# Patient Record
Sex: FEMALE | Race: WHITE | NOT HISPANIC OR LATINO | Employment: UNEMPLOYED | URBAN - METROPOLITAN AREA
[De-identification: names, ages, dates, MRNs, and addresses within clinical notes are randomized per-mention and may not be internally consistent; named-entity substitution may affect disease eponyms.]

---

## 2017-07-20 ENCOUNTER — TELEPHONE (OUTPATIENT)
Dept: NEUROLOGY | Facility: CLINIC | Age: 27
End: 2017-07-20

## 2017-07-20 NOTE — TELEPHONE ENCOUNTER
----- Message from RT Quinn sent at 7/20/2017 11:54 AM CDT -----  Contact: pt     pt , requesting an appt much sooner than 10/19/2017 referred to by her oral surgeon, Dr. Mohan, for nerve damage / migraine head aches, appt was put on wait list, thanks.

## 2017-09-06 ENCOUNTER — TELEPHONE (OUTPATIENT)
Dept: NEUROLOGY | Facility: CLINIC | Age: 27
End: 2017-09-06

## 2017-09-06 NOTE — TELEPHONE ENCOUNTER
Spoke with the pt, reports she was attempting to get in sooner. Pt will keep the appt for tomorrow. Informed her she would need to collect all information from the dentist that severed the nerve in face. Verbalized understanding and will bring documentation. I do not see referral in the system, the pt was scheduled by the phone room with Dr. Diaz in October as well.

## 2017-09-06 NOTE — TELEPHONE ENCOUNTER
----- Message from Stephanie Ugalde sent at 9/6/2017  1:24 PM CDT -----  Patient requesting to speak with nurse concerning tomorrow's appointment/requesting to be seen today if possible/please call back at [phone] to advise.

## 2017-09-07 ENCOUNTER — OFFICE VISIT (OUTPATIENT)
Dept: NEUROLOGY | Facility: CLINIC | Age: 27
End: 2017-09-07
Payer: COMMERCIAL

## 2017-09-07 VITALS
BODY MASS INDEX: 47.48 KG/M2 | HEART RATE: 115 BPM | HEIGHT: 65 IN | SYSTOLIC BLOOD PRESSURE: 132 MMHG | DIASTOLIC BLOOD PRESSURE: 88 MMHG | WEIGHT: 285 LBS | RESPIRATION RATE: 18 BRPM

## 2017-09-07 DIAGNOSIS — M79.2 NEUROPATHIC PAIN: ICD-10-CM

## 2017-09-07 DIAGNOSIS — G50.9 TRIGEMINAL NEUROPATHY: Primary | ICD-10-CM

## 2017-09-07 PROCEDURE — 3008F BODY MASS INDEX DOCD: CPT | Mod: S$GLB,,, | Performed by: PSYCHIATRY & NEUROLOGY

## 2017-09-07 PROCEDURE — 99204 OFFICE O/P NEW MOD 45 MIN: CPT | Mod: S$GLB,,, | Performed by: PSYCHIATRY & NEUROLOGY

## 2017-09-07 PROCEDURE — 99999 PR PBB SHADOW E&M-EST. PATIENT-LVL III: CPT | Mod: PBBFAC,,, | Performed by: PSYCHIATRY & NEUROLOGY

## 2017-09-07 RX ORDER — FLUOXETINE HYDROCHLORIDE 40 MG/1
60 CAPSULE ORAL
COMMUNITY
End: 2017-09-07 | Stop reason: SDUPTHER

## 2017-09-07 RX ORDER — OXCARBAZEPINE 600 MG/1
600 TABLET, FILM COATED ORAL 2 TIMES DAILY
Qty: 60 TABLET | Refills: 3 | Status: SHIPPED | OUTPATIENT
Start: 2017-09-07 | End: 2017-10-27 | Stop reason: SDUPTHER

## 2017-09-07 RX ORDER — KETOROLAC TROMETHAMINE 10 MG/1
10 TABLET, FILM COATED ORAL EVERY 6 HOURS
COMMUNITY
End: 2017-10-17

## 2017-09-07 RX ORDER — FLUOXETINE HYDROCHLORIDE 20 MG/1
60 CAPSULE ORAL DAILY
Qty: 90 CAPSULE | Refills: 0 | Status: SHIPPED | OUTPATIENT
Start: 2017-09-07 | End: 2017-10-04 | Stop reason: SDUPTHER

## 2017-09-07 NOTE — PATIENT INSTRUCTIONS
WORKUP:  -- none     PREVENTION (use daily regardless of headache):  -- start oxcarbazepine (trileptal) 300mg twice a day and increase to 600mg twice a day according to chart   (trileptal can elevate prozac levels)    ACUTE TREATMENT:  -- start Nucynta 50mg 2x per day as needed for severe pain     ---------------------------------------------------------------------------------    Oxcarbazepine (Trileptal brand)     Oxcarbazepine is an anti-epileptic medication that can be beneficial for painful neurological conditions. While it is only FDA approved for certain types of seizures, it has proven useful in the treatment of trigeminal and other painful neuralgias. It does not appear to help with migraines. While it is generally quite safe and well tolerated, it may cause side effects, including but not limited to the following:   Dizziness   Rash   Low blood sodium (blood test)   Sleepiness / sedation   Unsteadiness   Difficulty thinking or concentrating   Suicidal thoughts (a rare problem with any seizure drug)     Serious reactions are rare. Dosing should be increased gradually. It is usually administered twice a day. Use the following schedule as a guide to find the lowest dose that seems to be effective for you. Check off each dose increase as you go. Usually the dose is increased every 3-5 days.     Low dosing is done with 300mg tablets, higher dosing with 600mg tablets. The tablets can be easily split with a knife or pill-cutter.     Oxcarbazepine dosing using 300 or 600 mg tabs   # of Tablets                                    Check off Each Day   Morning  Evening  1  2  3  (4)  (5)    1/2  1/2         1/2  1         1  1         1  1 1/2         1 1/2  1 1/2         1 1/2  2         2  2           Do not increase dose by more than ½ tablet per day every 3 days. If needed, you can increase more slowly than suggested. Stop increasing your dose if you experience side effects. If your condition responds to the  medication at a lower strength, stop increasing your dose - the idea is to find the minimally effective strength you need to feel better. If you are better at 1 pill twice a day, then that is your dose!

## 2017-09-07 NOTE — PROGRESS NOTES
"Date of service: 9/7/2017  Referring provider: Aaareferral Self    Subjective:      Chief complaint: Facial Pain       Patient ID: Jasper Guerrero is a 26 y.o. lady with fibromyalgia who presents for the evaluation of facial pain. She is a new patient to me.     History of Present Illness    Headache History:  Age at onset and course over time: intermittent migraines but then August 12th, 2016 was having lower wisdom teeth removed by a local dentist, this was a difficult extraction, never regained feeling in right half of her tongue, was initially told it was local anesthetic effect. Pain in the bottom molars radiating "everywhere" started shortly thereafter. 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 She was urgently referred to orofacial surgeon at [facility] and she underwent grafting of cadaver lingual nerve in Oct 2016 as well as removal of a neuroma that was found during surgery; with some resolution to pain but the sensation to the right tongue ever came back. Now the pain has intensified again. She reports septocaine (articaine) was used for the initial extraction.   Location: right jaw (inside) radiating up the temple and across the forehead; +trigger area involving the right gum (buccal surface) that will briefly cause neuropathic sensations when touched   Quality: stabbing, throbbing, sharp   Severity:7-10/10   Duration: constant   Frequency: daily  Alleviated by: none  Exacerbated by: none  Associated with: nausea, dizziness, irritability, anger, anxiety, problem with memory and relaxation; loss of sensation/taste to anterior right tongue (can not taste hot wings on the right tongue)  Sleep habits:  Caffeine intake: 2-3 per day     Current acute treatment - multiple times per day, daily has developed erosive gastritis and black stools as a result   -- ibuprofen  -- excedrin  -- toradol   -- tylenol " "    Current prevention:  -- none   (fluoxetine 60mg for severe anxiety and severe depression)    Previously tried/failed acute treatment:  -- tylenol  -- aspirin  -- toradol  -- naproxen  -- fioricet  -- norco  -- percocet   -- Nucynta - helped the most     Previously tried/failed preventative treatment:  -- gabapentin - developed night gonzales   -- pregabalin - "couldn't feel legs"    Review of patient's allergies indicates:   Allergen Reactions    Penicillins      Current Outpatient Prescriptions   Medication Sig Dispense Refill    fluoxetine (PROZAC) 20 MG capsule Take 3 capsules (60 mg total) by mouth once daily. 90 capsule 0    ketorolac (TORADOL) 10 mg tablet Take 10 mg by mouth every 6 (six) hours.      oxcarbazepine (TRILEPTAL) 600 MG Tab Take 1 tablet (600 mg total) by mouth 2 (two) times daily. 60 tablet 3    tapentadol (NUCYNTA) 50 mg Tab Take 1 tablet (50 mg total) by mouth 2 (two) times daily as needed (severe pain). 60 tablet 0     No current facility-administered medications for this visit.        Past Medical History  Past Medical History:   Diagnosis Date    Fibromyalgia        Past Surgical History  Past Surgical History:   Procedure Laterality Date    MOUTH SURGERY         Family History  Family History   Problem Relation Age of Onset    Family history unknown: Yes       Social History  Social History     Social History    Marital status:      Spouse name: N/A    Number of children: N/A    Years of education: N/A     Occupational History    Not on file.     Social History Main Topics    Smoking status: Never Smoker    Smokeless tobacco: Never Used    Alcohol use No    Drug use: No    Sexual activity: Yes     Partners: Male     Other Topics Concern    Not on file     Social History Narrative    No narrative on file        Review of Systems  Constitutional: no fever, no chills  Eyes: no change to vision, no redness, no tearing  Ears, nose, mouth, throat: no hearing loss, no " tinnitus, no rhinorrhea, no difficulty swallowing   Cardiovascular: + palpitations  Respiratory: no shortness of breath  Gastrointestinal: no diarrhea, no constipation +nausea/vomiting   Musculoskeletal: no joint pain  Skin: no rashes  Neurologic: + numbness, weakness, change to speech, loss of coordination   Endocrine: no heat or cold intolerance + fatigue   Psych: + severe depression and anxiety     Objective:        Vitals:    09/07/17 1308   BP: 132/88   Pulse: (!) 115   Resp: 18     Body mass index is 47.43 kg/m².    Constitutional: appears in no acute distress, well-developed, well-nourished      Eyes: normal conjunctiva, PERRLA, optic discs are flat and sharp bilaterally     Ears, nose, mouth, throat: external appearance of ears and nose normal, hearing intact to finger rub     Cardiovascular: regular rate and rhythm, no murmurs appreciated, no carotid bruits     Respiratory: unlabored respirations, breath sounds normal bilaterally    Musculoskeletal: normal tone in all four extremities. No atrophy. No abnormal movements. Strength in all muscles groups of the upper and lower extremities is 5/5. Normal gait and station. Digits and nails normal.      Spine: cervical spine with normal range of motion     Psychiatric: normal judgment and insight. Oriented to person, place, and time.     Neurologic:   Cortical functions: recent and remote memory intact, normal attention span and concentration, speech fluent, adequate fund of knowledge   Cranial nerves: visual fields full, PERRLA, EOMI, facial sensation reduced to light touch in right V2 and V3 but intact to temperature in bialteral V1-V3, symmetric facial strength, hearing intact to finger rub, palate elevates symmetrically, shoulder shrug 5/5, tongue protrudes midline and has full strength   Reflexes: 2+ in the upper and lower extremities  Sensation: intact to light touch and temperature   Coordination: normal finger to noise    Data Review:     No results found  "for this or any previous visit.    No results found for: BNP, NA, K, MG, CL, CO2, BUN, CREATININE, GLU, HGBA1C, MG, AST, ALT, ALBUMIN, PROT, BILITOT, CHOL, HDL, LDLCALC, TRIG    No results found for: WBC, HGB, HCT, MCV, PLT    No results found for: TSH    Assessment & Plan:       Problem List Items Addressed This Visit     None      Visit Diagnoses     Trigeminal neuropathy    -  Primary    Relevant Medications    oxcarbazepine (TRILEPTAL) 600 MG Tab    tapentadol (NUCYNTA) 50 mg Tab    Neuropathic pain        Relevant Medications    oxcarbazepine (TRILEPTAL) 600 MG Tab    tapentadol (NUCYNTA) 50 mg Tab              Ms. Guerrero experiences severe, constant, neuropathic type pain in the right jaw radiating to the temple and forehead associated with numbness in the anterior tongue (right side), present since undergoing a wisdom tooth extraction in August 2016. 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 She has been on 2 neuropathic agents - gabapentin and pregabalin - which she has not tolerated at all. She "takes the edge off" the pain with significant doses of multiple NSAIDs daily and has now developed erosive gastritis. In this case, I would like to treat this akin to a trigeminal neuralgia/neuropathy (neuropathy, given sensory loss) since essentially this is a secondary trigeminal neuralgia. Since the best evidence for trigeminal neuralgia exists with carbamazepine, I will trial this but actually use oxcarbazepine for improved tolerance. I have advised with written instructions on a slow titration regimen to improve tolerance. For as needed relief, she must at once stop NSAIDs given the " gastrointestinal complications she has developed, and because this is ineffective for a neuropathic mechanism. I will instead prescribe Nucynta, and while I am not in favor of long term opiate therapy in general, I think short courses of this medication are important and indicated given the need for a non NSAID, anti-neuropathic pain type medicine. Nucynta satisfies this role given norepinephrine reuptake blocking activity. I am hoping that as oxcarbazepine is titrated, she will have lower need for Nucynta.     She asked me to refill her Prozac for the capsule form rather than tablet. Current dose is 60mg so I refilled it as 20mg x 3 per day without refills.     WORKUP:  -- none     PREVENTION (use daily regardless of headache):  -- start oxcarbazepine (trileptal) 300mg (half tab) and raise according to chart until 600mg BID is reached  (trileptal can elevate prozac levels)    ACUTE TREATMENT:  -- start Nucynta IR 50mg BID prn severe pain #60, no refills. Paper script.       Return in about 4 weeks (around 10/5/2017). for medication refill.     Sangeeta Hart MD

## 2017-09-12 ENCOUNTER — TELEPHONE (OUTPATIENT)
Dept: NEUROLOGY | Facility: CLINIC | Age: 27
End: 2017-09-12

## 2017-09-12 NOTE — TELEPHONE ENCOUNTER
----- Message from Joshua Bermudez sent at 9/11/2017 12:33 PM CDT -----  Contact: patient  Patient called requesting a script of anti itch. [facility] [place]. Please call back at [phone] when script has been sent. Thanks,

## 2017-09-12 NOTE — TELEPHONE ENCOUNTER
Noted. Returned call to patient, she will buy an over the counter cream and if this doesn't work we will call in hydroxyzine tablets.

## 2017-09-12 NOTE — TELEPHONE ENCOUNTER
Returned call and spoke with patient. Patient stated she has taken Nucynta before and it made her itch. Patient states she is itching and she knows it is a side effect. Patient would like anti-itch cream. Please advise.

## 2017-09-12 NOTE — TELEPHONE ENCOUNTER
Tried to call the patient. No answer. Unable to leave message due to busy tone. Will try again at a later time.

## 2017-09-12 NOTE — TELEPHONE ENCOUNTER
Returned call. No answer. Unable to leave message due to busy tone. Will try again at a later time.

## 2017-09-26 ENCOUNTER — TELEPHONE (OUTPATIENT)
Dept: NEUROLOGY | Facility: CLINIC | Age: 27
End: 2017-09-26

## 2017-09-26 NOTE — TELEPHONE ENCOUNTER
----- Message from Rebekah Tirado sent at 9/26/2017  1:28 PM CDT -----  Contact: Patient  Jasper, patient [phone], calling about a refill for next Saturday 10/7/17 Rx tapentadol (NUCYNTA) 50 mg Tab. Patient would like to  the hard copy Rx this week or next so she can have refilled on time. Please advise. Thanks.

## 2017-09-26 NOTE — TELEPHONE ENCOUNTER
S/W pt & advised her that this is a controlled substance & we cannot issue this type of prescription over a week ahead of when is would actually be due to be refilled; she can call back next week, on Wed 10 4 or Thurs 10/5, when Dr Hart is in clinic to see if it can be written at that time; pt verbalizes understanding.

## 2017-10-04 DIAGNOSIS — M79.2 NEUROPATHIC PAIN: ICD-10-CM

## 2017-10-04 DIAGNOSIS — G50.9 TRIGEMINAL NEUROPATHY: ICD-10-CM

## 2017-10-04 RX ORDER — FLUOXETINE HYDROCHLORIDE 20 MG/1
60 CAPSULE ORAL DAILY
Qty: 90 CAPSULE | Refills: 5 | Status: SHIPPED | OUTPATIENT
Start: 2017-10-04 | End: 2017-10-17

## 2017-10-04 NOTE — TELEPHONE ENCOUNTER
Returned call and spoke with patient. Informed her that Dr. Hart refilled Nucynta and it would be at the . Patient stated her  Sanjay Guerrero may  the Rx. Advised to bring either her/his ID as we will check them. Patient verbalized understanding.

## 2017-10-04 NOTE — TELEPHONE ENCOUNTER
----- Message from Lashell Flores sent at 10/4/2017 11:42 AM CDT -----  Contact: [phone]  Patient requesting a refill on nucynta, patient requesting to  the prescription and she will bring it to the pharmacy, as of this Saturday she will be out of medication.      Please call patient at [phone].     Thanks!

## 2017-10-17 ENCOUNTER — OFFICE VISIT (OUTPATIENT)
Dept: NEUROLOGY | Facility: CLINIC | Age: 27
End: 2017-10-17
Payer: COMMERCIAL

## 2017-10-17 VITALS — HEIGHT: 65 IN | BODY MASS INDEX: 48.48 KG/M2 | WEIGHT: 291 LBS

## 2017-10-17 DIAGNOSIS — M79.2 NEUROPATHIC PAIN: ICD-10-CM

## 2017-10-17 DIAGNOSIS — G50.9 TRIGEMINAL NEUROPATHY: Primary | ICD-10-CM

## 2017-10-17 DIAGNOSIS — F32.A DEPRESSION, UNSPECIFIED DEPRESSION TYPE: ICD-10-CM

## 2017-10-17 PROCEDURE — 99214 OFFICE O/P EST MOD 30 MIN: CPT | Mod: S$GLB,,, | Performed by: PSYCHIATRY & NEUROLOGY

## 2017-10-17 PROCEDURE — 99999 PR PBB SHADOW E&M-EST. PATIENT-LVL III: CPT | Mod: PBBFAC,,, | Performed by: PSYCHIATRY & NEUROLOGY

## 2017-10-17 RX ORDER — BUPROPION HYDROCHLORIDE 100 MG/1
100 TABLET, EXTENDED RELEASE ORAL DAILY
Refills: 3 | COMMUNITY
Start: 2017-09-27 | End: 2017-12-18

## 2017-10-17 RX ORDER — FLUOXETINE HYDROCHLORIDE 40 MG/1
40 CAPSULE ORAL DAILY
Refills: 1 | COMMUNITY
Start: 2017-10-05 | End: 2018-04-17

## 2017-10-17 RX ORDER — ALPRAZOLAM 1 MG/1
1 TABLET ORAL 2 TIMES DAILY PRN
Refills: 1 | Status: ON HOLD | COMMUNITY
Start: 2017-10-05 | End: 2019-02-10 | Stop reason: CLARIF

## 2017-10-17 NOTE — PROGRESS NOTES
"Date of service: 10/17/2017  Referring provider: No ref. provider found    Subjective:      Chief complaint: Follow-up       Patient ID: Jasper Guerrero is a 26 y.o. lady with fibromyalgia, depression, and trigeminal neuropathic pain who presents for follow up.     History of Present Illness    INTERVAL HISTORY     At last visit during which she was a new patient to me she was started on trileptal titration for prevention of trigeminal neuropathic pain and nucynta for acute treatment. She had called in saying she was doing very well.     Today she reports she had been pretty much pain free on the above regimen, until this recent week when she had an unfortunate number of very said circumstances occur - her close friend's young brother passed away from Soler sarcoma, and a relative passed away as well. She has been scratching her right forearm to deal with emotional pain.     She reports at one point she mis-read the chart on the trileptal and was taking 1200mg BID rather than 600mg BID. When she took this high dose, she developed an itchy rash and went to the Ed. She was evaluated and it was determined she had misunderstood the directions and when she went back to 600mg BID the rash went away. The nucynta works well at 50mg but recently has not been lasting more than about 4 hours.    The patient shows no worrisome behaviors in regards to her medications. She has no adverse effects. Analgesia is adequate just not lasting long.     She does not have thoughts of killing herself. She said she would never do that as she has to be present for her young son.     ORIGINAL PAIN HISTORY - 9/7/17  Age at onset and course over time: intermittent migraines but then August 12th, 2016 was having lower wisdom teeth removed by a local dentist, this was a difficult extraction, never regained feeling in right half of her tongue, was initially told it was local anesthetic effect. Pain in the bottom molars radiating "everywhere" " "started shortly thereafter. 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 She was urgently referred to orofacial surgeon at [facility] and she underwent grafting of cadaver lingual nerve in Oct 2016 as well as removal of a neuroma that was found during surgery; with some resolution to pain but the sensation to the right tongue ever came back. Now the pain has intensified again. She reports septocaine (articaine) was used for the initial extraction.   Location: right jaw (inside) radiating up the temple and across the forehead; +trigger area involving the right gum (buccal surface) that will briefly cause neuropathic sensations when touched   Quality: stabbing, throbbing, sharp   Severity:7-10/10   Duration: constant   Frequency: daily  Alleviated by: none  Exacerbated by: none  Associated with: nausea, dizziness, irritability, anger, anxiety, problem with memory and relaxation; loss of sensation/taste to anterior right tongue (can not taste hot wings on the right tongue)  Sleep habits:  Caffeine intake: 2-3 per day     Current acute treatment -   -- nucynta IR 50mg BID prn     Current prevention:  -- oxcarbazepine 600mg BID   (fluoxetine 60mg for severe anxiety and severe depression)    Previously tried/failed acute treatment: NSAIDs multiple times per day, daily has developed erosive gastritis and black stools as a result   -- tylenol  -- aspirin  -- toradol  -- naproxen  -- fioricet  -- norco  -- percocet   -- Nucynta - helped the most     Previously tried/failed preventative treatment:  -- gabapentin - developed night gonzales   -- pregabalin - "couldn't feel legs"    Review of patient's allergies indicates:   Allergen Reactions    Penicillins      Current Outpatient Prescriptions   Medication Sig Dispense Refill    alprazolam (XANAX) 1 MG tablet Take 1 mg by mouth 2 (two) times daily as needed.  1    buPROPion " (WELLBUTRIN SR) 100 MG TBSR 12 hr tablet Take 100 mg by mouth once daily.  3    fluoxetine (PROZAC) 40 MG capsule Take 80 mg by mouth once daily.  1    oxcarbazepine (TRILEPTAL) 600 MG Tab Take 1 tablet (600 mg total) by mouth 2 (two) times daily. 60 tablet 3    tapentadol (NUCYNTA) 50 mg Tab Take 1 tablet (50 mg total) by mouth once as needed (severe pain). X 20 days. Add to previous prescription for total 50mg PO TID prn. 20 tablet 0     No current facility-administered medications for this visit.        Past Medical History  Past Medical History:   Diagnosis Date    Fibromyalgia        Past Surgical History  Past Surgical History:   Procedure Laterality Date    MOUTH SURGERY         Family History  Family History   Problem Relation Age of Onset    Family history unknown: Yes       Social History  Social History     Social History    Marital status:      Spouse name: N/A    Number of children: N/A    Years of education: N/A     Occupational History    Not on file.     Social History Main Topics    Smoking status: Never Smoker    Smokeless tobacco: Never Used    Alcohol use No    Drug use: No    Sexual activity: Yes     Partners: Male     Other Topics Concern    Not on file     Social History Narrative    No narrative on file        Review of Systems  Constitutional: no fever, no chills  Eyes: no change to vision, no redness, no tearing  Ears, nose, mouth, throat: no hearing loss, no tinnitus, no rhinorrhea, no difficulty swallowing   Cardiovascular: + palpitations  Respiratory: no shortness of breath  Gastrointestinal: no diarrhea, no constipation +nausea/vomiting   Musculoskeletal: no joint pain  Skin: no rashes  Neurologic: + numbness, weakness, change to speech, loss of coordination   Endocrine: no heat or cold intolerance + fatigue   Psych: + severe depression and anxiety     Objective:        There were no vitals filed for this visit.  Body mass index is 48.43 kg/m².    General: Well  developed, well nourished. She is sad and eyes appear that she had been crying.   HEENT: Atraumatic, normocephalic.  Neck: Trachea midline  Cardiovascular: Vitals reviewed. Normal peripheral perfusion.   Pulmonary: No increased work of breathing.  Abdomen/GI: No guarding  Musculoskeletal: No obvious joint deformities, moves all extremities symmetrically and well.     Neurological exam:  Mental status: Awake and alert. Oriented to situation.  Speech fluent and appropriate. Recent and remote memory appear to be intact.  Fund of knowledge normal.  Cranial nerves: Pupils equal round, extraocular movements intact, facial strength intact bilaterally, hearing grossly intact bilaterally.  Gait: Normal       Data Review:     No results found for this or any previous visit.    No results found for: BNP, NA, K, MG, CL, CO2, BUN, CREATININE, GLU, HGBA1C, MG, AST, ALT, ALBUMIN, PROT, BILITOT, CHOL, HDL, LDLCALC, TRIG    No results found for: WBC, HGB, HCT, MCV, PLT    No results found for: TSH    Assessment & Plan:       Problem List Items Addressed This Visit     Trigeminal neuropathy - Primary    Relevant Medications    tapentadol (NUCYNTA) 50 mg Tab    Neuropathic pain    Relevant Medications    tapentadol (NUCYNTA) 50 mg Tab      Other Visit Diagnoses     Depression, unspecified depression type        Relevant Orders    Ambulatory Referral to Psychiatry              Ms. Guerrero has right trigeminal neuropathic pain related to traumatic injury to the lingual nerve. She was doing very well when we first initiated titration of trileptal, now 600mg BID and nucynta IR 50mg BID prn but has since experienced flare of pain in the setting of multiple significant life events. I will raise her trileptal gradually, and have her monitor for return of rash in which case I would have her go back to the previous lower dose. I will raise nucynta IR to 50mg TID prn.     For her depression I would like her to see one of our [facility]  psychiatrists as the relationship she has with her current one is not therapeutic.     WORKUP:  -- none     Refer to Dr. Swati Tripp ([facility] in [place])     PREVENTION (use daily regardless of headache):  -- raise oxcarbazepine to 900mg (1.5 tablets) and 600mg (1 tablet) x 3-5 days, if tolerating, but still in pain, raise to 900mg twice a day and let me know how this works for next refill     ACUTE TREATMENT:  -- raise Nucynta IR to 50mg three times a day as needed. #20 with zero refills prescribed today to add to 10/5/17 script.       Return in about 2 months (around 12/17/2017). for medication refill.     Sangeeta Hart MD

## 2017-10-17 NOTE — PATIENT INSTRUCTIONS
WORKUP:  -- none     Refer to Dr. Swati Tripp ([facility] psychiatrist in [place])     PREVENTION (use daily regardless of headache):  -- raise oxcarbazepine to 900mg (1.5 tablets) and 600mg (1 tablet) x 3-5 days, if tolerating, but still in pain, raise to 900mg twice a day and let me know how this works for next refill     ACUTE TREATMENT:  -- raise Nucynta IR to 50mg three times a day as needed. #20 with zero refills prescribed today to add to 10/5/17 script.

## 2017-10-18 DIAGNOSIS — M79.2 NEUROPATHIC PAIN: ICD-10-CM

## 2017-10-18 DIAGNOSIS — G50.9 TRIGEMINAL NEUROPATHY: ICD-10-CM

## 2017-10-18 NOTE — TELEPHONE ENCOUNTER
Spoke with pharmacist at [facility], stated that patient got the previous prescription at [facility] and that they did not have the medication in stock; they advised patient to get medication at [facility] again. Spoke with patient, she said that she can  the prescription from [facility] if you will send the prescription there because they always have it in stock.

## 2017-10-18 NOTE — TELEPHONE ENCOUNTER
----- Message from Diane Gibbs sent at 10/18/2017  1:16 PM CDT -----  Contact: patient  Patient calling in regards to a prescription change for Nucynta. The Pharmacy won't fill the prescription for 10 days. She was told it is too soon. Please advise.  Call back   Thanks!    [facility] [place] - [address] [facility]  [address] [facility] [place] [address]  Phone: [phone] Fax: [phone]

## 2017-10-19 ENCOUNTER — TELEPHONE (OUTPATIENT)
Dept: PSYCHIATRY | Facility: CLINIC | Age: 27
End: 2017-10-19

## 2017-10-19 NOTE — TELEPHONE ENCOUNTER
----- Message from Swati Tripp MD sent at 10/18/2017  4:46 PM CDT -----  Do we accept pt's insurance - [employer]?      ----- Message -----  From: Sangeeta Hart MD  Sent: 10/17/2017   5:03 PM  To: MD Dr. Qasim Barrientos,    I'm not sure if you are taking new patients, but would you please consider following my patient Ms. Jasper Guerrero. She is a very sweet 26 year old who I see for oral pain related to a dental procedure. She has depression and recently started hurting herself to cope with multiple close friend deaths. She has started seeing another psychiatrist who has really demonstrated some unprofessional behavior based on her description and I am quite worried for her, would like her to see someone excellent. If you can not see her could you please advise who else you would recommend.     Thank you!    Sangeeta Hart  Neurology

## 2017-10-19 NOTE — TELEPHONE ENCOUNTER
I do not see her specific insurance on our coverage list- but that does not mean she is not covered. She should call her insurance company for a definite answer because they have the most accurate information    I attempted to call patient. No answer- and unable to leave voicemail. Will reattempt later in day

## 2017-10-20 ENCOUNTER — TELEPHONE (OUTPATIENT)
Dept: NEUROLOGY | Facility: CLINIC | Age: 27
End: 2017-10-20

## 2017-10-20 NOTE — TELEPHONE ENCOUNTER
----- Message from Kailey Hu sent at 10/19/2017  3:20 PM CDT -----  Contact: self  Patient called regarding recent appt yesterday. Was prescribed NUCYNTA 50 mg Tab. Stating was taking twice a day but was changed to 3 times a day for 20 tablets. Pharmacy advising cannot receive until 10/29 because of an issue with how the prescription is written. Need medication now. Please contact [phone] (home)     [facility] [id number]  [place] - [address] AT [facility] [address]  [address] [place] [address]  Phone: [phone] Fax: [phone]

## 2017-10-20 NOTE — TELEPHONE ENCOUNTER
----- Message from Macy Flaherty sent at 10/20/2017  4:20 PM CDT -----  Contact: self  Patient is returning call.  Please call patient at [phone].  Thanks!

## 2017-10-20 NOTE — TELEPHONE ENCOUNTER
Returned call and spoke with patient. Informed her that her insurance would not pay for her medication before 10/29. Pharmacist Kirit at [facility] states that he could override the prescription on 10/25. Patient verbalized understanding.

## 2017-10-24 ENCOUNTER — HOSPITAL ENCOUNTER (EMERGENCY)
Facility: HOSPITAL | Age: 27
Discharge: HOME | End: 2017-10-24
Attending: EMERGENCY MEDICINE
Payer: COMMERCIAL

## 2017-10-24 ENCOUNTER — TELEPHONE (OUTPATIENT)
Dept: NEUROLOGY | Facility: CLINIC | Age: 27
End: 2017-10-24

## 2017-10-24 VITALS
HEIGHT: 65 IN | OXYGEN SATURATION: 99 % | RESPIRATION RATE: 16 BRPM | BODY MASS INDEX: 46.65 KG/M2 | SYSTOLIC BLOOD PRESSURE: 137 MMHG | DIASTOLIC BLOOD PRESSURE: 80 MMHG | TEMPERATURE: 98 F | WEIGHT: 280 LBS | HEART RATE: 97 BPM

## 2017-10-24 DIAGNOSIS — F32.A DEPRESSION, UNSPECIFIED DEPRESSION TYPE: Primary | ICD-10-CM

## 2017-10-24 PROCEDURE — 96372 THER/PROPH/DIAG INJ SC/IM: CPT

## 2017-10-24 PROCEDURE — 99283 EMERGENCY DEPT VISIT LOW MDM: CPT | Mod: 25

## 2017-10-24 PROCEDURE — 63600175 PHARM REV CODE 636 W HCPCS: Performed by: EMERGENCY MEDICINE

## 2017-10-24 RX ORDER — ZIPRASIDONE MESYLATE 20 MG/ML
10 INJECTION, POWDER, LYOPHILIZED, FOR SOLUTION INTRAMUSCULAR
Status: COMPLETED | OUTPATIENT
Start: 2017-10-24 | End: 2017-10-24

## 2017-10-24 RX ADMIN — ZIPRASIDONE MESYLATE 10 MG: 20 INJECTION, POWDER, LYOPHILIZED, FOR SOLUTION INTRAMUSCULAR at 10:10

## 2017-10-24 NOTE — TELEPHONE ENCOUNTER
----- Message from RT Quinn sent at 10/23/2017  3:41 PM CDT -----  Contact: pt    pt , requesting a call back concerning her medication refill, thanks.

## 2017-10-25 ENCOUNTER — TELEPHONE (OUTPATIENT)
Dept: NEUROLOGY | Facility: CLINIC | Age: 27
End: 2017-10-25

## 2017-10-25 NOTE — ED NOTES
"Presents to the ER with c/o increased depression. Patient reports self harm with "Scratching her legs and wrists when I become anxious." Patient is requesting her home meds to be changed. Denies HI or SI. Patient had a recent loss of her best friend, her brother is in penitentiary, her uncle is ill and she has a fear of going outside. 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 Patient has scarring to her bilateral wrists from scratching and redness to left upper thigh because she became anxious in room. Patient reports that she is compliant with her medications.  Mucous membranes are pink and moist. Skin is warm, dry and intact. Lungs are clear bilaterally, respirations are regular and unlabored. Denies cough, congestion, rhinorrhea or SOB. BS active x4, no tenderness with palpation, abd is soft and not distended. Denies any appetite or activity change. S1S2, capillary refill is < 2 seconds. Denies dysuria, difficulty urinating, frequency, numbness, tingling or weakness. MARQUIS VSS    "

## 2017-10-25 NOTE — TELEPHONE ENCOUNTER
----- Message from Diane Gibbs sent at 10/24/2017  2:24 PM CDT -----  Contact: patient  Patient calling to speak with the Nurse about requesting a new prescription. Please advise.  Call back   Thanks!

## 2017-10-25 NOTE — ED NOTES
Upon discharge, patient is AAOx4, no cardiac or respiratory complications. Follow up care reviewed with patient and has been instructed to return to the ER if needed. Patient verbalized understanding and ambulated to the lobby without difficulty. ADALBERTO CHO    The importance of making an appointment with Dr. Brice in the morning has been reviewed with the patient. Patient verbalized understanding and was discharged with her  who was in the waiting room.

## 2017-10-25 NOTE — TELEPHONE ENCOUNTER
----- Message from Stephanie Ugalde sent at 10/24/2017  4:38 PM CDT -----  Patient stated she spoke with [facility]'s pharmacy at [phone] (Mario)/was told if doctor calls in prescription for 90 tabs they can disregard prescription for 20 tabs and fill/please order and call patient back at [phone] to confirm.

## 2017-10-25 NOTE — TELEPHONE ENCOUNTER
Returned call and spoke with patient. Informed her that the insurance approved her medication. Patient verbalized understanding.

## 2017-10-25 NOTE — ED PROVIDER NOTES
"Encounter Date: 10/24/2017    SCRIBE #1 NOTE: I, Maia Cosby, am scribing for, and in the presence of,  Dr. Parr. I have scribed the entire note.       History     Chief Complaint   Patient presents with    Depression     pt has been scratching skin on legs and wrists; feels she needs help; denies suicidal/homocidal ideation       10/24/2017 10:20 PM     Chief complaint: Depression and anxiety       Jasper Guerrero is a 26 y.o. female who presents to the ED with concern for depression and anxiety with associated self-harm. Patient says that "recently" she "feels hopeless" and has been scratching the skin on her wrists and upper thighs. Last year, the patient's lingual nerve was severed and she was at home "for awhile." Since being at home for an extended period of time, she now has "a fear of going outside and being around other people." Patient states that she "needs help" and wants her "medications changed" so she can feel like herself again. Patient denies having SI, HI, or having any auditory or visual hallucinations. She is compliant with Xanax, Wellbutrin, Prozac, and Trileptal. Patient's current psychiatrist is Dr. Brendon Elliott in [place]. Her PMHx includes depression, agoraphobia, PTSD, and fibromyalgia. Patient's PSHx includes a mouth surgery.       The history is provided by the patient.     Review of patient's allergies indicates:   Allergen Reactions    Penicillins      Past Medical History:   Diagnosis Date    Agoraphobia     Depression     Fibromyalgia     PTSD (post-traumatic stress disorder)      Past Surgical History:   Procedure Laterality Date    MOUTH SURGERY       Family History   Problem Relation Age of Onset    Family history unknown: Yes     Social History   Substance Use Topics    Smoking status: Never Smoker    Smokeless tobacco: Never Used    Alcohol use No     Review of Systems   Constitutional: Negative for activity change, appetite change, chills, fatigue and fever. "        Positive for depression and anxiety.    Eyes: Negative for visual disturbance.   Respiratory: Negative for apnea and shortness of breath.    Cardiovascular: Negative for chest pain and palpitations.   Gastrointestinal: Negative for abdominal distention and abdominal pain.   Genitourinary: Negative for difficulty urinating.   Musculoskeletal: Negative for neck pain.   Skin: Negative for pallor and rash.   Neurological: Negative for headaches.   Hematological: Does not bruise/bleed easily.   Psychiatric/Behavioral: Positive for self-injury (lacerations to wrists and upper thighs.). Negative for agitation, hallucinations and suicidal ideas. The patient is nervous/anxious.         Negative for homicidal ideations.        Physical Exam     Initial Vitals [10/24/17 2210]   BP Pulse Resp Temp SpO2   137/80 97 16 98.2 °F (36.8 °C) 99 %      MAP       99         Physical Exam    Nursing note and vitals reviewed.  Constitutional: She appears well-developed and well-nourished.   HENT:   Head: Normocephalic and atraumatic.   Eyes: Conjunctivae are normal. Pupils are equal, round, and reactive to light.   Neck: Normal range of motion. Neck supple.   Cardiovascular: Normal rate, regular rhythm and normal heart sounds. Exam reveals no gallop and no friction rub.    No murmur heard.  Pulmonary/Chest: Effort normal and breath sounds normal. No respiratory distress. She has no wheezes. She has no rhonchi. She has no rales.   Abdominal: Soft. She exhibits no distension. There is no tenderness.   Musculoskeletal: Normal range of motion.   Neurological: She is alert and oriented to person, place, and time.   Skin: Skin is warm and dry. Laceration noted. No erythema.   Superficial lacerations to forearms bilaterally.    Psychiatric: Thought content normal. She is not actively hallucinating. Thought content is not delusional. She exhibits a depressed mood. She expresses no suicidal plans and no homicidal plans.   Depressed mood.  "Good insight. Normal thought process. No delusions or hallucinations.          ED Course   Procedures  Labs Reviewed - No data to display          Medical Decision Making:   History:   Old Medical Records: I decided to obtain old medical records.  ED Management:  Jasper Guerrero is a 26 y.o. female who presents with  a she feels "hopeless".  She denies any suicidal or homicidal ideation.  She has good insight and appears on adequate regimen.  She request alteration of her complex regimen which I do not feel is within my scope of practice.  She is given Geodon 10 mg intramuscularly tonight and referred to outpatient psychiatry.  She is given the option of seeking inpatient psychiatric treatment but would prefer to attempt to arrange outpatient treatment.  She does not appear to pose a risk to herself or others.            Scribe Attestation:   Scribe #1: I performed the above scribed service and the documentation accurately describes the services I performed. I attest to the accuracy of the note.            ED Course      Clinical Impression:     1. Depression, unspecified depression type          Disposition:   Disposition: Discharged  Condition: Stable                        Scott Parr III, MD  10/24/17 3663    "

## 2017-10-25 NOTE — TELEPHONE ENCOUNTER
----- Message from Louann Mcclain sent at 10/24/2017  2:01 PM CDT -----  Contact: pt  Pt states that she got a call and is returning the call...[phone] (home)

## 2017-10-25 NOTE — ED NOTES
Patient encouraged to find a coping mechanism to help with her anxiety. Patient verbalized understanding. Patient aware that her  will need to come into the waiting room before I can discharge her home as she is not able to drive after being medicated. Patient verbalized understanding.

## 2017-10-25 NOTE — TELEPHONE ENCOUNTER
Called and spoke with Kirit at [facility]. He stated he was able to get the prescription for 20 additional tablets to go through and the insurance would pay for it.

## 2017-10-27 DIAGNOSIS — M79.2 NEUROPATHIC PAIN: ICD-10-CM

## 2017-10-27 DIAGNOSIS — G50.9 TRIGEMINAL NEUROPATHY: ICD-10-CM

## 2017-10-27 RX ORDER — OXCARBAZEPINE 600 MG/1
600 TABLET, FILM COATED ORAL 2 TIMES DAILY
Qty: 180 TABLET | Refills: 3 | Status: SHIPPED | OUTPATIENT
Start: 2017-10-27 | End: 2017-11-27 | Stop reason: SDUPTHER

## 2017-10-30 DIAGNOSIS — G50.9 TRIGEMINAL NEUROPATHY: Primary | ICD-10-CM

## 2017-10-30 NOTE — TELEPHONE ENCOUNTER
----- Message from Melissa Gardiner sent at 10/30/2017 10:42 AM CDT -----  Contact: carlie   nucynta 50 mg   Needs 90 tabs   [facility]   [phone]  Call back

## 2017-10-31 NOTE — TELEPHONE ENCOUNTER
----- Message from Shira Sellers sent at 10/31/2017 12:49 PM CDT -----  Contact: Patient  Jasper, patient [phone] calling because she received a call earlier stating a prescription was being called in, and the pharmacy has not received anything yet. Calling to speak with someone in the office. Please advise. Thanks.    [address] [address] [address] [facility]  [place] [address]  Phone: [phone] Fax: [phone]

## 2017-11-01 ENCOUNTER — TELEPHONE (OUTPATIENT)
Dept: NEUROLOGY | Facility: CLINIC | Age: 27
End: 2017-11-01

## 2017-11-01 ENCOUNTER — TELEPHONE (OUTPATIENT)
Dept: NEUROSURGERY | Facility: CLINIC | Age: 27
End: 2017-11-01

## 2017-11-01 DIAGNOSIS — G50.9 TRIGEMINAL NEUROPATHY: ICD-10-CM

## 2017-11-01 NOTE — TELEPHONE ENCOUNTER
Ok I'm not sure why this was entered for a 90 day supply. I can refill as I did yesterday to the [facility] on [address] but only for 90 tablets at a time 30 day supply).

## 2017-11-01 NOTE — TELEPHONE ENCOUNTER
Spoke with pt, she reports she does not have any of the Nucynta left due to Dr. Hart increased the frequency 2 weeks ago (Oct 25th pt received an additional #20 Nucynta to cover that increase). Spoke with Mario at her local pharmacy, he reports he did not have #270 in stock and informed the pt he would need to order it as he could not do a partial fill on this medication. Pt refused, reports she needs it today. Came to office requesting a hard copy of the rx to be filled, informed her that Dr. Hart was not in clinic today and the rx would need to be e-scribed. The pt then called the a different [facility] in the [place] area, informed me they do have the medication and she would like the rx to be sent to the [facility] on [address] in [place]. rx sent to Dr. Hart for approval.

## 2017-11-01 NOTE — TELEPHONE ENCOUNTER
----- Message from Jose L Cummins sent at 11/1/2017 11:21 AM CDT -----  Contact: self  [phone]  Patient called asking the nurse to disregard previous message. Thanks!

## 2017-11-01 NOTE — TELEPHONE ENCOUNTER
----- Message from Praveena Blair sent at 11/1/2017 11:04 AM CDT -----  Contact: self   Patient need you to transfer NUCYNTA 1 month supply over to [facility], any questions please call back at [phone] (home)       [facility] in [place]  Phone Number [phone]

## 2017-11-15 ENCOUNTER — TELEPHONE (OUTPATIENT)
Dept: NEUROLOGY | Facility: CLINIC | Age: 27
End: 2017-11-15

## 2017-11-15 NOTE — TELEPHONE ENCOUNTER
----- Message from Mathew Cosme sent at 11/15/2017  4:07 PM CST -----  Contact: pt  Pt is calling to see if she can come  a hard copy of her prescription around 12-1-17 and bring it to the pharmacy and let them fill it when its time  Call Back#[phone]  Thanks

## 2017-11-15 NOTE — TELEPHONE ENCOUNTER
Returned call and spoke with patient. Informed patient that she could receive a hard copy, just to call us back when they time is closer. Patient also stated that since the cold weather started it has been making her pain worse and she has been taking 2-50 mg at a time and having to wait until the evening to take her third one. Patient would like to know if she can increase to 100 mg TID. Please advise.

## 2017-11-16 NOTE — TELEPHONE ENCOUNTER
Returned call and spoke with patient. Informed her that she could  a hard copy as it gets closer to the time to refill. However in order to increase the patient would need to discuss this at her next visit. Patient verbalized understanding.

## 2017-11-27 DIAGNOSIS — M79.2 NEUROPATHIC PAIN: ICD-10-CM

## 2017-11-27 DIAGNOSIS — G50.9 TRIGEMINAL NEUROPATHY: ICD-10-CM

## 2017-11-27 RX ORDER — OXCARBAZEPINE 600 MG/1
600 TABLET, FILM COATED ORAL 2 TIMES DAILY
Qty: 180 TABLET | Refills: 3 | Status: SHIPPED | OUTPATIENT
Start: 2017-11-27 | End: 2018-01-12 | Stop reason: ALTCHOICE

## 2017-11-27 NOTE — TELEPHONE ENCOUNTER
Returned call and spoke with patient. Informed patient Dr. Hart was not in clinic. Patient said that she would be fine if Dr. Hart would send over the prescription electronically  and she will not come  the hard copy. Please advise.

## 2017-11-27 NOTE — TELEPHONE ENCOUNTER
----- Message from Diane Gibbs sent at 11/27/2017 12:34 PM CST -----  Contact: patient  Patient calling to let the Nurse know she will be at the office today to  a hard copy prescription for Nucynta, 90 day supply. Please advise.  Call back   Thanks!

## 2017-11-27 NOTE — TELEPHONE ENCOUNTER
----- Message from June Tucker sent at 11/27/2017  2:08 PM CST -----  Patient is calling to get Vernell to call her back regarding the two prescriptions that office had talked to her earlier about. Please call at [phone].

## 2017-11-28 ENCOUNTER — TELEPHONE (OUTPATIENT)
Dept: NEUROLOGY | Facility: CLINIC | Age: 27
End: 2017-11-28

## 2017-11-28 NOTE — TELEPHONE ENCOUNTER
----- Message from Marixa Camp sent at 11/28/2017 10:04 AM CST -----  Contact: Patient  Patient would like a nurse to call her back regarding a prescription that the doctor was supposed to call in for her yesterday.  Patient is asking for a call back as soon as possible.  tapentadol (NUCYNTA) 50 mg Tab  Call Back#[phone]  Thanks     [facility] [address] - [place] - [address] AT [place] [address]  [address] [address]  Phone: [phone] Fax: [phone]

## 2017-11-28 NOTE — TELEPHONE ENCOUNTER
Returned call and spoke with patient. Informed her that Dr. Hart sent her medications over to [facility] in [facility]. Patient stated she did not want them to go over to that since they are closing. Patient stated she will call and see if they have been filled and she will just pick them up from there. If not she will call back to have them sent over to [facility].

## 2017-11-28 NOTE — TELEPHONE ENCOUNTER
----- Message from Jose L Cummins sent at 11/28/2017 10:30 AM CST -----  Contact: self  [phone]  Patient called stating the rx has been taken care of.Thanks!

## 2017-12-11 ENCOUNTER — TELEPHONE (OUTPATIENT)
Dept: NEUROLOGY | Facility: CLINIC | Age: 27
End: 2017-12-11

## 2017-12-11 NOTE — TELEPHONE ENCOUNTER
Returned call and spoke with patient. Patient stated she was sick and had to reschedule her appointment. Unable to get her in before 01/01. Appointment rescheduled for 01/02 at 2 pm. Patient verbalized understanding.

## 2017-12-11 NOTE — TELEPHONE ENCOUNTER
----- Message from RT Quinn sent at 12/8/2017  2:49 PM CST -----  Contact: Pt    Pt , requesting medication refill: Nucynta  and please send this this medication prescription to [facility] [place], she would like all future prescriptions sent to this pharmacy, please call to confirm, thanks.

## 2017-12-15 ENCOUNTER — TELEPHONE (OUTPATIENT)
Dept: NEUROLOGY | Facility: CLINIC | Age: 27
End: 2017-12-15

## 2017-12-15 NOTE — TELEPHONE ENCOUNTER
----- Message from Diane Gibbs sent at 12/15/2017  4:00 PM CST -----  Contact: patient  Patient calling to schedule an early appt. She is currently scheduled on 1/2/18. Please advise.   Call back   Thanks!

## 2017-12-18 ENCOUNTER — OFFICE VISIT (OUTPATIENT)
Dept: NEUROLOGY | Facility: CLINIC | Age: 27
End: 2017-12-18
Payer: COMMERCIAL

## 2017-12-18 VITALS
WEIGHT: 289.63 LBS | RESPIRATION RATE: 20 BRPM | DIASTOLIC BLOOD PRESSURE: 76 MMHG | HEIGHT: 65 IN | SYSTOLIC BLOOD PRESSURE: 142 MMHG | BODY MASS INDEX: 48.25 KG/M2 | HEART RATE: 108 BPM

## 2017-12-18 DIAGNOSIS — M79.2 NEUROPATHIC PAIN: Primary | ICD-10-CM

## 2017-12-18 DIAGNOSIS — G50.9 TRIGEMINAL NEUROPATHY: ICD-10-CM

## 2017-12-18 PROBLEM — F32.A DEPRESSION: Status: ACTIVE | Noted: 2017-12-18

## 2017-12-18 PROCEDURE — 99999 PR PBB SHADOW E&M-EST. PATIENT-LVL III: CPT | Mod: PBBFAC,,, | Performed by: PSYCHIATRY & NEUROLOGY

## 2017-12-18 PROCEDURE — 99214 OFFICE O/P EST MOD 30 MIN: CPT | Mod: S$GLB,,, | Performed by: PSYCHIATRY & NEUROLOGY

## 2017-12-18 RX ORDER — FLUVOXAMINE MALEATE 100 MG/1
TABLET, COATED ORAL
Refills: 1 | COMMUNITY
Start: 2017-12-14 | End: 2018-04-17

## 2017-12-18 RX ORDER — ONDANSETRON 4 MG/1
4 TABLET, FILM COATED ORAL 2 TIMES DAILY PRN
Refills: 0 | COMMUNITY
Start: 2017-11-22 | End: 2018-05-23

## 2017-12-18 NOTE — PATIENT INSTRUCTIONS
WORKUP:  -- none     PREVENTION (use daily regardless of headache):  -- you are currently taking oxcarbazepine 1200mg twice per day, twice per day, please wean down as follows:  WEEK 1: 900mg in AM / 1200mg in PM  WEEK 2: 900mg in AM / 900mg in PM  WEEK 3: 600mg in AM / 900mg in PM   WEEK 4: 600mg in AM / 600mg in PM   + keep a diary of your headaches and tingling, specifically in relation to trileptal dose     ACUTE TREATMENT:  -- continue Nucynta IR 50mg three times a day as needed (#90 per 30 days, do not fill till 1/1/18) no refill
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
## 2017-12-21 ENCOUNTER — TELEPHONE (OUTPATIENT)
Dept: NEUROLOGY | Facility: CLINIC | Age: 27
End: 2017-12-21

## 2017-12-21 NOTE — TELEPHONE ENCOUNTER
Called and spoke with patient. Appointment reschedule to 01/17 at 3 pm. Patient verbalized understanding.

## 2018-01-12 ENCOUNTER — TELEPHONE (OUTPATIENT)
Dept: NEUROLOGY | Facility: CLINIC | Age: 28
End: 2018-01-12

## 2018-01-12 RX ORDER — OXYCODONE AND ACETAMINOPHEN 7.5; 325 MG/1; MG/1
1 TABLET ORAL 3 TIMES DAILY PRN
Qty: 15 TABLET | Refills: 0 | Status: SHIPPED | OUTPATIENT
Start: 2018-01-12 | End: 2018-01-16 | Stop reason: SDUPTHER

## 2018-01-12 NOTE — TELEPHONE ENCOUNTER
----- Message from Lashell Flores sent at 1/12/2018  4:43 PM CST -----  Contact: [phone]  Patient is requesting a call back from the nurse stated the medication isn't helping, she stated she just found out she's pregnant and want to know if its safe to take the medication. She spoke to the nurse and waiting to hear back.    Please call the patient upon request at phone number [phone].

## 2018-01-12 NOTE — TELEPHONE ENCOUNTER
----- Message from Joshua Bermudez sent at 1/12/2018  4:29 PM CST -----  Contact: patient  Patient stated that she is waiting on a call back regarding  Nucynta .please call back at [phone]. Thanks,

## 2018-01-12 NOTE — TELEPHONE ENCOUNTER
Returned call and spoke with patient. Patient stated she recently just found out she is pregnant. Nucynta has not been working and she would like to know if she could have something else until Tuesday until she sees her OB. Please advise.

## 2018-01-12 NOTE — TELEPHONE ENCOUNTER
----- Message from Louann Mcclain sent at 1/12/2018  1:14 PM CST -----  Contact: pt  Pt calling states just find out pregnant and unsure if tapentadol (NUCYNTA) 50 mg Tab is safe for the baby since 7 1/2 weeks so wants to know if can get something stronger cause the medication is not working right now. Would like NORCO or percocet until Tuesday when see OBGYN doctor..[phone] (home)

## 2018-01-12 NOTE — TELEPHONE ENCOUNTER
----- Message from Lashell Flores sent at 1/12/2018  3:53 PM CST -----  Contact: [phone]  Patient is requesting a call back from the nurse stated the medication isn't helping, she stated she just found out she's pregnant and want to know if its safe to take the medication.   Please call the patient upon request at phone number [phone].

## 2018-01-15 ENCOUNTER — PATIENT MESSAGE (OUTPATIENT)
Dept: NEUROLOGY | Facility: CLINIC | Age: 28
End: 2018-01-15

## 2018-01-15 ENCOUNTER — TELEPHONE (OUTPATIENT)
Dept: NEUROLOGY | Facility: CLINIC | Age: 28
End: 2018-01-15

## 2018-01-15 NOTE — TELEPHONE ENCOUNTER
----- Message from Maggie Clark sent at 1/15/2018 11:15 AM CST -----  Remainder of Rx Percocet sent to [facility].  Please call patient at [phone].

## 2018-01-15 NOTE — TELEPHONE ENCOUNTER
----- Message from Sadie Kam sent at 1/15/2018  4:13 PM CST -----  Contact: self   Patient needs a call back from nurse about her pain medication     Please call back [phone] (home)

## 2018-01-15 NOTE — TELEPHONE ENCOUNTER
Attempted to call patient to inform we are still waiting on Dr. Hart to send the prescription. Busy tone.

## 2018-01-15 NOTE — TELEPHONE ENCOUNTER
----- Message from Maggie Clark sent at 1/15/2018 11:15 AM CST -----  Remainder of Rx Percocet sent to [facility].  Please call patient at [phone].

## 2018-01-15 NOTE — TELEPHONE ENCOUNTER
Spoke with patient. Verified the pharmacy and stated I would call to let her know once the prescription was sent to the pharmacy.

## 2018-01-15 NOTE — TELEPHONE ENCOUNTER
----- Message from Stephanie Ugalde sent at 1/15/2018 12:06 PM CST -----  Patient calling back to verify name of pharmacy previously requested prescription be ordered at/stated [facility] on [facility] in [place]/please call patient back at [phone] to confirm.

## 2018-01-16 ENCOUNTER — PATIENT MESSAGE (OUTPATIENT)
Dept: NEUROLOGY | Facility: CLINIC | Age: 28
End: 2018-01-16

## 2018-01-16 DIAGNOSIS — M79.2 NEUROPATHIC PAIN: ICD-10-CM

## 2018-01-16 DIAGNOSIS — M79.2 NEUROPATHIC PAIN: Primary | ICD-10-CM

## 2018-01-16 RX ORDER — OXYCODONE AND ACETAMINOPHEN 7.5; 325 MG/1; MG/1
1 TABLET ORAL 3 TIMES DAILY PRN
Qty: 6 TABLET | Refills: 0 | Status: SHIPPED | OUTPATIENT
Start: 2018-01-16 | End: 2018-01-16 | Stop reason: SDUPTHER

## 2018-01-16 RX ORDER — OXYCODONE AND ACETAMINOPHEN 7.5; 325 MG/1; MG/1
1 TABLET ORAL 3 TIMES DAILY PRN
Qty: 6 TABLET | Refills: 0 | Status: SHIPPED | OUTPATIENT
Start: 2018-01-16 | End: 2018-01-17 | Stop reason: SDUPTHER

## 2018-01-16 NOTE — TELEPHONE ENCOUNTER
----- Message from Marixa Camp sent at 1/16/2018 10:11 AM CST -----  Contact: Patient  Patient is calling to check the status of her prescription refill that she said was supposed to be called in yesterday and hasn't been.  oxyCODONE-acetaminophen (PERCOCET) 7.5-325 mg per tablet  Call Back#[phone]  Thanks     [facility] [address] [place] [address] & [address] [address]  Phone: [phone] Fax: [phone]

## 2018-01-16 NOTE — TELEPHONE ENCOUNTER
Received call from patient through hospital  this evening around 6:30 with request for percocet for lingual nerve injury - I was on the road, called pt back.  Pt has nucynta 50 mg but was told by her OB not to tale them as she was pregnant, category class C.  Could not advise it was OK to take these instead but recommended she check with OB again, as both nucynta and percocet are category C.     Unfortunately unable to e-Rx controlled medications right now as I don't have 2FA working.  Explained Dr. Hart was out of the office today and not available after hours. She will call office back in the AM.     Reviewed

## 2018-01-17 ENCOUNTER — PATIENT MESSAGE (OUTPATIENT)
Dept: NEUROLOGY | Facility: CLINIC | Age: 28
End: 2018-01-17

## 2018-01-17 DIAGNOSIS — M79.2 NEUROPATHIC PAIN: ICD-10-CM

## 2018-01-17 RX ORDER — OXYCODONE AND ACETAMINOPHEN 7.5; 325 MG/1; MG/1
1 TABLET ORAL 3 TIMES DAILY PRN
Qty: 21 TABLET | Refills: 0 | Status: SHIPPED | OUTPATIENT
Start: 2018-01-17 | End: 2018-01-23 | Stop reason: SDUPTHER

## 2018-01-22 ENCOUNTER — HOSPITAL ENCOUNTER (EMERGENCY)
Facility: HOSPITAL | Age: 28
Discharge: HOME | End: 2018-01-22
Attending: EMERGENCY MEDICINE
Payer: COMMERCIAL

## 2018-01-22 VITALS
SYSTOLIC BLOOD PRESSURE: 136 MMHG | DIASTOLIC BLOOD PRESSURE: 72 MMHG | OXYGEN SATURATION: 98 % | HEART RATE: 97 BPM | BODY MASS INDEX: 48.09 KG/M2 | TEMPERATURE: 98 F | WEIGHT: 289 LBS

## 2018-01-22 DIAGNOSIS — N93.9 VAGINAL BLEEDING: ICD-10-CM

## 2018-01-22 DIAGNOSIS — Z34.90 PREGNANCY, UNSPECIFIED GESTATIONAL AGE: ICD-10-CM

## 2018-01-22 DIAGNOSIS — O20.0 THREATENED MISCARRIAGE IN EARLY PREGNANCY: Primary | ICD-10-CM

## 2018-01-22 LAB
BACTERIA #/AREA URNS HPF: ABNORMAL /HPF
BACTERIA GENITAL QL WET PREP: ABNORMAL
BILIRUB UR QL STRIP: NEGATIVE
CLARITY UR: CLEAR
CLUE CELLS VAG QL WET PREP: ABNORMAL
COLOR UR: YELLOW
FILAMENT FUNGI VAG WET PREP-#/AREA: ABNORMAL
GLUCOSE UR QL STRIP: NEGATIVE
HCG INTACT+B SERPL-ACNC: NORMAL MIU/ML
HGB UR QL STRIP: ABNORMAL
KETONES UR QL STRIP: NEGATIVE
LEUKOCYTE ESTERASE UR QL STRIP: ABNORMAL
MICROSCOPIC COMMENT: ABNORMAL
NITRITE UR QL STRIP: NEGATIVE
PH UR STRIP: 6 [PH] (ref 5–8)
PROT UR QL STRIP: NEGATIVE
RBC #/AREA URNS HPF: 1 /HPF (ref 0–4)
SP GR UR STRIP: 1.02 (ref 1–1.03)
SPECIMEN SOURCE: ABNORMAL
SQUAMOUS #/AREA URNS HPF: 2 /HPF
T VAGINALIS GENITAL QL WET PREP: ABNORMAL
URN SPEC COLLECT METH UR: ABNORMAL
UROBILINOGEN UR STRIP-ACNC: NEGATIVE EU/DL
WBC #/AREA URNS HPF: 5 /HPF (ref 0–5)
WBC #/AREA VAG WET PREP: ABNORMAL
YEAST GENITAL QL WET PREP: ABNORMAL

## 2018-01-22 PROCEDURE — 87088 URINE BACTERIA CULTURE: CPT

## 2018-01-22 PROCEDURE — 99284 EMERGENCY DEPT VISIT MOD MDM: CPT

## 2018-01-22 PROCEDURE — 87077 CULTURE AEROBIC IDENTIFY: CPT

## 2018-01-22 PROCEDURE — 87210 SMEAR WET MOUNT SALINE/INK: CPT

## 2018-01-22 PROCEDURE — 87086 URINE CULTURE/COLONY COUNT: CPT

## 2018-01-22 PROCEDURE — 36415 COLL VENOUS BLD VENIPUNCTURE: CPT

## 2018-01-22 PROCEDURE — 81000 URINALYSIS NONAUTO W/SCOPE: CPT

## 2018-01-22 PROCEDURE — 84702 CHORIONIC GONADOTROPIN TEST: CPT

## 2018-01-22 PROCEDURE — 87491 CHLMYD TRACH DNA AMP PROBE: CPT

## 2018-01-22 PROCEDURE — 87186 SC STD MICRODIL/AGAR DIL: CPT

## 2018-01-22 RX ORDER — ACETAMINOPHEN 500 MG
1000 TABLET ORAL
Status: DISCONTINUED | OUTPATIENT
Start: 2018-01-22 | End: 2018-01-22 | Stop reason: HOSPADM

## 2018-01-22 NOTE — ED PROVIDER NOTES
Encounter Date: 2018    SCRIBE #1 NOTE: IBetina, am scribing for, and in the presence of, Shawna Collado PA-C.       History     Chief Complaint   Patient presents with    Vaginal Bleeding     pt reports she i s9 wks pregnant       2018 3:38 PM     Chief complaint: Vaginal Bleeding      Jasper Guerrero is a 9 week pregnant 27 y.o. female with a hx of A Depression and PTSD who presents to the ED with complaints of dark red vaginal bleeding when wiping. She reports spotting a few weeks ago. She also reports abd cramping and lower back pain. She is followed by OBGYN, Dr. Reynolds, who she saw 6 days ago. Her next appointment is 02/15/18. She denies tobacco and drug use. Pt denies dysuria and fever. Allergens include Penicillins.  No dysuria or hematuria.  She doesn't remember the exact date of her LMP.  She has had two previous ultrasounds, which showed a  Heartbeat.        The history is provided by the patient.     Review of patient's allergies indicates:   Allergen Reactions    Penicillins      Past Medical History:   Diagnosis Date    Agoraphobia     Depression     Fibromyalgia     PTSD (post-traumatic stress disorder)      Past Surgical History:   Procedure Laterality Date    MOUTH SURGERY       Family History   Problem Relation Age of Onset    Family history unknown: Yes     Social History   Substance Use Topics    Smoking status: Never Smoker    Smokeless tobacco: Never Used    Alcohol use No     Review of Systems   Constitutional: Negative for chills and fever.   HENT: Negative for facial swelling and trouble swallowing.    Eyes: Negative for discharge.   Respiratory: Negative for cough, chest tightness, shortness of breath and wheezing.    Cardiovascular: Negative for chest pain and palpitations.   Gastrointestinal: Positive for abdominal pain. Negative for diarrhea, nausea and vomiting.   Genitourinary: Positive for pelvic pain and vaginal bleeding. Negative for dysuria,  hematuria and vaginal discharge.   Musculoskeletal: Positive for back pain. Negative for arthralgias, joint swelling, myalgias, neck pain and neck stiffness.   Skin: Negative for color change, pallor, rash and wound.   Neurological: Negative for dizziness, syncope, weakness, light-headedness, numbness and headaches.   Hematological: Does not bruise/bleed easily.   Psychiatric/Behavioral: The patient is not nervous/anxious.        Physical Exam     Initial Vitals [01/22/18 1454]   BP Pulse Resp Temp SpO2   136/72 (!) 113 -- 98.2 °F (36.8 °C) 97 %      MAP       93.33         Physical Exam    Nursing note and vitals reviewed.  Constitutional: She appears well-developed and well-nourished. She is not diaphoretic. No distress.   HENT:   Head: Normocephalic and atraumatic.   Eyes: Conjunctivae are normal.   Cardiovascular: Normal rate, regular rhythm, normal heart sounds and intact distal pulses.   Pulmonary/Chest: Breath sounds normal. No respiratory distress. She has no wheezes. She has no rhonchi. She has no rales.   Abdominal: Soft. She exhibits no distension and no mass. There is no tenderness.   Genitourinary: Pelvic exam was performed with patient supine. There is no rash, tenderness, lesion or injury on the right labia. There is no rash, tenderness, lesion or injury on the left labia. Uterus is enlarged. Uterus is not tender. Cervix exhibits friability. Cervix exhibits no discharge. Right adnexum displays no mass, no tenderness and no fullness. Left adnexum displays no mass, no tenderness and no fullness. There is bleeding in the vagina. No erythema or tenderness in the vagina. No foreign body in the vagina. No signs of injury around the vagina. No vaginal discharge found.   Genitourinary Comments: Friable cervix noted with small amount of brownish discharge.  No adnexal tenderness or masses noted.  Uterine mildly enlarged.  Cervical OS closed.    Musculoskeletal: Normal range of motion. She exhibits no edema or  tenderness.   Neurological: She is alert and oriented to person, place, and time. She has normal strength. No sensory deficit.   Skin: Skin is warm and dry. No rash and no abscess noted. No erythema.         ED Course   Procedures  Labs Reviewed   C. TRACHOMATIS/N. GONORRHOEAE BY AMP DNA   URINALYSIS   VAGINAL SCREEN   POCT URINE PREGNANCY             Medical Decision Making:   Differential Diagnosis:   UTI  Threatened Miscarriage  Ectopic  PID  BV    Clinical Tests:   Lab Tests: Reviewed and Ordered  Radiological Study: Ordered and Reviewed       APC / Resident Notes:   Ultrasound shows single, live, intrauterine pregnancy with intrauterine gestational sac containing yolk sac and fetal pole.  Fetal heart rate is 154 beats per minute.  Estimated gestational age by ultrasound is 8 weeks 6 days plus or -0 week 4 day gives BRITTANY of 08/28/2018 and correlating satisfactory with the expected gestational age.  Probable uterine fibroid.    Labs stable.  Some leukocytes on urinalysis and urine culture will be sent, but no antibiotics indicated today.  She is made aware of the findings and discharged home to follow-up with Dr. Reynolds in 2-3 days.  She is given specific return precautions.           Scribe Attestation:   Scribe #1: I performed the above scribed service and the documentation accurately describes the services I performed. I attest to the accuracy of the note.    *  I, Shawna Collado PA-C, personally performed the services described in this documentation. All medical record entries made by the scribe were at my direction and in my presence.  I have reviewed the chart and agree that the record reflects my personal performance and is accurate and complete. Shawna Collado PA-C.  6:27 PM 01/22/2018       ED Course      Clinical Impression:   No diagnosis found.        1. Threatened miscarriage in early pregnancy    2. Pregnancy, unspecified gestational age    3. Vaginal bleeding                           Shawna  JOSE Collado PA-C  01/22/18 1827

## 2018-01-23 ENCOUNTER — PATIENT MESSAGE (OUTPATIENT)
Dept: NEUROLOGY | Facility: CLINIC | Age: 28
End: 2018-01-23

## 2018-01-23 ENCOUNTER — TELEPHONE (OUTPATIENT)
Dept: NEUROLOGY | Facility: CLINIC | Age: 28
End: 2018-01-23

## 2018-01-23 DIAGNOSIS — M79.2 NEUROPATHIC PAIN: ICD-10-CM

## 2018-01-23 LAB
C TRACH DNA SPEC QL NAA+PROBE: NOT DETECTED
N GONORRHOEA DNA SPEC QL NAA+PROBE: NOT DETECTED

## 2018-01-23 RX ORDER — OXYCODONE AND ACETAMINOPHEN 7.5; 325 MG/1; MG/1
1 TABLET ORAL 3 TIMES DAILY PRN
Qty: 21 TABLET | Refills: 0 | Status: SHIPPED | OUTPATIENT
Start: 2018-01-23 | End: 2018-01-30 | Stop reason: SDUPTHER

## 2018-01-23 NOTE — TELEPHONE ENCOUNTER
----- Message from Macy Flaherty sent at 1/23/2018 12:28 PM CST -----  Contact: self  Patient would like to be seen today due to nerve in mouth is causing a great deal of pain. Please call patient at [phone]. Thanks!

## 2018-01-25 LAB — BACTERIA UR CULT: NORMAL

## 2018-01-26 ENCOUNTER — PATIENT MESSAGE (OUTPATIENT)
Dept: NEUROLOGY | Facility: CLINIC | Age: 28
End: 2018-01-26

## 2018-01-28 ENCOUNTER — PATIENT MESSAGE (OUTPATIENT)
Dept: NEUROLOGY | Facility: CLINIC | Age: 28
End: 2018-01-28

## 2018-01-29 ENCOUNTER — PATIENT MESSAGE (OUTPATIENT)
Dept: NEUROLOGY | Facility: CLINIC | Age: 28
End: 2018-01-29

## 2018-01-29 ENCOUNTER — PATIENT MESSAGE (OUTPATIENT)
Dept: MATERNAL FETAL MEDICINE | Facility: CLINIC | Age: 28
End: 2018-01-29

## 2018-01-29 DIAGNOSIS — Z36.9 ENCOUNTER FOR FETAL ULTRASOUND: Primary | ICD-10-CM

## 2018-01-30 ENCOUNTER — OFFICE VISIT (OUTPATIENT)
Dept: NEUROLOGY | Facility: CLINIC | Age: 28
End: 2018-01-30
Payer: COMMERCIAL

## 2018-01-30 VITALS
DIASTOLIC BLOOD PRESSURE: 73 MMHG | WEIGHT: 289 LBS | HEIGHT: 65 IN | RESPIRATION RATE: 16 BRPM | SYSTOLIC BLOOD PRESSURE: 133 MMHG | BODY MASS INDEX: 48.15 KG/M2 | HEART RATE: 103 BPM

## 2018-01-30 DIAGNOSIS — Z3A.10 10 WEEKS GESTATION OF PREGNANCY: ICD-10-CM

## 2018-01-30 DIAGNOSIS — M26.609 TMJ (TEMPOROMANDIBULAR JOINT DISORDER): ICD-10-CM

## 2018-01-30 DIAGNOSIS — M79.2 NEUROPATHIC PAIN: ICD-10-CM

## 2018-01-30 DIAGNOSIS — F32.A DEPRESSION, UNSPECIFIED DEPRESSION TYPE: ICD-10-CM

## 2018-01-30 PROCEDURE — 99214 OFFICE O/P EST MOD 30 MIN: CPT | Mod: S$GLB,,, | Performed by: PSYCHIATRY & NEUROLOGY

## 2018-01-30 PROCEDURE — 3008F BODY MASS INDEX DOCD: CPT | Mod: S$GLB,,, | Performed by: PSYCHIATRY & NEUROLOGY

## 2018-01-30 PROCEDURE — 99999 PR PBB SHADOW E&M-EST. PATIENT-LVL III: CPT | Mod: PBBFAC,,, | Performed by: PSYCHIATRY & NEUROLOGY

## 2018-01-30 RX ORDER — OXYCODONE AND ACETAMINOPHEN 7.5; 325 MG/1; MG/1
1 TABLET ORAL 3 TIMES DAILY PRN
Qty: 90 TABLET | Refills: 0 | Status: SHIPPED | OUTPATIENT
Start: 2018-01-30 | End: 2018-02-15 | Stop reason: SDUPTHER

## 2018-01-30 RX ORDER — PROGESTERONE 200 MG/1
CAPSULE ORAL
Refills: 1 | COMMUNITY
Start: 2018-01-20 | End: 2018-04-17

## 2018-01-30 RX ORDER — CEPHALEXIN 500 MG/1
CAPSULE ORAL
Refills: 0 | COMMUNITY
Start: 2018-01-26 | End: 2018-02-15
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
## 2018-01-30 NOTE — PATIENT INSTRUCTIONS
"WORKUP:  -- none     PREVENTION (use daily regardless of headache):  -- can potentially start amitriptyline - discuss with MFM   -- discuss with OB/MFM if OK to do a kenalog/lidocaine TMF injections is OK     ACUTE TREATMENT:  -- continue percocet 7.5mg TID #90, no refills - will need to reduce down ideally off by delivery     ----------------------------------    Pregnancy and Migraine Control     If you are planning to have a baby, or are already pregnant, you may worry about whether the medications you take for migraine control could harm the baby, or how you will manage your migraines without medication for 9 months.  These are normal and common worries. The greatest thing in your favor is that for most (about 2/3rds) of women, migraines improve during pregnancy, particularly in the second and third trimesters. Breastfeeding after pregnancy can also be "protective" against migraines for some women. For those women who continue to have severe headaches during pregnancy, or while you are trying to become pregnant, as with other medical conditions, the non-pharmacologic "natural" route is preferred for treatment during pregnancy. Avoiding your usual triggers (food, odors etc), adequate and regular sleep, regular meals, exercise, massage, and relaxation become particularly important. After those measures, the next safest measures are to use "nutraceuticals," or in other words,    · magnesium oxide 400mg twice a day or   · Riboflavin 400mg once daily to prevent headaches    If prescription medication must be used due to the refractory nature of the headaches, we are limited with choices that are safe for the fetus but we DO have some. Medications are classified according to an A through D system to describe their safety.    A: There are human studies showing the drug is safe (no medication for headaches is class A)  B: Animals studies show it is safe, but there are no adequate human studies  C: Animal studies have " shown potential harm, there are no adequate human studies, but risk to fetus can outweigh benefit  D: Evidence of human fetal risk based on reported experience, some use may be justified   X: medication should never be used because the risk to fetus outweighs any benefit       Preventative medications considered safe in pregnancy include:    Medication  Pregnancy Category   Safe for breastfeeding   Cyproheptadine  B Can lower milk supply   Memantine  B Effect unknown    Propranolol C Yes   Labetalol C Yes   Nadolol  C No    Timolol C Variable    Metoprolol  C yes   Atenelol  C No    Amitriptyline  C Yes, monitor for rare sedation   Gabapentin  C Yes, monitor for sedation       For as needed treatment, the safest options (category B) include:    · Acetaminophen (tylenol)  · Caffeine  · Diphenhydramine (benadryl)  · Metaclopramide (reglan)  · Ondansetron (zofran)  · Lidocaine (injections)     Less preferred bu acceptable agents (category C) for rescue include:    · NSAIDs (ibuprofen, naproxen) - 2nd trimester - 30weeks, if OK with OBGYN   · Prochlorperazine (compazine)  · Butalbital  · Narcotics  · Triptans - a study of more than 500 women exposed to sumatriptan during pregnancy found no major problems for the fetus, so if a triptan was taken prior to knowing about the pregnancy, rest assured this is Ok!     The following medications need to be avoided in pregnancy because of known risks to the fetus:    · NSAIDs (after 30 weeks)  · DHE  · Midrin   · Benzodiazepines  · Butorphanol   · Aspirin (2nd and 3rd trimesters)  · Valproic acid (depakote)  · Topiramate (topamax)  · Imipramine  · Lithium  · Paroxetine

## 2018-02-07 ENCOUNTER — PATIENT MESSAGE (OUTPATIENT)
Dept: NEUROLOGY | Facility: CLINIC | Age: 28
End: 2018-02-07

## 2018-02-07 ENCOUNTER — TELEPHONE (OUTPATIENT)
Dept: NEUROLOGY | Facility: CLINIC | Age: 28
End: 2018-02-07

## 2018-02-07 ENCOUNTER — OFFICE VISIT (OUTPATIENT)
Dept: MATERNAL FETAL MEDICINE | Facility: CLINIC | Age: 28
End: 2018-02-07
Attending: OBSTETRICS & GYNECOLOGY
Payer: COMMERCIAL

## 2018-02-07 VITALS
WEIGHT: 278.88 LBS | SYSTOLIC BLOOD PRESSURE: 124 MMHG | DIASTOLIC BLOOD PRESSURE: 82 MMHG | BODY MASS INDEX: 46.41 KG/M2

## 2018-02-07 DIAGNOSIS — Z36.9 ENCOUNTER FOR FETAL ULTRASOUND: ICD-10-CM

## 2018-02-07 DIAGNOSIS — Z36.89 ENCOUNTER FOR FETAL ANATOMIC SURVEY: Primary | ICD-10-CM

## 2018-02-07 PROBLEM — Z34.91 FIRST TRIMESTER PREGNANCY: Status: ACTIVE | Noted: 2018-01-30

## 2018-02-07 PROCEDURE — 76801 OB US < 14 WKS SINGLE FETUS: CPT | Mod: S$GLB,,, | Performed by: OBSTETRICS & GYNECOLOGY

## 2018-02-07 PROCEDURE — 99205 OFFICE O/P NEW HI 60 MIN: CPT | Mod: 25,S$GLB,, | Performed by: OBSTETRICS & GYNECOLOGY

## 2018-02-07 PROCEDURE — 99999 PR PBB SHADOW E&M-EST. PATIENT-LVL II: CPT | Mod: PBBFAC,,, | Performed by: OBSTETRICS & GYNECOLOGY

## 2018-02-07 PROCEDURE — 3008F BODY MASS INDEX DOCD: CPT | Mod: S$GLB,,, | Performed by: OBSTETRICS & GYNECOLOGY

## 2018-02-07 RX ORDER — NITROFURANTOIN 25; 75 MG/1; MG/1
100 CAPSULE ORAL 2 TIMES DAILY
Qty: 14 CAPSULE | Refills: 0 | Status: SHIPPED | OUTPATIENT
Start: 2018-02-07 | End: 2018-02-15

## 2018-02-07 NOTE — PROGRESS NOTES
Chief complaint: Chronic pain management in pregnancy    Provider requesting consultation: Dr. Reynolds    27 y.o. [id number] 11w2d EGA.    PMH:  Past Medical History:   Diagnosis Date    Agoraphobia     Depression     Fibromyalgia     Nerve injury 2016    Lingual Nerve Injury    PTSD (post-traumatic stress disorder)        PObHx:  OB History    Para Term  AB Living   2 1 1     1   SAB TAB Ectopic Multiple Live Births           1      # Outcome Date GA Lbr Berto/2nd Weight Sex Delivery Anes PTL Lv   2 Current            1 Term 05/14/15 40w0d  3.771 kg (8 lb 5 oz) M Vag-Spont  N ABBY          PSH:  Past Surgical History:   Procedure Laterality Date    MOUTH SURGERY      WISDOM TOOTH EXTRACTION         Family history:Family history is unknown by patient.    Social history: reports that she has never smoked. She has never used smokeless tobacco. She reports that she does not drink alcohol or use drugs.    A detailed fetal anatomical ultrasound was completed today.  See details in imaging section of EPIC.    Today I discussed with the patient the association of maternal opioid use and  outcomes.  In particular I discussed the risk of acute maternal withdrawal and the increased risk of spontaneous miscarriage.  I also discussed with the patient the association of increased rates of fetal growth restriction and opioid use.  I further reviewed the risk of  addiction and the need for  hospitalization for the management of  abstinence syndrome.  I explained to the patient that is not uncommon for some neonates to stay in the hospital for days to weeks for treatment and that treatment regimens include medications such as methadone, phenobarbital and morphine.    For the remainder of the pregnancy I would recommend the patient continue with opioid replacement at a level sufficient to prevent acute maternal withdrawal.  This can be accomplished by the administration methadone  "or suboxone by a specialist qualified in the management of opioid addiction or by her neurologist if desired. In her case since she is being treated for chronic pain I feel whatever was most effective in relieving her pain is appropriate.  There is not any experience with Nucynta in pregnancy.  Note however that all birth defects occur in the 1st 13 weeks of pregnancy.  After this point developmental and growth predominate.  The first two weeks post fertilization are generally considered the "all or none" period for drug exposure, as  the developing conception has not implanted in the uterus. Prior to implantation, there is no vascularconnection, or blood flow, between the mother and the pregnancy. Therefore, maternal exposures to drugs prior to the fifth week of pregnancy will either prevent implantation of the conception or will have no effect on the developing pregnancy.The most sensitive period of development is from the fifth through the tenth week of pregnancy. This is the period of development when the organs of the body are forming. Exposure to any harmful substances during this time period may cause structural birth defects or possible cognitive deficits. While different organs have different periods of greatest sensitivity, all organs are most sensitive to harmful substances during this time period. In the time period after ten weeks gestation, intra-uterine growth restriction, cognitive deficits, and/or behavioral problems may result due to harmful substance exposure.  I would consider restarting the Nucynta and her oxcarbazepine if this proved her most effective regimen.  She also had questions concerning her antidepressant therapy.  She feels as if the depression has gotten worse since stopping Prozac.  I do suggest restarting her antidepressant.  Many of these drugs are associated with  withdrawal but it is well documented that treatment in pregnancy decreases the chance of severe PP " depression.  Since she is taking many psycho active medications, I will leave the choice of medication to her neurologist and psychiatrist but would be happy to discuss this with either of them.  In addition, due to the neurogenic nature of her pain, consultation with pain management could prove beneficial.  .       I recommend repeat fetal growth assessments every 4-6 weeks starting in the third trimester to assure no evidence of fetal growth restriction.  I would consider induction of labor at 39 weeks if cervix is favorable.  I would be cognizant that during labor fetal nonstress testing may be nonreactive secondary to opioid effects on fetal heart rate variability.  Because of the increased risk of false positive fetal non-stress tests in women who take daily opiods, I would not recommend routine third trimester fetal non-stress tests unless evidence of fetal growth restriction is found.     The patient was given an opportunity to ask questions about management and the diease process.  She expressed an understanding of and agreement to the above impression and plan. All questions were answered to her satisfaction.  She was given contact information to the [facility] clinic to address further concerns.      The approximate physician face-to-face time was 60 minutes. The majority of the time (>50%) was spent on counseling of the patient or coordination of care.

## 2018-02-07 NOTE — TELEPHONE ENCOUNTER
Called and spoke with Octaviano. Octaviano stated that the patient could dispose of Percocet at [facility].

## 2018-02-07 NOTE — LETTER
February 7, 2018      Mike Reynolds MD  [address] [facility]  [place] [facility] [address]           [facility]  [address]  [facility] [address]  Phone: [phone]          Patient: Jasper Guerrero   MR Number: [id number]   YOB: 1990   Date of Visit: 2/7/2018       Dear Dr. Mike Reynolds:    Thank you for referring Jasper Guerrero to me for evaluation. Attached you will find relevant portions of my assessment and plan of care.    If you have questions, please do not hesitate to call me. I look forward to following Jasper Guerrero along with you.    Sincerely,    Yayo Vo MD    Enclosure  CC:  No Recipients    If you would like to receive this communication electronically, please contact [email or web address] or [phone] to request more information on [facility] access.    For providers and/or their staff who would like to refer a patient to [facility], please contact us through our one-stop-shop provider referral line, [facility], at [phone].    If you feel you have received this communication in error or would no longer like to receive these types of communications, please e-mail [email or web address]

## 2018-02-08 ENCOUNTER — TELEPHONE (OUTPATIENT)
Dept: NEUROLOGY | Facility: CLINIC | Age: 28
End: 2018-02-08

## 2018-02-08 ENCOUNTER — PATIENT MESSAGE (OUTPATIENT)
Dept: NEUROLOGY | Facility: CLINIC | Age: 28
End: 2018-02-08

## 2018-02-12 ENCOUNTER — PATIENT MESSAGE (OUTPATIENT)
Dept: NEUROLOGY | Facility: CLINIC | Age: 28
End: 2018-02-12

## 2018-02-14 ENCOUNTER — PATIENT MESSAGE (OUTPATIENT)
Dept: NEUROLOGY | Facility: CLINIC | Age: 28
End: 2018-02-14

## 2018-02-15 ENCOUNTER — OFFICE VISIT (OUTPATIENT)
Dept: NEUROLOGY | Facility: CLINIC | Age: 28
End: 2018-02-15
Payer: COMMERCIAL

## 2018-02-15 VITALS
SYSTOLIC BLOOD PRESSURE: 126 MMHG | WEIGHT: 279.13 LBS | BODY MASS INDEX: 46.51 KG/M2 | RESPIRATION RATE: 18 BRPM | DIASTOLIC BLOOD PRESSURE: 83 MMHG | HEIGHT: 65 IN | HEART RATE: 120 BPM

## 2018-02-15 DIAGNOSIS — G50.9 TRIGEMINAL NEUROPATHY: Primary | ICD-10-CM

## 2018-02-15 DIAGNOSIS — M79.2 NEUROPATHIC PAIN: ICD-10-CM

## 2018-02-15 DIAGNOSIS — Z34.91 FIRST TRIMESTER PREGNANCY: ICD-10-CM

## 2018-02-15 PROCEDURE — 99214 OFFICE O/P EST MOD 30 MIN: CPT | Mod: S$GLB,,, | Performed by: PSYCHIATRY & NEUROLOGY

## 2018-02-15 PROCEDURE — 3008F BODY MASS INDEX DOCD: CPT | Mod: S$GLB,,, | Performed by: PSYCHIATRY & NEUROLOGY

## 2018-02-15 PROCEDURE — 99999 PR PBB SHADOW E&M-EST. PATIENT-LVL III: CPT | Mod: PBBFAC,,, | Performed by: PSYCHIATRY & NEUROLOGY

## 2018-02-15 RX ORDER — OXYCODONE AND ACETAMINOPHEN 7.5; 325 MG/1; MG/1
1 TABLET ORAL 3 TIMES DAILY PRN
Qty: 90 TABLET | Refills: 0 | OUTPATIENT
Start: 2018-03-01 | End: 2018-02-15 | Stop reason: SDUPTHER

## 2018-02-15 RX ORDER — OXYCODONE AND ACETAMINOPHEN 7.5; 325 MG/1; MG/1
1 TABLET ORAL 3 TIMES DAILY PRN
Qty: 90 TABLET | Refills: 0 | Status: SHIPPED | OUTPATIENT
Start: 2018-03-01 | End: 2018-03-21 | Stop reason: SDUPTHER

## 2018-02-15 NOTE — TELEPHONE ENCOUNTER
"Per Dr. Hart, "Spoke to patient on phone. Please put her in for a routine med refill at 430pm tomorrow". Appointment scheduled for 02/15 at 4:30 pm.  "

## 2018-02-15 NOTE — PATIENT INSTRUCTIONS
WORKUP:  -- none     PREVENTION (use daily regardless of headache):  -- none at this time  -- would be OK to resume trileptal during breastfeeding     ACUTE TREATMENT:  -- continue percocet 7.5mg TID #90, 3/1/18 to 3/31/18, no refills - will need to reduce down ideally off by delivery   -- nucynta would NOT be OK with breastfeeding, percocet OK with breastfeeding but would need to monitor baby for sleepiness and breathing difficulty
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
## 2018-02-20 ENCOUNTER — TELEPHONE (OUTPATIENT)
Dept: MATERNAL FETAL MEDICINE | Facility: CLINIC | Age: 28
End: 2018-02-20

## 2018-02-20 ENCOUNTER — HOSPITAL ENCOUNTER (EMERGENCY)
Facility: HOSPITAL | Age: 28
Discharge: HOME | End: 2018-02-20
Attending: EMERGENCY MEDICINE
Payer: COMMERCIAL

## 2018-02-20 ENCOUNTER — PATIENT MESSAGE (OUTPATIENT)
Dept: NEUROLOGY | Facility: CLINIC | Age: 28
End: 2018-02-20

## 2018-02-20 VITALS
WEIGHT: 279 LBS | BODY MASS INDEX: 46.48 KG/M2 | DIASTOLIC BLOOD PRESSURE: 82 MMHG | HEIGHT: 65 IN | OXYGEN SATURATION: 99 % | SYSTOLIC BLOOD PRESSURE: 127 MMHG | HEART RATE: 101 BPM | RESPIRATION RATE: 19 BRPM | TEMPERATURE: 99 F

## 2018-02-20 DIAGNOSIS — L03.313 CELLULITIS OF CHEST WALL: Primary | ICD-10-CM

## 2018-02-20 PROCEDURE — 99283 EMERGENCY DEPT VISIT LOW MDM: CPT

## 2018-02-20 RX ORDER — CEPHALEXIN 500 MG/1
500 CAPSULE ORAL 4 TIMES DAILY
Qty: 28 CAPSULE | Refills: 0 | Status: SHIPPED | OUTPATIENT
Start: 2018-02-20 | End: 2018-02-27

## 2018-02-20 NOTE — TELEPHONE ENCOUNTER
"Patient calling [facility] clinic requesting to speak to Dr. Vo. Pt is 13wks pregnant s/p consult with Dr. Vo regarding Hx of lingual nerve damage and pain medication during pregnancy. Pt states that she is currently experiencing breast pain that "shoots up to her chest, shoulder and back." Pt also states that this pain is "puckering." Pt reports being seen at the ER in [place] for "cardiac work-up due to high heart rate because of this pain." Patient asking to discuss with Dr. Vo and have him call in medication for her pain. Pt informed that Dr. Vo was not available at this time, instructed patient to inform her OB of this problem and instructed pt to ER for evaluation. Patient asked if I would call [place] ER and tell them which medication to give her for pain and I explained that I could not do that. Discussed with patient that this is not a "common complaint" during pregnancy and strongly encouraged her to have this evaluated.    Pt verbalized understanding of information.      "

## 2018-02-21 NOTE — ED NOTES
"Pt states that she wants something stronger than tylenol to "get me through the night my Dr already said I could have stronger medicine even though I am 13.5 weeks pregnant its in my records" PA aware  "

## 2018-02-21 NOTE — ED PROVIDER NOTES
"Encounter Date: 2/20/2018    SCRIBE #1 NOTE: I, Karissabeatriz Najera, am scribing for, and in the presence of, Shawna Collado PA-C.       History     Chief Complaint   Patient presents with    Breast Pain     indicates "specific area of breast" reddened & puffy - unable to see OB/GYN today, but was concerned for possible mastitis       02/20/2018  6:52 PM    Chief Complaint: Breast Pain      The patient is a 27 y.o. female who presents with sudden onset of left breast pain for 2 days with associated erythema. She starts the pain began while she was eating lunch. She could not get an appointment with her OBGYN today, but she has an appointment tomorrow. No alleviating factors. Denies fever, bleeding, drainage. Allergic to penicillin. PMHx includes agoraphobia, depression, fibromyalgia and nerve injury. No pertinent surgical hx.        The history is provided by the patient.     Review of patient's allergies indicates:   Allergen Reactions    Penicillins      Past Medical History:   Diagnosis Date    Agoraphobia     Depression     Fibromyalgia     Nerve injury 08/2016    Lingual Nerve Injury    PTSD (post-traumatic stress disorder)      Past Surgical History:   Procedure Laterality Date    MOUTH SURGERY      WISDOM TOOTH EXTRACTION       Family History   Problem Relation Age of Onset    Family history unknown: Yes     Social History   Substance Use Topics    Smoking status: Never Smoker    Smokeless tobacco: Never Used    Alcohol use No     Review of Systems   Constitutional: Negative for chills and fever.   HENT: Negative for facial swelling and trouble swallowing.    Eyes: Negative for discharge.   Respiratory: Negative for cough, chest tightness, shortness of breath and wheezing.    Cardiovascular: Negative for chest pain and palpitations.   Gastrointestinal: Negative for abdominal pain, diarrhea, nausea and vomiting.   Genitourinary: Negative for dysuria and hematuria.        +left breast pain "   Musculoskeletal: Negative for arthralgias, back pain, joint swelling, myalgias, neck pain and neck stiffness.   Skin: Positive for color change (Left Breast Erythema). Negative for pallor, rash and wound.   Neurological: Negative for dizziness, syncope, weakness, light-headedness, numbness and headaches.   Hematological: Does not bruise/bleed easily.   Psychiatric/Behavioral: The patient is not nervous/anxious.        Physical Exam     Initial Vitals [02/20/18 1812]   BP Pulse Resp Temp SpO2   127/82 101 19 98.5 °F (36.9 °C) 99 %      MAP       97         Physical Exam    Nursing note and vitals reviewed.  Constitutional: She appears well-developed and well-nourished. She is not diaphoretic. No distress.   HENT:   Head: Normocephalic and atraumatic.   Cardiovascular: Normal rate, regular rhythm, normal heart sounds and intact distal pulses.   No murmur heard.  Pulmonary/Chest: Breath sounds normal. No respiratory distress. She has no wheezes. She has no rhonchi. She has no rales.       Small area of erythema with minimal warmth noted to left breast along the left upper areola. No induration, active bleeding, or discharge.   Abdominal: Soft. She exhibits no distension and no mass. There is no tenderness.   Musculoskeletal: Normal range of motion. She exhibits no edema or tenderness.   Neurological: She is alert and oriented to person, place, and time. She has normal strength. No sensory deficit.   Skin: Skin is warm and dry. Rash noted. No abscess noted.   Psychiatric: She has a normal mood and affect.         ED Course   Procedures  Labs Reviewed - No data to display        Medical Decision Making:   History:   Old Medical Records: I decided to obtain old medical records.  Differential Diagnosis:   Cellulitis   Abscess  Mastitis  Tinea         APC / Resident Notes:   Her symptoms are most consistent with an area of early cellulitis, which we will treat with Keflex.  No evidence for abscess and no need for I&D,  further imaging or testing at this time.  She is requesting stronger pain medication, since her Percocet is only for her jaw pain.  She has been taking the Percocet TID as prescribed, but doesn't think it will help the breast pain.  She is made aware that the pain medication doesn't differentiate between locations of pain and that no stronger pain medication will be prescribed from the ED.  She is currently pregnant and under the care of [facility] for pain management.  She is highly encouraged to keep her appt with OB/GYN tomorrow.  She is given specific return precautions.        Scribe Attestation:   Scribe #1: I performed the above scribed service and the documentation accurately describes the services I performed. I attest to the accuracy of the note.    I, Shawna Collado PA-C, personally performed the services described in this documentation. All medical record entries made by the scribe were at my direction and in my presence.  I have reviewed the chart and agree that the record reflects my personal performance and is accurate and complete. Shawna Collado PA-C.  11:51 PM 02/20/2018             Clinical Impression:       1. Cellulitis of chest wall          Disposition:   Disposition: Discharged  Condition: Stable                        Shawna Collado PA-C  02/20/18 1832

## 2018-02-26 ENCOUNTER — PATIENT MESSAGE (OUTPATIENT)
Dept: NEUROLOGY | Facility: CLINIC | Age: 28
End: 2018-02-26

## 2018-02-27 ENCOUNTER — PATIENT MESSAGE (OUTPATIENT)
Dept: NEUROLOGY | Facility: CLINIC | Age: 28
End: 2018-02-27

## 2018-03-13 ENCOUNTER — PATIENT MESSAGE (OUTPATIENT)
Dept: NEUROLOGY | Facility: CLINIC | Age: 28
End: 2018-03-13

## 2018-03-19 ENCOUNTER — PATIENT MESSAGE (OUTPATIENT)
Dept: NEUROLOGY | Facility: CLINIC | Age: 28
End: 2018-03-19

## 2018-03-19 ENCOUNTER — TELEPHONE (OUTPATIENT)
Dept: MATERNAL FETAL MEDICINE | Facility: CLINIC | Age: 28
End: 2018-03-19

## 2018-03-19 NOTE — TELEPHONE ENCOUNTER
"Pt calling [facility] requesting to speak with Dr. Vo that she needs to get prescription for "nausea medicine." Pt denies that this not is a virus, just "pregnancy nausea." Discussed with patient if she has contacted her OB Provider and pt reports that "he is out today, well he is usually out on Mondays." Instructed pt to call her OB Provider's office, if Dr. Reynolds is not available, she is to ask for the MD that is covering his patient calls. Also instructed patient that should she not reach any staff in the office to call her L&D and discuss symptoms and her request for medication. Emphasized importance of the patient being evaluated due to the risk of dehydration in pregnancy.    Pt verbalized understanding of information.    "

## 2018-03-20 ENCOUNTER — PATIENT MESSAGE (OUTPATIENT)
Dept: NEUROLOGY | Facility: CLINIC | Age: 28
End: 2018-03-20

## 2018-03-20 ENCOUNTER — TELEPHONE (OUTPATIENT)
Dept: NEUROLOGY | Facility: CLINIC | Age: 28
End: 2018-03-20

## 2018-03-21 ENCOUNTER — LAB VISIT (OUTPATIENT)
Dept: LAB | Facility: HOSPITAL | Age: 28
End: 2018-03-21
Attending: PSYCHIATRY & NEUROLOGY
Payer: COMMERCIAL

## 2018-03-21 ENCOUNTER — TELEPHONE (OUTPATIENT)
Dept: NEUROLOGY | Facility: CLINIC | Age: 28
End: 2018-03-21

## 2018-03-21 ENCOUNTER — OFFICE VISIT (OUTPATIENT)
Dept: NEUROLOGY | Facility: CLINIC | Age: 28
End: 2018-03-21
Payer: COMMERCIAL

## 2018-03-21 VITALS
RESPIRATION RATE: 18 BRPM | WEIGHT: 277.75 LBS | BODY MASS INDEX: 46.28 KG/M2 | SYSTOLIC BLOOD PRESSURE: 148 MMHG | HEIGHT: 65 IN | DIASTOLIC BLOOD PRESSURE: 89 MMHG | HEART RATE: 121 BPM

## 2018-03-21 DIAGNOSIS — Z79.891 CHRONICALLY ON OPIATE THERAPY: ICD-10-CM

## 2018-03-21 DIAGNOSIS — M26.609 TMJ (TEMPOROMANDIBULAR JOINT DISORDER): ICD-10-CM

## 2018-03-21 DIAGNOSIS — G50.9 TRIGEMINAL NEUROPATHY: Primary | ICD-10-CM

## 2018-03-21 DIAGNOSIS — Z79.891 CHRONICALLY ON OPIATE THERAPY: Primary | ICD-10-CM

## 2018-03-21 DIAGNOSIS — M79.2 NEUROPATHIC PAIN: ICD-10-CM

## 2018-03-21 DIAGNOSIS — Z34.92 SECOND TRIMESTER PREGNANCY: ICD-10-CM

## 2018-03-21 PROCEDURE — 99214 OFFICE O/P EST MOD 30 MIN: CPT | Mod: S$GLB,,, | Performed by: PSYCHIATRY & NEUROLOGY

## 2018-03-21 PROCEDURE — 80307 DRUG TEST PRSMV CHEM ANLYZR: CPT

## 2018-03-21 PROCEDURE — 99999 PR PBB SHADOW E&M-EST. PATIENT-LVL III: CPT | Mod: PBBFAC,,, | Performed by: PSYCHIATRY & NEUROLOGY

## 2018-03-21 RX ORDER — OXYCODONE AND ACETAMINOPHEN 7.5; 325 MG/1; MG/1
1 TABLET ORAL 3 TIMES DAILY PRN
Qty: 90 TABLET | Refills: 0 | Status: SHIPPED | OUTPATIENT
Start: 2018-03-31 | End: 2018-04-17 | Stop reason: DRUGHIGH
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
## 2018-03-21 NOTE — PATIENT INSTRUCTIONS
WORKUP:  -- routine urine drug screen today     PREVENTION (use daily regardless of headache):  -- none at this time  -- would be OK to resume trileptal during breastfeeding     ACUTE TREATMENT:  -- continue percocet 7.5mg TID #90, 3/31/18 - 4/30/18 , paper script, no refills - will need to reduce down ideally off by delivery   -- nucynta would NOT be OK with breastfeeding, percocet OK with breastfeeding but would need to monitor baby for sleepiness and breathing difficulty

## 2018-03-22 ENCOUNTER — PATIENT MESSAGE (OUTPATIENT)
Dept: NEUROLOGY | Facility: CLINIC | Age: 28
End: 2018-03-22

## 2018-03-23 ENCOUNTER — PATIENT MESSAGE (OUTPATIENT)
Dept: NEUROLOGY | Facility: CLINIC | Age: 28
End: 2018-03-23

## 2018-03-25 ENCOUNTER — PATIENT MESSAGE (OUTPATIENT)
Dept: NEUROLOGY | Facility: CLINIC | Age: 28
End: 2018-03-25

## 2018-03-25 LAB
6MAM UR QL: NOT DETECTED
7AMINOCLONAZEPAM UR QL: NOT DETECTED
A-OH ALPRAZ UR QL: PRESENT
ALPRAZ UR QL: PRESENT
AMPHET UR QL SCN: NOT DETECTED
ANNOTATION COMMENT IMP: NORMAL
ANNOTATION COMMENT IMP: NORMAL
BARBITURATES UR QL: NOT DETECTED
BUPRENORPHINE UR QL: NOT DETECTED
BZE UR QL: NOT DETECTED
CARBOXYTHC UR QL: NOT DETECTED
CARISOPRODOL UR QL: NOT DETECTED
CLONAZEPAM UR QL: NOT DETECTED
CODEINE UR QL: NOT DETECTED
CREAT UR-MCNC: 145.4 MG/DL (ref 20–400)
DIAZEPAM UR QL: NOT DETECTED
ETHYL GLUCURONIDE UR QL: NOT DETECTED
FENTANYL UR QL: NOT DETECTED
HYDROCODONE UR QL: NOT DETECTED
HYDROMORPHONE UR QL: NOT DETECTED
LORAZEPAM UR QL: NOT DETECTED
MDA UR QL: NOT DETECTED
MDEA UR QL: NOT DETECTED
MDMA UR QL: NOT DETECTED
ME-PHENIDATE UR QL: NOT DETECTED
MEPERIDINE UR QL: NOT DETECTED
METHADONE UR QL: NOT DETECTED
METHAMPHET UR QL: NOT DETECTED
MIDAZOLAM UR QL SCN: NOT DETECTED
MORPHINE UR QL: NOT DETECTED
NORBUPRENORPHINE UR QL CFM: NOT DETECTED
NORDIAZEPAM UR QL: NOT DETECTED
NORFENTANYL UR QL: NOT DETECTED
NORHYDROCODONE UR QL CFM: NOT DETECTED
NOROXYCODONE UR QL CFM: NOT DETECTED
NOROXYMORPHONE: NOT DETECTED
OXAZEPAM UR QL: NOT DETECTED
OXYCODONE UR QL: NOT DETECTED
OXYMORPHONE UR QL: NOT DETECTED
PATHOLOGY STUDY: NORMAL
PCP UR QL: NOT DETECTED
PHENTERMINE UR QL: NOT DETECTED
PROPOXYPH UR QL: NOT DETECTED
SERVICE CMNT-IMP: NORMAL
TAPENTADOL UR QL SCN: NOT DETECTED
TAPENTADOL-O-SULF: NOT DETECTED
TEMAZEPAM UR QL: NOT DETECTED
TRAMADOL UR QL: NOT DETECTED
ZOLPIDEM UR QL: NOT DETECTED

## 2018-03-26 ENCOUNTER — PATIENT MESSAGE (OUTPATIENT)
Dept: NEUROLOGY | Facility: CLINIC | Age: 28
End: 2018-03-26

## 2018-04-11 ENCOUNTER — OFFICE VISIT (OUTPATIENT)
Dept: MATERNAL FETAL MEDICINE | Facility: CLINIC | Age: 28
End: 2018-04-11
Attending: OBSTETRICS & GYNECOLOGY
Payer: COMMERCIAL

## 2018-04-11 VITALS
DIASTOLIC BLOOD PRESSURE: 84 MMHG | BODY MASS INDEX: 45.71 KG/M2 | WEIGHT: 274.69 LBS | SYSTOLIC BLOOD PRESSURE: 118 MMHG

## 2018-04-11 DIAGNOSIS — Z36.89 ENCOUNTER FOR FETAL ANATOMIC SURVEY: ICD-10-CM

## 2018-04-11 DIAGNOSIS — Z36.89 ENCOUNTER FOR ULTRASOUND TO CHECK FETAL GROWTH: Primary | ICD-10-CM

## 2018-04-11 DIAGNOSIS — O99.212 OBESITY AFFECTING PREGNANCY IN SECOND TRIMESTER: ICD-10-CM

## 2018-04-11 DIAGNOSIS — O09.891 MEDICATION EXPOSURE DURING FIRST TRIMESTER OF PREGNANCY: ICD-10-CM

## 2018-04-11 PROCEDURE — 99499 UNLISTED E&M SERVICE: CPT | Mod: S$GLB,,, | Performed by: OBSTETRICS & GYNECOLOGY

## 2018-04-11 PROCEDURE — 99999 PR PBB SHADOW E&M-EST. PATIENT-LVL II: CPT | Mod: PBBFAC,,, | Performed by: OBSTETRICS & GYNECOLOGY

## 2018-04-11 PROCEDURE — 76811 OB US DETAILED SNGL FETUS: CPT | Mod: S$GLB,,, | Performed by: OBSTETRICS & GYNECOLOGY

## 2018-04-16 ENCOUNTER — PATIENT MESSAGE (OUTPATIENT)
Dept: NEUROLOGY | Facility: CLINIC | Age: 28
End: 2018-04-16

## 2018-04-17 ENCOUNTER — OFFICE VISIT (OUTPATIENT)
Dept: NEUROLOGY | Facility: CLINIC | Age: 28
End: 2018-04-17
Payer: COMMERCIAL

## 2018-04-17 VITALS
WEIGHT: 272.25 LBS | HEIGHT: 65 IN | RESPIRATION RATE: 16 BRPM | HEART RATE: 118 BPM | SYSTOLIC BLOOD PRESSURE: 143 MMHG | DIASTOLIC BLOOD PRESSURE: 91 MMHG | BODY MASS INDEX: 45.36 KG/M2

## 2018-04-17 DIAGNOSIS — Z79.891 CHRONICALLY ON OPIATE THERAPY: ICD-10-CM

## 2018-04-17 DIAGNOSIS — M79.2 NEUROPATHIC PAIN: ICD-10-CM

## 2018-04-17 DIAGNOSIS — G50.9 TRIGEMINAL NEUROPATHY: Primary | ICD-10-CM

## 2018-04-17 DIAGNOSIS — Z34.92 SECOND TRIMESTER PREGNANCY: ICD-10-CM

## 2018-04-17 PROCEDURE — 99999 PR PBB SHADOW E&M-EST. PATIENT-LVL III: CPT | Mod: PBBFAC,,, | Performed by: PSYCHIATRY & NEUROLOGY

## 2018-04-17 PROCEDURE — 99214 OFFICE O/P EST MOD 30 MIN: CPT | Mod: S$GLB,,, | Performed by: PSYCHIATRY & NEUROLOGY

## 2018-04-17 RX ORDER — OXYCODONE AND ACETAMINOPHEN 5; 325 MG/1; MG/1
1 TABLET ORAL 4 TIMES DAILY PRN
Qty: 120 TABLET | Refills: 0 | Status: SHIPPED | OUTPATIENT
Start: 2018-04-30 | End: 2018-05-23 | Stop reason: SDUPTHER
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
## 2018-04-23 ENCOUNTER — PATIENT MESSAGE (OUTPATIENT)
Dept: NEUROLOGY | Facility: CLINIC | Age: 28
End: 2018-04-23

## 2018-05-07 ENCOUNTER — PATIENT MESSAGE (OUTPATIENT)
Dept: NEUROLOGY | Facility: CLINIC | Age: 28
End: 2018-05-07

## 2018-05-16 ENCOUNTER — PATIENT MESSAGE (OUTPATIENT)
Dept: NEUROLOGY | Facility: CLINIC | Age: 28
End: 2018-05-16

## 2018-05-23 ENCOUNTER — OFFICE VISIT (OUTPATIENT)
Dept: NEUROLOGY | Facility: CLINIC | Age: 28
End: 2018-05-23
Payer: COMMERCIAL

## 2018-05-23 VITALS
RESPIRATION RATE: 19 BRPM | DIASTOLIC BLOOD PRESSURE: 85 MMHG | HEIGHT: 65 IN | HEART RATE: 127 BPM | WEIGHT: 276.44 LBS | SYSTOLIC BLOOD PRESSURE: 149 MMHG | BODY MASS INDEX: 46.06 KG/M2

## 2018-05-23 DIAGNOSIS — Z34.92 SECOND TRIMESTER PREGNANCY: ICD-10-CM

## 2018-05-23 DIAGNOSIS — G50.9 TRIGEMINAL NEUROPATHY: Primary | ICD-10-CM

## 2018-05-23 DIAGNOSIS — Z79.891 CHRONICALLY ON OPIATE THERAPY: ICD-10-CM

## 2018-05-23 PROCEDURE — 99214 OFFICE O/P EST MOD 30 MIN: CPT | Mod: S$GLB,,, | Performed by: PSYCHIATRY & NEUROLOGY

## 2018-05-23 PROCEDURE — 99999 PR PBB SHADOW E&M-EST. PATIENT-LVL III: CPT | Mod: PBBFAC,,, | Performed by: PSYCHIATRY & NEUROLOGY

## 2018-05-23 PROCEDURE — 3008F BODY MASS INDEX DOCD: CPT | Mod: CPTII,S$GLB,, | Performed by: PSYCHIATRY & NEUROLOGY

## 2018-05-23 RX ORDER — OXYCODONE AND ACETAMINOPHEN 5; 325 MG/1; MG/1
1 TABLET ORAL 3 TIMES DAILY PRN
Qty: 90 TABLET | Refills: 0 | Status: SHIPPED | OUTPATIENT
Start: 2018-05-23 | End: 2018-05-23 | Stop reason: SDUPTHER

## 2018-05-23 RX ORDER — OXYCODONE AND ACETAMINOPHEN 5; 325 MG/1; MG/1
1 TABLET ORAL 3 TIMES DAILY PRN
Qty: 90 TABLET | Refills: 0 | Status: SHIPPED | OUTPATIENT
Start: 2018-05-30 | End: 2018-06-15 | Stop reason: SDUPTHER
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
## 2018-05-23 NOTE — PATIENT INSTRUCTIONS
WORKUP:  -- none     PREVENTION (use daily regardless of headache):  -- none at this time  -- would be OK to resume trileptal during breastfeeding     ACUTE TREATMENT:  -- reduce percocet slightly (by 5mg per day) - 5mg/325mg 3x per day as needed #90, 5/30-6/29/18

## 2018-05-30 ENCOUNTER — PATIENT MESSAGE (OUTPATIENT)
Dept: NEUROLOGY | Facility: CLINIC | Age: 28
End: 2018-05-30

## 2018-06-11 ENCOUNTER — PATIENT MESSAGE (OUTPATIENT)
Dept: NEUROLOGY | Facility: CLINIC | Age: 28
End: 2018-06-11

## 2018-06-12 ENCOUNTER — PATIENT MESSAGE (OUTPATIENT)
Dept: NEUROLOGY | Facility: CLINIC | Age: 28
End: 2018-06-12

## 2018-06-12 ENCOUNTER — TELEPHONE (OUTPATIENT)
Dept: NEUROLOGY | Facility: CLINIC | Age: 28
End: 2018-06-12

## 2018-06-15 ENCOUNTER — OFFICE VISIT (OUTPATIENT)
Dept: NEUROLOGY | Facility: CLINIC | Age: 28
End: 2018-06-15
Payer: COMMERCIAL

## 2018-06-15 VITALS
DIASTOLIC BLOOD PRESSURE: 81 MMHG | BODY MASS INDEX: 46.01 KG/M2 | WEIGHT: 276.13 LBS | HEIGHT: 65 IN | HEART RATE: 112 BPM | RESPIRATION RATE: 16 BRPM | SYSTOLIC BLOOD PRESSURE: 135 MMHG

## 2018-06-15 DIAGNOSIS — G50.9 TRIGEMINAL NEUROPATHY: ICD-10-CM

## 2018-06-15 DIAGNOSIS — Z79.891 CHRONICALLY ON OPIATE THERAPY: ICD-10-CM

## 2018-06-15 PROCEDURE — 99999 PR PBB SHADOW E&M-EST. PATIENT-LVL III: CPT | Mod: PBBFAC,,, | Performed by: PSYCHIATRY & NEUROLOGY

## 2018-06-15 PROCEDURE — 99214 OFFICE O/P EST MOD 30 MIN: CPT | Mod: S$GLB,,, | Performed by: PSYCHIATRY & NEUROLOGY

## 2018-06-15 PROCEDURE — 3008F BODY MASS INDEX DOCD: CPT | Mod: CPTII,S$GLB,, | Performed by: PSYCHIATRY & NEUROLOGY

## 2018-06-15 RX ORDER — OXYCODONE AND ACETAMINOPHEN 5; 325 MG/1; MG/1
1 TABLET ORAL 2 TIMES DAILY PRN
Qty: 28 TABLET | Refills: 0 | Status: SHIPPED | OUTPATIENT
Start: 2018-06-29 | End: 2018-06-15 | Stop reason: SDUPTHER

## 2018-06-15 RX ORDER — OXYCODONE AND ACETAMINOPHEN 5; 325 MG/1; MG/1
1 TABLET ORAL DAILY PRN
Qty: 7 TABLET | Refills: 0 | Status: SHIPPED | OUTPATIENT
Start: 2018-06-13 | End: 2018-06-20
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
## 2018-06-15 NOTE — PATIENT INSTRUCTIONS
WORKUP:  -- none     PREVENTION (use daily regardless of headache):  -- none at this time   -- would be OK to resume trileptal during breastfeeding - can resume this on your own without my permission, just make sure you start half tablet at a time (half tablet daily, then half twice a day and so on....)     ACUTE TREATMENT:  -- reduce percocet slightly (by 5mg per day) - 5mg/325mg 2x per day as needed 6/29/18 to 7/13/18   -- then 5mg/325mg 1x per day 7/13 - 7/20 then stop   -- may resume Nucynta after delivery - call and come in when ready

## 2018-06-23 ENCOUNTER — PATIENT MESSAGE (OUTPATIENT)
Dept: NEUROLOGY | Facility: CLINIC | Age: 28
End: 2018-06-23

## 2018-06-24 ENCOUNTER — PATIENT MESSAGE (OUTPATIENT)
Dept: NEUROLOGY | Facility: CLINIC | Age: 28
End: 2018-06-24

## 2018-07-03 ENCOUNTER — PATIENT MESSAGE (OUTPATIENT)
Dept: NEUROLOGY | Facility: CLINIC | Age: 28
End: 2018-07-03

## 2018-07-03 DIAGNOSIS — G50.9 TRIGEMINAL NEUROPATHY: Primary | ICD-10-CM

## 2018-07-03 NOTE — TELEPHONE ENCOUNTER
Called and spoke with patient. Patient had an abcess in her mouth. Patient has a tooth extracted and developed a dry socket. That is when the pain started. Pain started to progress so she went to the hospital. Patient was admitted Tuesday morning. They gave her Fioricet and discharged home.The Fioricet has not helped. Her head is burning all over, mostly at the top.       Spoke with Dr. Hart. Please see my chart message.

## 2018-07-18 ENCOUNTER — PATIENT MESSAGE (OUTPATIENT)
Dept: NEUROLOGY | Facility: CLINIC | Age: 28
End: 2018-07-18

## 2018-07-18 RX ORDER — BUTALBITAL, ACETAMINOPHEN AND CAFFEINE 50; 325; 40 MG/1; MG/1; MG/1
TABLET ORAL
Qty: 10 TABLET | Refills: 1 | Status: SHIPPED | OUTPATIENT
Start: 2018-07-18 | End: 2019-01-14 | Stop reason: SDUPTHER

## 2018-08-09 ENCOUNTER — PATIENT MESSAGE (OUTPATIENT)
Dept: NEUROLOGY | Facility: CLINIC | Age: 28
End: 2018-08-09

## 2018-08-10 ENCOUNTER — PATIENT MESSAGE (OUTPATIENT)
Dept: NEUROLOGY | Facility: CLINIC | Age: 28
End: 2018-08-10

## 2018-08-15 ENCOUNTER — PATIENT MESSAGE (OUTPATIENT)
Dept: NEUROLOGY | Facility: CLINIC | Age: 28
End: 2018-08-15

## 2018-08-27 ENCOUNTER — PATIENT MESSAGE (OUTPATIENT)
Dept: NEUROLOGY | Facility: CLINIC | Age: 28
End: 2018-08-27

## 2018-08-28 ENCOUNTER — PATIENT MESSAGE (OUTPATIENT)
Dept: NEUROLOGY | Facility: CLINIC | Age: 28
End: 2018-08-28

## 2018-08-29 ENCOUNTER — OFFICE VISIT (OUTPATIENT)
Dept: NEUROLOGY | Facility: CLINIC | Age: 28
End: 2018-08-29
Payer: COMMERCIAL

## 2018-08-29 VITALS
HEIGHT: 65 IN | RESPIRATION RATE: 16 BRPM | BODY MASS INDEX: 45.26 KG/M2 | SYSTOLIC BLOOD PRESSURE: 144 MMHG | HEART RATE: 97 BPM | WEIGHT: 271.63 LBS | DIASTOLIC BLOOD PRESSURE: 87 MMHG

## 2018-08-29 DIAGNOSIS — G50.9 TRIGEMINAL NEUROPATHY: Primary | ICD-10-CM

## 2018-08-29 DIAGNOSIS — M79.2 NEUROPATHIC PAIN: ICD-10-CM

## 2018-08-29 DIAGNOSIS — Z79.891 CHRONICALLY ON OPIATE THERAPY: ICD-10-CM

## 2018-08-29 PROCEDURE — 99999 PR PBB SHADOW E&M-EST. PATIENT-LVL III: CPT | Mod: PBBFAC,,, | Performed by: PSYCHIATRY & NEUROLOGY

## 2018-08-29 PROCEDURE — 3008F BODY MASS INDEX DOCD: CPT | Mod: CPTII,S$GLB,, | Performed by: PSYCHIATRY & NEUROLOGY

## 2018-08-29 PROCEDURE — 99214 OFFICE O/P EST MOD 30 MIN: CPT | Mod: S$GLB,,, | Performed by: PSYCHIATRY & NEUROLOGY

## 2018-08-29 RX ORDER — OXCARBAZEPINE 600 MG/1
600 TABLET, FILM COATED ORAL 2 TIMES DAILY
Qty: 60 TABLET | Refills: 11 | Status: SHIPPED | OUTPATIENT
Start: 2018-08-29 | End: 2020-01-15 | Stop reason: SDUPTHER

## 2018-08-29 NOTE — PATIENT INSTRUCTIONS
WORKUP:  -- none     PREVENTION (use daily regardless of headache):  -- resume Trileptal at 300mg twice a day (half tablet twice a day) and then raise to lowest effective dose according to the chart (or highest dose 1200mg twice per day which is 2 tabs twice per day)  -- please discuss the IUD, Depo-Provera, or Nexplanon at your post-partum visit your OB    ACUTE TREATMENT:  -- resume Nucynta IR 50mg three times per day as needed #90, electronic, 8/29/18 to 9/28/18     ------------------------------------------------------------------------------------------------------------------------------------------------------------------    Oxcarbazepine (Trileptal brand)     Oxcarbazepine is an anti-epileptic medication that can be beneficial for painful neurological conditions. While it is only FDA approved for certain types of seizures, it has proven useful in the treatment of trigeminal and other painful neuralgias. It does not appear to help with migraines. While it is generally quite safe and well tolerated, it may cause side effects, including but not limited to the following:   Dizziness   Rash   Low blood sodium (blood test)   Sleepiness / sedation   Unsteadiness   Difficulty thinking or concentrating   Suicidal thoughts (a rare problem with any seizure drug)     Serious reactions are rare. Dosing should be increased gradually. It is usually administered twice a day. Use the following schedule as a guide to find the lowest dose that seems to be effective for you. Check off each dose increase as you go. Usually the dose is increased every 3-5 days.     Low dosing is done with 300mg tablets, higher dosing with 600mg tablets. The tablets can be easily split with a knife or pill-cutter.     Oxcarbazepine dosing using 600 mg tabs   # of Tablets                                    Check off Each Day   Morning  Evening  1  2  3  (4)  (5)    1/2  1/2         1/2  1         1  1         1  1 1/2         1 1/2  1 1/2          1 1/2  2         2  2           Do not increase dose by more than ½ tablet per day every 3 days. If needed, you can increase more slowly than suggested. Stop increasing your dose if you experience side effects. If your condition responds to the medication at a lower strength, stop increasing your dose - the idea is to find the minimally effective strength you need to feel better. If you are better at 1 pill twice a day, then that is your dose!

## 2018-09-05 ENCOUNTER — PATIENT MESSAGE (OUTPATIENT)
Dept: NEUROLOGY | Facility: CLINIC | Age: 28
End: 2018-09-05

## 2018-09-05 RX ORDER — RIZATRIPTAN BENZOATE 10 MG/1
TABLET ORAL
Qty: 18 TABLET | Refills: 11 | Status: SHIPPED | OUTPATIENT
Start: 2018-09-05 | End: 2019-09-26 | Stop reason: SDUPTHER

## 2018-09-06 ENCOUNTER — PATIENT MESSAGE (OUTPATIENT)
Dept: NEUROLOGY | Facility: CLINIC | Age: 28
End: 2018-09-06

## 2018-09-16 ENCOUNTER — PATIENT MESSAGE (OUTPATIENT)
Dept: NEUROLOGY | Facility: CLINIC | Age: 28
End: 2018-09-16

## 2018-09-17 ENCOUNTER — PATIENT MESSAGE (OUTPATIENT)
Dept: NEUROLOGY | Facility: CLINIC | Age: 28
End: 2018-09-17

## 2018-09-17 RX ORDER — DICLOFENAC SODIUM 50 MG/1
TABLET, DELAYED RELEASE ORAL
Qty: 30 TABLET | Refills: 11 | Status: SHIPPED | OUTPATIENT
Start: 2018-09-17 | End: 2018-11-14

## 2018-09-19 ENCOUNTER — PATIENT MESSAGE (OUTPATIENT)
Dept: NEUROLOGY | Facility: CLINIC | Age: 28
End: 2018-09-19

## 2018-09-19 DIAGNOSIS — M79.2 NEUROPATHIC PAIN: ICD-10-CM

## 2018-09-19 DIAGNOSIS — Z79.891 CHRONICALLY ON OPIATE THERAPY: ICD-10-CM

## 2018-09-19 DIAGNOSIS — G50.9 TRIGEMINAL NEUROPATHY: ICD-10-CM

## 2018-09-20 ENCOUNTER — PATIENT MESSAGE (OUTPATIENT)
Dept: NEUROLOGY | Facility: CLINIC | Age: 28
End: 2018-09-20

## 2018-09-20 ENCOUNTER — TELEPHONE (OUTPATIENT)
Dept: NEUROLOGY | Facility: CLINIC | Age: 28
End: 2018-09-20

## 2018-09-21 ENCOUNTER — PATIENT MESSAGE (OUTPATIENT)
Dept: NEUROLOGY | Facility: CLINIC | Age: 28
End: 2018-09-21

## 2018-09-26 ENCOUNTER — PATIENT MESSAGE (OUTPATIENT)
Dept: NEUROLOGY | Facility: CLINIC | Age: 28
End: 2018-09-26

## 2018-09-27 ENCOUNTER — PATIENT MESSAGE (OUTPATIENT)
Dept: NEUROLOGY | Facility: CLINIC | Age: 28
End: 2018-09-27

## 2018-09-27 RX ORDER — OXYCODONE AND ACETAMINOPHEN 10; 325 MG/1; MG/1
1 TABLET ORAL EVERY 8 HOURS PRN
Qty: 90 TABLET | Refills: 0 | Status: SHIPPED | OUTPATIENT
Start: 2018-09-27 | End: 2018-10-19 | Stop reason: SDUPTHER

## 2018-10-04 ENCOUNTER — PATIENT MESSAGE (OUTPATIENT)
Dept: NEUROLOGY | Facility: CLINIC | Age: 28
End: 2018-10-04

## 2018-10-05 ENCOUNTER — PATIENT MESSAGE (OUTPATIENT)
Dept: NEUROLOGY | Facility: CLINIC | Age: 28
End: 2018-10-05

## 2018-10-09 ENCOUNTER — PATIENT MESSAGE (OUTPATIENT)
Dept: NEUROLOGY | Facility: CLINIC | Age: 28
End: 2018-10-09

## 2018-10-10 ENCOUNTER — PATIENT MESSAGE (OUTPATIENT)
Dept: NEUROLOGY | Facility: CLINIC | Age: 28
End: 2018-10-10

## 2018-10-16 ENCOUNTER — PATIENT MESSAGE (OUTPATIENT)
Dept: NEUROLOGY | Facility: CLINIC | Age: 28
End: 2018-10-16

## 2018-10-19 ENCOUNTER — PATIENT MESSAGE (OUTPATIENT)
Dept: NEUROLOGY | Facility: CLINIC | Age: 28
End: 2018-10-19

## 2018-10-19 DIAGNOSIS — G50.9 TRIGEMINAL NEUROPATHY: Primary | ICD-10-CM

## 2018-10-19 RX ORDER — OXYCODONE AND ACETAMINOPHEN 10; 325 MG/1; MG/1
1 TABLET ORAL EVERY 8 HOURS PRN
Qty: 90 TABLET | Refills: 0 | Status: SHIPPED | OUTPATIENT
Start: 2018-10-27 | End: 2018-11-13 | Stop reason: SDUPTHER

## 2018-10-20 ENCOUNTER — PATIENT MESSAGE (OUTPATIENT)
Dept: NEUROLOGY | Facility: CLINIC | Age: 28
End: 2018-10-20

## 2018-10-22 ENCOUNTER — PATIENT MESSAGE (OUTPATIENT)
Dept: NEUROLOGY | Facility: CLINIC | Age: 28
End: 2018-10-22

## 2018-11-05 ENCOUNTER — PATIENT MESSAGE (OUTPATIENT)
Dept: NEUROLOGY | Facility: CLINIC | Age: 28
End: 2018-11-05

## 2018-11-06 ENCOUNTER — PATIENT MESSAGE (OUTPATIENT)
Dept: NEUROLOGY | Facility: CLINIC | Age: 28
End: 2018-11-06

## 2018-11-06 RX ORDER — ONDANSETRON 4 MG/1
4 TABLET, ORALLY DISINTEGRATING ORAL EVERY 8 HOURS PRN
Qty: 12 TABLET | Refills: 2 | Status: SHIPPED | OUTPATIENT
Start: 2018-11-06 | End: 2019-04-24

## 2018-11-07 ENCOUNTER — TELEPHONE (OUTPATIENT)
Dept: NEUROLOGY | Facility: CLINIC | Age: 28
End: 2018-11-07

## 2018-11-07 NOTE — TELEPHONE ENCOUNTER
Received a release of information in the mail from [facility] in regards to the patient. Release of information faxed to medical records to [phone].

## 2018-11-13 ENCOUNTER — PATIENT MESSAGE (OUTPATIENT)
Dept: NEUROLOGY | Facility: CLINIC | Age: 28
End: 2018-11-13

## 2018-11-13 DIAGNOSIS — G50.9 TRIGEMINAL NEUROPATHY: ICD-10-CM

## 2018-11-13 RX ORDER — OXYCODONE AND ACETAMINOPHEN 10; 325 MG/1; MG/1
1 TABLET ORAL EVERY 8 HOURS PRN
Qty: 90 TABLET | Refills: 0 | Status: SHIPPED | OUTPATIENT
Start: 2018-11-26 | End: 2018-12-13 | Stop reason: SDUPTHER

## 2018-11-14 ENCOUNTER — OFFICE VISIT (OUTPATIENT)
Dept: ORTHOPEDICS | Facility: CLINIC | Age: 28
End: 2018-11-14
Payer: COMMERCIAL

## 2018-11-14 VITALS
HEART RATE: 103 BPM | SYSTOLIC BLOOD PRESSURE: 132 MMHG | WEIGHT: 271 LBS | DIASTOLIC BLOOD PRESSURE: 92 MMHG | BODY MASS INDEX: 45.15 KG/M2 | HEIGHT: 65 IN

## 2018-11-14 DIAGNOSIS — M79.641 BILATERAL HAND PAIN: ICD-10-CM

## 2018-11-14 DIAGNOSIS — R20.0 NUMBNESS AND TINGLING IN BOTH HANDS: Primary | ICD-10-CM

## 2018-11-14 DIAGNOSIS — M79.642 BILATERAL HAND PAIN: ICD-10-CM

## 2018-11-14 DIAGNOSIS — R20.2 NUMBNESS AND TINGLING IN BOTH HANDS: Primary | ICD-10-CM

## 2018-11-14 PROCEDURE — 73130 X-RAY EXAM OF HAND: CPT | Mod: RT,,, | Performed by: ORTHOPAEDIC SURGERY

## 2018-11-14 PROCEDURE — 3008F BODY MASS INDEX DOCD: CPT | Mod: ,,, | Performed by: ORTHOPAEDIC SURGERY

## 2018-11-14 PROCEDURE — 99203 OFFICE O/P NEW LOW 30 MIN: CPT | Mod: ,,, | Performed by: ORTHOPAEDIC SURGERY

## 2018-11-14 RX ORDER — FLUOXETINE HYDROCHLORIDE 60 MG/1
TABLET, FILM COATED ORAL; ORAL
Refills: 1 | COMMUNITY
Start: 2018-10-20 | End: 2019-04-24

## 2018-11-14 RX ORDER — FLUVOXAMINE MALEATE 100 MG/1
100 TABLET, COATED ORAL DAILY
Refills: 2 | COMMUNITY
Start: 2018-10-22 | End: 2019-01-14

## 2018-11-14 NOTE — PROGRESS NOTES
[facility] ORTHOPEDICS    Subjective:     Chief Complaint:   Chief Complaint   Patient presents with    Right Hand - Pain     Left hand pain x 2-3 weeks. States that when she reaches for something her hand goes numb and she has decreased strength in the hand.  States that she does have tingling in her hand.     Left Hand - Pain     Left hand pain x a while. States that she does have numbness and tingling in hand but states that it is not as bad.        Past Medical History:   Diagnosis Date    Agoraphobia     Depression     Fibromyalgia     Nerve injury 08/2016    Lingual Nerve Injury    PTSD (post-traumatic stress disorder)        Past Surgical History:   Procedure Laterality Date    MOUTH SURGERY      WISDOM TOOTH EXTRACTION         Current Outpatient Medications   Medication Sig    butalbital-acetaminophen-caffeine -40 mg (FIORICET, ESGIC) -40 mg per tablet 1 tab PO as needed for migraine. No more than 10 tablets per month.    FLUoxetine (PROZAC) 40 MG capsule TK ONE C PO  QD    fluvoxaMINE (LUVOX) 100 MG tablet Take 100 mg by mouth once daily.    ondansetron (ZOFRAN-ODT) 4 MG TbDL Take 1 tablet (4 mg total) by mouth every 8 (eight) hours as needed.    OXcarbazepine (TRILEPTAL) 600 MG Tab Take 1 tablet (600 mg total) by mouth 2 (two) times daily.    rizatriptan (MAXALT) 10 MG tablet 1 tab PO PRN migraine. May repeat every 2 hours for max 3 tabs in 24 hours. Use no more than 10 days per month.    alprazolam (XANAX) 1 MG tablet Take 1 mg by mouth 2 (two) times daily as needed.    [START ON 11/26/2018] oxyCODONE-acetaminophen (PERCOCET)  mg per tablet Take 1 tablet by mouth every 8 (eight) hours as needed for Pain.     No current facility-administered medications for this visit.        Review of patient's allergies indicates:   Allergen Reactions    Benadryl  [diphenhydramine hcl]     Codeine     Flexeril [cyclobenzaprine] Other (See Comments)     Numbness on right side of body     Nsaids (non-steroidal anti-inflammatory drug) Diarrhea and Nausea And Vomiting    Penicillins        Family History   Problem Relation Age of Onset    No Known Problems Mother     No Known Problems Father     No Known Problems Sister     No Known Problems Brother     No Known Problems Maternal Aunt     No Known Problems Maternal Uncle     No Known Problems Paternal Aunt     No Known Problems Paternal Uncle     No Known Problems Maternal Grandmother     No Known Problems Maternal Grandfather     No Known Problems Paternal Grandmother     No Known Problems Paternal Grandfather        Social History     Socioeconomic History    Marital status:      Spouse name: Not on file    Number of children: Not on file    Years of education: Not on file    Highest education level: Not on file   Social Needs    Financial resource strain: Not on file    Food insecurity - worry: Not on file    Food insecurity - inability: Not on file    Transportation needs - medical: Not on file    Transportation needs - non-medical: Not on file   Occupational History    Not on file   Tobacco Use    Smoking status: Never Smoker    Smokeless tobacco: Never Used   Substance and Sexual Activity    Alcohol use: No    Drug use: No    Sexual activity: Yes     Partners: Male   Other Topics Concern    Not on file   Social History Narrative    Not on file       History of present illness: Parts of this girl has bilateral hand pain right worse than left for years diagnosis supposedly carpal tunnel for years recently getting worse more night pain little bit of weakness but not severe. She also has a chronic lingual nerve issue and been on pain medicines for long time. A lot of issues with GI and anti-inflammatories. Stay-at-home mom. 3 months ago delivered but did not have excessive carpal tunnel symptoms during pregnancy      Review of Systems:    Constitution: Negative for chills, fever, and sweats.  Negative for  unexplained weight loss.    HENT:  Negative for headaches and blurry vision.    Cardiovascular:Negative for chest pain or irregular heart beat. Negative for hypertension.    Respiratory:  Negative for cough and shortness of breath.    Gastrointestinal: Negative for abdominal pain, heartburn, melena, nausea, and vomitting.    Genitourinary:  Negative bladder incontinence and dysuria.    Musculoskeletal:  See HPI for details.     Neurological: Negative for numbness.    Psychiatric/Behavioral: Negative for depression.  The patient is not nervous/anxious.      Endocrine: Negative for polyuria    Hematologic/Lymphatic: Negative for bleeding problem.  Does not bruise/bleed easily.    Skin: Negative for poor would healing and rash    Objective:      Physical Examination:    Vital Signs:    Vitals:    11/14/18 0812   BP: (!) 132/92   Pulse: 103       Body mass index is 45.1 kg/m².    This a well-developed, well nourished patient in no acute distress.  They are alert and oriented and cooperative to examination.        Physical exam shows very impressive Phalen's on the right compared to the left but there is some in both sides she does not have a grossly positive Tinel's in the right hand she does not have thenar atrophy or weakness either side that's easily demonstrable. There is decreased sensation in the median nerve distribution to some extent on the right hand compared to the left  Pertinent New Results:    XRAY Report / Interpretation:   AP lateral oblique of both hands shows that she has some subluxation of the CMC joint base of the thumb bilateral not severe with no degenerative changes.Electronically Signed By Brendon Lozada JR, MD    Assessment/Plan:      My impression is carpal tunnel right greater than left. Plan she needs a nerve conduction and then surgery is indicated. She is already on some chronic pain meds which don't work very well Percocet 10 I certainly would not can go beyond that. See her back after we  get her nerve conduction      This note was created using Dragon voice recognition software that occasionally misinterpreted phrases or words.

## 2018-11-19 ENCOUNTER — TELEPHONE (OUTPATIENT)
Dept: ORTHOPEDICS | Facility: CLINIC | Age: 28
End: 2018-11-19

## 2018-11-19 ENCOUNTER — PATIENT MESSAGE (OUTPATIENT)
Dept: NEUROLOGY | Facility: CLINIC | Age: 28
End: 2018-11-19

## 2018-11-19 NOTE — TELEPHONE ENCOUNTER
Patient called and would like to know what else can she can take for pain nothing over the counter is helping, please call patient when you can

## 2018-11-20 ENCOUNTER — PATIENT MESSAGE (OUTPATIENT)
Dept: NEUROLOGY | Facility: CLINIC | Age: 28
End: 2018-11-20

## 2018-11-20 ENCOUNTER — PATIENT MESSAGE (OUTPATIENT)
Dept: ORTHOPEDICS | Facility: CLINIC | Age: 28
End: 2018-11-20

## 2018-11-20 ENCOUNTER — TELEPHONE (OUTPATIENT)
Dept: ORTHOPEDICS | Facility: CLINIC | Age: 28
End: 2018-11-20

## 2018-11-21 ENCOUNTER — PATIENT MESSAGE (OUTPATIENT)
Dept: ORTHOPEDICS | Facility: CLINIC | Age: 28
End: 2018-11-21

## 2018-11-30 ENCOUNTER — PATIENT MESSAGE (OUTPATIENT)
Dept: ORTHOPEDICS | Facility: CLINIC | Age: 28
End: 2018-11-30

## 2018-11-30 DIAGNOSIS — G56.01 CARPAL TUNNEL SYNDROME OF RIGHT WRIST: ICD-10-CM

## 2018-11-30 DIAGNOSIS — R20.0 NUMBNESS AND TINGLING IN BOTH HANDS: Primary | ICD-10-CM

## 2018-11-30 DIAGNOSIS — R20.2 NUMBNESS AND TINGLING IN BOTH HANDS: Primary | ICD-10-CM

## 2018-12-02 ENCOUNTER — PATIENT MESSAGE (OUTPATIENT)
Dept: ORTHOPEDICS | Facility: CLINIC | Age: 28
End: 2018-12-02

## 2018-12-05 ENCOUNTER — PATIENT MESSAGE (OUTPATIENT)
Dept: NEUROLOGY | Facility: CLINIC | Age: 28
End: 2018-12-05

## 2018-12-11 ENCOUNTER — PATIENT MESSAGE (OUTPATIENT)
Dept: NEUROLOGY | Facility: CLINIC | Age: 28
End: 2018-12-11

## 2018-12-11 ENCOUNTER — PATIENT MESSAGE (OUTPATIENT)
Dept: ORTHOPEDICS | Facility: CLINIC | Age: 28
End: 2018-12-11

## 2018-12-11 DIAGNOSIS — G50.9 TRIGEMINAL NEUROPATHY: ICD-10-CM

## 2018-12-12 ENCOUNTER — PATIENT MESSAGE (OUTPATIENT)
Dept: NEUROLOGY | Facility: CLINIC | Age: 28
End: 2018-12-12

## 2018-12-13 ENCOUNTER — TELEPHONE (OUTPATIENT)
Dept: NEUROLOGY | Facility: CLINIC | Age: 28
End: 2018-12-13

## 2018-12-13 ENCOUNTER — PATIENT MESSAGE (OUTPATIENT)
Dept: NEUROLOGY | Facility: CLINIC | Age: 28
End: 2018-12-13

## 2018-12-13 RX ORDER — OXYCODONE AND ACETAMINOPHEN 10; 325 MG/1; MG/1
1 TABLET ORAL EVERY 8 HOURS PRN
Qty: 90 TABLET | Refills: 0 | Status: SHIPPED | OUTPATIENT
Start: 2018-12-26 | End: 2019-01-22 | Stop reason: ALTCHOICE

## 2018-12-14 ENCOUNTER — PATIENT MESSAGE (OUTPATIENT)
Dept: ORTHOPEDICS | Facility: CLINIC | Age: 28
End: 2018-12-14

## 2018-12-16 ENCOUNTER — HOSPITAL ENCOUNTER (EMERGENCY)
Facility: HOSPITAL | Age: 28
Discharge: HOME | End: 2018-12-16
Attending: EMERGENCY MEDICINE
Payer: MEDICAID

## 2018-12-16 VITALS
SYSTOLIC BLOOD PRESSURE: 145 MMHG | TEMPERATURE: 98 F | OXYGEN SATURATION: 97 % | BODY MASS INDEX: 44.98 KG/M2 | HEIGHT: 65 IN | RESPIRATION RATE: 18 BRPM | HEART RATE: 84 BPM | DIASTOLIC BLOOD PRESSURE: 80 MMHG | WEIGHT: 270 LBS

## 2018-12-16 DIAGNOSIS — G43.811 OTHER MIGRAINE WITH STATUS MIGRAINOSUS, INTRACTABLE: Primary | ICD-10-CM

## 2018-12-16 LAB
B-HCG UR QL: NEGATIVE
CTP QC/QA: YES

## 2018-12-16 PROCEDURE — 99284 EMERGENCY DEPT VISIT MOD MDM: CPT | Mod: 25

## 2018-12-16 PROCEDURE — 25000003 PHARM REV CODE 250: Performed by: EMERGENCY MEDICINE

## 2018-12-16 PROCEDURE — 81025 URINE PREGNANCY TEST: CPT | Performed by: EMERGENCY MEDICINE

## 2018-12-16 PROCEDURE — 96361 HYDRATE IV INFUSION ADD-ON: CPT

## 2018-12-16 PROCEDURE — 96374 THER/PROPH/DIAG INJ IV PUSH: CPT

## 2018-12-16 PROCEDURE — 63600175 PHARM REV CODE 636 W HCPCS: Performed by: EMERGENCY MEDICINE

## 2018-12-16 PROCEDURE — 96375 TX/PRO/DX INJ NEW DRUG ADDON: CPT

## 2018-12-16 PROCEDURE — 96376 TX/PRO/DX INJ SAME DRUG ADON: CPT

## 2018-12-16 RX ORDER — NALBUPHINE HYDROCHLORIDE 10 MG/ML
10 INJECTION, SOLUTION INTRAMUSCULAR; INTRAVENOUS; SUBCUTANEOUS
Status: DISCONTINUED | OUTPATIENT
Start: 2018-12-16 | End: 2018-12-16

## 2018-12-16 RX ORDER — HYDROMORPHONE HYDROCHLORIDE 2 MG/ML
1 INJECTION, SOLUTION INTRAMUSCULAR; INTRAVENOUS; SUBCUTANEOUS
Status: COMPLETED | OUTPATIENT
Start: 2018-12-16 | End: 2018-12-16

## 2018-12-16 RX ORDER — METOCLOPRAMIDE HYDROCHLORIDE 5 MG/ML
10 INJECTION INTRAMUSCULAR; INTRAVENOUS
Status: COMPLETED | OUTPATIENT
Start: 2018-12-16 | End: 2018-12-16

## 2018-12-16 RX ORDER — KETOROLAC TROMETHAMINE 30 MG/ML
10 INJECTION, SOLUTION INTRAMUSCULAR; INTRAVENOUS
Status: DISCONTINUED | OUTPATIENT
Start: 2018-12-16 | End: 2018-12-16 | Stop reason: HOSPADM

## 2018-12-16 RX ORDER — HYDROMORPHONE HYDROCHLORIDE 2 MG/ML
2 INJECTION, SOLUTION INTRAMUSCULAR; INTRAVENOUS; SUBCUTANEOUS
Status: COMPLETED | OUTPATIENT
Start: 2018-12-16 | End: 2018-12-16

## 2018-12-16 RX ORDER — METHYLPREDNISOLONE SOD SUCC 125 MG
125 VIAL (EA) INJECTION
Status: COMPLETED | OUTPATIENT
Start: 2018-12-16 | End: 2018-12-16

## 2018-12-16 RX ORDER — MAGNESIUM SULFATE 1 G/100ML
1 INJECTION INTRAVENOUS
Status: COMPLETED | OUTPATIENT
Start: 2018-12-16 | End: 2018-12-16

## 2018-12-16 RX ADMIN — METHYLPREDNISOLONE SODIUM SUCCINATE 125 MG: 125 INJECTION, POWDER, FOR SOLUTION INTRAMUSCULAR; INTRAVENOUS at 05:12

## 2018-12-16 RX ADMIN — SODIUM CHLORIDE 1000 ML: 0.9 INJECTION, SOLUTION INTRAVENOUS at 05:12

## 2018-12-16 RX ADMIN — MAGNESIUM SULFATE HEPTAHYDRATE 1 G: 1 INJECTION, SOLUTION INTRAVENOUS at 05:12

## 2018-12-16 RX ADMIN — METOCLOPRAMIDE 10 MG: 5 INJECTION, SOLUTION INTRAMUSCULAR; INTRAVENOUS at 05:12

## 2018-12-16 RX ADMIN — HYDROMORPHONE HYDROCHLORIDE 2 MG: 2 INJECTION, SOLUTION INTRAMUSCULAR; INTRAVENOUS; SUBCUTANEOUS at 07:12

## 2018-12-16 RX ADMIN — HYDROMORPHONE HYDROCHLORIDE 1 MG: 2 INJECTION INTRAMUSCULAR; INTRAVENOUS; SUBCUTANEOUS at 05:12

## 2018-12-16 NOTE — ED PROVIDER NOTES
"Encounter Date: 12/16/2018    SCRIBE #1 NOTE: I, Terese Kaur, am scribing for, and in the presence of, Dr. Silver Quiros.       History     Chief Complaint   Patient presents with    Migraine     " cluster " x 4 days        Time seen by provider: 5:00 PM on 12/16/2018    Jasper Bautista is a 28 y.o. female with agoraphobia and fibromyalgia who presents to the ED with an onset of a migraine that has been present for four days. She has been diagnosed with cluster migraine by a neurologist. Pt took all her medication that her neurologist has prescribed, but they are not working for this migraine. She endorses visual disturbances. The patient denies fever, or any other symptoms at this time. No pertinent PSHx noted. No pertinent SHx noted. Pt is allergic to Benadryl, Codeine, Flexeril, NSAIDS, and Penicillins.      The history is provided by the patient.     Review of patient's allergies indicates:   Allergen Reactions    Benadryl  [diphenhydramine hcl]     Codeine     Flexeril [cyclobenzaprine] Other (See Comments)     Numbness on right side of body    Nsaids (non-steroidal anti-inflammatory drug) Diarrhea and Nausea And Vomiting    Penicillins      Past Medical History:   Diagnosis Date    Agoraphobia     Depression     Fibromyalgia     Nerve injury 08/2016    Lingual Nerve Injury    PTSD (post-traumatic stress disorder)      Past Surgical History:   Procedure Laterality Date    MOUTH SURGERY      WISDOM TOOTH EXTRACTION       Family History   Problem Relation Age of Onset    No Known Problems Mother     No Known Problems Father     No Known Problems Sister     No Known Problems Brother     No Known Problems Maternal Aunt     No Known Problems Maternal Uncle     No Known Problems Paternal Aunt     No Known Problems Paternal Uncle     No Known Problems Maternal Grandmother     No Known Problems Maternal Grandfather     No Known Problems Paternal Grandmother     No Known " Problems Paternal Grandfather      Social History     Tobacco Use    Smoking status: Never Smoker    Smokeless tobacco: Never Used   Substance Use Topics    Alcohol use: No    Drug use: No     Review of Systems   Constitutional: Negative for fever.   HENT: Negative for sore throat.    Eyes: Positive for visual disturbance.   Respiratory: Negative for shortness of breath.    Cardiovascular: Negative for chest pain.   Gastrointestinal: Negative for nausea.   Genitourinary: Negative for dysuria.   Musculoskeletal: Negative for back pain.   Skin: Negative for rash.   Neurological: Negative for weakness.        Positive for migraine.   Hematological: Does not bruise/bleed easily.       Physical Exam     Initial Vitals [12/16/18 1548]   BP Pulse Resp Temp SpO2   131/62 87 18 98 °F (36.7 °C) 98 %      MAP       --         Physical Exam    Nursing note and vitals reviewed.  Constitutional: She appears well-developed and well-nourished.  Non-toxic appearance. No distress.   HENT:   Head: Normocephalic and atraumatic.   Eyes: EOM are normal. Pupils are equal, round, and reactive to light.   Neck: Normal range of motion. Neck supple. No neck rigidity. No JVD present.   Cardiovascular: Normal rate, regular rhythm, normal heart sounds and intact distal pulses. Exam reveals no gallop and no friction rub.    No murmur heard.  Pulmonary/Chest: Breath sounds normal. She has no wheezes. She has no rhonchi. She has no rales.   Abdominal: Soft. Bowel sounds are normal. She exhibits no distension. There is no tenderness. There is no rigidity, no rebound and no guarding.   Musculoskeletal: Normal range of motion.   Neurological: She is alert and oriented to person, place, and time. She has normal strength and normal reflexes. No cranial nerve deficit or sensory deficit. She exhibits normal muscle tone. Coordination normal. GCS eye subscore is 4. GCS verbal subscore is 5. GCS motor subscore is 6.   Skin: Skin is warm and dry.    Psychiatric: She has a normal mood and affect. Her speech is normal and behavior is normal. She is not actively hallucinating.         ED Course   Procedures  Labs Reviewed   POCT URINE PREGNANCY          Imaging Results    None          Medical Decision Making:   History:   Old Medical Records: I decided to obtain old medical records.  Initial Assessment:   28-year-old with a history of migraines which she refers to his cluster migraines presents emergency department for headache, nausea consistent with previous episodes of the same migraine headache. She has no fever or meningismus. She is well-appearing and does not appear to be in pain on examination however started on IV ED medication for headache. She was given Reglan, methylprednisone, IV fluids to treat her headache. The magnesium and Toradol were ordered but patient declined.  She asked for nalbuphine but our facility did not carry it.  She asked for more narcotics and was given a 2 mg of Dilaudid IV.  She was offered admission but did not want to stay for intractable headache. She will follow up with her neurologist on outpatient basis.  I do not feel comfortable prescribing her narcotic pain medication to treat her headache. Return precautions discussed.  I have low suspicion for intracranial hemorrhage, meningitis or emergent intracranial process.  Clinical Tests:   Lab Tests: Ordered and Reviewed            Scribe Attestation:   Scribe #1: I performed the above scribed service and the documentation accurately describes the services I performed. I attest to the accuracy of the note.    I, Ishaan Quesada, personally performed the services described in this documentation. All medical record entries made by the scribe were at my direction and in my presence.  I have reviewed the chart and agree that the record reflects my personal performance and is accurate and complete. Silver Quiros MD.  4:19 PM 12/19/2018             Clinical Impression:   The  encounter diagnosis was Other migraine with status migrainosus, intractable.      Disposition:   Disposition: Discharged  Condition: Stable                        Silver Quiros MD  12/19/18 4311

## 2018-12-17 ENCOUNTER — PATIENT MESSAGE (OUTPATIENT)
Dept: ORTHOPEDICS | Facility: CLINIC | Age: 28
End: 2018-12-17

## 2018-12-17 ENCOUNTER — PATIENT MESSAGE (OUTPATIENT)
Dept: NEUROLOGY | Facility: CLINIC | Age: 28
End: 2018-12-17

## 2018-12-17 RX ORDER — DIHYDROERGOTAMINE MESYLATE 4 MG/ML
1 SPRAY NASAL EVERY 8 HOURS PRN
Qty: 9 ML | Refills: 12 | Status: SHIPPED | OUTPATIENT
Start: 2018-12-17 | End: 2019-10-16

## 2018-12-17 NOTE — ED NOTES
Upon discharge, patient is AAOx4, no cardiac or respiratory complications. Follow up care reviewed with patient and has been instructed to return to the ER if needed. Patient verbalized understanding and ambulated to the lobby without difficulty. ADALBERTO CHO

## 2018-12-19 ENCOUNTER — PATIENT MESSAGE (OUTPATIENT)
Dept: NEUROLOGY | Facility: CLINIC | Age: 28
End: 2018-12-19

## 2018-12-20 ENCOUNTER — PATIENT MESSAGE (OUTPATIENT)
Dept: NEUROLOGY | Facility: CLINIC | Age: 28
End: 2018-12-20

## 2018-12-20 ENCOUNTER — PATIENT MESSAGE (OUTPATIENT)
Dept: ORTHOPEDICS | Facility: CLINIC | Age: 28
End: 2018-12-20

## 2018-12-20 DIAGNOSIS — G56.01 CARPAL TUNNEL SYNDROME OF RIGHT WRIST: Primary | ICD-10-CM

## 2018-12-21 ENCOUNTER — PATIENT MESSAGE (OUTPATIENT)
Dept: ORTHOPEDICS | Facility: CLINIC | Age: 28
End: 2018-12-21

## 2018-12-23 ENCOUNTER — HOSPITAL ENCOUNTER (EMERGENCY)
Facility: HOSPITAL | Age: 28
Discharge: HOME | End: 2018-12-23
Attending: EMERGENCY MEDICINE
Payer: MEDICAID

## 2018-12-23 VITALS
SYSTOLIC BLOOD PRESSURE: 118 MMHG | HEART RATE: 60 BPM | BODY MASS INDEX: 44.98 KG/M2 | OXYGEN SATURATION: 97 % | DIASTOLIC BLOOD PRESSURE: 68 MMHG | TEMPERATURE: 99 F | RESPIRATION RATE: 18 BRPM | HEIGHT: 65 IN | WEIGHT: 270 LBS

## 2018-12-23 DIAGNOSIS — G43.711 INTRACTABLE CHRONIC MIGRAINE WITHOUT AURA AND WITH STATUS MIGRAINOSUS: Primary | ICD-10-CM

## 2018-12-23 LAB
ALBUMIN SERPL BCP-MCNC: 3.9 G/DL
ALP SERPL-CCNC: 85 U/L
ALT SERPL W/O P-5'-P-CCNC: 22 U/L
ANION GAP SERPL CALC-SCNC: 9 MMOL/L
AST SERPL-CCNC: 21 U/L
BASOPHILS # BLD AUTO: 0.1 K/UL
BASOPHILS NFR BLD: 1.4 %
BILIRUB SERPL-MCNC: 0.2 MG/DL
BUN SERPL-MCNC: 11 MG/DL
CALCIUM SERPL-MCNC: 9.6 MG/DL
CHLORIDE SERPL-SCNC: 108 MMOL/L
CO2 SERPL-SCNC: 22 MMOL/L
CREAT SERPL-MCNC: 0.9 MG/DL
DIFFERENTIAL METHOD: ABNORMAL
EOSINOPHIL # BLD AUTO: 0 K/UL
EOSINOPHIL NFR BLD: 0 %
ERYTHROCYTE [DISTWIDTH] IN BLOOD BY AUTOMATED COUNT: 14.3 %
EST. GFR  (AFRICAN AMERICAN): >60 ML/MIN/1.73 M^2
EST. GFR  (NON AFRICAN AMERICAN): >60 ML/MIN/1.73 M^2
GLUCOSE SERPL-MCNC: 106 MG/DL
HCT VFR BLD AUTO: 36.1 %
HGB BLD-MCNC: 11.9 G/DL
LYMPHOCYTES # BLD AUTO: 2.9 K/UL
LYMPHOCYTES NFR BLD: 27.5 %
MCH RBC QN AUTO: 28.2 PG
MCHC RBC AUTO-ENTMCNC: 32.9 G/DL
MCV RBC AUTO: 86 FL
MONOCYTES # BLD AUTO: 0.5 K/UL
MONOCYTES NFR BLD: 4.6 %
NEUTROPHILS # BLD AUTO: 6.9 K/UL
NEUTROPHILS NFR BLD: 66.5 %
PLATELET # BLD AUTO: 353 K/UL
PMV BLD AUTO: 7.7 FL
POTASSIUM SERPL-SCNC: 4.2 MMOL/L
PROT SERPL-MCNC: 7.1 G/DL
RBC # BLD AUTO: 4.22 M/UL
SODIUM SERPL-SCNC: 139 MMOL/L
WBC # BLD AUTO: 10.4 K/UL

## 2018-12-23 PROCEDURE — 99284 EMERGENCY DEPT VISIT MOD MDM: CPT | Mod: 25

## 2018-12-23 PROCEDURE — 85025 COMPLETE CBC W/AUTO DIFF WBC: CPT

## 2018-12-23 PROCEDURE — 36415 COLL VENOUS BLD VENIPUNCTURE: CPT

## 2018-12-23 PROCEDURE — 96361 HYDRATE IV INFUSION ADD-ON: CPT

## 2018-12-23 PROCEDURE — 96365 THER/PROPH/DIAG IV INF INIT: CPT

## 2018-12-23 PROCEDURE — 80053 COMPREHEN METABOLIC PANEL: CPT

## 2018-12-23 PROCEDURE — 25000003 PHARM REV CODE 250: Performed by: EMERGENCY MEDICINE

## 2018-12-23 PROCEDURE — 63600175 PHARM REV CODE 636 W HCPCS: Performed by: EMERGENCY MEDICINE

## 2018-12-23 PROCEDURE — 96375 TX/PRO/DX INJ NEW DRUG ADDON: CPT | Mod: 59

## 2018-12-23 RX ORDER — BUTORPHANOL TARTRATE 1 MG/ML
1 INJECTION INTRAMUSCULAR; INTRAVENOUS ONCE
Status: COMPLETED | OUTPATIENT
Start: 2018-12-23 | End: 2018-12-23

## 2018-12-23 RX ORDER — KETOROLAC TROMETHAMINE 30 MG/ML
15 INJECTION, SOLUTION INTRAMUSCULAR; INTRAVENOUS
Status: COMPLETED | OUTPATIENT
Start: 2018-12-23 | End: 2018-12-23

## 2018-12-23 RX ORDER — BUTALBITAL, ACETAMINOPHEN AND CAFFEINE 50; 325; 40 MG/1; MG/1; MG/1
1 TABLET ORAL
Status: COMPLETED | OUTPATIENT
Start: 2018-12-23 | End: 2018-12-23

## 2018-12-23 RX ORDER — METHYLPREDNISOLONE SOD SUCC 125 MG
125 VIAL (EA) INJECTION
Status: COMPLETED | OUTPATIENT
Start: 2018-12-23 | End: 2018-12-23

## 2018-12-23 RX ADMIN — KETOROLAC TROMETHAMINE 15 MG: 30 INJECTION, SOLUTION INTRAMUSCULAR at 08:12

## 2018-12-23 RX ADMIN — METHYLPREDNISOLONE SODIUM SUCCINATE 125 MG: 125 INJECTION, POWDER, FOR SOLUTION INTRAMUSCULAR; INTRAVENOUS at 08:12

## 2018-12-23 RX ADMIN — SODIUM CHLORIDE 1000 ML: 0.9 INJECTION, SOLUTION INTRAVENOUS at 08:12

## 2018-12-23 RX ADMIN — PROCHLORPERAZINE EDISYLATE 10 MG: 5 INJECTION INTRAMUSCULAR; INTRAVENOUS at 08:12

## 2018-12-23 RX ADMIN — BUTORPHANOL TARTRATE 1 MG: 1 INJECTION, SOLUTION INTRAMUSCULAR; INTRAVENOUS at 09:12

## 2018-12-23 RX ADMIN — BUTALBITAL, ACETAMINOPHEN, AND CAFFEINE 1 TABLET: 50; 325; 40 TABLET ORAL at 10:12

## 2018-12-24 NOTE — ED NOTES
Pt in room 7 for evaluation of headache.  Pt is awake, alert and oriented. Skin warm and dry. abd soft and non-tender. Pt denies chest pain or sob.

## 2018-12-26 ENCOUNTER — TELEPHONE (OUTPATIENT)
Dept: ORTHOPEDICS | Facility: CLINIC | Age: 28
End: 2018-12-26

## 2018-12-26 ENCOUNTER — PATIENT MESSAGE (OUTPATIENT)
Dept: ORTHOPEDICS | Facility: CLINIC | Age: 28
End: 2018-12-26

## 2018-12-26 ENCOUNTER — PATIENT MESSAGE (OUTPATIENT)
Dept: NEUROLOGY | Facility: CLINIC | Age: 28
End: 2018-12-26

## 2018-12-26 LAB
BUN SERPL-MCNC: 11 MG/DL (ref 8–20)
CALCIUM SERPL-MCNC: 9.2 MG/DL (ref 7.7–10.4)
CHLORIDE: 106 MMOL/L (ref 98–110)
CO2 SERPL-SCNC: 25.1 MMOL/L (ref 22.8–31.6)
CREATININE: 0.95 MG/DL (ref 0.6–1.4)
GLUCOSE: 99 MG/DL (ref 70–99)
HCT VFR BLD AUTO: 38 % (ref 36–48)
HGB BLD-MCNC: 12 G/DL (ref 12–15)
MCH RBC QN AUTO: 28.2 PG (ref 25–35)
MCHC RBC AUTO-ENTMCNC: 31.6 G/DL (ref 31–36)
MCV RBC AUTO: 89.4 FL (ref 79–98)
MRSA SCREEN BY PCR: NORMAL
NUCLEATED RBCS: 0 %
PLATELET # BLD AUTO: 329 K/UL (ref 140–440)
POTASSIUM SERPL-SCNC: 3.9 MMOL/L (ref 3.5–5)
RBC # BLD AUTO: 4.25 M/UL (ref 3.5–5.5)
SODIUM: 140 MMOL/L (ref 134–144)
WBC # BLD AUTO: 7.8 K/UL (ref 5–10)

## 2018-12-26 NOTE — TELEPHONE ENCOUNTER
Kourtney called from [facility] this pt is scheduled for surgery tomorrow and no orders for pre op was scheduled for this pt ext# [phone]

## 2018-12-27 ENCOUNTER — PATIENT MESSAGE (OUTPATIENT)
Dept: ORTHOPEDICS | Facility: CLINIC | Age: 28
End: 2018-12-27

## 2018-12-28 ENCOUNTER — PATIENT MESSAGE (OUTPATIENT)
Dept: ORTHOPEDICS | Facility: CLINIC | Age: 28
End: 2018-12-28

## 2018-12-28 DIAGNOSIS — G56.01 CARPAL TUNNEL SYNDROME OF RIGHT WRIST: Primary | ICD-10-CM

## 2018-12-28 RX ORDER — HYDROCODONE BITARTRATE AND ACETAMINOPHEN 10; 325 MG/1; MG/1
1 TABLET ORAL EVERY 4 HOURS PRN
Qty: 28 TABLET | Refills: 0 | Status: SHIPPED | OUTPATIENT
Start: 2018-12-28 | End: 2019-01-22 | Stop reason: ALTCHOICE

## 2019-01-01 ENCOUNTER — PATIENT MESSAGE (OUTPATIENT)
Dept: ORTHOPEDICS | Facility: CLINIC | Age: 29
End: 2019-01-01

## 2019-01-02 ENCOUNTER — PATIENT MESSAGE (OUTPATIENT)
Dept: NEUROLOGY | Facility: CLINIC | Age: 29
End: 2019-01-02

## 2019-01-02 ENCOUNTER — PATIENT MESSAGE (OUTPATIENT)
Dept: ORTHOPEDICS | Facility: CLINIC | Age: 29
End: 2019-01-02

## 2019-01-03 ENCOUNTER — PATIENT MESSAGE (OUTPATIENT)
Dept: NEUROLOGY | Facility: CLINIC | Age: 29
End: 2019-01-03

## 2019-01-04 ENCOUNTER — PATIENT MESSAGE (OUTPATIENT)
Dept: ORTHOPEDICS | Facility: CLINIC | Age: 29
End: 2019-01-04

## 2019-01-06 ENCOUNTER — PATIENT MESSAGE (OUTPATIENT)
Dept: ORTHOPEDICS | Facility: CLINIC | Age: 29
End: 2019-01-06

## 2019-01-07 ENCOUNTER — PATIENT MESSAGE (OUTPATIENT)
Dept: ORTHOPEDICS | Facility: CLINIC | Age: 29
End: 2019-01-07

## 2019-01-09 ENCOUNTER — PATIENT MESSAGE (OUTPATIENT)
Dept: ORTHOPEDICS | Facility: CLINIC | Age: 29
End: 2019-01-09

## 2019-01-09 ENCOUNTER — OFFICE VISIT (OUTPATIENT)
Dept: ORTHOPEDICS | Facility: CLINIC | Age: 29
End: 2019-01-09
Payer: MEDICAID

## 2019-01-09 VITALS
HEIGHT: 67 IN | HEART RATE: 103 BPM | DIASTOLIC BLOOD PRESSURE: 82 MMHG | BODY MASS INDEX: 43.16 KG/M2 | SYSTOLIC BLOOD PRESSURE: 140 MMHG | WEIGHT: 275 LBS

## 2019-01-09 DIAGNOSIS — G56.01 CARPAL TUNNEL SYNDROME OF RIGHT WRIST: Primary | ICD-10-CM

## 2019-01-09 DIAGNOSIS — G56.02 CARPAL TUNNEL SYNDROME OF LEFT WRIST: ICD-10-CM

## 2019-01-09 PROCEDURE — 99024 POSTOP FOLLOW-UP VISIT: CPT | Mod: ,,, | Performed by: ORTHOPAEDIC SURGERY

## 2019-01-09 PROCEDURE — 99024 PR POST-OP FOLLOW-UP VISIT: ICD-10-PCS | Mod: ,,, | Performed by: ORTHOPAEDIC SURGERY

## 2019-01-09 PROCEDURE — 20526 CARPAL TUNNEL: ICD-10-PCS | Mod: 79,LT,, | Performed by: ORTHOPAEDIC SURGERY

## 2019-01-09 PROCEDURE — 20526 THER INJECTION CARP TUNNEL: CPT | Mod: 79,LT,, | Performed by: ORTHOPAEDIC SURGERY

## 2019-01-09 RX ORDER — METHYLPREDNISOLONE ACETATE 40 MG/ML
40 INJECTION, SUSPENSION INTRA-ARTICULAR; INTRALESIONAL; INTRAMUSCULAR; SOFT TISSUE
Status: DISCONTINUED | OUTPATIENT
Start: 2019-01-09 | End: 2019-01-09 | Stop reason: HOSPADM

## 2019-01-09 RX ORDER — CLONAZEPAM 1 MG/1
1 TABLET ORAL DAILY
Refills: 2 | COMMUNITY
Start: 2018-12-10 | End: 2019-04-24

## 2019-01-09 RX ADMIN — METHYLPREDNISOLONE ACETATE 40 MG: 40 INJECTION, SUSPENSION INTRA-ARTICULAR; INTRALESIONAL; INTRAMUSCULAR; SOFT TISSUE at 01:01

## 2019-01-09 NOTE — PROGRESS NOTES
[facility] ORTHOPEDICS POST-OP NOTE    Subjective:           Chief Complaint:   Chief Complaint   Patient presents with    Post-op Evaluation     [facility]- [facility] 12/27/2018 Follow up       Past Medical History:   Diagnosis Date    Agoraphobia     Depression     Fibromyalgia     Migraine headache     Nerve injury 08/2016    Lingual Nerve Injury    PTSD (post-traumatic stress disorder)        Past Surgical History:   Procedure Laterality Date    MOUTH SURGERY      WISDOM TOOTH EXTRACTION         Current Outpatient Medications   Medication Sig    butalbital-acetaminophen-caffeine -40 mg (FIORICET, ESGIC) -40 mg per tablet 1 tab PO as needed for migraine. No more than 10 tablets per month.    FLUoxetine (PROZAC) 40 MG capsule TK ONE C PO  QD    fluvoxaMINE (LUVOX) 100 MG tablet Take 100 mg by mouth once daily.    ondansetron (ZOFRAN-ODT) 4 MG TbDL Take 1 tablet (4 mg total) by mouth every 8 (eight) hours as needed.    OXcarbazepine (TRILEPTAL) 600 MG Tab Take 1 tablet (600 mg total) by mouth 2 (two) times daily.    oxyCODONE-acetaminophen (PERCOCET)  mg per tablet Take 1 tablet by mouth every 8 (eight) hours as needed for Pain.    rizatriptan (MAXALT) 10 MG tablet 1 tab PO PRN migraine. May repeat every 2 hours for max 3 tabs in 24 hours. Use no more than 10 days per month.    alprazolam (XANAX) 1 MG tablet Take 1 mg by mouth 2 (two) times daily as needed.    clonazePAM (KLONOPIN) 1 MG tablet Take 1 tablet by mouth Daily.    dihydroergotamine (MIGRANAL) 0.5 mg/pump act. (4 mg/mL) nasal spray 1 spray by Nasal route every 8 (eight) hours as needed for Migraine. Use in one nostril as directed.  No more than 4 sprays in one hour    HYDROcodone-acetaminophen (NORCO)  mg per tablet Take 1 tablet by mouth every 4 (four) hours as needed for Pain.     No current facility-administered medications for this visit.        Review of patient's allergies indicates:   Allergen Reactions    Benadryl   "[diphenhydramine hcl]     Codeine     Flexeril [cyclobenzaprine] Other (See Comments)     Numbness on right side of body    Magnesium      "made me feel horrible"     Nsaids (non-steroidal anti-inflammatory drug) Diarrhea and Nausea And Vomiting    Penicillins        Family History   Problem Relation Age of Onset    No Known Problems Mother     No Known Problems Father     No Known Problems Sister     No Known Problems Brother     No Known Problems Maternal Aunt     No Known Problems Maternal Uncle     No Known Problems Paternal Aunt     No Known Problems Paternal Uncle     No Known Problems Maternal Grandmother     No Known Problems Maternal Grandfather     No Known Problems Paternal Grandmother     No Known Problems Paternal Grandfather        Social History     Socioeconomic History    Marital status:      Spouse name: Not on file    Number of children: Not on file    Years of education: Not on file    Highest education level: Not on file   Social Needs    Financial resource strain: Not on file    Food insecurity - worry: Not on file    Food insecurity - inability: Not on file    Transportation needs - medical: Not on file    Transportation needs - non-medical: Not on file   Occupational History    Not on file   Tobacco Use    Smoking status: Never Smoker    Smokeless tobacco: Never Used   Substance and Sexual Activity    Alcohol use: No    Drug use: No    Sexual activity: Yes     Partners: Male   Other Topics Concern    Not on file   Social History Narrative    Not on file       History of present illness: Follow-up carpal tunnel right. She's had good relief from the carpal tunnel symptoms. Wound is healing up okay. Also noting some carpal tunnel symptoms left hand. She had a prior normal nerve conduction on the left hand but describes carpal tunnel symptoms left hand      Review of Systems:    Musculoskeletal:  See HPI      Objective:        Physical " Examination:    Vital Signs:    Vitals:    01/09/19 1320   BP: (!) 140/82   Pulse: 103       Body mass index is 43.07 kg/m².    This a well-developed, well nourished patient in no acute distress.  They are alert and oriented and cooperative to examination.    So right hand carpal tunnel wound looks okay sutures are out today. Nerve looks intact. Moving the fingers well. Left hand she does have a Phalen's.  Pertinent New Results:        Assessment/Plan:      So our plan right hand we need to keep the wound covered and little padding for another couple weeks. The left hand, to try carpal tunnel injection. He is half cc of Depo-Medrol and about 2 cc of lidocaine went from distal into the carpal tunnel injected it and we'll see if she gets reasonable relief from the carpal tunnel injection. See her back in a month for either the right and/or the left hand    This note was created using Dragon voice recognition software that occasionally misinterpreted phrases or words.

## 2019-01-09 NOTE — PROCEDURES
Carpal Tunnel  Date/Time: 1/9/2019 1:33 PM  Performed by: Brendon Lozada Jr., MD  Authorized by: Brendon Lozada Jr., MD     Consent Done?:  Yes (Verbal)  Indications:  Pain  Timeout: Prior to procedure the correct patient, procedure, and site was verified      Location:  Wrist (Left)  Prep: Patient was prepped and draped in usual sterile fashion    Ultrasonic Guidance for needle placement: No  Needle size:  25 G  Medications:  40 mg methylPREDNISolone acetate 40 mg/mL  Patient tolerance:  Patient tolerated the procedure well with no immediate complications

## 2019-01-13 ENCOUNTER — PATIENT MESSAGE (OUTPATIENT)
Dept: NEUROLOGY | Facility: CLINIC | Age: 29
End: 2019-01-13

## 2019-01-14 ENCOUNTER — PATIENT MESSAGE (OUTPATIENT)
Dept: NEUROLOGY | Facility: CLINIC | Age: 29
End: 2019-01-14

## 2019-01-14 ENCOUNTER — TELEPHONE (OUTPATIENT)
Dept: NEUROLOGY | Facility: CLINIC | Age: 29
End: 2019-01-14

## 2019-01-14 ENCOUNTER — OFFICE VISIT (OUTPATIENT)
Dept: NEUROLOGY | Facility: CLINIC | Age: 29
End: 2019-01-14
Payer: MEDICAID

## 2019-01-14 ENCOUNTER — LAB VISIT (OUTPATIENT)
Dept: LAB | Facility: HOSPITAL | Age: 29
End: 2019-01-14
Attending: PSYCHIATRY & NEUROLOGY
Payer: MEDICAID

## 2019-01-14 VITALS
DIASTOLIC BLOOD PRESSURE: 85 MMHG | WEIGHT: 288.69 LBS | HEIGHT: 67 IN | RESPIRATION RATE: 16 BRPM | BODY MASS INDEX: 45.31 KG/M2 | HEART RATE: 110 BPM | SYSTOLIC BLOOD PRESSURE: 132 MMHG

## 2019-01-14 DIAGNOSIS — G50.9 TRIGEMINAL NEUROPATHY: ICD-10-CM

## 2019-01-14 DIAGNOSIS — G43.719 INTRACTABLE CHRONIC MIGRAINE WITHOUT AURA AND WITHOUT STATUS MIGRAINOSUS: Primary | ICD-10-CM

## 2019-01-14 DIAGNOSIS — Z79.891 CHRONICALLY ON OPIATE THERAPY: ICD-10-CM

## 2019-01-14 DIAGNOSIS — Z79.891 CHRONICALLY ON OPIATE THERAPY: Primary | ICD-10-CM

## 2019-01-14 DIAGNOSIS — M79.2 NEUROPATHIC PAIN: ICD-10-CM

## 2019-01-14 DIAGNOSIS — M26.609 TMJ (TEMPOROMANDIBULAR JOINT DISORDER): ICD-10-CM

## 2019-01-14 PROBLEM — Z34.92 SECOND TRIMESTER PREGNANCY: Status: RESOLVED | Noted: 2018-01-30 | Resolved: 2019-01-14

## 2019-01-14 PROCEDURE — 80307 DRUG TEST PRSMV CHEM ANLYZR: CPT

## 2019-01-14 PROCEDURE — 99999 PR PBB SHADOW E&M-EST. PATIENT-LVL III: ICD-10-PCS | Mod: PBBFAC,,, | Performed by: PSYCHIATRY & NEUROLOGY

## 2019-01-14 PROCEDURE — 99999 PR PBB SHADOW E&M-EST. PATIENT-LVL III: CPT | Mod: PBBFAC,,, | Performed by: PSYCHIATRY & NEUROLOGY

## 2019-01-14 PROCEDURE — 99213 OFFICE O/P EST LOW 20 MIN: CPT | Mod: PBBFAC,PO | Performed by: PSYCHIATRY & NEUROLOGY

## 2019-01-14 PROCEDURE — 99214 OFFICE O/P EST MOD 30 MIN: CPT | Mod: S$PBB,,, | Performed by: PSYCHIATRY & NEUROLOGY

## 2019-01-14 PROCEDURE — 99214 PR OFFICE/OUTPT VISIT, EST, LEVL IV, 30-39 MIN: ICD-10-PCS | Mod: S$PBB,,, | Performed by: PSYCHIATRY & NEUROLOGY

## 2019-01-14 RX ORDER — LEVONORGESTREL 52 MG/1
INTRAUTERINE DEVICE INTRAUTERINE
COMMUNITY
Start: 2019-01-07 | End: 2021-03-03

## 2019-01-14 RX ORDER — BUTALBITAL, ACETAMINOPHEN AND CAFFEINE 50; 325; 40 MG/1; MG/1; MG/1
TABLET ORAL
Qty: 10 TABLET | Refills: 1 | Status: SHIPPED | OUTPATIENT
Start: 2019-01-14 | End: 2019-03-11 | Stop reason: SDUPTHER
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
## 2019-01-14 NOTE — PATIENT INSTRUCTIONS
WORKUP:  -- urine drug screen today     PREVENTION (use daily regardless of headache):  -- continue Trileptal 600mg twice a day   -- seek authorization to begin Botox for chronic migraine     ACUTE TREATMENT:  -- for next refill change to Percocet 7.5mg 4x per day as needed - will send to [facility] after urine is back   -- refill fioricet   -- continue maxalt as needed (total of fioricet + maxalt no more than 10 days per month)

## 2019-01-14 NOTE — TELEPHONE ENCOUNTER
Called and spoke with patient. She is unable to make her appointment on Thursday and would like to come in today. Patient is rescheduled for today. Patient verbalized understanding.

## 2019-01-15 ENCOUNTER — PATIENT MESSAGE (OUTPATIENT)
Dept: NEUROLOGY | Facility: CLINIC | Age: 29
End: 2019-01-15

## 2019-01-15 ENCOUNTER — TELEPHONE (OUTPATIENT)
Dept: NEUROLOGY | Facility: CLINIC | Age: 29
End: 2019-01-15

## 2019-01-15 DIAGNOSIS — Z87.11 HISTORY OF GASTRIC ULCER: Primary | ICD-10-CM

## 2019-01-15 RX ORDER — SUCRALFATE 1 G/1
1 TABLET ORAL 4 TIMES DAILY
Qty: 30 TABLET | Refills: 11 | Status: SHIPPED | OUTPATIENT
Start: 2019-01-15 | End: 2020-06-10

## 2019-01-15 NOTE — TELEPHONE ENCOUNTER
Called [facility] at [phone]. Spoke with pharmacist Jayshree in regards to new medication coverage. Her insurance will approve generic Percocet 7.5-325 mg for #120 per 30 days with a $30 co-pay.

## 2019-01-16 ENCOUNTER — PATIENT MESSAGE (OUTPATIENT)
Dept: NEUROLOGY | Facility: CLINIC | Age: 29
End: 2019-01-16

## 2019-01-16 RX ORDER — ONDANSETRON 4 MG/1
4 TABLET, FILM COATED ORAL EVERY 6 HOURS PRN
Qty: 30 TABLET | Refills: 11 | Status: SHIPPED | OUTPATIENT
Start: 2019-01-16 | End: 2020-01-27

## 2019-01-17 ENCOUNTER — PATIENT MESSAGE (OUTPATIENT)
Dept: ORTHOPEDICS | Facility: CLINIC | Age: 29
End: 2019-01-17

## 2019-01-18 ENCOUNTER — PATIENT MESSAGE (OUTPATIENT)
Dept: NEUROLOGY | Facility: CLINIC | Age: 29
End: 2019-01-18

## 2019-01-19 LAB
6MAM UR QL: NOT DETECTED
7AMINOCLONAZEPAM UR QL: PRESENT
A-OH ALPRAZ UR QL: NOT DETECTED
ALPRAZ UR QL: NOT DETECTED
AMPHET UR QL SCN: NOT DETECTED
ANNOTATION COMMENT IMP: NORMAL
ANNOTATION COMMENT IMP: NORMAL
BARBITURATES UR QL: NOT DETECTED
BUPRENORPHINE UR QL: NOT DETECTED
BZE UR QL: NOT DETECTED
CARBOXYTHC UR QL: NOT DETECTED
CARISOPRODOL UR QL: NOT DETECTED
CLONAZEPAM UR QL: NOT DETECTED
CODEINE UR QL: NOT DETECTED
CREAT UR-MCNC: 92.2 MG/DL (ref 20–400)
DIAZEPAM UR QL: NOT DETECTED
ETHYL GLUCURONIDE UR QL: NOT DETECTED
FENTANYL UR QL: NOT DETECTED
HYDROCODONE UR QL: NOT DETECTED
HYDROMORPHONE UR QL: NOT DETECTED
LORAZEPAM UR QL: NOT DETECTED
MDA UR QL: NOT DETECTED
MDEA UR QL: NOT DETECTED
MDMA UR QL: NOT DETECTED
ME-PHENIDATE UR QL: NOT DETECTED
MEPERIDINE UR QL: NOT DETECTED
METHADONE UR QL: NOT DETECTED
METHAMPHET UR QL: NOT DETECTED
MIDAZOLAM UR QL SCN: NOT DETECTED
MORPHINE UR QL: NOT DETECTED
NORBUPRENORPHINE UR QL CFM: NOT DETECTED
NORDIAZEPAM UR QL: NOT DETECTED
NORFENTANYL UR QL: NOT DETECTED
NORHYDROCODONE UR QL CFM: NOT DETECTED
NOROXYCODONE UR QL CFM: PRESENT
NOROXYMORPHONE: NOT DETECTED
OXAZEPAM UR QL: NOT DETECTED
OXYCODONE UR QL: PRESENT
OXYMORPHONE UR QL: NOT DETECTED
PATHOLOGY STUDY: NORMAL
PCP UR QL: NOT DETECTED
PHENTERMINE UR QL: NOT DETECTED
PROPOXYPH UR QL: NOT DETECTED
SERVICE CMNT-IMP: NORMAL
TAPENTADOL UR QL SCN: NOT DETECTED
TAPENTADOL-O-SULF: NOT DETECTED
TEMAZEPAM UR QL: NOT DETECTED
TRAMADOL UR QL: NOT DETECTED
ZOLPIDEM UR QL: NOT DETECTED

## 2019-01-21 ENCOUNTER — PATIENT MESSAGE (OUTPATIENT)
Dept: NEUROLOGY | Facility: CLINIC | Age: 29
End: 2019-01-21

## 2019-01-21 DIAGNOSIS — G50.9 TRIGEMINAL NEUROPATHY: Primary | ICD-10-CM

## 2019-01-21 DIAGNOSIS — Z79.891 CHRONICALLY ON OPIATE THERAPY: ICD-10-CM

## 2019-01-22 ENCOUNTER — PATIENT MESSAGE (OUTPATIENT)
Dept: NEUROLOGY | Facility: CLINIC | Age: 29
End: 2019-01-22

## 2019-01-22 RX ORDER — OXYCODONE AND ACETAMINOPHEN 7.5; 325 MG/1; MG/1
1 TABLET ORAL EVERY 6 HOURS PRN
Qty: 120 TABLET | Refills: 0 | Status: SHIPPED | OUTPATIENT
Start: 2019-01-25 | End: 2019-02-24

## 2019-01-22 RX ORDER — OXYCODONE AND ACETAMINOPHEN 7.5; 325 MG/1; MG/1
1 TABLET ORAL EVERY 6 HOURS PRN
Qty: 120 TABLET | Refills: 0 | Status: SHIPPED | OUTPATIENT
Start: 2019-02-24 | End: 2019-03-26

## 2019-01-22 RX ORDER — OXYCODONE AND ACETAMINOPHEN 7.5; 325 MG/1; MG/1
1 TABLET ORAL EVERY 6 HOURS PRN
Qty: 120 TABLET | Refills: 0 | Status: SHIPPED | OUTPATIENT
Start: 2019-03-26 | End: 2019-04-24 | Stop reason: SDUPTHER

## 2019-01-23 ENCOUNTER — PATIENT MESSAGE (OUTPATIENT)
Dept: ORTHOPEDICS | Facility: CLINIC | Age: 29
End: 2019-01-23

## 2019-01-24 ENCOUNTER — PATIENT MESSAGE (OUTPATIENT)
Dept: ORTHOPEDICS | Facility: CLINIC | Age: 29
End: 2019-01-24

## 2019-01-24 DIAGNOSIS — G56.02 CARPAL TUNNEL SYNDROME OF LEFT WRIST: Primary | ICD-10-CM

## 2019-01-24 NOTE — PROGRESS NOTES
Patient is still struggling with her left hand after injection, per Dr. Lozada schedule for [facility]

## 2019-01-28 ENCOUNTER — PATIENT MESSAGE (OUTPATIENT)
Dept: NEUROLOGY | Facility: CLINIC | Age: 29
End: 2019-01-28

## 2019-01-28 NOTE — TELEPHONE ENCOUNTER
Called and spoke with patient. Informed her to be here for 10:30 am. Patient verbalized understanding.

## 2019-01-30 ENCOUNTER — PROCEDURE VISIT (OUTPATIENT)
Dept: NEUROLOGY | Facility: CLINIC | Age: 29
End: 2019-01-30
Payer: MEDICAID

## 2019-01-30 ENCOUNTER — TELEPHONE (OUTPATIENT)
Dept: NEUROLOGY | Facility: CLINIC | Age: 29
End: 2019-01-30

## 2019-01-30 VITALS
HEIGHT: 67 IN | BODY MASS INDEX: 45.33 KG/M2 | WEIGHT: 288.81 LBS | SYSTOLIC BLOOD PRESSURE: 127 MMHG | HEART RATE: 113 BPM | DIASTOLIC BLOOD PRESSURE: 81 MMHG | RESPIRATION RATE: 16 BRPM

## 2019-01-30 DIAGNOSIS — G43.719 INTRACTABLE CHRONIC MIGRAINE WITHOUT AURA AND WITHOUT STATUS MIGRAINOSUS: Primary | ICD-10-CM

## 2019-01-30 PROCEDURE — 64615 CHEMODENERV MUSC MIGRAINE: CPT | Mod: PBBFAC,PO | Performed by: PSYCHIATRY & NEUROLOGY

## 2019-01-30 PROCEDURE — 64615 CHEMODENERV MUSC MIGRAINE: CPT | Mod: S$PBB,,, | Performed by: PSYCHIATRY & NEUROLOGY

## 2019-01-30 PROCEDURE — 64615 PR CHEMODENERVATION OF MUSCLE FOR CHRONIC MIGRAINE: ICD-10-PCS | Mod: S$PBB,,, | Performed by: PSYCHIATRY & NEUROLOGY

## 2019-01-30 RX ADMIN — ONABOTULINUMTOXINA 200 UNITS: 100 INJECTION, POWDER, LYOPHILIZED, FOR SOLUTION INTRADERMAL; INTRAMUSCULAR at 11:01

## 2019-01-30 NOTE — PROCEDURES
PROCEDURE PERFORMED: Botulinum toxin injection (75999)    CLINICAL INDICATION: G43.719    A time out was conducted just before the start of the procedure to verify the correct patient and procedure, procedure location, and all relevant critical information.     Conventional methods of treatment such as multiple medications, both on and   off label have been tried including:    anti-epileptics (gabapentin, lyrica, trileptal), and antidepressants (fluoxetine, fluvoxamine)    The patient has been unresponsive and refractory.The patient meets criteria for chronic headaches according to the ICHD-II, the patient has more than 15 headaches a month which last for more than 4 hours a day.    This is the first Botox injections and I am aiming for at least 50%  improvement in the patient's symptoms. Frequency of treatment is every 3 months unless no response to the treatments, at which time we will discontinue the injections.     DESCRIPTION OF PROCEDURE: After obtaining informed consent and under   aseptic technique, a total of 185 units of botulinum toxin type A were   injected in the following muscles: Procerus 5 units,  5 units   bilaterally, frontalis 20 units, temporalis 20 units bilaterally,   occipitalis 15 units, upper cervical paraspinals 10 units bilaterally and trapezius 15 units bilaterally and masseters 15 units bilaterally. The patient was given a total of 185 units in 37 sites.The patient tolerated the procedure well. There were no complications. The patient was given a prescription for repeat treatment in 3 months.     Unavoidable waste 15 units    Sangeeta Hart MD

## 2019-02-03 ENCOUNTER — PATIENT MESSAGE (OUTPATIENT)
Dept: NEUROLOGY | Facility: CLINIC | Age: 29
End: 2019-02-03

## 2019-02-03 ENCOUNTER — PATIENT MESSAGE (OUTPATIENT)
Dept: ORTHOPEDICS | Facility: CLINIC | Age: 29
End: 2019-02-03

## 2019-02-07 ENCOUNTER — PATIENT MESSAGE (OUTPATIENT)
Dept: ORTHOPEDICS | Facility: CLINIC | Age: 29
End: 2019-02-07

## 2019-02-08 ENCOUNTER — PATIENT MESSAGE (OUTPATIENT)
Dept: NEUROLOGY | Facility: CLINIC | Age: 29
End: 2019-02-08

## 2019-02-10 ENCOUNTER — HOSPITAL ENCOUNTER (OUTPATIENT)
Facility: HOSPITAL | Age: 29
Discharge: LEFT BEFORE DISCHARGE | End: 2019-02-10
Attending: EMERGENCY MEDICINE | Admitting: INTERNAL MEDICINE
Payer: MEDICAID

## 2019-02-10 ENCOUNTER — PATIENT MESSAGE (OUTPATIENT)
Dept: NEUROLOGY | Facility: CLINIC | Age: 29
End: 2019-02-10

## 2019-02-10 ENCOUNTER — PATIENT MESSAGE (OUTPATIENT)
Dept: ORTHOPEDICS | Facility: CLINIC | Age: 29
End: 2019-02-10

## 2019-02-10 VITALS
BODY MASS INDEX: 46.65 KG/M2 | RESPIRATION RATE: 18 BRPM | HEART RATE: 92 BPM | HEIGHT: 65 IN | TEMPERATURE: 98 F | OXYGEN SATURATION: 93 % | DIASTOLIC BLOOD PRESSURE: 87 MMHG | SYSTOLIC BLOOD PRESSURE: 127 MMHG | WEIGHT: 280 LBS

## 2019-02-10 DIAGNOSIS — M79.2 NEUROPATHIC PAIN: ICD-10-CM

## 2019-02-10 DIAGNOSIS — M26.609 TMJ (TEMPOROMANDIBULAR JOINT DISORDER): ICD-10-CM

## 2019-02-10 DIAGNOSIS — O99.212 OBESITY AFFECTING PREGNANCY IN SECOND TRIMESTER: ICD-10-CM

## 2019-02-10 DIAGNOSIS — M79.7 FIBROMYALGIA: ICD-10-CM

## 2019-02-10 DIAGNOSIS — G50.9 TRIGEMINAL NEUROPATHY: ICD-10-CM

## 2019-02-10 DIAGNOSIS — O09.891 MEDICATION EXPOSURE DURING FIRST TRIMESTER OF PREGNANCY: ICD-10-CM

## 2019-02-10 DIAGNOSIS — F32.A DEPRESSION, UNSPECIFIED DEPRESSION TYPE: ICD-10-CM

## 2019-02-10 DIAGNOSIS — Z79.891 CHRONICALLY ON OPIATE THERAPY: ICD-10-CM

## 2019-02-10 DIAGNOSIS — R51.9 INTRACTABLE HEADACHE, UNSPECIFIED CHRONICITY PATTERN, UNSPECIFIED HEADACHE TYPE: Primary | ICD-10-CM

## 2019-02-10 PROCEDURE — 96375 TX/PRO/DX INJ NEW DRUG ADDON: CPT

## 2019-02-10 PROCEDURE — 99285 EMERGENCY DEPT VISIT HI MDM: CPT | Mod: 25

## 2019-02-10 PROCEDURE — 96374 THER/PROPH/DIAG INJ IV PUSH: CPT

## 2019-02-10 PROCEDURE — 25000003 PHARM REV CODE 250: Performed by: EMERGENCY MEDICINE

## 2019-02-10 PROCEDURE — G0378 HOSPITAL OBSERVATION PER HR: HCPCS

## 2019-02-10 PROCEDURE — 96376 TX/PRO/DX INJ SAME DRUG ADON: CPT

## 2019-02-10 PROCEDURE — 63600175 PHARM REV CODE 636 W HCPCS: Performed by: EMERGENCY MEDICINE

## 2019-02-10 RX ORDER — ACETAMINOPHEN 325 MG/1
650 TABLET ORAL EVERY 6 HOURS PRN
Status: DISCONTINUED | OUTPATIENT
Start: 2019-02-10 | End: 2019-02-10 | Stop reason: HOSPADM

## 2019-02-10 RX ORDER — ONDANSETRON 2 MG/ML
4 INJECTION INTRAMUSCULAR; INTRAVENOUS EVERY 8 HOURS PRN
Status: DISCONTINUED | OUTPATIENT
Start: 2019-02-10 | End: 2019-02-10 | Stop reason: HOSPADM

## 2019-02-10 RX ORDER — FLUOXETINE HYDROCHLORIDE 20 MG/1
40 CAPSULE ORAL DAILY
Status: DISCONTINUED | OUTPATIENT
Start: 2019-02-11 | End: 2019-02-10 | Stop reason: HOSPADM

## 2019-02-10 RX ORDER — CLONAZEPAM 1 MG/1
1 TABLET ORAL DAILY
Status: DISCONTINUED | OUTPATIENT
Start: 2019-02-10 | End: 2019-02-10

## 2019-02-10 RX ORDER — PROCHLORPERAZINE EDISYLATE 5 MG/ML
10 INJECTION INTRAMUSCULAR; INTRAVENOUS EVERY 6 HOURS PRN
Status: DISCONTINUED | OUTPATIENT
Start: 2019-02-10 | End: 2019-02-10 | Stop reason: HOSPADM

## 2019-02-10 RX ORDER — LIDOCAINE HYDROCHLORIDE 20 MG/ML
3 INJECTION, SOLUTION EPIDURAL; INFILTRATION; INTRACAUDAL; PERINEURAL ONCE
Status: COMPLETED | OUTPATIENT
Start: 2019-02-10 | End: 2019-02-10

## 2019-02-10 RX ORDER — BUTORPHANOL TARTRATE 1 MG/ML
1 INJECTION INTRAMUSCULAR; INTRAVENOUS
Status: COMPLETED | OUTPATIENT
Start: 2019-02-10 | End: 2019-02-10

## 2019-02-10 RX ORDER — HYDROMORPHONE HYDROCHLORIDE 2 MG/ML
1 INJECTION, SOLUTION INTRAMUSCULAR; INTRAVENOUS; SUBCUTANEOUS EVERY 4 HOURS PRN
Status: DISCONTINUED | OUTPATIENT
Start: 2019-02-10 | End: 2019-02-10

## 2019-02-10 RX ORDER — METOCLOPRAMIDE HYDROCHLORIDE 5 MG/ML
10 INJECTION INTRAMUSCULAR; INTRAVENOUS
Status: COMPLETED | OUTPATIENT
Start: 2019-02-10 | End: 2019-02-10

## 2019-02-10 RX ORDER — DEXAMETHASONE SODIUM PHOSPHATE 100 MG/10ML
12 INJECTION INTRAMUSCULAR; INTRAVENOUS
Status: COMPLETED | OUTPATIENT
Start: 2019-02-10 | End: 2019-02-10

## 2019-02-10 RX ORDER — LIDOCAINE HYDROCHLORIDE 20 MG/ML
10 INJECTION, SOLUTION INFILTRATION; PERINEURAL ONCE
Status: DISCONTINUED | OUTPATIENT
Start: 2019-02-10 | End: 2019-02-10 | Stop reason: SDUPTHER

## 2019-02-10 RX ORDER — PROCHLORPERAZINE EDISYLATE 5 MG/ML
10 INJECTION INTRAMUSCULAR; INTRAVENOUS EVERY 6 HOURS PRN
Status: DISCONTINUED | OUTPATIENT
Start: 2019-02-10 | End: 2019-02-10

## 2019-02-10 RX ORDER — OXCARBAZEPINE 150 MG/1
600 TABLET, FILM COATED ORAL 2 TIMES DAILY
Status: DISCONTINUED | OUTPATIENT
Start: 2019-02-10 | End: 2019-02-10 | Stop reason: HOSPADM

## 2019-02-10 RX ORDER — SODIUM CHLORIDE 0.9 % (FLUSH) 0.9 %
5 SYRINGE (ML) INJECTION
Status: DISCONTINUED | OUTPATIENT
Start: 2019-02-10 | End: 2019-02-10 | Stop reason: HOSPADM

## 2019-02-10 RX ORDER — DEXAMETHASONE SODIUM PHOSPHATE 4 MG/ML
12 INJECTION, SOLUTION INTRA-ARTICULAR; INTRALESIONAL; INTRAMUSCULAR; INTRAVENOUS; SOFT TISSUE
Status: DISCONTINUED | OUTPATIENT
Start: 2019-02-10 | End: 2019-02-10

## 2019-02-10 RX ORDER — LIDOCAINE HYDROCHLORIDE 20 MG/ML
10 INJECTION, SOLUTION INFILTRATION; PERINEURAL ONCE
Status: DISCONTINUED | OUTPATIENT
Start: 2019-02-10 | End: 2019-02-10

## 2019-02-10 RX ORDER — METOCLOPRAMIDE HYDROCHLORIDE 5 MG/ML
10 INJECTION INTRAMUSCULAR; INTRAVENOUS EVERY 6 HOURS PRN
Status: DISCONTINUED | OUTPATIENT
Start: 2019-02-10 | End: 2019-02-10 | Stop reason: HOSPADM

## 2019-02-10 RX ADMIN — LIDOCAINE HYDROCHLORIDE 60 MG: 20 INJECTION, SOLUTION EPIDURAL; INFILTRATION; INTRACAUDAL; PERINEURAL at 04:02

## 2019-02-10 RX ADMIN — BUTORPHANOL TARTRATE 1 MG: 1 INJECTION, SOLUTION INTRAMUSCULAR; INTRAVENOUS at 02:02

## 2019-02-10 RX ADMIN — BUTORPHANOL TARTRATE 1 MG: 1 INJECTION, SOLUTION INTRAMUSCULAR; INTRAVENOUS at 03:02

## 2019-02-10 RX ADMIN — METOCLOPRAMIDE 10 MG: 5 INJECTION, SOLUTION INTRAMUSCULAR; INTRAVENOUS at 02:02

## 2019-02-10 RX ADMIN — DEXAMETHASONE SODIUM PHOSPHATE 12 MG: 10 INJECTION INTRAMUSCULAR; INTRAVENOUS at 02:02

## 2019-02-10 NOTE — SUBJECTIVE & OBJECTIVE
"Past Medical History:   Diagnosis Date    Agoraphobia     Depression     Fibromyalgia     Migraine headache     Nerve injury 08/2016    Lingual Nerve Injury    PTSD (post-traumatic stress disorder)        Past Surgical History:   Procedure Laterality Date    CARPAL TUNNEL RELEASE Right 12/27/2018    Dr Lozada     MOUTH SURGERY      WISDOM TOOTH EXTRACTION         Review of patient's allergies indicates:   Allergen Reactions    Benadryl [diphenhydramine hcl]      Paralysis on right side body     Codeine      Paralysis right body    Flexeril [cyclobenzaprine] Other (See Comments)     Numbness on right side of body    Penicillins Anaphylaxis and Hives    Nsaids (non-steroidal anti-inflammatory drug) Diarrhea and Nausea And Vomiting    Magnesium      "made me feel horrible"        No current facility-administered medications on file prior to encounter.      Current Outpatient Medications on File Prior to Encounter   Medication Sig    clonazePAM (KLONOPIN) 1 MG tablet Take 1 tablet by mouth Daily.    FLUoxetine 60 mg Tab TK ONE C PO  QD    ondansetron (ZOFRAN) 4 MG tablet Take 1 tablet (4 mg total) by mouth every 6 (six) hours as needed for Nausea.    OXcarbazepine (TRILEPTAL) 600 MG Tab Take 1 tablet (600 mg total) by mouth 2 (two) times daily.    oxyCODONE-acetaminophen (PERCOCET) 7.5-325 mg per tablet Take 1 tablet by mouth every 6 (six) hours as needed for Pain.    rizatriptan (MAXALT) 10 MG tablet 1 tab PO PRN migraine. May repeat every 2 hours for max 3 tabs in 24 hours. Use no more than 10 days per month.    sucralfate (CARAFATE) 1 gram tablet Take 1 tablet (1 g total) by mouth 4 (four) times daily. When taking NSAIDs    butalbital-acetaminophen-caffeine -40 mg (FIORICET, ESGIC) -40 mg per tablet 1 tab PO as needed for migraine. No more than 10 tablets per month.    dihydroergotamine (MIGRANAL) 0.5 mg/pump act. (4 mg/mL) nasal spray 1 spray by Nasal route every 8 (eight) hours as " needed for Migraine. Use in one nostril as directed.  No more than 4 sprays in one hour    MIRENA 20 mcg/24 hr (5 years) IUD     ondansetron (ZOFRAN-ODT) 4 MG TbDL Take 1 tablet (4 mg total) by mouth every 8 (eight) hours as needed.    [START ON 2/24/2019] oxyCODONE-acetaminophen (PERCOCET) 7.5-325 mg per tablet Take 1 tablet by mouth every 6 (six) hours as needed for Pain.    [START ON 3/26/2019] oxyCODONE-acetaminophen (PERCOCET) 7.5-325 mg per tablet Take 1 tablet by mouth every 6 (six) hours as needed for Pain.    [DISCONTINUED] alprazolam (XANAX) 1 MG tablet Take 1 mg by mouth 2 (two) times daily as needed.     Family History     Problem Relation (Age of Onset)    Cancer Maternal Aunt    No Known Problems Mother, Father, Maternal Uncle, Paternal Aunt, Paternal Uncle, Maternal Grandmother, Maternal Grandfather, Paternal Grandmother, Paternal Grandfather, Daughter, Son        Tobacco Use    Smoking status: Never Smoker    Smokeless tobacco: Never Used   Substance and Sexual Activity    Alcohol use: No    Drug use: No    Sexual activity: Yes     Partners: Male     Review of Systems   Constitutional: Positive for diaphoresis. Negative for activity change, appetite change, chills, fever and unexpected weight change.   HENT: Negative for congestion, hearing loss, mouth sores, nosebleeds, sinus pressure, sore throat and trouble swallowing.         Tongue numbness   Eyes: Positive for visual disturbance. Negative for photophobia and redness.   Respiratory: Negative for apnea, cough, choking, chest tightness, shortness of breath, wheezing and stridor.    Cardiovascular: Negative for chest pain, palpitations and leg swelling.   Gastrointestinal: Positive for nausea. Negative for abdominal distention, abdominal pain, constipation, diarrhea and vomiting.   Endocrine: Negative for polydipsia, polyphagia and polyuria.   Genitourinary: Negative for dysuria.   Musculoskeletal: Negative for arthralgias, back pain,  gait problem, neck pain and neck stiffness.   Skin: Negative for color change, pallor and rash.   Neurological: Negative for dizziness, tremors, seizures, syncope, weakness, light-headedness, numbness and headaches.   Hematological: Negative for adenopathy. Does not bruise/bleed easily.   Psychiatric/Behavioral: Negative for agitation, behavioral problems, confusion, decreased concentration and suicidal ideas. The patient is nervous/anxious.      Objective:     Vital Signs (Most Recent):  Temp: 98.4 °F (36.9 °C) (02/10/19 1722)  Pulse: 92 (02/10/19 1722)  Resp: 18 (02/10/19 1722)  BP: 127/87 (02/10/19 1722)  SpO2: (!) 93 % (02/10/19 1722) Vital Signs (24h Range):  Temp:  [98.4 °F (36.9 °C)-98.6 °F (37 °C)] 98.4 °F (36.9 °C)  Pulse:  [] 92  Resp:  [18-20] 18  SpO2:  [93 %-98 %] 93 %  BP: (127-181)/(63-88) 127/87     Weight: 127 kg (280 lb)  Body mass index is 46.59 kg/m².    Physical Exam   Constitutional: She is oriented to person, place, and time. She appears well-developed and well-nourished. No distress.   obese   HENT:   Head: Normocephalic and atraumatic.   Nose: Nose normal.   Mouth/Throat: No oropharyngeal exudate.   Eyes: Conjunctivae and EOM are normal. No scleral icterus.   Neck: Normal range of motion. Neck supple. No JVD present. No tracheal deviation present. No thyromegaly present.   Cardiovascular: Normal rate, regular rhythm, normal heart sounds and intact distal pulses. Exam reveals no gallop and no friction rub.   No murmur heard.  Pulmonary/Chest: Effort normal and breath sounds normal. No stridor. No respiratory distress. She has no wheezes. She has no rales.   Abdominal: Soft. Bowel sounds are normal. She exhibits no distension and no mass. There is no rebound and no guarding.   Musculoskeletal: Normal range of motion. She exhibits no edema, tenderness or deformity.   Neurological: She is alert and oriented to person, place, and time. No cranial nerve deficit. She exhibits normal muscle  tone. Coordination normal.   Skin: Skin is warm and dry. Capillary refill takes less than 2 seconds. No rash noted. She is not diaphoretic. No erythema. No pallor.   Psychiatric: She has a normal mood and affect. Her behavior is normal. Judgment and thought content normal.         CRANIAL NERVES     CN III, IV, VI   Extraocular motions are normal.        Significant Labs: All pertinent labs within the past 24 hours have been reviewed.    Significant Imaging: I have reviewed and interpreted all pertinent imaging results/findings within the past 24 hours.

## 2019-02-10 NOTE — NURSING
Dr. Becerra stated there is no reason to call Dr. Wilder Woodson concerning consult that he has spoken with him already and he is going to be back to work in the morning and is already familiar with the case.

## 2019-02-10 NOTE — ED NOTES
Presents to the ER with c/o a migraine that patient has had for the past 230 days. Patient has been seen in the ED multiple times in the ED for the same complaint. Patient reports that she did follow up with a neurologist and has been getting botox injections that have not given her any relief. Associated complaints are nausea, emesis and photophobia. Mucous membranes are pink and moist. Skin is warm, dry and intact. Lungs are clear bilaterally, respirations are regular and unlabored. Denies cough, congestion, rhinorrhea or SOB. BS active x4, no tenderness with palpation, abd is soft and not distended. Denies any appetite or activity change. S1S2, capillary refill is < 2 seconds. Denies dysuria, difficulty urinating, frequency, numbness, tingling or weakness. MARQUIS GLOVER

## 2019-02-10 NOTE — ED PROVIDER NOTES
"Encounter Date: 2/10/2019    SCRIBE #1 NOTE: I, Sofy Lima, am scribing for, and in the presence of, .       History     Chief Complaint   Patient presents with    Migraine     " for 230 days "       Time seen by provider: 2:01 PM on 02/10/2019    Jasper Guerrero is a 28 y.o. female with who presents to the ED with a headache for 230 days. Patient states that she has had a headache for over 230 days. She states trying Maxalt, Fioricet, hydrocodone, magnesium, and percocet. She reports the headache is current and at this time the headache is located behind her left eye that radiates back. She adds that occasionally sees "spots" but denies any blurry vision, numbness, or weakness. Patient denies any nausea, vomiting, cough, congestion, chest pain, shortness of breath, neck pain, loss of bowel or bladder control, changes in speech, changes in vision, or fever. Denies IVDA.       The history is provided by the patient. No  was used.     Review of patient's allergies indicates:   Allergen Reactions    Benadryl  [diphenhydramine hcl]     Codeine     Flexeril [cyclobenzaprine] Other (See Comments)     Numbness on right side of body    Magnesium      "made me feel horrible"     Nsaids (non-steroidal anti-inflammatory drug) Diarrhea and Nausea And Vomiting    Penicillins      Past Medical History:   Diagnosis Date    Agoraphobia     Depression     Fibromyalgia     Migraine headache     Nerve injury 08/2016    Lingual Nerve Injury    PTSD (post-traumatic stress disorder)      Past Surgical History:   Procedure Laterality Date    MOUTH SURGERY      WISDOM TOOTH EXTRACTION       Family History   Problem Relation Age of Onset    No Known Problems Mother     No Known Problems Father     No Known Problems Sister     No Known Problems Brother     No Known Problems Maternal Aunt     No Known Problems Maternal Uncle     No Known Problems Paternal Aunt     No Known Problems " Paternal Uncle     No Known Problems Maternal Grandmother     No Known Problems Maternal Grandfather     No Known Problems Paternal Grandmother     No Known Problems Paternal Grandfather      Social History     Tobacco Use    Smoking status: Never Smoker    Smokeless tobacco: Never Used   Substance Use Topics    Alcohol use: No    Drug use: No     Review of Systems   Constitutional: Negative for chills and fever.   HENT: Negative for congestion, drooling and sore throat.    Eyes: Positive for visual disturbance. Negative for photophobia.   Respiratory: Negative for cough, shortness of breath and wheezing.    Cardiovascular: Negative for chest pain.   Gastrointestinal: Negative for abdominal pain, diarrhea, nausea and vomiting.   Genitourinary: Negative for dysuria and hematuria.   Musculoskeletal: Negative for back pain, joint swelling and neck pain.   Skin: Negative for rash.   Neurological: Positive for headaches. Negative for syncope, weakness and numbness.   Hematological: Does not bruise/bleed easily.   Psychiatric/Behavioral: Negative for confusion.       Physical Exam     Initial Vitals [02/10/19 1356]   BP Pulse Resp Temp SpO2   133/79 104 20 98.6 °F (37 °C) 98 %      MAP       --         Physical Exam    Nursing note and vitals reviewed.  Constitutional: She appears well-nourished.   HENT:   Head: Normocephalic and atraumatic.   Eyes: Conjunctivae and EOM are normal. Pupils are equal, round, and reactive to light.   Neck: Normal range of motion. Neck supple. No thyroid mass present.   Cardiovascular: Normal rate, regular rhythm and normal heart sounds. Exam reveals no gallop and no friction rub.    No murmur heard.  Pulmonary/Chest: Breath sounds normal. She has no wheezes. She has no rhonchi. She has no rales.   Abdominal: Soft. Normal appearance and bowel sounds are normal. There is no tenderness. There is no rebound and no guarding.   Musculoskeletal: Normal range of motion.   Neurological: She  is alert and oriented to person, place, and time. She has normal strength. No cranial nerve deficit or sensory deficit. GCS score is 15. GCS eye subscore is 4. GCS verbal subscore is 5. GCS motor subscore is 6.   5/5 strength and normal sensation of all extremities. No focal neuro deficits.   Skin: Skin is warm and dry. No rash noted. No erythema.   Psychiatric: She has a normal mood and affect. Her speech is normal. Cognition and memory are normal.         ED Course   Procedures  Labs Reviewed - No data to display       Imaging Results    None          Medical Decision Making:   History:   Old Medical Records: I decided to obtain old medical records.  ED Management:  28-year-old female presents with an unrelenting headache greatest currently on left side of her head that has persisted for 320 days.  She has undergone Botox injections with out improvement.  She has little improvement with state all and steroids.  I discussed the case with Dr. Krueger who recommends admission for lumbar puncture to evaluate headache with papilledema and rule out pseudotumor cerebral cerebri  Discussed with Dr. Becerra who will admit the patient       APC / Resident Notes:   I, Dr. Scott Parr III, personally performed the services described in this documentation. All medical record entries made by the scribe were at my direction and in my presence.  I have reviewed the chart and agree that the record reflects my personal performance and is accurate and complete       Scribe Attestation:   Scribe #1: I performed the above scribed service and the documentation accurately describes the services I performed. I attest to the accuracy of the note.               Clinical Impression:   There were no encounter diagnoses.      Disposition:   Disposition: Admitted  Condition: Stable                        Scott Parr III, MD  02/10/19 4407

## 2019-02-10 NOTE — ED NOTES
"Patient reports that stadol does not relieve her headaches. "One of the times I was here a doctor gave me dilaudid. That helped."   "

## 2019-02-10 NOTE — ED NOTES
Patient inquired about receiving dilaudid. She is aware that physician is aware of her request and has not ordered any. Patient verbalized understanding.

## 2019-02-10 NOTE — ASSESSMENT & PLAN NOTE
Body mass index is 46.59 kg/m². Morbid obesity complicates all aspects of disease management from diagnostic modalities to treatment. Weight loss encouraged and health benefits explained to patient.

## 2019-02-10 NOTE — HPI
Ms. 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  She sees Dr. Bruno in [place] who has been working this up. She has had recent admissions to [facility] and claims that they did CT, MRI, MRV (we do not have those records here) and she was told there was no reason found for her headaches.  She has hx of gastric ulcers to NSAIDs diagnosed by endoscopy several years ago.  She has tried nsaids, toradol, reglan, steroids, magnesium, fiorcet, and oxcarbmazepine without relief.  She states that dilaudid is the only thing that helps her headache when it is severe.  She has associated nausea and zofran is only medicine that helps. She does not like phenergan. She denies f/c, recent illness, recent travel, travel outside of country, abdominal pain, wt loss. She does have occasional episodes of diarrhea without known exacerbating or alleveing factors. She has not had a bowel movement today.  Her last dose of narcotic was yesterday and she took one clonazepam this am.      ED found papilledema on examination. Case was discussed with neurology who felt that admission with LP in am is recommended for evaluation of pseudotumor cerebri

## 2019-02-10 NOTE — SUBJECTIVE & OBJECTIVE
Interval History:    Review of Systems   Constitutional: Positive for diaphoresis. Negative for activity change, appetite change, chills, fatigue, fever and unexpected weight change.   HENT: Negative for congestion, nosebleeds, sinus pressure, sneezing and trouble swallowing.         Tongue numbness   Eyes: Positive for visual disturbance. Negative for photophobia, pain and redness.   Respiratory: Negative for apnea, cough, choking, chest tightness, shortness of breath, wheezing and stridor.    Cardiovascular: Negative for chest pain, palpitations and leg swelling.   Gastrointestinal: Positive for nausea. Negative for abdominal distention, blood in stool, constipation, diarrhea and vomiting.   Endocrine: Negative for polydipsia, polyphagia and polyuria.   Genitourinary: Negative for dysuria, frequency, hematuria and urgency.   Musculoskeletal: Negative for arthralgias, back pain, gait problem, neck pain and neck stiffness.   Skin: Negative for color change, pallor and rash.   Neurological: Negative for dizziness, tremors, seizures, syncope, facial asymmetry, weakness, light-headedness, numbness and headaches.   Hematological: Negative for adenopathy. Does not bruise/bleed easily.   Psychiatric/Behavioral: Negative for agitation, behavioral problems, confusion, decreased concentration and suicidal ideas. The patient is nervous/anxious.      Objective:     Vital Signs (Most Recent):  Temp: 98.4 °F (36.9 °C) (02/10/19 1722)  Pulse: 92 (02/10/19 1722)  Resp: 18 (02/10/19 1722)  BP: 127/87 (02/10/19 1722)  SpO2: (!) 93 % (02/10/19 1722) Vital Signs (24h Range):  Temp:  [98.4 °F (36.9 °C)-98.6 °F (37 °C)] 98.4 °F (36.9 °C)  Pulse:  [] 92  Resp:  [18-20] 18  SpO2:  [93 %-98 %] 93 %  BP: (127-181)/(63-88) 127/87     Weight: 127 kg (280 lb)  Body mass index is 46.59 kg/m².  No intake or output data in the 24 hours ending 02/10/19 1751   Physical Exam   Constitutional: She is oriented to person, place, and time. She  appears well-developed and well-nourished. No distress.   obese   HENT:   Head: Normocephalic and atraumatic.   Nose: Nose normal.   Mouth/Throat: No oropharyngeal exudate.   Eyes: Conjunctivae and EOM are normal. Right eye exhibits no discharge. Left eye exhibits no discharge. No scleral icterus.   Neck: Normal range of motion. Neck supple. No JVD present. No tracheal deviation present. No thyromegaly present.   Cardiovascular: Normal rate, regular rhythm, normal heart sounds and intact distal pulses. Exam reveals no gallop and no friction rub.   No murmur heard.  Pulmonary/Chest: Effort normal. No stridor. No respiratory distress. She has no wheezes. She has rales. She exhibits no tenderness.   Abdominal: Soft. Bowel sounds are normal. She exhibits no distension. There is no tenderness. There is no rebound and no guarding.   Musculoskeletal: Normal range of motion. She exhibits no edema, tenderness or deformity.   Neurological: She is alert and oriented to person, place, and time. No cranial nerve deficit. She exhibits normal muscle tone. Coordination normal.   Skin: Skin is warm and dry. Capillary refill takes less than 2 seconds. No rash noted. She is not diaphoretic. No erythema. No pallor.   Psychiatric: She has a normal mood and affect. Her behavior is normal. Judgment and thought content normal.       Significant Labs: All pertinent labs within the past 24 hours have been reviewed.    Significant Imaging: I have reviewed and interpreted all pertinent imaging results/findings within the past 24 hours.

## 2019-02-10 NOTE — ED NOTES
Upon transfer to room 205, patient is AAOx4, no cardiac or respiratory complications at this time. No needs or questions from patient before transfer.

## 2019-02-11 ENCOUNTER — PATIENT MESSAGE (OUTPATIENT)
Dept: NEUROLOGY | Facility: CLINIC | Age: 29
End: 2019-02-11

## 2019-02-11 ENCOUNTER — TELEPHONE (OUTPATIENT)
Dept: MEDSURG UNIT | Facility: HOSPITAL | Age: 29
End: 2019-02-11

## 2019-02-11 NOTE — NURSING
Pt left AMA. Pt was counseled on the importance of staying overnight and having tests done in AM however pt stated she has to go home and will follow up with her primary care doctor in the AM. I stressed the importance to the pt on the need to come back to the ER if symptoms persist or worsen and to absolutely call her primary care doctor in the AM.

## 2019-02-11 NOTE — H&P
"[facility] Hospital Medicine  History & Physical    Patient Name: Jasper Guerrero  MRN: [id number]  Admission Date: 2/10/2019  Attending Physician: Magan Becerra MD   Primary Care Provider: Sofia Perry NP         Patient information was obtained from patient and ER records.     Subjective:     Principal Problem:Intractable chronic migraine without aura and without status migrainosus    Chief Complaint:   Chief Complaint   Patient presents with    Migraine     " for 230 days "        HPI: Ms. 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  She sees Dr. Bruno in [place] who has been working this up. She has had recent admissions to [facility] and claims that they did CT, MRI, MRV (we do not have those records here) and she was told there was no reason found for her headaches.  She has hx of gastric ulcers to NSAIDs diagnosed by endoscopy several years ago.  She has tried nsaids, toradol, reglan, steroids, magnesium, fiorcet, and oxcarbmazepine without relief.  She states that dilaudid is the only thing that helps her headache when it is severe.  She has associated nausea and zofran is only medicine that helps. She does not like phenergan. She denies f/c, recent illness, recent travel, travel outside of country, abdominal pain, wt loss. She does have occasional episodes of diarrhea without known exacerbating or alleveing factors. She has not had a bowel movement today.  Her last dose of narcotic was yesterday and she took one clonazepam this am.      ED found papilledema on examination. Case was discussed with neurology who felt that admission with LP in am is recommended for evaluation of pseudotumor cerebri    Past Medical History:   Diagnosis Date    Agoraphobia     Depression     Fibromyalgia     Migraine headache     Nerve injury 08/2016    Lingual Nerve Injury    PTSD (post-traumatic stress disorder)        Past " "Surgical History:   Procedure Laterality Date    CARPAL TUNNEL RELEASE Right 12/27/2018    Dr Lozada     MOUTH SURGERY      WISDOM TOOTH EXTRACTION         Review of patient's allergies indicates:   Allergen Reactions    Benadryl [diphenhydramine hcl]      Paralysis on right side body     Codeine      Paralysis right body    Flexeril [cyclobenzaprine] Other (See Comments)     Numbness on right side of body    Penicillins Anaphylaxis and Hives    Nsaids (non-steroidal anti-inflammatory drug) Diarrhea and Nausea And Vomiting    Magnesium      "made me feel horrible"        No current facility-administered medications on file prior to encounter.      Current Outpatient Medications on File Prior to Encounter   Medication Sig    clonazePAM (KLONOPIN) 1 MG tablet Take 1 tablet by mouth Daily.    FLUoxetine 60 mg Tab TK ONE C PO  QD    ondansetron (ZOFRAN) 4 MG tablet Take 1 tablet (4 mg total) by mouth every 6 (six) hours as needed for Nausea.    OXcarbazepine (TRILEPTAL) 600 MG Tab Take 1 tablet (600 mg total) by mouth 2 (two) times daily.    oxyCODONE-acetaminophen (PERCOCET) 7.5-325 mg per tablet Take 1 tablet by mouth every 6 (six) hours as needed for Pain.    rizatriptan (MAXALT) 10 MG tablet 1 tab PO PRN migraine. May repeat every 2 hours for max 3 tabs in 24 hours. Use no more than 10 days per month.    sucralfate (CARAFATE) 1 gram tablet Take 1 tablet (1 g total) by mouth 4 (four) times daily. When taking NSAIDs    butalbital-acetaminophen-caffeine -40 mg (FIORICET, ESGIC) -40 mg per tablet 1 tab PO as needed for migraine. No more than 10 tablets per month.    dihydroergotamine (MIGRANAL) 0.5 mg/pump act. (4 mg/mL) nasal spray 1 spray by Nasal route every 8 (eight) hours as needed for Migraine. Use in one nostril as directed.  No more than 4 sprays in one hour    MIRENA 20 mcg/24 hr (5 years) IUD     ondansetron (ZOFRAN-ODT) 4 MG TbDL Take 1 tablet (4 mg total) by mouth every 8 " (eight) hours as needed.    [START ON 2/24/2019] oxyCODONE-acetaminophen (PERCOCET) 7.5-325 mg per tablet Take 1 tablet by mouth every 6 (six) hours as needed for Pain.    [START ON 3/26/2019] oxyCODONE-acetaminophen (PERCOCET) 7.5-325 mg per tablet Take 1 tablet by mouth every 6 (six) hours as needed for Pain.    [DISCONTINUED] alprazolam (XANAX) 1 MG tablet Take 1 mg by mouth 2 (two) times daily as needed.     Family History     Problem Relation (Age of Onset)    Cancer Maternal Aunt    No Known Problems Mother, Father, Maternal Uncle, Paternal Aunt, Paternal Uncle, Maternal Grandmother, Maternal Grandfather, Paternal Grandmother, Paternal Grandfather, Daughter, Son        Tobacco Use    Smoking status: Never Smoker    Smokeless tobacco: Never Used   Substance and Sexual Activity    Alcohol use: No    Drug use: No    Sexual activity: Yes     Partners: Male     Review of Systems   Constitutional: Positive for diaphoresis. Negative for activity change, appetite change, chills, fever and unexpected weight change.   HENT: Negative for congestion, hearing loss, mouth sores, nosebleeds, sinus pressure, sore throat and trouble swallowing.         Tongue numbness   Eyes: Positive for visual disturbance. Negative for photophobia and redness.   Respiratory: Negative for apnea, cough, choking, chest tightness, shortness of breath, wheezing and stridor.    Cardiovascular: Negative for chest pain, palpitations and leg swelling.   Gastrointestinal: Positive for nausea. Negative for abdominal distention, abdominal pain, constipation, diarrhea and vomiting.   Endocrine: Negative for polydipsia, polyphagia and polyuria.   Genitourinary: Negative for dysuria.   Musculoskeletal: Negative for arthralgias, back pain, gait problem, neck pain and neck stiffness.   Skin: Negative for color change, pallor and rash.   Neurological: Negative for dizziness, tremors, seizures, syncope, weakness, light-headedness, numbness and  headaches.   Hematological: Negative for adenopathy. Does not bruise/bleed easily.   Psychiatric/Behavioral: Negative for agitation, behavioral problems, confusion, decreased concentration and suicidal ideas. The patient is nervous/anxious.      Objective:     Vital Signs (Most Recent):  Temp: 98.4 °F (36.9 °C) (02/10/19 1722)  Pulse: 92 (02/10/19 1722)  Resp: 18 (02/10/19 1722)  BP: 127/87 (02/10/19 1722)  SpO2: (!) 93 % (02/10/19 1722) Vital Signs (24h Range):  Temp:  [98.4 °F (36.9 °C)-98.6 °F (37 °C)] 98.4 °F (36.9 °C)  Pulse:  [] 92  Resp:  [18-20] 18  SpO2:  [93 %-98 %] 93 %  BP: (127-181)/(63-88) 127/87     Weight: 127 kg (280 lb)  Body mass index is 46.59 kg/m².    Physical Exam   Constitutional: She is oriented to person, place, and time. She appears well-developed and well-nourished. No distress.   obese   HENT:   Head: Normocephalic and atraumatic.   Nose: Nose normal.   Mouth/Throat: No oropharyngeal exudate.   Eyes: Conjunctivae and EOM are normal. No scleral icterus.   Neck: Normal range of motion. Neck supple. No JVD present. No tracheal deviation present. No thyromegaly present.   Cardiovascular: Normal rate, regular rhythm, normal heart sounds and intact distal pulses. Exam reveals no gallop and no friction rub.   No murmur heard.  Pulmonary/Chest: Effort normal and breath sounds normal. No stridor. No respiratory distress. She has no wheezes. She has no rales.   Abdominal: Soft. Bowel sounds are normal. She exhibits no distension and no mass. There is no rebound and no guarding.   Musculoskeletal: Normal range of motion. She exhibits no edema, tenderness or deformity.   Neurological: She is alert and oriented to person, place, and time. No cranial nerve deficit. She exhibits normal muscle tone. Coordination normal.   Skin: Skin is warm and dry. Capillary refill takes less than 2 seconds. No rash noted. She is not diaphoretic. No erythema. No pallor.   Psychiatric: She has a normal mood and  affect. Her behavior is normal. Judgment and thought content normal.         CRANIAL NERVES     CN III, IV, VI   Extraocular motions are normal.        Significant Labs: All pertinent labs within the past 24 hours have been reviewed.    Significant Imaging: I have reviewed and interpreted all pertinent imaging results/findings within the past 24 hours.    Assessment/Plan:     * Intractable chronic migraine without aura and without status migrainosus    She has failed therapy to this point  Followed by Dr. Bruno in [place]       Intractable headache    Considering failed therapy. Differential includes pseudotumor cerebri  She apparently has had recent CT/MRI/MRV at [facility] without etiology found  LP in am with opening pressure.   After admission to the floor she states she would like to leave AMA due to needing to take care of children at home. I have discussed the dangers of elevated intracranial pressures and she states understanding. She would like to call Dr. Batista in AM and update her and get scheduled for LP as an outpatient.  I have offered her labs, compazine, zofran, and other medications indicated for headache control which she is refusing at this time.      Chronically on opiate therapy    Unfortunately since we are trying to get an opening pressures for diagnosis of pseudotumor cerebri, we can not give her opiates or benzos.  I have discussed this plan with neurology and the patient.  She is considering leaving AMA.      Depression    Continue home fluoxetine       Trigeminal neuropathy    Continue oxcarbazepine  Supportive care     BMI 40.0-44.9, adult    Body mass index is 46.59 kg/m². Morbid obesity complicates all aspects of disease management from diagnostic modalities to treatment. Weight loss encouraged and health benefits explained to patient.       Fibromyalgia    Continue home oxcarbazepine  Supportive care        VTE Risk Mitigation (From admission, onward)        Ordered     IP VTE LOW RISK  PATIENT  Once      02/10/19 5689             Magan Becerra MD  Department of Hospital Medicine   [facility]

## 2019-02-11 NOTE — DISCHARGE SUMMARY
[facility]  Hospital Medicine  Discharge Summary      Patient Name: Jasper Guerrero  MRN: [id number]  Admission Date: 2/10/2019  Hospital Length of Stay: 0 days  Discharge Date and Time:  02/10/2019 6:22 PM  Attending Physician: Magan Becerra MD   Discharging Provider: Magan Becerra MD  Primary Care Provider: Sofia Perry NP      HPI:   Ms. 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  She sees Dr. Bruno in [place] who has been working this up. She has had recent admissions to [facility] and claims that they did CT, MRI, MRV (we do not have those records here) and she was told there was no reason found for her headaches.  She has hx of gastric ulcers to NSAIDs diagnosed by endoscopy several years ago.  She has tried nsaids, toradol, reglan, steroids, magnesium, fiorcet, and oxcarbmazepine without relief.  She states that dilaudid is the only thing that helps her headache when it is severe.  She has associated nausea and zofran is only medicine that helps. She does not like phenergan. She denies f/c, recent illness, recent travel, travel outside of country, abdominal pain, wt loss. She does have occasional episodes of diarrhea without known exacerbating or alleveing factors. She has not had a bowel movement today.  Her last dose of narcotic was yesterday and she took one clonazepam this am.      ED found papilledema on examination. Case was discussed with neurology who felt that admission with LP in am is recommended for evaluation of pseudotumor cerebri    * No surgery found *      Hospital Course:   Ms Guerrero is a 27 yo WF with hx above who was admitted with headache. Plan as discussed with neurology is for evaluation of pseudotumor cerebri and needs opening pressure with LP. This requires not having opiates or anxiety medications. This was very distressing for the patient. After admission to the floor she states she would  like to leave AMA due to needing to take care of children at home. I have discussed the dangers of elevated intracranial pressures and she states understanding. She would like to call Dr. Hart in AM and update her and get scheduled for LP as an outpatient.  I have offered her labs, compazine, zofran, and other medications indicated for headache control which she is refusing at this time. I have requested follow up appointment with Dr. Hart and stressed the need to call office tomorrow.      Consults:   Consults (From admission, onward)        Status Ordering Provider     Inpatient consult to Neurology  Once     Provider:  Beck Woodson MD    Acknowledged FARHAT HELM          No new Assessment & Plan notes have been filed under this hospital service since the last note was generated.  Service: Hospital Medicine    Final Active Diagnoses:    Diagnosis Date Noted POA    PRINCIPAL PROBLEM:  Intractable chronic migraine without aura and without status migrainosus [G43.719] 01/14/2019 Yes    Intractable headache [R51] 02/10/2019 Yes    Chronically on opiate therapy [Z79.891] 03/21/2018 Not Applicable    Depression [F32.9] 12/18/2017 Yes    Trigeminal neuropathy [G50.9] 10/17/2017 Yes    Fibromyalgia [M79.7] 01/16/2015 Yes    BMI 40.0-44.9, adult [Z68.41] 01/16/2015 Not Applicable      Problems Resolved During this Admission:       Discharged Condition: good    Disposition: Left Against Medical Adv*    Follow Up:  Follow-up Information     Sangeeta Hart MD.    Specialty:  Neurology  Contact information:  [address] [facility]  [address]  [facility] [place] [facility] [id number] [phone]             Sofia Perry NP.    Specialty:  Family Medicine  Contact information:  [address] [facility] [address] [phone]                 Patient Instructions:      Diet Adult Regular     Notify your health care provider if you experience any of the following:  temperature >100.4     Notify your  health care provider if you experience any of the following:  persistent nausea and vomiting or diarrhea     Notify your health care provider if you experience any of the following:  severe uncontrolled pain     Notify your health care provider if you experience any of the following:  redness, tenderness, or signs of infection (pain, swelling, redness, odor or green/yellow discharge around incision site)     Activity as tolerated       Significant Diagnostic Studies: no labs obtained. Patient left ama before labs obtained    Pending Diagnostic Studies:     Procedure Component Value Units Date/Time    IR Lumbar Puncture Diagnostic [[id number]]     Order Status:  Sent Lab Status:  No result     IR Lumbar Puncture Therapeutic [[id number]]     Order Status:  Sent Lab Status:  No result          Medications:  Reconciled Home Medications:      Medication List      CONTINUE taking these medications    butalbital-acetaminophen-caffeine -40 mg -40 mg per tablet  Commonly known as:  FIORICET, ESGIC  1 tab PO as needed for migraine. No more than 10 tablets per month.     clonazePAM 1 MG tablet  Commonly known as:  KLONOPIN  Take 1 tablet by mouth Daily.     dihydroergotamine 0.5 mg/pump act. (4 mg/mL) nasal spray  Commonly known as:  MIGRANAL  1 spray by Nasal route every 8 (eight) hours as needed for Migraine. Use in one nostril as directed.  No more than 4 sprays in one hour     FLUoxetine 60 mg Tab  TK ONE C PO  QD     MIRENA 20 mcg/24 hr (5 years) IUD  Generic drug:  levonorgestrel     ondansetron 4 MG tablet  Commonly known as:  ZOFRAN  Take 1 tablet (4 mg total) by mouth every 6 (six) hours as needed for Nausea.     ondansetron 4 MG Tbdl  Commonly known as:  ZOFRAN-ODT  Take 1 tablet (4 mg total) by mouth every 8 (eight) hours as needed.     OXcarbazepine 600 MG Tab  Commonly known as:  TRILEPTAL  Take 1 tablet (600 mg total) by mouth 2 (two) times daily.     * oxyCODONE-acetaminophen 7.5-325 mg per  tablet  Commonly known as:  PERCOCET  Take 1 tablet by mouth every 6 (six) hours as needed for Pain.     * oxyCODONE-acetaminophen 7.5-325 mg per tablet  Commonly known as:  PERCOCET  Take 1 tablet by mouth every 6 (six) hours as needed for Pain.  Start taking on:  2/24/2019     * oxyCODONE-acetaminophen 7.5-325 mg per tablet  Commonly known as:  PERCOCET  Take 1 tablet by mouth every 6 (six) hours as needed for Pain.  Start taking on:  3/26/2019     rizatriptan 10 MG tablet  Commonly known as:  MAXALT  1 tab PO PRN migraine. May repeat every 2 hours for max 3 tabs in 24 hours. Use no more than 10 days per month.     sucralfate 1 gram tablet  Commonly known as:  CARAFATE  Take 1 tablet (1 g total) by mouth 4 (four) times daily. When taking NSAIDs         * This list has 3 medication(s) that are the same as other medications prescribed for you. Read the directions carefully, and ask your doctor or other care provider to review them with you.                Indwelling Lines/Drains at time of discharge:   Lines/Drains/Airways          None          Time spent on the discharge of patient: 65 minutes  Patient was seen and examined on the date of discharge and determined to be suitable for discharge.         Magan Becerra MD  Department of Hospital Medicine  [facility]

## 2019-02-11 NOTE — ASSESSMENT & PLAN NOTE
Unfortunately since we are trying to get an opening pressures for diagnosis of pseudotumor cerebri, we can not give her opiates or benzos.  I have discussed this plan with neurology and the patient.  She is considering leaving A.

## 2019-02-11 NOTE — HOSPITAL COURSE
Ms Guerrero is a 29 yo WF with hx above who was admitted with headache. Plan as discussed with neurology is for evaluation of pseudotumor cerebri and needs opening pressure with LP. This requires not having opiates or anxiety medications. This was very distressing for the patient. After admission to the floor she states she would like to leave AMA due to needing to take care of children at home. I have discussed the dangers of elevated intracranial pressures and she states understanding. She would like to call Dr. Hart in AM and update her and get scheduled for LP as an outpatient.  I have offered her labs, compazine, zofran, and other medications indicated for headache control which she is refusing at this time. I have requested follow up appointment with Dr. aHrt and stressed the need to call office tomorrow.

## 2019-02-11 NOTE — ASSESSMENT & PLAN NOTE
Considering failed therapy. Differential includes pseudotumor cerebri  She apparently has had recent CT/MRI/MRV at [facility] without etiology found  LP in am with opening pressure.   After admission to the floor she states she would like to leave AMA due to needing to take care of children at home. I have discussed the dangers of elevated intracranial pressures and she states understanding. She would like to call Dr. Batista in AM and update her and get scheduled for LP as an outpatient.  I have offered her labs, compazine, zofran, and other medications indicated for headache control which she is refusing at this time.

## 2019-02-12 ENCOUNTER — PATIENT MESSAGE (OUTPATIENT)
Dept: NEUROLOGY | Facility: CLINIC | Age: 29
End: 2019-02-12

## 2019-02-12 ENCOUNTER — OFFICE VISIT (OUTPATIENT)
Dept: NEUROLOGY | Facility: CLINIC | Age: 29
End: 2019-02-12
Payer: MEDICAID

## 2019-02-12 VITALS
BODY MASS INDEX: 46.65 KG/M2 | SYSTOLIC BLOOD PRESSURE: 121 MMHG | WEIGHT: 280 LBS | HEIGHT: 65 IN | HEART RATE: 111 BPM | RESPIRATION RATE: 16 BRPM | DIASTOLIC BLOOD PRESSURE: 79 MMHG

## 2019-02-12 DIAGNOSIS — G43.719 INTRACTABLE CHRONIC MIGRAINE WITHOUT AURA AND WITHOUT STATUS MIGRAINOSUS: ICD-10-CM

## 2019-02-12 DIAGNOSIS — M54.2 NECK PAIN: Primary | ICD-10-CM

## 2019-02-12 PROCEDURE — 99999 PR PBB SHADOW E&M-EST. PATIENT-LVL III: CPT | Mod: PBBFAC,,, | Performed by: PSYCHIATRY & NEUROLOGY

## 2019-02-12 PROCEDURE — 99213 OFFICE O/P EST LOW 20 MIN: CPT | Mod: PBBFAC,PO | Performed by: PSYCHIATRY & NEUROLOGY

## 2019-02-12 PROCEDURE — 99213 OFFICE O/P EST LOW 20 MIN: CPT | Mod: S$PBB,,, | Performed by: PSYCHIATRY & NEUROLOGY

## 2019-02-12 PROCEDURE — 99999 PR PBB SHADOW E&M-EST. PATIENT-LVL III: ICD-10-PCS | Mod: PBBFAC,,, | Performed by: PSYCHIATRY & NEUROLOGY

## 2019-02-12 PROCEDURE — 99213 PR OFFICE/OUTPT VISIT, EST, LEVL III, 20-29 MIN: ICD-10-PCS | Mod: S$PBB,,, | Performed by: PSYCHIATRY & NEUROLOGY

## 2019-02-12 RX ORDER — METOCLOPRAMIDE 10 MG/1
10 TABLET ORAL EVERY 6 HOURS PRN
Qty: 60 TABLET | Refills: 11 | Status: SHIPPED | OUTPATIENT
Start: 2019-02-12 | End: 2020-02-27

## 2019-02-12 RX ORDER — TIZANIDINE 4 MG/1
TABLET ORAL
Qty: 30 TABLET | Refills: 11 | Status: SHIPPED | OUTPATIENT
Start: 2019-02-12 | End: 2020-06-10
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
## 2019-02-12 NOTE — TELEPHONE ENCOUNTER
Called and spoke with patient. Patient is scheduled to see an ophthalmologist today at 12:30 pm. Informed patient to have them send us the records from her visit. Fax number sent through [facility].

## 2019-02-12 NOTE — PATIENT INSTRUCTIONS
WORKUP:  -- none     PREVENTION (use daily regardless of headache):  -- continue Trileptal 600mg twice a day   -- continue with Botox    ACUTE TREATMENT:  -- continue Percocet 7.5mg 4x per day as needed (lingual nerve pain)  -- continue maxalt as needed (total of fioricet + maxalt no more than 10 days per month) (migraine)  -- Fioricet for rescue no more than 10 tablets per month (migraine)  -- start tizanidine 4 mg at night for neck pain  -- start Reglan 10 mg p.o. for nausea    Set up infusions at [facility] --  two times per week as needed for severe migraine (to avoid having to go to the ED)    1. Dilaudid 2mg IV   2. Decadron 4mg IV  3. Reglan 10mg IV     Must have

## 2019-02-13 ENCOUNTER — TELEPHONE (OUTPATIENT)
Dept: NEUROLOGY | Facility: CLINIC | Age: 29
End: 2019-02-13

## 2019-02-13 ENCOUNTER — PATIENT MESSAGE (OUTPATIENT)
Dept: NEUROLOGY | Facility: CLINIC | Age: 29
End: 2019-02-13

## 2019-02-14 ENCOUNTER — PATIENT MESSAGE (OUTPATIENT)
Dept: NEUROLOGY | Facility: CLINIC | Age: 29
End: 2019-02-14

## 2019-02-15 ENCOUNTER — PATIENT MESSAGE (OUTPATIENT)
Dept: NEUROLOGY | Facility: CLINIC | Age: 29
End: 2019-02-15

## 2019-02-17 ENCOUNTER — PATIENT MESSAGE (OUTPATIENT)
Dept: NEUROLOGY | Facility: CLINIC | Age: 29
End: 2019-02-17

## 2019-02-18 ENCOUNTER — PATIENT MESSAGE (OUTPATIENT)
Dept: NEUROLOGY | Facility: CLINIC | Age: 29
End: 2019-02-18

## 2019-02-18 NOTE — TELEPHONE ENCOUNTER
"Message sent to [facility] department to get approval for the infusions to be "3 times per week PRN for 2 weeks the 2 times per week PRN".  "

## 2019-02-19 ENCOUNTER — PATIENT MESSAGE (OUTPATIENT)
Dept: NEUROLOGY | Facility: CLINIC | Age: 29
End: 2019-02-19

## 2019-02-20 ENCOUNTER — PATIENT MESSAGE (OUTPATIENT)
Dept: NEUROLOGY | Facility: CLINIC | Age: 29
End: 2019-02-20

## 2019-02-20 NOTE — TELEPHONE ENCOUNTER
Called and spoke with patient in regards to [facility] message. All questions and concerns answered. Patient has no further questions. Patient verbalized understanding.

## 2019-02-21 ENCOUNTER — PATIENT MESSAGE (OUTPATIENT)
Dept: NEUROLOGY | Facility: CLINIC | Age: 29
End: 2019-02-21

## 2019-02-26 ENCOUNTER — PATIENT MESSAGE (OUTPATIENT)
Dept: NEUROLOGY | Facility: CLINIC | Age: 29
End: 2019-02-26

## 2019-02-27 ENCOUNTER — PATIENT MESSAGE (OUTPATIENT)
Dept: NEUROLOGY | Facility: CLINIC | Age: 29
End: 2019-02-27

## 2019-02-28 ENCOUNTER — TELEPHONE (OUTPATIENT)
Dept: NEUROLOGY | Facility: CLINIC | Age: 29
End: 2019-02-28

## 2019-02-28 ENCOUNTER — PATIENT MESSAGE (OUTPATIENT)
Dept: NEUROLOGY | Facility: CLINIC | Age: 29
End: 2019-02-28

## 2019-03-01 ENCOUNTER — PATIENT MESSAGE (OUTPATIENT)
Dept: NEUROLOGY | Facility: CLINIC | Age: 29
End: 2019-03-01

## 2019-03-04 ENCOUNTER — PATIENT MESSAGE (OUTPATIENT)
Dept: NEUROLOGY | Facility: CLINIC | Age: 29
End: 2019-03-04

## 2019-03-04 NOTE — TELEPHONE ENCOUNTER
Referral # [id number] has been changed. Dexamethasone has been replaced with Compazine. Reglan has been changed to Diamox. Referral is needing approval. Please advise.

## 2019-03-07 ENCOUNTER — PATIENT MESSAGE (OUTPATIENT)
Dept: NEUROLOGY | Facility: CLINIC | Age: 29
End: 2019-03-07

## 2019-03-08 ENCOUNTER — PATIENT MESSAGE (OUTPATIENT)
Dept: NEUROLOGY | Facility: CLINIC | Age: 29
End: 2019-03-08

## 2019-03-08 NOTE — TELEPHONE ENCOUNTER
"Called and spoke with patient. Patient does not like the way Nubain made her feel today. Patient stated "It made me feel hot and shaky". Patient no longer wants Nubain and would like to have Dilaudid back. Patient also still have a very bad headache. Please advise.   "

## 2019-03-10 ENCOUNTER — PATIENT MESSAGE (OUTPATIENT)
Dept: NEUROLOGY | Facility: CLINIC | Age: 29
End: 2019-03-10

## 2019-03-11 ENCOUNTER — PATIENT MESSAGE (OUTPATIENT)
Dept: NEUROLOGY | Facility: CLINIC | Age: 29
End: 2019-03-11

## 2019-03-11 DIAGNOSIS — G43.719 INTRACTABLE CHRONIC MIGRAINE WITHOUT AURA AND WITHOUT STATUS MIGRAINOSUS: ICD-10-CM

## 2019-03-11 RX ORDER — BUTALBITAL, ACETAMINOPHEN AND CAFFEINE 50; 325; 40 MG/1; MG/1; MG/1
TABLET ORAL
Qty: 10 TABLET | Refills: 0 | Status: SHIPPED | OUTPATIENT
Start: 2019-03-11 | End: 2019-03-12 | Stop reason: SDUPTHER

## 2019-03-12 ENCOUNTER — PATIENT MESSAGE (OUTPATIENT)
Dept: NEUROLOGY | Facility: CLINIC | Age: 29
End: 2019-03-12

## 2019-03-12 NOTE — TELEPHONE ENCOUNTER
Referral # [id number] has been changed. Priscilla has been changed back to Dilaudid and needs authorization. Please advise.

## 2019-03-13 ENCOUNTER — PATIENT MESSAGE (OUTPATIENT)
Dept: NEUROLOGY | Facility: CLINIC | Age: 29
End: 2019-03-13

## 2019-03-18 ENCOUNTER — PATIENT MESSAGE (OUTPATIENT)
Dept: NEUROLOGY | Facility: CLINIC | Age: 29
End: 2019-03-18

## 2019-03-18 ENCOUNTER — TELEPHONE (OUTPATIENT)
Dept: NEUROLOGY | Facility: CLINIC | Age: 29
End: 2019-03-18

## 2019-03-18 NOTE — TELEPHONE ENCOUNTER
Called and spoke with patient. Patient would like to enquire about Ketamine nasal spray. She read an article someone gave her about it. Also patient said with the percocet she does have some break through pain and would like to know if a few Nucynta pills can be prescribed to help with the break through pain. Please advise.

## 2019-03-18 NOTE — TELEPHONE ENCOUNTER
----- Message from Shanique Taveras sent at 3/18/2019 12:53 PM CDT -----  Contact: self   Patient need to speak with nurse Óscar she states its personal, please call back at [phone] asap

## 2019-03-20 ENCOUNTER — PATIENT MESSAGE (OUTPATIENT)
Dept: NEUROLOGY | Facility: CLINIC | Age: 29
End: 2019-03-20

## 2019-03-21 ENCOUNTER — PATIENT MESSAGE (OUTPATIENT)
Dept: NEUROLOGY | Facility: CLINIC | Age: 29
End: 2019-03-21

## 2019-03-21 NOTE — TELEPHONE ENCOUNTER
Called and spoke with patient. Patient wanted to know what to do if the urgent care gave her a stronger pain script. Patient is on her way there. Informed patient to hold on to the script until Dr. Hart responds. Patient verbalized understanding.

## 2019-03-22 ENCOUNTER — PATIENT MESSAGE (OUTPATIENT)
Dept: NEUROLOGY | Facility: CLINIC | Age: 29
End: 2019-03-22

## 2019-03-22 ENCOUNTER — PATIENT MESSAGE (OUTPATIENT)
Dept: ORTHOPEDICS | Facility: CLINIC | Age: 29
End: 2019-03-22

## 2019-03-22 ENCOUNTER — HOSPITAL ENCOUNTER (EMERGENCY)
Facility: HOSPITAL | Age: 29
Discharge: HOME | End: 2019-03-22
Attending: EMERGENCY MEDICINE
Payer: MEDICAID

## 2019-03-22 VITALS
OXYGEN SATURATION: 98 % | HEIGHT: 65 IN | HEART RATE: 108 BPM | RESPIRATION RATE: 20 BRPM | SYSTOLIC BLOOD PRESSURE: 127 MMHG | DIASTOLIC BLOOD PRESSURE: 73 MMHG | WEIGHT: 275 LBS | BODY MASS INDEX: 45.82 KG/M2 | TEMPERATURE: 98 F

## 2019-03-22 DIAGNOSIS — M79.89 SWELLING IN RIGHT ARMPIT: ICD-10-CM

## 2019-03-22 DIAGNOSIS — I80.8 SUPERFICIAL THROMBOPHLEBITIS OF RIGHT UPPER EXTREMITY: Primary | ICD-10-CM

## 2019-03-22 PROCEDURE — 99284 EMERGENCY DEPT VISIT MOD MDM: CPT

## 2019-03-22 RX ORDER — TRAMADOL HYDROCHLORIDE 50 MG/1
100 TABLET ORAL
Status: DISCONTINUED | OUTPATIENT
Start: 2019-03-22 | End: 2019-03-22 | Stop reason: HOSPADM

## 2019-03-22 RX ORDER — OXYCODONE AND ACETAMINOPHEN 10; 325 MG/1; MG/1
1 TABLET ORAL EVERY 4 HOURS PRN
Qty: 5 TABLET | Refills: 0 | Status: SHIPPED | OUTPATIENT
Start: 2019-03-22 | End: 2019-04-24

## 2019-03-22 NOTE — ED NOTES
Pt states that she can not take NSAIDS and can not take narcotics because she has to drive herself home, asking to speak to MD. SCANLON aware

## 2019-03-22 NOTE — ED PROVIDER NOTES
"Encounter Date: 3/22/2019    SCRIBE #1 NOTE: I, Barron Carreno, am scribing for, and in the presence of, Dr. uQiros.       History     Chief Complaint   Patient presents with    Arm Pain     upper arm pain and swelling     03/22/2019  12:27 PM     Jasper Guerrero is a 28 y.o. female who has a past medical history of Agoraphobia, Depression, Fibromyalgia, Migraine headache is presenting to ED for evaluation of right arm swelling since a few days ago. She also c/o associated right upper arm pain. She was seen at urgent care and advised to go to ED for further evaluation. She denies fever. No other complaints noted at this time. Pt has a past surgical history that includes Mouth surgery; [place] tooth extraction; and Carpal tunnel release.       The history is provided by the patient.     Review of patient's allergies indicates:   Allergen Reactions    Benadryl [diphenhydramine hcl]      Paralysis on right side body     Codeine      Paralysis right body    Flexeril [cyclobenzaprine] Other (See Comments)     Numbness on right side of body    Penicillins Anaphylaxis and Hives    Nsaids (non-steroidal anti-inflammatory drug) Diarrhea and Nausea And Vomiting    Magnesium      "made me feel horrible"      Past Medical History:   Diagnosis Date    Agoraphobia     Depression     Fibromyalgia     Migraine headache     Nerve injury 08/2016    Lingual Nerve Injury    PTSD (post-traumatic stress disorder)      Past Surgical History:   Procedure Laterality Date    CARPAL TUNNEL RELEASE Right 12/27/2018    Dr Lozada     MOUTH SURGERY      WISDOM TOOTH EXTRACTION       Family History   Problem Relation Age of Onset    No Known Problems Mother     No Known Problems Father     Cancer Maternal Aunt         endometrial    No Known Problems Maternal Uncle     No Known Problems Paternal Aunt     No Known Problems Paternal Uncle     No Known Problems Maternal Grandmother     No Known Problems Maternal Grandfather     " No Known Problems Paternal Grandmother     No Known Problems Paternal Grandfather     No Known Problems Daughter     No Known Problems Son      Social History     Tobacco Use    Smoking status: Never Smoker    Smokeless tobacco: Never Used   Substance Use Topics    Alcohol use: No    Drug use: No     Review of Systems   Constitutional: Negative for chills and fever.   HENT: Negative for facial swelling and trouble swallowing.    Eyes: Negative for discharge.   Respiratory: Negative for cough, chest tightness, shortness of breath and wheezing.    Cardiovascular: Negative for chest pain and palpitations.   Gastrointestinal: Negative for abdominal pain, diarrhea, nausea and vomiting.   Genitourinary: Negative for dysuria and hematuria.   Musculoskeletal: Positive for myalgias. Negative for back pain, joint swelling and neck pain.        Positive for right arm swelling.    Skin: Negative for color change, pallor, rash and wound.   Neurological: Negative for dizziness, syncope, weakness, light-headedness, numbness and headaches.   Hematological: Does not bruise/bleed easily.   Psychiatric/Behavioral: The patient is not nervous/anxious.        Physical Exam     Initial Vitals [03/22/19 1200]   BP Pulse Resp Temp SpO2   127/73 108 20 97.5 °F (36.4 °C) 98 %      MAP       --         Physical Exam    Nursing note and vitals reviewed.  Constitutional: She appears well-developed and well-nourished. She is not diaphoretic. No distress.   HENT:   Head: Normocephalic and atraumatic.   Cardiovascular: Intact distal pulses.   Musculoskeletal: Normal range of motion. She exhibits tenderness.   Tenderness inside right arm. Mild swelling.   Neurological: She is alert and oriented to person, place, and time. She has normal strength. No sensory deficit.   Skin: Skin is warm and dry. No rash and no abscess noted. No erythema.   No erythema noted to right arm.    Psychiatric: She has a normal mood and affect.         ED Course    Procedures  Labs Reviewed - No data to display       Imaging Results          US Upper Extremity Veins Right (Final result)  Result time 03/22/19 13:29:31    Final result by Feroz Valenzuela Jr., MD (03/22/19 13:29:31)                 Impression:      There is occlusive thrombus identified in the basilic vein and in the cephalic vein at and above the antecubital fossa.  Deep vein thrombosis in the brachial, axillary and subclavian vein is not seen.      Electronically signed by: Feroz Valenzuela MD  Date:    03/22/2019  Time:    13:29             Narrative:    EXAMINATION:  US UPPER EXTREMITY VEINS RIGHT    CLINICAL HISTORY:  right arm pain and swelling near antecubital area;    TECHNIQUE:  Duplex and color flow Doppler evaluation and dynamic compression was performed of the right upper extremity veins.    COMPARISON:  None    FINDINGS:  Central veins: The internal jugular, subclavian, and axillary veins are patent and free of thrombus.    Arm veins: The brachial veins are patent and compressible.  There is occlusive thrombus identified in the basilic vein from the antecubital fossa to the mid upper arm and occlusive thrombus in the cephalic vein at the antecubital fossa.    Contralateral subclavian/internal jugular veins: The left subclavian and internal jugular veins are patent and free of thrombus.                                 Medical Decision Making:   History:   Old Medical Records: I decided to obtain old medical records.  Initial Assessment:   28-year-old woman presents for evaluation of acute onset of worsening pain in her right inner arm.  No trauma. No overlying erythema or fluctuance appreciated.  She has normal pulses and sensation with full range of motion. I do not suspect abscess, cellulitis, septic joint.  Ultrasound performed to rule out DVT and shows occlusive thrombus in the basilic and cephalic vein above the antecubital fossa.  No evidence of DVT.  Patient is diagnosed with superficial  thrombophlebitis and instructed to apply warm compresses.  Patient is on chronic narcotic pain medication given a short source of stronger pain medication for acute pain. Return precautions discussed.  Pain management follow-up.  She is discharged improved in no acute distress.  Clinical Tests:   Radiological Study: Ordered and Reviewed            Scribe Attestation:   Scribe #1: I performed the above scribed service and the documentation accurately describes the services I performed. I attest to the accuracy of the note.    I, Ishaan Quesada, personally performed the services described in this documentation. All medical record entries made by the scribe were at my direction and in my presence.  I have reviewed the chart and agree that the record reflects my personal performance and is accurate and complete. Silver Quiros MD.  8:29 AM 03/23/2019       DISCLAIMER: This note was prepared with [employer] voice recognition transcription software. Garbled syntax, mangled pronouns, and other bizarre constructions may be attributed to that software system.             Clinical Impression:     1. Superficial thrombophlebitis of right upper extremity    2. Swelling in right armpit                                   Silver Quiros MD  03/23/19 0829

## 2019-03-23 ENCOUNTER — PATIENT MESSAGE (OUTPATIENT)
Dept: NEUROLOGY | Facility: CLINIC | Age: 29
End: 2019-03-23

## 2019-03-25 ENCOUNTER — PATIENT MESSAGE (OUTPATIENT)
Dept: NEUROLOGY | Facility: CLINIC | Age: 29
End: 2019-03-25

## 2019-03-26 ENCOUNTER — PATIENT MESSAGE (OUTPATIENT)
Dept: NEUROLOGY | Facility: CLINIC | Age: 29
End: 2019-03-26

## 2019-03-26 NOTE — TELEPHONE ENCOUNTER
"Per Dr. Hart, "It is okay to fill the two prescriptions listed below as they are for different indications the what I am prescribing and I was made aware head of time."  "

## 2019-03-27 ENCOUNTER — PATIENT MESSAGE (OUTPATIENT)
Dept: NEUROLOGY | Facility: CLINIC | Age: 29
End: 2019-03-27

## 2019-03-28 ENCOUNTER — PATIENT MESSAGE (OUTPATIENT)
Dept: NEUROLOGY | Facility: CLINIC | Age: 29
End: 2019-03-28

## 2019-04-09 ENCOUNTER — PATIENT MESSAGE (OUTPATIENT)
Dept: NEUROLOGY | Facility: CLINIC | Age: 29
End: 2019-04-09

## 2019-04-10 ENCOUNTER — PATIENT MESSAGE (OUTPATIENT)
Dept: NEUROLOGY | Facility: CLINIC | Age: 29
End: 2019-04-10

## 2019-04-10 NOTE — TELEPHONE ENCOUNTER
Call and spoke with patient per her request. Patient wanted to know why she could not increase the number of infusion per week. Informed patient that her Dilaudid should be lasting longer and its not. Informed patient she is not getting the desired effect from it. Patient asked for a prescription of Norco to help with the headaches. Informed patient per Dr. Hart, that she could not have Norco due the other pain medications she is already on. Informed patient she would have to wait until the Botox starts to work. Patient verbalized understanding.

## 2019-04-11 ENCOUNTER — PATIENT MESSAGE (OUTPATIENT)
Dept: NEUROLOGY | Facility: CLINIC | Age: 29
End: 2019-04-11

## 2019-04-14 ENCOUNTER — HOSPITAL ENCOUNTER (EMERGENCY)
Facility: HOSPITAL | Age: 29
Discharge: HOME | End: 2019-04-14
Attending: EMERGENCY MEDICINE
Payer: MEDICAID

## 2019-04-14 VITALS — WEIGHT: 270 LBS | HEIGHT: 65 IN | BODY MASS INDEX: 44.98 KG/M2

## 2019-04-14 DIAGNOSIS — G89.29 CHRONIC NONINTRACTABLE HEADACHE, UNSPECIFIED HEADACHE TYPE: Primary | ICD-10-CM

## 2019-04-14 DIAGNOSIS — R51.9 CHRONIC NONINTRACTABLE HEADACHE, UNSPECIFIED HEADACHE TYPE: Primary | ICD-10-CM

## 2019-04-14 PROCEDURE — 99283 EMERGENCY DEPT VISIT LOW MDM: CPT

## 2019-04-15 ENCOUNTER — PATIENT MESSAGE (OUTPATIENT)
Dept: NEUROLOGY | Facility: CLINIC | Age: 29
End: 2019-04-15

## 2019-04-15 ENCOUNTER — TELEPHONE (OUTPATIENT)
Dept: NEUROLOGY | Facility: CLINIC | Age: 29
End: 2019-04-15

## 2019-04-15 NOTE — ED PROVIDER NOTES
Pt seen and discharged during Epic downtime. See paper chart.      Denise Cleaning NP  04/14/19 9217

## 2019-04-22 ENCOUNTER — PATIENT MESSAGE (OUTPATIENT)
Dept: NEUROLOGY | Facility: CLINIC | Age: 29
End: 2019-04-22

## 2019-04-23 ENCOUNTER — PATIENT MESSAGE (OUTPATIENT)
Dept: NEUROLOGY | Facility: CLINIC | Age: 29
End: 2019-04-23

## 2019-04-24 ENCOUNTER — PATIENT MESSAGE (OUTPATIENT)
Dept: NEUROLOGY | Facility: CLINIC | Age: 29
End: 2019-04-24

## 2019-04-24 ENCOUNTER — PROCEDURE VISIT (OUTPATIENT)
Dept: NEUROLOGY | Facility: CLINIC | Age: 29
End: 2019-04-24
Payer: MEDICAID

## 2019-04-24 VITALS
WEIGHT: 293 LBS | SYSTOLIC BLOOD PRESSURE: 136 MMHG | DIASTOLIC BLOOD PRESSURE: 88 MMHG | HEIGHT: 65 IN | BODY MASS INDEX: 48.82 KG/M2 | HEART RATE: 128 BPM | RESPIRATION RATE: 13 BRPM

## 2019-04-24 DIAGNOSIS — G50.9 TRIGEMINAL NEUROPATHY: ICD-10-CM

## 2019-04-24 DIAGNOSIS — G43.719 INTRACTABLE CHRONIC MIGRAINE WITHOUT AURA AND WITHOUT STATUS MIGRAINOSUS: ICD-10-CM

## 2019-04-24 DIAGNOSIS — Z79.891 CHRONICALLY ON OPIATE THERAPY: Primary | ICD-10-CM

## 2019-04-24 DIAGNOSIS — F41.9 ANXIETY: ICD-10-CM

## 2019-04-24 PROCEDURE — 64615 CHEMODENERV MUSC MIGRAINE: CPT | Mod: S$PBB,,, | Performed by: PSYCHIATRY & NEUROLOGY

## 2019-04-24 PROCEDURE — 64615 PR CHEMODENERVATION OF MUSCLE FOR CHRONIC MIGRAINE: ICD-10-PCS | Mod: S$PBB,,, | Performed by: PSYCHIATRY & NEUROLOGY

## 2019-04-24 PROCEDURE — 99214 OFFICE O/P EST MOD 30 MIN: CPT | Mod: S$PBB,25,, | Performed by: PSYCHIATRY & NEUROLOGY

## 2019-04-24 PROCEDURE — 99214 PR OFFICE/OUTPT VISIT, EST, LEVL IV, 30-39 MIN: ICD-10-PCS | Mod: S$PBB,25,, | Performed by: PSYCHIATRY & NEUROLOGY

## 2019-04-24 PROCEDURE — 64615 CHEMODENERV MUSC MIGRAINE: CPT | Mod: PBBFAC,PO | Performed by: PSYCHIATRY & NEUROLOGY

## 2019-04-24 RX ORDER — OXYCODONE AND ACETAMINOPHEN 7.5; 325 MG/1; MG/1
1 TABLET ORAL EVERY 6 HOURS PRN
Qty: 120 TABLET | Refills: 0 | Status: SHIPPED | OUTPATIENT
Start: 2019-05-25 | End: 2019-06-24

## 2019-04-24 RX ORDER — ALPRAZOLAM 1 MG/1
TABLET ORAL
COMMUNITY
Start: 2019-04-10 | End: 2020-02-12

## 2019-04-24 RX ORDER — OXYCODONE AND ACETAMINOPHEN 7.5; 325 MG/1; MG/1
1 TABLET ORAL EVERY 6 HOURS PRN
Qty: 120 TABLET | Refills: 0 | Status: SHIPPED | OUTPATIENT
Start: 2019-04-25 | End: 2019-05-25

## 2019-04-24 RX ADMIN — ONABOTULINUMTOXINA 200 UNITS: 100 INJECTION, POWDER, LYOPHILIZED, FOR SOLUTION INTRADERMAL; INTRAMUSCULAR at 06:04
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
## 2019-04-24 NOTE — PROCEDURES
ProceduresPROCEDURE PERFORMED: Botulinum toxin injection (71596)    CLINICAL INDICATION: G43.719    A time out was conducted just before the start of the procedure to verify the correct patient and procedure, procedure location, and all relevant critical information.     Conventional methods of treatment such as multiple medications, both on and   off label have been tried including:    anti-epileptics (gabapentin, lyrica, trileptal), and antidepressants (fluoxetine, fluvoxamine)    The patient has been unresponsive and refractory.The patient meets criteria for chronic headaches according to the ICHD-II, the patient has more than 15 headaches a month which last for more than 4 hours a day.    Injection session number: 2  Percentage relief since last session: NA    DESCRIPTION OF PROCEDURE: After obtaining informed consent and under   aseptic technique, a total of 185 units of botulinum toxin type A were   injected in the following muscles: Procerus 5 units,  5 units   bilaterally, frontalis 20 units, temporalis 20 units bilaterally,   occipitalis 15 units, upper cervical paraspinals 10 units bilaterally and trapezius 15 units bilaterally and masseters 15 units bilaterally. The patient was given a total of 185 units in 37 sites.The patient tolerated the procedure well. There were no complications. The patient was given a prescription for repeat treatment in 3 months.     Unavoidable waste 15 units    Sangeeta Hart MD

## 2019-04-24 NOTE — PATIENT INSTRUCTIONS
WORKUP:  -- none      REFERRALS:  -- refer to psychologist Dr. Jessica Combs for cognitive behavioral therapy for chronic pain/anxiety     PREVENTION (use daily regardless of headache):  -- continue Trileptal 600mg twice a day   -- continue with Botox     ACUTE TREATMENT:  -- continue Percocet 7.5mg 4x per day as needed (lingual nerve pain) - refill today for #120 4/25/19 to 5/25/19 and 5/25/19 to 6/24/19 (e scribe)  -- continue maxalt as needed (total of fioricet + maxalt no more than 10 days per month) (migraine)  -- Fioricet for rescue no more than 10 tablets per month (migraine)  -- continue tizanidine 4 mg at night for neck pain  -- continue Reglan 10 mg p.o. for nausea     Continue infusions at [facility] --  two times per week as needed for severe migraine (to avoid having to go to the ED)     1. Dilaudid 2mg IV   2. Diamox 500mg IV   3. Compazine 0mg IV      Must have

## 2019-04-25 ENCOUNTER — PATIENT MESSAGE (OUTPATIENT)
Dept: NEUROLOGY | Facility: CLINIC | Age: 29
End: 2019-04-25

## 2019-04-29 ENCOUNTER — PATIENT MESSAGE (OUTPATIENT)
Dept: NEUROLOGY | Facility: CLINIC | Age: 29
End: 2019-04-29

## 2019-05-03 ENCOUNTER — PATIENT MESSAGE (OUTPATIENT)
Dept: ORTHOPEDICS | Facility: CLINIC | Age: 29
End: 2019-05-03

## 2019-05-07 ENCOUNTER — PATIENT MESSAGE (OUTPATIENT)
Dept: NEUROLOGY | Facility: CLINIC | Age: 29
End: 2019-05-07

## 2019-05-08 ENCOUNTER — PATIENT MESSAGE (OUTPATIENT)
Dept: NEUROLOGY | Facility: CLINIC | Age: 29
End: 2019-05-08

## 2019-05-09 ENCOUNTER — PATIENT MESSAGE (OUTPATIENT)
Dept: NEUROLOGY | Facility: CLINIC | Age: 29
End: 2019-05-09

## 2019-05-10 ENCOUNTER — PATIENT MESSAGE (OUTPATIENT)
Dept: NEUROLOGY | Facility: CLINIC | Age: 29
End: 2019-05-10

## 2019-05-10 NOTE — TELEPHONE ENCOUNTER
Called and spoke with patient per her request. All questions and concerns addressed. Patient verbalized understanding.

## 2019-05-10 NOTE — PROGRESS NOTES
Primary Care Behavioral Health: Initial  Patient Name: Jasper Guerrero   Date:  05/13/2019  Site:  [facility]  Referral source:  Sangeeta Hart MD    Chief complaint/reason for encounter:  Intractable chronic migraine without aura and without status migrainosus / Anxiety / Trigeminal neuropathy    History of present illness:  Ms.Brady Larissa Guerrero  is a 28 y.o.  female referred by Sangeeta Hart MD for symptoms of Intractable chronic migraine without aura and without status migrainosus / Anxiety / Trigeminal neuropathy . Patient was seen, examined and chart was reviewed.  Patient notes a hx of anxiety, depression, OCD.  Patient notes her lingual nerve was severed during dental work.  Patient notes she is currently engaged in treatment with neurologist.  Patient notes she continues to experience daily pain in her face; fluctuating between pounding, sharp and throbbing.  Patient notes at times she experiences 8/10 pain and during periods of heightened pain she experiences 9-10/10 pain.    Patient notes she has 2 children; 4 years old and 9 month old daughter.  Patient notes she resides with her parents,  and 2 children.      Past Medical History:   Diagnosis Date    Agoraphobia     Depression     Fibromyalgia     Migraine headache     Nerve injury 08/2016    Lingual Nerve Injury    PTSD (post-traumatic stress disorder)        Current Outpatient Medications:     ALPRAZolam (XANAX) 1 MG tablet, , Disp: , Rfl:     butalbital-acetaminophen-caffeine -40 mg (FIORICET, ESGIC) -40 mg per tablet, TAKE 1 TABLET BY MOUTH AS NEEDED FOR MIGRAINE.(NO MORE THAN 10 TABLETS A MONTH), Disp: 10 tablet, Rfl: 3    dihydroergotamine (MIGRANAL) 0.5 mg/pump act. (4 mg/mL) nasal spray, 1 spray by Nasal route every 8 (eight) hours as needed for Migraine. Use in one nostril as directed.  No more than 4 sprays in one hour, Disp: 9 mL, Rfl: 12    metoclopramide HCl (REGLAN) 10 MG tablet, Take 1 tablet  (10 mg total) by mouth every 6 (six) hours as needed (migraine or nausea)., Disp: 60 tablet, Rfl: 11    MIRENA 20 mcg/24 hr (5 years) IUD, , Disp: , Rfl:     ondansetron (ZOFRAN) 4 MG tablet, Take 1 tablet (4 mg total) by mouth every 6 (six) hours as needed for Nausea., Disp: 30 tablet, Rfl: 11    OXcarbazepine (TRILEPTAL) 600 MG Tab, Take 1 tablet (600 mg total) by mouth 2 (two) times daily., Disp: 60 tablet, Rfl: 11    oxyCODONE-acetaminophen (PERCOCET) 7.5-325 mg per tablet, Take 1 tablet by mouth every 6 (six) hours as needed for Pain., Disp: 120 tablet, Rfl: 0    [START ON 5/25/2019] oxyCODONE-acetaminophen (PERCOCET) 7.5-325 mg per tablet, Take 1 tablet by mouth every 6 (six) hours as needed for Pain. DNF 5/25/19, Disp: 120 tablet, Rfl: 0    rizatriptan (MAXALT) 10 MG tablet, 1 tab PO PRN migraine. May repeat every 2 hours for max 3 tabs in 24 hours. Use no more than 10 days per month., Disp: 18 tablet, Rfl: 11    sucralfate (CARAFATE) 1 gram tablet, Take 1 tablet (1 g total) by mouth 4 (four) times daily. When taking NSAIDs, Disp: 30 tablet, Rfl: 11    tiZANidine (ZANAFLEX) 4 MG tablet, Half or full tablet by mouth at night as needed for muscle spasm, Disp: 30 tablet, Rfl: 11    Current Facility-Administered Medications:     onabotulinumtoxina injection 200 Units, 200 Units, Intramuscular, Q90 Days, Sangeeta Hart MD, 200 Units at 01/30/19 1101    onabotulinumtoxina injection 200 Units, 200 Units, Intramuscular, Q90 Days, Sangeeta Hart MD, 200 Units at 04/24/19 1810    Psychiatric history:  Previous diagnosis: Patient notes a hx of post-partum anxiety, depression and psychosis following both deliveries. Patient note she experiences fleeting visions of harming her children.  Patient denies ever experiencing thoughts, plan or intent to harm her children.   Previous medications: Adderal, Celexa, Luvox, Xanax, Valium, Klonopin  Previous hospitalizations: Patient denies hx of inpatient psychiatric  treatment.    History of outpatient treatment: Patient notes she is currently engaged in treatment with outpatient psychiatry; Dr. Taylor.  Patient notes a remote hx of engagement in counseling 8 years ago for 6 week.    Previous suicide attempt:  Patient denies.  Family history of psychiatric illness:  Mother: Depression and Anxiety; Maternal Grandmother and Aunt: Depression and Anxiety    Current and past substance use: Deferred due to patient distress.    Psychiatric symptoms:  Depression:  Patient endorses symptoms of depressed mood or depression-related anhedonia, lack of motivation, lethargy, difficulty concentrating, feelings of worthlessness or guilt, hopelessness.  Tami/Hypomania:  Denied.  She denied periods of elevated mood or abnormally increased energy or goal-directed activity.  Anxiety:  Patient notes she is experiencing episodes of panic 30 minutes-1 hour 4-5x/day.    Thoughts:  Denied delusions, hallucinations.  Suicidal thoughts/behaviors: Patient notes a hx of suicidal thoughts with plan to stab herself with a knife 3 years ago however notes at that time she contacted a friend.  Patient notes she experiences at times fleeting suicidal thoughts of overdosing on medication a few times per week.  Patient denies at this times experiencing any intent to end her life.  Patient denies during those times engaging in any preparation measures to kill herself. Patient notes when experiencing suicidal thinking she does tend to contact her  or mother for support.  Patient names her family as factors protecting and preventing her from harming herself.  At the time of appointment, patient denied suicidal and homicidal ideation, plan and intent.  Patient noted agreement to call her  as well as call 911/and or present to the ED if she experienced suicidal or homicidal ideation, plan or intent.    Sleep: Patient notes she experiences difficulty falling asleep and difficulty staying asleep.       Mental Status Exam:  General appearance:  appears stated age, neatly dressed, well groomed  Speech:  Clear and intelligible, normal rate, normal tone, normal pitch, normal volume  Level of cooperation:  cooperative  Thought processes:  Linear, logical, goal-directed  Mood:  Depressed and Anxious  Thought content:  Relevant and appropriate, no illusions, no visual hallucinations, no auditory hallucinations, no delusions, no active or passive homicidal thoughts, no active or passive suicidal ideation, no obsessions, no compulsions, no violence  Affect:  Anxious and Saddened  Orientation:  oriented to person, place, situation and date  Memory/Attention/Concentration:  No gross cognitive deficits made evident during conversation.  Judgment and insight: fair  Language:  intact    PHQ9 5/13/2019   Total Score 27     GAD7 5/13/2019   ABDIEL-7 Score 21       Plan: Patient notes she experiences at times fleeting suicidal thoughts of overdosing on medication a few times per week.  Patient denies during those times experiencing intent to end her life.  Patient denies during those times engaging in any preparation measures to end her from killing herself. Patient notes when experiencing suicidal thinking she does tend to contact her  or mother for support.  Patient names her family as factors protecting and preventing her from killing herself or others; patient notes she will 'never' kill herself.  At the time of appointment, patient denied suicidal and homicidal ideation, plan and intent.  Patient noted agreement to call her  as well as call 911/and or present to the ED if she experienced suicidal or homicidal ideation, plan or intent. At the time of appointment, patient's risk to herself and other's was not imminent and therefore did not meet criteria for transfer for involuntary psychiatric evaluation. Patient was encouraged to consider inpatient psychiatric treatment and this was recommended to her. Patient  presents today somewhat preoccupied with need for changes in psychiatric medications the day of this appointment.   Provider contacted 2 mental health agencies that accept medicaid and was advised that patient could do a walk in 8am-2pm M-F; patient was dissatisfied with this due to not being able to go the day of this appointment.  The other agency noted their intakes are generally booked 2-3 weeks out.  Patient was encouraged to contact outpatient psychiatric provider however she noted due to financial limitations she is unable to see that provider. Patient noted she is interested in going to the ED for psychiatric evaluation; provider contacted ED for coordination of care.      Patient will be contacted day after this appointment by MA for further referral information; intensive outpatient program resources, psychiatry resources and counseling resources.  At this time, patient does appear in need of longer-term psychotherapy and psychiatric treatment.     Length of Session:  70 minutes

## 2019-05-13 ENCOUNTER — PATIENT MESSAGE (OUTPATIENT)
Dept: NEUROLOGY | Facility: CLINIC | Age: 29
End: 2019-05-13

## 2019-05-13 ENCOUNTER — INITIAL CONSULT (OUTPATIENT)
Dept: PSYCHIATRY | Facility: CLINIC | Age: 29
End: 2019-05-13
Payer: MEDICAID

## 2019-05-13 DIAGNOSIS — F33.2 SEVERE EPISODE OF RECURRENT MAJOR DEPRESSIVE DISORDER, WITHOUT PSYCHOTIC FEATURES: Primary | ICD-10-CM

## 2019-05-13 PROCEDURE — 99999 PR PBB SHADOW E&M-EST. PATIENT-LVL I: ICD-10-PCS | Mod: PBBFAC,,, | Performed by: PSYCHOLOGIST

## 2019-05-13 PROCEDURE — 99211 OFF/OP EST MAY X REQ PHY/QHP: CPT | Mod: PBBFAC,PO | Performed by: PSYCHOLOGIST

## 2019-05-13 PROCEDURE — 90791 PSYCH DIAGNOSTIC EVALUATION: CPT | Mod: ,,, | Performed by: PSYCHOLOGIST

## 2019-05-13 PROCEDURE — 90791 PR PSYCHIATRIC DIAGNOSTIC EVALUATION: ICD-10-PCS | Mod: ,,, | Performed by: PSYCHOLOGIST

## 2019-05-13 PROCEDURE — 99999 PR PBB SHADOW E&M-EST. PATIENT-LVL I: CPT | Mod: PBBFAC,,, | Performed by: PSYCHOLOGIST

## 2019-05-14 ENCOUNTER — PATIENT MESSAGE (OUTPATIENT)
Dept: NEUROLOGY | Facility: CLINIC | Age: 29
End: 2019-05-14

## 2019-05-14 ENCOUNTER — TREATMENT PLANNING (OUTPATIENT)
Dept: PSYCHIATRY | Facility: CLINIC | Age: 29
End: 2019-05-14

## 2019-05-14 NOTE — PROGRESS NOTES
Please contact Ms. Guerrero and provider her with list of psychiatric providers, intensive outpatient programs and counselors covered by her insurance.

## 2019-05-15 ENCOUNTER — PATIENT MESSAGE (OUTPATIENT)
Dept: PSYCHIATRY | Facility: CLINIC | Age: 29
End: 2019-05-15

## 2019-05-15 NOTE — TELEPHONE ENCOUNTER
Good morning, Here are the resources for your insurance carrier.       Behavioral Health Service Locations        [facility]  [address] [place] [place]  [phone]    [facility]  [address]  [phone]    [facility]  [address] [place] [address]  [phone]    [facility]  [address] [place] [address]  [phone]    Thank you  Deandra Flores MA

## 2019-05-17 ENCOUNTER — PATIENT MESSAGE (OUTPATIENT)
Dept: NEUROLOGY | Facility: CLINIC | Age: 29
End: 2019-05-17

## 2019-05-23 ENCOUNTER — PATIENT MESSAGE (OUTPATIENT)
Dept: NEUROLOGY | Facility: CLINIC | Age: 29
End: 2019-05-23

## 2019-05-24 ENCOUNTER — PATIENT MESSAGE (OUTPATIENT)
Dept: NEUROLOGY | Facility: CLINIC | Age: 29
End: 2019-05-24

## 2019-05-27 ENCOUNTER — PATIENT MESSAGE (OUTPATIENT)
Dept: NEUROLOGY | Facility: CLINIC | Age: 29
End: 2019-05-27

## 2019-05-28 ENCOUNTER — PATIENT MESSAGE (OUTPATIENT)
Dept: NEUROLOGY | Facility: CLINIC | Age: 29
End: 2019-05-28

## 2019-06-03 ENCOUNTER — PATIENT MESSAGE (OUTPATIENT)
Dept: NEUROLOGY | Facility: CLINIC | Age: 29
End: 2019-06-03

## 2019-06-04 ENCOUNTER — PATIENT MESSAGE (OUTPATIENT)
Dept: NEUROLOGY | Facility: CLINIC | Age: 29
End: 2019-06-04

## 2019-06-05 ENCOUNTER — PATIENT MESSAGE (OUTPATIENT)
Dept: NEUROLOGY | Facility: CLINIC | Age: 29
End: 2019-06-05

## 2019-06-08 ENCOUNTER — PATIENT MESSAGE (OUTPATIENT)
Dept: NEUROLOGY | Facility: CLINIC | Age: 29
End: 2019-06-08

## 2019-06-10 ENCOUNTER — PATIENT MESSAGE (OUTPATIENT)
Dept: NEUROLOGY | Facility: CLINIC | Age: 29
End: 2019-06-10

## 2019-06-17 ENCOUNTER — PATIENT MESSAGE (OUTPATIENT)
Dept: NEUROLOGY | Facility: CLINIC | Age: 29
End: 2019-06-17

## 2019-06-17 DIAGNOSIS — G43.719 INTRACTABLE CHRONIC MIGRAINE WITHOUT AURA AND WITHOUT STATUS MIGRAINOSUS: Primary | ICD-10-CM

## 2019-06-17 DIAGNOSIS — Z79.891 CHRONICALLY ON OPIATE THERAPY: ICD-10-CM

## 2019-06-17 RX ORDER — TOPIRAMATE 50 MG/1
TABLET, FILM COATED ORAL
Qty: 30 TABLET | Refills: 11 | Status: SHIPPED | OUTPATIENT
Start: 2019-06-17 | End: 2019-11-15

## 2019-06-17 NOTE — TELEPHONE ENCOUNTER
Jasper,     I received your message for the percocet refill. In reviewing your prescription record, I am concerned at how frequently you need additional prescriptions for opiates from the dentist. The dose that I prescribe for you is already a moderate dose, and additional opiates on top of that especially in the setting of other controlled substances (fioricet, xanax, adderal) is high risk. Out of the 4 previous months, you have been prescribed additional opiates on 3. This is excessive.      Your overdose risk score is 720 out of a range from 0 to 990 which means you are 141 times more likely to die from accidental overdose of these medications than someone not on this regimen.    With your next prescription we will wean down the dose of percocet to 5mg four times per day. This is for YOUR SAFETY.     We will add a low dose of topiramate which is very helpful for migraines, can be helpful for nerve pain, and also helps with opiod withdrawal.     I have send both over to the pharmacy    - Dr Hart

## 2019-06-19 ENCOUNTER — PATIENT MESSAGE (OUTPATIENT)
Dept: NEUROLOGY | Facility: CLINIC | Age: 29
End: 2019-06-19

## 2019-06-21 ENCOUNTER — PATIENT MESSAGE (OUTPATIENT)
Dept: NEUROLOGY | Facility: CLINIC | Age: 29
End: 2019-06-21

## 2019-06-21 DIAGNOSIS — Z79.891 CHRONICALLY ON OPIATE THERAPY: ICD-10-CM

## 2019-06-21 DIAGNOSIS — G50.9 TRIGEMINAL NEUROPATHY: Primary | ICD-10-CM

## 2019-06-21 RX ORDER — OXYCODONE AND ACETAMINOPHEN 5; 325 MG/1; MG/1
1 TABLET ORAL EVERY 6 HOURS PRN
Qty: 120 TABLET | Refills: 0 | Status: SHIPPED | OUTPATIENT
Start: 2019-06-24 | End: 2019-07-24 | Stop reason: SDUPTHER

## 2019-06-24 ENCOUNTER — PATIENT MESSAGE (OUTPATIENT)
Dept: NEUROLOGY | Facility: CLINIC | Age: 29
End: 2019-06-24

## 2019-06-25 ENCOUNTER — PATIENT MESSAGE (OUTPATIENT)
Dept: NEUROLOGY | Facility: CLINIC | Age: 29
End: 2019-06-25

## 2019-06-28 ENCOUNTER — PATIENT MESSAGE (OUTPATIENT)
Dept: NEUROLOGY | Facility: CLINIC | Age: 29
End: 2019-06-28

## 2019-07-01 ENCOUNTER — PATIENT MESSAGE (OUTPATIENT)
Dept: NEUROLOGY | Facility: CLINIC | Age: 29
End: 2019-07-01

## 2019-07-04 ENCOUNTER — PATIENT MESSAGE (OUTPATIENT)
Dept: NEUROLOGY | Facility: CLINIC | Age: 29
End: 2019-07-04

## 2019-07-05 ENCOUNTER — PATIENT MESSAGE (OUTPATIENT)
Dept: NEUROLOGY | Facility: CLINIC | Age: 29
End: 2019-07-05

## 2019-07-05 NOTE — TELEPHONE ENCOUNTER
No. You may break your tablets in half and use one and a half at a time but the quantity stays the same for 30 days    - Dr Hart

## 2019-07-07 ENCOUNTER — PATIENT MESSAGE (OUTPATIENT)
Dept: NEUROLOGY | Facility: CLINIC | Age: 29
End: 2019-07-07

## 2019-07-09 ENCOUNTER — PATIENT MESSAGE (OUTPATIENT)
Dept: NEUROLOGY | Facility: CLINIC | Age: 29
End: 2019-07-09

## 2019-07-10 ENCOUNTER — PATIENT MESSAGE (OUTPATIENT)
Dept: NEUROLOGY | Facility: CLINIC | Age: 29
End: 2019-07-10

## 2019-07-10 ENCOUNTER — TELEPHONE (OUTPATIENT)
Dept: NEUROLOGY | Facility: CLINIC | Age: 29
End: 2019-07-10

## 2019-07-11 ENCOUNTER — PATIENT MESSAGE (OUTPATIENT)
Dept: NEUROLOGY | Facility: CLINIC | Age: 29
End: 2019-07-11

## 2019-07-17 ENCOUNTER — PATIENT MESSAGE (OUTPATIENT)
Dept: NEUROLOGY | Facility: CLINIC | Age: 29
End: 2019-07-17

## 2019-07-17 NOTE — TELEPHONE ENCOUNTER
Called and spoke with patient per her request. Patient reports her month is still bothering her. She has tried the pain medications and stronger antibiotics. She is waiting from a call back from her dentist. Patient would like to know what she can do from here. Patient would like to know for example if her dentist gives her more pain medication is she allowed to fill it. Please advise.

## 2019-07-19 ENCOUNTER — PATIENT MESSAGE (OUTPATIENT)
Dept: NEUROLOGY | Facility: CLINIC | Age: 29
End: 2019-07-19

## 2019-07-22 ENCOUNTER — PATIENT MESSAGE (OUTPATIENT)
Dept: NEUROLOGY | Facility: CLINIC | Age: 29
End: 2019-07-22

## 2019-07-22 DIAGNOSIS — G50.9 TRIGEMINAL NEUROPATHY: ICD-10-CM

## 2019-07-22 DIAGNOSIS — Z79.891 CHRONICALLY ON OPIATE THERAPY: ICD-10-CM

## 2019-07-23 ENCOUNTER — PATIENT MESSAGE (OUTPATIENT)
Dept: NEUROLOGY | Facility: CLINIC | Age: 29
End: 2019-07-23

## 2019-07-24 ENCOUNTER — PATIENT MESSAGE (OUTPATIENT)
Dept: NEUROLOGY | Facility: CLINIC | Age: 29
End: 2019-07-24

## 2019-07-24 RX ORDER — OXYCODONE AND ACETAMINOPHEN 5; 325 MG/1; MG/1
1 TABLET ORAL EVERY 8 HOURS PRN
Qty: 90 TABLET | Refills: 0 | Status: SHIPPED | OUTPATIENT
Start: 2019-07-24 | End: 2019-08-22 | Stop reason: SDUPTHER

## 2019-07-28 ENCOUNTER — PATIENT MESSAGE (OUTPATIENT)
Dept: NEUROLOGY | Facility: CLINIC | Age: 29
End: 2019-07-28

## 2019-07-29 ENCOUNTER — PATIENT MESSAGE (OUTPATIENT)
Dept: NEUROLOGY | Facility: CLINIC | Age: 29
End: 2019-07-29

## 2019-07-29 ENCOUNTER — TELEPHONE (OUTPATIENT)
Dept: NEUROLOGY | Facility: CLINIC | Age: 29
End: 2019-07-29

## 2019-07-31 ENCOUNTER — LAB VISIT (OUTPATIENT)
Dept: LAB | Facility: HOSPITAL | Age: 29
End: 2019-07-31
Attending: PSYCHIATRY & NEUROLOGY
Payer: MEDICAID

## 2019-07-31 ENCOUNTER — PROCEDURE VISIT (OUTPATIENT)
Dept: NEUROLOGY | Facility: CLINIC | Age: 29
End: 2019-07-31
Payer: MEDICAID

## 2019-07-31 ENCOUNTER — PATIENT MESSAGE (OUTPATIENT)
Dept: NEUROLOGY | Facility: CLINIC | Age: 29
End: 2019-07-31

## 2019-07-31 ENCOUNTER — TELEPHONE (OUTPATIENT)
Dept: NEUROLOGY | Facility: CLINIC | Age: 29
End: 2019-07-31

## 2019-07-31 VITALS
SYSTOLIC BLOOD PRESSURE: 150 MMHG | HEART RATE: 98 BPM | HEIGHT: 65 IN | WEIGHT: 293 LBS | BODY MASS INDEX: 48.82 KG/M2 | DIASTOLIC BLOOD PRESSURE: 101 MMHG | RESPIRATION RATE: 16 BRPM

## 2019-07-31 DIAGNOSIS — Z79.891 CHRONICALLY ON OPIATE THERAPY: Primary | ICD-10-CM

## 2019-07-31 DIAGNOSIS — G50.9 TRIGEMINAL NEUROPATHY: ICD-10-CM

## 2019-07-31 DIAGNOSIS — G43.719 INTRACTABLE CHRONIC MIGRAINE WITHOUT AURA AND WITHOUT STATUS MIGRAINOSUS: ICD-10-CM

## 2019-07-31 DIAGNOSIS — Z79.891 CHRONICALLY ON OPIATE THERAPY: ICD-10-CM

## 2019-07-31 PROCEDURE — 80307 DRUG TEST PRSMV CHEM ANLYZR: CPT

## 2019-07-31 PROCEDURE — 64615 CHEMODENERV MUSC MIGRAINE: CPT | Mod: S$PBB,,, | Performed by: PSYCHIATRY & NEUROLOGY

## 2019-07-31 PROCEDURE — 99213 OFFICE O/P EST LOW 20 MIN: CPT | Mod: S$PBB,25,, | Performed by: PSYCHIATRY & NEUROLOGY

## 2019-07-31 PROCEDURE — 64615 CHEMODENERV MUSC MIGRAINE: CPT | Mod: PBBFAC,PO | Performed by: PSYCHIATRY & NEUROLOGY

## 2019-07-31 PROCEDURE — 64615 PR CHEMODENERVATION OF MUSCLE FOR CHRONIC MIGRAINE: ICD-10-PCS | Mod: S$PBB,,, | Performed by: PSYCHIATRY & NEUROLOGY

## 2019-07-31 PROCEDURE — 99213 PR OFFICE/OUTPT VISIT, EST, LEVL III, 20-29 MIN: ICD-10-PCS | Mod: S$PBB,25,, | Performed by: PSYCHIATRY & NEUROLOGY

## 2019-07-31 RX ORDER — FLUVOXAMINE MALEATE 100 MG/1
TABLET, COATED ORAL
COMMUNITY
Start: 2019-05-28 | End: 2019-11-15

## 2019-07-31 RX ADMIN — ONABOTULINUMTOXINA 200 UNITS: 100 INJECTION, POWDER, LYOPHILIZED, FOR SOLUTION INTRADERMAL; INTRAMUSCULAR at 10:07

## 2019-07-31 NOTE — PROCEDURES
Procedures  PROCEDURE PERFORMED: Botulinum toxin injection (81882)    CLINICAL INDICATION: G43.719    A time out was conducted just before the start of the procedure to verify the correct patient and procedure, procedure location, and all relevant critical information.     Conventional methods of treatment such as multiple medications, both on and   off label have been tried including:    anti-epileptics (gabapentin, lyrica, trileptal), and antidepressants (fluoxetine, fluvoxamine)    The patient has been unresponsive and refractory.The patient meets criteria for chronic headaches according to the ICHD-II, the patient has more than 15 headaches a month which last for more than 4 hours a day.    Injection session number: 3  Percentage relief since last session: 30% improvement in daily severity but still mostly daily (8-9/10 to 6/10)    DESCRIPTION OF PROCEDURE: After obtaining informed consent and under   aseptic technique, a total of 185 units of botulinum toxin type A were   injected in the following muscles:     -- Procerus 5 units  --  5 units bilaterally  -- Frontalis 20 units  -- Temporalis 20 units bilaterally  -- masseter 15 units bilateral  -- Occipitalis 15 units bilaterally  -- Upper cervical paraspinals 10 units bilaterally  -- Trapezius 15 units bilaterally.     The patient tolerated the procedure well. There were no complications. The patient was given a prescription for repeat treatment in 12 weeks       Unavoidable waste 15 units    Sangeeta Hart MD
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
## 2019-08-03 ENCOUNTER — PATIENT MESSAGE (OUTPATIENT)
Dept: NEUROLOGY | Facility: CLINIC | Age: 29
End: 2019-08-03

## 2019-08-03 DIAGNOSIS — G43.719 INTRACTABLE CHRONIC MIGRAINE WITHOUT AURA AND WITHOUT STATUS MIGRAINOSUS: ICD-10-CM

## 2019-08-03 LAB
6MAM UR QL: NOT DETECTED
7AMINOCLONAZEPAM UR QL: NOT DETECTED
A-OH ALPRAZ UR QL: NOT DETECTED
ALPRAZ UR QL: NOT DETECTED
AMPHET UR QL SCN: NOT DETECTED
BARBITURATES UR QL: PRESENT
BUPRENORPHINE UR QL: NOT DETECTED
BZE UR QL: NOT DETECTED
CARBOXYTHC UR QL: NOT DETECTED
CARISOPRODOL UR QL: NOT DETECTED
CLONAZEPAM UR QL: NOT DETECTED
CODEINE UR QL: NOT DETECTED
CREAT UR-MCNC: 191.1 MG/DL (ref 20–400)
DIAZEPAM UR QL: NOT DETECTED
ETHYL GLUCURONIDE UR QL: NOT DETECTED
FENTANYL UR QL: NOT DETECTED
HYDROCODONE UR QL: NOT DETECTED
HYDROMORPHONE UR QL: NOT DETECTED
LORAZEPAM UR QL: NOT DETECTED
MDA UR QL: NOT DETECTED
MDEA UR QL: NOT DETECTED
MDMA UR QL: NOT DETECTED
ME-PHENIDATE UR QL: NOT DETECTED
MEPERIDINE UR QL: NOT DETECTED
METHADONE UR QL: NOT DETECTED
METHAMPHET UR QL: NOT DETECTED
MIDAZOLAM UR QL SCN: NOT DETECTED
MORPHINE UR QL: NOT DETECTED
NORBUPRENORPHINE UR QL CFM: NOT DETECTED
NORDIAZEPAM UR QL: NOT DETECTED
NORFENTANYL UR QL: NOT DETECTED
NORHYDROCODONE UR QL CFM: NOT DETECTED
NOROXYCODONE UR QL CFM: PRESENT
NOROXYMORPHONE: NOT DETECTED
OXAZEPAM UR QL: NOT DETECTED
OXYCODONE UR QL: PRESENT
OXYMORPHONE UR QL: NOT DETECTED
PATHOLOGY STUDY: NORMAL
PCP UR QL: NOT DETECTED
PHENTERMINE UR QL: NOT DETECTED
PROPOXYPH UR QL: NOT DETECTED
SERVICE CMNT-IMP: NORMAL
TAPENTADOL UR QL SCN: NOT DETECTED
TAPENTADOL-O-SULF: NOT DETECTED
TEMAZEPAM UR QL: NOT DETECTED
TRAMADOL UR QL: NOT DETECTED
ZOLPIDEM UR QL: NOT DETECTED

## 2019-08-03 RX ORDER — BUTALBITAL, ACETAMINOPHEN AND CAFFEINE 50; 325; 40 MG/1; MG/1; MG/1
TABLET ORAL
Qty: 10 TABLET | Refills: 0 | Status: SHIPPED | OUTPATIENT
Start: 2019-08-03 | End: 2019-08-30 | Stop reason: SDUPTHER

## 2019-08-04 ENCOUNTER — PATIENT MESSAGE (OUTPATIENT)
Dept: NEUROLOGY | Facility: CLINIC | Age: 29
End: 2019-08-04

## 2019-08-05 ENCOUNTER — PATIENT MESSAGE (OUTPATIENT)
Dept: NEUROLOGY | Facility: CLINIC | Age: 29
End: 2019-08-05

## 2019-08-06 ENCOUNTER — PATIENT MESSAGE (OUTPATIENT)
Dept: NEUROLOGY | Facility: CLINIC | Age: 29
End: 2019-08-06

## 2019-08-13 ENCOUNTER — PATIENT MESSAGE (OUTPATIENT)
Dept: NEUROLOGY | Facility: CLINIC | Age: 29
End: 2019-08-13

## 2019-08-14 ENCOUNTER — PATIENT MESSAGE (OUTPATIENT)
Dept: NEUROLOGY | Facility: CLINIC | Age: 29
End: 2019-08-14

## 2019-08-16 ENCOUNTER — PATIENT MESSAGE (OUTPATIENT)
Dept: NEUROLOGY | Facility: CLINIC | Age: 29
End: 2019-08-16

## 2019-08-21 ENCOUNTER — PATIENT MESSAGE (OUTPATIENT)
Dept: NEUROLOGY | Facility: CLINIC | Age: 29
End: 2019-08-21

## 2019-08-22 ENCOUNTER — PATIENT MESSAGE (OUTPATIENT)
Dept: NEUROLOGY | Facility: CLINIC | Age: 29
End: 2019-08-22

## 2019-08-22 DIAGNOSIS — G50.9 TRIGEMINAL NEUROPATHY: ICD-10-CM

## 2019-08-22 DIAGNOSIS — Z79.891 CHRONICALLY ON OPIATE THERAPY: ICD-10-CM

## 2019-08-22 RX ORDER — OXYCODONE AND ACETAMINOPHEN 5; 325 MG/1; MG/1
1 TABLET ORAL EVERY 8 HOURS PRN
Qty: 90 TABLET | Refills: 0 | Status: SHIPPED | OUTPATIENT
Start: 2019-08-23 | End: 2019-08-22 | Stop reason: SDUPTHER

## 2019-08-22 RX ORDER — OXYCODONE AND ACETAMINOPHEN 5; 325 MG/1; MG/1
1 TABLET ORAL EVERY 8 HOURS PRN
Qty: 90 TABLET | Refills: 0 | Status: SHIPPED | OUTPATIENT
Start: 2019-08-23 | End: 2019-09-20 | Stop reason: SDUPTHER

## 2019-08-23 ENCOUNTER — TELEPHONE (OUTPATIENT)
Dept: NEUROLOGY | Facility: CLINIC | Age: 29
End: 2019-08-23

## 2019-08-27 ENCOUNTER — TELEPHONE (OUTPATIENT)
Dept: NEUROLOGY | Facility: CLINIC | Age: 29
End: 2019-08-27

## 2019-08-27 NOTE — TELEPHONE ENCOUNTER
Referral # [id number] has been changed. Compazine has been replaced with Zofran. Referral will require an update authorization. Please advise.

## 2019-08-30 ENCOUNTER — PATIENT MESSAGE (OUTPATIENT)
Dept: NEUROLOGY | Facility: CLINIC | Age: 29
End: 2019-08-30

## 2019-08-30 DIAGNOSIS — G43.719 INTRACTABLE CHRONIC MIGRAINE WITHOUT AURA AND WITHOUT STATUS MIGRAINOSUS: ICD-10-CM

## 2019-08-30 RX ORDER — BUTALBITAL, ACETAMINOPHEN AND CAFFEINE 50; 325; 40 MG/1; MG/1; MG/1
TABLET ORAL
Qty: 10 TABLET | Refills: 0 | Status: SHIPPED | OUTPATIENT
Start: 2019-08-30 | End: 2019-09-26 | Stop reason: SDUPTHER

## 2019-09-04 ENCOUNTER — PATIENT MESSAGE (OUTPATIENT)
Dept: NEUROLOGY | Facility: CLINIC | Age: 29
End: 2019-09-04

## 2019-09-05 ENCOUNTER — PATIENT MESSAGE (OUTPATIENT)
Dept: NEUROLOGY | Facility: CLINIC | Age: 29
End: 2019-09-05

## 2019-09-06 ENCOUNTER — PATIENT MESSAGE (OUTPATIENT)
Dept: NEUROLOGY | Facility: CLINIC | Age: 29
End: 2019-09-06

## 2019-09-09 ENCOUNTER — TELEPHONE (OUTPATIENT)
Dept: NEUROLOGY | Facility: CLINIC | Age: 29
End: 2019-09-09

## 2019-09-11 ENCOUNTER — PATIENT MESSAGE (OUTPATIENT)
Dept: NEUROLOGY | Facility: CLINIC | Age: 29
End: 2019-09-11

## 2019-09-12 ENCOUNTER — PATIENT MESSAGE (OUTPATIENT)
Dept: NEUROLOGY | Facility: CLINIC | Age: 29
End: 2019-09-12

## 2019-09-13 ENCOUNTER — PATIENT MESSAGE (OUTPATIENT)
Dept: NEUROLOGY | Facility: CLINIC | Age: 29
End: 2019-09-13

## 2019-09-16 ENCOUNTER — TELEPHONE (OUTPATIENT)
Dept: NEUROLOGY | Facility: CLINIC | Age: 29
End: 2019-09-16

## 2019-09-16 ENCOUNTER — PATIENT MESSAGE (OUTPATIENT)
Dept: NEUROLOGY | Facility: CLINIC | Age: 29
End: 2019-09-16

## 2019-09-18 ENCOUNTER — PATIENT MESSAGE (OUTPATIENT)
Dept: NEUROLOGY | Facility: CLINIC | Age: 29
End: 2019-09-18

## 2019-09-19 ENCOUNTER — PATIENT MESSAGE (OUTPATIENT)
Dept: NEUROLOGY | Facility: CLINIC | Age: 29
End: 2019-09-19

## 2019-09-19 DIAGNOSIS — G50.9 TRIGEMINAL NEUROPATHY: ICD-10-CM

## 2019-09-19 DIAGNOSIS — Z79.891 CHRONICALLY ON OPIATE THERAPY: ICD-10-CM

## 2019-09-20 ENCOUNTER — PROCEDURE VISIT (OUTPATIENT)
Dept: NEUROLOGY | Facility: CLINIC | Age: 29
End: 2019-09-20
Payer: MEDICAID

## 2019-09-20 ENCOUNTER — PATIENT MESSAGE (OUTPATIENT)
Dept: NEUROLOGY | Facility: CLINIC | Age: 29
End: 2019-09-20

## 2019-09-20 DIAGNOSIS — J06.9 UPPER RESPIRATORY TRACT INFECTION, UNSPECIFIED TYPE: Primary | ICD-10-CM

## 2019-09-20 DIAGNOSIS — M26.69 TMJ CAPSULITIS: ICD-10-CM

## 2019-09-20 PROCEDURE — 20605 DRAIN/INJ JOINT/BURSA W/O US: CPT | Mod: 50,S$PBB,, | Performed by: PSYCHIATRY & NEUROLOGY

## 2019-09-20 PROCEDURE — 20605 DRAIN/INJ JOINT/BURSA W/O US: CPT | Mod: 50,PBBFAC,PO | Performed by: PSYCHIATRY & NEUROLOGY

## 2019-09-20 PROCEDURE — 99212 OFFICE O/P EST SF 10 MIN: CPT | Mod: S$PBB,25,, | Performed by: PSYCHIATRY & NEUROLOGY

## 2019-09-20 PROCEDURE — 99212 PR OFFICE/OUTPT VISIT, EST, LEVL II, 10-19 MIN: ICD-10-PCS | Mod: S$PBB,25,, | Performed by: PSYCHIATRY & NEUROLOGY

## 2019-09-20 PROCEDURE — 20605 PR DRAIN/INJECT INTERMEDIATE JOINT/BURSA: ICD-10-PCS | Mod: 50,S$PBB,, | Performed by: PSYCHIATRY & NEUROLOGY

## 2019-09-20 RX ORDER — TRIAMCINOLONE ACETONIDE 40 MG/ML
40 INJECTION, SUSPENSION INTRA-ARTICULAR; INTRAMUSCULAR ONCE
Status: COMPLETED | OUTPATIENT
Start: 2019-09-20 | End: 2019-09-20

## 2019-09-20 RX ORDER — OXYCODONE AND ACETAMINOPHEN 5; 325 MG/1; MG/1
1 TABLET ORAL EVERY 8 HOURS PRN
Qty: 90 TABLET | Refills: 0 | Status: SHIPPED | OUTPATIENT
Start: 2019-09-22 | End: 2019-10-23 | Stop reason: ALTCHOICE

## 2019-09-20 RX ORDER — AZITHROMYCIN 250 MG/1
TABLET, FILM COATED ORAL
Qty: 6 TABLET | Refills: 0 | Status: SHIPPED | OUTPATIENT
Start: 2019-09-20 | End: 2019-11-15

## 2019-09-20 RX ORDER — LIDOCAINE HYDROCHLORIDE 10 MG/ML
2 INJECTION, SOLUTION EPIDURAL; INFILTRATION; INTRACAUDAL; PERINEURAL ONCE
Status: COMPLETED | OUTPATIENT
Start: 2019-09-20 | End: 2019-09-20

## 2019-09-20 RX ADMIN — TRIAMCINOLONE ACETONIDE 40 MG: 40 INJECTION, SUSPENSION INTRA-ARTICULAR; INTRAMUSCULAR at 04:09

## 2019-09-20 RX ADMIN — LIDOCAINE HYDROCHLORIDE 20 MG: 10 INJECTION, SOLUTION EPIDURAL; INFILTRATION; INTRACAUDAL; PERINEURAL at 04:09

## 2019-09-20 NOTE — PROGRESS NOTES
CC: sinus pressure    HPI: patient feels pressure in her sinuses. No fever. + post nasal drip + bilateral earache + headache, +sore throat son is sick     There were no vitals filed for this visit.     + pain over bilateral right more than left TMJ    Plan:    Upper respiratory infection    Plan:  -- start Z pack

## 2019-09-20 NOTE — PROCEDURES
Procedures     TEMPOROMANDIBULAR JOINT INJECTION - BILATERAL    Indication: temporomandibular joint pain     Anesthesia: none     After risks and benefits were explained including bleeding, infection, worsening of the pain, damage to the area being injected, weakness, allergic reaction to medications, vascular injection, and nerve damage, signed consent was obtained. All questions were answered.     With the patient seated, I palpated left TMJ with the mouth closed and then the fully open position. The sulcus of the joint was identified and marked and patient was asked not to move jaw. Next, this area was prepped with alcohol. A 3ml syringe was used attached to 30g 1 inch needle. The joint was approached from posterior to the patient, with the needle making a 30 degree angle to the sagittal plane, directed anteromedially. Needle was advanced intot joint capsule until loss of resistance was felt. Once in the joint, after negative aspiration of heme, I injected 1cc of the below listed solution. The needle was withdrawn and the exact same procedure was repeated on the contralateral side. The patient was then asked to open and close jaw through full range of motion several times.     Medications used: lidocaine 1% and Triamcinolone 40mg     Total volume infused: 2cc (1 cc per joint)    Estimated blood loss: none     The patient did tolerate the procedure well and there were no complications.     RTC as needed

## 2019-09-25 ENCOUNTER — PATIENT MESSAGE (OUTPATIENT)
Dept: NEUROLOGY | Facility: CLINIC | Age: 29
End: 2019-09-25

## 2019-09-25 DIAGNOSIS — G43.719 INTRACTABLE CHRONIC MIGRAINE WITHOUT AURA AND WITHOUT STATUS MIGRAINOSUS: ICD-10-CM

## 2019-09-26 RX ORDER — BUTALBITAL, ACETAMINOPHEN AND CAFFEINE 50; 325; 40 MG/1; MG/1; MG/1
TABLET ORAL
Qty: 10 TABLET | Refills: 0 | Status: SHIPPED | OUTPATIENT
Start: 2019-09-26 | End: 2019-10-23 | Stop reason: SDUPTHER

## 2019-09-26 RX ORDER — RIZATRIPTAN BENZOATE 10 MG/1
TABLET ORAL
Qty: 18 TABLET | Refills: 11 | Status: SHIPPED | OUTPATIENT
Start: 2019-09-26 | End: 2020-09-28 | Stop reason: SDUPTHER

## 2019-10-01 ENCOUNTER — PATIENT MESSAGE (OUTPATIENT)
Dept: NEUROLOGY | Facility: CLINIC | Age: 29
End: 2019-10-01

## 2019-10-01 ENCOUNTER — HOSPITAL ENCOUNTER (EMERGENCY)
Facility: HOSPITAL | Age: 29
Discharge: HOME | End: 2019-10-01
Attending: EMERGENCY MEDICINE
Payer: MEDICAID

## 2019-10-01 VITALS
DIASTOLIC BLOOD PRESSURE: 83 MMHG | SYSTOLIC BLOOD PRESSURE: 138 MMHG | OXYGEN SATURATION: 100 % | WEIGHT: 293 LBS | HEART RATE: 92 BPM | TEMPERATURE: 99 F | RESPIRATION RATE: 16 BRPM | BODY MASS INDEX: 51.42 KG/M2

## 2019-10-01 DIAGNOSIS — G43.809 OTHER MIGRAINE WITHOUT STATUS MIGRAINOSUS, NOT INTRACTABLE: Primary | ICD-10-CM

## 2019-10-01 LAB
B-HCG UR QL: NEGATIVE
CTP QC/QA: YES

## 2019-10-01 PROCEDURE — 81025 URINE PREGNANCY TEST: CPT | Performed by: PHYSICIAN ASSISTANT

## 2019-10-01 PROCEDURE — 96374 THER/PROPH/DIAG INJ IV PUSH: CPT

## 2019-10-01 PROCEDURE — 96376 TX/PRO/DX INJ SAME DRUG ADON: CPT

## 2019-10-01 PROCEDURE — 99284 EMERGENCY DEPT VISIT MOD MDM: CPT | Mod: 25

## 2019-10-01 PROCEDURE — 96375 TX/PRO/DX INJ NEW DRUG ADDON: CPT

## 2019-10-01 PROCEDURE — 63600175 PHARM REV CODE 636 W HCPCS: Performed by: PHYSICIAN ASSISTANT

## 2019-10-01 RX ORDER — METOCLOPRAMIDE HYDROCHLORIDE 5 MG/ML
10 INJECTION INTRAMUSCULAR; INTRAVENOUS
Status: COMPLETED | OUTPATIENT
Start: 2019-10-01 | End: 2019-10-01

## 2019-10-01 RX ORDER — BUTORPHANOL TARTRATE 1 MG/ML
1 INJECTION INTRAMUSCULAR; INTRAVENOUS ONCE
Status: COMPLETED | OUTPATIENT
Start: 2019-10-01 | End: 2019-10-01

## 2019-10-01 RX ORDER — DEXAMETHASONE SODIUM PHOSPHATE 4 MG/ML
8 INJECTION, SOLUTION INTRA-ARTICULAR; INTRALESIONAL; INTRAMUSCULAR; INTRAVENOUS; SOFT TISSUE
Status: COMPLETED | OUTPATIENT
Start: 2019-10-01 | End: 2019-10-01

## 2019-10-01 RX ADMIN — SODIUM CHLORIDE 1000 ML: 0.9 INJECTION, SOLUTION INTRAVENOUS at 07:10

## 2019-10-01 RX ADMIN — BUTORPHANOL TARTRATE 1 MG: 1 INJECTION, SOLUTION INTRAMUSCULAR; INTRAVENOUS at 08:10

## 2019-10-01 RX ADMIN — METOCLOPRAMIDE 10 MG: 5 INJECTION, SOLUTION INTRAMUSCULAR; INTRAVENOUS at 07:10

## 2019-10-01 RX ADMIN — BUTORPHANOL TARTRATE 1 MG: 1 INJECTION, SOLUTION INTRAMUSCULAR; INTRAVENOUS at 07:10

## 2019-10-01 RX ADMIN — DEXAMETHASONE SODIUM PHOSPHATE 8 MG: 4 INJECTION, SOLUTION INTRA-ARTICULAR; INTRALESIONAL; INTRAMUSCULAR; INTRAVENOUS; SOFT TISSUE at 07:10

## 2019-10-01 NOTE — ED NOTES
Pt awake alert oriented reports headache for the past several months, pt denies chest pain or SOB reports headache getting worse today

## 2019-10-01 NOTE — ED PROVIDER NOTES
"Encounter Date: 10/1/2019    SCRIBE #1 NOTE: IYsabel and gomez scribing for, and in the presence of, Tran Zelaya PA-C.       History     Chief Complaint   Patient presents with    Migraine     seen at [facility] this morning for same     Time seen by provider: 6:39 PM on 10/01/2019    Jasper Guerrero is a 28 y.o. female with PMHx of fibromyalgia and migraine headache who presents to the ED with an onset of headache and nausea starting 1 day ago. She reports that the pain is 9.5 out of 10. She reports diarrhea as well, which she notes is typical of her worse headaches. She also reports some lightheadedness. She had an infusion earlier today where she received Zofran, Diamox, and Dilaudid. She reports that the pain went down to 3 out of 10 after the infusion, though this relief has now worn off. She reports gradual worsening pain to a 9.5/10 currently.  She usually takes Fioricet, Tylenol, and Maxalt for her headaches. The patient denies visual disturbance, fever, vomiting, increased urinary frequency, painful urination, vaginal discharge, or any other symptoms at this time. No pertinent PSHx. Drug allergies to Benadryl, Codeine, Flexeril, Penicillins, NSAIDs.     The history is provided by the patient.     Review of patient's allergies indicates:   Allergen Reactions    Benadryl [diphenhydramine hcl]      Paralysis on right side body     Codeine      Paralysis right body    Flexeril [cyclobenzaprine] Other (See Comments)     Numbness on right side of body    Penicillins Anaphylaxis and Hives    Nsaids (non-steroidal anti-inflammatory drug) Diarrhea and Nausea And Vomiting    Magnesium      "made me feel horrible"      Past Medical History:   Diagnosis Date    Agoraphobia     Depression     Fibromyalgia     Migraine headache     Nerve injury 08/2016    Lingual Nerve Injury    PTSD (post-traumatic stress disorder)      Past Surgical History:   Procedure Laterality Date    CARPAL " TUNNEL RELEASE Right 12/27/2018    Dr Lozada     MOUTH SURGERY      WISDOM TOOTH EXTRACTION         Social History     Tobacco Use    Smoking status: Never Smoker    Smokeless tobacco: Never Used   Substance Use Topics    Alcohol use: No    Drug use: No     Review of Systems   Constitutional: Negative for fever.   HENT: Negative for sore throat.    Eyes: Negative for visual disturbance.   Respiratory: Negative for shortness of breath.    Cardiovascular: Negative for chest pain.   Gastrointestinal: Positive for diarrhea and nausea. Negative for vomiting.   Genitourinary: Negative for dysuria, frequency and vaginal discharge.   Musculoskeletal: Negative for back pain.   Skin: Negative for rash.   Neurological: Positive for light-headedness and headaches. Negative for weakness.   Hematological: Does not bruise/bleed easily.       Physical Exam     Initial Vitals [10/01/19 1759]   BP Pulse Resp Temp SpO2   132/80 98 16 98.7 °F (37.1 °C) 98 %      MAP       --         Physical Exam    Nursing note and vitals reviewed.  Constitutional: Vital signs are normal. She appears well-developed and well-nourished.   HENT:   Head: Atraumatic.   Eyes: EOM are normal. Pupils are equal, round, and reactive to light.   Cardiovascular: Normal rate, regular rhythm and normal pulses.   Pulmonary/Chest: Breath sounds normal. She has no wheezes. She has no rhonchi.   Neurological: She is alert and oriented to person, place, and time.   Skin: Skin is warm. Capillary refill takes less than 2 seconds.   Psychiatric: She has a normal mood and affect. Her speech is normal.         ED Course   Procedures  Labs Reviewed   POCT URINE PREGNANCY          Imaging Results    None          Medical Decision Making:   History:   Old Medical Records: I decided to obtain old medical records.  Clinical Tests:   Lab Tests: Ordered and Reviewed       APC / Resident Notes:   I reviewed the patients chart.  Patient is followed by neurology for chronic  migraines and is seen regularly at an infusion clinic (twice weekly prn).      Last ED visit was 6/12/19. She was reporting a similar headache radiating to the back of her head, worse than normal migraines. CT of the head at that time was WNL. According to previous notes, patient reported that pain improved after receiving Decadron, Compazine 10 mg, and Butorphanol.     I believe the patient has a migraine headache based upon the history and physical exam.  The patient's headaches are similar to their prior headaches although she reports that her headaches usually improve with infusions.  The patient has had a prior negative CT Head.  She has no focal weakness, numbness, meningismus, other focal neurologic deficit, history of trauma, fevers, elevated blood pressure to suggest meningitis, subarachnoid hemorrhage, TIA, stroke, mass, or hypertensive urgency.     She is given Decadron, Stadol, Reglan, IV fluids, with improvement of headache to 8/10.  She is requesting additional dose of pain medications.  Will give 1 additional mg of Stadol.    Patient reports improvement of pain to 6/10.  Patient has daily headaches rated 4.5/10 per patient.  I feel she is stable for discharge with plan to continue home medications as prescribed.  Patient is comfortable with this plan.  She is given ER return precautions and advised to follow up with PCP and neurologist for ER if the.  I discussed the care this patient with my supervising physician.           Scribe Attestation:   Scribe #1: I performed the above scribed service and the documentation accurately describes the services I performed. I attest to the accuracy of the note.    I, Tran Zelaya, personally performed the services described in this documentation. All medical record entries made by the scribe were at my direction and in my presence.  I have reviewed the chart and agree that the record reflects my personal performance and is accurate and complete. Tran Zelaya,  APC.  8:36 PM 10/01/2019             Clinical Impression:       ICD-10-CM ICD-9-CM   1. Other migraine without status migrainosus, not intractable G43.809 346.80                                JACK Barber  10/01/19 2103

## 2019-10-02 ENCOUNTER — PATIENT MESSAGE (OUTPATIENT)
Dept: NEUROLOGY | Facility: CLINIC | Age: 29
End: 2019-10-02

## 2019-10-02 ENCOUNTER — TELEPHONE (OUTPATIENT)
Dept: NEUROLOGY | Facility: CLINIC | Age: 29
End: 2019-10-02

## 2019-10-02 NOTE — TELEPHONE ENCOUNTER
----- Message from Stef Chen sent at 10/2/2019  9:46 AM CDT -----  Contact: pt  Type: Needs Medical Advice    Who Called:  pt    Best Call Back Number: [phone]  Additional Information: pt states that she needs to discuss a personal matter. Would not guve any further information.

## 2019-10-03 ENCOUNTER — PATIENT MESSAGE (OUTPATIENT)
Dept: NEUROLOGY | Facility: CLINIC | Age: 29
End: 2019-10-03

## 2019-10-04 ENCOUNTER — PATIENT MESSAGE (OUTPATIENT)
Dept: NEUROLOGY | Facility: CLINIC | Age: 29
End: 2019-10-04

## 2019-10-04 DIAGNOSIS — G43.719 INTRACTABLE CHRONIC MIGRAINE WITHOUT AURA AND WITHOUT STATUS MIGRAINOSUS: Primary | ICD-10-CM

## 2019-10-07 ENCOUNTER — PATIENT MESSAGE (OUTPATIENT)
Dept: NEUROLOGY | Facility: CLINIC | Age: 29
End: 2019-10-07

## 2019-10-07 ENCOUNTER — TELEPHONE (OUTPATIENT)
Dept: NEUROLOGY | Facility: CLINIC | Age: 29
End: 2019-10-07

## 2019-10-09 ENCOUNTER — PATIENT MESSAGE (OUTPATIENT)
Dept: NEUROLOGY | Facility: CLINIC | Age: 29
End: 2019-10-09

## 2019-10-11 ENCOUNTER — PATIENT MESSAGE (OUTPATIENT)
Dept: NEUROLOGY | Facility: CLINIC | Age: 29
End: 2019-10-11

## 2019-10-15 ENCOUNTER — PATIENT MESSAGE (OUTPATIENT)
Dept: NEUROLOGY | Facility: CLINIC | Age: 29
End: 2019-10-15

## 2019-10-16 ENCOUNTER — PATIENT MESSAGE (OUTPATIENT)
Dept: NEUROLOGY | Facility: CLINIC | Age: 29
End: 2019-10-16

## 2019-10-16 ENCOUNTER — LAB VISIT (OUTPATIENT)
Dept: LAB | Facility: HOSPITAL | Age: 29
End: 2019-10-16
Attending: PSYCHIATRY & NEUROLOGY
Payer: MEDICAID

## 2019-10-16 ENCOUNTER — PROCEDURE VISIT (OUTPATIENT)
Dept: NEUROLOGY | Facility: CLINIC | Age: 29
End: 2019-10-16
Payer: MEDICAID

## 2019-10-16 VITALS
WEIGHT: 293 LBS | BODY MASS INDEX: 48.82 KG/M2 | DIASTOLIC BLOOD PRESSURE: 106 MMHG | HEART RATE: 113 BPM | HEIGHT: 65 IN | SYSTOLIC BLOOD PRESSURE: 149 MMHG | RESPIRATION RATE: 16 BRPM

## 2019-10-16 DIAGNOSIS — Z79.891 CHRONICALLY ON OPIATE THERAPY: ICD-10-CM

## 2019-10-16 DIAGNOSIS — M79.2 NEUROPATHIC PAIN: ICD-10-CM

## 2019-10-16 DIAGNOSIS — G50.9 TRIGEMINAL NEUROPATHY: Primary | ICD-10-CM

## 2019-10-16 DIAGNOSIS — G43.719 INTRACTABLE CHRONIC MIGRAINE WITHOUT AURA AND WITHOUT STATUS MIGRAINOSUS: ICD-10-CM

## 2019-10-16 PROCEDURE — 99214 PR OFFICE/OUTPT VISIT, EST, LEVL IV, 30-39 MIN: ICD-10-PCS | Mod: 25,S$PBB,, | Performed by: PSYCHIATRY & NEUROLOGY

## 2019-10-16 PROCEDURE — 64615 CHEMODENERV MUSC MIGRAINE: CPT | Mod: PBBFAC,PO | Performed by: PSYCHIATRY & NEUROLOGY

## 2019-10-16 PROCEDURE — 80307 DRUG TEST PRSMV CHEM ANLYZR: CPT

## 2019-10-16 PROCEDURE — 64615 PR CHEMODENERVATION OF MUSCLE FOR CHRONIC MIGRAINE: ICD-10-PCS | Mod: S$PBB,,, | Performed by: PSYCHIATRY & NEUROLOGY

## 2019-10-16 PROCEDURE — 99214 OFFICE O/P EST MOD 30 MIN: CPT | Mod: 25,S$PBB,, | Performed by: PSYCHIATRY & NEUROLOGY

## 2019-10-16 PROCEDURE — 64615 CHEMODENERV MUSC MIGRAINE: CPT | Mod: S$PBB,,, | Performed by: PSYCHIATRY & NEUROLOGY

## 2019-10-16 RX ADMIN — ONABOTULINUMTOXINA 200 UNITS: 100 INJECTION, POWDER, LYOPHILIZED, FOR SOLUTION INTRADERMAL; INTRAMUSCULAR at 02:10

## 2019-10-16 NOTE — PROCEDURES
ProceduresPROCEDURE PERFORMED: Botulinum toxin injection (07525)    CLINICAL INDICATION: G43.719    A time out was conducted just before the start of the procedure to verify the correct patient and procedure, procedure location, and all relevant critical information.     Conventional methods of treatment such as multiple medications, both on and   off label have been tried including:    anti-epileptics (gabapentin, lyrica, trileptal), and antidepressants (fluoxetine, fluvoxamine)    The patient has been unresponsive and refractory.The patient meets criteria for chronic headaches according to the ICHD-II, the patient has more than 15 headaches a month which last for more than 4 hours a day.    Injection session number: 4  Percentage relief since last session: 30% improvement in daily severity but still mostly daily (8-9/10 to 6/10), 2 severe escalations per week     DESCRIPTION OF PROCEDURE: After obtaining informed consent and under   aseptic technique, a total of 185 units of botulinum toxin type A were   injected in the following muscles:     -- Procerus 5 units  --  5 units bilaterally  -- Frontalis 20 units  -- Temporalis 20 units bilaterally  -- masseter 15 units bilateral  -- Occipitalis 15 units bilaterally  -- Upper cervical paraspinals 10 units bilaterally  -- Trapezius 15 units bilaterally.     The patient tolerated the procedure well. There were no complications. The patient was given a prescription for repeat treatment in 12 weeks       Unavoidable waste 15 units    Sangeeta Hart MD

## 2019-10-16 NOTE — PATIENT INSTRUCTIONS
TESTING:  -- UTOX today - done    REFERRALS:  -- none     PREVENTION (use daily regardless of headache):  -- stop topiramate   -- continue Trileptal 600mg twice a day   -- continue with Botox  -- consider trigeminal nerve block in the future    ACUTE TREATMENT:  -- when urine returns, may start methadone 5mg twice a day, with the understanding that we are stopping percocet, infusions, and no additional opiates will be tolerated from other providers  -- continue maxalt as needed (total of fioricet + maxalt no more than 10 days per month) (migraine)  -- Fioricet for rescue no more than 10 tablets per month (migraine)  -- continue tizanidine 4 mg at night for neck pain  -- continue Reglan 10 mg p.o. for nausea
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
## 2019-10-17 ENCOUNTER — PATIENT MESSAGE (OUTPATIENT)
Dept: NEUROLOGY | Facility: CLINIC | Age: 29
End: 2019-10-17

## 2019-10-18 ENCOUNTER — PATIENT MESSAGE (OUTPATIENT)
Dept: NEUROLOGY | Facility: CLINIC | Age: 29
End: 2019-10-18

## 2019-10-19 LAB
6MAM UR QL: NOT DETECTED
7AMINOCLONAZEPAM UR QL: PRESENT
A-OH ALPRAZ UR QL: NOT DETECTED
ALPRAZ UR QL: NOT DETECTED
AMPHET UR QL SCN: NOT DETECTED
BARBITURATES UR QL: PRESENT
BUPRENORPHINE UR QL: NOT DETECTED
BZE UR QL: NOT DETECTED
CARBOXYTHC UR QL: NOT DETECTED
CARISOPRODOL UR QL: NOT DETECTED
CLONAZEPAM UR QL: NOT DETECTED
CODEINE UR QL: NOT DETECTED
CREAT UR-MCNC: 211.8 MG/DL (ref 20–400)
DIAZEPAM UR QL: NOT DETECTED
ETHYL GLUCURONIDE UR QL: NOT DETECTED
FENTANYL UR QL: NOT DETECTED
HYDROCODONE UR QL: PRESENT
HYDROMORPHONE UR QL: NOT DETECTED
LORAZEPAM UR QL: NOT DETECTED
MDA UR QL: NOT DETECTED
MDEA UR QL: NOT DETECTED
MDMA UR QL: NOT DETECTED
ME-PHENIDATE UR QL: NOT DETECTED
MEPERIDINE UR QL: NOT DETECTED
METHADONE UR QL: NOT DETECTED
METHAMPHET UR QL: NOT DETECTED
MIDAZOLAM UR QL SCN: NOT DETECTED
MORPHINE UR QL: NOT DETECTED
NORBUPRENORPHINE UR QL CFM: NOT DETECTED
NORDIAZEPAM UR QL: NOT DETECTED
NORFENTANYL UR QL: NOT DETECTED
NORHYDROCODONE UR QL CFM: PRESENT
NOROXYCODONE UR QL CFM: NOT DETECTED
NOROXYMORPHONE: NOT DETECTED
OXAZEPAM UR QL: NOT DETECTED
OXYCODONE UR QL: NOT DETECTED
OXYMORPHONE UR QL: NOT DETECTED
PATHOLOGY STUDY: NORMAL
PCP UR QL: NOT DETECTED
PHENTERMINE UR QL: NOT DETECTED
PROPOXYPH UR QL: NOT DETECTED
SERVICE CMNT-IMP: NORMAL
TAPENTADOL UR QL SCN: NOT DETECTED
TAPENTADOL-O-SULF: NOT DETECTED
TEMAZEPAM UR QL: NOT DETECTED
TRAMADOL UR QL: NOT DETECTED
ZOLPIDEM UR QL: NOT DETECTED

## 2019-10-20 ENCOUNTER — PATIENT MESSAGE (OUTPATIENT)
Dept: NEUROLOGY | Facility: CLINIC | Age: 29
End: 2019-10-20

## 2019-10-21 ENCOUNTER — PATIENT MESSAGE (OUTPATIENT)
Dept: NEUROLOGY | Facility: CLINIC | Age: 29
End: 2019-10-21

## 2019-10-22 ENCOUNTER — PATIENT MESSAGE (OUTPATIENT)
Dept: NEUROLOGY | Facility: CLINIC | Age: 29
End: 2019-10-22

## 2019-10-22 ENCOUNTER — TELEPHONE (OUTPATIENT)
Dept: NEUROLOGY | Facility: CLINIC | Age: 29
End: 2019-10-22

## 2019-10-22 NOTE — TELEPHONE ENCOUNTER
Jasper Guerrero Olga P, MD 16 minutes ago (4:10 PM)         Okay I am so sorry to Message again,, my usual pharmacy on [address] doesn't have Nucynta but the one on [address] ([phone]) does have it and they said they have like literally 90 tabs so it would need to be sent over first thing tomorrow morning so that hopefully I get it and they don't run out. Also, I told the pharmacist not to fill the Valium until I had permission.         Jasper Guerrero Olga P, MD 50 minutes ago (3:35 PM)         The ocd medicine is called anafranil        Jasper Guerrero Olga P, MD 51 minutes ago (3:33 PM)         She (the psychiatrist) wrote me Valium , Effexor, ambien (for sleep since I have problems with it die to anxiety) and another medicine for ocd but I can't remember the name. She called them all over to the pharmacy all ready but I will wait to  the Valium until I have your permission. Thanks!

## 2019-10-23 ENCOUNTER — PATIENT MESSAGE (OUTPATIENT)
Dept: NEUROLOGY | Facility: CLINIC | Age: 29
End: 2019-10-23

## 2019-10-23 DIAGNOSIS — Z79.891 CHRONICALLY ON OPIATE THERAPY: ICD-10-CM

## 2019-10-23 DIAGNOSIS — G50.9 TRIGEMINAL NEUROPATHY: Primary | ICD-10-CM

## 2019-10-23 DIAGNOSIS — G43.719 INTRACTABLE CHRONIC MIGRAINE WITHOUT AURA AND WITHOUT STATUS MIGRAINOSUS: ICD-10-CM

## 2019-10-23 RX ORDER — BUTALBITAL, ACETAMINOPHEN AND CAFFEINE 50; 325; 40 MG/1; MG/1; MG/1
TABLET ORAL
Qty: 10 TABLET | Refills: 0 | Status: SHIPPED | OUTPATIENT
Start: 2019-10-26 | End: 2019-11-26 | Stop reason: SDUPTHER

## 2019-10-23 NOTE — TELEPHONE ENCOUNTER
Patient would like Nucynta sent to the [facility] in [place] off of [address]. Please advise.

## 2019-10-23 NOTE — TELEPHONE ENCOUNTER
Nucynta 50 mg t.i.d. p.r.n., number 90 starting 10/23/2019 [facility] [address].

## 2019-10-23 NOTE — TELEPHONE ENCOUNTER
Please see note below for medications psychiatrist prescribed, patient has not filled the valium yet because she wants your approval first.  thanks

## 2019-10-28 ENCOUNTER — PATIENT MESSAGE (OUTPATIENT)
Dept: NEUROLOGY | Facility: CLINIC | Age: 29
End: 2019-10-28

## 2019-11-01 ENCOUNTER — PATIENT MESSAGE (OUTPATIENT)
Dept: NEUROLOGY | Facility: CLINIC | Age: 29
End: 2019-11-01

## 2019-11-01 ENCOUNTER — TELEPHONE (OUTPATIENT)
Dept: NEUROLOGY | Facility: CLINIC | Age: 29
End: 2019-11-01

## 2019-11-04 ENCOUNTER — PATIENT MESSAGE (OUTPATIENT)
Dept: NEUROLOGY | Facility: CLINIC | Age: 29
End: 2019-11-04

## 2019-11-05 ENCOUNTER — PATIENT MESSAGE (OUTPATIENT)
Dept: NEUROLOGY | Facility: CLINIC | Age: 29
End: 2019-11-05

## 2019-11-06 ENCOUNTER — PATIENT MESSAGE (OUTPATIENT)
Dept: NEUROLOGY | Facility: CLINIC | Age: 29
End: 2019-11-06

## 2019-11-07 ENCOUNTER — PATIENT MESSAGE (OUTPATIENT)
Dept: NEUROLOGY | Facility: CLINIC | Age: 29
End: 2019-11-07

## 2019-11-08 ENCOUNTER — PATIENT MESSAGE (OUTPATIENT)
Dept: NEUROLOGY | Facility: CLINIC | Age: 29
End: 2019-11-08

## 2019-11-12 ENCOUNTER — PATIENT MESSAGE (OUTPATIENT)
Dept: NEUROLOGY | Facility: CLINIC | Age: 29
End: 2019-11-12

## 2019-11-15 ENCOUNTER — OFFICE VISIT (OUTPATIENT)
Dept: NEUROLOGY | Facility: CLINIC | Age: 29
End: 2019-11-15
Payer: MEDICAID

## 2019-11-15 VITALS
HEIGHT: 65 IN | SYSTOLIC BLOOD PRESSURE: 132 MMHG | HEART RATE: 106 BPM | RESPIRATION RATE: 16 BRPM | BODY MASS INDEX: 48.82 KG/M2 | DIASTOLIC BLOOD PRESSURE: 85 MMHG | WEIGHT: 293 LBS

## 2019-11-15 DIAGNOSIS — Z79.891 CHRONICALLY ON OPIATE THERAPY: ICD-10-CM

## 2019-11-15 DIAGNOSIS — M79.2 NEUROPATHIC PAIN: ICD-10-CM

## 2019-11-15 DIAGNOSIS — G43.719 INTRACTABLE CHRONIC MIGRAINE WITHOUT AURA AND WITHOUT STATUS MIGRAINOSUS: ICD-10-CM

## 2019-11-15 DIAGNOSIS — G50.9 TRIGEMINAL NEUROPATHY: Primary | ICD-10-CM

## 2019-11-15 PROCEDURE — 99214 OFFICE O/P EST MOD 30 MIN: CPT | Mod: S$PBB,,, | Performed by: PSYCHIATRY & NEUROLOGY

## 2019-11-15 PROCEDURE — 99999 PR PBB SHADOW E&M-EST. PATIENT-LVL III: CPT | Mod: PBBFAC,,, | Performed by: PSYCHIATRY & NEUROLOGY

## 2019-11-15 PROCEDURE — 99999 PR PBB SHADOW E&M-EST. PATIENT-LVL III: ICD-10-PCS | Mod: PBBFAC,,, | Performed by: PSYCHIATRY & NEUROLOGY

## 2019-11-15 PROCEDURE — 99214 PR OFFICE/OUTPT VISIT, EST, LEVL IV, 30-39 MIN: ICD-10-PCS | Mod: S$PBB,,, | Performed by: PSYCHIATRY & NEUROLOGY

## 2019-11-15 PROCEDURE — 99213 OFFICE O/P EST LOW 20 MIN: CPT | Mod: PBBFAC,PO | Performed by: PSYCHIATRY & NEUROLOGY

## 2019-11-15 RX ORDER — VENLAFAXINE HYDROCHLORIDE 75 MG/1
75 CAPSULE, EXTENDED RELEASE ORAL DAILY
COMMUNITY
End: 2020-02-12

## 2019-11-15 RX ORDER — CLOMIPRAMINE HYDROCHLORIDE 25 MG/1
25 CAPSULE ORAL NIGHTLY
COMMUNITY
End: 2019-12-26

## 2019-11-15 RX ORDER — VENLAFAXINE HYDROCHLORIDE 37.5 MG/1
37.5 CAPSULE, EXTENDED RELEASE ORAL DAILY
COMMUNITY
End: 2020-02-12

## 2019-11-15 RX ORDER — VENLAFAXINE 75 MG/1
75 TABLET ORAL EVERY MORNING
COMMUNITY
End: 2019-11-15 | Stop reason: CLARIF

## 2019-11-15 NOTE — PATIENT INSTRUCTIONS
TESTING:  -- none     REFERRALS:  -- none     PREVENTION (use daily regardless of headache):  -- I do not recommend any other medication changes at this time given the other medications you are on   -- continue Trileptal 600mg twice a day (remember that trileptal will lower contraceptive effect of tablets, and trileptal has to be stopped if you are pregnant), copper IUD would be OK   -- continue with Botox  -- consider trigeminal nerve block in the future    ACUTE TREATMENT:  -- recommend to stop opiates after this month - wean Nucynta from 3 times per day to 2 times per day x 3 days then 1 times per day until you run out   -- continue maxalt as needed (total of fioricet + maxalt no more than 10 days per month) (migraine)  -- Fioricet for rescue no more than 10 tablets per month (migraine)  -- continue tizanidine 4 mg at night for neck pain  -- continue Reglan 10 mg p.o. for nausea  -- phenergan IM + toradol IM ok for pain - ok to be in the infusion center
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
## 2019-11-18 ENCOUNTER — PATIENT MESSAGE (OUTPATIENT)
Dept: NEUROLOGY | Facility: CLINIC | Age: 29
End: 2019-11-18

## 2019-11-19 NOTE — TELEPHONE ENCOUNTER
Óscar, please set up a phone call with NP's Lizz [facility] in [place] to discuss Jasper's care.    You can give the NP my cell is that is easier for her.

## 2019-11-20 NOTE — TELEPHONE ENCOUNTER
Called and spoke with Teresa at [facility]. Phone meeting was scheduled with Lizz Tyson for 11/26/2019 at 8:45 am.

## 2019-11-22 ENCOUNTER — PATIENT MESSAGE (OUTPATIENT)
Dept: NEUROLOGY | Facility: CLINIC | Age: 29
End: 2019-11-22

## 2019-11-22 NOTE — TELEPHONE ENCOUNTER
"Called and spoke with pharmacist Pavithra at [facility]. They are needing approval from Dr. Hart to fill early. Patient picked up Percocet on 02/24 for #120, 3/20 Percocet #15 prescribed by [facility], and again on on 03/22 for Percocet 10 #5 by the emergency room. Informed Pavithra that the patient was prescribed these medications for separate issues. Dr. 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 Hart, "it is okay to fill". Pavithra verbalized understanding and will fill the medication.   " no

## 2019-11-25 ENCOUNTER — PATIENT MESSAGE (OUTPATIENT)
Dept: NEUROLOGY | Facility: CLINIC | Age: 29
End: 2019-11-25

## 2019-11-26 ENCOUNTER — PATIENT MESSAGE (OUTPATIENT)
Dept: NEUROLOGY | Facility: CLINIC | Age: 29
End: 2019-11-26

## 2019-11-26 ENCOUNTER — DOCUMENTATION ONLY (OUTPATIENT)
Dept: NEUROLOGY | Facility: CLINIC | Age: 29
End: 2019-11-26

## 2019-11-26 DIAGNOSIS — G43.719 INTRACTABLE CHRONIC MIGRAINE WITHOUT AURA AND WITHOUT STATUS MIGRAINOSUS: ICD-10-CM

## 2019-11-26 RX ORDER — BUTALBITAL, ACETAMINOPHEN AND CAFFEINE 50; 325; 40 MG/1; MG/1; MG/1
TABLET ORAL
Qty: 10 TABLET | Refills: 0 | Status: SHIPPED | OUTPATIENT
Start: 2019-11-26 | End: 2019-12-26 | Stop reason: SDUPTHER

## 2019-11-26 NOTE — PROGRESS NOTES
Had a phone call with patient's psychiatry NP Lizz Tyson today. She is titrating up her effexor and when this is higher she plans to take her off of clomipramine. She has gone back to xanax. She is on ambien for insomnia.    Jasper went to the dentist for oral work and received percocet on 11/15 and 11/21.    I am still unwilling to put Jasper back on a chronic opiate regimen for the reason it was not efficacious in my opinion, she was developing opiod hyperalgesia, and rebound migraines and she was showing signs of misuse (taking 5 tablets of percocet per day rather than the 3 prescribed, routinely getting additional opiates from dentist after we were denying her requests for more)    From pain perspective, we already have her on trileptal, botox, maxalt, fioricet. She is titrating venlafaxine with Psychiatry which in and of itself will be helpful for pain. I agree with this medication choice.  I have recommended ketamine infusions to Jasper.    Sangeeta Hart MD

## 2019-11-27 ENCOUNTER — PATIENT MESSAGE (OUTPATIENT)
Dept: NEUROLOGY | Facility: CLINIC | Age: 29
End: 2019-11-27

## 2019-11-27 RX ORDER — DICLOFENAC SODIUM 50 MG/1
TABLET, DELAYED RELEASE ORAL
Qty: 30 TABLET | Refills: 0 | Status: SHIPPED | OUTPATIENT
Start: 2019-11-27 | End: 2019-12-20

## 2019-12-03 ENCOUNTER — PATIENT MESSAGE (OUTPATIENT)
Dept: NEUROLOGY | Facility: CLINIC | Age: 29
End: 2019-12-03

## 2019-12-04 ENCOUNTER — PATIENT MESSAGE (OUTPATIENT)
Dept: NEUROLOGY | Facility: CLINIC | Age: 29
End: 2019-12-04

## 2019-12-05 ENCOUNTER — PATIENT MESSAGE (OUTPATIENT)
Dept: NEUROLOGY | Facility: CLINIC | Age: 29
End: 2019-12-05

## 2019-12-05 DIAGNOSIS — G43.719 INTRACTABLE CHRONIC MIGRAINE WITHOUT AURA AND WITHOUT STATUS MIGRAINOSUS: Primary | ICD-10-CM

## 2019-12-05 RX ORDER — SUMATRIPTAN SUCCINATE 100 MG/1
TABLET ORAL
Qty: 10 TABLET | Refills: 11 | Status: SHIPPED | OUTPATIENT
Start: 2019-12-05 | End: 2021-01-19 | Stop reason: SDUPTHER

## 2019-12-05 RX ORDER — GALCANEZUMAB 120 MG/ML
INJECTION, SOLUTION SUBCUTANEOUS
Qty: 2 ML | Refills: 0 | Status: SHIPPED | OUTPATIENT
Start: 2019-12-05 | End: 2020-02-13

## 2019-12-06 ENCOUNTER — TELEPHONE (OUTPATIENT)
Dept: PHARMACY | Facility: CLINIC | Age: 29
End: 2019-12-06

## 2019-12-09 ENCOUNTER — PATIENT MESSAGE (OUTPATIENT)
Dept: NEUROLOGY | Facility: CLINIC | Age: 29
End: 2019-12-09

## 2019-12-10 ENCOUNTER — PATIENT MESSAGE (OUTPATIENT)
Dept: NEUROLOGY | Facility: CLINIC | Age: 29
End: 2019-12-10

## 2019-12-10 RX ORDER — KETOROLAC TROMETHAMINE 10 MG/1
10 TABLET, FILM COATED ORAL EVERY 6 HOURS
Qty: 30 TABLET | Refills: 11 | Status: SHIPPED | OUTPATIENT
Start: 2019-12-10 | End: 2020-01-21

## 2019-12-13 ENCOUNTER — PATIENT MESSAGE (OUTPATIENT)
Dept: NEUROLOGY | Facility: CLINIC | Age: 29
End: 2019-12-13

## 2019-12-13 NOTE — TELEPHONE ENCOUNTER
DOCUMENTATION ONLY:  Prior authorization for Emgality approved from 12/13/2019 to 12/13/20.    Case ID# [id number]    Co-pay: $3.00    Patient Assistance IS required.     Forward to clinical pharmacist for consult & shipment. -HBR

## 2019-12-18 ENCOUNTER — PATIENT MESSAGE (OUTPATIENT)
Dept: NEUROLOGY | Facility: CLINIC | Age: 29
End: 2019-12-18

## 2019-12-19 ENCOUNTER — PATIENT MESSAGE (OUTPATIENT)
Dept: NEUROLOGY | Facility: CLINIC | Age: 29
End: 2019-12-19

## 2019-12-20 RX ORDER — DICLOFENAC SODIUM 50 MG/1
TABLET, DELAYED RELEASE ORAL
Qty: 30 TABLET | Refills: 0 | Status: SHIPPED | OUTPATIENT
Start: 2019-12-20 | End: 2020-01-02

## 2019-12-23 ENCOUNTER — TELEPHONE (OUTPATIENT)
Dept: ORTHOPEDICS | Facility: CLINIC | Age: 29
End: 2019-12-23

## 2019-12-23 NOTE — TELEPHONE ENCOUNTER
Spoke with pt, she hasn't been here in a year, hand is hurting, has CTS. Can't take Ibuprofen. Suggested she take Tylenol, elevate, ice pack and wear her brace during the day. She has an appointment in 2 days

## 2019-12-23 NOTE — TELEPHONE ENCOUNTER
----- Message from Caroline Abdalla sent at 12/23/2019  9:40 AM CST -----  Contact: Pt  Pt states she is in pain(10) with her L wrist  and needs advise on what to do about the pain until her appt on 12/16 @9:45am.

## 2019-12-26 ENCOUNTER — PATIENT MESSAGE (OUTPATIENT)
Dept: NEUROLOGY | Facility: CLINIC | Age: 29
End: 2019-12-26

## 2019-12-26 ENCOUNTER — OFFICE VISIT (OUTPATIENT)
Dept: ORTHOPEDICS | Facility: CLINIC | Age: 29
End: 2019-12-26
Payer: MEDICAID

## 2019-12-26 VITALS
BODY MASS INDEX: 48.82 KG/M2 | WEIGHT: 293 LBS | DIASTOLIC BLOOD PRESSURE: 83 MMHG | SYSTOLIC BLOOD PRESSURE: 137 MMHG | HEART RATE: 89 BPM | HEIGHT: 65 IN

## 2019-12-26 DIAGNOSIS — G43.719 INTRACTABLE CHRONIC MIGRAINE WITHOUT AURA AND WITHOUT STATUS MIGRAINOSUS: ICD-10-CM

## 2019-12-26 DIAGNOSIS — R20.0 NUMBNESS AND TINGLING IN LEFT HAND: ICD-10-CM

## 2019-12-26 DIAGNOSIS — R20.2 NUMBNESS AND TINGLING IN LEFT HAND: ICD-10-CM

## 2019-12-26 DIAGNOSIS — G56.02 CARPAL TUNNEL SYNDROME OF LEFT WRIST: Primary | ICD-10-CM

## 2019-12-26 PROCEDURE — 99214 OFFICE O/P EST MOD 30 MIN: CPT | Mod: S$GLB,,, | Performed by: ORTHOPAEDIC SURGERY

## 2019-12-26 PROCEDURE — 99214 PR OFFICE/OUTPT VISIT, EST, LEVL IV, 30-39 MIN: ICD-10-PCS | Mod: S$GLB,,, | Performed by: ORTHOPAEDIC SURGERY

## 2019-12-26 RX ORDER — FLUVOXAMINE MALEATE 100 MG/1
300 TABLET, COATED ORAL DAILY
COMMUNITY
End: 2021-06-02 | Stop reason: SDUPTHER

## 2019-12-26 RX ORDER — ZOLPIDEM TARTRATE 10 MG/1
TABLET ORAL
COMMUNITY
Start: 2019-12-03 | End: 2021-06-02 | Stop reason: SDUPTHER

## 2019-12-26 RX ORDER — BUTALBITAL, ACETAMINOPHEN AND CAFFEINE 50; 325; 40 MG/1; MG/1; MG/1
TABLET ORAL
Qty: 10 TABLET | Refills: 0 | Status: SHIPPED | OUTPATIENT
Start: 2019-12-26 | End: 2020-01-27 | Stop reason: SDUPTHER

## 2019-12-26 NOTE — PROGRESS NOTES
[facility]    Subjective:     Chief Complaint:   Chief Complaint   Patient presents with    Left Wrist - Pain     Left wrist pain x a while. States that her pain is getting worse, She did have a NCS done in 2018. States that she does have numbness and tingling in the hand.        Past Medical History:   Diagnosis Date    Agoraphobia     Depression     Fibromyalgia     Migraine headache     Nerve injury 08/2016    Lingual Nerve Injury    PTSD (post-traumatic stress disorder)        Past Surgical History:   Procedure Laterality Date    CARPAL TUNNEL RELEASE Right 12/27/2018    Dr Lozada     MOUTH SURGERY      WISDOM TOOTH EXTRACTION         Current Outpatient Medications   Medication Sig    ALPRAZolam (XANAX) 1 MG tablet     butalbital-acetaminophen-caffeine -40 mg (FIORICET, ESGIC) -40 mg per tablet TAKE 1 TABLET BY MOUTH AS NEEDED FOR MIGRAINE.(NO MORE THAN 10 TABLETS A MONTH)    metoclopramide HCl (REGLAN) 10 MG tablet Take 1 tablet (10 mg total) by mouth every 6 (six) hours as needed (migraine or nausea).    MIRENA 20 mcg/24 hr (5 years) IUD     ondansetron (ZOFRAN) 4 MG tablet Take 1 tablet (4 mg total) by mouth every 6 (six) hours as needed for Nausea.    OXcarbazepine (TRILEPTAL) 600 MG Tab Take 1 tablet (600 mg total) by mouth 2 (two) times daily.    rizatriptan (MAXALT) 10 MG tablet 1 tab PO PRN migraine. May repeat every 2 hours for max 3 tabs in 24 hours. Use no more than 10 days per month.    sucralfate (CARAFATE) 1 gram tablet Take 1 tablet (1 g total) by mouth 4 (four) times daily. When taking NSAIDs    sumatriptan (IMITREX) 100 MG tablet 1 tab PO PRN migraine. May repeat once in 2 hours, no more than 2 tab in 24 hours. No more than 10 days per month.    venlafaxine (EFFEXOR-XR) 37.5 MG 24 hr capsule Take 37.5 mg by mouth once daily.    venlafaxine (EFFEXOR-XR) 75 MG 24 hr capsule Take 75 mg by mouth once daily.    zolpidem (AMBIEN) 10 mg Tab     diclofenac  "(VOLTAREN) 50 MG EC tablet TAKE 1 TO 2 TABLETS BY MOUTH NO MORE THAN 4 TABLETS PER DAY AS NEEDED (Patient not taking: Reported on 12/26/2019)    EMGALITY  mg/mL PnIj Inject 240mg (2 injections) subcutaneously at separate sites, once (loading dose). Start maintenance dose 28 days later. (Patient not taking: Reported on 12/26/2019)    [START ON 1/2/2020] galcanezumab-gnlm (EMGALITY PEN) 120 mg/mL PnIj Inject 120 mg into the skin every 28 days. (Patient not taking: Reported on 12/26/2019)    ketorolac (TORADOL) 10 mg tablet Take 1 tablet (10 mg total) by mouth every 6 (six) hours. (Patient not taking: Reported on 12/26/2019)    tiZANidine (ZANAFLEX) 4 MG tablet Half or full tablet by mouth at night as needed for muscle spasm (Patient not taking: Reported on 12/26/2019)     Current Facility-Administered Medications   Medication    onabotulinumtoxina injection 200 Units    onabotulinumtoxina injection 200 Units       Review of patient's allergies indicates:   Allergen Reactions    Benadryl [diphenhydramine hcl]      Paralysis on right side body     Codeine      Paralysis right body    Flexeril [cyclobenzaprine] Other (See Comments)     Numbness on right side of body    Penicillins Anaphylaxis and Hives    Nsaids (non-steroidal anti-inflammatory drug) Diarrhea and Nausea And Vomiting    Magnesium      "made me feel horrible"        Family History   Problem Relation Age of Onset    No Known Problems Mother     No Known Problems Father     Cancer Maternal Aunt         endometrial    No Known Problems Maternal Uncle     No Known Problems Paternal Aunt     No Known Problems Paternal Uncle     No Known Problems Maternal Grandmother     No Known Problems Maternal Grandfather     No Known Problems Paternal Grandmother     No Known Problems Paternal Grandfather     No Known Problems Daughter     No Known Problems Son        Social History     Socioeconomic History    Marital status:      " Spouse name: Not on file    Number of children: Not on file    Years of education: Not on file    Highest education level: Not on file   Occupational History    Not on file   Social Needs    Financial resource strain: Not on file    Food insecurity:     Worry: Not on file     Inability: Not on file    Transportation needs:     Medical: Not on file     Non-medical: Not on file   Tobacco Use    Smoking status: Never Smoker    Smokeless tobacco: Never Used   Substance and Sexual Activity    Alcohol use: No    Drug use: No    Sexual activity: Yes     Partners: Male   Lifestyle    Physical activity:     Days per week: Not on file     Minutes per session: Not on file    Stress: Not on file   Relationships    Social connections:     Talks on phone: Not on file     Gets together: Not on file     Attends [employer] service: Not on file     Active member of club or organization: Not on file     Attends meetings of clubs or organizations: Not on file     Relationship status: Not on file   Other Topics Concern    Not on file   Social History Narrative    Not on file       History of present illness:  Left hand pain. And this lady very complicated she is going to lots of the chronic meds for a psych issues and fibromyalgia.  But currently having issues with the left hand that is probably carpal tunnel.  A 2018 we did a right carpal tunnel which helped her right hand out pain she is having at this point seems to be similar.  Over the last several weeks    Review of Systems:    Constitution: Negative for chills, fever, and sweats.  Negative for unexplained weight loss.    HENT:  Negative for headaches and blurry vision.    Cardiovascular:Negative for chest pain or irregular heart beat. Negative for hypertension.    Respiratory:  Negative for cough and shortness of breath.    Gastrointestinal: Negative for abdominal pain, heartburn, melena, nausea, and vomitting.    Genitourinary:  Negative bladder incontinence and  dysuria.    Musculoskeletal:  See HPI for details.     Neurological: Negative for numbness.    Psychiatric/Behavioral: Negative for depression.  The patient is not nervous/anxious.      Endocrine: Negative for polyuria    Hematologic/Lymphatic: Negative for bleeding problem.  Does not bruise/bleed easily.    Skin: Negative for poor would healing and rash    Objective:      Physical Examination:    Vital Signs:    Vitals:    12/26/19 0925   BP: 137/83   Pulse: 89       Body mass index is 54.91 kg/m².    This a well-developed, well nourished patient in no acute distress.  They are alert and oriented and cooperative to examination.        So physical exam shows left hand has a mild Phalen's.  Right hand does not there is no atrophy of the thenar on the left and no tenderness around the elbow good range of motion wrist on the left.  Nerve conduction from last year was abnormal for carpal tunnel right hand but normal limits carpal tunnel left hand.  Pertinent New Results:    XRAY Report / Interpretation:       Assessment/Plan:    Get a nerve conduction presuming the nerve conduction is abnormal then we will proceed with our carpal tunnel on left hand.  She has very complex pain neurological wound fibromyalgia store a that needs someone to address all those complex medications. She has a neurologist which is the most appropriate person to do that.  We did not write any pain medicines today.  She is complaining of pain in her hand and other locations.  And she is taking a lot of anti-inflammatories which are causing GI issues she was warned to stop taking her Voltaren and her Toradol if she is having GI issues.  My impression is carpal tunnel left hand.  We are going to history and physical consent for carpal tunnel release left hand and      This note was created using Dragon voice recognition software that occasionally misinterpreted phrases or words.

## 2020-01-02 DIAGNOSIS — G43.719 INTRACTABLE CHRONIC MIGRAINE WITHOUT AURA AND WITHOUT STATUS MIGRAINOSUS: Primary | ICD-10-CM

## 2020-01-02 RX ORDER — DICLOFENAC SODIUM 50 MG/1
TABLET, DELAYED RELEASE ORAL
Qty: 30 TABLET | Refills: 0 | Status: SHIPPED | OUTPATIENT
Start: 2020-01-02 | End: 2020-02-12

## 2020-01-04 ENCOUNTER — PATIENT MESSAGE (OUTPATIENT)
Dept: NEUROLOGY | Facility: CLINIC | Age: 30
End: 2020-01-04

## 2020-01-06 ENCOUNTER — PATIENT MESSAGE (OUTPATIENT)
Dept: NEUROLOGY | Facility: CLINIC | Age: 30
End: 2020-01-06

## 2020-01-08 ENCOUNTER — PATIENT MESSAGE (OUTPATIENT)
Dept: NEUROLOGY | Facility: CLINIC | Age: 30
End: 2020-01-08

## 2020-01-08 ENCOUNTER — TELEPHONE (OUTPATIENT)
Dept: NEUROLOGY | Facility: CLINIC | Age: 30
End: 2020-01-08

## 2020-01-13 ENCOUNTER — TELEPHONE (OUTPATIENT)
Dept: ORTHOPEDICS | Facility: CLINIC | Age: 30
End: 2020-01-13

## 2020-01-13 DIAGNOSIS — G56.02 CARPAL TUNNEL SYNDROME OF LEFT WRIST: Primary | ICD-10-CM

## 2020-01-13 DIAGNOSIS — G56.02 CARPAL TUNNEL SYNDROME, LEFT: ICD-10-CM

## 2020-01-13 RX ORDER — MUPIROCIN 20 MG/G
OINTMENT TOPICAL
Status: CANCELLED | OUTPATIENT
Start: 2020-01-13

## 2020-01-14 ENCOUNTER — TELEPHONE (OUTPATIENT)
Dept: PHARMACY | Facility: CLINIC | Age: 30
End: 2020-01-14

## 2020-01-14 NOTE — TELEPHONE ENCOUNTER
Call attempt 1 for Emgality initial consult - LVM and MyChart message sent. Copay $3.00 at 004.    Black Peña, PharmD  Clinical Pharmacist   [facility]   P: [phone]

## 2020-01-15 ENCOUNTER — PATIENT MESSAGE (OUTPATIENT)
Dept: NEUROLOGY | Facility: CLINIC | Age: 30
End: 2020-01-15

## 2020-01-15 DIAGNOSIS — M79.2 NEUROPATHIC PAIN: ICD-10-CM

## 2020-01-15 DIAGNOSIS — G50.9 TRIGEMINAL NEUROPATHY: ICD-10-CM

## 2020-01-15 RX ORDER — OXCARBAZEPINE 600 MG/1
600 TABLET, FILM COATED ORAL 2 TIMES DAILY
Qty: 60 TABLET | Refills: 11 | Status: SHIPPED | OUTPATIENT
Start: 2020-01-15 | End: 2020-11-24

## 2020-01-15 NOTE — TELEPHONE ENCOUNTER
Initial Emgality consult and injection training partially completed with patient. She was taking care of her crying baby and was not able to talk for long. Briefly reviewed some information and answered the questions she had. Sent her the link to the  injection video via [employer].    Emgality 240mg (loading dose) will be shipped on  to arrive at patient's home on  via [employer]. $3.00 copay. Patient intends to start Emgality once received. Address confirmed. Confirmed 2 patient identifiers - name and . Therapy Appropriate    Store in refrigerator prior to use.     Counseled patient on administration directions:  - Dose: Inject two injections (240mg) into the skin as a loading dose, followed by one injection (120mg) every 4 weeks thereafter.   - Patient may self-inject in either the front/top of the thighs, abdomen- but at least 2 inches away from belly button     Patient was counseled on possible side effects:  - Injection site reaction: redness, soreness, itching, bruising, which should resolve within 3-5 days.  - Allergic reactions: itching, rash, hives, swelling of face, mouth, tongue, throat, trouble breathing.     Allergy/Medication Review: Comorbidities, allergies, and medical history on file reviewed. Medication list reviewed; no DDIs with Emgality     Consultation included the importance of compliance and of keeping all follow up appointments.  Patient understands to report any medication changes to [facility] and provider. All questions answered and addressed to patients satisfaction. [facility] will contact patient in 3 weeks for refills.    Black Peña, PharmD  Clinical Pharmacist   [facility]   P: [phone]

## 2020-01-21 ENCOUNTER — HOSPITAL ENCOUNTER (OUTPATIENT)
Dept: PREADMISSION TESTING | Facility: HOSPITAL | Age: 30
Discharge: HOME | End: 2020-01-21
Attending: ORTHOPAEDIC SURGERY
Payer: MEDICAID

## 2020-01-21 DIAGNOSIS — G56.02 CARPAL TUNNEL SYNDROME OF LEFT WRIST: ICD-10-CM

## 2020-01-21 DIAGNOSIS — G56.02 CARPAL TUNNEL SYNDROME, LEFT: Primary | ICD-10-CM

## 2020-01-21 LAB
ALBUMIN SERPL BCP-MCNC: 3.7 G/DL (ref 3.5–5.2)
ALP SERPL-CCNC: 68 U/L (ref 55–135)
ALT SERPL W/O P-5'-P-CCNC: 34 U/L (ref 10–44)
ANION GAP SERPL CALC-SCNC: 8 MMOL/L (ref 8–16)
AST SERPL-CCNC: 21 U/L (ref 10–40)
BASOPHILS # BLD AUTO: 0.03 K/UL (ref 0–0.2)
BASOPHILS NFR BLD: 0.3 % (ref 0–1.9)
BILIRUB SERPL-MCNC: 0.2 MG/DL (ref 0.1–1)
BUN SERPL-MCNC: 10 MG/DL (ref 6–20)
CALCIUM SERPL-MCNC: 9.3 MG/DL (ref 8.7–10.5)
CHLORIDE SERPL-SCNC: 104 MMOL/L (ref 95–110)
CO2 SERPL-SCNC: 27 MMOL/L (ref 23–29)
CREAT SERPL-MCNC: 0.9 MG/DL (ref 0.5–1.4)
DIFFERENTIAL METHOD: ABNORMAL
EOSINOPHIL # BLD AUTO: 0.2 K/UL (ref 0–0.5)
EOSINOPHIL NFR BLD: 2.1 % (ref 0–8)
ERYTHROCYTE [DISTWIDTH] IN BLOOD BY AUTOMATED COUNT: 12.9 % (ref 11.5–14.5)
EST. GFR  (AFRICAN AMERICAN): >60 ML/MIN/1.73 M^2
EST. GFR  (NON AFRICAN AMERICAN): >60 ML/MIN/1.73 M^2
GLUCOSE SERPL-MCNC: 126 MG/DL (ref 70–110)
HCT VFR BLD AUTO: 38.1 % (ref 37–48.5)
HGB BLD-MCNC: 12 G/DL (ref 12–16)
IMM GRANULOCYTES # BLD AUTO: 0.03 K/UL (ref 0–0.04)
LYMPHOCYTES # BLD AUTO: 2.6 K/UL (ref 1–4.8)
LYMPHOCYTES NFR BLD: 28.9 % (ref 18–48)
MCH RBC QN AUTO: 28.2 PG (ref 27–31)
MCHC RBC AUTO-ENTMCNC: 31.5 G/DL (ref 32–36)
MCV RBC AUTO: 90 FL (ref 82–98)
MONOCYTES # BLD AUTO: 0.5 K/UL (ref 0.3–1)
MONOCYTES NFR BLD: 5.5 % (ref 4–15)
NEUTROPHILS # BLD AUTO: 5.6 K/UL (ref 1.8–7.7)
NEUTROPHILS NFR BLD: 62.9 % (ref 38–73)
NRBC BLD-RTO: 0 /100 WBC
PLATELET # BLD AUTO: 300 K/UL (ref 150–350)
PMV BLD AUTO: 9.5 FL (ref 9.2–12.9)
POTASSIUM SERPL-SCNC: 4.5 MMOL/L (ref 3.5–5.1)
PROT SERPL-MCNC: 7 G/DL (ref 6–8.4)
RBC # BLD AUTO: 4.25 M/UL (ref 4–5.4)
SODIUM SERPL-SCNC: 139 MMOL/L (ref 136–145)
WBC # BLD AUTO: 8.86 K/UL (ref 3.9–12.7)

## 2020-01-21 PROCEDURE — 36415 COLL VENOUS BLD VENIPUNCTURE: CPT

## 2020-01-21 PROCEDURE — 99900104 DSU ONLY-NO CHARGE-EA ADD'L HR (STAT)

## 2020-01-21 PROCEDURE — 80053 COMPREHEN METABOLIC PANEL: CPT

## 2020-01-21 PROCEDURE — 99900103 DSU ONLY-NO CHARGE-INITIAL HR (STAT)

## 2020-01-21 PROCEDURE — 85025 COMPLETE CBC W/AUTO DIFF WBC: CPT

## 2020-01-21 RX ORDER — ACETAMINOPHEN 325 MG/1
650 TABLET ORAL EVERY 6 HOURS PRN
COMMUNITY
End: 2021-03-03

## 2020-01-21 NOTE — OR NURSING
Patient stated she has a history of blood clots in her right arm in 2019.  Informed Tran JIM And Elsie MEJIA with Dr. Lozada and added to patients history.

## 2020-01-21 NOTE — DISCHARGE INSTRUCTIONS
To confirm, Your doctor has instructed you that surgery is scheduled for: 1/28/2020    Please report to [facility], Registration the morning of surgery. You must check-in and receive a wristband before going to your procedure.    Pre-Op will call the afternoon prior to surgery between 1:00 and 6:00 PM with the final arrival time.  Phone number: [phone]    PLEASE NOTE:  The surgery schedule has many variables which may affect the time of your surgery case.  Family members should be available if your surgery time changes.  Plan to be here the day of your procedure between 4-6 hours.    MEDICATIONS:  TAKE ONLY THESE MEDICATIONS WITH A SMALL SIP OF WATER THE MORNING OF YOUR PROCEDURE:  XANAX IF NEEDED, LUVOX, REGLAN IF NEEDED, TRILEPTAL, EFFEXOR      DO NOT TAKE THESE MEDICATIONS 5-7 DAYS PRIOR to your procedure or per your surgeon's request:   ASPIRIN, ALEVE, ADVIL, IBUPROFEN, FISH OIL VITAMIN E, HERBALS, DICLOFENAC  (May take Tylenol)    ONLY if you are prescribed any types of blood thinners such as:  Aspirin, Coumadin, Plavix, Pradaxa, Xarelto, Aggrenox, Effient, Eliquis, Savasya, Brilinta, or any other, ask your surgeon whether you should stop taking them and how long before surgery you should stop.  You may also need to verify with the prescribing physician if it is ok to stop your medication.      INSTRUCTIONS IMPORTANT!!  · Do not eat or drink anything between midnight and the time of your procedure- this includes gum, mints, and candy.  · Do not smoke or drink alcoholic beverages 24 hours prior to your procedure.  · Shower the night before AND the morning of your procedure with a Chlorhexidine wash such as Hibiclens or Dial antibacterial soap from the neck down.  Do not get it on your face or in your eyes.  You may use your own shampoo and face wash. This helps your skin to be as bacteria free as possible.    · If you wear contact lenses, dentures, hearing aids or glasses, bring a  container to put them in during surgery and give to a family member for safe keeping.  Please leave all jewelry, piercing's and valuables at home.   · DO NOT remove hair from the surgery site.  Do not shave the incision site unless you are given specific instructions to do so.    · ONLY if you have been diagnosed with sleep apnea please bring your C-PAP machine.  · ONLY if you wear home oxygen please bring your portable oxygen tank the day of your procedure.  · ONLY if you have a history of OPEN HEART SURGERY you will need a clearance from your Cardiologist per Anesthesia.      · ONLY for patients requiring bowel prep, written instructions will be given by your doctor's office.  · ONLY if you have a neuro stimulator, please bring the controller with you the morning of surgery  · ONLY if a type and screen test is needed before surgery, please return: n/a  · If your doctor has scheduled you for an overnight stay, bring a small overnight bag with any personal items you need.  · Make arrangements in advance for transportation home by a responsible adult.  It is not safe to drive a vehicle during the 24 hours after anesthesia.      · Visiting hours are 10:00AM to 8:30PM.  For the safety of all patients, visitors under the age of 12 are not allowed above the first floor of the hospital.    · All [facility] facilities and properties are tobacco free.  Smoking is NOT allowed.       If you have any questions about these instructions, call Pre-Op Admit  Nursing at [phone] or the Pre-Op Day Surgery Unit at [phone].

## 2020-01-23 ENCOUNTER — PATIENT MESSAGE (OUTPATIENT)
Dept: NEUROLOGY | Facility: CLINIC | Age: 30
End: 2020-01-23

## 2020-01-24 ENCOUNTER — PATIENT MESSAGE (OUTPATIENT)
Dept: NEUROLOGY | Facility: CLINIC | Age: 30
End: 2020-01-24

## 2020-01-24 DIAGNOSIS — G43.719 INTRACTABLE CHRONIC MIGRAINE WITHOUT AURA AND WITHOUT STATUS MIGRAINOSUS: ICD-10-CM

## 2020-01-27 ENCOUNTER — ANESTHESIA EVENT (OUTPATIENT)
Dept: SURGERY | Facility: HOSPITAL | Age: 30
End: 2020-01-27
Payer: MEDICAID

## 2020-01-27 RX ORDER — ONDANSETRON 4 MG/1
TABLET, FILM COATED ORAL
Qty: 30 TABLET | Refills: 11 | Status: SHIPPED | OUTPATIENT
Start: 2020-01-27 | End: 2020-11-24 | Stop reason: SDUPTHER

## 2020-01-27 RX ORDER — BUTALBITAL, ACETAMINOPHEN AND CAFFEINE 50; 325; 40 MG/1; MG/1; MG/1
TABLET ORAL
Qty: 10 TABLET | Refills: 0 | Status: SHIPPED | OUTPATIENT
Start: 2020-01-27 | End: 2020-02-27 | Stop reason: SDUPTHER

## 2020-01-28 ENCOUNTER — ANESTHESIA (OUTPATIENT)
Dept: SURGERY | Facility: HOSPITAL | Age: 30
End: 2020-01-28
Payer: MEDICAID

## 2020-01-28 ENCOUNTER — HOSPITAL ENCOUNTER (OUTPATIENT)
Facility: HOSPITAL | Age: 30
Discharge: HOME | End: 2020-01-28
Attending: ORTHOPAEDIC SURGERY | Admitting: ORTHOPAEDIC SURGERY
Payer: MEDICAID

## 2020-01-28 VITALS
DIASTOLIC BLOOD PRESSURE: 55 MMHG | RESPIRATION RATE: 18 BRPM | TEMPERATURE: 98 F | OXYGEN SATURATION: 96 % | BODY MASS INDEX: 48.82 KG/M2 | WEIGHT: 293 LBS | SYSTOLIC BLOOD PRESSURE: 110 MMHG | HEART RATE: 88 BPM | HEIGHT: 65 IN

## 2020-01-28 DIAGNOSIS — G56.02 CARPAL TUNNEL SYNDROME, LEFT: Primary | ICD-10-CM

## 2020-01-28 DIAGNOSIS — G56.02 CARPAL TUNNEL SYNDROME OF LEFT WRIST: ICD-10-CM

## 2020-01-28 LAB
B-HCG UR QL: NEGATIVE
CTP QC/QA: YES

## 2020-01-28 PROCEDURE — 25000003 PHARM REV CODE 250: Performed by: ORTHOPAEDIC SURGERY

## 2020-01-28 PROCEDURE — 71000015 HC POSTOP RECOV 1ST HR: Performed by: ORTHOPAEDIC SURGERY

## 2020-01-28 PROCEDURE — S0077 INJECTION, CLINDAMYCIN PHOSP: HCPCS | Performed by: ORTHOPAEDIC SURGERY

## 2020-01-28 PROCEDURE — 01810 ANES PX NRV MUSC F/ARM WRST: CPT | Performed by: ORTHOPAEDIC SURGERY

## 2020-01-28 PROCEDURE — 36000707: Performed by: ORTHOPAEDIC SURGERY

## 2020-01-28 PROCEDURE — 71000033 HC RECOVERY, INTIAL HOUR: Performed by: ORTHOPAEDIC SURGERY

## 2020-01-28 PROCEDURE — D9220A PRA ANESTHESIA: Mod: ANES,,, | Performed by: ANESTHESIOLOGY

## 2020-01-28 PROCEDURE — D9220A PRA ANESTHESIA: Mod: CRNA,,, | Performed by: NURSE ANESTHETIST, CERTIFIED REGISTERED

## 2020-01-28 PROCEDURE — 81025 URINE PREGNANCY TEST: CPT | Performed by: ANESTHESIOLOGY

## 2020-01-28 PROCEDURE — 63600175 PHARM REV CODE 636 W HCPCS: Performed by: NURSE ANESTHETIST, CERTIFIED REGISTERED

## 2020-01-28 PROCEDURE — 36000706: Performed by: ORTHOPAEDIC SURGERY

## 2020-01-28 PROCEDURE — 99900104 DSU ONLY-NO CHARGE-EA ADD'L HR (STAT): Performed by: ORTHOPAEDIC SURGERY

## 2020-01-28 PROCEDURE — 25000003 PHARM REV CODE 250: Performed by: ANESTHESIOLOGY

## 2020-01-28 PROCEDURE — 99900103 DSU ONLY-NO CHARGE-INITIAL HR (STAT): Performed by: ORTHOPAEDIC SURGERY

## 2020-01-28 PROCEDURE — 37000008 HC ANESTHESIA 1ST 15 MINUTES: Performed by: ORTHOPAEDIC SURGERY

## 2020-01-28 PROCEDURE — 37000009 HC ANESTHESIA EA ADD 15 MINS: Performed by: ORTHOPAEDIC SURGERY

## 2020-01-28 PROCEDURE — 63600175 PHARM REV CODE 636 W HCPCS: Performed by: ANESTHESIOLOGY

## 2020-01-28 PROCEDURE — 27200651 HC AIRWAY, LMA: Performed by: ANESTHESIOLOGY

## 2020-01-28 PROCEDURE — 63600175 PHARM REV CODE 636 W HCPCS: Performed by: ORTHOPAEDIC SURGERY

## 2020-01-28 PROCEDURE — D9220A PRA ANESTHESIA: ICD-10-PCS | Mod: ANES,,, | Performed by: ANESTHESIOLOGY

## 2020-01-28 PROCEDURE — D9220A PRA ANESTHESIA: ICD-10-PCS | Mod: CRNA,,, | Performed by: NURSE ANESTHETIST, CERTIFIED REGISTERED

## 2020-01-28 PROCEDURE — 71000039 HC RECOVERY, EACH ADD'L HOUR: Performed by: ORTHOPAEDIC SURGERY

## 2020-01-28 RX ORDER — KETAMINE HYDROCHLORIDE 10 MG/ML
15 INJECTION, SOLUTION INTRAMUSCULAR; INTRAVENOUS ONCE
Status: COMPLETED | OUTPATIENT
Start: 2020-01-28 | End: 2020-01-28

## 2020-01-28 RX ORDER — SODIUM CHLORIDE 0.9 % (FLUSH) 0.9 %
3 SYRINGE (ML) INJECTION
Status: DISCONTINUED | OUTPATIENT
Start: 2020-01-28 | End: 2020-01-28 | Stop reason: HOSPADM

## 2020-01-28 RX ORDER — FENTANYL CITRATE 50 UG/ML
25 INJECTION, SOLUTION INTRAMUSCULAR; INTRAVENOUS EVERY 5 MIN PRN
Status: COMPLETED | OUTPATIENT
Start: 2020-01-28 | End: 2020-01-28

## 2020-01-28 RX ORDER — SODIUM CHLORIDE 0.9 % (FLUSH) 0.9 %
10 SYRINGE (ML) INJECTION
Status: DISCONTINUED | OUTPATIENT
Start: 2020-01-28 | End: 2020-01-28 | Stop reason: HOSPADM

## 2020-01-28 RX ORDER — OXYCODONE HYDROCHLORIDE 10 MG/1
10 TABLET ORAL EVERY 4 HOURS PRN
Status: DISCONTINUED | OUTPATIENT
Start: 2020-01-28 | End: 2020-01-28 | Stop reason: HOSPADM

## 2020-01-28 RX ORDER — LIDOCAINE HYDROCHLORIDE 10 MG/ML
INJECTION, SOLUTION EPIDURAL; INFILTRATION; INTRACAUDAL; PERINEURAL
Status: DISCONTINUED | OUTPATIENT
Start: 2020-01-28 | End: 2020-01-28 | Stop reason: HOSPADM

## 2020-01-28 RX ORDER — MUPIROCIN 20 MG/G
OINTMENT TOPICAL
Status: DISCONTINUED | OUTPATIENT
Start: 2020-01-28 | End: 2020-01-28 | Stop reason: HOSPADM

## 2020-01-28 RX ORDER — METOCLOPRAMIDE HYDROCHLORIDE 5 MG/ML
5 INJECTION INTRAMUSCULAR; INTRAVENOUS EVERY 6 HOURS PRN
Status: DISCONTINUED | OUTPATIENT
Start: 2020-01-28 | End: 2020-01-28 | Stop reason: HOSPADM

## 2020-01-28 RX ORDER — ONDANSETRON 2 MG/ML
4 INJECTION INTRAMUSCULAR; INTRAVENOUS DAILY PRN
Status: DISCONTINUED | OUTPATIENT
Start: 2020-01-28 | End: 2020-01-28 | Stop reason: HOSPADM

## 2020-01-28 RX ORDER — OXYCODONE HYDROCHLORIDE 5 MG/1
5 TABLET ORAL
Status: DISCONTINUED | OUTPATIENT
Start: 2020-01-28 | End: 2020-01-28 | Stop reason: HOSPADM

## 2020-01-28 RX ORDER — PROPOFOL 10 MG/ML
VIAL (ML) INTRAVENOUS
Status: DISCONTINUED | OUTPATIENT
Start: 2020-01-28 | End: 2020-01-28

## 2020-01-28 RX ORDER — HYDROCODONE BITARTRATE AND ACETAMINOPHEN 10; 325 MG/1; MG/1
1 TABLET ORAL EVERY 6 HOURS PRN
Qty: 20 TABLET | Refills: 0 | Status: SHIPPED | OUTPATIENT
Start: 2020-01-28 | End: 2020-02-12 | Stop reason: SDUPTHER

## 2020-01-28 RX ORDER — MIDAZOLAM HYDROCHLORIDE 1 MG/ML
INJECTION, SOLUTION INTRAMUSCULAR; INTRAVENOUS
Status: DISCONTINUED | OUTPATIENT
Start: 2020-01-28 | End: 2020-01-28

## 2020-01-28 RX ORDER — LIDOCAINE HCL/PF 100 MG/5ML
SYRINGE (ML) INTRAVENOUS
Status: DISCONTINUED | OUTPATIENT
Start: 2020-01-28 | End: 2020-01-28

## 2020-01-28 RX ORDER — SODIUM CHLORIDE, SODIUM LACTATE, POTASSIUM CHLORIDE, CALCIUM CHLORIDE 600; 310; 30; 20 MG/100ML; MG/100ML; MG/100ML; MG/100ML
INJECTION, SOLUTION INTRAVENOUS CONTINUOUS
Status: DISCONTINUED | OUTPATIENT
Start: 2020-01-28 | End: 2020-01-28 | Stop reason: HOSPADM

## 2020-01-28 RX ORDER — ONDANSETRON 2 MG/ML
4 INJECTION INTRAMUSCULAR; INTRAVENOUS EVERY 12 HOURS PRN
Status: DISCONTINUED | OUTPATIENT
Start: 2020-01-28 | End: 2020-01-28 | Stop reason: HOSPADM

## 2020-01-28 RX ORDER — FENTANYL CITRATE 50 UG/ML
INJECTION, SOLUTION INTRAMUSCULAR; INTRAVENOUS
Status: DISCONTINUED | OUTPATIENT
Start: 2020-01-28 | End: 2020-01-28

## 2020-01-28 RX ORDER — HYDROCODONE BITARTRATE AND ACETAMINOPHEN 5; 325 MG/1; MG/1
1 TABLET ORAL EVERY 4 HOURS PRN
Status: DISCONTINUED | OUTPATIENT
Start: 2020-01-28 | End: 2020-01-28

## 2020-01-28 RX ORDER — LIDOCAINE HYDROCHLORIDE 10 MG/ML
1 INJECTION, SOLUTION EPIDURAL; INFILTRATION; INTRACAUDAL; PERINEURAL ONCE
Status: DISCONTINUED | OUTPATIENT
Start: 2020-01-28 | End: 2020-01-28 | Stop reason: HOSPADM

## 2020-01-28 RX ORDER — HYDROMORPHONE HYDROCHLORIDE 2 MG/ML
0.2 INJECTION, SOLUTION INTRAMUSCULAR; INTRAVENOUS; SUBCUTANEOUS EVERY 5 MIN PRN
Status: COMPLETED | OUTPATIENT
Start: 2020-01-28 | End: 2020-01-28

## 2020-01-28 RX ORDER — CLINDAMYCIN PHOSPHATE 900 MG/50ML
900 INJECTION, SOLUTION INTRAVENOUS
Status: COMPLETED | OUTPATIENT
Start: 2020-01-28 | End: 2020-01-28

## 2020-01-28 RX ADMIN — HYDROMORPHONE HYDROCHLORIDE 0.2 MG: 2 INJECTION, SOLUTION INTRAMUSCULAR; INTRAVENOUS; SUBCUTANEOUS at 01:01

## 2020-01-28 RX ADMIN — HYDROMORPHONE HYDROCHLORIDE 0.2 MG: 2 INJECTION, SOLUTION INTRAMUSCULAR; INTRAVENOUS; SUBCUTANEOUS at 12:01

## 2020-01-28 RX ADMIN — KETAMINE HYDROCHLORIDE 15 MG: 10 INJECTION, SOLUTION INTRAMUSCULAR; INTRAVENOUS at 01:01

## 2020-01-28 RX ADMIN — FENTANYL CITRATE 25 MCG: 50 INJECTION INTRAMUSCULAR; INTRAVENOUS at 12:01

## 2020-01-28 RX ADMIN — CLINDAMYCIN PHOSPHATE 900 MG: 18 INJECTION, SOLUTION INTRAVENOUS at 11:01

## 2020-01-28 RX ADMIN — PROPOFOL 200 MG: 10 INJECTION, EMULSION INTRAVENOUS at 11:01

## 2020-01-28 RX ADMIN — SODIUM CHLORIDE, SODIUM GLUCONATE, SODIUM ACETATE, POTASSIUM CHLORIDE, MAGNESIUM CHLORIDE, SODIUM PHOSPHATE, DIBASIC, AND POTASSIUM PHOSPHATE: .53; .5; .37; .037; .03; .012; .00082 INJECTION, SOLUTION INTRAVENOUS at 11:01

## 2020-01-28 RX ADMIN — LIDOCAINE HYDROCHLORIDE 100 MG: 20 INJECTION, SOLUTION INTRAVENOUS at 11:01

## 2020-01-28 RX ADMIN — SODIUM CHLORIDE, SODIUM LACTATE, POTASSIUM CHLORIDE, CALCIUM CHLORIDE: 600; 310; 30; 20 INJECTION, SOLUTION INTRAVENOUS at 12:01

## 2020-01-28 RX ADMIN — MIDAZOLAM 2 MG: 1 INJECTION INTRAMUSCULAR; INTRAVENOUS at 11:01

## 2020-01-28 RX ADMIN — MUPIROCIN: 20 OINTMENT TOPICAL at 10:01

## 2020-01-28 RX ADMIN — FENTANYL CITRATE 100 MCG: 50 INJECTION, SOLUTION INTRAMUSCULAR; INTRAVENOUS at 11:01

## 2020-01-28 RX ADMIN — OXYCODONE HYDROCHLORIDE 5 MG: 5 TABLET ORAL at 12:01

## 2020-01-28 NOTE — ANESTHESIA POSTPROCEDURE EVALUATION
Anesthesia Post Evaluation    Patient: Jasper Guerrero    Procedure(s) Performed: Procedure(s) (LRB):  RELEASE, CARPAL TUNNEL (Left)    Final Anesthesia Type: general    Patient location during evaluation: PACU  Patient participation: Yes- Able to Participate  Level of consciousness: awake and alert  Post-procedure vital signs: reviewed and stable  Pain management: adequate  Airway patency: patent    PONV status at discharge: No PONV  Anesthetic complications: no      Cardiovascular status: blood pressure returned to baseline  Respiratory status: unassisted  Hydration status: euvolemic  Follow-up not needed.          Vitals Value Taken Time   /55 1/28/2020  2:30 PM   Temp 36.7 °C (98 °F) 1/28/2020  1:46 PM   Pulse 88 1/28/2020  2:30 PM   Resp 18 1/28/2020  2:30 PM   SpO2 96 % 1/28/2020  2:30 PM         Event Time     Out of Recovery 13:46:00          Pain/Zena Score: Pain Rating Prior to Med Admin: 8 (1/28/2020  1:30 PM)  Zena Score: 10 (1/28/2020  2:30 PM)

## 2020-01-28 NOTE — PLAN OF CARE
Pt aaox4,  States pain is 4 out 10 and tolerble, denies nausea, tolerating clear liquids, dressing cdi, family updated, anestheia states pt  meets anesthesia criteria to transfer to Phase II, report given to ADALBERTO Camilo

## 2020-01-28 NOTE — BRIEF OP NOTE
[facility]  Brief Operative Note     SUMMARY     Surgery Date: 1/28/2020     Surgeon(s) and Role:     * Brendon Lozada Jr., MD - Primary    Assisting Surgeon: None    Pre-op Diagnosis:  Carpal tunnel syndrome of left wrist [G56.02]    Post-op Diagnosis:  Post-Op Diagnosis Codes:     * Carpal tunnel syndrome of left wrist [G56.02]    Procedure(s) (LRB):  RELEASE, CARPAL TUNNEL (Left)    Anesthesia: Choice    Description of the findings of the procedure:cts    Estimated Blood Loss: * No values recorded between 1/28/2020 11:50 AM and 1/28/2020 12:07 PM *         Specimens:   Specimen (12h ago, onward)    None          Discharge Note    SUMMARY     Admit Date: 1/28/2020    Discharge Date and Time:  01/28/2020 12:07 PM    Hospital Course (synopsis of major diagnoses, care, treatment, and services provided during the course of the hospital stay): Uneventful Outpatient Surgery     Final Diagnosis: Post-Op Diagnosis Codes:     * Carpal tunnel syndrome of left wrist [G56.02]    Disposition: Home or Self Care    Follow Up/Patient Instructions:     Medications:  Reconciled Home Medications:   Current Discharge Medication List      START taking these medications    Details   HYDROcodone-acetaminophen (NORCO)  mg per tablet Take 1 tablet by mouth every 6 (six) hours as needed for Pain.  Qty: 20 tablet, Refills: 0    Comments: Quantity prescribed more than 7 day supply? No         CONTINUE these medications which have NOT CHANGED    Details   acetaminophen (TYLENOL) 325 MG tablet Take 650 mg by mouth every 6 (six) hours as needed for Pain.      ALPRAZolam (XANAX) 1 MG tablet       butalbital-acetaminophen-caffeine -40 mg (FIORICET, ESGIC) -40 mg per tablet TAKE 1 TABLET BY MOUTH AS NEEDED FOR MIGRAINE.(NO MORE THAN 10 TABLETS A MONTH)  Qty: 10 tablet, Refills: 0    Associated Diagnoses: Intractable chronic migraine without aura and without status migrainosus      diclofenac (VOLTAREN) 50 MG  EC tablet TAKE 1 TO 2 TABLETS BY MOUTH( NOT TO EXCEED 4 TABLETS) PER DAY  Qty: 30 tablet, Refills: 0      fluvoxaMINE (LUVOX) 100 MG tablet Take 200 mg by mouth once daily.       metoclopramide HCl (REGLAN) 10 MG tablet Take 1 tablet (10 mg total) by mouth every 6 (six) hours as needed (migraine or nausea).  Qty: 60 tablet, Refills: 11      ondansetron (ZOFRAN) 4 MG tablet TAKE 1 TABLET BY MOUTH EVERY 6 HOURS AS NEEDED FOR FOR NAUSEA  Qty: 30 tablet, Refills: 11      OXcarbazepine (TRILEPTAL) 600 MG Tab Take 1 tablet (600 mg total) by mouth 2 (two) times daily.  Qty: 60 tablet, Refills: 11    Associated Diagnoses: Trigeminal neuropathy; Neuropathic pain      rizatriptan (MAXALT) 10 MG tablet 1 tab PO PRN migraine. May repeat every 2 hours for max 3 tabs in 24 hours. Use no more than 10 days per month.  Qty: 18 tablet, Refills: 11    Associated Diagnoses: Intractable chronic migraine without aura and without status migrainosus      sucralfate (CARAFATE) 1 gram tablet Take 1 tablet (1 g total) by mouth 4 (four) times daily. When taking NSAIDs  Qty: 30 tablet, Refills: 11    Associated Diagnoses: History of gastric ulcer      sumatriptan (IMITREX) 100 MG tablet 1 tab PO PRN migraine. May repeat once in 2 hours, no more than 2 tab in 24 hours. No more than 10 days per month.  Qty: 10 tablet, Refills: 11    Associated Diagnoses: Intractable chronic migraine without aura and without status migrainosus      tiZANidine (ZANAFLEX) 4 MG tablet Half or full tablet by mouth at night as needed for muscle spasm  Qty: 30 tablet, Refills: 11    Associated Diagnoses: Neck pain      !! venlafaxine (EFFEXOR-XR) 37.5 MG 24 hr capsule Take 37.5 mg by mouth once daily.      !! venlafaxine (EFFEXOR-XR) 75 MG 24 hr capsule Take 75 mg by mouth once daily.      zolpidem (AMBIEN) 10 mg Tab       !! EMGALITY  mg/mL PnIj Inject 240mg (2 injections) subcutaneously at separate sites, once (loading dose). Start maintenance dose 28 days  later.  Qty: 2 mL, Refills: 0    Associated Diagnoses: Intractable chronic migraine without aura and without status migrainosus      !! galcanezumab-gnlm (EMGALITY PEN) 120 mg/mL PnIj Inject 120 mg into the skin every 28 days.  Qty: 1 mL, Refills: 11    Associated Diagnoses: Intractable chronic migraine without aura and without status migrainosus      MIRENA 20 mcg/24 hr (5 years) IUD        !! - Potential duplicate medications found. Please discuss with provider.        Discharge Procedure Orders   [facility] FOR HOME USE     Diet general     Keep surgical extremity elevated     Ice to affected area     No driving, operating heavy equipment or signing legal documents while taking pain medication.     Call MD for:  temperature >100.4     Call MD for:  persistent nausea and vomiting     Call MD for:  severe uncontrolled pain     Call MD for:  difficulty breathing, headache or visual disturbances     Call MD for:  redness, tenderness, or signs of infection (pain, swelling, redness, odor or green/yellow discharge around incision site)     Call MD for:  hives     Call MD for:  persistent dizziness or light-headedness     Call MD for:  extreme fatigue     Remove dressing in 72 hours     Follow-up Information     Brendon Lozada Jr, MD In 2 weeks.    Specialties:  Orthopedic Surgery, Surgery  Contact information:  [address]  [address]  [facility] [id number] [phone]

## 2020-01-28 NOTE — TRANSFER OF CARE
"Anesthesia Transfer of Care Note    Patient: Jasper Guerrero    Procedure(s) Performed: Procedure(s) (LRB):  RELEASE, CARPAL TUNNEL (Left)    Patient location: PACU    Anesthesia Type: general    Transport from OR: Transported from OR on 2-3 L/min O2 by NC with adequate spontaneous ventilation    Post pain: adequate analgesia    Post assessment: no apparent anesthetic complications and tolerated procedure well    Post vital signs: stable    Level of consciousness: awake    Nausea/Vomiting: no nausea/vomiting    Complications: none    Transfer of care protocol was followed      Last vitals:   Visit Vitals  /68   Pulse 97   Temp 37.3 °C (99.1 °F)   Resp 18   Ht 5' 5" (1.651 m)   Wt (!) 154.7 kg (341 lb)   SpO2 97%   Breastfeeding? No   BMI 56.75 kg/m²     "

## 2020-01-28 NOTE — PLAN OF CARE
Meets criteria for discharge. Discharged to home with . Denies nausea. Tolerating fluids. Pain tolerable. Steady gait. Voiding without difficulty. Discharge instructions reviewed and printed handout given. Ice pack for home use given. Verbalized understanding.

## 2020-01-28 NOTE — ANESTHESIA PREPROCEDURE EVALUATION
01/28/2020  Jasper Guerrero is a 29 y.o., female.    Anesthesia Evaluation    I have reviewed the Patient Summary Reports.    I have reviewed the Nursing Notes.      Review of Systems  Anesthesia Hx:  No problems with previous Anesthesia        Physical Exam  General:  Morbid Obesity    Airway/Jaw/Neck:  Airway Findings: Mallampati: II                Anesthesia Plan  Type of Anesthesia, risks & benefits discussed:  Anesthesia Type:  general  Patient's Preference:   Intra-op Monitoring Plan:   Intra-op Monitoring Plan Comments:   Post Op Pain Control Plan:   Post Op Pain Control Plan Comments:   Induction:   IV  Beta Blocker:  Patient is not currently on a Beta-Blocker (No further documentation required).       Informed Consent: Patient understands risks and agrees with Anesthesia plan.  Questions answered. Anesthesia consent signed with patient.  ASA Score: 2     Day of Surgery Review of History & Physical:    H&P update referred to the surgeon.         Ready For Surgery From Anesthesia Perspective.

## 2020-01-28 NOTE — OP NOTE
DATE OF PROCEDURE:  01/28/2020    PROCEDURE PERFORMED:  Carpal tunnel release, left hand.    PREOPERATIVE DIAGNOSIS:  Carpal tunnel syndrome, left.    POSTOPERATIVE DIAGNOSIS:  Carpal tunnel syndrome, left.    ANESTHESIA:  General.    SURGEON:  Brendon Lozada Jr., M.D.    ASSISTANT:  Rosalia Osman.    PROCEDURE IN DETAIL:  The patient was taken to the Operating Room and placed   under general anesthesia.  The left arm was prepped and draped in a satisfactory   manner, exsanguinated with the Esmarch and elevated the tourniquet to 250.  We   did augment with some 1% plain Xylocaine during the case.  We then made a skin   incision in the palm, went through the skin and subQ, identified the transverse   carpal ligament and then sectioned that ligament working from distal to proximal   under direct vision and did seem to be significantly thick.  There did seem to   be a tight carpal tunnel present.  We released it under direct vision.  We also   released the ulnar side of the median nerve to make sure there were no adhesions   and then irrigated.  We left the red rubber catheter in, closed the skin with   nylon, applied a bulky dressing and then taken to Recovery in satisfactory   condition.  Blood loss was negligible.      PERLITA/IN  dd: 01/28/2020 12:10:08 (CST)  td: 01/28/2020 15:00:50 (CST)  Doc ID   #[id number]  Job ID #[id number]    CC:

## 2020-01-28 NOTE — DISCHARGE INSTRUCTIONS
General Information:    1.  Do not drink alcoholic beverages including beer for 24 hours or as long as you are on pain medication..  2.  Do not drive a motor vehicle, operate machinery or power tools, or signs legal papers for 24 hours or as long as you are on pain medication.   3.  You may experience light-headedness, dizziness, and sleepiness following surgery. Please do not stay alone. A responsible adult should be with you for this 24 hour period.  4.  Go home and rest.    5. Progress slowly to a normal diet unless instructed.  Otherwise, begin with liquids such as soft drinks, then soup and crackers working up to solid foods. Drink plenty of nonalcoholic fluids.  6.  Certain anesthetics and pain medications produce nausea and vomiting in certain       individuals. If nausea becomes a problem at home, call you doctor.    7. A nurse will be calling you sometime after surgery. Do not be alarmed. This is our way of finding out how you are doing.    8. Several times every hour while you are awake, take 2-3 deep breaths and cough. If you had stomach surgery hold a pillow or rolled towel firmly against your stomach before you cough. This will help with any pain the cough might cause.  9. Several times every hour while you are awake, pump and flex your feet 5-6 times and do foot circles. This will help prevent blood clots.    10.Call your doctor for severe pain, bleeding, fever, or signs or symptoms of infection (pain, swelling, redness, foul odor, drainage).    Post op instructions for prevention of DVT  What is deep vein thrombosis?  Deep vein thrombosis (DVT) is the medical term for blood clots in the deep veins of the leg.  These blood clots can be dangerous.  A DVT can block a blood vessel and keep blood from getting where it needs to go.  Another problem is that the clot can travel to other parts of the body such as the lungs.  A clot that travels to the lungs is called a pulmonary embolus (PE) and can cause  serious problems with breathing which can lead to death.  Am I at risk for DVT/PE?  If you are not very active, you are at risk of DVT.  Anyone confined to bed, sitting for long periods of time, recovering from surgery, etc. increases the risk of DVT.  Other risk factors are cancer diagnosis, certain medications, estrogen replacement in any form,older age, obesity, pregnancy, smoking, history of clotting disorders, and dehydration.  How will I know if I have a DVT?   Swelling in the lower leg   Pain   Warmth, redness, hardness or bulging of the vein  If you have any of these symptoms, call your doctors office right away.  Some people will not have any symptoms until the clot moves to the lungs.  What are the symptoms of a PE?   Panting, shortness of breath, or trouble breathing   Sharp, knife-like chest pain when you breathe   Coughing or coughing up blood   Rapid heartbeat  If you have any of these symptoms or get worse quickly, call 911 for emergency treatment.  How can I prevent a DVT?   Avoid long periods of inactivity and dont cross your legs--get up and walk around every hour or so.   Stay active--walking after surgery is highly encouraged.  This means you should get out of the house and walk in the neighborhood.  Going up and down stairs will not impair healing (unless advised against such activity by your doctor).     Drink plenty of noncaffeinated, nonalcoholic fluids each day to prevent dehydration.   Wear special support stockings, if they have been advised by your doctor.   If you travel, stop at least once an hour and walk around.   Avoid smoking (assistance with stopping is available through your healthcare provider)  Always notify your doctor if you are not able to follow the post operative instructions that are given to you at the time of discharge.  It may be necessary to prescribe one of the medications available to prevent DVT.    Discharge Instructions: After Your  "Surgery/Procedure  Youve just had surgery. During surgery you were given medicine called anesthesia to keep you relaxed and free of pain. After surgery you may have some pain or nausea. This is common. Here are some tips for feeling better and getting well after surgery.     Stay on schedule with your medication.   Going home  Your doctor or nurse will show you how to take care of yourself when you go home. He or she will also answer your questions. Have an adult family member or friend drive you home.      For your safety we recommend these precaution for the first 24 hours after your procedure:  · Do not drive or use heavy equipment.  · Do not make important decisions or sign legal papers.  · Do not drink alcohol.  · Have someone stay with you, if needed. He or she can watch for problems and help keep you safe.  · Your concentration, balance, coordination, and judgement may be impaired for many hours after anesthesia.  Use caution when ambulating or standing up.     · You may feel weak and "washed out" after anesthesia and surgery.      Subtle residual effects of general anesthesia or sedation with regional / local anesthesia can last more than 24 hours.  Rest for the remainder of the day or longer if your Doctor/Surgeon has advised you to do so.  Although you may feel normal within the first 24 hours, your reflexes and mental ability may be impaired without you realizing it.  You may feel dizzy, lightheaded or sleepy for 24 hours or longer.      Be sure to go to all follow-up visits with your doctor. And rest after your surgery for as long as your doctor tells you to.  Coping with pain  If you have pain after surgery, pain medicine will help you feel better. Take it as told, before pain becomes severe. Also, ask your doctor or pharmacist about other ways to control pain. This might be with heat, ice, or relaxation. And follow any other instructions your surgeon or nurse gives you.  Tips for taking pain " medicine  To get the best relief possible, remember these points:  · Pain medicines can upset your stomach. Taking them with a little food may help.  · Most pain relievers taken by mouth need at least 20 to 30 minutes to start to work.  · Taking medicine on a schedule can help you remember to take it. Try to time your medicine so that you can take it before starting an activity. This might be before you get dressed, go for a walk, or sit down for dinner.  · Constipation is a common side effect of pain medicines. Call your doctor before taking any medicines such as laxatives or stool softeners to help ease constipation. Also ask if you should skip any foods. Drinking lots of fluids and eating foods such as fruits and vegetables that are high in fiber can also help. Remember, do not take laxatives unless your surgeon has prescribed them.  · Drinking alcohol and taking pain medicine can cause dizziness and slow your breathing. It can even be deadly. Do not drink alcohol while taking pain medicine.  · Pain medicine can make you react more slowly to things. Do not drive or run machinery while taking pain medicine.  Your health care provider may tell you to take acetaminophen to help ease your pain. Ask him or her how much you are supposed to take each day. Acetaminophen or other pain relievers may interact with your prescription medicines or other over-the-counter (OTC) drugs. Some prescription medicines have acetaminophen and other ingredients. Using both prescription and OTC acetaminophen for pain can cause you to overdose. Read the labels on your OTC medicines with care. This will help you to clearly know the list of ingredients, how much to take, and any warnings. It may also help you not take too much acetaminophen. If you have questions or do not understand the information, ask your pharmacist or health care provider to explain it to you before you take the OTC medicine.  Managing nausea  Some people have an upset  stomach after surgery. This is often because of anesthesia, pain, or pain medicine, or the stress of surgery. These tips will help you handle nausea and eat healthy foods as you get better. If you were on a special food plan before surgery, ask your doctor if you should follow it while you get better. These tips may help:  · Do not push yourself to eat. Your body will tell you when to eat and how much.  · Start off with clear liquids and soup. They are easier to digest.  · Next try semi-solid foods, such as mashed potatoes, applesauce, and gelatin, as you feel ready.  · Slowly move to solid foods. Dont eat fatty, rich, or spicy foods at first.  · Do not force yourself to have 3 large meals a day. Instead eat smaller amounts more often.  · Take pain medicines with a small amount of solid food, such as crackers or toast, to avoid nausea.     Call your surgeon if  · You still have pain an hour after taking medicine. The medicine may not be strong enough.  · You feel too sleepy, dizzy, or groggy. The medicine may be too strong.  · You have side effects like nausea, vomiting, or skin changes, such as rash, itching, or hives.       If you have obstructive sleep apnea  You were given anesthesia medicine during surgery to keep you comfortable and free of pain. After surgery, you may have more apnea spells because of this medicine and other medicines you were given. The spells may last longer than usual.   At home:  · Keep using the continuous positive airway pressure (CPAP) device when you sleep. Unless your health care provider tells you not to, use it when you sleep, day or night. CPAP is a common device used to treat obstructive sleep apnea.  · Talk with your provider before taking any pain medicine, muscle relaxants, or sedatives. Your provider will tell you about the possible dangers of taking these medicines.  © 7015-8646 [employer]. [address] [place] [address], [place] [address]. All rights reserved. This  information is not intended as a substitute for professional medical care. Always follow your healthcare professional's instructions.      Fall Prevention  Falls often occur due to slipping, tripping or losing your balance. Millions of people fall every year and injure themselves. Here are ways to reduce your risk of falling again.  · Think about your fall, was there anything that caused your fall that can be fixed, removed, or replaced?  · Make your home safe by keeping walkways clear of objects you may trip over.  · Use non-slip pads under rugs. Do not use area rugs or small throw rugs.  · Use non-slip mats in bathtubs and showers.  · Install handrails and lights on staircases.  · Do not walk in poorly lit areas.  · Do not stand on chairs or wobbly ladders.  · Use caution when reaching overhead or looking upward. This position can cause a loss of balance.  · Be sure your shoes fit properly, have non-slip bottoms and are in good condition.   · Wear shoes both inside and out. Avoid going barefoot or wearing slippers.  · Be cautious when going up and down stairs, curbs, and when walking on uneven sidewalks.  · If your balance is poor, consider using a cane or walker.  · If your fall was related to alcohol use, stop or limit alcohol intake.   · If your fall was related to use of sleeping medicines, talk to your doctor about this. You may need to reduce your dosage at bedtime if you awaken during the night to go to the bathroom.    · To reduce the need for nighttime bathroom trips:  ¨ Avoid drinking fluids for several hours before going to bed  ¨ Empty your bladder before going to bed  ¨ Men can keep a urinal at the bedside  · Stay as active as you can. Balance, flexibility, strength, and endurance all come from exercise. They all play a role in preventing falls. Ask your healthcare provider which types of activity are right for you.  · Get your vision checked on a regular basis.  · If you have pets, know where they  are before you stand up or walk so you don't trip over them.  · Use night lights.  Date Last Reviewed: 11/5/2015  © 2208-9740 [employer]. [address] [place] [address], [place] [address]. All rights reserved. This information is not intended as a substitute for professional medical care. Always follow your healthcare professional's instructions.

## 2020-01-29 ENCOUNTER — PATIENT MESSAGE (OUTPATIENT)
Dept: ORTHOPEDICS | Facility: CLINIC | Age: 30
End: 2020-01-29

## 2020-01-29 ENCOUNTER — TELEPHONE (OUTPATIENT)
Dept: ORTHOPEDICS | Facility: CLINIC | Age: 30
End: 2020-01-29

## 2020-01-29 NOTE — TELEPHONE ENCOUNTER
----- Message from Sophy Moe sent at 1/29/2020  9:06 AM CST -----  Contact: [place] [phone]  [facility] [place] called and checked on her and her pain leave is 10. Dr Lozada

## 2020-01-29 NOTE — PROGRESS NOTES
Post op call made, pt stated high pain and pain medicine is not helping. Called the office and left a message with the nurse.

## 2020-01-29 NOTE — TELEPHONE ENCOUNTER
Called and spoke with pt, had CTR yesterday/. Has been up all night, crying in pain, pain meds not helping, she is elevating it and using ice. Will speak with Dr. Lozada and get back with her.

## 2020-01-29 NOTE — TELEPHONE ENCOUNTER
Spoke with pt as per Dr. Lozada can add Ibuprofen in between if needed but nothing else stronger that Norco 10mg. Pt stated she has ulcers and is unable to take Ibuprofen. Again told her to keep hand elevated higher than her heart and use ice and make sure is taking her meds as prescribed. Call back if this doesn't settle down

## 2020-01-30 ENCOUNTER — PATIENT MESSAGE (OUTPATIENT)
Dept: NEUROLOGY | Facility: CLINIC | Age: 30
End: 2020-01-30

## 2020-02-10 ENCOUNTER — TELEPHONE (OUTPATIENT)
Dept: PHARMACY | Facility: CLINIC | Age: 30
End: 2020-02-10

## 2020-02-10 NOTE — TELEPHONE ENCOUNTER
Incoming call regarding Emg[facility] at [facility]. Will prepare for shipment on  to arrive  with consent of patient. Copay 3.00 CCOF 5459.  Patient stated that 2 of her medications were increased in strength and she was put back on Valium and taken off of Xanax. She also stated that she was put on 2 antibiotics. (transferred to [facility] for consultation). Patients  and Address was verified. Also patient has not reported any new allergies or side effects. Next injection  with 0 doses on hand and no supplies needed with this shipment.

## 2020-02-12 ENCOUNTER — OFFICE VISIT (OUTPATIENT)
Dept: ORTHOPEDICS | Facility: CLINIC | Age: 30
End: 2020-02-12
Payer: MEDICAID

## 2020-02-12 ENCOUNTER — TELEPHONE (OUTPATIENT)
Dept: ORTHOPEDICS | Facility: CLINIC | Age: 30
End: 2020-02-12

## 2020-02-12 ENCOUNTER — PATIENT MESSAGE (OUTPATIENT)
Dept: ORTHOPEDICS | Facility: CLINIC | Age: 30
End: 2020-02-12

## 2020-02-12 VITALS
WEIGHT: 293 LBS | HEIGHT: 65 IN | DIASTOLIC BLOOD PRESSURE: 84 MMHG | SYSTOLIC BLOOD PRESSURE: 135 MMHG | HEART RATE: 111 BPM | BODY MASS INDEX: 48.82 KG/M2

## 2020-02-12 DIAGNOSIS — Z98.890 STATUS POST CARPAL TUNNEL RELEASE: Primary | ICD-10-CM

## 2020-02-12 PROCEDURE — 99024 POSTOP FOLLOW-UP VISIT: CPT | Mod: S$GLB,,, | Performed by: ORTHOPAEDIC SURGERY

## 2020-02-12 PROCEDURE — 99024 PR POST-OP FOLLOW-UP VISIT: ICD-10-PCS | Mod: S$GLB,,, | Performed by: ORTHOPAEDIC SURGERY

## 2020-02-12 RX ORDER — DIAZEPAM 10 MG/1
10 TABLET ORAL
COMMUNITY
End: 2020-11-24

## 2020-02-12 RX ORDER — VENLAFAXINE HYDROCHLORIDE 150 MG/1
CAPSULE, EXTENDED RELEASE ORAL
COMMUNITY
Start: 2020-02-04 | End: 2020-06-10

## 2020-02-12 RX ORDER — HYDROCODONE BITARTRATE AND ACETAMINOPHEN 10; 325 MG/1; MG/1
1 TABLET ORAL EVERY 6 HOURS PRN
Qty: 20 TABLET | Refills: 0 | Status: SHIPPED | OUTPATIENT
Start: 2020-02-12 | End: 2020-02-17

## 2020-02-12 NOTE — PROGRESS NOTES
[facility]TH[facility]ED[facility] POST-OP NOTE    Subjective:           Chief Complaint:   Chief Complaint   Patient presents with    Left Hand - Post-op Evaluation     Left hand CTR 1.28.2020. Suture removal. States that her hand is worse than it was before surgery.         Past Medical History:   Diagnosis Date    Agoraphobia     Depression     DVT (deep venous thrombosis)     2019 had blood clot in right arm    Fibromyalgia     Migraine headache     Nerve injury 08/2016    Lingual Nerve Injury    PTSD (post-traumatic stress disorder)        Past Surgical History:   Procedure Laterality Date    CARPAL TUNNEL RELEASE Right 12/27/2018    Dr Lozada     CARPAL TUNNEL RELEASE Left 1/28/2020    Procedure: RELEASE, CARPAL TUNNEL;  Surgeon: Brendon Lozada Jr., MD;  Location: [facility];  Service: Orthopedics;  Laterality: Left;    MOUTH SURGERY      WISDOM TOOTH EXTRACTION         Current Outpatient Medications   Medication Sig    acetaminophen (TYLENOL) 325 MG tablet Take 650 mg by mouth every 6 (six) hours as needed for Pain.    butalbital-acetaminophen-caffeine -40 mg (FIORICET, ESGIC) -40 mg per tablet TAKE 1 TABLET BY MOUTH AS NEEDED FOR MIGRAINE.(NO MORE THAN 10 TABLETS A MONTH)    diazePAM (VALIUM) 10 MG Tab Take 10 mg by mouth.    EMGALITY  mg/mL PnIj Inject 240mg (2 injections) subcutaneously at separate sites, once (loading dose). Start maintenance dose 28 days later.    fluvoxaMINE (LUVOX) 100 MG tablet Take 200 mg by mouth once daily.     metoclopramide HCl (REGLAN) 10 MG tablet Take 1 tablet (10 mg total) by mouth every 6 (six) hours as needed (migraine or nausea).    MIRENA 20 mcg/24 hr (5 years) IUD     ondansetron (ZOFRAN) 4 MG tablet TAKE 1 TABLET BY MOUTH EVERY 6 HOURS AS NEEDED FOR FOR NAUSEA    OXcarbazepine (TRILEPTAL) 600 MG Tab Take 1 tablet (600 mg total) by mouth 2 (two) times daily. (Patient taking differently: Take 900 mg by mouth 2 (two) times daily. )    rizatriptan  "(MAXALT) 10 MG tablet 1 tab PO PRN migraine. May repeat every 2 hours for max 3 tabs in 24 hours. Use no more than 10 days per month.    tiZANidine (ZANAFLEX) 4 MG tablet Half or full tablet by mouth at night as needed for muscle spasm    venlafaxine (EFFEXOR-XR) 150 MG Cp24     zolpidem (AMBIEN) 10 mg Tab     galcanezumab-gnlm (EMGALITY PEN) 120 mg/mL PnIj Inject 120 mg into the skin every 28 days. (Patient not taking: Reported on 12/26/2019)    HYDROcodone-acetaminophen (NORCO)  mg per tablet Take 1 tablet by mouth every 6 (six) hours as needed for Pain. (Patient not taking: Reported on 2/12/2020)    sucralfate (CARAFATE) 1 gram tablet Take 1 tablet (1 g total) by mouth 4 (four) times daily. When taking NSAIDs (Patient not taking: Reported on 2/12/2020)    sumatriptan (IMITREX) 100 MG tablet 1 tab PO PRN migraine. May repeat once in 2 hours, no more than 2 tab in 24 hours. No more than 10 days per month. (Patient not taking: Reported on 2/12/2020)     Current Facility-Administered Medications   Medication    onabotulinumtoxina injection 200 Units    onabotulinumtoxina injection 200 Units       Review of patient's allergies indicates:   Allergen Reactions    Benadryl [diphenhydramine hcl]      Paralysis on right side body     Codeine      Paralysis right body    Flexeril [cyclobenzaprine] Other (See Comments)     Numbness on right side of body    Penicillins Anaphylaxis and Hives    Nsaids (non-steroidal anti-inflammatory drug) Diarrhea and Nausea And Vomiting    Magnesium      "made me feel horrible"        Family History   Problem Relation Age of Onset    No Known Problems Mother     No Known Problems Father     Cancer Maternal Aunt         endometrial    No Known Problems Maternal Uncle     No Known Problems Paternal Aunt     No Known Problems Paternal Uncle     No Known Problems Maternal Grandmother     No Known Problems Maternal Grandfather     No Known Problems Paternal " Grandmother     No Known Problems Paternal Grandfather     No Known Problems Daughter     No Known Problems Son        Social History     Socioeconomic History    Marital status:      Spouse name: Not on file    Number of children: Not on file    Years of education: Not on file    Highest education level: Not on file   Occupational History    Not on file   Social Needs    Financial resource strain: Not on file    Food insecurity:     Worry: Not on file     Inability: Not on file    Transportation needs:     Medical: Not on file     Non-medical: Not on file   Tobacco Use    Smoking status: Never Smoker    Smokeless tobacco: Never Used   Substance and Sexual Activity    Alcohol use: No    Drug use: No    Sexual activity: Yes     Partners: Male   Lifestyle    Physical activity:     Days per week: Not on file     Minutes per session: Not on file    Stress: Not on file   Relationships    Social connections:     Talks on phone: Not on file     Gets together: Not on file     Attends Orthodoxy service: Not on file     Active member of club or organization: Not on file     Attends meetings of clubs or organizations: Not on file     Relationship status: Not on file   Other Topics Concern    Not on file   Social History Narrative    Not on file       History of present illness:  Follow-up of carpal tunnel left hand.  She has been having a lot of pain in the hand but not in the fingers no unusual numbness after the carpal tunnel.    Review of Systems:    Musculoskeletal:  See HPI      Objective:        Physical Examination:    Vital Signs:    Vitals:    02/12/20 0936   BP: 135/84   Pulse: (!) 111       Body mass index is 56.75 kg/m².    This a well-developed, well nourished patient in no acute distress.  They are alert and oriented and cooperative to examination.        Left hand no unusual numbness fingers work well wound looks pretty good little superficial layer of little open but not a  dehiscence.  Pertinent New Results:    XRAY Report / Interpretation:       Assessment/Plan:      So our plan when keep it covered for right now she takes a lot of pain meds has low pain threshold going to give her a few more Norco 10s.  So protective splint and see her back in 4 weeks.    This note was created using Dragon voice recognition software that occasionally misinterpreted phrases or words.

## 2020-02-25 ENCOUNTER — PATIENT MESSAGE (OUTPATIENT)
Dept: NEUROLOGY | Facility: CLINIC | Age: 30
End: 2020-02-25

## 2020-02-27 ENCOUNTER — PATIENT MESSAGE (OUTPATIENT)
Dept: NEUROLOGY | Facility: CLINIC | Age: 30
End: 2020-02-27

## 2020-02-27 DIAGNOSIS — G43.719 INTRACTABLE CHRONIC MIGRAINE WITHOUT AURA AND WITHOUT STATUS MIGRAINOSUS: ICD-10-CM

## 2020-02-27 RX ORDER — METOCLOPRAMIDE 10 MG/1
TABLET ORAL
Qty: 60 TABLET | Refills: 11 | Status: SHIPPED | OUTPATIENT
Start: 2020-02-27 | End: 2020-11-24 | Stop reason: SDUPTHER

## 2020-02-27 RX ORDER — BUTALBITAL, ACETAMINOPHEN AND CAFFEINE 50; 325; 40 MG/1; MG/1; MG/1
TABLET ORAL
Qty: 10 TABLET | Refills: 0 | Status: SHIPPED | OUTPATIENT
Start: 2020-02-27 | End: 2020-03-26 | Stop reason: SDUPTHER

## 2020-03-08 ENCOUNTER — PATIENT MESSAGE (OUTPATIENT)
Dept: NEUROLOGY | Facility: CLINIC | Age: 30
End: 2020-03-08

## 2020-03-09 ENCOUNTER — PATIENT MESSAGE (OUTPATIENT)
Dept: NEUROLOGY | Facility: CLINIC | Age: 30
End: 2020-03-09

## 2020-03-09 ENCOUNTER — TELEPHONE (OUTPATIENT)
Dept: PHARMACY | Facility: CLINIC | Age: 30
End: 2020-03-09

## 2020-03-09 NOTE — TELEPHONE ENCOUNTER
We are not going to tailor her psychiatric regimen to her pain regimen. She needs to be on the most medically appropriate psychiatric regimen that they see fit

## 2020-03-11 ENCOUNTER — PATIENT MESSAGE (OUTPATIENT)
Dept: NEUROLOGY | Facility: CLINIC | Age: 30
End: 2020-03-11

## 2020-03-13 ENCOUNTER — PATIENT MESSAGE (OUTPATIENT)
Dept: NEUROLOGY | Facility: CLINIC | Age: 30
End: 2020-03-13

## 2020-03-17 ENCOUNTER — PATIENT MESSAGE (OUTPATIENT)
Dept: NEUROLOGY | Facility: CLINIC | Age: 30
End: 2020-03-17

## 2020-03-17 ENCOUNTER — PATIENT MESSAGE (OUTPATIENT)
Dept: ORTHOPEDICS | Facility: CLINIC | Age: 30
End: 2020-03-17

## 2020-03-17 DIAGNOSIS — G43.719 INTRACTABLE CHRONIC MIGRAINE WITHOUT AURA AND WITHOUT STATUS MIGRAINOSUS: Primary | ICD-10-CM

## 2020-03-17 DIAGNOSIS — Z98.890 STATUS POST CARPAL TUNNEL RELEASE: Primary | ICD-10-CM

## 2020-03-18 ENCOUNTER — PATIENT MESSAGE (OUTPATIENT)
Dept: NEUROLOGY | Facility: CLINIC | Age: 30
End: 2020-03-18

## 2020-03-18 RX ORDER — HYDROCODONE BITARTRATE AND ACETAMINOPHEN 5; 325 MG/1; MG/1
1 TABLET ORAL EVERY 8 HOURS PRN
Qty: 21 TABLET | Refills: 0 | Status: SHIPPED | OUTPATIENT
Start: 2020-03-18 | End: 2020-03-25

## 2020-03-23 ENCOUNTER — TELEPHONE (OUTPATIENT)
Dept: PHARMACY | Facility: CLINIC | Age: 30
End: 2020-03-23

## 2020-03-23 NOTE — TELEPHONE ENCOUNTER
No answer/VM to inform patient that [facility] received prescription for Ajovy and benefits investigation is required.  [facility] will be back in touch once insurance determination is received.

## 2020-03-26 ENCOUNTER — PATIENT MESSAGE (OUTPATIENT)
Dept: NEUROLOGY | Facility: CLINIC | Age: 30
End: 2020-03-26

## 2020-03-26 DIAGNOSIS — G43.719 INTRACTABLE CHRONIC MIGRAINE WITHOUT AURA AND WITHOUT STATUS MIGRAINOSUS: ICD-10-CM

## 2020-03-26 RX ORDER — BUTALBITAL, ACETAMINOPHEN AND CAFFEINE 50; 325; 40 MG/1; MG/1; MG/1
TABLET ORAL
Qty: 10 TABLET | Refills: 0 | Status: SHIPPED | OUTPATIENT
Start: 2020-03-27 | End: 2020-04-27 | Stop reason: SDUPTHER

## 2020-03-27 ENCOUNTER — PATIENT MESSAGE (OUTPATIENT)
Dept: ORTHOPEDICS | Facility: CLINIC | Age: 30
End: 2020-03-27

## 2020-03-30 NOTE — TELEPHONE ENCOUNTER
DOCUMENTATION ONLY:  Prior authorization for Ajovy 225mg/1.5 mL #1.5 mL/28 days approved from 03/30/2020 to 03/30/2021  Case ID: [id number]    Co-pay: $0.00    Patient assistance IS NOT required. Sending to clinical pharmacist for  and shipment. Ronaldo OCONNOR

## 2020-04-06 ENCOUNTER — PATIENT MESSAGE (OUTPATIENT)
Dept: ORTHOPEDICS | Facility: CLINIC | Age: 30
End: 2020-04-06

## 2020-04-06 ENCOUNTER — PATIENT MESSAGE (OUTPATIENT)
Dept: PHARMACY | Facility: CLINIC | Age: 30
End: 2020-04-06

## 2020-04-06 ENCOUNTER — TELEPHONE (OUTPATIENT)
Dept: PHARMACY | Facility: CLINIC | Age: 30
End: 2020-04-06

## 2020-04-06 NOTE — TELEPHONE ENCOUNTER
Initial Ajovy shipment scheduled. Verified to ship on  for delivery on . $0.00 copay at 004. Ajovy  training video link sent via [employer]. No questions/concerns. Verified two patient identifiers- name and .    Les Omer, PharmD  Clinical Pharmacist  [facility]  P: [phone]

## 2020-04-07 ENCOUNTER — PATIENT MESSAGE (OUTPATIENT)
Dept: ORTHOPEDICS | Facility: CLINIC | Age: 30
End: 2020-04-07

## 2020-04-13 ENCOUNTER — PATIENT MESSAGE (OUTPATIENT)
Dept: ORTHOPEDICS | Facility: CLINIC | Age: 30
End: 2020-04-13

## 2020-04-15 ENCOUNTER — TELEPHONE (OUTPATIENT)
Dept: NEUROLOGY | Facility: CLINIC | Age: 30
End: 2020-04-15

## 2020-04-15 ENCOUNTER — OFFICE VISIT (OUTPATIENT)
Dept: NEUROLOGY | Facility: CLINIC | Age: 30
End: 2020-04-15
Payer: MEDICAID

## 2020-04-15 ENCOUNTER — PATIENT MESSAGE (OUTPATIENT)
Dept: NEUROLOGY | Facility: CLINIC | Age: 30
End: 2020-04-15

## 2020-04-15 DIAGNOSIS — G43.719 INTRACTABLE CHRONIC MIGRAINE WITHOUT AURA AND WITHOUT STATUS MIGRAINOSUS: ICD-10-CM

## 2020-04-15 DIAGNOSIS — G50.9 TRIGEMINAL NEUROPATHY: Primary | ICD-10-CM

## 2020-04-15 DIAGNOSIS — F41.9 ANXIETY: ICD-10-CM

## 2020-04-15 DIAGNOSIS — M79.2 NEUROPATHIC PAIN: ICD-10-CM

## 2020-04-15 DIAGNOSIS — Z79.891 CHRONICALLY ON OPIATE THERAPY: ICD-10-CM

## 2020-04-15 PROCEDURE — 99214 PR OFFICE/OUTPT VISIT, EST, LEVL IV, 30-39 MIN: ICD-10-PCS | Mod: 95,,, | Performed by: PSYCHIATRY & NEUROLOGY

## 2020-04-15 PROCEDURE — 99214 OFFICE O/P EST MOD 30 MIN: CPT | Mod: 95,,, | Performed by: PSYCHIATRY & NEUROLOGY

## 2020-04-15 RX ORDER — HYDROCODONE BITARTRATE AND ACETAMINOPHEN 5; 325 MG/1; MG/1
1 TABLET ORAL 2 TIMES DAILY PRN
Qty: 60 TABLET | Refills: 0 | Status: SHIPPED | OUTPATIENT
Start: 2020-04-15 | End: 2020-05-13 | Stop reason: SDUPTHER

## 2020-04-15 NOTE — PROGRESS NOTES
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 reason.     TESTING:  -- none     REFERRALS:  -- none     PREVENTION (use daily regardless of headache):  -- continue Trileptal 600mg twice a day (remember that trileptal will lower contraceptive effect of tablets, and trileptal has to be stopped if you are pregnant), copper IUD would be OK   -- continue with Ajovy  -- consider starting lamotrigine next visit    ACUTE TREATMENT:  -- start hydrocodone 5mg twice a day as needed for severe facial pain  -- continue tylenol 1 gram three times a day as needed      Follow up in about 4 weeks (around 5/13/2020) for office visit.      Sangeeta Hart MD

## 2020-04-15 NOTE — PATIENT INSTRUCTIONS
Please call our clinic at [phone] or send a message to Dr. Hart on the [employer] portal if there are any changes to the plan described below, for example,if you are not contacted for the requested tests, referral(s) within one week, if you are unable to receive the medications prescribed, or if you feel you need to change the treatment course for any reason.     TESTING:  -- none     REFERRALS:  -- none     PREVENTION (use daily regardless of headache):  -- continue Trileptal 600mg twice a day (remember that trileptal will lower contraceptive effect of tablets, and trileptal has to be stopped if you are pregnant), copper IUD would be OK   -- continue with Ajovy  -- consider starting lamotrigine next visit    ACUTE TREATMENT:  -- start hydrocodone 5mg twice a day as needed for severe facial pain  -- continue tylenol 1 gram three times a day as needed

## 2020-04-15 NOTE — TELEPHONE ENCOUNTER
Called pt due to COVID-19 restrictions we need to reschedule appt with Dr. Hart on 4/29/2020. Can reschedule to virtual visit, audio visit, or place on wait list. Pt rescheduled to virtual visit this afternoon. Thanks, Inocencia

## 2020-04-23 ENCOUNTER — PATIENT MESSAGE (OUTPATIENT)
Dept: NEUROLOGY | Facility: CLINIC | Age: 30
End: 2020-04-23

## 2020-04-25 ENCOUNTER — PATIENT MESSAGE (OUTPATIENT)
Dept: NEUROLOGY | Facility: CLINIC | Age: 30
End: 2020-04-25

## 2020-04-25 DIAGNOSIS — G43.719 INTRACTABLE CHRONIC MIGRAINE WITHOUT AURA AND WITHOUT STATUS MIGRAINOSUS: ICD-10-CM

## 2020-04-27 RX ORDER — BUTALBITAL, ACETAMINOPHEN AND CAFFEINE 50; 325; 40 MG/1; MG/1; MG/1
TABLET ORAL
Qty: 10 TABLET | Refills: 0 | Status: SHIPPED | OUTPATIENT
Start: 2020-04-27 | End: 2020-05-25 | Stop reason: SDUPTHER

## 2020-05-04 ENCOUNTER — TELEPHONE (OUTPATIENT)
Dept: PHARMACY | Facility: CLINIC | Age: 30
End: 2020-05-04

## 2020-05-13 ENCOUNTER — OFFICE VISIT (OUTPATIENT)
Dept: NEUROLOGY | Facility: CLINIC | Age: 30
End: 2020-05-13
Payer: MEDICAID

## 2020-05-13 ENCOUNTER — TELEPHONE (OUTPATIENT)
Dept: NEUROLOGY | Facility: CLINIC | Age: 30
End: 2020-05-13

## 2020-05-13 DIAGNOSIS — Z79.891 CHRONICALLY ON OPIATE THERAPY: ICD-10-CM

## 2020-05-13 DIAGNOSIS — G43.719 INTRACTABLE CHRONIC MIGRAINE WITHOUT AURA AND WITHOUT STATUS MIGRAINOSUS: Primary | ICD-10-CM

## 2020-05-13 DIAGNOSIS — G50.9 TRIGEMINAL NEUROPATHY: ICD-10-CM

## 2020-05-13 DIAGNOSIS — M79.2 NEUROPATHIC PAIN: ICD-10-CM

## 2020-05-13 PROCEDURE — 99214 PR OFFICE/OUTPT VISIT, EST, LEVL IV, 30-39 MIN: ICD-10-PCS | Mod: 95,,, | Performed by: PSYCHIATRY & NEUROLOGY

## 2020-05-13 PROCEDURE — 99214 OFFICE O/P EST MOD 30 MIN: CPT | Mod: 95,,, | Performed by: PSYCHIATRY & NEUROLOGY

## 2020-05-13 RX ORDER — HYDROCODONE BITARTRATE AND ACETAMINOPHEN 5; 325 MG/1; MG/1
1 TABLET ORAL 3 TIMES DAILY PRN
Qty: 90 TABLET | Refills: 0 | Status: SHIPPED | OUTPATIENT
Start: 2020-05-15 | End: 2020-06-10 | Stop reason: SDUPTHER

## 2020-05-13 RX ORDER — FLUOXETINE HYDROCHLORIDE 20 MG/1
40 CAPSULE ORAL DAILY
COMMUNITY
End: 2020-08-10

## 2020-05-13 NOTE — TELEPHONE ENCOUNTER
Called patient and schedule 4 week follow up for medication refill with Dr. Hart. Patient expressed understanding

## 2020-05-13 NOTE — PATIENT INSTRUCTIONS
Please call our clinic at [phone] or send a message to Dr. Hart on the [employer] portal if there are any changes to the plan described below, for example,if you are not contacted for the requested tests, referral(s) within one week, if you are unable to receive the medications prescribed, or if you feel you need to change the treatment course for any reason.     TESTING:  -- none     REFERRALS:  -- none     PREVENTION (use daily regardless of headache):  -- continue Trileptal 600mg twice a day (remember that trileptal will lower contraceptive effect of tablets, and trileptal has to be stopped if you are pregnant)  -- continue with Ajovy  -- consider starting lamotrigine next visit    ACUTE TREATMENT:  -- raise hydrocodone 5mg from twice a day to three times a day as needed for severe facial pain starting with 5/15/2020 refill   -- may continue tylenol 1 gram 2-3 times per day as needed (max 4 grams per day including what is in hydrocodone, which is 1 gram per day if taking 3 tablets)

## 2020-05-13 NOTE — PROGRESS NOTES
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 Botox but denied after one year of use. botox discontinued. Emgality tried x 3 with no effective. Started Ajovy 4/16//2020.         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 Trileptal has been effective but dosing is limited by side effects. Treatment refractive case so we are using opiates as well. Nucynta was most effective but cost prohibitive once her insurance changed to medicaid.     Resume hydrocodone at low dose. Avoid percocet - headaches were progressing on this. Consider adding lamotrigine once she has adjusted to ajovy. If hydrocodone failed, consider low dose methadone. Will not tolerate early refills and aberrant behavior on the prescription drug monitor program and this will be grounds for termination of chronic opiate therapy for this purpose.            Relevant Medications    HYDROcodone-acetaminophen (NORCO) 5-325 mg per tablet (Start on 5/15/2020)       Psychiatric    Chronically on opiate therapy    Overview     Previous Opiate contract on file, will need new one for next visit. Has Mirena for contraception           Relevant Medications    HYDROcodone-acetaminophen (NORCO) 5-325 mg per tablet (Start on 5/15/2020)              Please call our clinic at [phone] or send a message to  Dr. Hart on the [employer] portal if there are any changes to the plan described below, for example,if you are not contacted for the requested tests, referral(s) within one week, if you are unable to receive the medications prescribed, or if you feel you need to change the treatment course for any reason.     TESTING:  -- none     REFERRALS:  -- none     PREVENTION (use daily regardless of headache):  -- continue Trileptal 600mg twice a day (remember that trileptal will lower contraceptive effect of tablets, and trileptal has to be stopped if you are pregnant)  -- continue with Ajovy  -- consider starting lamotrigine next visit    ACUTE TREATMENT:  -- raise hydrocodone 5mg from twice a day to three times a day as needed for severe facial pain starting with 5/15/2020 refill   -- may continue tylenol 1 gram 2-3 times per day as needed (max 4 grams per day including what is in hydrocodone, which is 1 gram per day if taking 3 tablets)    Follow up in about 4 weeks (around 6/10/2020) for office visit.      Sangeeta Hart MD

## 2020-05-13 NOTE — TELEPHONE ENCOUNTER
----- Message from Sangeeta Hart MD sent at 5/13/2020  2:58 PM CDT -----  Please schedule office follow up in 4 weeks (office or virtual fine) for med refill

## 2020-05-21 ENCOUNTER — PATIENT MESSAGE (OUTPATIENT)
Dept: NEUROLOGY | Facility: CLINIC | Age: 30
End: 2020-05-21

## 2020-05-24 ENCOUNTER — PATIENT MESSAGE (OUTPATIENT)
Dept: NEUROLOGY | Facility: CLINIC | Age: 30
End: 2020-05-24

## 2020-05-24 DIAGNOSIS — G43.719 INTRACTABLE CHRONIC MIGRAINE WITHOUT AURA AND WITHOUT STATUS MIGRAINOSUS: ICD-10-CM

## 2020-05-25 RX ORDER — BUTALBITAL, ACETAMINOPHEN AND CAFFEINE 50; 325; 40 MG/1; MG/1; MG/1
TABLET ORAL
Qty: 10 TABLET | Refills: 0 | Status: SHIPPED | OUTPATIENT
Start: 2020-05-25 | End: 2020-06-25 | Stop reason: SDUPTHER

## 2020-06-03 ENCOUNTER — TELEPHONE (OUTPATIENT)
Dept: PHARMACY | Facility: CLINIC | Age: 30
End: 2020-06-03

## 2020-06-10 ENCOUNTER — OFFICE VISIT (OUTPATIENT)
Dept: NEUROLOGY | Facility: CLINIC | Age: 30
End: 2020-06-10
Payer: MEDICAID

## 2020-06-10 ENCOUNTER — TELEPHONE (OUTPATIENT)
Dept: NEUROLOGY | Facility: CLINIC | Age: 30
End: 2020-06-10

## 2020-06-10 DIAGNOSIS — Z79.891 CHRONICALLY ON OPIATE THERAPY: ICD-10-CM

## 2020-06-10 DIAGNOSIS — G50.9 TRIGEMINAL NEUROPATHY: Primary | ICD-10-CM

## 2020-06-10 DIAGNOSIS — M79.2 NEUROPATHIC PAIN: ICD-10-CM

## 2020-06-10 PROCEDURE — 99214 PR OFFICE/OUTPT VISIT, EST, LEVL IV, 30-39 MIN: ICD-10-PCS | Mod: 95,,, | Performed by: PSYCHIATRY & NEUROLOGY

## 2020-06-10 PROCEDURE — 99214 OFFICE O/P EST MOD 30 MIN: CPT | Mod: 95,,, | Performed by: PSYCHIATRY & NEUROLOGY

## 2020-06-10 RX ORDER — HYDROCODONE BITARTRATE AND ACETAMINOPHEN 5; 325 MG/1; MG/1
1 TABLET ORAL 3 TIMES DAILY PRN
Qty: 90 TABLET | Refills: 0 | Status: SHIPPED | OUTPATIENT
Start: 2020-06-14 | End: 2020-07-14

## 2020-06-10 RX ORDER — HYDROCODONE BITARTRATE AND ACETAMINOPHEN 5; 325 MG/1; MG/1
1 TABLET ORAL EVERY 6 HOURS PRN
Qty: 120 TABLET | Refills: 0 | OUTPATIENT
Start: 2020-07-14 | End: 2020-08-04

## 2020-06-10 NOTE — PROGRESS NOTES
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 [] thoughts of harming self/others, [] depression   Integumentary: [] rashes, [] sores that do not heal       Objective:        There were no vitals filed for this visit.  There is no height or weight on file to calculate BMI.      Data Review:     No results found for this or any previous visit.    Lab Results   Component Value Date     01/21/2020     12/26/2018    K 4.5 01/21/2020     01/21/2020     12/26/2018    CO2 27 01/21/2020    BUN 10 01/21/2020    CREATININE 0.9 01/21/2020    CREATININE 0.95 12/26/2018     (H) 01/21/2020    GLU 99 12/26/2018    AST 21 01/21/2020    ALT 34 01/21/2020    ALBUMIN 3.7 01/21/2020    PROT 7.0 01/21/2020    BILITOT 0.2 01/21/2020       Lab Results   Component Value Date    WBC 8.86 01/21/2020    HGB 12.0 01/21/2020    HCT 38.1 01/21/2020    MCV 90 01/21/2020     01/21/2020       Lab Results   Component Value Date    TSH 2.270 05/13/2019       Assessment & Plan:       Problem List Items Addressed This Visit        Neuro    Trigeminal neuropathy - Primary    Overview     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          Relevant Medications    HYDROcodone-acetaminophen (NORCO) 5-325 mg per tablet (Start on 6/14/2020)    HYDROcodone-acetaminophen  (NORCO) 5-325 mg per tablet (Start on 7/14/2020)    Neuropathic pain    Overview     The pain from her trigeminal neuropathy is a neuropathic type pain. Trileptal has been effective but dosing is limited by side effects. Treatment refractive case so we are using opiates as well. Nucynta was most effective but cost prohibitive once her insurance changed to medicaid.     Resume hydrocodone at low dose. Avoid percocet - headaches were progressing on this. Consider adding lamotrigine once she has adjusted to ajovy. If hydrocodone failed, consider low dose methadone. Will not tolerate early refills and aberrant behavior on the prescription drug monitor program and this will be grounds for termination of chronic opiate therapy for this purpose.            Relevant Medications    HYDROcodone-acetaminophen (NORCO) 5-325 mg per tablet (Start on 6/14/2020)    HYDROcodone-acetaminophen (NORCO) 5-325 mg per tablet (Start on 7/14/2020)       Psychiatric    Chronically on opiate therapy    Overview     Previous Opiate contract on file, will need new one for next visit. Has Mirena for contraception but she would like to remove this and not be on any hormonal methods.  I strongly discouraged cessation of contraceptives while she is on the medication regimen that she is on including Trileptal, Ajovvy, hydrocodone.  I recommended that she see her OB gyn for further assistance.           Relevant Medications    HYDROcodone-acetaminophen (NORCO) 5-325 mg per tablet (Start on 6/14/2020)    HYDROcodone-acetaminophen (NORCO) 5-325 mg per tablet (Start on 7/14/2020)              Please call our clinic at [phone] or send a message to Dr. Hart on the [facility] portal if there are any changes to the plan described below, for example,if you are not contacted for the requested tests, referral(s) within one week, if you are unable to receive the medications prescribed, or if you feel you need to change the treatment course for any reason.      TESTING:  -- none     REFERRALS:  -- none     PREVENTION (use daily regardless of headache):  -- continue Trileptal 600mg twice a day (remember that trileptal will lower contraceptive effect of tablets, and trileptal has to be stopped if you are pregnant)  -- continue with Ajovy (Ajovvy should be stopped ideally 5 months before planned pregnancy, when using this medication I recommend being on a reliable contraceptive)  -- consider starting lamotrigine next visit    ACUTE TREATMENT:  -- continue hydrocodone 5mg three times per day as needed #90 (6/14/2020 - 7/14/2020, and 7/14/2020 to 8/13/2020 [facility]  -- may continue tylenol 1 gram 2-3 times per day as needed (max 4 grams per day including what is in hydrocodone, which is 1 gram per day if taking 3 tablets)    Follow up in about 2 months (around 8/10/2020).      Sangeeta Hart MD

## 2020-06-10 NOTE — TELEPHONE ENCOUNTER
----- Message from Sangeeta Hart MD sent at 6/10/2020  2:55 PM CDT -----  Schedule follow up first week of August

## 2020-06-10 NOTE — PATIENT INSTRUCTIONS
Please call our clinic at [phone] or send a message to Dr. Hart on the [facility] portal if there are any changes to the plan described below, for example,if you are not contacted for the requested tests, referral(s) within one week, if you are unable to receive the medications prescribed, or if you feel you need to change the treatment course for any reason.     TESTING:  -- none     REFERRALS:  -- none     PREVENTION (use daily regardless of headache):  -- continue Trileptal 600mg twice a day (remember that trileptal will lower contraceptive effect of tablets, and trileptal has to be stopped if you are pregnant)  -- continue with Ajovvy (Ajovvy should be stopped ideally 5 months before planned pregnancy, when using this medication I recommend being on a reliable contraceptive)  -- consider starting lamotrigine next visit    ACUTE TREATMENT:  -- continue hydrocodone 5mg three times per day as needed #90 (6/14/2020 - 7/14/2020, and 7/14/2020 to 8/13/2020 sent electronically to [facility])  -- may continue tylenol 1 gram 2-3 times per day as needed (max 4 grams per day including what is in hydrocodone, which is 1 gram per day if taking 3 tablets)

## 2020-06-25 DIAGNOSIS — G43.719 INTRACTABLE CHRONIC MIGRAINE WITHOUT AURA AND WITHOUT STATUS MIGRAINOSUS: ICD-10-CM

## 2020-06-25 RX ORDER — BUTALBITAL, ACETAMINOPHEN AND CAFFEINE 50; 325; 40 MG/1; MG/1; MG/1
TABLET ORAL
Qty: 10 TABLET | Refills: 0 | Status: SHIPPED | OUTPATIENT
Start: 2020-06-25 | End: 2020-07-27 | Stop reason: SDUPTHER

## 2020-06-25 NOTE — TELEPHONE ENCOUNTER
Refill for Fioricet was approved and sent to [facility] in [place]. Sent pt message to notify on My Chart.

## 2020-07-01 ENCOUNTER — HOSPITAL ENCOUNTER (EMERGENCY)
Facility: HOSPITAL | Age: 30
Discharge: HOME | End: 2020-07-01
Attending: EMERGENCY MEDICINE
Payer: MEDICAID

## 2020-07-01 ENCOUNTER — PATIENT MESSAGE (OUTPATIENT)
Dept: NEUROLOGY | Facility: CLINIC | Age: 30
End: 2020-07-01

## 2020-07-01 VITALS
RESPIRATION RATE: 20 BRPM | BODY MASS INDEX: 48.82 KG/M2 | WEIGHT: 293 LBS | HEART RATE: 87 BPM | OXYGEN SATURATION: 98 % | HEIGHT: 65 IN | TEMPERATURE: 98 F | SYSTOLIC BLOOD PRESSURE: 142 MMHG | DIASTOLIC BLOOD PRESSURE: 82 MMHG

## 2020-07-01 DIAGNOSIS — G50.9 TRIGEMINAL NEUROPATHY: Primary | ICD-10-CM

## 2020-07-01 DIAGNOSIS — G50.0 TRIGEMINAL NEURALGIA OF RIGHT SIDE OF FACE: Primary | ICD-10-CM

## 2020-07-01 DIAGNOSIS — R51.9 FACIAL PAIN: ICD-10-CM

## 2020-07-01 PROCEDURE — 63600175 PHARM REV CODE 636 W HCPCS: Performed by: EMERGENCY MEDICINE

## 2020-07-01 PROCEDURE — 96372 THER/PROPH/DIAG INJ SC/IM: CPT

## 2020-07-01 PROCEDURE — 25000003 PHARM REV CODE 250: Performed by: EMERGENCY MEDICINE

## 2020-07-01 PROCEDURE — 99284 EMERGENCY DEPT VISIT MOD MDM: CPT | Mod: 25

## 2020-07-01 RX ORDER — MORPHINE SULFATE 10 MG/ML
5 INJECTION INTRAMUSCULAR; INTRAVENOUS; SUBCUTANEOUS ONCE
Status: COMPLETED | OUTPATIENT
Start: 2020-07-01 | End: 2020-07-01

## 2020-07-01 RX ORDER — ONDANSETRON 4 MG/1
4 TABLET, ORALLY DISINTEGRATING ORAL
Status: COMPLETED | OUTPATIENT
Start: 2020-07-01 | End: 2020-07-01

## 2020-07-01 RX ORDER — LAMOTRIGINE 25 MG/1
TABLET ORAL
Qty: 30 TABLET | Refills: 11 | Status: SHIPPED | OUTPATIENT
Start: 2020-07-01 | End: 2020-08-31

## 2020-07-01 RX ADMIN — MORPHINE SULFATE 5 MG: 10 INJECTION, SOLUTION INTRAMUSCULAR; INTRAVENOUS at 07:07

## 2020-07-01 RX ADMIN — ONDANSETRON 4 MG: 4 TABLET, ORALLY DISINTEGRATING ORAL at 07:07

## 2020-07-01 NOTE — ED PROVIDER NOTES
"Encounter Date: 7/1/2020    SCRIBE #1 NOTE: I, Tana Ansari, am scribing for, and in the presence of, Nish Cunningham MD.       History     Chief Complaint   Patient presents with    Facial Pain     right sided facial pain; states, " Im having a bad trigeminal neuralgia flare-up."     Time seen by provider: 6:26 PM on 07/01/2020    Jasper Guerrero is a 29 y.o. female with a PMHx of lingual nerve injury, chronic migraines, and DVT who presents to the ED with an onset of right sided facial pain x1 day. The patient reports she is having bad trigeminal neuralgia flare-up. The patient also endures nausea. She states she has not had a flare up in 2 months. Patient is compliant with daily medication. The patient states this flare up could possibly be due to the weather. Her neurologist is Dr. Hart in [place]. The patient denies fever, cough, CP, SOB, weakness, facial asymmetry, speech difficulty or any other symptoms at this time. PSHx of mouth surgery and wisdom tooth surgery.      The history is provided by the patient.     Review of patient's allergies indicates:   Allergen Reactions    Benadryl [diphenhydramine hcl]      Paralysis on right side body     Codeine      Paralysis right body    Flexeril [cyclobenzaprine] Other (See Comments)     Numbness on right side of body    Penicillins Anaphylaxis and Hives    Nsaids (non-steroidal anti-inflammatory drug) Diarrhea and Nausea And Vomiting    Magnesium      "made me feel horrible"      Past Medical History:   Diagnosis Date    Agoraphobia     Depression     DVT (deep venous thrombosis)     2019 had blood clot in right arm    Fibromyalgia     Migraine headache     Nerve injury 08/2016    Lingual Nerve Injury    PTSD (post-traumatic stress disorder)      Past Surgical History:   Procedure Laterality Date    CARPAL TUNNEL RELEASE Right 12/27/2018    Dr Lozada     CARPAL TUNNEL RELEASE Left 1/28/2020    Procedure: RELEASE, CARPAL TUNNEL;  Surgeon: Brendon" JAMES Lozada Jr., MD;  [facility]: [facility];  Service: Orthopedics;  Laterality: Left;    MOUTH SURGERY      WISDOM TOOTH EXTRACTION       Family History   Problem Relation Age of Onset    No Known Problems Mother     No Known Problems Father     Cancer Maternal Aunt         endometrial    No Known Problems Maternal Uncle     No Known Problems Paternal Aunt     No Known Problems Paternal Uncle     No Known Problems Maternal Grandmother     No Known Problems Maternal Grandfather     No Known Problems Paternal Grandmother     No Known Problems Paternal Grandfather     No Known Problems Daughter     No Known Problems Son      Social History     Tobacco Use    Smoking status: Never Smoker    Smokeless tobacco: Never Used   Substance Use Topics    Alcohol use: No    Drug use: No     Review of Systems   Constitutional: Negative for activity change, diaphoresis and fever.   HENT: Negative for ear pain, rhinorrhea, sore throat and trouble swallowing.         + Facial pain.       - Facial asymmetry.   Eyes: Negative for pain and visual disturbance.   Respiratory: Negative for cough, shortness of breath and stridor.    Cardiovascular: Negative for chest pain.   Gastrointestinal: Positive for nausea. Negative for abdominal pain, blood in stool, diarrhea and vomiting.   Genitourinary: Negative for dysuria, hematuria, vaginal bleeding and vaginal discharge.   Musculoskeletal: Negative for gait problem.   Skin: Negative for rash and wound.   Neurological: Negative for seizures, speech difficulty, weakness and headaches.   Psychiatric/Behavioral: Negative for hallucinations and suicidal ideas.       Physical Exam     Initial Vitals [07/01/20 1820]   BP Pulse Resp Temp SpO2   (!) 142/82 87 20 98.3 °F (36.8 °C) 98 %      MAP       --         Physical Exam    Nursing note and vitals reviewed.  Constitutional: She appears well-developed. She is not diaphoretic. No distress.   HENT:   Head: Normocephalic and atraumatic.    Nose: Nose normal.   Eyes: EOM are normal. No scleral icterus.   Neck: Normal range of motion. Neck supple.   Cardiovascular: Normal rate, regular rhythm, normal heart sounds and intact distal pulses. Exam reveals no gallop and no friction rub.    No murmur heard.  Pulmonary/Chest: Breath sounds normal. No stridor. No respiratory distress. She has no wheezes. She has no rhonchi. She has no rales.   Abdominal: Soft. Bowel sounds are normal. There is no abdominal tenderness.   Musculoskeletal: Normal range of motion.   Neurological: She is alert and oriented to person, place, and time. She has normal strength. No cranial nerve deficit or sensory deficit.   Cranial nerves II-XII grossly intact. Finger-to-nose normal. Tone normal. Sensation intact to light touch. No drift. No disdiadochokinesia. 5/5 BUE and BLE strength. Normal gait. Negative Romberg. Speech and cognition is normal. No focal neurologic deficit.     Skin: Skin is warm and dry. Capillary refill takes less than 2 seconds. No rash noted.   Psychiatric: She has a normal mood and affect.         ED Course   Procedures  Labs Reviewed - No data to display       Imaging Results    None          Medical Decision Making:   History:   Old Medical Records: I decided to obtain old medical records.  ED Management:  29-year-old female past medical history of trigeminal neuralgia and fibromyalgia presents today with trigeminal neuralgia flare.  No focal neurological symptoms. Neuro exam is benign. Pt is nontoxic. VSS.    Based on history and normal neurological exam I have low suspicion for intracranial tumor, stroke, intracranial bleed, meningitis, temporal arteritis, glaucoma, CO poisoning.    Patient is already on medications for trigeminal neuralgia and spoke to neurologist today who will initiate a new medication but she came to the ED for acute pain control.  Patient given IM morphine in the ED and will initiate prescription for neurologist orders.  Patient will  also follow up closely with Neurology and given referral for further outpatient management.  Patient understands agrees with instructions and strict return precautions.  Patient instructed the side effects of narcotic medications and not to drive or operate any heavy machinery.  She states that her  drove and will ride with him.             Scribe Attestation:   Scribe #1: I performed the above scribed service and the documentation accurately describes the services I performed. I attest to the accuracy of the note.      Attending Attestation:     Physician Attestation for Scribe:    I, Dr. Nish Cunningham, personally performed the services described in this documentation.   All medical record entries made by the scribe were at my direction and in my presence.   I have reviewed the chart and agree that the record is accurate and complete.   Nish Cunningham MD  7:47 PM 07/01/2020     DISCLAIMER: This note was prepared with [employer] Speaking voice recognition transcription software. Garbled syntax, mangled pronouns, and other bizarre constructions may be attributed to that software system.                        Clinical Impression:       ICD-10-CM ICD-9-CM   1. Trigeminal neuralgia of right side of face  G50.0 350.1   2. Facial pain  R51 784.0             ED Disposition Condition    Discharge Stable        ED Prescriptions     None        Follow-up Information     Follow up With Specialties Details Why Contact Info    Sangeeta Hart MD Neurology Go in 1 day  [address]  [address] [place] [id number]  [phone]      Sofia Perry NP Family Medicine Go in 1 day  [address] [facility]  [address] [facility] [address]  [phone]      [facility] Emergency Medicine Go to  As needed, If symptoms worsen [address] [place] [address] Drive  [facility] [address] [phone]                                     Nish Cunningham MD  07/01/20 [id number]

## 2020-07-02 ENCOUNTER — TELEPHONE (OUTPATIENT)
Dept: PHARMACY | Facility: CLINIC | Age: 30
End: 2020-07-02

## 2020-07-06 ENCOUNTER — HOSPITAL ENCOUNTER (EMERGENCY)
Facility: HOSPITAL | Age: 30
Discharge: HOME | End: 2020-07-06
Attending: EMERGENCY MEDICINE
Payer: MEDICAID

## 2020-07-06 VITALS
SYSTOLIC BLOOD PRESSURE: 145 MMHG | TEMPERATURE: 97 F | OXYGEN SATURATION: 95 % | HEART RATE: 96 BPM | DIASTOLIC BLOOD PRESSURE: 74 MMHG | RESPIRATION RATE: 16 BRPM

## 2020-07-06 DIAGNOSIS — G43.809 OTHER MIGRAINE WITHOUT STATUS MIGRAINOSUS, NOT INTRACTABLE: Primary | ICD-10-CM

## 2020-07-06 LAB
B-HCG UR QL: NEGATIVE
CTP QC/QA: YES

## 2020-07-06 PROCEDURE — 63600175 PHARM REV CODE 636 W HCPCS: Performed by: EMERGENCY MEDICINE

## 2020-07-06 PROCEDURE — 99284 EMERGENCY DEPT VISIT MOD MDM: CPT | Mod: 25

## 2020-07-06 PROCEDURE — 96372 THER/PROPH/DIAG INJ SC/IM: CPT

## 2020-07-06 PROCEDURE — 81025 URINE PREGNANCY TEST: CPT | Performed by: PHYSICIAN ASSISTANT

## 2020-07-06 RX ORDER — PROCHLORPERAZINE EDISYLATE 5 MG/ML
10 INJECTION INTRAMUSCULAR; INTRAVENOUS
Status: COMPLETED | OUTPATIENT
Start: 2020-07-06 | End: 2020-07-06

## 2020-07-06 RX ORDER — BUTORPHANOL TARTRATE 1 MG/ML
1 INJECTION INTRAMUSCULAR; INTRAVENOUS ONCE
Status: COMPLETED | OUTPATIENT
Start: 2020-07-06 | End: 2020-07-06

## 2020-07-06 RX ADMIN — PROCHLORPERAZINE EDISYLATE 10 MG: 5 INJECTION INTRAMUSCULAR; INTRAVENOUS at 07:07

## 2020-07-06 RX ADMIN — BUTORPHANOL TARTRATE 1 MG: 1 INJECTION, SOLUTION INTRAMUSCULAR; INTRAVENOUS at 07:07

## 2020-07-06 NOTE — ED PROVIDER NOTES
"Encounter Date: 7/6/2020    SCRIBE #1 NOTE: I, Sheridan Flores, am scribing for, and in the presence of, Jesus Bustamante MD.       History     Chief Complaint   Patient presents with    Headache     Time seen by provider: 6:43 PM on 07/06/2020    Jasper Guerrero is a 29 y.o. female with PMHx of migraines, DVT, and fibromyalgia who presents to the ED with an onset of HA for 1 day. She took Fioricet, Maxalt, and Imitrex with some relief. She also c/o nausea. The patient reports that she self-injects with Ajovy every 28 days as a migraine preventative. She is due for her next injection in 3 days. The patient denies vomiting, changes in vision, numbness, or any other symptoms at this time. No pertinent PSHx. Known drug allergies include benadryl, codeine, flexeril, and penicillins.      The history is provided by the patient.     Review of patient's allergies indicates:   Allergen Reactions    Benadryl [diphenhydramine hcl]      Paralysis on right side body     Codeine      Paralysis right body    Flexeril [cyclobenzaprine] Other (See Comments)     Numbness on right side of body    Penicillins Anaphylaxis and Hives    Nsaids (non-steroidal anti-inflammatory drug) Diarrhea and Nausea And Vomiting    Magnesium      "made me feel horrible"      Past Medical History:   Diagnosis Date    Agoraphobia     Depression     DVT (deep venous thrombosis)     2019 had blood clot in right arm    Fibromyalgia     Migraine headache     Nerve injury 08/2016    Lingual Nerve Injury    PTSD (post-traumatic stress disorder)      Past Surgical History:   Procedure Laterality Date    CARPAL TUNNEL RELEASE Right 12/27/2018    Dr Lozada     CARPAL TUNNEL RELEASE Left 1/28/2020    Procedure: RELEASE, CARPAL TUNNEL;  Surgeon: Brendon Lozada Jr., MD;  Location: [facility];  Service: Orthopedics;  Laterality: Left;    MOUTH SURGERY      WISDOM TOOTH EXTRACTION       Family History   Problem Relation Age of Onset    No Known " Problems Mother     No Known Problems Father     Cancer Maternal Aunt         endometrial    No Known Problems Maternal Uncle     No Known Problems Paternal Aunt     No Known Problems Paternal Uncle     No Known Problems Maternal Grandmother     No Known Problems Maternal Grandfather     No Known Problems Paternal Grandmother     No Known Problems Paternal Grandfather     No Known Problems Daughter     No Known Problems Son      Social History     Tobacco Use    Smoking status: Never Smoker    Smokeless tobacco: Never Used   Substance Use Topics    Alcohol use: No    Drug use: No     Review of Systems   Constitutional: Negative for fever.   Eyes: Negative for visual disturbance.   Gastrointestinal: Positive for nausea. Negative for vomiting.   Musculoskeletal: Negative for back pain.   Neurological: Positive for headaches. Negative for weakness and numbness.   Hematological: Does not bruise/bleed easily.       Physical Exam     Initial Vitals [07/06/20 1720]   BP Pulse Resp Temp SpO2   (!) 140/76 96 18 97.4 °F (36.3 °C) 97 %      MAP       --         Physical Exam    Nursing note and vitals reviewed.  Constitutional: She appears well-developed and well-nourished. She is not diaphoretic.  Non-toxic appearance. She does not have a sickly appearance. She does not appear ill. No distress.   HENT:   Head: Normocephalic and atraumatic.   Eyes: EOM are normal.   No photophobia   Neck: Normal range of motion. Neck supple.   Cardiovascular: Normal rate, regular rhythm and normal heart sounds. Exam reveals no gallop and no friction rub.    No murmur heard.  Pulmonary/Chest: Breath sounds normal. No respiratory distress. She has no wheezes. She has no rhonchi. She has no rales.   Musculoskeletal: Normal range of motion.   Neurological: She is alert and oriented to person, place, and time.   Awake alert well-appearing  Follows all commands  Equal  strength bilaterally   Skin: Skin is warm and dry.    Psychiatric: She has a normal mood and affect. Her behavior is normal. Judgment and thought content normal.         ED Course   Procedures  Labs Reviewed   POCT URINE PREGNANCY          Imaging Results    None          Medical Decision Making:   History:   Old Medical Records: I decided to obtain old medical records.  Clinical Tests:   Lab Tests: Ordered and Reviewed            Scribe Attestation:   Scribe #1: I performed the above scribed service and the documentation accurately describes the services I performed. I attest to the accuracy of the note.    I, Dr. Bustamante, personally performed the services described in this documentation. All medical record entries made by the scribe were at my direction and in my presence.  I have reviewed the chart and agree that the record reflects my personal performance and is accurate and complete.8:10 PM 07/06/2020            ED Course as of Jul 06 2009 Mon Jul 06, 2020 1923 Preg Test, Ur: Negative [EF]   2004 29-year-old presents to the ER with typical migraine headache.  Not sudden in onset or maximal in onset.  No relief at home with typical medications.  Patient was given Compazine and IV state all in the ER.  She reports persistent headache but is well-appearing.  No outward sign of any discomfort.  She can be discharged home.  Keep scheduled visit for Thursday monthly injection from neurology     HA not max at onset, not sudden at onset and no change from prior.    I do not suspect a life-threatening cause of the headache such as subarachnoid, meningitis, epidural, subdural, venous thrombus, intracranial hypertension, carotid dissection or vertebral dissection.     [EF]      ED Course User Index  [EF] Jesus Bustamante MD                Clinical Impression:       ICD-10-CM ICD-9-CM   1. Other migraine without status migrainosus, not intractable  G43.809 346.80                                Jesus Bustamante MD  07/06/20 2011

## 2020-07-07 NOTE — ED NOTES
Jasper Guerrero presents to the ED with c/o generalized headache that patient has had for a few days. Patient reports that she self injects with migraine medication, but is unable to do so for the next 3 days. Associated complaints are nausea and photophobia. Bilateral hand  are strong and equal, + sensation to upper and lower extremities. AAOx4. Mucous membranes are pink and moist. Skin is warm, dry and intact. Lungs are clear bilaterally, respirations are regular and unlabored. Denies SOB, cough, congestion or rhinorrhea. BS active x4, no tenderness with palpation, abd is soft and not distended. Denies any appetite or activity change. S1S2, capillary refill is < 2 seconds. Denies dysuria, difficulty urinating, frequency, numbness, tingling or weakness. MARQUIS GLOVER

## 2020-07-09 ENCOUNTER — PATIENT OUTREACH (OUTPATIENT)
Dept: EMERGENCY MEDICINE | Facility: HOSPITAL | Age: 30
End: 2020-07-09

## 2020-07-09 NOTE — PROGRESS NOTES
Ines Griffin  ED Navigator  Emergency Department    Project: [facility] ED Navigator  Role: Community Health Worker    Date: 07/09/2020  Patient Name: Jasper Guerrero  MRN: [id number]  PCP: Sofia Perry NP    Assessment:     Jasper Guerrero is a 29 y.o. female who has presented to ED for No chief complaint on file.  . Patient has visited the ED 2 times in the past 3 months. Patient did not contact PCP.     ED Navigator Patient Assessment    Consent to Services  Does patient consent to completing the assessment?: No  Transportation  Insurance Coverage  Specialist Appointment  PCP Follow Up Appointment  Medications  Psychological  Food  Communication/Education  Other Financial Concers  Other Social Barriers/Concerns         Social History     Socioeconomic History    Marital status:      Spouse name: Not on file    Number of children: Not on file    Years of education: Not on file    Highest education level: Not on file   Occupational History    Not on file   Social Needs    Financial resource strain: Not on file    Food insecurity     Worry: Not on file     Inability: Not on file    Transportation needs     Medical: Not on file     Non-medical: Not on file   Tobacco Use    Smoking status: Never Smoker    Smokeless tobacco: Never Used   Substance and Sexual Activity    Alcohol use: No    Drug use: No    Sexual activity: Yes     Partners: Male   Lifestyle    Physical activity     Days per week: Not on file     Minutes per session: Not on file    Stress: Not on file   Relationships    Social connections     Talks on phone: Not on file     Gets together: Not on file     Attends [employer] service: Not on file     Active member of club or organization: Not on file     Attends meetings of clubs or organizations: Not on file     Relationship status: Not on file   Other Topics Concern    Not on file   Social History Narrative    Not on file       Plan:     Patient denied any needs at this time.

## 2020-07-18 ENCOUNTER — PATIENT MESSAGE (OUTPATIENT)
Dept: NEUROLOGY | Facility: CLINIC | Age: 30
End: 2020-07-18

## 2020-07-26 ENCOUNTER — PATIENT MESSAGE (OUTPATIENT)
Dept: NEUROLOGY | Facility: CLINIC | Age: 30
End: 2020-07-26

## 2020-07-26 DIAGNOSIS — G43.719 INTRACTABLE CHRONIC MIGRAINE WITHOUT AURA AND WITHOUT STATUS MIGRAINOSUS: ICD-10-CM

## 2020-07-27 RX ORDER — BUTALBITAL, ACETAMINOPHEN AND CAFFEINE 50; 325; 40 MG/1; MG/1; MG/1
TABLET ORAL
Qty: 10 TABLET | Refills: 0 | Status: SHIPPED | OUTPATIENT
Start: 2020-07-27 | End: 2020-08-24 | Stop reason: SDUPTHER

## 2020-07-30 ENCOUNTER — TELEPHONE (OUTPATIENT)
Dept: PHARMACY | Facility: CLINIC | Age: 30
End: 2020-07-30

## 2020-07-31 ENCOUNTER — TELEPHONE (OUTPATIENT)
Dept: NEUROLOGY | Facility: CLINIC | Age: 30
End: 2020-07-31

## 2020-07-31 NOTE — TELEPHONE ENCOUNTER
----- Message from Denisse Mayes PharmD sent at 7/30/2020  5:22 PM CDT -----  Regarding: Ajovy  Good Evening,    This is just an FYI email to let you all know Ms. Guerrero is pregnant and will not be taking the Ajovy at this time. We will reach back out to her once she has delivered.Thank You,      Denisse Mayes, PharmD    Specialty Pharmacy Clinical Pharmacist  [facility]  P: [phone]

## 2020-07-31 NOTE — TELEPHONE ENCOUNTER
----- Message from Denisse Mayes PharmD sent at 7/30/2020  5:22 PM CDT -----  Regarding: Ajovy  Good Evening,    This is just an FYI email to let you all know Ms. Guerrero is pregnant and will not be taking the Ajovy at this time. We will reach back out to her once she has delivered.Thank You,      Denisse Mayes, PharmD    Specialty Pharmacy Clinical Pharmacist  [facility]  P: [phone]

## 2020-08-04 ENCOUNTER — PATIENT MESSAGE (OUTPATIENT)
Dept: NEUROLOGY | Facility: CLINIC | Age: 30
End: 2020-08-04

## 2020-08-04 ENCOUNTER — HOSPITAL ENCOUNTER (EMERGENCY)
Facility: HOSPITAL | Age: 30
Discharge: HOME | End: 2020-08-04
Attending: EMERGENCY MEDICINE
Payer: MEDICAID

## 2020-08-04 VITALS
DIASTOLIC BLOOD PRESSURE: 68 MMHG | BODY MASS INDEX: 56.58 KG/M2 | OXYGEN SATURATION: 95 % | SYSTOLIC BLOOD PRESSURE: 136 MMHG | RESPIRATION RATE: 20 BRPM | HEART RATE: 86 BPM | WEIGHT: 293 LBS | TEMPERATURE: 98 F

## 2020-08-04 DIAGNOSIS — G43.009 MIGRAINE WITHOUT AURA AND WITHOUT STATUS MIGRAINOSUS, NOT INTRACTABLE: Primary | ICD-10-CM

## 2020-08-04 PROCEDURE — 96361 HYDRATE IV INFUSION ADD-ON: CPT

## 2020-08-04 PROCEDURE — 96375 TX/PRO/DX INJ NEW DRUG ADDON: CPT

## 2020-08-04 PROCEDURE — 99284 EMERGENCY DEPT VISIT MOD MDM: CPT | Mod: 25

## 2020-08-04 PROCEDURE — 63600175 PHARM REV CODE 636 W HCPCS: Performed by: EMERGENCY MEDICINE

## 2020-08-04 PROCEDURE — 25000003 PHARM REV CODE 250: Performed by: EMERGENCY MEDICINE

## 2020-08-04 PROCEDURE — 96376 TX/PRO/DX INJ SAME DRUG ADON: CPT

## 2020-08-04 PROCEDURE — 96374 THER/PROPH/DIAG INJ IV PUSH: CPT

## 2020-08-04 RX ORDER — BUTORPHANOL TARTRATE 1 MG/ML
1 INJECTION INTRAMUSCULAR; INTRAVENOUS
Status: COMPLETED | OUTPATIENT
Start: 2020-08-04 | End: 2020-08-04

## 2020-08-04 RX ORDER — DEXAMETHASONE SODIUM PHOSPHATE 4 MG/ML
12 INJECTION, SOLUTION INTRA-ARTICULAR; INTRALESIONAL; INTRAMUSCULAR; INTRAVENOUS; SOFT TISSUE
Status: COMPLETED | OUTPATIENT
Start: 2020-08-04 | End: 2020-08-04

## 2020-08-04 RX ORDER — METOCLOPRAMIDE HYDROCHLORIDE 5 MG/ML
10 INJECTION INTRAMUSCULAR; INTRAVENOUS
Status: COMPLETED | OUTPATIENT
Start: 2020-08-04 | End: 2020-08-04

## 2020-08-04 RX ORDER — SODIUM CHLORIDE 9 MG/ML
1000 INJECTION, SOLUTION INTRAVENOUS
Status: COMPLETED | OUTPATIENT
Start: 2020-08-04 | End: 2020-08-04

## 2020-08-04 RX ORDER — PROCHLORPERAZINE MALEATE 10 MG
10 TABLET ORAL EVERY 6 HOURS PRN
Qty: 15 TABLET | Refills: 0 | Status: SHIPPED | OUTPATIENT
Start: 2020-08-04 | End: 2020-08-10

## 2020-08-04 RX ORDER — BUTORPHANOL TARTRATE 10 MG/ML
1 SPRAY NASAL EVERY 4 HOURS PRN
Qty: 2.5 ML | Refills: 0 | Status: SHIPPED | OUTPATIENT
Start: 2020-08-04 | End: 2020-08-10

## 2020-08-04 RX ADMIN — BUTORPHANOL TARTRATE 1 MG: 1 INJECTION, SOLUTION INTRAMUSCULAR; INTRAVENOUS at 04:08

## 2020-08-04 RX ADMIN — DEXAMETHASONE SODIUM PHOSPHATE 12 MG: 4 INJECTION, SOLUTION INTRAMUSCULAR; INTRAVENOUS at 04:08

## 2020-08-04 RX ADMIN — BUTORPHANOL TARTRATE 1 MG: 1 INJECTION, SOLUTION INTRAMUSCULAR; INTRAVENOUS at 05:08

## 2020-08-04 RX ADMIN — METOCLOPRAMIDE 10 MG: 5 INJECTION, SOLUTION INTRAMUSCULAR; INTRAVENOUS at 04:08

## 2020-08-04 RX ADMIN — SODIUM CHLORIDE 1000 ML: 0.9 INJECTION, SOLUTION INTRAVENOUS at 04:08

## 2020-08-04 NOTE — ED PROVIDER NOTES
"Encounter Date: 8/4/2020    SCRIBE #1 NOTE: I, Alanna Khan, am scribing for, and in the presence of, Scott Parr MD.       History     Chief Complaint   Patient presents with    Migraine     States has chronic migraines; unable to take her normal migraine injections because she is trying to get pregnant.       Time seen by provider: 4:26 PM on 08/04/2020    Jasper Guerrero is a 29 y.o. female who presents to the ED with an onset of a frontal HA with associated dizziness, nausea and diarrhea since yesterday. Patient has a PMHx of migraine HAs, lingual nerve injury and depression. She endorses this HA feels similar to prior HAs. She states she normally gets relief from Fiorcet, Maxalt, Imitrex and Ajovy injections that she receives every 28 days for her chronic migraines but reports she is trying to conceive in December and is no longer able to take the Ajovy. The patient denies fever, cough, SOB, weakness, numbness or any other symptoms at this time. PSHx includes wisdom tooth extraction and mouth surgery.      The history is provided by the patient.     Review of patient's allergies indicates:   Allergen Reactions    Benadryl [diphenhydramine hcl]      Paralysis on right side body     Codeine      Paralysis right body    Flexeril [cyclobenzaprine] Other (See Comments)     Numbness on right side of body    Penicillins Anaphylaxis and Hives    Nsaids (non-steroidal anti-inflammatory drug) Diarrhea and Nausea And Vomiting    Magnesium      "made me feel horrible"      Past Medical History:   Diagnosis Date    Agoraphobia     Depression     DVT (deep venous thrombosis)     2019 had blood clot in right arm    Fibromyalgia     Migraine headache     Nerve injury 08/2016    Lingual Nerve Injury    PTSD (post-traumatic stress disorder)      Past Surgical History:   Procedure Laterality Date    CARPAL TUNNEL RELEASE Right 12/27/2018    Dr Lozada     CARPAL TUNNEL RELEASE Left 1/28/2020    Procedure: " RELEASE, CARPAL TUNNEL;  Surgeon: Brendon Lozada Jr., MD;  Location: [facility];  Service: Orthopedics;  Laterality: Left;    MOUTH SURGERY      WISDOM TOOTH EXTRACTION       Family History   Problem Relation Age of Onset    No Known Problems Mother     No Known Problems Father     Cancer Maternal Aunt         endometrial    No Known Problems Maternal Uncle     No Known Problems Paternal Aunt     No Known Problems Paternal Uncle     No Known Problems Maternal Grandmother     No Known Problems Maternal Grandfather     No Known Problems Paternal Grandmother     No Known Problems Paternal Grandfather     No Known Problems Daughter     No Known Problems Son      Social History     Tobacco Use    Smoking status: Never Smoker    Smokeless tobacco: Never Used   Substance Use Topics    Alcohol use: No    Drug use: No     Review of Systems   Constitutional: Negative for activity change, appetite change, chills, fatigue and fever.   Eyes: Negative for visual disturbance.   Respiratory: Negative for apnea, cough and shortness of breath.    Cardiovascular: Negative for chest pain and palpitations.   Gastrointestinal: Positive for diarrhea and nausea. Negative for abdominal distention and abdominal pain.   Genitourinary: Negative for difficulty urinating.   Musculoskeletal: Negative for neck pain.   Skin: Negative for pallor and rash.   Neurological: Positive for dizziness and headaches. Negative for weakness and numbness.   Hematological: Does not bruise/bleed easily.   Psychiatric/Behavioral: Negative for agitation.       Physical Exam     Initial Vitals [08/04/20 1608]   BP Pulse Resp Temp SpO2   (!) 182/95 110 20 98.2 °F (36.8 °C) 97 %      MAP       --         Physical Exam    Nursing note and vitals reviewed.  Constitutional: She appears well-developed and well-nourished.   HENT:   Head: Normocephalic and atraumatic.   Eyes: Conjunctivae are normal. Pupils are equal, round, and reactive to light.   Neck:  Normal range of motion. Neck supple.   Cardiovascular: Normal rate, regular rhythm and normal heart sounds. Exam reveals no gallop and no friction rub.    No murmur heard.  Pulmonary/Chest: Effort normal and breath sounds normal. No respiratory distress. She has no wheezes. She has no rhonchi. She has no rales.   Abdominal: Soft. She exhibits no distension. There is no abdominal tenderness.   Musculoskeletal: Normal range of motion.   Neurological: She is alert and oriented to person, place, and time.   Normal strength in BUEs and BLEs.   Skin: Skin is warm and dry. No erythema.   Psychiatric: She has a normal mood and affect.         ED Course   Procedures  Labs Reviewed - No data to display       Imaging Results    None          Medical Decision Making:   History:   Old Medical Records: I decided to obtain old medical records.  ED Management:  29-year-old female with a history of migraine headaches presents with a bilateral frontal headache identical to previous migraine headaches.  She has had no fever or neck stiffness with infectious etiology unlikely.  She has no focal neurologic deficits to suggest intracranial hemorrhage size mass.  The symptoms are consistent with a migraine.       APC / Resident Notes:   I, Dr. Scott Parr III, personally performed the services described in this documentation. All medical record entries made by the scribe were at my direction and in my presence.  I have reviewed the chart and agree that the record reflects my personal performance and is accurate and complete       Scribe Attestation:   Scribe #1: I performed the above scribed service and the documentation accurately describes the services I performed. I attest to the accuracy of the note.                          Clinical Impression:       ICD-10-CM ICD-9-CM   1. Migraine without aura and without status migrainosus, not intractable  G43.009 346.10         Disposition:   Disposition: Discharged  Condition: Stable     ED  Disposition Condition    Discharge Stable        ED Prescriptions     None        Follow-up Information     Follow up With Specialties Details Why Contact Info    Beck Woodson MD Vascular Neurology, Neurology In 3 days  [address] [facility] [address] [facility] [address]  [address]  [place] [id number]  [phone]                                       Scott Parr III, MD  08/04/20 8130

## 2020-08-05 ENCOUNTER — PATIENT MESSAGE (OUTPATIENT)
Dept: NEUROLOGY | Facility: CLINIC | Age: 30
End: 2020-08-05

## 2020-08-05 NOTE — ED NOTES
Jasper Guerrero presents to the ED with c/o headache that started a few days ago. Patient reports that she is not able to take her migraine medications secondary to trying to get pregnant. Associated complaints are nausea and hematuria. Mucous membranes are pink and moist. Skin is warm, dry and intact. Lungs are clear bilaterally, respirations are regular and unlabored. Denies SOB, cough, congestion or rhinorrhea. BS active x4, no tenderness with palpation, abd is soft and not distended. Denies any appetite or activity change. S1S2, capillary refill is < 2 seconds. Denies dysuria, difficulty urinating, frequency, numbness, tingling or weakness. MARQUIS GLOVRE

## 2020-08-05 NOTE — ED NOTES
Upon discharge, patient is AAOx4, no cardiac or respiratory complications. Follow up care and  Medications have been reviewed with patient and has been instructed to return to the ER if needed. Patient verbalized understanding and ambulated to the lobby without difficulty. ADALBERTO CHO.

## 2020-08-10 ENCOUNTER — OFFICE VISIT (OUTPATIENT)
Dept: NEUROLOGY | Facility: CLINIC | Age: 30
End: 2020-08-10
Payer: MEDICAID

## 2020-08-10 DIAGNOSIS — G50.9 TRIGEMINAL NEUROPATHY: Primary | ICD-10-CM

## 2020-08-10 DIAGNOSIS — M79.2 NEUROPATHIC PAIN: ICD-10-CM

## 2020-08-10 DIAGNOSIS — Z79.891 CHRONICALLY ON OPIATE THERAPY: ICD-10-CM

## 2020-08-10 PROCEDURE — 99214 OFFICE O/P EST MOD 30 MIN: CPT | Mod: 95,,, | Performed by: PSYCHIATRY & NEUROLOGY

## 2020-08-10 PROCEDURE — 99214 PR OFFICE/OUTPT VISIT, EST, LEVL IV, 30-39 MIN: ICD-10-PCS | Mod: 95,,, | Performed by: PSYCHIATRY & NEUROLOGY

## 2020-08-10 RX ORDER — HYDROCODONE BITARTRATE AND ACETAMINOPHEN 5; 325 MG/1; MG/1
1 TABLET ORAL EVERY 8 HOURS PRN
Qty: 60 TABLET | Refills: 0 | OUTPATIENT
Start: 2020-08-13 | End: 2020-08-25

## 2020-08-10 RX ORDER — HYDROCODONE BITARTRATE AND ACETAMINOPHEN 5; 325 MG/1; MG/1
1 TABLET ORAL EVERY 8 HOURS PRN
COMMUNITY
End: 2020-08-10 | Stop reason: SDUPTHER

## 2020-08-10 NOTE — PROGRESS NOTES
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 on file   Social Needs    Financial resource strain: Not on file    Food insecurity     Worry: Not on file     Inability: Not on file    Transportation needs     Medical: Not on file     Non-medical: Not on file   Tobacco Use    Smoking status: Never Smoker    Smokeless tobacco: Never Used   Substance and Sexual Activity    Alcohol use: No    Drug use: No    Sexual activity: Yes     Partners: Male   Lifestyle    Physical activity     Days per week: Not on file     Minutes per session: Not on file    Stress: Not on file   Relationships    Social connections     Talks on phone: Not on file     Gets together: Not on file     Attends [employer] service: Not on file     Active member of club or organization: Not on file     Attends meetings of clubs or organizations: Not on file     Relationship status: Not on file   Other Topics Concern    Not on file   Social History Narrative    Not on file        Review of Systems  14-point review of systems as follows:   No check janett indicates NEGATIVE response   Constitutional: [] weight loss, [] change to appetite   Eyes: [] change in vision, [] double vision   Ears, nose, mouth, throat: [] frequent nose bleeds, [] ringing in the ears   Respiratory: [] cough, [] wheezing   Cardiovascular: [] chest pain, [] palpitations   Gastrointestinal:  [] jaundice, [] nausea/vomiting   Genitourinary: [] incontinence, [] burning with urination   Hematologic/lymphatic: [] easy bruising/bleeding, [] night sweats   Neurological: [] numbness, [] weakness   Endocrine: [] fatigue, [] heat/cold intolerance   Allergy/Immunologic: [] fevers, [] chills   Musculoskeletal: [] muscle pain, [] joint pain   Psychiatric: [] thoughts of harming self/others, [] depression   Integumentary: [] rashes, [] sores that do not heal       Objective:        There were no vitals filed for this visit.  There is no height or weight on file to calculate BMI.      Data Review:     No results found for this or any previous visit.    Lab Results   Component Value Date     01/21/2020     12/26/2018    K 4.5 01/21/2020     01/21/2020     12/26/2018    CO2 27 01/21/2020    BUN 10 01/21/2020    CREATININE 0.9 01/21/2020    CREATININE 0.95 12/26/2018     (H) 01/21/2020    GLU 99 12/26/2018    AST 21 01/21/2020    ALT 34 01/21/2020    ALBUMIN 3.7 01/21/2020    PROT 7.0 01/21/2020    BILITOT 0.2 01/21/2020       Lab Results   Component Value Date    WBC 8.86 01/21/2020    HGB 12.0 01/21/2020    HCT 38.1 01/21/2020    MCV 90 01/21/2020     01/21/2020       Lab Results   Component Value Date    TSH 2.270 05/13/2019       Assessment & Plan:       Problem List Items Addressed This Visit        Neuro    Trigeminal neuropathy - Primary    Overview     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          Relevant Medications    HYDROcodone-acetaminophen (NORCO) 5-325 mg per tablet (Start on 8/13/2020)    Neuropathic pain    Overview     The pain from her trigeminal neuropathy is a neuropathic type pain. Trileptal has been effective but dosing is limited by side effects. Treatment refractive case so we are using opiates as well. Nucynta was most effective but cost prohibitive  once her insurance changed to medicaid.     Resume hydrocodone at low dose. Avoid percocet - headaches were progressing on this. Consider adding lamotrigine once she has adjusted to ajovy. If hydrocodone failed, consider low dose methadone. Will not tolerate early refills and aberrant behavior on the prescription drug monitor program and this will be grounds for termination of chronic opiate therapy for this purpose.               Psychiatric    Chronically on opiate therapy    Overview     Has Mirena for contraception but she would like to remove this and not be on any hormonal methods.  I strongly discouraged cessation of contraceptives while she is on the medication regimen that she is on including Trileptal, hydrocodone.                     Please call our clinic at [phone] or send a message to Dr. Hart on the [employer] portal if there are any changes to the plan described below, for example,if you are not contacted for the requested tests, referral(s) within one week, if you are unable to receive the medications prescribed, or if you feel you need to change the treatment course for any reason.     TESTING:  -- none     REFERRALS:  -- none     PREVENTION (use daily regardless of headache):  -- continue Trileptal 600mg twice a day (remember that trileptal will lower contraceptive effect of tablets, and trileptal has to be stopped if you are pregnant)  -- stop lamotrigine 25mg daily since it is poorly tolerated     ACUTE TREATMENT:  -- continue hydrocodone 5mg three times per day as needed (#[facility]12[facility]. It is expected that you have 30 tabs left over from July script given that Dr. Hart prescribed you #120 in error). Further refills from Dr. Mensah's office.   -- may continue tylenol 1 gram 2-3 times per day as needed (max 4 grams per day including what is in hydrocodone, which is 1 gram per day if taking 3 tablets)      Follow-up prior to 9/12 with Dr. Mensah for  pain management  Follow-up in 3 months with Dr. Diaz for facial pain      No follow-ups on file.      Sangeeta Hart MD

## 2020-08-10 NOTE — Clinical Note
Please schedule visit with Dr. Mensah to take over chronic opiate therapy for facial pain (Dr Diaz does not prescribe). Pt's next hydrocodone will be due 9/12/2020. Can be see late Aug or early Sept. Thank you

## 2020-08-10 NOTE — PATIENT INSTRUCTIONS
Please call our clinic at [phone] or send a message to Dr. Hart on the [employer] portal if there are any changes to the plan described below, for example,if you are not contacted for the requested tests, referral(s) within one week, if you are unable to receive the medications prescribed, or if you feel you need to change the treatment course for any reason.     TESTING:  -- none     REFERRALS:  -- none     PREVENTION (use daily regardless of headache):  -- continue Trileptal 600mg twice a day (remember that trileptal will lower contraceptive effect of tablets, and trileptal has to be stopped if you are pregnant)  -- stop lamotrigine 25mg daily since it is poorly tolerated     ACUTE TREATMENT:  -- continue hydrocodone 5mg three times per day as needed (#60 sent electronically to [facility] for 8/13 to 9/12/2020. It is expected that you have 30 tabs left over from July script given that Dr. Hart prescribed you #120 in error). Further refills from Dr. Mensah's office.   -- may continue tylenol 1 gram 2-3 times per day as needed (max 4 grams per day including what is in hydrocodone, which is 1 gram per day if taking 3 tablets)      Follow-up prior to 9/12 with Dr. Mensah for pain management  Follow-up in 3 months with Dr. Diaz for facial pain

## 2020-08-11 ENCOUNTER — TELEPHONE (OUTPATIENT)
Dept: NEUROLOGY | Facility: CLINIC | Age: 30
End: 2020-08-11

## 2020-08-20 ENCOUNTER — TELEPHONE (OUTPATIENT)
Dept: PAIN MEDICINE | Facility: CLINIC | Age: 30
End: 2020-08-20

## 2020-08-20 NOTE — TELEPHONE ENCOUNTER
----- Message from Yari  sent at 8/18/2020  4:00 PM CDT -----  Regarding: access to appt  Contact: pt  Type: Needs Medical Advice    Who Called:      Best Call Back Number:     Additional Information: Requesting a call back from Nurse, regarding access to an appt see referral ,please call back with advice

## 2020-08-24 ENCOUNTER — PATIENT MESSAGE (OUTPATIENT)
Dept: NEUROLOGY | Facility: CLINIC | Age: 30
End: 2020-08-24

## 2020-08-24 DIAGNOSIS — G43.719 INTRACTABLE CHRONIC MIGRAINE WITHOUT AURA AND WITHOUT STATUS MIGRAINOSUS: ICD-10-CM

## 2020-08-24 RX ORDER — BUTALBITAL, ACETAMINOPHEN AND CAFFEINE 50; 325; 40 MG/1; MG/1; MG/1
TABLET ORAL
Qty: 10 TABLET | Refills: 0 | Status: SHIPPED | OUTPATIENT
Start: 2020-08-24 | End: 2020-10-28 | Stop reason: SDUPTHER

## 2020-08-24 NOTE — TELEPHONE ENCOUNTER
Advised patient that Dr Mensah does not typically do medication management, will check with him prior to scheduling.

## 2020-08-25 ENCOUNTER — HOSPITAL ENCOUNTER (EMERGENCY)
Facility: HOSPITAL | Age: 30
Discharge: HOME | End: 2020-08-25
Attending: EMERGENCY MEDICINE
Payer: MEDICAID

## 2020-08-25 VITALS
BODY MASS INDEX: 48.82 KG/M2 | TEMPERATURE: 98 F | RESPIRATION RATE: 16 BRPM | OXYGEN SATURATION: 95 % | DIASTOLIC BLOOD PRESSURE: 80 MMHG | HEART RATE: 83 BPM | WEIGHT: 293 LBS | HEIGHT: 65 IN | SYSTOLIC BLOOD PRESSURE: 155 MMHG

## 2020-08-25 DIAGNOSIS — G43.009 MIGRAINE WITHOUT AURA AND WITHOUT STATUS MIGRAINOSUS, NOT INTRACTABLE: Primary | ICD-10-CM

## 2020-08-25 LAB
B-HCG UR QL: NEGATIVE
CTP QC/QA: YES

## 2020-08-25 PROCEDURE — 96374 THER/PROPH/DIAG INJ IV PUSH: CPT

## 2020-08-25 PROCEDURE — 99284 EMERGENCY DEPT VISIT MOD MDM: CPT | Mod: 25

## 2020-08-25 PROCEDURE — 96375 TX/PRO/DX INJ NEW DRUG ADDON: CPT

## 2020-08-25 PROCEDURE — 81025 URINE PREGNANCY TEST: CPT | Performed by: EMERGENCY MEDICINE

## 2020-08-25 PROCEDURE — 63600175 PHARM REV CODE 636 W HCPCS: Performed by: EMERGENCY MEDICINE

## 2020-08-25 PROCEDURE — 25000003 PHARM REV CODE 250: Performed by: EMERGENCY MEDICINE

## 2020-08-25 RX ORDER — ACETAMINOPHEN 325 MG/1
650 TABLET ORAL
Status: DISCONTINUED | OUTPATIENT
Start: 2020-08-25 | End: 2020-08-25 | Stop reason: HOSPADM

## 2020-08-25 RX ORDER — SODIUM CHLORIDE 9 MG/ML
1000 INJECTION, SOLUTION INTRAVENOUS
Status: COMPLETED | OUTPATIENT
Start: 2020-08-25 | End: 2020-08-25

## 2020-08-25 RX ORDER — ONDANSETRON 2 MG/ML
8 INJECTION INTRAMUSCULAR; INTRAVENOUS
Status: COMPLETED | OUTPATIENT
Start: 2020-08-25 | End: 2020-08-25

## 2020-08-25 RX ORDER — BUTALBITAL, ACETAMINOPHEN AND CAFFEINE 50; 325; 40 MG/1; MG/1; MG/1
1 TABLET ORAL
Status: DISCONTINUED | OUTPATIENT
Start: 2020-08-25 | End: 2020-08-25 | Stop reason: HOSPADM

## 2020-08-25 RX ORDER — PROCHLORPERAZINE MALEATE 10 MG
10 TABLET ORAL EVERY 6 HOURS PRN
Qty: 15 TABLET | Refills: 0 | Status: SHIPPED | OUTPATIENT
Start: 2020-08-25 | End: 2020-08-31

## 2020-08-25 RX ORDER — HALOPERIDOL 5 MG/ML
5 INJECTION INTRAMUSCULAR
Status: COMPLETED | OUTPATIENT
Start: 2020-08-25 | End: 2020-08-25

## 2020-08-25 RX ORDER — BUTORPHANOL TARTRATE 1 MG/ML
2 INJECTION INTRAMUSCULAR; INTRAVENOUS
Status: COMPLETED | OUTPATIENT
Start: 2020-08-25 | End: 2020-08-25

## 2020-08-25 RX ORDER — DEXAMETHASONE SODIUM PHOSPHATE 4 MG/ML
8 INJECTION, SOLUTION INTRA-ARTICULAR; INTRALESIONAL; INTRAMUSCULAR; INTRAVENOUS; SOFT TISSUE
Status: COMPLETED | OUTPATIENT
Start: 2020-08-25 | End: 2020-08-25

## 2020-08-25 RX ADMIN — SODIUM CHLORIDE 1000 ML: 0.9 INJECTION, SOLUTION INTRAVENOUS at 07:08

## 2020-08-25 RX ADMIN — DEXAMETHASONE SODIUM PHOSPHATE 8 MG: 4 INJECTION, SOLUTION INTRAMUSCULAR; INTRAVENOUS at 07:08

## 2020-08-25 RX ADMIN — ONDANSETRON 8 MG: 2 INJECTION INTRAMUSCULAR; INTRAVENOUS at 07:08

## 2020-08-25 RX ADMIN — HALOPERIDOL LACTATE 5 MG: 5 INJECTION, SOLUTION INTRAMUSCULAR at 07:08

## 2020-08-25 RX ADMIN — BUTORPHANOL TARTRATE 2 MG: 1 INJECTION, SOLUTION INTRAMUSCULAR; INTRAVENOUS at 07:08

## 2020-08-25 NOTE — TELEPHONE ENCOUNTER
Made patient a virtual appointment for next week on Monday afternoon. She would like to discuss medication management with Dr Mensah and how her current medication regiment works well for her in conjunction with her other medication.    73 yo woman with a hx of COPD, breast CA, kidney CA in remission, NOEMY, anxiety, HTN, presenting with bilateral LE edema.    - continue leg elevation and  ace wraps  clinically improving   will follow

## 2020-08-25 NOTE — ED PROVIDER NOTES
"Encounter Date: 8/25/2020    SCRIBE #1 NOTE: I, Lu Champion, am scribing for, and in the presence of, Scott Parr MD.       History     Chief Complaint   Patient presents with    Headache     started with migraine last night. At home meds haven't worked       Time seen by provider: 6:53 PM on 08/25/2020    Jasper Guerrero is a 29 y.o. female with PMHx of fibromyalgia and migraine headache who presents to the ED with complaints of a headache associated with nausea that started last night. Patient reports taking medication without relief. Headache radiates from the front to the back of the head. She denies fever, chills, vision/speech changes, vomiting, weakness, numbness, or other new symptoms. She has no other medical concerns or complaints at this moment. PSHx includes carpal tunnel release.     The history is provided by the patient.     Review of patient's allergies indicates:   Allergen Reactions    Benadryl [diphenhydramine hcl]      Paralysis on right side body     Codeine      Paralysis right body    Flexeril [cyclobenzaprine] Other (See Comments)     Numbness on right side of body    Penicillins Anaphylaxis and Hives    Nsaids (non-steroidal anti-inflammatory drug) Diarrhea and Nausea And Vomiting    Magnesium      "made me feel horrible"      Past Medical History:   Diagnosis Date    Agoraphobia     Depression     DVT (deep venous thrombosis)     2019 had blood clot in right arm    Fibromyalgia     Migraine headache     Nerve injury 08/2016    Lingual Nerve Injury    PTSD (post-traumatic stress disorder)      Past Surgical History:   Procedure Laterality Date    CARPAL TUNNEL RELEASE Right 12/27/2018    Dr Lozada     CARPAL TUNNEL RELEASE Left 1/28/2020    Procedure: RELEASE, CARPAL TUNNEL;  Surgeon: Brendon Lozada Jr., MD;  Location: [facility];  Service: Orthopedics;  Laterality: Left;    MOUTH SURGERY      WISDOM TOOTH EXTRACTION       Family History   Problem Relation Age of " Onset    No Known Problems Mother     No Known Problems Father     Cancer Maternal Aunt         endometrial    No Known Problems Maternal Uncle     No Known Problems Paternal Aunt     No Known Problems Paternal Uncle     No Known Problems Maternal Grandmother     No Known Problems Maternal Grandfather     No Known Problems Paternal Grandmother     No Known Problems Paternal Grandfather     No Known Problems Daughter     No Known Problems Son      Social History     Tobacco Use    Smoking status: Never Smoker    Smokeless tobacco: Never Used   Substance Use Topics    Alcohol use: No    Drug use: No     Review of Systems   Constitutional: Negative for chills and fever.   Eyes: Negative for visual disturbance.   Respiratory: Negative for cough and shortness of breath.    Cardiovascular: Negative for chest pain.   Gastrointestinal: Positive for nausea. Negative for abdominal pain and vomiting.   Musculoskeletal: Negative for gait problem and joint swelling.   Skin: Negative for pallor and rash.   Neurological: Positive for headaches. Negative for speech difficulty.   Hematological: Does not bruise/bleed easily.   Psychiatric/Behavioral: The patient is not nervous/anxious.        Physical Exam     Initial Vitals [08/25/20 1839]   BP Pulse Resp Temp SpO2   (!) 146/92 (!) 116 16 98.3 °F (36.8 °C) 97 %      MAP       --         Physical Exam    Nursing note and vitals reviewed.  Constitutional: She appears well-developed and well-nourished.   HENT:   Head: Normocephalic and atraumatic.   Eyes: Conjunctivae are normal.   Neck: Normal range of motion. Neck supple.   Cardiovascular: Normal rate, regular rhythm and normal heart sounds. Exam reveals no gallop and no friction rub.    No murmur heard.  Pulmonary/Chest: Effort normal and breath sounds normal. No respiratory distress. She has no wheezes. She has no rhonchi. She has no rales.   Abdominal: Soft. She exhibits no distension. There is no abdominal  tenderness.   Musculoskeletal: Normal range of motion.   Neurological: She is alert and oriented to person, place, and time. She has normal strength. No cranial nerve deficit or sensory deficit.   No focal neurological deficits noted. Cranial nerves III-XII grossly intact. 5/5 strength and sensation to light touch intact to BUE and BLE.    Skin: Skin is warm and dry. No erythema.   Psychiatric: She has a normal mood and affect.         ED Course   Procedures  Labs Reviewed   POCT URINE PREGNANCY          Imaging Results    None          Medical Decision Making:   History:   Old Medical Records: I decided to obtain old medical records.  Clinical Tests:   Lab Tests: Reviewed and Ordered  ED Management:  29-year-old female with a history of migraine headaches presents with a bilateral frontal headache.  Symptoms most likely reflect a migraine.  She reports multiple allergies and insists on state all.  She receives state all with improved headache.  Infectious etiology is unlikely in the absence of fever or neck stiffness.  She has no focal neurologic deficits with intracranial hemorrhage or mass unlikely.       APC / Resident Notes:   I, Dr. Scott Parr III, personally performed the services described in this documentation. All medical record entries made by the scribe were at my direction and in my presence.  I have reviewed the chart and agree that the record reflects my personal performance and is accurate and complete       Scribe Attestation:   Scribe #1: I performed the above scribed service and the documentation accurately describes the services I performed. I attest to the accuracy of the note.               Clinical Impression:       ICD-10-CM ICD-9-CM   1. Migraine without aura and without status migrainosus, not intractable  G43.009 346.10                                Scott Parr III, MD  08/25/20 1957

## 2020-08-25 NOTE — TELEPHONE ENCOUNTER
Yes I can see her but she should know that my plan would be to wean her down and off of her opioid medications.

## 2020-08-31 ENCOUNTER — OFFICE VISIT (OUTPATIENT)
Dept: PAIN MEDICINE | Facility: CLINIC | Age: 30
End: 2020-08-31
Payer: MEDICAID

## 2020-08-31 VITALS
HEART RATE: 104 BPM | DIASTOLIC BLOOD PRESSURE: 89 MMHG | SYSTOLIC BLOOD PRESSURE: 146 MMHG | WEIGHT: 293 LBS | BODY MASS INDEX: 48.82 KG/M2 | HEIGHT: 65 IN

## 2020-08-31 DIAGNOSIS — M79.2 NEUROPATHIC PAIN: ICD-10-CM

## 2020-08-31 DIAGNOSIS — G89.4 CHRONIC PAIN SYNDROME: Primary | ICD-10-CM

## 2020-08-31 DIAGNOSIS — Z02.89 PAIN MANAGEMENT CONTRACT SIGNED: ICD-10-CM

## 2020-08-31 DIAGNOSIS — G50.9 TRIGEMINAL NEUROPATHY: ICD-10-CM

## 2020-08-31 PROCEDURE — 99203 OFFICE O/P NEW LOW 30 MIN: CPT | Mod: S$PBB,,, | Performed by: ANESTHESIOLOGY

## 2020-08-31 PROCEDURE — 99203 PR OFFICE/OUTPT VISIT, NEW, LEVL III, 30-44 MIN: ICD-10-PCS | Mod: S$PBB,,, | Performed by: ANESTHESIOLOGY

## 2020-08-31 PROCEDURE — 99214 OFFICE O/P EST MOD 30 MIN: CPT | Mod: PBBFAC,PN | Performed by: ANESTHESIOLOGY

## 2020-08-31 PROCEDURE — 99999 PR PBB SHADOW E&M-EST. PATIENT-LVL IV: ICD-10-PCS | Mod: PBBFAC,,, | Performed by: ANESTHESIOLOGY

## 2020-08-31 PROCEDURE — 80307 DRUG TEST PRSMV CHEM ANLYZR: CPT

## 2020-08-31 PROCEDURE — 99999 PR PBB SHADOW E&M-EST. PATIENT-LVL IV: CPT | Mod: PBBFAC,,, | Performed by: ANESTHESIOLOGY

## 2020-08-31 RX ORDER — HYDROCODONE BITARTRATE AND ACETAMINOPHEN 5; 325 MG/1; MG/1
TABLET ORAL
COMMUNITY
Start: 2020-08-13 | End: 2020-09-30

## 2020-08-31 RX ORDER — FLUOXETINE HYDROCHLORIDE 20 MG/1
60 CAPSULE ORAL DAILY
COMMUNITY
Start: 2020-08-13 | End: 2020-10-21

## 2020-08-31 NOTE — LETTER
August 31, 2020      Sangeeta Hart MD  [address] [facility] [address]  [address]  [facility] [address]           [facility]  [address] [place] [address]  Phone: [phone]  Fax: [phone]          Patient: Jasper Guerrero   MR Number: [id number]   YOB: 1990   Date of Visit: 8/31/2020       Dear Dr. Sangeeta Hart:    Thank you for referring Jasper Guerrero to me for evaluation. Attached you will find relevant portions of my assessment and plan of care.    If you have questions, please do not hesitate to call me. I look forward to following Jasper Guerrero along with you.    Sincerely,    Pedro Mensah MD    Enclosure  CC:  No Recipients    If you would like to receive this communication electronically, please contact [email or web address] or [phone] to request more information on [employer] access.    For providers and/or their staff who would like to refer a patient to [facility], please contact us through our one-stop-shop provider referral line, [facility], at [phone].    If you feel you have received this communication in error or would no longer like to receive these types of communications, please e-mail [email or web address]

## 2020-08-31 NOTE — PROGRESS NOTES
[facility] New Patient Evaluation    Referred by: Dr. Hart  Reason for referral: facial pain    CC:   Chief Complaint   Patient presents with    Headache    Jaw Pain     right side      Last 3 PDI Scores 8/31/2020   Pain Disability Index (PDI) 39       HPI:   Jasper Guerrero is a 29 y.o. female who complains of facial pain    Onset: 4 years  Inciting Event: severed lingual nerve during wisdom tooth extraction  Progression: since onset, pain is stable  Current Pain Score: 6/10  Typical Range: 3-9/10  Timing: constant  Quality: aching, dull, burning, numb, sharp, electric, shooting, throbbing, tight, tingling, deep  Radiation: yes, from around right mastoid process to right face in a V3-like distribution  Associated numbness or weakness: yes numbness, tingling    Exacerbated by: nothing in particular  Allievated by: medications  Is Pain Level Acceptable?: No    Previous Therapies:  PT/OT:   HEP:   Interventions:   Surgery:  Medications:   - NSAIDS: naprosyn  - MSK Relaxants: flexeril  - TCAs:   - SNRIs:   - Topicals:   - Anticonvulsants: oxcarbazepine, gabapentin, pregabalin, topiramate, lamotrigine  - Opioids: hydrocodone, nucynta, oxycodone    History:    Current Outpatient Medications:     acetaminophen (TYLENOL) 325 MG tablet, Take 650 mg by mouth every 6 (six) hours as needed for Pain., Disp: , Rfl:     butalbital-acetaminophen-caffeine -40 mg (FIORICET, ESGIC) -40 mg per tablet, TAKE 1 TABLET BY MOUTH AS NEEDED FOR MIGRAINE.(NO MORE THAN 10 TABLETS A MONTH), Disp: 10 tablet, Rfl: 0    diazePAM (VALIUM) 10 MG Tab, Take 10 mg by mouth., Disp: , Rfl:     FLUoxetine 20 MG capsule, Take 60 mg by mouth once daily., Disp: , Rfl:     fluvoxaMINE (LUVOX) 100 MG tablet, Take 300 mg by mouth once daily. , Disp: , Rfl:     HYDROcodone-acetaminophen (NORCO) 5-325 mg per tablet, , Disp: , Rfl:     metoclopramide HCl (REGLAN) 10 MG tablet, TAKE 1 TABLET BY MOUTH EVERY 6 HOURS AS NEEDED FOR  MIGRAINE AND NAUSEA, Disp: 60 tablet, Rfl: 11    MIRENA 20 mcg/24 hr (5 years) IUD, , Disp: , Rfl:     ondansetron (ZOFRAN) 4 MG tablet, TAKE 1 TABLET BY MOUTH EVERY 6 HOURS AS NEEDED FOR FOR NAUSEA, Disp: 30 tablet, Rfl: 11    OXcarbazepine (TRILEPTAL) 600 MG Tab, Take 1 tablet (600 mg total) by mouth 2 (two) times daily., Disp: 60 tablet, Rfl: 11    rizatriptan (MAXALT) 10 MG tablet, 1 tab PO PRN migraine. May repeat every 2 hours for max 3 tabs in 24 hours. Use no more than 10 days per month., Disp: 18 tablet, Rfl: 11    sumatriptan (IMITREX) 100 MG tablet, 1 tab PO PRN migraine. May repeat once in 2 hours, no more than 2 tab in 24 hours. No more than 10 days per month., Disp: 10 tablet, Rfl: 11    zolpidem (AMBIEN) 10 mg Tab, , Disp: , Rfl:     Current Facility-Administered Medications:     onabotulinumtoxina injection 200 Units, 200 Units, Intramuscular, Q90 Days, Sangeeta Hart MD, 200 Units at 01/30/19 1101    onabotulinumtoxina injection 200 Units, 200 Units, Intramuscular, Q90 Days, Sangeeta Hart MD, 200 Units at 10/16/19 1444    Past Medical History:   Diagnosis Date    Agoraphobia     Depression     DVT (deep venous thrombosis)     2019 had blood clot in right arm    Fibromyalgia     Migraine headache     Nerve injury 08/2016    Lingual Nerve Injury    PTSD (post-traumatic stress disorder)        Past Surgical History:   Procedure Laterality Date    CARPAL TUNNEL RELEASE Right 12/27/2018    Dr Lozada     CARPAL TUNNEL RELEASE Left 1/28/2020    Procedure: RELEASE, CARPAL TUNNEL;  Surgeon: Brendon Lozada Jr., MD;  Location: [facility];  Service: Orthopedics;  Laterality: Left;    MOUTH SURGERY      WISDOM TOOTH EXTRACTION         Family History   Problem Relation Age of Onset    No Known Problems Mother     No Known Problems Father     Cancer Maternal Aunt         endometrial    No Known Problems Maternal Uncle     No Known Problems Paternal Aunt     No Known Problems Paternal Uncle      "No Known Problems Maternal Grandmother     No Known Problems Maternal Grandfather     No Known Problems Paternal Grandmother     No Known Problems Paternal Grandfather     No Known Problems Daughter     No Known Problems Son        Social History     Socioeconomic History    Marital status:      Spouse name: Not on file    Number of children: Not on file    Years of education: Not on file    Highest education level: Not on file   Occupational History    Not on file   Social Needs    Financial resource strain: Not on file    Food insecurity     Worry: Not on file     Inability: Not on file    Transportation needs     Medical: Not on file     Non-medical: Not on file   Tobacco Use    Smoking status: Never Smoker    Smokeless tobacco: Never Used   Substance and Sexual Activity    Alcohol use: No    Drug use: No    Sexual activity: Yes     Partners: Male   Lifestyle    Physical activity     Days per week: Not on file     Minutes per session: Not on file    Stress: Not on file   Relationships    Social connections     Talks on phone: Not on file     Gets together: Not on file     Attends Buddhism service: Not on file     Active member of club or organization: Not on file     Attends meetings of clubs or organizations: Not on file     Relationship status: Not on file   Other Topics Concern    Not on file   Social History Narrative    Not on file       Review of patient's allergies indicates:   Allergen Reactions    Benadryl [diphenhydramine hcl]      Paralysis on right side body     Codeine      Paralysis right body    Flexeril [cyclobenzaprine] Other (See Comments)     Numbness on right side of body    Penicillins Anaphylaxis and Hives    Nsaids (non-steroidal anti-inflammatory drug) Diarrhea and Nausea And Vomiting    Magnesium      "made me feel horrible"        Review of Systems:  Review of Systems - General ROS: negative for - fever  Psychological ROS: positive for - anxiety and " "depression  Hematological and Lymphatic ROS: negative for - bleeding problems  Endocrine ROS: negative for - unexpected weight changes  Respiratory ROS: no cough, shortness of breath, or wheezing  Cardiovascular ROS: no chest pain or dyspnea on exertion  Gastrointestinal ROS: positive for - abdominal pain, diarrhea and nausea  Musculoskeletal ROS: negative for - muscular weakness  Neurological ROS: positive for - headaches  Dermatological ROS: negative for rash    Physical Exam:  Vitals:    08/31/20 1350   BP: (!) 146/89   Pulse: 104   Weight: (!) 159 kg (350 lb 8.5 oz)   Height: 5' 5" (1.651 m)   PainSc:   7   PainLoc: Jaw     Body mass index is 58.33 kg/m².     Gen: NAD  Psych: mood appropriate for given condition  HEENT: anicteric   Respiratory: non labored  Abd: soft nt, nd  Skin: intact    Imaging:  none    Labs:  BMP  Lab Results   Component Value Date     01/21/2020    K 4.5 01/21/2020     01/21/2020    CO2 27 01/21/2020    BUN 10 01/21/2020    CREATININE 0.9 01/21/2020    CALCIUM 9.3 01/21/2020    ANIONGAP 8 01/21/2020    ESTGFRAFRICA >60 01/21/2020    EGFRNONAA >60 01/21/2020     Lab Results   Component Value Date    ALT 34 01/21/2020    AST 21 01/21/2020    ALKPHOS 68 01/21/2020    BILITOT 0.2 01/21/2020       Assessment:   Problem List Items Addressed This Visit        Neuro    Trigeminal neuropathy    Neuropathic pain       Psychiatric    Chronically on opiate therapy - Primary      Other Visit Diagnoses     Pain management contract signed        Relevant Orders    Pain Clinic Drug Screen        29 y.o. year old female PMH obesity, fibromyalgia, depression presents to the office with right sided facial pain.  Her pain started 4 years ago after she suffered a severed lingual nerve during wisdom tooth extraction.  Per chart review she was urgently referred to orofacial surgeon at [facility] and she underwent grafting of cadaver lingual nerve in Oct 2016 as well as removal of a neuroma that was found " during surgery; with some resolution to pain but the sensation to the right tongue ever came back.    Today she says her pain is 6/10, constant, aching, dull, burning, throbbing, numb, sharp, electric, shooting on the right side of her face.  Per Dr. Hart's notes the patient has failed adjunctive medications including gabapentin, pregabalin, topiramate, and NSAIDs.  She currently being managed medically with trileptal, tylenol, and hydrocodone 5mg TID # 90 which she says gives her about 75-80% relief.  She was referred to me to help take over the opioid portion of her medication regimen.  She says that she takes hydrocodone three times per day.  Per notes she was refilled on 8/13/20 and due for a refill on 9/12/20.  I discussed that I can take over her mediation management.  She seems to be doing well on her current dose.  Discussed in the future if her pain worse I don't recommend to increase dosages.  We discussed craniofacial interventions to possibly decrease the amount of opioid she might require but she says she does not want to do anything at this time as she said she doesn't want any needles around her painful area.  She will call when she is due for a refill and then will follow up in the office in 3 months.      Treatment Plan:   Procedures: none at this time  Medications: will continue hydrocodone 5/325mg po q8h prn for severe pain.  No signs of abuse, no adverse events noted, patient experiences significant benefit of analgesia, and patient demonstrates increased activity while on these medications.  The patient is meeting the goals of opioid therapy and is dependent on them for functionality and can not perform ADLS without them. Regarding opioids, the risks were discussed including tolerance, addiction, overdose, over-sedation, drug interactions, liver damage, hormone imbalance, over-sedation, respiratory depression, opioid-induced hyperalgesia, and even death.  Pain contract signed today.   Labs:  Reviewed and medications are appropriately dosed for current hepatorenal function.  UDS today  Imaging: No additional recommended at this time.    : Reviewed and consistent with medication use as prescribed.    Pedro Mensah M.D.  Interventional Pain Medicine / Anesthesiology

## 2020-09-06 LAB
6MAM UR QL: NOT DETECTED
7AMINOCLONAZEPAM UR QL: NOT DETECTED
A-OH ALPRAZ UR QL: NOT DETECTED
ALPRAZ UR QL: NOT DETECTED
AMPHET UR QL SCN: NOT DETECTED
ANNOTATION COMMENT IMP: NORMAL
ANNOTATION COMMENT IMP: NORMAL
BARBITURATES UR QL: PRESENT
BUPRENORPHINE UR QL: NOT DETECTED
BZE UR QL: NOT DETECTED
CARBOXYTHC UR QL: NOT DETECTED
CARISOPRODOL UR QL: NOT DETECTED
CLONAZEPAM UR QL: NOT DETECTED
CODEINE UR QL: NOT DETECTED
CREAT UR-MCNC: 310.8 MG/DL (ref 20–400)
DIAZEPAM UR QL: NOT DETECTED
ETHYL GLUCURONIDE UR QL: NOT DETECTED
FENTANYL UR QL: NOT DETECTED
HYDROCODONE UR QL: NOT DETECTED
HYDROMORPHONE UR QL: NOT DETECTED
LORAZEPAM UR QL: NOT DETECTED
MDA UR QL: NOT DETECTED
MDEA UR QL: NOT DETECTED
MDMA UR QL: NOT DETECTED
ME-PHENIDATE UR QL: NOT DETECTED
MEPERIDINE UR QL: NOT DETECTED
METHADONE UR QL: NOT DETECTED
METHAMPHET UR QL: NOT DETECTED
MIDAZOLAM UR QL SCN: NOT DETECTED
MORPHINE UR QL: NOT DETECTED
NORBUPRENORPHINE UR QL CFM: NOT DETECTED
NORDIAZEPAM UR QL: PRESENT
NORFENTANYL UR QL: NOT DETECTED
NORHYDROCODONE UR QL CFM: NOT DETECTED
NOROXYCODONE UR QL CFM: NOT DETECTED
NOROXYMORPHONE: NOT DETECTED
OXAZEPAM UR QL: NOT DETECTED
OXYCODONE UR QL: NOT DETECTED
OXYMORPHONE UR QL: NOT DETECTED
PATHOLOGY STUDY: NORMAL
PCP UR QL: NOT DETECTED
PHENTERMINE UR QL: NOT DETECTED
PROPOXYPH UR QL: NOT DETECTED
SERVICE CMNT-IMP: NORMAL
TAPENTADOL UR QL SCN: NOT DETECTED
TAPENTADOL-O-SULF: NOT DETECTED
TEMAZEPAM UR QL: NOT DETECTED
TRAMADOL UR QL: NOT DETECTED
ZOLPIDEM UR QL: PRESENT

## 2020-09-08 ENCOUNTER — TELEPHONE (OUTPATIENT)
Dept: PAIN MEDICINE | Facility: HOSPITAL | Age: 30
End: 2020-09-08

## 2020-09-08 NOTE — TELEPHONE ENCOUNTER
Spoke with patient about doctor's notes. Patient stated she was nervous during the visit that's why she was not able to fully explain herself. Patient is requesting to speak with the doctor about regarding the prescription.

## 2020-09-09 NOTE — TELEPHONE ENCOUNTER
"You aren't due for a refill until 9/12.     You tested negative on the urine test done on 8/31.  That means you ran out of medication over 2 weeks early, which is a lot more than "some extras on a few days".     If you feel like you have a problem with taking pain medication I can send you a referral to an addiction specialist.  But I will not be prescribing you any medication.   "

## 2020-09-11 ENCOUNTER — TELEPHONE (OUTPATIENT)
Dept: NEUROLOGY | Facility: CLINIC | Age: 30
End: 2020-09-11

## 2020-09-11 ENCOUNTER — PATIENT MESSAGE (OUTPATIENT)
Dept: NEUROLOGY | Facility: CLINIC | Age: 30
End: 2020-09-11

## 2020-09-11 NOTE — TELEPHONE ENCOUNTER
Called patient since she sent 2 more messages. Notified her that DR Hart has already gone and that the prescription refill for pain medication can not be handled. She would need to schedule an appointment to see DR Diaz to get pain medication as well and our NP can not handle chronic pain medication. Verbalized understanding.

## 2020-09-14 ENCOUNTER — HOSPITAL ENCOUNTER (EMERGENCY)
Facility: HOSPITAL | Age: 30
Discharge: HOME | End: 2020-09-14
Attending: EMERGENCY MEDICINE
Payer: MEDICAID

## 2020-09-14 VITALS
DIASTOLIC BLOOD PRESSURE: 86 MMHG | RESPIRATION RATE: 21 BRPM | OXYGEN SATURATION: 97 % | HEART RATE: 98 BPM | BODY MASS INDEX: 48.82 KG/M2 | WEIGHT: 293 LBS | SYSTOLIC BLOOD PRESSURE: 140 MMHG | TEMPERATURE: 98 F | HEIGHT: 65 IN

## 2020-09-14 DIAGNOSIS — G89.4 CHRONIC PAIN SYNDROME: ICD-10-CM

## 2020-09-14 DIAGNOSIS — G50.0 TRIGEMINAL NEURALGIA OF RIGHT SIDE OF FACE: Primary | ICD-10-CM

## 2020-09-14 LAB
B-HCG UR QL: NEGATIVE
CTP QC/QA: YES

## 2020-09-14 PROCEDURE — 99283 EMERGENCY DEPT VISIT LOW MDM: CPT

## 2020-09-14 PROCEDURE — 25000003 PHARM REV CODE 250: Performed by: PHYSICIAN ASSISTANT

## 2020-09-14 PROCEDURE — 81025 URINE PREGNANCY TEST: CPT | Performed by: PHYSICIAN ASSISTANT

## 2020-09-14 RX ORDER — ONDANSETRON 4 MG/1
4 TABLET, ORALLY DISINTEGRATING ORAL
Status: COMPLETED | OUTPATIENT
Start: 2020-09-14 | End: 2020-09-14

## 2020-09-14 RX ORDER — HYDROCODONE BITARTRATE AND ACETAMINOPHEN 5; 325 MG/1; MG/1
1 TABLET ORAL
Status: COMPLETED | OUTPATIENT
Start: 2020-09-14 | End: 2020-09-14

## 2020-09-14 RX ADMIN — ONDANSETRON 4 MG: 4 TABLET, ORALLY DISINTEGRATING ORAL at 04:09

## 2020-09-14 RX ADMIN — HYDROCODONE BITARTRATE AND ACETAMINOPHEN 1 TABLET: 5; 325 TABLET ORAL at 04:09

## 2020-09-14 NOTE — ED PROVIDER NOTES
"Encounter Date: 9/14/2020       History     Chief Complaint   Patient presents with    Facial Pain     rt side-hx of trigeminal neuralgia     Patient is a 29 y.o. year old female PMH obesity, fibromyalgia, depression presents to with right sided facial pain for a few days.  Her pain started 4 years ago after she suffered a severed lingual nerve during wisdom tooth extraction.  Per chart review she was urgently referred to orofacial surgeon at [facility] and she underwent grafting of cadaver lingual nerve in Oct 2016 as well as removal of a neuroma that was found during surgery; with some resolution to pain but the sensation to the right tongue ever came back. Today she says her pain is 8/10 shooting on the rigth side of her face. She reports symptoms are consistent with prior episodes. She states her neurologist had her on tylenol, and hydrocodone 5mg TID for which she ran out of on 9/12/20202. She was referred to pain management who she states "she is no longer working with".  She denied any fever, chills or other symptoms.         Review of patient's allergies indicates:   Allergen Reactions    Benadryl [diphenhydramine hcl]      Paralysis on right side body     Codeine      Paralysis right body    Flexeril [cyclobenzaprine] Other (See Comments)     Numbness on right side of body    Penicillins Anaphylaxis and Hives    Nsaids (non-steroidal anti-inflammatory drug) Diarrhea and Nausea And Vomiting    Magnesium      "made me feel horrible"      Past Medical History:   Diagnosis Date    Agoraphobia     Depression     DVT (deep venous thrombosis)     2019 had blood clot in right arm    Fibromyalgia     Migraine headache     Nerve injury 08/2016    Lingual Nerve Injury    PTSD (post-traumatic stress disorder)      Past Surgical History:   Procedure Laterality Date    CARPAL TUNNEL RELEASE Right 12/27/2018    Dr Lozada     CARPAL TUNNEL RELEASE Left 1/28/2020    Procedure: RELEASE, CARPAL TUNNEL;  Surgeon: Brendon" JAMES Lozada Jr., MD;  Location: [facility];  Service: Orthopedics;  Laterality: Left;    MOUTH SURGERY      WISDOM TOOTH EXTRACTION       Family History   Problem Relation Age of Onset    No Known Problems Mother     No Known Problems Father     Cancer Maternal Aunt         endometrial    No Known Problems Maternal Uncle     No Known Problems Paternal Aunt     No Known Problems Paternal Uncle     No Known Problems Maternal Grandmother     No Known Problems Maternal Grandfather     No Known Problems Paternal Grandmother     No Known Problems Paternal Grandfather     No Known Problems Daughter     No Known Problems Son      Social History     Tobacco Use    Smoking status: Never Smoker    Smokeless tobacco: Never Used   Substance Use Topics    Alcohol use: No    Drug use: No     Review of Systems   Constitutional: Negative for fever.   HENT:        + right sided facial pain   Respiratory: Negative for shortness of breath.    Genitourinary: Negative for flank pain.   Musculoskeletal: Negative for gait problem.   Neurological: Negative for weakness.   Psychiatric/Behavioral: Negative for confusion.       Physical Exam     Initial Vitals [09/14/20 1540]   BP Pulse Resp Temp SpO2   (!) 140/86 98 18 98.2 °F (36.8 °C) 97 %      MAP       --         Physical Exam    Nursing note and vitals reviewed.  Constitutional: She appears well-developed and well-nourished. She is not diaphoretic. No distress.   HENT:   Head: Normocephalic and atraumatic.   No rashes or skin changes. No trismus.    Musculoskeletal: Normal range of motion.   Neurological: She is alert. She has normal strength. No sensory deficit.   Skin: Skin is warm and dry. No rash and no abscess noted. No erythema.   Psychiatric: She has a normal mood and affect.         ED Course   Procedures  Labs Reviewed   POCT URINE PREGNANCY          Imaging Results    None          Medical Decision Making:   History:   Old Medical Records: I decided to obtain old  "medical records.  Clinical Tests:   Lab Tests: Ordered and Reviewed       APC / Resident Notes:   Urgent evaluation of a well appearing 29 year old female who presents with chronic right facial pain secondary to trigeminal neuralgia. She reports her neurologist left and she "isn't working with pain management". She denied any new symptoms. After reading her chart, it is noted that she was fired from her pain management doctor for failing her drug test and likely taking more of her pain medication than prescribed. She was given a dose of her medication here but explained to her that we will not be treating her chronic pain and will not give any prescriptions for home. She would prefer IM morphine but will be getting her home dose of norco. Discussed results with patient. Return precautions given. Based on my clinical evaluation, I do not appreciate any immediate, emergent, or life threatening condition or etiology that warrants additional workup today and feel that the patient can be discharged with close follow up care.  Patient is to follow up with their primary care provider. Case was discussed with Dr. Grewal who is in agreement with the plan of care. All questions answered.              Attending Attestation:     Physician Attestation Statement for NP/PA:   I discussed this assessment and plan of this patient with the NP/PA, but I did not personally examine the patient. The face to face encounter was performed by the NP/PA.    Other NP/PA Attestation Additions:    History of Present Illness: 29-year-old female presents with facial pain.    Medical Decision Making: I am in agreement with the physician assistant's  assessment, treatment, and plan of care.                           Clinical Impression:       ICD-10-CM ICD-9-CM   1. Trigeminal neuralgia of right side of face  G50.0 350.1   2. Chronic pain syndrome  G89.4 338.4             ED Disposition Condition    Discharge Stable        ED Prescriptions     None "        Follow-up Information     Follow up With Specialties Details Why Contact Info    Sofia Perry NP Family Medicine   [address] [facility] [address]  [phone]      [facility]  As needed [facility] [address]  [facility] [address]  [phone]                                       Ysabel Park PA-C  09/14/20 1624       Oniel Grewal MD  09/15/20 1127

## 2020-09-14 NOTE — ED NOTES
Urine sample requested from Pt. Pt stated unable to provide urine sample at this time. Pt educated about importance of urine sample. Instructed to alert nurse when able to provide sample. Pt verbalized understanding. Pt provided with water to help facilitate specimen collection at pt request. Pt is AAOx4, ABC's intact, NADN. Sitting upright in bed.

## 2020-09-14 NOTE — ED NOTES
Pt presents with worsening Sx of R sided facial pain that she has a PMHx of trigeminal neuralgia. Pt states her neurologist left and she is without pain medications. Pt is AAOx4, ABC's intact, NADN.

## 2020-09-14 NOTE — DISCHARGE INSTRUCTIONS
Please follow up with one of your primary care providers, neurologist or pain management doctors for management of your chronic pain.  For worsening symptoms, chest pain, shortness of breath, increased abdominal pain, high grade fever, stroke or stroke like symptoms, immediately go to the nearest Emergency Room or call 911 as soon as possible.

## 2020-09-15 ENCOUNTER — TELEPHONE (OUTPATIENT)
Dept: NEUROLOGY | Facility: CLINIC | Age: 30
End: 2020-09-15

## 2020-09-15 NOTE — TELEPHONE ENCOUNTER
Called patient to see if there was sooner appointment with DR Diaz. Went through DR Diaz schedule and there is nothing for new patient appointment any sooner than what she is scheduled. Informed patient would put on wait list and if new patient appointment opened up she would be notified. Also until she is seen, no pain medication can be called in for her and that is why she was sent to pain management. Verbalized understanding.

## 2020-09-17 ENCOUNTER — PATIENT OUTREACH (OUTPATIENT)
Dept: EMERGENCY MEDICINE | Facility: HOSPITAL | Age: 30
End: 2020-09-17

## 2020-09-25 ENCOUNTER — PATIENT MESSAGE (OUTPATIENT)
Dept: PAIN MEDICINE | Facility: CLINIC | Age: 30
End: 2020-09-25

## 2020-09-27 ENCOUNTER — PATIENT MESSAGE (OUTPATIENT)
Dept: NEUROLOGY | Facility: CLINIC | Age: 30
End: 2020-09-27

## 2020-09-27 DIAGNOSIS — G43.719 INTRACTABLE CHRONIC MIGRAINE WITHOUT AURA AND WITHOUT STATUS MIGRAINOSUS: ICD-10-CM

## 2020-09-28 ENCOUNTER — PATIENT MESSAGE (OUTPATIENT)
Dept: NEUROLOGY | Facility: CLINIC | Age: 30
End: 2020-09-28

## 2020-09-28 RX ORDER — RIZATRIPTAN BENZOATE 10 MG/1
TABLET ORAL
Qty: 18 TABLET | Refills: 11 | Status: SHIPPED | OUTPATIENT
Start: 2020-09-28 | End: 2021-03-03

## 2020-09-30 ENCOUNTER — PATIENT MESSAGE (OUTPATIENT)
Dept: NEUROLOGY | Facility: CLINIC | Age: 30
End: 2020-09-30

## 2020-09-30 ENCOUNTER — OFFICE VISIT (OUTPATIENT)
Dept: FAMILY MEDICINE | Facility: CLINIC | Age: 30
End: 2020-09-30
Payer: MEDICAID

## 2020-09-30 ENCOUNTER — LAB VISIT (OUTPATIENT)
Dept: PRIMARY CARE CLINIC | Facility: CLINIC | Age: 30
End: 2020-09-30
Payer: MEDICAID

## 2020-09-30 VITALS
DIASTOLIC BLOOD PRESSURE: 82 MMHG | WEIGHT: 293 LBS | HEIGHT: 65 IN | HEART RATE: 123 BPM | BODY MASS INDEX: 48.82 KG/M2 | OXYGEN SATURATION: 97 % | SYSTOLIC BLOOD PRESSURE: 122 MMHG | TEMPERATURE: 97 F

## 2020-09-30 DIAGNOSIS — R05.9 COUGH: ICD-10-CM

## 2020-09-30 DIAGNOSIS — G89.29 CHRONIC BILATERAL LOW BACK PAIN WITHOUT SCIATICA: ICD-10-CM

## 2020-09-30 DIAGNOSIS — J06.9 UPPER RESPIRATORY TRACT INFECTION, UNSPECIFIED TYPE: Primary | ICD-10-CM

## 2020-09-30 DIAGNOSIS — Z01.818 PRE-OP TESTING: ICD-10-CM

## 2020-09-30 DIAGNOSIS — M54.50 CHRONIC BILATERAL LOW BACK PAIN WITHOUT SCIATICA: ICD-10-CM

## 2020-09-30 PROCEDURE — U0003 INFECTIOUS AGENT DETECTION BY NUCLEIC ACID (DNA OR RNA); SEVERE ACUTE RESPIRATORY SYNDROME CORONAVIRUS 2 (SARS-COV-2) (CORONAVIRUS DISEASE [COVID-19]), AMPLIFIED PROBE TECHNIQUE, MAKING USE OF HIGH THROUGHPUT TECHNOLOGIES AS DESCRIBED BY CMS-2020-01-R: HCPCS

## 2020-09-30 PROCEDURE — 99203 PR OFFICE/OUTPT VISIT, NEW, LEVL III, 30-44 MIN: ICD-10-PCS | Mod: S$GLB,,, | Performed by: NURSE PRACTITIONER

## 2020-09-30 PROCEDURE — 99203 OFFICE O/P NEW LOW 30 MIN: CPT | Mod: S$GLB,,, | Performed by: NURSE PRACTITIONER

## 2020-09-30 RX ORDER — DOXYCYCLINE 100 MG/1
100 CAPSULE ORAL 2 TIMES DAILY
Qty: 20 CAPSULE | Refills: 0 | Status: SHIPPED | OUTPATIENT
Start: 2020-09-30 | End: 2020-10-21

## 2020-09-30 RX ORDER — PANTOPRAZOLE SODIUM 40 MG/1
1 TABLET, DELAYED RELEASE ORAL DAILY
COMMUNITY
Start: 2020-09-23 | End: 2021-06-08

## 2020-09-30 RX ORDER — GUAIFENESIN AND DEXTROMETHORPHAN HYDROBROMIDE 600; 30 MG/1; MG/1
1 TABLET, EXTENDED RELEASE ORAL 2 TIMES DAILY
Qty: 20 TABLET | Refills: 0 | COMMUNITY
Start: 2020-09-30 | End: 2020-10-10

## 2020-09-30 RX ORDER — FLUTICASONE PROPIONATE 50 MCG
2 SPRAY, SUSPENSION (ML) NASAL DAILY
Qty: 9.9 ML | Refills: 0 | Status: SHIPPED | OUTPATIENT
Start: 2020-09-30 | End: 2020-10-21

## 2020-09-30 NOTE — PROGRESS NOTES
SUBJECTIVE:      Patient ID: Jasper Guerrero is a 29 y.o. female.    Chief Complaint: Sinus Problem    Patient is here today complaining of sore throat, pnd, and sinus congestion for 3 days. Both children are also sick.   Follows with psychiatry Dr Hadley in [place] at [facility]. Follows with neurology for migraine headaches and trigeminal neuralgia. She is asking for a referral to Dr Maher for chronic back pain  Sinus Problem  This is a new problem. The current episode started in the past 7 days. The problem has been gradually worsening since onset. There has been no fever. Associated symptoms include congestion, coughing, headaches (chronic), sinus pressure and a sore throat. Pertinent negatives include no chills, diaphoresis or neck pain. Past treatments include nothing.       Past Surgical History:   Procedure Laterality Date    CARPAL TUNNEL RELEASE Right 12/27/2018    Dr Lozada     CARPAL TUNNEL RELEASE Left 1/28/2020    Procedure: RELEASE, CARPAL TUNNEL;  Surgeon: Brendon Lozada Jr., MD;  Location: [facility];  Service: Orthopedics;  Laterality: Left;    MOUTH SURGERY      WISDOM TOOTH EXTRACTION       Family History   Problem Relation Age of Onset    No Known Problems Mother     No Known Problems Father     Cancer Maternal Aunt         endometrial    No Known Problems Maternal Uncle     No Known Problems Paternal Aunt     No Known Problems Paternal Uncle     No Known Problems Maternal Grandmother     No Known Problems Maternal Grandfather     No Known Problems Paternal Grandmother     No Known Problems Paternal Grandfather     No Known Problems Daughter     No Known Problems Son       Social History     Socioeconomic History    Marital status:      Spouse name: Not on file    Number of children: Not on file    Years of education: Not on file    Highest education level: Not on file   Occupational History    Not on file   Social Needs    Financial resource strain:  Not hard at all    Food insecurity     Worry: Never true     Inability: Never true    Transportation needs     Medical: No     Non-medical: No   Tobacco Use    Smoking status: Never Smoker    Smokeless tobacco: Never Used   Substance and Sexual Activity    Alcohol use: No     Frequency: Never     Drinks per session: Patient refused     Binge frequency: Never    Drug use: No    Sexual activity: Yes     Partners: Male   Lifestyle    Physical activity     Days per week: 0 days     Minutes per session: 0 min    Stress: To some extent   Relationships    Social connections     Talks on phone: More than three times a week     Gets together: More than three times a week     Attends [employer] service: Not on file     Active member of club or organization: No     Attends meetings of clubs or organizations: Never     Relationship status:    Other Topics Concern    Not on file   Social History Narrative    Not on file     Current Outpatient Medications   Medication Sig Dispense Refill    acetaminophen (TYLENOL) 325 MG tablet Take 650 mg by mouth every 6 (six) hours as needed for Pain.      butalbital-acetaminophen-caffeine -40 mg (FIORICET, ESGIC) -40 mg per tablet TAKE 1 TABLET BY MOUTH AS NEEDED FOR MIGRAINE.(NO MORE THAN 10 TABLETS A MONTH) 10 tablet 0    diazePAM (VALIUM) 10 MG Tab Take 10 mg by mouth.      FLUoxetine 20 MG capsule Take 60 mg by mouth once daily.      fluvoxaMINE (LUVOX) 100 MG tablet Take 300 mg by mouth once daily.       metoclopramide HCl (REGLAN) 10 MG tablet TAKE 1 TABLET BY MOUTH EVERY 6 HOURS AS NEEDED FOR MIGRAINE AND NAUSEA 60 tablet 11    MIRENA 20 mcg/24 hr (5 years) IUD       ondansetron (ZOFRAN) 4 MG tablet TAKE 1 TABLET BY MOUTH EVERY 6 HOURS AS NEEDED FOR FOR NAUSEA 30 tablet 11    OXcarbazepine (TRILEPTAL) 600 MG Tab Take 1 tablet (600 mg total) by mouth 2 (two) times daily. 60 tablet 11    pantoprazole (PROTONIX) 40 MG tablet 1 tablet once daily.    "   rizatriptan (MAXALT) 10 MG tablet 1 tab PO PRN migraine. May repeat every 2 hours for max 3 tabs in 24 hours. Use no more than 10 days per month. 18 tablet 11    sumatriptan (IMITREX) 100 MG tablet 1 tab PO PRN migraine. May repeat once in 2 hours, no more than 2 tab in 24 hours. No more than 10 days per month. 10 tablet 11    zolpidem (AMBIEN) 10 mg Tab       dextromethorphan-guaifenesin  mg (MUCINEX DM)  mg per 12 hr tablet Take 1 tablet by mouth 2 (two) times daily. for 10 days 20 tablet 0    doxycycline (VIBRAMYCIN) 100 MG Cap Take 1 capsule (100 mg total) by mouth 2 (two) times daily. 20 capsule 0    fluticasone propionate (FLONASE) 50 mcg/actuation nasal spray 2 sprays (100 mcg total) by Each Nostril route once daily. 9.9 mL 0     Current Facility-Administered Medications   Medication Dose Route Frequency Provider Last Rate Last Dose    onabotulinumtoxina injection 200 Units  200 Units Intramuscular Q90 Days Sangeeta Hart MD   200 Units at 01/30/19 1101    onabotulinumtoxina injection 200 Units  200 Units Intramuscular Q90 Days Sangeeta Hart MD   200 Units at 10/16/19 1444     Review of patient's allergies indicates:   Allergen Reactions    Benadryl [diphenhydramine hcl]      Paralysis on right side body     Codeine      Paralysis right body    Flexeril [cyclobenzaprine] Other (See Comments)     Numbness on right side of body    Penicillins Anaphylaxis and Hives    Nsaids (non-steroidal anti-inflammatory drug) Diarrhea and Nausea And Vomiting    Magnesium      "made me feel horrible"       Past Medical History:   Diagnosis Date    Agoraphobia     Depression     DVT (deep venous thrombosis)     2019 had blood clot in right arm    Fibromyalgia     Migraine headache     Nerve injury 08/2016    Lingual Nerve Injury    PTSD (post-traumatic stress disorder)      Past Surgical History:   Procedure Laterality Date    CARPAL TUNNEL RELEASE Right 12/27/2018    Dr Neville ROJAS " "TUNNEL RELEASE Left 1/28/2020    Procedure: RELEASE, CARPAL TUNNEL;  Surgeon: Brendon Lozada Jr., MD;  Location: [facility];  Service: Orthopedics;  Laterality: Left;    MOUTH SURGERY      WISDOM TOOTH EXTRACTION         Review of Systems   Constitutional: Negative for activity change, appetite change, chills, diaphoresis and unexpected weight change.   HENT: Positive for congestion, rhinorrhea, sinus pressure and sore throat. Negative for ear discharge, hearing loss, trouble swallowing and voice change.    Eyes: Negative for photophobia, pain, discharge and visual disturbance.   Respiratory: Positive for cough. Negative for chest tightness, wheezing and stridor.    Cardiovascular: Negative for chest pain and palpitations.   Gastrointestinal: Negative for abdominal pain, blood in stool, constipation, diarrhea and vomiting.   Endocrine: Negative for cold intolerance, heat intolerance, polydipsia and polyuria.   Genitourinary: Negative for difficulty urinating, dysuria, flank pain, hematuria and menstrual problem.   Musculoskeletal: Negative for arthralgias, joint swelling, neck pain and neck stiffness.   Skin: Negative for pallor.   Neurological: Positive for headaches (chronic). Negative for dizziness, speech difficulty and weakness.   Hematological: Does not bruise/bleed easily.   Psychiatric/Behavioral: Negative for confusion and dysphoric mood. The patient is not nervous/anxious.       OBJECTIVE:      Vitals:    09/30/20 1028 09/30/20 1045   BP: (!) 120/90 122/82   Pulse: (!) 123    Temp: 97.4 °F (36.3 °C)    TempSrc: Skin    SpO2: 97%    Weight: (!) 160.6 kg (354 lb)    Height: 5' 5" (1.651 m)      Physical Exam  Vitals signs and nursing note reviewed.   Constitutional:       Appearance: She is well-developed.   HENT:      Head: Atraumatic.      Right Ear: A middle ear effusion is present.      Left Ear: Tympanic membrane is injected.      Nose: Rhinorrhea present. Mucosal edema: turbinates erythematous and " swollen.      Mouth/Throat:      Pharynx: Uvula midline. Posterior oropharyngeal erythema present.      Tonsils: No tonsillar exudate.   Eyes:      Conjunctiva/sclera: Conjunctivae normal.   Neck:      Musculoskeletal: Neck supple.   Cardiovascular:      Rate and Rhythm: Normal rate and regular rhythm.      Pulses: Normal pulses.      Heart sounds: Normal heart sounds.   Pulmonary:      Effort: Pulmonary effort is normal.      Breath sounds: Normal breath sounds. No wheezing, rhonchi or rales.   Abdominal:      General: Bowel sounds are normal. There is no distension.      Palpations: Abdomen is soft.      Tenderness: There is no abdominal tenderness.   Musculoskeletal: Normal range of motion.   Skin:     General: Skin is warm and dry.   Neurological:      Mental Status: She is alert and oriented to person, place, and time.   Psychiatric:         Attention and Perception: Attention normal.         Mood and Affect: Mood normal.         Speech: Speech normal.         Behavior: Behavior normal. Behavior is cooperative.         Thought Content: Thought content normal.        Assessment:       1. Upper respiratory tract infection, unspecified type    2. Chronic bilateral low back pain without sciatica        Plan:       Upper respiratory tract infection, unspecified type  -     doxycycline (VIBRAMYCIN) 100 MG Cap; Take 1 capsule (100 mg total) by mouth 2 (two) times daily.  Dispense: 20 capsule; Refill: 0  -     dextromethorphan-guaifenesin  mg (MUCINEX DM)  mg per 12 hr tablet; Take 1 tablet by mouth 2 (two) times daily. for 10 days  Dispense: 20 tablet; Refill: 0  -     fluticasone propionate (FLONASE) 50 mcg/actuation nasal spray; 2 sprays (100 mcg total) by Each Nostril route once daily.  Dispense: 9.9 mL; Refill: 0  -     COVID-19 Routine Screening    Chronic bilateral low back pain without sciatica  -     Ambulatory referral/consult to Orthopedics; Future; Expected date: 10/07/2020        Follow up if  symptoms worsen or fail to improve.      9/30/2020 Enoch Gerardo, APRN, FNP

## 2020-09-30 NOTE — PATIENT INSTRUCTIONS
Instructions for Patients with Confirmed or Suspected COVID-19    If you are awaiting your test result, you will either be called or it will be released to the patient portal.  If you have any questions about your test, please visit [email or web address] or call our COVID-19 information line at [phone].      Instructions for non-hospitalized or discharged patients with confirmed or suspected COVID-19:       Stay home except to get medical care.    Separate yourself from other people and animals in your home.    Call ahead before visiting your doctor.    Wear a face mask.    Cover your coughs and sneezes.    Clean your hands often.    Avoid sharing personal household items.    Clean all high-touch surfaces every day.    Monitor your symptoms. Seek prompt medical attention if your illness is worsening (e.g., difficulty breathing). Before seeking care, call your healthcare provider.    If you have a medical emergency and must call 911, notify the dispatcher that you have or are being evaluated for COVID-19. If possible, put on a face mask before emergency medical services arrive.    Use the following symptom-based strategy to return to normal activity following a suspected or confirmed case of COVID-19. Continue isolation until:   o At least 3 days (72 hours) have passed since recovery defined as resolution of fever without the use of fever-reducing medications and improvement in respiratory symptoms (e.g. cough, shortness of breath), and   o At least 10 days have passed since the first positive test.       As one of the next steps, you will receive a call or text from the [place] Department [facility] Health ([facility]) COVID-19 Tracing Team. See the contact information below so you know not to ignore the health departments call. It is important that you contact them back immediately so they can help.     Contact Tracer Number:  [phone]  Caller ID for most carriers: [place] [facility]    What is  contact tracing?   Contact tracing is a process that helps identify everyone who has been in close contact with an infected person. Contact tracers let those people know they may have been exposed and guide them on next steps. Confidentiality is important for everyone; no one will be told who may have exposed them to the virus.   Your involvement is important. The more we know about where and how this virus is spreading, the better chance we have at stopping it from spreading further.  What does exposure mean?   Exposure means you have been within 6 feet for more than 15 minutes with a person who has or had COVID-19.  What kind of questions do the contact tracers ask?   A contact tracer will confirm your basic contact information including name, address, phone number, and next of kin, as well as asking about any symptoms you may have had. Theyll also ask you how you think you may have gotten sick, such as places where you may have been exposed to the virus, and people you were with. Those names will never be shared with anyone outside of that call, and will only be used to help trace and stop the spread of the virus.   I have privacy concerns. How will the state use my information?   Your privacy about your health is important. All calls are completed using call centers that use the appropriate health privacy protection measures (HIPAA compliance), meaning that your patient information is safe. No one will ever ask you any questions related to immigration status. Your health comes first.   Do I have to participate?   You do not have to participate, but we strongly encourage you to. Contact tracing can help us catch and control new outbreaks as theyre developing to keep your friends and family safe.   What if I dont hear from anyone?   If you dont receive a call within 24 hours, you can call the number above right away to inquire about your status. That line is open from 8:00 am - 8:00 p.m., 7 days a  week.  Contact tracing saves lives! Together, we have the power to beat this virus and keep our loved ones and neighbors safe.       Instructions for household members, intimate partners and caregivers in a non-healthcare setting of a patient with confirmed or suspected COVID-19:         Close contacts should monitor their health and call their healthcare provider right away if they develop symptoms suggestive of COVID-19 (e.g., fever, cough, shortness of breath).    Stay home except to get medical care. Separate yourself from other people and animals in the home.   Monitor the patients symptoms. If the patient is getting sicker, call his or her healthcare provider. If the patient has a medical emergency and you need to call 911, notify the dispatch personnel that the patient has or is being evaluated for COVID-19.    Wear a facemask when around other people such as sharing a room or vehicle and before entering a healthcare provider's office.   Cover coughs and sneezes with a tissue. Throw used tissues in a lined trash can immediately and wash hands.   Clean hands often with soap and water for at least 20 seconds or with an alcohol-based hand , rubbing hands together until they feel dry. Avoid touching your eyes, nose, and mouth with unwashed hands.   Clean all high-touch; surfaces every day, including counters, tabletops, doorknobs, bathroom fixtures, toilets, phones, keyboards, tablets, bedside tables, etc. Use a household cleaning spray or wipe according to label instructions.   Avoid sharing personal household items such as dishes, drinking glasses, cups, towels, bedding, etc. After these items are used, they should be washed thoroughly with soap and water.   Continue isolation until:   At least 3 days (72 hours) have passed since recovery defined as resolution of fever without the use of fever-reducing medications and improvement in respiratory symptoms (e.g. cough, shortness of breath),  and    At least 10 days have passed since the patients first positive test.    https://www.cdc.gov/coronavirus/2019-ncov/your-health/index.htm

## 2020-10-01 ENCOUNTER — PATIENT MESSAGE (OUTPATIENT)
Dept: FAMILY MEDICINE | Facility: CLINIC | Age: 30
End: 2020-10-01

## 2020-10-01 LAB — SARS-COV-2 RNA RESP QL NAA+PROBE: NOT DETECTED

## 2020-10-02 ENCOUNTER — TELEPHONE (OUTPATIENT)
Dept: FAMILY MEDICINE | Facility: CLINIC | Age: 30
End: 2020-10-02

## 2020-10-05 ENCOUNTER — PATIENT MESSAGE (OUTPATIENT)
Dept: FAMILY MEDICINE | Facility: CLINIC | Age: 30
End: 2020-10-05

## 2020-10-06 NOTE — TELEPHONE ENCOUNTER
Neither Dr. Truong or Dr. Rose take medicaid insurance. LMOM for pt to call the office so I can have her call her insurance to see who is on her plan for pain management.       Pt called, instructed her to call her insurance to see who is covered for pain management, verbalized understanding. Also gave pt information for [facility].

## 2020-10-08 DIAGNOSIS — K31.89 OTHER DISEASES OF STOMACH AND DUODENUM: ICD-10-CM

## 2020-10-08 DIAGNOSIS — T18.2XXA FOREIGN BODY IN STOMACH, INITIAL ENCOUNTER: ICD-10-CM

## 2020-10-08 DIAGNOSIS — K25.3 ACUTE GASTRIC ULCER WITHOUT HEMORRHAGE OR PERFORATION: Primary | ICD-10-CM

## 2020-10-21 ENCOUNTER — OFFICE VISIT (OUTPATIENT)
Dept: ORTHOPEDICS | Facility: CLINIC | Age: 30
End: 2020-10-21
Payer: MEDICAID

## 2020-10-21 VITALS
SYSTOLIC BLOOD PRESSURE: 136 MMHG | WEIGHT: 293 LBS | DIASTOLIC BLOOD PRESSURE: 84 MMHG | BODY MASS INDEX: 48.82 KG/M2 | HEIGHT: 65 IN | HEART RATE: 112 BPM

## 2020-10-21 DIAGNOSIS — M54.16 LUMBAR RADICULOPATHY: Primary | ICD-10-CM

## 2020-10-21 DIAGNOSIS — M51.36 DEGENERATIVE LUMBAR DISC: ICD-10-CM

## 2020-10-21 PROCEDURE — 99213 PR OFFICE/OUTPT VISIT, EST, LEVL III, 20-29 MIN: ICD-10-PCS | Mod: S$GLB,,, | Performed by: ORTHOPAEDIC SURGERY

## 2020-10-21 PROCEDURE — 99213 OFFICE O/P EST LOW 20 MIN: CPT | Mod: S$GLB,,, | Performed by: ORTHOPAEDIC SURGERY

## 2020-10-21 RX ORDER — HYDROCODONE BITARTRATE AND ACETAMINOPHEN 5; 325 MG/1; MG/1
1 TABLET ORAL EVERY 6 HOURS PRN
Qty: 28 TABLET | Refills: 0 | Status: SHIPPED | OUTPATIENT
Start: 2020-10-21 | End: 2020-10-28

## 2020-10-21 RX ORDER — VENLAFAXINE HYDROCHLORIDE 75 MG/1
CAPSULE, EXTENDED RELEASE ORAL
COMMUNITY
Start: 2020-10-15 | End: 2020-11-24

## 2020-10-21 NOTE — PROGRESS NOTES
Subjective:       Patient ID: Jasper Guerrero is a 29 y.o. female.    Chief Complaint: Pain of the Lumbar Spine (Lower back pain x a while. States that her pain has gotten worse, states that she has pain in the lower back and radiates down both legs to the thigh's worse on the left than the right ) and Pain of the Thoracic Spine (T spine pain x a while. States that she has pain that is mainly on the right side )      History of Present Illness  Chronic history of lower back pain for 10 years insidious in onset patient tried chiropractic treatment that made her pain worse occasion radiates to the lower thoracic area no bowel or bladder dysfunction.  She has a history of ulcers and therefore claims she cannot take anti-inflammatory medicines    Current Medications  Current Outpatient Medications   Medication Sig Dispense Refill    acetaminophen (TYLENOL) 325 MG tablet Take 650 mg by mouth every 6 (six) hours as needed for Pain.      butalbital-acetaminophen-caffeine -40 mg (FIORICET, ESGIC) -40 mg per tablet TAKE 1 TABLET BY MOUTH AS NEEDED FOR MIGRAINE.(NO MORE THAN 10 TABLETS A MONTH) 10 tablet 0    diazePAM (VALIUM) 10 MG Tab Take 10 mg by mouth.      fluvoxaMINE (LUVOX) 100 MG tablet Take 300 mg by mouth once daily.       metoclopramide HCl (REGLAN) 10 MG tablet TAKE 1 TABLET BY MOUTH EVERY 6 HOURS AS NEEDED FOR MIGRAINE AND NAUSEA 60 tablet 11    MIRENA 20 mcg/24 hr (5 years) IUD       ondansetron (ZOFRAN) 4 MG tablet TAKE 1 TABLET BY MOUTH EVERY 6 HOURS AS NEEDED FOR FOR NAUSEA 30 tablet 11    OXcarbazepine (TRILEPTAL) 600 MG Tab Take 1 tablet (600 mg total) by mouth 2 (two) times daily. 60 tablet 11    pantoprazole (PROTONIX) 40 MG tablet 1 tablet once daily.      rizatriptan (MAXALT) 10 MG tablet 1 tab PO PRN migraine. May repeat every 2 hours for max 3 tabs in 24 hours. Use no more than 10 days per month. 18 tablet 11    sumatriptan (IMITREX) 100 MG tablet 1 tab PO PRN migraine. May  "repeat once in 2 hours, no more than 2 tab in 24 hours. No more than 10 days per month. 10 tablet 11    venlafaxine (EFFEXOR-XR) 75 MG 24 hr capsule       zolpidem (AMBIEN) 10 mg Tab        Current Facility-Administered Medications   Medication Dose Route Frequency Provider Last Rate Last Dose    onabotulinumtoxina injection 200 Units  200 Units Intramuscular Q90 Days Sangeeta Hart MD   200 Units at 01/30/19 1101    onabotulinumtoxina injection 200 Units  200 Units Intramuscular Q90 Days Sangeeta Hart MD   200 Units at 10/16/19 1444       Allergies  Review of patient's allergies indicates:   Allergen Reactions    Benadryl [diphenhydramine hcl]      Paralysis on right side body     Codeine      Paralysis right body    Flexeril [cyclobenzaprine] Other (See Comments)     Numbness on right side of body    Nsaids (non-steroidal anti-inflammatory drug) Diarrhea and Nausea And Vomiting     Ulcers     Penicillins Anaphylaxis and Hives    Magnesium      "made me feel horrible"        Past Medical History  Past Medical History:   Diagnosis Date    Agoraphobia     Depression     DVT (deep venous thrombosis)     2019 had blood clot in right arm    Fibromyalgia     Migraine headache     Nerve injury 08/2016    Lingual Nerve Injury    PTSD (post-traumatic stress disorder)        Surgical History  Past Surgical History:   Procedure Laterality Date    CARPAL TUNNEL RELEASE Right 12/27/2018    Dr Lozada     CARPAL TUNNEL RELEASE Left 1/28/2020    Procedure: RELEASE, CARPAL TUNNEL;  Surgeon: Brendon Lozada Jr., MD;  Location: [facility];  Service: Orthopedics;  Laterality: Left;    MOUTH SURGERY      WISDOM TOOTH EXTRACTION         Family History:   Family History   Problem Relation Age of Onset    No Known Problems Mother     No Known Problems Father     Cancer Maternal Aunt         endometrial    No Known Problems Maternal Uncle     No Known Problems Paternal Aunt     No Known Problems Paternal Uncle     No " Known Problems Maternal Grandmother     No Known Problems Maternal Grandfather     No Known Problems Paternal Grandmother     No Known Problems Paternal Grandfather     No Known Problems Daughter     No Known Problems Son        Social History:   Social History     Socioeconomic History    Marital status:      Spouse name: Not on file    Number of children: Not on file    Years of education: Not on file    Highest education level: Not on file   Occupational History    Not on file   Social Needs    Financial resource strain: Not hard at all    Food insecurity     Worry: Never true     Inability: Never true    Transportation needs     Medical: No     Non-medical: No   Tobacco Use    Smoking status: Never Smoker    Smokeless tobacco: Never Used   Substance and Sexual Activity    Alcohol use: No     Frequency: Never     Drinks per session: Patient refused     Binge frequency: Never    Drug use: No    Sexual activity: Yes     Partners: Male   Lifestyle    Physical activity     Days per week: 0 days     Minutes per session: 0 min    Stress: To some extent   Relationships    Social connections     Talks on phone: More than three times a week     Gets together: More than three times a week     Attends Faith service: Not on file     Active member of club or organization: No     Attends meetings of clubs or organizations: Never     Relationship status:    Other Topics Concern    Not on file   Social History Narrative    Not on file       Hospitalization/Major Diagnostic Procedure:     Review of Systems     General/Constitutional:  Chills denies. Fatigue denies. Fever denies. Weight gain denies. Weight loss denies.    Respiratory:  Shortness of breath denies.    Cardiovascular:  Chest pain denies.    Gastrointestinal:  Constipation denies. Diarrhea denies. Nausea denies. Vomiting denies.     Hematology:  Easy bruising denies. Prolonged bleeding denies.     Genitourinary:  Frequent  urination denies. Pain in lower back denies. Painful urination denies.     Musculoskeletal:  See HPI for details    Skin:  Rash denies.    Neurologic:  Dizziness denies. Gait abnormalities denies. Seizures denies. Tingling/Numbess denies.    Psychiatric:  Anxiety denies. Depressed mood denies.     Objective:   Vital Signs:   Vitals:    10/21/20 1239   BP: 136/84   Pulse: (!) 112        Physical Exam      General Examination:     Constitutional: The patient is alert and oriented to lace person and time. Mood is pleasant.     Head/Face: Normal facial features normal eyebrows    Eyes: Normal extraocular motion bilaterally    Lungs: Respirations are equal and unlabored    Gait is coordinated.    Cardiovascular: There are no swelling or varicosities present.    Lymphatic: Negative for adenopathy    Skin: Normal    Neurological: Level of consciousness normal. Oriented to place person and time and situation    Psychiatric: Oriented to time place person and situation    Patient stand erect has moderate tenderness bilateral paraspinous muscles L4-S1 no spasm noted range of motion limited due to pain pain with extension straight-leg-raising negative pulses intact    XRAY Report/ Interpretation :  AP pelvis x-ray was taken today. Indications low back pain and/or hip pain. Findings AP pelvis x-ray appears to be normal with no evidence of fractures or significant degenerative disease AP and lateral x-rays of lumbar spine will performed today. Indications low back pain. Findings:  Slight narrowing L5-S1 disc space otherwise negative      Assessment:       1. Lumbar radiculopathy    2. Degenerative lumbar disc        Plan:       Jasper was seen today for pain and pain.    Diagnoses and all orders for this visit:    Lumbar radiculopathy  -     X-Ray Lumbar Spine Ap And Lateral  -     X-Ray Pelvis Routine AP    Degenerative lumbar disc         No follow-ups on file.    Due to the duration of her symptoms failure of treatments  including chiropractic treatments and because of the inability to take anti-inflammatories I recommend an MRI study of the lumbar spine at this time short term pain medicine has been given for 1 weeks time patient informed that no further narcotic analgesics will be given until re-evaluated after the MRI study    This note was created using Dragon voice recognition software that occasionally misinterpreted phrases or words.

## 2020-10-21 NOTE — LETTER
October 21, 2020      Enoch Gerardo NP  [address]  [facility] [address]           [facility]  [address] [facility] [address]  [facility] [address]  Phone: [phone]  Fax: [phone]          Patient: Jasper Guerrero   MR Number: [id number]   YOB: 1990   Date of Visit: 10/21/2020       Dear Enoch Gerardo:    Thank you for referring Jasper Guerrero to me for evaluation. Attached you will find relevant portions of my assessment and plan of care.    If you have questions, please do not hesitate to call me. I look forward to following Jasper Guerrero along with you.    Sincerely,    Brendon Maher MD    Enclosure  CC:  No Recipients    If you would like to receive this communication electronically, please contact [email or web address] or [phone] to request more information on [employer] Link access.    For providers and/or their staff who would like to refer a patient to [facility], please contact us through our one-stop-shop provider referral line, [facility], at [phone].    If you feel you have received this communication in error or would no longer like to receive these types of communications, please e-mail [email or web address]

## 2020-10-22 ENCOUNTER — PATIENT MESSAGE (OUTPATIENT)
Dept: ORTHOPEDICS | Facility: CLINIC | Age: 30
End: 2020-10-22

## 2020-10-25 ENCOUNTER — PATIENT MESSAGE (OUTPATIENT)
Dept: NEUROLOGY | Facility: CLINIC | Age: 30
End: 2020-10-25

## 2020-10-25 DIAGNOSIS — G43.719 INTRACTABLE CHRONIC MIGRAINE WITHOUT AURA AND WITHOUT STATUS MIGRAINOSUS: ICD-10-CM

## 2020-10-26 ENCOUNTER — PATIENT MESSAGE (OUTPATIENT)
Dept: ORTHOPEDICS | Facility: CLINIC | Age: 30
End: 2020-10-26

## 2020-10-27 ENCOUNTER — TELEPHONE (OUTPATIENT)
Dept: ORTHOPEDICS | Facility: CLINIC | Age: 30
End: 2020-10-27

## 2020-10-28 ENCOUNTER — PATIENT MESSAGE (OUTPATIENT)
Dept: ORTHOPEDICS | Facility: CLINIC | Age: 30
End: 2020-10-28

## 2020-10-28 ENCOUNTER — PATIENT MESSAGE (OUTPATIENT)
Dept: NEUROLOGY | Facility: CLINIC | Age: 30
End: 2020-10-28

## 2020-10-28 ENCOUNTER — TELEPHONE (OUTPATIENT)
Dept: ORTHOPEDICS | Facility: CLINIC | Age: 30
End: 2020-10-28

## 2020-10-28 DIAGNOSIS — M51.36 DEGENERATIVE LUMBAR DISC: ICD-10-CM

## 2020-10-28 DIAGNOSIS — M54.16 LUMBAR RADICULOPATHY: Primary | ICD-10-CM

## 2020-10-28 RX ORDER — TRAMADOL HYDROCHLORIDE 50 MG/1
50 TABLET ORAL EVERY 6 HOURS PRN
Qty: 28 TABLET | Refills: 0 | Status: SHIPPED | OUTPATIENT
Start: 2020-10-28 | End: 2020-11-04

## 2020-10-28 RX ORDER — BUTALBITAL, ACETAMINOPHEN AND CAFFEINE 50; 325; 40 MG/1; MG/1; MG/1
TABLET ORAL
Qty: 10 TABLET | Refills: 0 | Status: SHIPPED | OUTPATIENT
Start: 2020-10-28 | End: 2020-11-30 | Stop reason: SDUPTHER

## 2020-10-28 NOTE — TELEPHONE ENCOUNTER
----- Message from Emi Sargent sent at 10/28/2020  9:48 AM CDT -----  Phone #: [phone]  Patient is requesting a refill of [facility] Maher      Pharmacy:   [id number] - [place] - [address] AT [facility] [address]  [address]  [place] [address]  Phone: [phone] Fax: [phone]    [facility] #[id number] - [place] - [address] AT [facility] [address]  [address]  [place] [address]  Phone: [phone] Fax: [phone]

## 2020-11-04 ENCOUNTER — PATIENT MESSAGE (OUTPATIENT)
Dept: ORTHOPEDICS | Facility: CLINIC | Age: 30
End: 2020-11-04

## 2020-11-04 ENCOUNTER — OFFICE VISIT (OUTPATIENT)
Dept: ORTHOPEDICS | Facility: CLINIC | Age: 30
End: 2020-11-04
Payer: MEDICAID

## 2020-11-04 VITALS
BODY MASS INDEX: 48.82 KG/M2 | DIASTOLIC BLOOD PRESSURE: 80 MMHG | HEIGHT: 65 IN | WEIGHT: 293 LBS | HEART RATE: 82 BPM | SYSTOLIC BLOOD PRESSURE: 128 MMHG

## 2020-11-04 DIAGNOSIS — G89.29 OTHER CHRONIC PAIN: ICD-10-CM

## 2020-11-04 DIAGNOSIS — M51.36 DEGENERATIVE LUMBAR DISC: ICD-10-CM

## 2020-11-04 DIAGNOSIS — M54.16 LUMBAR RADICULOPATHY: Primary | ICD-10-CM

## 2020-11-04 PROCEDURE — 99213 PR OFFICE/OUTPT VISIT, EST, LEVL III, 20-29 MIN: ICD-10-PCS | Mod: S$GLB,,, | Performed by: ORTHOPAEDIC SURGERY

## 2020-11-04 PROCEDURE — 99213 OFFICE O/P EST LOW 20 MIN: CPT | Mod: S$GLB,,, | Performed by: ORTHOPAEDIC SURGERY

## 2020-11-04 RX ORDER — DULOXETIN HYDROCHLORIDE 30 MG/1
30 CAPSULE, DELAYED RELEASE ORAL DAILY
Qty: 7 CAPSULE | Refills: 0 | Status: SHIPPED | OUTPATIENT
Start: 2020-11-04 | End: 2020-11-11

## 2020-11-04 RX ORDER — DULOXETIN HYDROCHLORIDE 60 MG/1
60 CAPSULE, DELAYED RELEASE ORAL DAILY
Qty: 30 CAPSULE | Refills: 5 | Status: SHIPPED | OUTPATIENT
Start: 2020-11-04 | End: 2020-11-24

## 2020-11-04 NOTE — PROGRESS NOTES
Subjective:       Patient ID: Jasper Guerrero is a 29 y.o. female.    Chief Complaint: Pain of the Lumbar Spine (Lumbar f/u, MRI was denied by insurance. She still has intermittent radiating pain into bilateral thighs. She has no numbness. States that Tramadol isn't helping pain.)      History of Present Illness  Patient is here follow-up with chief complaint of bilateral lumbar and lumbosacral pain with some radiation into the anterior thighs bilateral.  She also has cervical and thoracic pain but this current referral through Medicaid is only for her lumbar spine.    When she was recently here Dr. Maher recommended an MRI of her lumbar spine but this was denied by Medicaid.    Patient describes her pain as severe and activity limiting.  States that she struggles and cannot effectively clean her home or play or interact with her children.  Patient states that she spends most of her time sitting or supine to manage her symptoms.  Patient states that increased activity and/or changes in the weather increase her pain and that the only thing that helps decrease her pain is the opioid medication.    The patient states that her pain started a little over 10 years ago.  No acute injury or incident where the pain started but does relate her symptoms to the possibility that her back was injured carrying a very heavy books sac/backpack while in high school.     The only treatment she has had in the past was chiropractic.  The tramadol which was recently prescribed for her does not help with her pain.  She does not remember that she has ever had an MRI of her lumbar spine.  She states that she has been to the emergency room about 2 times over the last 10 years for her lumbar spine and has had multiple x-rays done there.  Patient states that she was told she has early onset arthritic change of her lumbar spine.  She has also had multiple x-rays done with chiropractors.  Her most recent treatment was approximately  2013.    Reports a significant amount of frustration and anxiety related to her chronic pain and the decreased functional ability that it causes    Current Medications  Current Outpatient Medications   Medication Sig Dispense Refill    acetaminophen (TYLENOL) 325 MG tablet Take 650 mg by mouth every 6 (six) hours as needed for Pain.      butalbital-acetaminophen-caffeine -40 mg (FIORICET, ESGIC) -40 mg per tablet TAKE 1 TABLET BY MOUTH AS NEEDED FOR MIGRAINE.(NO MORE THAN 10 TABLETS A MONTH) 10 tablet 0    diazePAM (VALIUM) 10 MG Tab Take 10 mg by mouth.      fluvoxaMINE (LUVOX) 100 MG tablet Take 300 mg by mouth once daily.       metoclopramide HCl (REGLAN) 10 MG tablet TAKE 1 TABLET BY MOUTH EVERY 6 HOURS AS NEEDED FOR MIGRAINE AND NAUSEA 60 tablet 11    MIRENA 20 mcg/24 hr (5 years) IUD       ondansetron (ZOFRAN) 4 MG tablet TAKE 1 TABLET BY MOUTH EVERY 6 HOURS AS NEEDED FOR FOR NAUSEA 30 tablet 11    OXcarbazepine (TRILEPTAL) 600 MG Tab Take 1 tablet (600 mg total) by mouth 2 (two) times daily. 60 tablet 11    pantoprazole (PROTONIX) 40 MG tablet 1 tablet once daily.      rizatriptan (MAXALT) 10 MG tablet 1 tab PO PRN migraine. May repeat every 2 hours for max 3 tabs in 24 hours. Use no more than 10 days per month. 18 tablet 11    sumatriptan (IMITREX) 100 MG tablet 1 tab PO PRN migraine. May repeat once in 2 hours, no more than 2 tab in 24 hours. No more than 10 days per month. 10 tablet 11    traMADoL (ULTRAM) 50 mg tablet Take 1 tablet (50 mg total) by mouth every 6 (six) hours as needed. 28 tablet 0    venlafaxine (EFFEXOR-XR) 75 MG 24 hr capsule       zolpidem (AMBIEN) 10 mg Tab        Current Facility-Administered Medications   Medication Dose Route Frequency Provider Last Rate Last Dose    onabotulinumtoxina injection 200 Units  200 Units Intramuscular Q90 Days Sangeeta Hart MD   200 Units at 01/30/19 1101    onabotulinumtoxina injection 200 Units  200 Units Intramuscular Q90  "Days Sangeeta Hart MD   200 Units at 10/16/19 1444       Allergies  Review of patient's allergies indicates:   Allergen Reactions    Benadryl [diphenhydramine hcl]      Paralysis on right side body     Codeine      Paralysis right body    Flexeril [cyclobenzaprine] Other (See Comments)     Numbness on right side of body    Nsaids (non-steroidal anti-inflammatory drug) Diarrhea and Nausea And Vomiting     Ulcers     Penicillins Anaphylaxis and Hives    Magnesium      "made me feel horrible"        Past Medical History  Past Medical History:   Diagnosis Date    Agoraphobia     Depression     DVT (deep venous thrombosis)     2019 had blood clot in right arm    Fibromyalgia     Migraine headache     Nerve injury 08/2016    Lingual Nerve Injury    PTSD (post-traumatic stress disorder)        Surgical History  Past Surgical History:   Procedure Laterality Date    CARPAL TUNNEL RELEASE Right 12/27/2018    Dr Lozada     CARPAL TUNNEL RELEASE Left 1/28/2020    Procedure: RELEASE, CARPAL TUNNEL;  Surgeon: Brendon Lozada Jr., MD;  Location: [facility];  Service: Orthopedics;  Laterality: Left;    MOUTH SURGERY      WISDOM TOOTH EXTRACTION         Family History:   Family History   Problem Relation Age of Onset    No Known Problems Mother     No Known Problems Father     Cancer Maternal Aunt         endometrial    No Known Problems Maternal Uncle     No Known Problems Paternal Aunt     No Known Problems Paternal Uncle     No Known Problems Maternal Grandmother     No Known Problems Maternal Grandfather     No Known Problems Paternal Grandmother     No Known Problems Paternal Grandfather     No Known Problems Daughter     No Known Problems Son        Social History:   Social History     Socioeconomic History    Marital status:      Spouse name: Not on file    Number of children: Not on file    Years of education: Not on file    Highest education level: Not on file   Occupational History    " Not on file   Social Needs    Financial resource strain: Not hard at all    Food insecurity     Worry: Never true     Inability: Never true    Transportation needs     Medical: No     Non-medical: No   Tobacco Use    Smoking status: Never Smoker    Smokeless tobacco: Never Used   Substance and Sexual Activity    Alcohol use: No     Frequency: Never     Drinks per session: Patient refused     Binge frequency: Never    Drug use: No    Sexual activity: Yes     Partners: Male   Lifestyle    Physical activity     Days per week: 0 days     Minutes per session: 0 min    Stress: To some extent   Relationships    Social connections     Talks on phone: More than three times a week     Gets together: More than three times a week     Attends Hoahaoism service: Not on file     Active member of club or organization: No     Attends meetings of clubs or organizations: Never     Relationship status:    Other Topics Concern    Not on file   Social History Narrative    Not on file       Hospitalization/Major Diagnostic Procedure:     Review of Systems     General/Constitutional:  Chills denies. Fatigue denies. Fever denies. Weight gain denies. Weight loss denies.    Respiratory:  Shortness of breath denies.    Cardiovascular:  Chest pain denies.    Gastrointestinal:  Constipation denies. Diarrhea denies. Nausea denies. Vomiting denies.     Hematology:  Easy bruising denies. Prolonged bleeding denies.     Genitourinary:  Frequent urination denies. Pain in lower back denies. Painful urination denies.     Musculoskeletal:  See HPI for details    Skin:  Rash denies.    Neurologic:  Dizziness denies. Gait abnormalities denies. Seizures denies. Tingling/Numbess denies.    Psychiatric:  Anxiety denies. Depressed mood denies.     Objective:   Vital Signs:   Vitals:    11/04/20 1223   BP: 128/80   Pulse: 82        Physical Exam      General Examination:     Constitutional: The patient is alert and oriented to lace person  "and time. Mood is pleasant.     Head/Face: Normal facial features normal eyebrows    Eyes: Normal extraocular motion bilaterally    Lungs: Respirations are equal and unlabored    Gait is coordinated.    Cardiovascular: There are no swelling or varicosities present.    Lymphatic: Negative for adenopathy    Skin: Normal    Neurological: Level of consciousness normal. Oriented to place person and time and situation    Psychiatric: Oriented to time place person and situation     Morbidly obese.  Exaggerated antalgic gait.  Painful range of motion flexion, extension, and rotation bilaterally.  Tenderness palpation bilateral lumbar lumbosacral spine.  No palpable muscle spasm.  Bilateral lower extremities are distal neurovascular intact with no focal neurologic deficits and negative straight leg raise maneuver bilateral.    XRAY Report/ Interpretation:  Previous lumbar x-rays taken in the office demonstrated no significant abnormalities except for some mild degenerative change      Assessment:       1. Lumbar radiculopathy    2. Degenerative lumbar disc    3. Other chronic pain        Plan:       Jasper was seen today for pain.    Diagnoses and all orders for this visit:    Lumbar radiculopathy  -     Ambulatory referral/consult to Pain Clinic; Future  -     Ambulatory referral/consult to Physical/Occupational Therapy; Future    Degenerative lumbar disc  -     Ambulatory referral/consult to Pain Clinic; Future  -     Ambulatory referral/consult to Physical/Occupational Therapy; Future    Other chronic pain         No follow-ups on file.  Franklin Mello, physician's assistant served in the capacity as a "scribe" for this patient encounter  A "face to face" encounter occurred with Dr. Maher on this date  The treatment plan and medical decision making is outlined below:  At this time recommend that she undergo a structured course of physical therapy to 3 times a week for 6 weeks.  Also recommend that she be referred to " pain management.  I discussed with her the fact that we cannot prescribe long-term narcotic medications for her.  I checked a  and she has been to several physicians over the last 12 months to receive opioids and benzodiazepine medications.  Therefore I chose not to refill her Norco.  I did notify her of this.  However I will give her prescription of Cymbalta which should help with some of her chronic pain.  We will see her back in approximately 8 weeks.  If the physical therapy does not improve her symptoms and we will order a lumbar MRI at that time.  However continue to recommend that she become established with a pain management specialist.  I informed the patient at this time that I am not able to refill her hydrocodone or opioid medications because she has been on benzodiazepines in the past she had has undergo physical therapy in MRI and then let me reassessed her or she can go see her primary care physician and let them decide about whether to prescribe any opioid medicines she understands my recommendations and there was no misunderstanding to  At the end of the appointment after I informed her that I was not able to refill her opioid medications because she is on the chronic benzodiazepine registry she was not satisfied with this answer and requested to speak to Dr. Maher directly regarding this.    This note was created using Dragon voice recognition software that occasionally misinterpreted phrases or words.

## 2020-11-12 DIAGNOSIS — K59.1 FUNCTIONAL DIARRHEA: ICD-10-CM

## 2020-11-12 DIAGNOSIS — K31.89 GASTROPTOSIS: ICD-10-CM

## 2020-11-12 DIAGNOSIS — K31.84 GASTROPARESIS: ICD-10-CM

## 2020-11-12 DIAGNOSIS — R11.0 NAUSEA: ICD-10-CM

## 2020-11-12 DIAGNOSIS — K62.5 HEMORRHAGE OF RECTUM AND ANUS: ICD-10-CM

## 2020-11-12 DIAGNOSIS — K25.3 ACUTE GASTRIC ULCER WITHOUT HEMORRHAGE OR PERFORATION: Primary | ICD-10-CM

## 2020-11-12 DIAGNOSIS — T18.2XXA FOREIGN BODY IN STOMACH: ICD-10-CM

## 2020-11-12 DIAGNOSIS — R10.13 ABDOMINAL PAIN, EPIGASTRIC: ICD-10-CM

## 2020-11-18 ENCOUNTER — PATIENT MESSAGE (OUTPATIENT)
Dept: NEUROLOGY | Facility: CLINIC | Age: 30
End: 2020-11-18

## 2020-11-23 ENCOUNTER — PATIENT MESSAGE (OUTPATIENT)
Dept: NEUROLOGY | Facility: CLINIC | Age: 30
End: 2020-11-23

## 2020-11-24 ENCOUNTER — PATIENT MESSAGE (OUTPATIENT)
Dept: NEUROLOGY | Facility: CLINIC | Age: 30
End: 2020-11-24

## 2020-11-24 ENCOUNTER — OFFICE VISIT (OUTPATIENT)
Dept: NEUROLOGY | Facility: CLINIC | Age: 30
End: 2020-11-24
Payer: MEDICAID

## 2020-11-24 VITALS
SYSTOLIC BLOOD PRESSURE: 148 MMHG | WEIGHT: 293 LBS | BODY MASS INDEX: 59.32 KG/M2 | RESPIRATION RATE: 20 BRPM | HEART RATE: 113 BPM | DIASTOLIC BLOOD PRESSURE: 105 MMHG

## 2020-11-24 DIAGNOSIS — F41.9 ANXIETY: ICD-10-CM

## 2020-11-24 DIAGNOSIS — Z79.891 CHRONICALLY ON OPIATE THERAPY: ICD-10-CM

## 2020-11-24 DIAGNOSIS — M26.609 TMJ (TEMPOROMANDIBULAR JOINT DISORDER): ICD-10-CM

## 2020-11-24 DIAGNOSIS — G43.719 INTRACTABLE CHRONIC MIGRAINE WITHOUT AURA AND WITHOUT STATUS MIGRAINOSUS: ICD-10-CM

## 2020-11-24 DIAGNOSIS — G50.9 TRIGEMINAL NEUROPATHY: Primary | ICD-10-CM

## 2020-11-24 PROCEDURE — 99999 PR PBB SHADOW E&M-EST. PATIENT-LVL III: CPT | Mod: PBBFAC,,, | Performed by: PSYCHIATRY & NEUROLOGY

## 2020-11-24 PROCEDURE — 99213 OFFICE O/P EST LOW 20 MIN: CPT | Mod: PBBFAC,PO | Performed by: PSYCHIATRY & NEUROLOGY

## 2020-11-24 PROCEDURE — 99215 PR OFFICE/OUTPT VISIT, EST, LEVL V, 40-54 MIN: ICD-10-PCS | Mod: S$PBB,,, | Performed by: PSYCHIATRY & NEUROLOGY

## 2020-11-24 PROCEDURE — 99215 OFFICE O/P EST HI 40 MIN: CPT | Mod: S$PBB,,, | Performed by: PSYCHIATRY & NEUROLOGY

## 2020-11-24 PROCEDURE — 99999 PR PBB SHADOW E&M-EST. PATIENT-LVL III: ICD-10-PCS | Mod: PBBFAC,,, | Performed by: PSYCHIATRY & NEUROLOGY

## 2020-11-24 RX ORDER — METOCLOPRAMIDE 10 MG/1
TABLET ORAL
Qty: 60 TABLET | Refills: 11 | Status: SHIPPED | OUTPATIENT
Start: 2020-11-24 | End: 2021-08-04 | Stop reason: SDUPTHER

## 2020-11-24 RX ORDER — OXCARBAZEPINE 600 MG/1
600 TABLET, FILM COATED ORAL DAILY
COMMUNITY
End: 2020-11-24 | Stop reason: ALTCHOICE

## 2020-11-24 RX ORDER — DICYCLOMINE HYDROCHLORIDE 20 MG/1
20 TABLET ORAL EVERY 6 HOURS
COMMUNITY
End: 2021-06-08

## 2020-11-24 RX ORDER — ONDANSETRON 4 MG/1
TABLET, FILM COATED ORAL
Qty: 30 TABLET | Refills: 11 | Status: SHIPPED | OUTPATIENT
Start: 2020-11-24 | End: 2021-06-08

## 2020-11-24 RX ORDER — HYDROCODONE BITARTRATE AND ACETAMINOPHEN 5; 325 MG/1; MG/1
1 TABLET ORAL EVERY 6 HOURS PRN
Qty: 40 TABLET | Refills: 0 | Status: SHIPPED | OUTPATIENT
Start: 2020-11-24 | End: 2020-12-01 | Stop reason: SDUPTHER

## 2020-11-24 RX ORDER — CLONAZEPAM 1 MG/1
1 TABLET ORAL 2 TIMES DAILY PRN
COMMUNITY
End: 2021-06-02 | Stop reason: SDUPTHER

## 2020-11-24 NOTE — LETTER
November 24, 2020      Sangeeta Hart MD  [address] [facility] [address]  [address]  [facility] [address]           [facility]  [address] [place] [address]  Phone: [phone]  Fax: [phone]          Patient: Jasper Guerrero   MR Number: [id number]   YOB: 1990   Date of Visit: 11/24/2020       Dear Dr. Sangeeta Hart:    Thank you for referring Jasper Guerrero to me for evaluation. Attached you will find relevant portions of my assessment and plan of care.    If you have questions, please do not hesitate to call me. I look forward to following Jasper Guerrero along with you.    Sincerely,    Nadine Diaz MD    Enclosure  CC:  No Recipients    If you would like to receive this communication electronically, please contact [email or web address] or [phone] to request more information on [employer] Link access.    For providers and/or their staff who would like to refer a patient to [facility], please contact us through our one-stop-shop provider referral line, [facility], at [phone].    If you feel you have received this communication in error or would no longer like to receive these types of communications, please e-mail [email or web address]

## 2020-11-24 NOTE — PROGRESS NOTES
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 Transportation needs     Medical: Not on file     Non-medical: Not on file   Tobacco Use    Smoking status: Never Smoker    Smokeless tobacco: Never Used   Substance and Sexual Activity    Alcohol use: No    Drug use: No    Sexual activity: Yes     Partners: Male   Lifestyle    Physical activity     Days per week: Not on file     Minutes per session: Not on file    Stress: Not on file   Relationships    Social connections     Talks on phone: Not on file     Gets together: Not on file     Attends Taoism service: Not on file     Active member of club or organization: Not on file     Attends meetings of clubs or organizations: Not on file     Relationship status: Not on file   Other Topics Concern    Not on file   Social History Narrative    Not on file        Review of Systems  14-point review of systems as follows:   No check janett indicates NEGATIVE response   Constitutional: [] weight loss, [] change to appetite   Eyes: [] change in vision, [] double vision   Ears, nose, mouth, throat: [] frequent nose bleeds, [] ringing in the ears   Respiratory: [] cough, [] wheezing   Cardiovascular: [] chest pain, [] palpitations   Gastrointestinal: [] jaundice, [] nausea/vomiting   Genitourinary: [] incontinence, [] burning with urination   Hematologic/lymphatic: [] easy bruising/bleeding, [] night sweats    Neurological: [] numbness, [] weakness   Endocrine: [] fatigue, [] heat/cold intolerance   Allergy/Immunologic: [] fevers, [] chills   Musculoskeletal: [] muscle pain, [] joint pain   Psychiatric: [] thoughts of harming self/others, [] depression   Integumentary: [] rashes, [] sores that do not heal       Objective:        Vitals:    11/24/20 1008   BP: (!) 148/105   Pulse: (!) 113   Resp: 20     Body mass index is 59.32 kg/m².      Data Review:     No results found for this or any previous visit.    Lab Results   Component Value Date     01/21/2020     12/26/2018    K 4.5 01/21/2020     01/21/2020     12/26/2018    CO2 27 01/21/2020    BUN 10 01/21/2020    CREATININE 0.9 01/21/2020    CREATININE 0.95 12/26/2018     (H) 01/21/2020    GLU 99 12/26/2018    AST 21 01/21/2020    ALT 34 01/21/2020    ALBUMIN 3.7 01/21/2020    PROT 7.0 01/21/2020    BILITOT 0.2 01/21/2020       Lab Results   Component Value Date    WBC 8.86 01/21/2020    HGB 12.0 01/21/2020    HCT 38.1 01/21/2020    MCV 90 01/21/2020     01/21/2020       Lab Results   Component Value Date    TSH 2.270 05/13/2019       Assessment & Plan:       Problem List Items Addressed This Visit        Neuro    Trigeminal neuropathy - Primary    Overview     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          Relevant Medications    HYDROcodone-acetaminophen (NORCO) 5-325 mg per tablet (Start on 8/13/2020)    Neuropathic pain    Overview     The pain from her trigeminal neuropathy is a neuropathic type pain. Trileptal has been effective but dosing is limited by side effects. Treatment refractive case so we are using opiates as well. Nucynta was most effective but cost prohibitive once her insurance changed to medicaid.     Resume hydrocodone at low dose. Avoid percocet - headaches were progressing on this. Consider adding  lamotrigine once she has adjusted to ajovy. If hydrocodone failed, consider low dose methadone. Will not tolerate early refills and aberrant behavior on the prescription drug monitor program and this will be grounds for termination of chronic opiate therapy for this purpose.               REFERRALS:  -- [facility]    PREVENTION (use daily regardless of headache):  -- Discontinue Trileptal 600mg a day, decrease to 300 mg daily for 1 week, then stop  -- stop lamotrigine 25mg daily since it is poorly tolerated     ACUTE TREATMENT:  -- continue hydrocodone 5mg three times per day as needed (#[facility] until you see your next Pain Medicine doctor.    Counseling:  More than 50% of the 40 minute encounter was spent face to face counseling the patient NEW to me regarding current status and future plan of care as well as side of the medications. All questions were answered to patient's satisfaction      Justino Diaz M.D  Medical Director, Headache and Facial Pain  [facility]

## 2020-11-26 ENCOUNTER — PATIENT MESSAGE (OUTPATIENT)
Dept: NEUROLOGY | Facility: CLINIC | Age: 30
End: 2020-11-26

## 2020-11-30 ENCOUNTER — PATIENT MESSAGE (OUTPATIENT)
Dept: NEUROLOGY | Facility: CLINIC | Age: 30
End: 2020-11-30

## 2020-11-30 DIAGNOSIS — G43.719 INTRACTABLE CHRONIC MIGRAINE WITHOUT AURA AND WITHOUT STATUS MIGRAINOSUS: ICD-10-CM

## 2020-11-30 RX ORDER — BUTALBITAL, ACETAMINOPHEN AND CAFFEINE 50; 325; 40 MG/1; MG/1; MG/1
TABLET ORAL
Qty: 10 TABLET | Refills: 0 | Status: SHIPPED | OUTPATIENT
Start: 2020-11-30 | End: 2021-01-15 | Stop reason: SDUPTHER

## 2020-12-01 ENCOUNTER — PATIENT MESSAGE (OUTPATIENT)
Dept: NEUROLOGY | Facility: CLINIC | Age: 30
End: 2020-12-01

## 2020-12-01 RX ORDER — BUTALBITAL, ACETAMINOPHEN AND CAFFEINE 50; 325; 40 MG/1; MG/1; MG/1
TABLET ORAL
Qty: 10 TABLET | Refills: 0 | OUTPATIENT
Start: 2020-12-01

## 2020-12-01 RX ORDER — HYDROCODONE BITARTRATE AND ACETAMINOPHEN 5; 325 MG/1; MG/1
1 TABLET ORAL EVERY 6 HOURS PRN
Qty: 60 TABLET | Refills: 0 | Status: SHIPPED | OUTPATIENT
Start: 2020-12-01 | End: 2020-12-09 | Stop reason: SDUPTHER

## 2020-12-01 NOTE — TELEPHONE ENCOUNTER
1648: Spoke with pharmacy about e-script failure receipt of Fioricet. Verbal order provided with readback. Patient and Dr. Diaz notified.

## 2020-12-01 NOTE — TELEPHONE ENCOUNTER
Called pharmacy to confirm they did not receive the escribe.  They stated they did not have an order from yesterday or today for Fioricet.  They did receive the order for Norco today.  While on the phone with pharmacist to give verbal order, Margaret another nurse for Dr Diaz was calling medication.

## 2020-12-09 ENCOUNTER — PATIENT MESSAGE (OUTPATIENT)
Dept: NEUROLOGY | Facility: CLINIC | Age: 30
End: 2020-12-09

## 2020-12-09 RX ORDER — HYDROCODONE BITARTRATE AND ACETAMINOPHEN 5; 325 MG/1; MG/1
1 TABLET ORAL EVERY 6 HOURS PRN
Qty: 60 TABLET | Refills: 0 | Status: SHIPPED | OUTPATIENT
Start: 2020-12-09 | End: 2020-12-28 | Stop reason: SDUPTHER

## 2020-12-10 ENCOUNTER — PATIENT MESSAGE (OUTPATIENT)
Dept: NEUROLOGY | Facility: CLINIC | Age: 30
End: 2020-12-10

## 2020-12-16 ENCOUNTER — PATIENT MESSAGE (OUTPATIENT)
Dept: FAMILY MEDICINE | Facility: CLINIC | Age: 30
End: 2020-12-16

## 2020-12-27 ENCOUNTER — PATIENT MESSAGE (OUTPATIENT)
Dept: NEUROLOGY | Facility: CLINIC | Age: 30
End: 2020-12-27

## 2020-12-28 ENCOUNTER — PATIENT MESSAGE (OUTPATIENT)
Dept: NEUROLOGY | Facility: CLINIC | Age: 30
End: 2020-12-28

## 2020-12-28 RX ORDER — HYDROCODONE BITARTRATE AND ACETAMINOPHEN 5; 325 MG/1; MG/1
1 TABLET ORAL EVERY 6 HOURS PRN
Qty: 36 TABLET | Refills: 0 | Status: SHIPPED | OUTPATIENT
Start: 2020-12-29 | End: 2021-01-07

## 2021-01-05 ENCOUNTER — PATIENT MESSAGE (OUTPATIENT)
Dept: NEUROLOGY | Facility: CLINIC | Age: 31
End: 2021-01-05

## 2021-01-06 ENCOUNTER — PATIENT MESSAGE (OUTPATIENT)
Dept: NEUROLOGY | Facility: CLINIC | Age: 31
End: 2021-01-06

## 2021-01-07 ENCOUNTER — PATIENT MESSAGE (OUTPATIENT)
Dept: NEUROLOGY | Facility: CLINIC | Age: 31
End: 2021-01-07

## 2021-01-15 ENCOUNTER — PATIENT MESSAGE (OUTPATIENT)
Dept: NEUROLOGY | Facility: CLINIC | Age: 31
End: 2021-01-15

## 2021-01-15 DIAGNOSIS — G43.719 INTRACTABLE CHRONIC MIGRAINE WITHOUT AURA AND WITHOUT STATUS MIGRAINOSUS: ICD-10-CM

## 2021-01-15 RX ORDER — BUTALBITAL, ACETAMINOPHEN AND CAFFEINE 50; 325; 40 MG/1; MG/1; MG/1
TABLET ORAL
Qty: 10 TABLET | Refills: 0 | Status: SHIPPED | OUTPATIENT
Start: 2021-01-15 | End: 2021-07-07

## 2021-01-18 ENCOUNTER — PATIENT MESSAGE (OUTPATIENT)
Dept: NEUROLOGY | Facility: CLINIC | Age: 31
End: 2021-01-18

## 2021-01-18 DIAGNOSIS — G43.719 INTRACTABLE CHRONIC MIGRAINE WITHOUT AURA AND WITHOUT STATUS MIGRAINOSUS: ICD-10-CM

## 2021-01-19 RX ORDER — SUMATRIPTAN SUCCINATE 100 MG/1
TABLET ORAL
Qty: 10 TABLET | Refills: 11 | Status: SHIPPED | OUTPATIENT
Start: 2021-01-19 | End: 2021-06-08

## 2021-01-20 ENCOUNTER — PATIENT MESSAGE (OUTPATIENT)
Dept: NEUROLOGY | Facility: CLINIC | Age: 31
End: 2021-01-20

## 2021-01-20 ENCOUNTER — TELEPHONE (OUTPATIENT)
Dept: ORTHOPEDICS | Facility: CLINIC | Age: 31
End: 2021-01-20

## 2021-01-20 DIAGNOSIS — G43.719 INTRACTABLE CHRONIC MIGRAINE WITHOUT AURA AND WITHOUT STATUS MIGRAINOSUS: Primary | ICD-10-CM

## 2021-01-21 ENCOUNTER — PATIENT MESSAGE (OUTPATIENT)
Dept: NEUROLOGY | Facility: CLINIC | Age: 31
End: 2021-01-21

## 2021-01-21 RX ORDER — METHYLPREDNISOLONE 4 MG/1
TABLET ORAL
Qty: 1 PACKAGE | Refills: 0 | Status: SHIPPED | OUTPATIENT
Start: 2021-01-21 | End: 2021-01-26

## 2021-01-26 ENCOUNTER — PATIENT MESSAGE (OUTPATIENT)
Dept: NEUROLOGY | Facility: CLINIC | Age: 31
End: 2021-01-26

## 2021-01-26 RX ORDER — RIMEGEPANT SULFATE 75 MG/75MG
TABLET, ORALLY DISINTEGRATING ORAL
Qty: 8 TABLET | Refills: 11 | Status: SHIPPED | OUTPATIENT
Start: 2021-01-26 | End: 2021-03-03

## 2021-01-27 ENCOUNTER — PATIENT MESSAGE (OUTPATIENT)
Dept: PAIN MEDICINE | Facility: CLINIC | Age: 31
End: 2021-01-27

## 2021-01-27 ENCOUNTER — PATIENT MESSAGE (OUTPATIENT)
Dept: NEUROLOGY | Facility: CLINIC | Age: 31
End: 2021-01-27

## 2021-01-27 ENCOUNTER — TELEPHONE (OUTPATIENT)
Dept: PHARMACY | Facility: CLINIC | Age: 31
End: 2021-01-27

## 2021-01-28 ENCOUNTER — PATIENT MESSAGE (OUTPATIENT)
Dept: NEUROLOGY | Facility: CLINIC | Age: 31
End: 2021-01-28

## 2021-01-29 ENCOUNTER — HOSPITAL ENCOUNTER (EMERGENCY)
Facility: HOSPITAL | Age: 31
Discharge: HOME | End: 2021-01-29
Attending: EMERGENCY MEDICINE
Payer: MEDICAID

## 2021-01-29 ENCOUNTER — PATIENT MESSAGE (OUTPATIENT)
Dept: NEUROLOGY | Facility: CLINIC | Age: 31
End: 2021-01-29

## 2021-01-29 VITALS
SYSTOLIC BLOOD PRESSURE: 144 MMHG | OXYGEN SATURATION: 97 % | HEART RATE: 88 BPM | BODY MASS INDEX: 48.82 KG/M2 | HEIGHT: 65 IN | TEMPERATURE: 98 F | DIASTOLIC BLOOD PRESSURE: 82 MMHG | RESPIRATION RATE: 17 BRPM | WEIGHT: 293 LBS

## 2021-01-29 DIAGNOSIS — G50.0 TRIGEMINAL NEURALGIA OF RIGHT SIDE OF FACE: Primary | ICD-10-CM

## 2021-01-29 PROCEDURE — 96374 THER/PROPH/DIAG INJ IV PUSH: CPT

## 2021-01-29 PROCEDURE — 99284 EMERGENCY DEPT VISIT MOD MDM: CPT | Mod: 25

## 2021-01-29 PROCEDURE — 96375 TX/PRO/DX INJ NEW DRUG ADDON: CPT

## 2021-01-29 PROCEDURE — 25000003 PHARM REV CODE 250: Performed by: EMERGENCY MEDICINE

## 2021-01-29 PROCEDURE — 63600175 PHARM REV CODE 636 W HCPCS: Performed by: EMERGENCY MEDICINE

## 2021-01-29 RX ORDER — DEXAMETHASONE SODIUM PHOSPHATE 4 MG/ML
12 INJECTION, SOLUTION INTRA-ARTICULAR; INTRALESIONAL; INTRAMUSCULAR; INTRAVENOUS; SOFT TISSUE
Status: COMPLETED | OUTPATIENT
Start: 2021-01-29 | End: 2021-01-29

## 2021-01-29 RX ORDER — ACETAMINOPHEN 325 MG/1
650 TABLET ORAL
Status: COMPLETED | OUTPATIENT
Start: 2021-01-29 | End: 2021-01-29

## 2021-01-29 RX ORDER — BUTORPHANOL TARTRATE 1 MG/ML
1 INJECTION INTRAMUSCULAR; INTRAVENOUS
Status: COMPLETED | OUTPATIENT
Start: 2021-01-29 | End: 2021-01-29

## 2021-01-29 RX ORDER — GABAPENTIN 300 MG/1
300 CAPSULE ORAL 3 TIMES DAILY
Qty: 90 CAPSULE | Refills: 11 | Status: SHIPPED | OUTPATIENT
Start: 2021-01-29 | End: 2021-03-03

## 2021-01-29 RX ORDER — BUTALBITAL, ACETAMINOPHEN AND CAFFEINE 50; 325; 40 MG/1; MG/1; MG/1
1 TABLET ORAL
Status: COMPLETED | OUTPATIENT
Start: 2021-01-29 | End: 2021-01-29

## 2021-01-29 RX ADMIN — BUTALBITAL, ACETAMINOPHEN, AND CAFFEINE 1 TABLET: 50; 325; 40 TABLET ORAL at 06:01

## 2021-01-29 RX ADMIN — ACETAMINOPHEN 650 MG: 325 TABLET ORAL at 06:01

## 2021-01-29 RX ADMIN — BUTORPHANOL TARTRATE 1 MG: 1 INJECTION, SOLUTION INTRAMUSCULAR; INTRAVENOUS at 06:01

## 2021-01-29 RX ADMIN — DEXAMETHASONE SODIUM PHOSPHATE 12 MG: 4 INJECTION, SOLUTION INTRAMUSCULAR; INTRAVENOUS at 06:01

## 2021-02-02 ENCOUNTER — PATIENT MESSAGE (OUTPATIENT)
Dept: FAMILY MEDICINE | Facility: CLINIC | Age: 31
End: 2021-02-02

## 2021-02-03 ENCOUNTER — PATIENT MESSAGE (OUTPATIENT)
Dept: NEUROLOGY | Facility: CLINIC | Age: 31
End: 2021-02-03

## 2021-02-04 LAB — PAP SMEAR: NORMAL

## 2021-02-09 ENCOUNTER — PATIENT MESSAGE (OUTPATIENT)
Dept: NEUROLOGY | Facility: CLINIC | Age: 31
End: 2021-02-09

## 2021-02-16 ENCOUNTER — PATIENT MESSAGE (OUTPATIENT)
Dept: NEUROLOGY | Facility: CLINIC | Age: 31
End: 2021-02-16

## 2021-02-16 DIAGNOSIS — G43.719 INTRACTABLE CHRONIC MIGRAINE WITHOUT AURA AND WITHOUT STATUS MIGRAINOSUS: ICD-10-CM

## 2021-02-17 ENCOUNTER — PATIENT MESSAGE (OUTPATIENT)
Dept: FAMILY MEDICINE | Facility: CLINIC | Age: 31
End: 2021-02-17

## 2021-02-17 RX ORDER — BUTALBITAL, ACETAMINOPHEN AND CAFFEINE 50; 325; 40 MG/1; MG/1; MG/1
TABLET ORAL
Qty: 10 TABLET | Refills: 0 | OUTPATIENT
Start: 2021-02-17

## 2021-02-18 ENCOUNTER — PATIENT MESSAGE (OUTPATIENT)
Dept: FAMILY MEDICINE | Facility: CLINIC | Age: 31
End: 2021-02-18

## 2021-02-18 ENCOUNTER — TELEPHONE (OUTPATIENT)
Dept: NEUROSURGERY | Facility: CLINIC | Age: 31
End: 2021-02-18

## 2021-02-18 ENCOUNTER — PATIENT MESSAGE (OUTPATIENT)
Dept: NEUROLOGY | Facility: CLINIC | Age: 31
End: 2021-02-18

## 2021-02-18 DIAGNOSIS — G43.719 INTRACTABLE CHRONIC MIGRAINE WITHOUT AURA AND WITHOUT STATUS MIGRAINOSUS: ICD-10-CM

## 2021-02-18 DIAGNOSIS — G50.9 TRIGEMINAL NEUROPATHY: Primary | ICD-10-CM

## 2021-02-18 DIAGNOSIS — M79.2 NEUROPATHIC PAIN: ICD-10-CM

## 2021-02-18 RX ORDER — BUTALBITAL, ACETAMINOPHEN AND CAFFEINE 50; 325; 40 MG/1; MG/1; MG/1
TABLET ORAL
Qty: 10 TABLET | Refills: 0 | OUTPATIENT
Start: 2021-02-18

## 2021-02-22 ENCOUNTER — TELEPHONE (OUTPATIENT)
Dept: NEUROLOGY | Facility: CLINIC | Age: 31
End: 2021-02-22

## 2021-02-22 ENCOUNTER — PATIENT MESSAGE (OUTPATIENT)
Dept: NEUROLOGY | Facility: CLINIC | Age: 31
End: 2021-02-22

## 2021-02-22 DIAGNOSIS — G43.719 INTRACTABLE CHRONIC MIGRAINE WITHOUT AURA AND WITHOUT STATUS MIGRAINOSUS: ICD-10-CM

## 2021-02-22 RX ORDER — BUTALBITAL, ACETAMINOPHEN AND CAFFEINE 50; 325; 40 MG/1; MG/1; MG/1
TABLET ORAL
Qty: 10 TABLET | Refills: 0 | Status: CANCELLED | OUTPATIENT
Start: 2021-02-22

## 2021-03-02 ENCOUNTER — TELEPHONE (OUTPATIENT)
Dept: ORTHOPEDICS | Facility: CLINIC | Age: 31
End: 2021-03-02

## 2021-03-02 ENCOUNTER — PATIENT MESSAGE (OUTPATIENT)
Dept: ORTHOPEDICS | Facility: CLINIC | Age: 31
End: 2021-03-02

## 2021-03-03 ENCOUNTER — OFFICE VISIT (OUTPATIENT)
Dept: FAMILY MEDICINE | Facility: CLINIC | Age: 31
End: 2021-03-03
Payer: MEDICAID

## 2021-03-03 ENCOUNTER — PATIENT MESSAGE (OUTPATIENT)
Dept: FAMILY MEDICINE | Facility: CLINIC | Age: 31
End: 2021-03-03

## 2021-03-03 VITALS
RESPIRATION RATE: 17 BRPM | TEMPERATURE: 98 F | DIASTOLIC BLOOD PRESSURE: 88 MMHG | WEIGHT: 293 LBS | SYSTOLIC BLOOD PRESSURE: 136 MMHG | OXYGEN SATURATION: 98 % | BODY MASS INDEX: 48.82 KG/M2 | HEIGHT: 65 IN | HEART RATE: 105 BPM

## 2021-03-03 DIAGNOSIS — Z11.59 ENCOUNTER FOR HEPATITIS C SCREENING TEST FOR LOW RISK PATIENT: ICD-10-CM

## 2021-03-03 DIAGNOSIS — Z13.6 ENCOUNTER FOR LIPID SCREENING FOR CARDIOVASCULAR DISEASE: ICD-10-CM

## 2021-03-03 DIAGNOSIS — Z13.220 ENCOUNTER FOR LIPID SCREENING FOR CARDIOVASCULAR DISEASE: ICD-10-CM

## 2021-03-03 DIAGNOSIS — R20.0 HAND NUMBNESS: Primary | ICD-10-CM

## 2021-03-03 DIAGNOSIS — G50.9 TRIGEMINAL NEUROPATHY: Primary | ICD-10-CM

## 2021-03-03 DIAGNOSIS — Z11.4 ENCOUNTER FOR SCREENING FOR HUMAN IMMUNODEFICIENCY VIRUS (HIV): ICD-10-CM

## 2021-03-03 DIAGNOSIS — G50.9 TRIGEMINAL NEUROPATHY: ICD-10-CM

## 2021-03-03 DIAGNOSIS — G56.02 CARPAL TUNNEL SYNDROME, LEFT: ICD-10-CM

## 2021-03-03 PROCEDURE — 99215 OFFICE O/P EST HI 40 MIN: CPT | Performed by: FAMILY MEDICINE

## 2021-03-03 PROCEDURE — 99214 OFFICE O/P EST MOD 30 MIN: CPT | Mod: S$PBB,,, | Performed by: FAMILY MEDICINE

## 2021-03-03 PROCEDURE — 99214 PR OFFICE/OUTPT VISIT, EST, LEVL IV, 30-39 MIN: ICD-10-PCS | Mod: S$PBB,,, | Performed by: FAMILY MEDICINE

## 2021-03-03 RX ORDER — ACETAMINOPHEN 500 MG
500 TABLET ORAL
COMMUNITY
End: 2022-12-20

## 2021-03-03 RX ORDER — HYDROCODONE BITARTRATE AND ACETAMINOPHEN 5; 325 MG/1; MG/1
1 TABLET ORAL EVERY 6 HOURS PRN
Qty: 60 TABLET | Refills: 0 | Status: SHIPPED | OUTPATIENT
Start: 2021-03-03 | End: 2021-04-01 | Stop reason: SDUPTHER

## 2021-03-03 RX ORDER — VENLAFAXINE HYDROCHLORIDE 150 MG/1
CAPSULE, EXTENDED RELEASE ORAL
COMMUNITY
End: 2021-06-02 | Stop reason: SDUPTHER

## 2021-03-05 ENCOUNTER — TELEPHONE (OUTPATIENT)
Dept: FAMILY MEDICINE | Facility: CLINIC | Age: 31
End: 2021-03-05

## 2021-03-05 DIAGNOSIS — G50.9 TRIGEMINAL NEUROPATHY: Primary | ICD-10-CM

## 2021-03-05 DIAGNOSIS — G56.02 CARPAL TUNNEL SYNDROME, LEFT: ICD-10-CM

## 2021-03-06 ENCOUNTER — PATIENT MESSAGE (OUTPATIENT)
Dept: NEUROLOGY | Facility: CLINIC | Age: 31
End: 2021-03-06

## 2021-03-07 ENCOUNTER — PATIENT MESSAGE (OUTPATIENT)
Dept: FAMILY MEDICINE | Facility: CLINIC | Age: 31
End: 2021-03-07

## 2021-03-08 ENCOUNTER — PATIENT MESSAGE (OUTPATIENT)
Dept: FAMILY MEDICINE | Facility: CLINIC | Age: 31
End: 2021-03-08

## 2021-03-08 ENCOUNTER — TELEPHONE (OUTPATIENT)
Dept: FAMILY MEDICINE | Facility: CLINIC | Age: 31
End: 2021-03-08

## 2021-03-10 DIAGNOSIS — R51.9 HEADACHE, UNSPECIFIED: Primary | ICD-10-CM

## 2021-03-13 ENCOUNTER — PATIENT MESSAGE (OUTPATIENT)
Dept: FAMILY MEDICINE | Facility: CLINIC | Age: 31
End: 2021-03-13

## 2021-03-16 ENCOUNTER — PATIENT MESSAGE (OUTPATIENT)
Dept: FAMILY MEDICINE | Facility: CLINIC | Age: 31
End: 2021-03-16

## 2021-03-18 ENCOUNTER — PATIENT MESSAGE (OUTPATIENT)
Dept: FAMILY MEDICINE | Facility: CLINIC | Age: 31
End: 2021-03-18

## 2021-03-22 ENCOUNTER — PATIENT MESSAGE (OUTPATIENT)
Dept: FAMILY MEDICINE | Facility: CLINIC | Age: 31
End: 2021-03-22

## 2021-03-23 ENCOUNTER — TELEPHONE (OUTPATIENT)
Dept: FAMILY MEDICINE | Facility: CLINIC | Age: 31
End: 2021-03-23

## 2021-03-23 DIAGNOSIS — Z01.89 PATIENT REQUEST FOR DIAGNOSTIC TESTING: Primary | ICD-10-CM

## 2021-03-24 ENCOUNTER — PATIENT MESSAGE (OUTPATIENT)
Dept: FAMILY MEDICINE | Facility: CLINIC | Age: 31
End: 2021-03-24

## 2021-03-30 ENCOUNTER — PATIENT MESSAGE (OUTPATIENT)
Dept: FAMILY MEDICINE | Facility: CLINIC | Age: 31
End: 2021-03-30

## 2021-03-31 ENCOUNTER — PATIENT MESSAGE (OUTPATIENT)
Dept: FAMILY MEDICINE | Facility: CLINIC | Age: 31
End: 2021-03-31

## 2021-04-01 ENCOUNTER — PATIENT MESSAGE (OUTPATIENT)
Dept: FAMILY MEDICINE | Facility: CLINIC | Age: 31
End: 2021-04-01

## 2021-04-01 DIAGNOSIS — G50.9 TRIGEMINAL NEUROPATHY: ICD-10-CM

## 2021-04-01 RX ORDER — HYDROCODONE BITARTRATE AND ACETAMINOPHEN 5; 325 MG/1; MG/1
1 TABLET ORAL EVERY 6 HOURS PRN
Qty: 60 TABLET | Refills: 0 | Status: SHIPPED | OUTPATIENT
Start: 2021-04-01 | End: 2021-04-27 | Stop reason: SDUPTHER

## 2021-04-09 ENCOUNTER — PATIENT MESSAGE (OUTPATIENT)
Dept: FAMILY MEDICINE | Facility: CLINIC | Age: 31
End: 2021-04-09

## 2021-04-18 ENCOUNTER — PATIENT MESSAGE (OUTPATIENT)
Dept: FAMILY MEDICINE | Facility: CLINIC | Age: 31
End: 2021-04-18

## 2021-04-20 ENCOUNTER — HOSPITAL ENCOUNTER (EMERGENCY)
Facility: HOSPITAL | Age: 31
Discharge: LEFT BEFORE DISCHARGE | End: 2021-04-20
Attending: EMERGENCY MEDICINE
Payer: MEDICAID

## 2021-04-20 VITALS
RESPIRATION RATE: 18 BRPM | HEART RATE: 98 BPM | DIASTOLIC BLOOD PRESSURE: 87 MMHG | WEIGHT: 293 LBS | HEIGHT: 65 IN | OXYGEN SATURATION: 98 % | BODY MASS INDEX: 48.82 KG/M2 | SYSTOLIC BLOOD PRESSURE: 151 MMHG | TEMPERATURE: 98 F

## 2021-04-20 DIAGNOSIS — R51.9 NONINTRACTABLE HEADACHE, UNSPECIFIED CHRONICITY PATTERN, UNSPECIFIED HEADACHE TYPE: Primary | ICD-10-CM

## 2021-04-20 PROCEDURE — 96375 TX/PRO/DX INJ NEW DRUG ADDON: CPT

## 2021-04-20 PROCEDURE — 25000003 PHARM REV CODE 250: Performed by: EMERGENCY MEDICINE

## 2021-04-20 PROCEDURE — 96361 HYDRATE IV INFUSION ADD-ON: CPT

## 2021-04-20 PROCEDURE — 63600175 PHARM REV CODE 636 W HCPCS: Performed by: EMERGENCY MEDICINE

## 2021-04-20 PROCEDURE — 99284 EMERGENCY DEPT VISIT MOD MDM: CPT | Mod: 25

## 2021-04-20 PROCEDURE — 96374 THER/PROPH/DIAG INJ IV PUSH: CPT

## 2021-04-20 RX ORDER — DEXAMETHASONE SODIUM PHOSPHATE 4 MG/ML
10 INJECTION, SOLUTION INTRA-ARTICULAR; INTRALESIONAL; INTRAMUSCULAR; INTRAVENOUS; SOFT TISSUE
Status: COMPLETED | OUTPATIENT
Start: 2021-04-20 | End: 2021-04-20

## 2021-04-20 RX ORDER — DROPERIDOL 2.5 MG/ML
5 INJECTION, SOLUTION INTRAMUSCULAR; INTRAVENOUS
Status: COMPLETED | OUTPATIENT
Start: 2021-04-20 | End: 2021-04-20

## 2021-04-20 RX ADMIN — DROPERIDOL 5 MG: 2.5 INJECTION, SOLUTION INTRAMUSCULAR; INTRAVENOUS at 10:04

## 2021-04-20 RX ADMIN — DEXAMETHASONE SODIUM PHOSPHATE 10 MG: 4 INJECTION, SOLUTION INTRA-ARTICULAR; INTRALESIONAL; INTRAMUSCULAR; INTRAVENOUS; SOFT TISSUE at 10:04

## 2021-04-20 RX ADMIN — SODIUM CHLORIDE 1000 ML: 0.9 INJECTION, SOLUTION INTRAVENOUS at 10:04

## 2021-04-26 ENCOUNTER — PATIENT MESSAGE (OUTPATIENT)
Dept: FAMILY MEDICINE | Facility: CLINIC | Age: 31
End: 2021-04-26

## 2021-04-27 DIAGNOSIS — G50.9 TRIGEMINAL NEUROPATHY: ICD-10-CM

## 2021-04-29 RX ORDER — HYDROCODONE BITARTRATE AND ACETAMINOPHEN 5; 325 MG/1; MG/1
1 TABLET ORAL EVERY 6 HOURS PRN
Qty: 60 TABLET | Refills: 0 | Status: SHIPPED | OUTPATIENT
Start: 2021-04-29 | End: 2021-05-27 | Stop reason: SDUPTHER

## 2021-05-17 ENCOUNTER — PATIENT MESSAGE (OUTPATIENT)
Dept: FAMILY MEDICINE | Facility: CLINIC | Age: 31
End: 2021-05-17

## 2021-05-18 ENCOUNTER — HOSPITAL ENCOUNTER (EMERGENCY)
Facility: HOSPITAL | Age: 31
Discharge: HOME | End: 2021-05-18
Attending: EMERGENCY MEDICINE
Payer: MEDICAID

## 2021-05-18 VITALS
SYSTOLIC BLOOD PRESSURE: 137 MMHG | RESPIRATION RATE: 18 BRPM | DIASTOLIC BLOOD PRESSURE: 77 MMHG | OXYGEN SATURATION: 98 % | WEIGHT: 293 LBS | HEART RATE: 95 BPM | TEMPERATURE: 98 F | BODY MASS INDEX: 48.82 KG/M2 | HEIGHT: 65 IN

## 2021-05-18 DIAGNOSIS — R51.9 FACIAL PAIN: Primary | ICD-10-CM

## 2021-05-18 DIAGNOSIS — G50.0 TRIGEMINAL NEURALGIA: ICD-10-CM

## 2021-05-18 LAB
B-HCG UR QL: NEGATIVE
CTP QC/QA: YES

## 2021-05-18 PROCEDURE — 25000003 PHARM REV CODE 250: Performed by: EMERGENCY MEDICINE

## 2021-05-18 PROCEDURE — 81025 URINE PREGNANCY TEST: CPT | Performed by: EMERGENCY MEDICINE

## 2021-05-18 PROCEDURE — 99283 EMERGENCY DEPT VISIT LOW MDM: CPT

## 2021-05-18 RX ORDER — OXYCODONE AND ACETAMINOPHEN 10; 325 MG/1; MG/1
1 TABLET ORAL ONCE
Status: COMPLETED | OUTPATIENT
Start: 2021-05-18 | End: 2021-05-18

## 2021-05-18 RX ADMIN — OXYCODONE HYDROCHLORIDE AND ACETAMINOPHEN 1 TABLET: 10; 325 TABLET ORAL at 09:05

## 2021-05-20 ENCOUNTER — PATIENT MESSAGE (OUTPATIENT)
Dept: FAMILY MEDICINE | Facility: CLINIC | Age: 31
End: 2021-05-20

## 2021-05-21 ENCOUNTER — PATIENT MESSAGE (OUTPATIENT)
Dept: FAMILY MEDICINE | Facility: CLINIC | Age: 31
End: 2021-05-21

## 2021-05-21 ENCOUNTER — TELEPHONE (OUTPATIENT)
Dept: FAMILY MEDICINE | Facility: CLINIC | Age: 31
End: 2021-05-21

## 2021-05-21 DIAGNOSIS — G50.9 TRIGEMINAL NEUROPATHY: Primary | ICD-10-CM

## 2021-05-24 ENCOUNTER — PATIENT MESSAGE (OUTPATIENT)
Dept: FAMILY MEDICINE | Facility: CLINIC | Age: 31
End: 2021-05-24

## 2021-05-26 ENCOUNTER — PATIENT MESSAGE (OUTPATIENT)
Dept: FAMILY MEDICINE | Facility: CLINIC | Age: 31
End: 2021-05-26

## 2021-05-27 DIAGNOSIS — G50.9 TRIGEMINAL NEUROPATHY: ICD-10-CM

## 2021-05-27 RX ORDER — HYDROCODONE BITARTRATE AND ACETAMINOPHEN 5; 325 MG/1; MG/1
1 TABLET ORAL EVERY 6 HOURS PRN
Qty: 60 TABLET | Refills: 0 | Status: SHIPPED | OUTPATIENT
Start: 2021-05-29 | End: 2021-06-08 | Stop reason: SDUPTHER

## 2021-05-31 ENCOUNTER — PATIENT MESSAGE (OUTPATIENT)
Dept: FAMILY MEDICINE | Facility: CLINIC | Age: 31
End: 2021-05-31

## 2021-06-02 DIAGNOSIS — G47.00 INSOMNIA, UNSPECIFIED TYPE: ICD-10-CM

## 2021-06-02 DIAGNOSIS — F41.9 ANXIETY: Primary | ICD-10-CM

## 2021-06-02 DIAGNOSIS — F32.A DEPRESSION, UNSPECIFIED DEPRESSION TYPE: ICD-10-CM

## 2021-06-02 RX ORDER — VENLAFAXINE HYDROCHLORIDE 150 MG/1
150 CAPSULE, EXTENDED RELEASE ORAL DAILY
Qty: 30 CAPSULE | Refills: 1 | Status: SHIPPED | OUTPATIENT
Start: 2021-06-02 | End: 2021-08-19 | Stop reason: SDUPTHER

## 2021-06-02 RX ORDER — ZOLPIDEM TARTRATE 10 MG/1
10 TABLET ORAL NIGHTLY
Qty: 30 TABLET | Refills: 0 | Status: SHIPPED | OUTPATIENT
Start: 2021-06-02 | End: 2021-12-20

## 2021-06-02 RX ORDER — FLUVOXAMINE MALEATE 100 MG/1
100 TABLET, COATED ORAL 3 TIMES DAILY
Qty: 90 TABLET | Refills: 1 | Status: SHIPPED | OUTPATIENT
Start: 2021-06-02 | End: 2021-08-19 | Stop reason: SDUPTHER

## 2021-06-02 RX ORDER — CLONAZEPAM 1 MG/1
1 TABLET ORAL 2 TIMES DAILY PRN
Qty: 60 TABLET | Refills: 1 | Status: SHIPPED | OUTPATIENT
Start: 2021-06-02 | End: 2021-12-01 | Stop reason: SDUPTHER

## 2021-06-07 ENCOUNTER — PATIENT MESSAGE (OUTPATIENT)
Dept: FAMILY MEDICINE | Facility: CLINIC | Age: 31
End: 2021-06-07

## 2021-06-07 ENCOUNTER — TELEPHONE (OUTPATIENT)
Dept: PAIN MEDICINE | Facility: CLINIC | Age: 31
End: 2021-06-07

## 2021-06-08 ENCOUNTER — PATIENT MESSAGE (OUTPATIENT)
Dept: FAMILY MEDICINE | Facility: CLINIC | Age: 31
End: 2021-06-08

## 2021-06-08 DIAGNOSIS — G50.9 TRIGEMINAL NEUROPATHY: ICD-10-CM

## 2021-06-09 ENCOUNTER — PATIENT MESSAGE (OUTPATIENT)
Dept: NEUROLOGY | Facility: CLINIC | Age: 31
End: 2021-06-09

## 2021-06-14 RX ORDER — HYDROCODONE BITARTRATE AND ACETAMINOPHEN 5; 325 MG/1; MG/1
1 TABLET ORAL EVERY 6 HOURS PRN
Qty: 60 TABLET | Refills: 0 | Status: SHIPPED | OUTPATIENT
Start: 2021-06-28 | End: 2021-07-07

## 2021-06-15 ENCOUNTER — PATIENT MESSAGE (OUTPATIENT)
Dept: NEUROLOGY | Facility: CLINIC | Age: 31
End: 2021-06-15

## 2021-06-15 ENCOUNTER — OFFICE VISIT (OUTPATIENT)
Dept: NEUROLOGY | Facility: CLINIC | Age: 31
End: 2021-06-15
Payer: MEDICAID

## 2021-06-15 DIAGNOSIS — G43.719 INTRACTABLE CHRONIC MIGRAINE WITHOUT AURA AND WITHOUT STATUS MIGRAINOSUS: ICD-10-CM

## 2021-06-15 DIAGNOSIS — M26.609 TMJ (TEMPOROMANDIBULAR JOINT DISORDER): ICD-10-CM

## 2021-06-15 DIAGNOSIS — G50.9 TRIGEMINAL NEUROPATHY: Primary | ICD-10-CM

## 2021-06-15 DIAGNOSIS — F41.9 ANXIETY: ICD-10-CM

## 2021-06-15 DIAGNOSIS — Z79.891 CHRONICALLY ON OPIATE THERAPY: ICD-10-CM

## 2021-06-15 DIAGNOSIS — M79.2 NEUROPATHIC PAIN: ICD-10-CM

## 2021-06-15 PROCEDURE — 99214 OFFICE O/P EST MOD 30 MIN: CPT | Mod: 95,,, | Performed by: PSYCHIATRY & NEUROLOGY

## 2021-06-15 PROCEDURE — 99214 PR OFFICE/OUTPT VISIT, EST, LEVL IV, 30-39 MIN: ICD-10-PCS | Mod: 95,,, | Performed by: PSYCHIATRY & NEUROLOGY

## 2021-06-15 RX ORDER — OXYCODONE AND ACETAMINOPHEN 5; 325 MG/1; MG/1
1 TABLET ORAL EVERY 12 HOURS PRN
Qty: 60 TABLET | Refills: 0 | Status: SHIPPED | OUTPATIENT
Start: 2021-06-15 | End: 2021-07-07 | Stop reason: SDUPTHER

## 2021-06-15 RX ORDER — SUMATRIPTAN SUCCINATE 100 MG/1
TABLET ORAL
Qty: 10 TABLET | Refills: 11 | Status: SHIPPED | OUTPATIENT
Start: 2021-06-15 | End: 2021-08-04

## 2021-06-16 ENCOUNTER — PATIENT MESSAGE (OUTPATIENT)
Dept: NEUROLOGY | Facility: CLINIC | Age: 31
End: 2021-06-16

## 2021-06-17 ENCOUNTER — PATIENT MESSAGE (OUTPATIENT)
Dept: FAMILY MEDICINE | Facility: CLINIC | Age: 31
End: 2021-06-17

## 2021-06-21 ENCOUNTER — PATIENT MESSAGE (OUTPATIENT)
Dept: NEUROLOGY | Facility: CLINIC | Age: 31
End: 2021-06-21

## 2021-06-22 ENCOUNTER — PATIENT MESSAGE (OUTPATIENT)
Dept: NEUROLOGY | Facility: CLINIC | Age: 31
End: 2021-06-22

## 2021-06-23 ENCOUNTER — PATIENT MESSAGE (OUTPATIENT)
Dept: NEUROLOGY | Facility: CLINIC | Age: 31
End: 2021-06-23

## 2021-06-23 ENCOUNTER — PATIENT MESSAGE (OUTPATIENT)
Dept: FAMILY MEDICINE | Facility: CLINIC | Age: 31
End: 2021-06-23

## 2021-06-25 ENCOUNTER — PATIENT MESSAGE (OUTPATIENT)
Dept: FAMILY MEDICINE | Facility: CLINIC | Age: 31
End: 2021-06-25

## 2021-06-27 ENCOUNTER — HOSPITAL ENCOUNTER (EMERGENCY)
Facility: HOSPITAL | Age: 31
Discharge: HOME | End: 2021-06-27
Attending: EMERGENCY MEDICINE
Payer: MEDICAID

## 2021-06-27 ENCOUNTER — PATIENT MESSAGE (OUTPATIENT)
Dept: FAMILY MEDICINE | Facility: CLINIC | Age: 31
End: 2021-06-27

## 2021-06-27 VITALS
SYSTOLIC BLOOD PRESSURE: 141 MMHG | OXYGEN SATURATION: 98 % | WEIGHT: 293 LBS | BODY MASS INDEX: 48.82 KG/M2 | RESPIRATION RATE: 20 BRPM | HEIGHT: 65 IN | DIASTOLIC BLOOD PRESSURE: 79 MMHG | HEART RATE: 128 BPM | TEMPERATURE: 98 F

## 2021-06-27 DIAGNOSIS — G50.0 TRIGEMINAL NEURALGIA OF RIGHT SIDE OF FACE: Primary | ICD-10-CM

## 2021-06-27 LAB
B-HCG UR QL: NEGATIVE
CTP QC/QA: YES

## 2021-06-27 PROCEDURE — 81025 URINE PREGNANCY TEST: CPT | Performed by: EMERGENCY MEDICINE

## 2021-06-27 PROCEDURE — 96372 THER/PROPH/DIAG INJ SC/IM: CPT

## 2021-06-27 PROCEDURE — 99284 EMERGENCY DEPT VISIT MOD MDM: CPT | Mod: 25

## 2021-06-27 PROCEDURE — 63600175 PHARM REV CODE 636 W HCPCS: Performed by: EMERGENCY MEDICINE

## 2021-06-27 RX ORDER — BUTORPHANOL TARTRATE 1 MG/ML
2 INJECTION INTRAMUSCULAR; INTRAVENOUS
Status: COMPLETED | OUTPATIENT
Start: 2021-06-27 | End: 2021-06-27

## 2021-06-27 RX ADMIN — BUTORPHANOL TARTRATE 2 MG: 1 INJECTION, SOLUTION INTRAMUSCULAR; INTRAVENOUS at 04:06

## 2021-06-28 ENCOUNTER — PATIENT MESSAGE (OUTPATIENT)
Dept: NEUROLOGY | Facility: CLINIC | Age: 31
End: 2021-06-28

## 2021-06-28 ENCOUNTER — TELEPHONE (OUTPATIENT)
Dept: NEUROLOGY | Facility: CLINIC | Age: 31
End: 2021-06-28

## 2021-06-29 ENCOUNTER — PATIENT MESSAGE (OUTPATIENT)
Dept: NEUROLOGY | Facility: CLINIC | Age: 31
End: 2021-06-29

## 2021-06-29 ENCOUNTER — HOSPITAL ENCOUNTER (EMERGENCY)
Facility: HOSPITAL | Age: 31
Discharge: HOME | End: 2021-06-29
Attending: EMERGENCY MEDICINE
Payer: MEDICAID

## 2021-06-29 VITALS
SYSTOLIC BLOOD PRESSURE: 140 MMHG | BODY MASS INDEX: 48.82 KG/M2 | RESPIRATION RATE: 18 BRPM | OXYGEN SATURATION: 99 % | WEIGHT: 293 LBS | HEIGHT: 65 IN | TEMPERATURE: 99 F | HEART RATE: 96 BPM | DIASTOLIC BLOOD PRESSURE: 70 MMHG

## 2021-06-29 DIAGNOSIS — G50.0 TRIGEMINAL NEURALGIA OF RIGHT SIDE OF FACE: Primary | ICD-10-CM

## 2021-06-29 PROCEDURE — 63600175 PHARM REV CODE 636 W HCPCS: Performed by: EMERGENCY MEDICINE

## 2021-06-29 PROCEDURE — 99284 EMERGENCY DEPT VISIT MOD MDM: CPT | Mod: 25

## 2021-06-29 PROCEDURE — 96372 THER/PROPH/DIAG INJ SC/IM: CPT | Mod: 59

## 2021-06-29 PROCEDURE — 25000003 PHARM REV CODE 250: Performed by: EMERGENCY MEDICINE

## 2021-06-29 RX ORDER — CARBAMAZEPINE 200 MG/1
200 CAPSULE, EXTENDED RELEASE ORAL 2 TIMES DAILY
Qty: 60 CAPSULE | Refills: 0 | Status: SHIPPED | OUTPATIENT
Start: 2021-06-29 | End: 2021-07-07

## 2021-06-29 RX ORDER — CARBAMAZEPINE 200 MG/1
200 TABLET ORAL
Status: COMPLETED | OUTPATIENT
Start: 2021-06-29 | End: 2021-06-29

## 2021-06-29 RX ORDER — MORPHINE SULFATE 10 MG/ML
10 INJECTION INTRAMUSCULAR; INTRAVENOUS; SUBCUTANEOUS
Status: COMPLETED | OUTPATIENT
Start: 2021-06-29 | End: 2021-06-29

## 2021-06-29 RX ADMIN — MORPHINE SULFATE 10 MG: 10 INJECTION, SOLUTION INTRAMUSCULAR; INTRAVENOUS at 07:06

## 2021-06-29 RX ADMIN — CARBAMAZEPINE 200 MG: 200 TABLET ORAL at 07:06

## 2021-06-30 ENCOUNTER — PATIENT MESSAGE (OUTPATIENT)
Dept: NEUROLOGY | Facility: CLINIC | Age: 31
End: 2021-06-30

## 2021-07-04 ENCOUNTER — HOSPITAL ENCOUNTER (EMERGENCY)
Facility: HOSPITAL | Age: 31
Discharge: HOME | End: 2021-07-04
Attending: EMERGENCY MEDICINE
Payer: MEDICAID

## 2021-07-04 VITALS
WEIGHT: 293 LBS | BODY MASS INDEX: 48.82 KG/M2 | TEMPERATURE: 99 F | OXYGEN SATURATION: 98 % | DIASTOLIC BLOOD PRESSURE: 96 MMHG | HEART RATE: 105 BPM | HEIGHT: 65 IN | SYSTOLIC BLOOD PRESSURE: 137 MMHG | RESPIRATION RATE: 18 BRPM

## 2021-07-04 DIAGNOSIS — G50.0 TRIGEMINAL NEURALGIA OF RIGHT SIDE OF FACE: Primary | ICD-10-CM

## 2021-07-04 PROCEDURE — 63600175 PHARM REV CODE 636 W HCPCS: Performed by: EMERGENCY MEDICINE

## 2021-07-04 PROCEDURE — 96372 THER/PROPH/DIAG INJ SC/IM: CPT

## 2021-07-04 PROCEDURE — 99284 EMERGENCY DEPT VISIT MOD MDM: CPT | Mod: 25

## 2021-07-04 RX ORDER — MORPHINE SULFATE 10 MG/ML
10 INJECTION INTRAMUSCULAR; INTRAVENOUS; SUBCUTANEOUS
Status: COMPLETED | OUTPATIENT
Start: 2021-07-04 | End: 2021-07-04

## 2021-07-04 RX ORDER — ONDANSETRON 2 MG/ML
8 INJECTION INTRAMUSCULAR; INTRAVENOUS
Status: COMPLETED | OUTPATIENT
Start: 2021-07-04 | End: 2021-07-04

## 2021-07-04 RX ADMIN — ONDANSETRON 8 MG: 2 INJECTION, SOLUTION INTRAMUSCULAR; INTRAVENOUS at 08:07

## 2021-07-04 RX ADMIN — MORPHINE SULFATE 10 MG: 10 INJECTION, SOLUTION INTRAMUSCULAR; INTRAVENOUS at 08:07

## 2021-07-07 ENCOUNTER — TELEPHONE (OUTPATIENT)
Dept: NEUROLOGY | Facility: CLINIC | Age: 31
End: 2021-07-07

## 2021-07-07 ENCOUNTER — OFFICE VISIT (OUTPATIENT)
Dept: NEUROLOGY | Facility: CLINIC | Age: 31
End: 2021-07-07
Payer: MEDICAID

## 2021-07-07 ENCOUNTER — PATIENT MESSAGE (OUTPATIENT)
Dept: NEUROLOGY | Facility: CLINIC | Age: 31
End: 2021-07-07

## 2021-07-07 ENCOUNTER — OFFICE VISIT (OUTPATIENT)
Dept: FAMILY MEDICINE | Facility: CLINIC | Age: 31
End: 2021-07-07
Payer: MEDICAID

## 2021-07-07 VITALS
HEIGHT: 65 IN | TEMPERATURE: 98 F | RESPIRATION RATE: 19 BRPM | HEART RATE: 122 BPM | BODY MASS INDEX: 48.82 KG/M2 | OXYGEN SATURATION: 97 % | DIASTOLIC BLOOD PRESSURE: 94 MMHG | SYSTOLIC BLOOD PRESSURE: 142 MMHG | WEIGHT: 293 LBS

## 2021-07-07 VITALS
DIASTOLIC BLOOD PRESSURE: 92 MMHG | SYSTOLIC BLOOD PRESSURE: 144 MMHG | RESPIRATION RATE: 24 BRPM | WEIGHT: 293 LBS | BODY MASS INDEX: 57.05 KG/M2 | HEART RATE: 118 BPM

## 2021-07-07 DIAGNOSIS — F41.9 ANXIETY: ICD-10-CM

## 2021-07-07 DIAGNOSIS — G50.0 TRIGEMINAL NEURALGIA OF RIGHT SIDE OF FACE: Primary | ICD-10-CM

## 2021-07-07 DIAGNOSIS — G50.9 TRIGEMINAL NEUROPATHY: ICD-10-CM

## 2021-07-07 DIAGNOSIS — Z02.89 PAIN MANAGEMENT CONTRACT SIGNED: ICD-10-CM

## 2021-07-07 DIAGNOSIS — G43.719 INTRACTABLE CHRONIC MIGRAINE WITHOUT AURA AND WITHOUT STATUS MIGRAINOSUS: Primary | ICD-10-CM

## 2021-07-07 DIAGNOSIS — M26.609 TMJ (TEMPOROMANDIBULAR JOINT DISORDER): ICD-10-CM

## 2021-07-07 PROCEDURE — 99213 OFFICE O/P EST LOW 20 MIN: CPT | Mod: PBBFAC,PO | Performed by: PSYCHIATRY & NEUROLOGY

## 2021-07-07 PROCEDURE — 80307 DRUG TEST PRSMV CHEM ANLYZR: CPT | Performed by: PSYCHIATRY & NEUROLOGY

## 2021-07-07 PROCEDURE — 99213 OFFICE O/P EST LOW 20 MIN: CPT | Mod: S$PBB,,, | Performed by: FAMILY MEDICINE

## 2021-07-07 PROCEDURE — 99999 PR PBB SHADOW E&M-EST. PATIENT-LVL III: ICD-10-PCS | Mod: PBBFAC,,, | Performed by: PSYCHIATRY & NEUROLOGY

## 2021-07-07 PROCEDURE — 99999 PR PBB SHADOW E&M-EST. PATIENT-LVL III: CPT | Mod: PBBFAC,,, | Performed by: PSYCHIATRY & NEUROLOGY

## 2021-07-07 PROCEDURE — 99215 OFFICE O/P EST HI 40 MIN: CPT | Performed by: FAMILY MEDICINE

## 2021-07-07 PROCEDURE — 99215 OFFICE O/P EST HI 40 MIN: CPT | Mod: S$PBB,,, | Performed by: PSYCHIATRY & NEUROLOGY

## 2021-07-07 PROCEDURE — 99215 PR OFFICE/OUTPT VISIT, EST, LEVL V, 40-54 MIN: ICD-10-PCS | Mod: S$PBB,,, | Performed by: PSYCHIATRY & NEUROLOGY

## 2021-07-07 PROCEDURE — 99213 PR OFFICE/OUTPT VISIT, EST, LEVL III, 20-29 MIN: ICD-10-PCS | Mod: S$PBB,,, | Performed by: FAMILY MEDICINE

## 2021-07-07 RX ORDER — OXYCODONE AND ACETAMINOPHEN 5; 325 MG/1; MG/1
1 TABLET ORAL EVERY 8 HOURS PRN
Qty: 90 TABLET | Refills: 0 | Status: SHIPPED | OUTPATIENT
Start: 2021-07-15 | End: 2021-08-04

## 2021-07-12 ENCOUNTER — PATIENT MESSAGE (OUTPATIENT)
Dept: NEUROLOGY | Facility: CLINIC | Age: 31
End: 2021-07-12

## 2021-07-12 ENCOUNTER — HOSPITAL ENCOUNTER (EMERGENCY)
Facility: HOSPITAL | Age: 31
Discharge: HOME | End: 2021-07-12
Attending: EMERGENCY MEDICINE
Payer: MEDICAID

## 2021-07-12 VITALS
HEIGHT: 65 IN | OXYGEN SATURATION: 97 % | RESPIRATION RATE: 20 BRPM | DIASTOLIC BLOOD PRESSURE: 82 MMHG | WEIGHT: 293 LBS | BODY MASS INDEX: 48.82 KG/M2 | TEMPERATURE: 99 F | SYSTOLIC BLOOD PRESSURE: 134 MMHG | HEART RATE: 108 BPM

## 2021-07-12 DIAGNOSIS — G89.4 CHRONIC PAIN SYNDROME: ICD-10-CM

## 2021-07-12 DIAGNOSIS — G50.0 TRIGEMINAL NEURALGIA PAIN: Primary | ICD-10-CM

## 2021-07-12 LAB
6MAM UR QL: NOT DETECTED
7AMINOCLONAZEPAM UR QL: PRESENT
A-OH ALPRAZ UR QL: NOT DETECTED
ALPHA-OH-MIDAZOLAM: NOT DETECTED
ALPRAZ UR QL: NOT DETECTED
AMPHET UR QL SCN: NOT DETECTED
ANNOTATION COMMENT IMP: NORMAL
ANNOTATION COMMENT IMP: NORMAL
B-HCG UR QL: NEGATIVE
BARBITURATES UR QL: NOT DETECTED
BUPRENORPHINE UR QL: NOT DETECTED
BZE UR QL: NOT DETECTED
CARBOXYTHC UR QL: NOT DETECTED
CARISOPRODOL UR QL: NOT DETECTED
CLONAZEPAM UR QL: NOT DETECTED
CODEINE UR QL: NOT DETECTED
CREAT UR-MCNC: 283.2 MG/DL (ref 20–400)
CTP QC/QA: YES
DIAZEPAM UR QL: NOT DETECTED
ETHYL GLUCURONIDE UR QL: NOT DETECTED
FENTANYL UR QL: NOT DETECTED
GABAPENTIN: NOT DETECTED
HYDROCODONE UR QL: NOT DETECTED
HYDROMORPHONE UR QL: NOT DETECTED
LORAZEPAM UR QL: NOT DETECTED
MDA UR QL: NOT DETECTED
MDEA UR QL: NOT DETECTED
MDMA UR QL: NOT DETECTED
ME-PHENIDATE UR QL: NOT DETECTED
METHADONE UR QL: NOT DETECTED
METHAMPHET UR QL: NOT DETECTED
MIDAZOLAM UR QL SCN: NOT DETECTED
MORPHINE UR QL: NOT DETECTED
NALOXONE: NOT DETECTED
NORBUPRENORPHINE UR QL CFM: NOT DETECTED
NORDIAZEPAM UR QL: NOT DETECTED
NORFENTANYL UR QL: NOT DETECTED
NORHYDROCODONE UR QL CFM: NOT DETECTED
NORMEPERIDINE UR QL CFM: NOT DETECTED
NOROXYCODONE UR QL CFM: PRESENT
NOROXYMORPHONE UR QL SCN: NOT DETECTED
OXAZEPAM UR QL: NOT DETECTED
OXYCODONE UR QL: PRESENT
OXYMORPHONE UR QL: NOT DETECTED
PATHOLOGY STUDY: NORMAL
PCP UR QL: NOT DETECTED
PHENTERMINE UR QL: NOT DETECTED
PREGABALIN: NOT DETECTED
SERVICE CMNT-IMP: NORMAL
TAPENTADOL UR QL SCN: NOT DETECTED
TAPENTADOL-O-SULF: NOT DETECTED
TEMAZEPAM UR QL: NOT DETECTED
TRAMADOL UR QL: NOT DETECTED
ZOLPIDEM METABOLITE: PRESENT
ZOLPIDEM UR QL: NOT DETECTED

## 2021-07-12 PROCEDURE — 99282 EMERGENCY DEPT VISIT SF MDM: CPT

## 2021-07-12 PROCEDURE — 81025 URINE PREGNANCY TEST: CPT | Performed by: NURSE PRACTITIONER

## 2021-07-15 ENCOUNTER — PATIENT MESSAGE (OUTPATIENT)
Dept: NEUROLOGY | Facility: CLINIC | Age: 31
End: 2021-07-15

## 2021-07-23 ENCOUNTER — PATIENT MESSAGE (OUTPATIENT)
Dept: FAMILY MEDICINE | Facility: CLINIC | Age: 31
End: 2021-07-23

## 2021-07-23 ENCOUNTER — PATIENT MESSAGE (OUTPATIENT)
Dept: NEUROLOGY | Facility: CLINIC | Age: 31
End: 2021-07-23

## 2021-07-26 ENCOUNTER — PATIENT MESSAGE (OUTPATIENT)
Dept: NEUROLOGY | Facility: CLINIC | Age: 31
End: 2021-07-26

## 2021-08-02 ENCOUNTER — PATIENT MESSAGE (OUTPATIENT)
Dept: FAMILY MEDICINE | Facility: CLINIC | Age: 31
End: 2021-08-02

## 2021-08-04 ENCOUNTER — PATIENT MESSAGE (OUTPATIENT)
Dept: FAMILY MEDICINE | Facility: CLINIC | Age: 31
End: 2021-08-04

## 2021-08-04 ENCOUNTER — OFFICE VISIT (OUTPATIENT)
Dept: FAMILY MEDICINE | Facility: CLINIC | Age: 31
End: 2021-08-04
Payer: MEDICAID

## 2021-08-04 VITALS
BODY MASS INDEX: 48.82 KG/M2 | DIASTOLIC BLOOD PRESSURE: 90 MMHG | HEIGHT: 65 IN | RESPIRATION RATE: 19 BRPM | SYSTOLIC BLOOD PRESSURE: 142 MMHG | WEIGHT: 293 LBS

## 2021-08-04 DIAGNOSIS — G50.9 TRIGEMINAL NEUROPATHY: Primary | ICD-10-CM

## 2021-08-04 DIAGNOSIS — G43.719 INTRACTABLE CHRONIC MIGRAINE WITHOUT AURA AND WITHOUT STATUS MIGRAINOSUS: ICD-10-CM

## 2021-08-04 DIAGNOSIS — F41.9 ANXIETY: ICD-10-CM

## 2021-08-04 PROCEDURE — 99214 PR OFFICE/OUTPT VISIT, EST, LEVL IV, 30-39 MIN: ICD-10-PCS | Mod: S$PBB,,, | Performed by: FAMILY MEDICINE

## 2021-08-04 PROCEDURE — 99214 OFFICE O/P EST MOD 30 MIN: CPT | Mod: S$PBB,,, | Performed by: FAMILY MEDICINE

## 2021-08-04 PROCEDURE — 99213 OFFICE O/P EST LOW 20 MIN: CPT | Performed by: FAMILY MEDICINE

## 2021-08-04 RX ORDER — HYDROCODONE BITARTRATE AND ACETAMINOPHEN 5; 325 MG/1; MG/1
1 TABLET ORAL EVERY 6 HOURS PRN
Qty: 90 TABLET | Refills: 0 | Status: SHIPPED | OUTPATIENT
Start: 2021-08-04 | End: 2021-09-07 | Stop reason: SDUPTHER

## 2021-08-04 RX ORDER — METOCLOPRAMIDE 10 MG/1
TABLET ORAL
Qty: 60 TABLET | Refills: 11 | Status: SHIPPED | OUTPATIENT
Start: 2021-08-04 | End: 2023-10-23

## 2021-08-04 RX ORDER — HYDROCODONE BITARTRATE AND ACETAMINOPHEN 5; 325 MG/1; MG/1
1 TABLET ORAL EVERY 6 HOURS PRN
Qty: 90 TABLET | Refills: 0 | Status: SHIPPED | OUTPATIENT
Start: 2021-10-04 | End: 2021-09-07 | Stop reason: SDUPTHER

## 2021-08-04 RX ORDER — HYDROCODONE BITARTRATE AND ACETAMINOPHEN 5; 325 MG/1; MG/1
1 TABLET ORAL EVERY 6 HOURS PRN
Qty: 90 TABLET | Refills: 0 | Status: SHIPPED | OUTPATIENT
Start: 2021-09-04 | End: 2021-09-07 | Stop reason: SDUPTHER

## 2021-08-04 RX ORDER — BUTALBITAL, ACETAMINOPHEN AND CAFFEINE 50; 325; 40 MG/1; MG/1; MG/1
1 TABLET ORAL EVERY 4 HOURS PRN
Qty: 10 TABLET | Refills: 3 | Status: SHIPPED | OUTPATIENT
Start: 2021-08-04 | End: 2021-09-03

## 2021-08-04 RX ORDER — BUSPIRONE HYDROCHLORIDE 7.5 MG/1
7.5 TABLET ORAL 3 TIMES DAILY
Qty: 90 TABLET | Refills: 11 | Status: SHIPPED | OUTPATIENT
Start: 2021-08-04 | End: 2022-03-21

## 2021-08-05 ENCOUNTER — PATIENT MESSAGE (OUTPATIENT)
Dept: FAMILY MEDICINE | Facility: CLINIC | Age: 31
End: 2021-08-05

## 2021-08-09 ENCOUNTER — PATIENT MESSAGE (OUTPATIENT)
Dept: FAMILY MEDICINE | Facility: CLINIC | Age: 31
End: 2021-08-09

## 2021-08-18 ENCOUNTER — CLINICAL SUPPORT (OUTPATIENT)
Dept: FAMILY MEDICINE | Facility: CLINIC | Age: 31
End: 2021-08-18
Payer: MEDICAID

## 2021-08-18 DIAGNOSIS — Z20.822 CLOSE EXPOSURE TO COVID-19 VIRUS: ICD-10-CM

## 2021-08-18 DIAGNOSIS — R51.9 HEADACHE AROUND THE EYES: Primary | ICD-10-CM

## 2021-08-18 LAB — SARS-COV-2 RDRP RESP QL NAA+PROBE: NEGATIVE

## 2021-08-18 PROCEDURE — U0002 COVID-19 LAB TEST NON-CDC: HCPCS | Performed by: FAMILY MEDICINE

## 2021-08-25 ENCOUNTER — PATIENT MESSAGE (OUTPATIENT)
Dept: FAMILY MEDICINE | Facility: CLINIC | Age: 31
End: 2021-08-25

## 2021-08-29 ENCOUNTER — PATIENT MESSAGE (OUTPATIENT)
Dept: FAMILY MEDICINE | Facility: CLINIC | Age: 31
End: 2021-08-29

## 2021-09-06 ENCOUNTER — PATIENT MESSAGE (OUTPATIENT)
Dept: FAMILY MEDICINE | Facility: CLINIC | Age: 31
End: 2021-09-06

## 2021-09-06 DIAGNOSIS — G50.9 TRIGEMINAL NEUROPATHY: ICD-10-CM

## 2021-09-07 ENCOUNTER — TELEPHONE (OUTPATIENT)
Dept: FAMILY MEDICINE | Facility: CLINIC | Age: 31
End: 2021-09-07

## 2021-09-07 ENCOUNTER — PATIENT MESSAGE (OUTPATIENT)
Dept: FAMILY MEDICINE | Facility: CLINIC | Age: 31
End: 2021-09-07

## 2021-09-07 DIAGNOSIS — G50.9 TRIGEMINAL NEUROPATHY: ICD-10-CM

## 2021-09-07 RX ORDER — HYDROCODONE BITARTRATE AND ACETAMINOPHEN 5; 325 MG/1; MG/1
1 TABLET ORAL EVERY 6 HOURS PRN
Qty: 90 TABLET | Refills: 0 | Status: SHIPPED | OUTPATIENT
Start: 2021-10-04 | End: 2021-09-07 | Stop reason: SDUPTHER

## 2021-09-07 RX ORDER — HYDROCODONE BITARTRATE AND ACETAMINOPHEN 5; 325 MG/1; MG/1
1 TABLET ORAL EVERY 6 HOURS PRN
Qty: 90 TABLET | Refills: 0 | Status: SHIPPED | OUTPATIENT
Start: 2021-09-07 | End: 2021-10-27

## 2021-09-20 ENCOUNTER — PATIENT MESSAGE (OUTPATIENT)
Dept: FAMILY MEDICINE | Facility: CLINIC | Age: 31
End: 2021-09-20

## 2021-09-23 ENCOUNTER — HOSPITAL ENCOUNTER (EMERGENCY)
Facility: HOSPITAL | Age: 31
Discharge: HOME | End: 2021-09-23
Attending: EMERGENCY MEDICINE
Payer: MEDICAID

## 2021-09-23 VITALS
BODY MASS INDEX: 48.82 KG/M2 | TEMPERATURE: 98 F | DIASTOLIC BLOOD PRESSURE: 91 MMHG | WEIGHT: 293 LBS | HEIGHT: 65 IN | HEART RATE: 112 BPM | OXYGEN SATURATION: 97 % | RESPIRATION RATE: 20 BRPM | SYSTOLIC BLOOD PRESSURE: 144 MMHG

## 2021-09-23 DIAGNOSIS — R51.9 NONINTRACTABLE HEADACHE, UNSPECIFIED CHRONICITY PATTERN, UNSPECIFIED HEADACHE TYPE: Primary | ICD-10-CM

## 2021-09-23 DIAGNOSIS — R11.0 NAUSEA: ICD-10-CM

## 2021-09-23 LAB
B-HCG UR QL: NEGATIVE
CTP QC/QA: YES

## 2021-09-23 PROCEDURE — 25000003 PHARM REV CODE 250: Performed by: PHYSICIAN ASSISTANT

## 2021-09-23 PROCEDURE — 99284 EMERGENCY DEPT VISIT MOD MDM: CPT | Mod: 25

## 2021-09-23 PROCEDURE — 96374 THER/PROPH/DIAG INJ IV PUSH: CPT

## 2021-09-23 PROCEDURE — 81025 URINE PREGNANCY TEST: CPT | Performed by: NURSE PRACTITIONER

## 2021-09-23 PROCEDURE — 63600175 PHARM REV CODE 636 W HCPCS: Performed by: PHYSICIAN ASSISTANT

## 2021-09-23 PROCEDURE — 96372 THER/PROPH/DIAG INJ SC/IM: CPT

## 2021-09-23 RX ORDER — BUTORPHANOL TARTRATE 1 MG/ML
2 INJECTION INTRAMUSCULAR; INTRAVENOUS ONCE
Status: COMPLETED | OUTPATIENT
Start: 2021-09-23 | End: 2021-09-23

## 2021-09-23 RX ORDER — ONDANSETRON 2 MG/ML
8 INJECTION INTRAMUSCULAR; INTRAVENOUS
Status: COMPLETED | OUTPATIENT
Start: 2021-09-23 | End: 2021-09-23

## 2021-09-23 RX ADMIN — BUTORPHANOL TARTRATE 2 MG: 1 INJECTION, SOLUTION INTRAMUSCULAR; INTRAVENOUS at 08:09

## 2021-09-23 RX ADMIN — ONDANSETRON 8 MG: 2 INJECTION INTRAMUSCULAR; INTRAVENOUS at 08:09

## 2021-09-23 RX ADMIN — SODIUM CHLORIDE 500 ML: 0.9 INJECTION, SOLUTION INTRAVENOUS at 08:09

## 2021-09-24 ENCOUNTER — PATIENT OUTREACH (OUTPATIENT)
Dept: EMERGENCY MEDICINE | Facility: HOSPITAL | Age: 31
End: 2021-09-24

## 2021-09-26 ENCOUNTER — HOSPITAL ENCOUNTER (EMERGENCY)
Facility: HOSPITAL | Age: 31
Discharge: HOME | End: 2021-09-26
Attending: EMERGENCY MEDICINE
Payer: MEDICAID

## 2021-09-26 VITALS
OXYGEN SATURATION: 96 % | TEMPERATURE: 99 F | WEIGHT: 293 LBS | BODY MASS INDEX: 48.82 KG/M2 | DIASTOLIC BLOOD PRESSURE: 94 MMHG | SYSTOLIC BLOOD PRESSURE: 147 MMHG | HEART RATE: 108 BPM | RESPIRATION RATE: 17 BRPM | HEIGHT: 65 IN

## 2021-09-26 DIAGNOSIS — G43.809 OTHER MIGRAINE WITHOUT STATUS MIGRAINOSUS, NOT INTRACTABLE: Primary | ICD-10-CM

## 2021-09-26 PROCEDURE — 96375 TX/PRO/DX INJ NEW DRUG ADDON: CPT

## 2021-09-26 PROCEDURE — 96374 THER/PROPH/DIAG INJ IV PUSH: CPT

## 2021-09-26 PROCEDURE — 99284 EMERGENCY DEPT VISIT MOD MDM: CPT | Mod: 25

## 2021-09-26 PROCEDURE — 63600175 PHARM REV CODE 636 W HCPCS: Performed by: EMERGENCY MEDICINE

## 2021-09-26 PROCEDURE — 25000003 PHARM REV CODE 250: Performed by: EMERGENCY MEDICINE

## 2021-09-26 PROCEDURE — 96361 HYDRATE IV INFUSION ADD-ON: CPT

## 2021-09-26 PROCEDURE — 63600175 PHARM REV CODE 636 W HCPCS

## 2021-09-26 RX ORDER — SODIUM CHLORIDE 9 MG/ML
INJECTION, SOLUTION INTRAVENOUS
Status: COMPLETED | OUTPATIENT
Start: 2021-09-26 | End: 2021-09-26

## 2021-09-26 RX ORDER — HYDROMORPHONE HYDROCHLORIDE 1 MG/ML
1 INJECTION, SOLUTION INTRAMUSCULAR; INTRAVENOUS; SUBCUTANEOUS
Status: COMPLETED | OUTPATIENT
Start: 2021-09-26 | End: 2021-09-26

## 2021-09-26 RX ORDER — ONDANSETRON 2 MG/ML
INJECTION INTRAMUSCULAR; INTRAVENOUS
Status: COMPLETED
Start: 2021-09-26 | End: 2021-09-26

## 2021-09-26 RX ORDER — ONDANSETRON 2 MG/ML
4 INJECTION INTRAMUSCULAR; INTRAVENOUS
Status: COMPLETED | OUTPATIENT
Start: 2021-09-26 | End: 2021-09-26

## 2021-09-26 RX ORDER — MORPHINE SULFATE 4 MG/ML
4 INJECTION, SOLUTION INTRAMUSCULAR; INTRAVENOUS
Status: COMPLETED | OUTPATIENT
Start: 2021-09-26 | End: 2021-09-26

## 2021-09-26 RX ADMIN — ONDANSETRON 4 MG: 2 INJECTION INTRAMUSCULAR; INTRAVENOUS at 08:09

## 2021-09-26 RX ADMIN — MORPHINE SULFATE 4 MG: 4 INJECTION, SOLUTION INTRAMUSCULAR; INTRAVENOUS at 08:09

## 2021-09-26 RX ADMIN — SODIUM CHLORIDE: 0.9 INJECTION, SOLUTION INTRAVENOUS at 08:09

## 2021-09-26 RX ADMIN — HYDROMORPHONE HYDROCHLORIDE 1 MG: 1 INJECTION, SOLUTION INTRAMUSCULAR; INTRAVENOUS; SUBCUTANEOUS at 08:09

## 2021-09-29 ENCOUNTER — HOSPITAL ENCOUNTER (EMERGENCY)
Facility: HOSPITAL | Age: 31
Discharge: HOME | End: 2021-09-30
Attending: EMERGENCY MEDICINE
Payer: MEDICAID

## 2021-09-29 DIAGNOSIS — G43.809 OTHER MIGRAINE WITHOUT STATUS MIGRAINOSUS, NOT INTRACTABLE: Primary | ICD-10-CM

## 2021-09-29 LAB
ALBUMIN SERPL BCP-MCNC: 4.5 G/DL (ref 3.5–5.2)
ALP SERPL-CCNC: 61 U/L (ref 55–135)
ALT SERPL W/O P-5'-P-CCNC: 42 U/L (ref 10–44)
ANION GAP SERPL CALC-SCNC: 12 MMOL/L (ref 8–16)
AST SERPL-CCNC: 30 U/L (ref 10–40)
B-HCG UR QL: NEGATIVE
BACTERIA #/AREA URNS HPF: NEGATIVE /HPF
BASOPHILS # BLD AUTO: 0.03 K/UL (ref 0–0.2)
BASOPHILS NFR BLD: 0.2 % (ref 0–1.9)
BILIRUB SERPL-MCNC: 0.4 MG/DL (ref 0.1–1)
BILIRUB UR QL STRIP: NEGATIVE
BUN SERPL-MCNC: 12 MG/DL (ref 6–20)
CALCIUM SERPL-MCNC: 9.9 MG/DL (ref 8.7–10.5)
CHLORIDE SERPL-SCNC: 101 MMOL/L (ref 95–110)
CLARITY UR: CLEAR
CO2 SERPL-SCNC: 25 MMOL/L (ref 23–29)
COLOR UR: YELLOW
CREAT SERPL-MCNC: 0.9 MG/DL (ref 0.5–1.4)
CTP QC/QA: YES
DIFFERENTIAL METHOD: ABNORMAL
EOSINOPHIL # BLD AUTO: 0.1 K/UL (ref 0–0.5)
EOSINOPHIL NFR BLD: 0.6 % (ref 0–8)
ERYTHROCYTE [DISTWIDTH] IN BLOOD BY AUTOMATED COUNT: 13.2 % (ref 11.5–14.5)
EST. GFR  (AFRICAN AMERICAN): >60 ML/MIN/1.73 M^2
EST. GFR  (NON AFRICAN AMERICAN): >60 ML/MIN/1.73 M^2
GLUCOSE SERPL-MCNC: 130 MG/DL (ref 70–110)
GLUCOSE UR QL STRIP: NEGATIVE
HCT VFR BLD AUTO: 43.2 % (ref 37–48.5)
HGB BLD-MCNC: 14.2 G/DL (ref 12–16)
HGB UR QL STRIP: NEGATIVE
HYALINE CASTS #/AREA URNS LPF: 2 /LPF
IMM GRANULOCYTES # BLD AUTO: 0.05 K/UL (ref 0–0.04)
IMM GRANULOCYTES NFR BLD AUTO: 0.4 % (ref 0–0.5)
KETONES UR QL STRIP: ABNORMAL
LEUKOCYTE ESTERASE UR QL STRIP: NEGATIVE
LYMPHOCYTES # BLD AUTO: 4.8 K/UL (ref 1–4.8)
LYMPHOCYTES NFR BLD: 33.9 % (ref 18–48)
MCH RBC QN AUTO: 29.4 PG (ref 27–31)
MCHC RBC AUTO-ENTMCNC: 32.9 G/DL (ref 32–36)
MCV RBC AUTO: 89 FL (ref 82–98)
MICROSCOPIC COMMENT: ABNORMAL
MONOCYTES # BLD AUTO: 0.9 K/UL (ref 0.3–1)
MONOCYTES NFR BLD: 6.5 % (ref 4–15)
NEUTROPHILS # BLD AUTO: 8.3 K/UL (ref 1.8–7.7)
NEUTROPHILS NFR BLD: 58.4 % (ref 38–73)
NITRITE UR QL STRIP: NEGATIVE
NRBC BLD-RTO: 0 /100 WBC
PH UR STRIP: 7 [PH] (ref 5–8)
PLATELET # BLD AUTO: 381 K/UL (ref 150–450)
PMV BLD AUTO: 9.4 FL (ref 9.2–12.9)
POTASSIUM SERPL-SCNC: 4.1 MMOL/L (ref 3.5–5.1)
PROT SERPL-MCNC: 8.3 G/DL (ref 6–8.4)
PROT UR QL STRIP: ABNORMAL
RBC # BLD AUTO: 4.83 M/UL (ref 4–5.4)
RBC #/AREA URNS HPF: 1 /HPF (ref 0–4)
SODIUM SERPL-SCNC: 138 MMOL/L (ref 136–145)
SP GR UR STRIP: >1.03 (ref 1–1.03)
SQUAMOUS #/AREA URNS HPF: 1 /HPF
URN SPEC COLLECT METH UR: ABNORMAL
UROBILINOGEN UR STRIP-ACNC: ABNORMAL EU/DL
WBC # BLD AUTO: 14.25 K/UL (ref 3.9–12.7)
WBC #/AREA URNS HPF: 1 /HPF (ref 0–5)

## 2021-09-29 PROCEDURE — 85025 COMPLETE CBC W/AUTO DIFF WBC: CPT | Performed by: EMERGENCY MEDICINE

## 2021-09-29 PROCEDURE — 81025 URINE PREGNANCY TEST: CPT | Performed by: EMERGENCY MEDICINE

## 2021-09-29 PROCEDURE — 80053 COMPREHEN METABOLIC PANEL: CPT | Performed by: EMERGENCY MEDICINE

## 2021-09-29 PROCEDURE — 81001 URINALYSIS AUTO W/SCOPE: CPT | Performed by: EMERGENCY MEDICINE

## 2021-09-29 PROCEDURE — 96361 HYDRATE IV INFUSION ADD-ON: CPT

## 2021-09-29 PROCEDURE — 96375 TX/PRO/DX INJ NEW DRUG ADDON: CPT

## 2021-09-29 PROCEDURE — 99284 EMERGENCY DEPT VISIT MOD MDM: CPT | Mod: 25

## 2021-09-29 PROCEDURE — 36415 COLL VENOUS BLD VENIPUNCTURE: CPT | Performed by: EMERGENCY MEDICINE

## 2021-09-29 PROCEDURE — 63600175 PHARM REV CODE 636 W HCPCS: Performed by: EMERGENCY MEDICINE

## 2021-09-29 PROCEDURE — 96374 THER/PROPH/DIAG INJ IV PUSH: CPT

## 2021-09-29 RX ORDER — ONDANSETRON 2 MG/ML
8 INJECTION INTRAMUSCULAR; INTRAVENOUS
Status: COMPLETED | OUTPATIENT
Start: 2021-09-29 | End: 2021-09-29

## 2021-09-29 RX ORDER — DIPHENHYDRAMINE HYDROCHLORIDE 50 MG/ML
25 INJECTION INTRAMUSCULAR; INTRAVENOUS
Status: DISCONTINUED | OUTPATIENT
Start: 2021-09-29 | End: 2021-09-30 | Stop reason: HOSPADM

## 2021-09-29 RX ORDER — PROCHLORPERAZINE EDISYLATE 5 MG/ML
10 INJECTION INTRAMUSCULAR; INTRAVENOUS
Status: COMPLETED | OUTPATIENT
Start: 2021-09-29 | End: 2021-09-29

## 2021-09-29 RX ORDER — HYDROMORPHONE HYDROCHLORIDE 1 MG/ML
1 INJECTION, SOLUTION INTRAMUSCULAR; INTRAVENOUS; SUBCUTANEOUS
Status: COMPLETED | OUTPATIENT
Start: 2021-09-30 | End: 2021-09-30

## 2021-09-29 RX ORDER — KETOROLAC TROMETHAMINE 30 MG/ML
15 INJECTION, SOLUTION INTRAMUSCULAR; INTRAVENOUS
Status: DISCONTINUED | OUTPATIENT
Start: 2021-09-29 | End: 2021-09-29

## 2021-09-29 RX ADMIN — SODIUM CHLORIDE, SODIUM LACTATE, POTASSIUM CHLORIDE, AND CALCIUM CHLORIDE 1000 ML: .6; .31; .03; .02 INJECTION, SOLUTION INTRAVENOUS at 11:09

## 2021-09-29 RX ADMIN — ONDANSETRON 8 MG: 2 INJECTION INTRAMUSCULAR; INTRAVENOUS at 11:09

## 2021-09-29 RX ADMIN — PROCHLORPERAZINE EDISYLATE 10 MG: 5 INJECTION INTRAMUSCULAR; INTRAVENOUS at 11:09

## 2021-09-30 VITALS
BODY MASS INDEX: 48.82 KG/M2 | OXYGEN SATURATION: 95 % | RESPIRATION RATE: 18 BRPM | WEIGHT: 293 LBS | HEIGHT: 65 IN | DIASTOLIC BLOOD PRESSURE: 58 MMHG | SYSTOLIC BLOOD PRESSURE: 120 MMHG | HEART RATE: 99 BPM | TEMPERATURE: 98 F

## 2021-09-30 PROCEDURE — 96375 TX/PRO/DX INJ NEW DRUG ADDON: CPT

## 2021-09-30 PROCEDURE — 63600175 PHARM REV CODE 636 W HCPCS: Performed by: EMERGENCY MEDICINE

## 2021-09-30 PROCEDURE — 96361 HYDRATE IV INFUSION ADD-ON: CPT

## 2021-09-30 RX ADMIN — HYDROMORPHONE HYDROCHLORIDE 1 MG: 1 INJECTION, SOLUTION INTRAMUSCULAR; INTRAVENOUS; SUBCUTANEOUS at 12:09

## 2021-10-10 ENCOUNTER — HOSPITAL ENCOUNTER (EMERGENCY)
Facility: HOSPITAL | Age: 31
Discharge: HOME | End: 2021-10-10
Attending: EMERGENCY MEDICINE
Payer: MEDICAID

## 2021-10-10 VITALS
DIASTOLIC BLOOD PRESSURE: 98 MMHG | SYSTOLIC BLOOD PRESSURE: 140 MMHG | RESPIRATION RATE: 20 BRPM | TEMPERATURE: 99 F | HEART RATE: 120 BPM | WEIGHT: 293 LBS | HEIGHT: 65 IN | BODY MASS INDEX: 48.82 KG/M2 | OXYGEN SATURATION: 97 %

## 2021-10-10 DIAGNOSIS — Z53.29 LEFT AGAINST MEDICAL ADVICE: ICD-10-CM

## 2021-10-10 DIAGNOSIS — R51.9 NONINTRACTABLE HEADACHE, UNSPECIFIED CHRONICITY PATTERN, UNSPECIFIED HEADACHE TYPE: Primary | ICD-10-CM

## 2021-10-10 LAB
ALBUMIN SERPL BCP-MCNC: 4.3 G/DL (ref 3.5–5.2)
ALP SERPL-CCNC: 62 U/L (ref 55–135)
ALT SERPL W/O P-5'-P-CCNC: 51 U/L (ref 10–44)
ANION GAP SERPL CALC-SCNC: 12 MMOL/L (ref 8–16)
AST SERPL-CCNC: 36 U/L (ref 10–40)
BASOPHILS # BLD AUTO: 0.04 K/UL (ref 0–0.2)
BASOPHILS NFR BLD: 0.3 % (ref 0–1.9)
BILIRUB SERPL-MCNC: 0.6 MG/DL (ref 0.1–1)
BUN SERPL-MCNC: 10 MG/DL (ref 6–20)
CALCIUM SERPL-MCNC: 9.7 MG/DL (ref 8.7–10.5)
CHLORIDE SERPL-SCNC: 105 MMOL/L (ref 95–110)
CO2 SERPL-SCNC: 24 MMOL/L (ref 23–29)
CREAT SERPL-MCNC: 0.7 MG/DL (ref 0.5–1.4)
DIFFERENTIAL METHOD: NORMAL
EOSINOPHIL # BLD AUTO: 0.1 K/UL (ref 0–0.5)
EOSINOPHIL NFR BLD: 0.7 % (ref 0–8)
ERYTHROCYTE [DISTWIDTH] IN BLOOD BY AUTOMATED COUNT: 13.3 % (ref 11.5–14.5)
EST. GFR  (AFRICAN AMERICAN): >60 ML/MIN/1.73 M^2
EST. GFR  (NON AFRICAN AMERICAN): >60 ML/MIN/1.73 M^2
GLUCOSE SERPL-MCNC: 156 MG/DL (ref 70–110)
HCT VFR BLD AUTO: 45.1 % (ref 37–48.5)
HGB BLD-MCNC: 14.7 G/DL (ref 12–16)
IMM GRANULOCYTES # BLD AUTO: 0.03 K/UL (ref 0–0.04)
IMM GRANULOCYTES NFR BLD AUTO: 0.3 % (ref 0–0.5)
LYMPHOCYTES # BLD AUTO: 4.1 K/UL (ref 1–4.8)
LYMPHOCYTES NFR BLD: 35 % (ref 18–48)
MCH RBC QN AUTO: 29.2 PG (ref 27–31)
MCHC RBC AUTO-ENTMCNC: 32.6 G/DL (ref 32–36)
MCV RBC AUTO: 90 FL (ref 82–98)
MONOCYTES # BLD AUTO: 0.9 K/UL (ref 0.3–1)
MONOCYTES NFR BLD: 8 % (ref 4–15)
NEUTROPHILS # BLD AUTO: 6.5 K/UL (ref 1.8–7.7)
NEUTROPHILS NFR BLD: 55.7 % (ref 38–73)
NRBC BLD-RTO: 0 /100 WBC
PLATELET # BLD AUTO: 313 K/UL (ref 150–450)
PMV BLD AUTO: 9.5 FL (ref 9.2–12.9)
POTASSIUM SERPL-SCNC: 4.1 MMOL/L (ref 3.5–5.1)
PROT SERPL-MCNC: 8 G/DL (ref 6–8.4)
RBC # BLD AUTO: 5.03 M/UL (ref 4–5.4)
SODIUM SERPL-SCNC: 141 MMOL/L (ref 136–145)
WBC # BLD AUTO: 11.63 K/UL (ref 3.9–12.7)

## 2021-10-10 PROCEDURE — 85025 COMPLETE CBC W/AUTO DIFF WBC: CPT | Performed by: EMERGENCY MEDICINE

## 2021-10-10 PROCEDURE — 80053 COMPREHEN METABOLIC PANEL: CPT | Performed by: EMERGENCY MEDICINE

## 2021-10-10 PROCEDURE — 99283 EMERGENCY DEPT VISIT LOW MDM: CPT

## 2021-10-10 RX ORDER — ONDANSETRON 2 MG/ML
4 INJECTION INTRAMUSCULAR; INTRAVENOUS
Status: DISCONTINUED | OUTPATIENT
Start: 2021-10-10 | End: 2021-10-10 | Stop reason: HOSPADM

## 2021-10-10 RX ORDER — PROCHLORPERAZINE EDISYLATE 5 MG/ML
10 INJECTION INTRAMUSCULAR; INTRAVENOUS
Status: DISCONTINUED | OUTPATIENT
Start: 2021-10-10 | End: 2021-10-10 | Stop reason: HOSPADM

## 2021-10-17 ENCOUNTER — HOSPITAL ENCOUNTER (EMERGENCY)
Facility: HOSPITAL | Age: 31
Discharge: HOME | End: 2021-10-17
Attending: EMERGENCY MEDICINE
Payer: MEDICAID

## 2021-10-17 VITALS
WEIGHT: 293 LBS | DIASTOLIC BLOOD PRESSURE: 96 MMHG | HEIGHT: 65 IN | HEART RATE: 120 BPM | RESPIRATION RATE: 18 BRPM | OXYGEN SATURATION: 99 % | SYSTOLIC BLOOD PRESSURE: 174 MMHG | BODY MASS INDEX: 48.82 KG/M2 | TEMPERATURE: 98 F

## 2021-10-17 DIAGNOSIS — G50.0 TRIGEMINAL NEURALGIA OF RIGHT SIDE OF FACE: Primary | ICD-10-CM

## 2021-10-17 PROCEDURE — 99283 EMERGENCY DEPT VISIT LOW MDM: CPT

## 2021-10-17 PROCEDURE — 25000003 PHARM REV CODE 250: Performed by: EMERGENCY MEDICINE

## 2021-10-17 RX ORDER — OXYCODONE AND ACETAMINOPHEN 5; 325 MG/1; MG/1
1 TABLET ORAL
Status: COMPLETED | OUTPATIENT
Start: 2021-10-17 | End: 2021-10-17

## 2021-10-17 RX ADMIN — OXYCODONE HYDROCHLORIDE AND ACETAMINOPHEN 1 TABLET: 5; 325 TABLET ORAL at 06:10

## 2021-10-18 ENCOUNTER — PATIENT MESSAGE (OUTPATIENT)
Dept: FAMILY MEDICINE | Facility: CLINIC | Age: 31
End: 2021-10-18
Payer: MEDICAID

## 2021-10-19 DIAGNOSIS — F32.A DEPRESSION, UNSPECIFIED DEPRESSION TYPE: ICD-10-CM

## 2021-10-19 RX ORDER — FLUVOXAMINE MALEATE 100 MG/1
100 TABLET, COATED ORAL 3 TIMES DAILY
Qty: 90 TABLET | Refills: 1 | Status: SHIPPED | OUTPATIENT
Start: 2021-10-19 | End: 2021-12-17 | Stop reason: SDUPTHER

## 2021-10-19 RX ORDER — VENLAFAXINE HYDROCHLORIDE 150 MG/1
150 CAPSULE, EXTENDED RELEASE ORAL DAILY
Qty: 30 CAPSULE | Refills: 1 | Status: SHIPPED | OUTPATIENT
Start: 2021-10-19 | End: 2021-12-17 | Stop reason: SDUPTHER

## 2021-10-25 ENCOUNTER — PATIENT MESSAGE (OUTPATIENT)
Dept: FAMILY MEDICINE | Facility: CLINIC | Age: 31
End: 2021-10-25
Payer: MEDICAID

## 2021-10-25 ENCOUNTER — HOSPITAL ENCOUNTER (EMERGENCY)
Facility: HOSPITAL | Age: 31
Discharge: HOME | End: 2021-10-25
Attending: EMERGENCY MEDICINE
Payer: MEDICAID

## 2021-10-25 VITALS
DIASTOLIC BLOOD PRESSURE: 92 MMHG | WEIGHT: 293 LBS | RESPIRATION RATE: 19 BRPM | HEIGHT: 65 IN | SYSTOLIC BLOOD PRESSURE: 144 MMHG | OXYGEN SATURATION: 96 % | BODY MASS INDEX: 48.82 KG/M2 | HEART RATE: 115 BPM | TEMPERATURE: 98 F

## 2021-10-25 DIAGNOSIS — G43.709 CHRONIC MIGRAINE WITHOUT AURA WITHOUT STATUS MIGRAINOSUS, NOT INTRACTABLE: Primary | ICD-10-CM

## 2021-10-25 LAB
B-HCG UR QL: NEGATIVE
CTP QC/QA: YES

## 2021-10-25 PROCEDURE — 25000003 PHARM REV CODE 250: Performed by: PHYSICIAN ASSISTANT

## 2021-10-25 PROCEDURE — 63600175 PHARM REV CODE 636 W HCPCS: Performed by: PHYSICIAN ASSISTANT

## 2021-10-25 PROCEDURE — 96374 THER/PROPH/DIAG INJ IV PUSH: CPT

## 2021-10-25 PROCEDURE — 81025 URINE PREGNANCY TEST: CPT | Performed by: PHYSICIAN ASSISTANT

## 2021-10-25 PROCEDURE — 99284 EMERGENCY DEPT VISIT MOD MDM: CPT | Mod: 25

## 2021-10-25 PROCEDURE — 96375 TX/PRO/DX INJ NEW DRUG ADDON: CPT

## 2021-10-25 RX ORDER — ONDANSETRON 2 MG/ML
4 INJECTION INTRAMUSCULAR; INTRAVENOUS
Status: COMPLETED | OUTPATIENT
Start: 2021-10-25 | End: 2021-10-25

## 2021-10-25 RX ORDER — MORPHINE SULFATE 2 MG/ML
6 INJECTION, SOLUTION INTRAMUSCULAR; INTRAVENOUS
Status: COMPLETED | OUTPATIENT
Start: 2021-10-25 | End: 2021-10-25

## 2021-10-25 RX ORDER — HYDROCODONE BITARTRATE AND ACETAMINOPHEN 10; 325 MG/1; MG/1
1 TABLET ORAL
Status: COMPLETED | OUTPATIENT
Start: 2021-10-25 | End: 2021-10-25

## 2021-10-25 RX ADMIN — MORPHINE SULFATE 6 MG: 2 INJECTION, SOLUTION INTRAMUSCULAR; INTRAVENOUS at 04:10

## 2021-10-25 RX ADMIN — ONDANSETRON 4 MG: 2 INJECTION INTRAMUSCULAR; INTRAVENOUS at 04:10

## 2021-10-25 RX ADMIN — HYDROCODONE BITARTRATE AND ACETAMINOPHEN 1 TABLET: 10; 325 TABLET ORAL at 05:10

## 2021-10-25 RX ADMIN — SODIUM CHLORIDE 1000 ML: 0.9 INJECTION, SOLUTION INTRAVENOUS at 04:10

## 2021-10-26 ENCOUNTER — PATIENT MESSAGE (OUTPATIENT)
Dept: FAMILY MEDICINE | Facility: CLINIC | Age: 31
End: 2021-10-26
Payer: MEDICAID

## 2021-10-26 DIAGNOSIS — G50.9 TRIGEMINAL NEUROPATHY: ICD-10-CM

## 2021-10-27 ENCOUNTER — PATIENT MESSAGE (OUTPATIENT)
Dept: FAMILY MEDICINE | Facility: CLINIC | Age: 31
End: 2021-10-27
Payer: MEDICAID

## 2021-10-27 RX ORDER — OXYCODONE AND ACETAMINOPHEN 5; 325 MG/1; MG/1
1 TABLET ORAL EVERY 4 HOURS PRN
Qty: 90 EACH | Refills: 0 | Status: SHIPPED | OUTPATIENT
Start: 2021-10-27 | End: 2021-12-20 | Stop reason: SDUPTHER

## 2021-10-27 RX ORDER — HYDROCODONE BITARTRATE AND ACETAMINOPHEN 5; 325 MG/1; MG/1
1 TABLET ORAL EVERY 6 HOURS PRN
Qty: 90 TABLET | Refills: 0 | Status: CANCELLED | OUTPATIENT
Start: 2021-10-27

## 2021-10-29 ENCOUNTER — PATIENT OUTREACH (OUTPATIENT)
Dept: EMERGENCY MEDICINE | Facility: HOSPITAL | Age: 31
End: 2021-10-29
Payer: MEDICAID

## 2021-11-04 ENCOUNTER — PATIENT OUTREACH (OUTPATIENT)
Dept: EMERGENCY MEDICINE | Facility: HOSPITAL | Age: 31
End: 2021-11-04
Payer: MEDICAID

## 2021-11-14 ENCOUNTER — HOSPITAL ENCOUNTER (EMERGENCY)
Facility: HOSPITAL | Age: 31
Discharge: HOME | End: 2021-11-14
Attending: EMERGENCY MEDICINE
Payer: MEDICAID

## 2021-11-14 VITALS
DIASTOLIC BLOOD PRESSURE: 81 MMHG | SYSTOLIC BLOOD PRESSURE: 137 MMHG | BODY MASS INDEX: 56.58 KG/M2 | TEMPERATURE: 99 F | WEIGHT: 293 LBS | OXYGEN SATURATION: 95 % | HEART RATE: 109 BPM | RESPIRATION RATE: 20 BRPM

## 2021-11-14 DIAGNOSIS — R11.10 VOMITING, INTRACTABILITY OF VOMITING NOT SPECIFIED, PRESENCE OF NAUSEA NOT SPECIFIED, UNSPECIFIED VOMITING TYPE: ICD-10-CM

## 2021-11-14 DIAGNOSIS — G50.0 TRIGEMINAL NEURALGIA OF RIGHT SIDE OF FACE: ICD-10-CM

## 2021-11-14 DIAGNOSIS — R51.9 FACIAL PAIN: Primary | ICD-10-CM

## 2021-11-14 LAB
ALBUMIN SERPL BCP-MCNC: 4.2 G/DL (ref 3.5–5.2)
ALP SERPL-CCNC: 70 U/L (ref 55–135)
ALT SERPL W/O P-5'-P-CCNC: 51 U/L (ref 10–44)
AMPHET+METHAMPHET UR QL: NEGATIVE
ANION GAP SERPL CALC-SCNC: 10 MMOL/L (ref 8–16)
AST SERPL-CCNC: 30 U/L (ref 10–40)
B-HCG UR QL: NEGATIVE
BACTERIA #/AREA URNS HPF: ABNORMAL /HPF
BARBITURATES UR QL SCN>200 NG/ML: ABNORMAL
BASOPHILS # BLD AUTO: 0.03 K/UL (ref 0–0.2)
BASOPHILS NFR BLD: 0.2 % (ref 0–1.9)
BENZODIAZ UR QL SCN>200 NG/ML: NEGATIVE
BILIRUB SERPL-MCNC: 0.6 MG/DL (ref 0.1–1)
BILIRUB UR QL STRIP: ABNORMAL
BUN SERPL-MCNC: 9 MG/DL (ref 6–20)
BZE UR QL SCN: NEGATIVE
CALCIUM SERPL-MCNC: 9.2 MG/DL (ref 8.7–10.5)
CANNABINOIDS UR QL SCN: NEGATIVE
CHLORIDE SERPL-SCNC: 104 MMOL/L (ref 95–110)
CLARITY UR: CLEAR
CO2 SERPL-SCNC: 22 MMOL/L (ref 23–29)
COLOR UR: YELLOW
CREAT SERPL-MCNC: 0.7 MG/DL (ref 0.5–1.4)
CREAT UR-MCNC: 253 MG/DL (ref 15–325)
CTP QC/QA: YES
DIFFERENTIAL METHOD: ABNORMAL
EOSINOPHIL # BLD AUTO: 0.1 K/UL (ref 0–0.5)
EOSINOPHIL NFR BLD: 0.7 % (ref 0–8)
ERYTHROCYTE [DISTWIDTH] IN BLOOD BY AUTOMATED COUNT: 13.3 % (ref 11.5–14.5)
EST. GFR  (AFRICAN AMERICAN): >60 ML/MIN/1.73 M^2
EST. GFR  (NON AFRICAN AMERICAN): >60 ML/MIN/1.73 M^2
GLUCOSE SERPL-MCNC: 138 MG/DL (ref 70–110)
GLUCOSE UR QL STRIP: NEGATIVE
HCT VFR BLD AUTO: 43.9 % (ref 37–48.5)
HGB BLD-MCNC: 14.3 G/DL (ref 12–16)
HGB UR QL STRIP: ABNORMAL
HYALINE CASTS #/AREA URNS LPF: 16 /LPF
IMM GRANULOCYTES # BLD AUTO: 0.04 K/UL (ref 0–0.04)
IMM GRANULOCYTES NFR BLD AUTO: 0.3 % (ref 0–0.5)
KETONES UR QL STRIP: NEGATIVE
LEUKOCYTE ESTERASE UR QL STRIP: ABNORMAL
LIPASE SERPL-CCNC: 57 U/L (ref 4–60)
LYMPHOCYTES # BLD AUTO: 3.5 K/UL (ref 1–4.8)
LYMPHOCYTES NFR BLD: 27.7 % (ref 18–48)
MCH RBC QN AUTO: 29 PG (ref 27–31)
MCHC RBC AUTO-ENTMCNC: 32.6 G/DL (ref 32–36)
MCV RBC AUTO: 89 FL (ref 82–98)
MICROSCOPIC COMMENT: ABNORMAL
MONOCYTES # BLD AUTO: 0.8 K/UL (ref 0.3–1)
MONOCYTES NFR BLD: 6.4 % (ref 4–15)
NEUTROPHILS # BLD AUTO: 8.3 K/UL (ref 1.8–7.7)
NEUTROPHILS NFR BLD: 64.7 % (ref 38–73)
NITRITE UR QL STRIP: NEGATIVE
NRBC BLD-RTO: 0 /100 WBC
OPIATES UR QL SCN: NEGATIVE
PCP UR QL SCN>25 NG/ML: NEGATIVE
PH UR STRIP: 7 [PH] (ref 5–8)
PLATELET # BLD AUTO: 343 K/UL (ref 150–450)
PMV BLD AUTO: 9.2 FL (ref 9.2–12.9)
POTASSIUM SERPL-SCNC: 3.9 MMOL/L (ref 3.5–5.1)
PROT SERPL-MCNC: 7.9 G/DL (ref 6–8.4)
PROT UR QL STRIP: NEGATIVE
RBC # BLD AUTO: 4.93 M/UL (ref 4–5.4)
RBC #/AREA URNS HPF: 24 /HPF (ref 0–4)
SODIUM SERPL-SCNC: 136 MMOL/L (ref 136–145)
SP GR UR STRIP: 1.02 (ref 1–1.03)
SQUAMOUS #/AREA URNS HPF: 10 /HPF
TOXICOLOGY INFORMATION: ABNORMAL
URN SPEC COLLECT METH UR: ABNORMAL
UROBILINOGEN UR STRIP-ACNC: NEGATIVE EU/DL
WBC # BLD AUTO: 12.78 K/UL (ref 3.9–12.7)
WBC #/AREA URNS HPF: 28 /HPF (ref 0–5)

## 2021-11-14 PROCEDURE — 83690 ASSAY OF LIPASE: CPT | Performed by: NURSE PRACTITIONER

## 2021-11-14 PROCEDURE — 81001 URINALYSIS AUTO W/SCOPE: CPT | Mod: 59 | Performed by: NURSE PRACTITIONER

## 2021-11-14 PROCEDURE — 80053 COMPREHEN METABOLIC PANEL: CPT | Performed by: NURSE PRACTITIONER

## 2021-11-14 PROCEDURE — 96374 THER/PROPH/DIAG INJ IV PUSH: CPT

## 2021-11-14 PROCEDURE — 96376 TX/PRO/DX INJ SAME DRUG ADON: CPT

## 2021-11-14 PROCEDURE — 87086 URINE CULTURE/COLONY COUNT: CPT | Performed by: NURSE PRACTITIONER

## 2021-11-14 PROCEDURE — 99284 EMERGENCY DEPT VISIT MOD MDM: CPT | Mod: 25

## 2021-11-14 PROCEDURE — 81025 URINE PREGNANCY TEST: CPT | Performed by: NURSE PRACTITIONER

## 2021-11-14 PROCEDURE — 25000003 PHARM REV CODE 250: Performed by: NURSE PRACTITIONER

## 2021-11-14 PROCEDURE — 96375 TX/PRO/DX INJ NEW DRUG ADDON: CPT

## 2021-11-14 PROCEDURE — 96361 HYDRATE IV INFUSION ADD-ON: CPT

## 2021-11-14 PROCEDURE — 85025 COMPLETE CBC W/AUTO DIFF WBC: CPT | Performed by: NURSE PRACTITIONER

## 2021-11-14 PROCEDURE — 80307 DRUG TEST PRSMV CHEM ANLYZR: CPT | Performed by: NURSE PRACTITIONER

## 2021-11-14 PROCEDURE — 63600175 PHARM REV CODE 636 W HCPCS: Performed by: NURSE PRACTITIONER

## 2021-11-14 RX ORDER — ONDANSETRON 2 MG/ML
4 INJECTION INTRAMUSCULAR; INTRAVENOUS
Status: COMPLETED | OUTPATIENT
Start: 2021-11-14 | End: 2021-11-14

## 2021-11-14 RX ORDER — MORPHINE SULFATE 4 MG/ML
4 INJECTION, SOLUTION INTRAMUSCULAR; INTRAVENOUS
Status: COMPLETED | OUTPATIENT
Start: 2021-11-14 | End: 2021-11-14

## 2021-11-14 RX ORDER — ONDANSETRON 4 MG/1
4 TABLET, FILM COATED ORAL EVERY 6 HOURS
Qty: 12 TABLET | Refills: 0 | OUTPATIENT
Start: 2021-11-14 | End: 2022-01-02

## 2021-11-14 RX ADMIN — ONDANSETRON 4 MG: 2 INJECTION INTRAMUSCULAR; INTRAVENOUS at 03:11

## 2021-11-14 RX ADMIN — SODIUM CHLORIDE 1000 ML: 0.9 INJECTION, SOLUTION INTRAVENOUS at 01:11

## 2021-11-14 RX ADMIN — MORPHINE SULFATE 4 MG: 4 INJECTION, SOLUTION INTRAMUSCULAR; INTRAVENOUS at 03:11

## 2021-11-14 RX ADMIN — MORPHINE SULFATE 4 MG: 4 INJECTION, SOLUTION INTRAMUSCULAR; INTRAVENOUS at 02:11

## 2021-11-14 RX ADMIN — ONDANSETRON 4 MG: 2 INJECTION INTRAMUSCULAR; INTRAVENOUS at 01:11

## 2021-11-15 LAB
BACTERIA UR CULT: NORMAL
BACTERIA UR CULT: NORMAL

## 2021-11-30 ENCOUNTER — PATIENT MESSAGE (OUTPATIENT)
Dept: FAMILY MEDICINE | Facility: CLINIC | Age: 31
End: 2021-11-30
Payer: MEDICAID

## 2021-12-01 ENCOUNTER — PATIENT MESSAGE (OUTPATIENT)
Dept: FAMILY MEDICINE | Facility: CLINIC | Age: 31
End: 2021-12-01
Payer: MEDICAID

## 2021-12-01 DIAGNOSIS — F41.9 ANXIETY: ICD-10-CM

## 2021-12-03 ENCOUNTER — PATIENT MESSAGE (OUTPATIENT)
Dept: FAMILY MEDICINE | Facility: CLINIC | Age: 31
End: 2021-12-03
Payer: MEDICAID

## 2021-12-03 RX ORDER — CLONAZEPAM 1 MG/1
1 TABLET ORAL DAILY
Qty: 30 TABLET | Refills: 0 | Status: SHIPPED | OUTPATIENT
Start: 2021-12-03 | End: 2022-01-06 | Stop reason: SDUPTHER

## 2021-12-08 ENCOUNTER — PATIENT MESSAGE (OUTPATIENT)
Dept: FAMILY MEDICINE | Facility: CLINIC | Age: 31
End: 2021-12-08
Payer: MEDICAID

## 2021-12-08 DIAGNOSIS — G43.719 INTRACTABLE CHRONIC MIGRAINE WITHOUT AURA AND WITHOUT STATUS MIGRAINOSUS: Primary | ICD-10-CM

## 2021-12-08 RX ORDER — ONDANSETRON 8 MG/1
8 TABLET, ORALLY DISINTEGRATING ORAL
Qty: 8 TABLET | Refills: 0 | Status: SHIPPED | OUTPATIENT
Start: 2021-12-08 | End: 2022-03-21

## 2021-12-08 RX ORDER — BUTALBITAL, ACETAMINOPHEN AND CAFFEINE 50; 325; 40 MG/1; MG/1; MG/1
1 TABLET ORAL EVERY 4 HOURS PRN
Qty: 30 TABLET | Refills: 1 | Status: CANCELLED | OUTPATIENT
Start: 2021-12-08

## 2021-12-08 RX ORDER — BUTALBITAL, ACETAMINOPHEN AND CAFFEINE 50; 325; 40 MG/1; MG/1; MG/1
1 TABLET ORAL EVERY 4 HOURS PRN
COMMUNITY
Start: 2021-11-15 | End: 2021-12-09 | Stop reason: SDUPTHER

## 2021-12-08 RX ORDER — ONDANSETRON 4 MG/1
4 TABLET, FILM COATED ORAL EVERY 6 HOURS
Qty: 12 TABLET | Refills: 0 | Status: CANCELLED | OUTPATIENT
Start: 2021-12-08

## 2021-12-09 ENCOUNTER — PATIENT MESSAGE (OUTPATIENT)
Dept: FAMILY MEDICINE | Facility: CLINIC | Age: 31
End: 2021-12-09
Payer: MEDICAID

## 2021-12-09 DIAGNOSIS — G43.719 INTRACTABLE CHRONIC MIGRAINE WITHOUT AURA AND WITHOUT STATUS MIGRAINOSUS: Primary | ICD-10-CM

## 2021-12-13 ENCOUNTER — PATIENT MESSAGE (OUTPATIENT)
Dept: FAMILY MEDICINE | Facility: CLINIC | Age: 31
End: 2021-12-13
Payer: MEDICAID

## 2021-12-13 DIAGNOSIS — F32.A DEPRESSION, UNSPECIFIED DEPRESSION TYPE: ICD-10-CM

## 2021-12-13 RX ORDER — OXYCODONE AND ACETAMINOPHEN 5; 325 MG/1; MG/1
1 TABLET ORAL EVERY 4 HOURS PRN
Qty: 90 EACH | Refills: 0 | Status: CANCELLED | OUTPATIENT
Start: 2021-12-13

## 2021-12-13 RX ORDER — VENLAFAXINE HYDROCHLORIDE 150 MG/1
150 CAPSULE, EXTENDED RELEASE ORAL DAILY
Qty: 30 CAPSULE | Refills: 1 | Status: CANCELLED | OUTPATIENT
Start: 2021-12-13

## 2021-12-13 RX ORDER — FLUVOXAMINE MALEATE 100 MG/1
100 TABLET, COATED ORAL 3 TIMES DAILY
Qty: 90 TABLET | Refills: 1 | Status: CANCELLED | OUTPATIENT
Start: 2021-12-13

## 2021-12-15 ENCOUNTER — PATIENT MESSAGE (OUTPATIENT)
Dept: FAMILY MEDICINE | Facility: CLINIC | Age: 31
End: 2021-12-15
Payer: MEDICAID

## 2021-12-15 RX ORDER — BUTALBITAL, ACETAMINOPHEN AND CAFFEINE 50; 325; 40 MG/1; MG/1; MG/1
1 TABLET ORAL EVERY 6 HOURS PRN
Qty: 10 TABLET | Refills: 1 | Status: SHIPPED | OUTPATIENT
Start: 2021-12-15 | End: 2022-02-11 | Stop reason: SDUPTHER

## 2021-12-17 ENCOUNTER — TELEPHONE (OUTPATIENT)
Dept: FAMILY MEDICINE | Facility: CLINIC | Age: 31
End: 2021-12-17
Payer: MEDICAID

## 2021-12-17 DIAGNOSIS — F32.A DEPRESSION, UNSPECIFIED DEPRESSION TYPE: ICD-10-CM

## 2021-12-17 RX ORDER — VENLAFAXINE HYDROCHLORIDE 150 MG/1
150 CAPSULE, EXTENDED RELEASE ORAL DAILY
Qty: 30 CAPSULE | Refills: 2 | Status: SHIPPED | OUTPATIENT
Start: 2021-12-17 | End: 2023-10-23

## 2021-12-17 RX ORDER — FLUVOXAMINE MALEATE 100 MG/1
100 TABLET, COATED ORAL 3 TIMES DAILY
Qty: 90 TABLET | Refills: 2 | Status: SHIPPED | OUTPATIENT
Start: 2021-12-17 | End: 2023-10-23

## 2021-12-20 ENCOUNTER — OFFICE VISIT (OUTPATIENT)
Dept: FAMILY MEDICINE | Facility: CLINIC | Age: 31
End: 2021-12-20
Payer: MEDICAID

## 2021-12-20 VITALS
WEIGHT: 293 LBS | SYSTOLIC BLOOD PRESSURE: 132 MMHG | OXYGEN SATURATION: 97 % | BODY MASS INDEX: 48.82 KG/M2 | HEART RATE: 110 BPM | DIASTOLIC BLOOD PRESSURE: 90 MMHG | TEMPERATURE: 99 F | HEIGHT: 65 IN

## 2021-12-20 DIAGNOSIS — Z11.59 ENCOUNTER FOR HEPATITIS C SCREENING TEST FOR LOW RISK PATIENT: ICD-10-CM

## 2021-12-20 DIAGNOSIS — Z11.4 ENCOUNTER FOR SCREENING FOR HIV: ICD-10-CM

## 2021-12-20 DIAGNOSIS — J06.9 UPPER RESPIRATORY TRACT INFECTION, UNSPECIFIED TYPE: ICD-10-CM

## 2021-12-20 DIAGNOSIS — Z13.6 ENCOUNTER FOR LIPID SCREENING FOR CARDIOVASCULAR DISEASE: ICD-10-CM

## 2021-12-20 DIAGNOSIS — Z13.1 DIABETES MELLITUS SCREENING: ICD-10-CM

## 2021-12-20 DIAGNOSIS — G50.0 TRIGEMINAL NEURALGIA: Primary | ICD-10-CM

## 2021-12-20 DIAGNOSIS — I10 PRIMARY HYPERTENSION: ICD-10-CM

## 2021-12-20 DIAGNOSIS — Z13.220 ENCOUNTER FOR LIPID SCREENING FOR CARDIOVASCULAR DISEASE: ICD-10-CM

## 2021-12-20 DIAGNOSIS — Z13.0 SCREENING FOR DEFICIENCY ANEMIA: ICD-10-CM

## 2021-12-20 DIAGNOSIS — K21.9 GASTROESOPHAGEAL REFLUX DISEASE WITHOUT ESOPHAGITIS: ICD-10-CM

## 2021-12-20 PROCEDURE — 99214 PR OFFICE/OUTPT VISIT, EST, LEVL IV, 30-39 MIN: ICD-10-PCS | Mod: S$PBB,,, | Performed by: FAMILY MEDICINE

## 2021-12-20 PROCEDURE — 99214 OFFICE O/P EST MOD 30 MIN: CPT | Performed by: FAMILY MEDICINE

## 2021-12-20 PROCEDURE — 99214 OFFICE O/P EST MOD 30 MIN: CPT | Mod: S$PBB,,, | Performed by: FAMILY MEDICINE

## 2021-12-20 RX ORDER — LISINOPRIL AND HYDROCHLOROTHIAZIDE 10; 12.5 MG/1; MG/1
1 TABLET ORAL DAILY
Qty: 90 TABLET | Refills: 3 | Status: SHIPPED | OUTPATIENT
Start: 2021-12-20 | End: 2022-03-21

## 2021-12-20 RX ORDER — OXCARBAZEPINE 150 MG/1
150 TABLET, FILM COATED ORAL 2 TIMES DAILY
Qty: 60 TABLET | Refills: 11 | Status: SHIPPED | OUTPATIENT
Start: 2021-12-20 | End: 2022-07-05

## 2021-12-20 RX ORDER — OXYCODONE AND ACETAMINOPHEN 5; 325 MG/1; MG/1
1 TABLET ORAL EVERY 4 HOURS PRN
Qty: 90 EACH | Refills: 0 | Status: SHIPPED | OUTPATIENT
Start: 2021-12-20 | End: 2022-01-05 | Stop reason: SDUPTHER

## 2021-12-20 RX ORDER — PANTOPRAZOLE SODIUM 40 MG/1
40 TABLET, DELAYED RELEASE ORAL DAILY
Qty: 30 TABLET | Refills: 11 | Status: SHIPPED | OUTPATIENT
Start: 2021-12-20 | End: 2022-03-21

## 2021-12-20 RX ORDER — AZELASTINE 1 MG/ML
1 SPRAY, METERED NASAL 2 TIMES DAILY
Qty: 30 ML | Refills: 0 | Status: SHIPPED | OUTPATIENT
Start: 2021-12-20 | End: 2022-03-21

## 2022-01-02 ENCOUNTER — HOSPITAL ENCOUNTER (EMERGENCY)
Facility: HOSPITAL | Age: 32
Discharge: HOME | End: 2022-01-02
Attending: EMERGENCY MEDICINE
Payer: MEDICAID

## 2022-01-02 VITALS
OXYGEN SATURATION: 95 % | TEMPERATURE: 98 F | RESPIRATION RATE: 18 BRPM | HEART RATE: 103 BPM | SYSTOLIC BLOOD PRESSURE: 140 MMHG | DIASTOLIC BLOOD PRESSURE: 83 MMHG

## 2022-01-02 DIAGNOSIS — G43.909 MIGRAINE WITHOUT STATUS MIGRAINOSUS, NOT INTRACTABLE, UNSPECIFIED MIGRAINE TYPE: Primary | ICD-10-CM

## 2022-01-02 LAB
B-HCG UR QL: NEGATIVE
CTP QC/QA: YES

## 2022-01-02 PROCEDURE — 96374 THER/PROPH/DIAG INJ IV PUSH: CPT

## 2022-01-02 PROCEDURE — 81025 URINE PREGNANCY TEST: CPT | Performed by: EMERGENCY MEDICINE

## 2022-01-02 PROCEDURE — 96375 TX/PRO/DX INJ NEW DRUG ADDON: CPT

## 2022-01-02 PROCEDURE — 99284 EMERGENCY DEPT VISIT MOD MDM: CPT | Mod: 25

## 2022-01-02 PROCEDURE — 96376 TX/PRO/DX INJ SAME DRUG ADON: CPT

## 2022-01-02 PROCEDURE — 63600175 PHARM REV CODE 636 W HCPCS: Performed by: EMERGENCY MEDICINE

## 2022-01-02 RX ORDER — MORPHINE SULFATE 4 MG/ML
4 INJECTION, SOLUTION INTRAMUSCULAR; INTRAVENOUS
Status: COMPLETED | OUTPATIENT
Start: 2022-01-02 | End: 2022-01-02

## 2022-01-02 RX ORDER — ONDANSETRON 4 MG/1
4 TABLET, FILM COATED ORAL EVERY 8 HOURS PRN
Qty: 12 TABLET | Refills: 0 | Status: SHIPPED | OUTPATIENT
Start: 2022-01-02 | End: 2023-04-17

## 2022-01-02 RX ORDER — ONDANSETRON 2 MG/ML
4 INJECTION INTRAMUSCULAR; INTRAVENOUS
Status: COMPLETED | OUTPATIENT
Start: 2022-01-02 | End: 2022-01-02

## 2022-01-02 RX ADMIN — MORPHINE SULFATE 4 MG: 4 INJECTION INTRAVENOUS at 03:01

## 2022-01-02 RX ADMIN — ONDANSETRON 4 MG: 2 INJECTION INTRAMUSCULAR; INTRAVENOUS at 02:01

## 2022-01-02 RX ADMIN — MORPHINE SULFATE 4 MG: 4 INJECTION INTRAVENOUS at 02:01

## 2022-01-02 NOTE — ED PROVIDER NOTES
"Encounter Date: 1/2/2022    SCRIBE #1 NOTE: I, Kyle Mackay, am scribing for, and in the presence of, Oniel Grewal MD.       History     Chief Complaint   Patient presents with    Headache     Time seen by provider: 2:41 PM on 01/02/2022    Jasper Guerrero is a 31 y.o. female who presents to the ED with an onset of a migraine and "gagging" since yesterday. Patient states she has a hx of chronic migraines but states this migraine is much worse. She notes she is unable to "get it under control" with her home medications including Fioricet. The patient denies any fever, cough, congestion, CP, vision changes, speech changes, or any other symptoms at this time. No pertinent PSHx. Patient is not a smoker.       The history is provided by the patient.     Review of patient's allergies indicates:   Allergen Reactions    Benadryl [diphenhydramine hcl]      Paralysis on right side body     Codeine      Paralysis right body    Flexeril [cyclobenzaprine] Other (See Comments)     Numbness on right side of body    Nsaids (non-steroidal anti-inflammatory drug) Diarrhea and Nausea And Vomiting     Ulcers     Penicillins Anaphylaxis and Hives    Magnesium      "made me feel horrible"      Past Medical History:   Diagnosis Date    Agoraphobia     Carpal tunnel syndrome     Depression     DVT (deep venous thrombosis)     2019 had blood clot in right arm    Fibromyalgia     Migraine headache     Nerve injury 08/2016    Lingual Nerve Injury    Obese     PTSD (post-traumatic stress disorder)     Trigeminal neuralgia      Past Surgical History:   Procedure Laterality Date    CARPAL TUNNEL RELEASE Right 12/27/2018    Dr Lozada     CARPAL TUNNEL RELEASE Left 1/28/2020    Procedure: RELEASE, CARPAL TUNNEL;  Surgeon: Brendon Lozada Jr., MD;  [facility];  Service: Orthopedics;  Laterality: Left;    MOUTH SURGERY      WISDOM TOOTH EXTRACTION       Family History   Problem Relation Age of Onset    No Known " Problems Mother     No Known Problems Father     Cancer Maternal Aunt         endometrial    No Known Problems Maternal Uncle     No Known Problems Paternal Aunt     No Known Problems Paternal Uncle     No Known Problems Maternal Grandmother     No Known Problems Maternal Grandfather     No Known Problems Paternal Grandmother     No Known Problems Paternal Grandfather     No Known Problems Daughter     No Known Problems Son      Social History     Tobacco Use    Smoking status: Never Smoker    Smokeless tobacco: Never Used   Substance Use Topics    Alcohol use: No    Drug use: No     Review of Systems   Constitutional: Negative for chills and fever.   HENT: Negative for nosebleeds.    Eyes: Negative for visual disturbance.   Respiratory: Negative for cough and shortness of breath.    Cardiovascular: Negative for chest pain and palpitations.   Gastrointestinal: Negative for abdominal pain, diarrhea, nausea and vomiting.   Genitourinary: Negative for dysuria and hematuria.   Musculoskeletal: Negative for back pain and neck pain.   Skin: Negative for rash.   Neurological: Positive for headaches. Negative for seizures and syncope.       Physical Exam     Initial Vitals [01/02/22 1254]   BP Pulse Resp Temp SpO2   (!) 173/94 106 20 97.8 °F (36.6 °C) 97 %      MAP       --         Physical Exam    Nursing note and vitals reviewed.  Constitutional: She appears well-developed and well-nourished. She is not diaphoretic. No distress.   HENT:   Head: Normocephalic and atraumatic.   Eyes: EOM are normal. Pupils are equal, round, and reactive to light.   Neck: Neck supple.   Normal range of motion.  Cardiovascular: Normal rate, regular rhythm, normal heart sounds and intact distal pulses. Exam reveals no gallop and no friction rub.    No murmur heard.  Pulmonary/Chest: Breath sounds normal. No respiratory distress. She has no wheezes. She has no rhonchi. She has no rales.   Musculoskeletal:         General: Normal  range of motion.      Cervical back: Normal range of motion and neck supple.     Neurological: She is alert and oriented to person, place, and time. She has normal strength. No cranial nerve deficit or sensory deficit. GCS score is 15. GCS eye subscore is 4. GCS verbal subscore is 5. GCS motor subscore is 6.   Skin: Skin is warm and dry.   Psychiatric: She has a normal mood and affect. Her behavior is normal. Judgment and thought content normal.         ED Course   Procedures  Labs Reviewed   POCT URINE PREGNANCY          Imaging Results    None          Medications   ondansetron injection 4 mg (4 mg Intravenous Given 1/2/22 6509)   morphine injection 4 mg (4 mg Intravenous Given 1/2/22 1459)   morphine injection 4 mg (4 mg Intravenous Given 1/2/22 1538)     Medical Decision Making:   History:   Old Medical Records: I decided to obtain old medical records.  Initial Assessment:   31-year-old female presents with a headache.  Differential Diagnosis:   Initial differential diagnosis included but not limited to migraine headache, tension headache, and cluster headache.  Clinical Tests:   Lab Tests: Ordered and Reviewed  ED Management:  The patient was emergently evaluated in the emergency room, her evaluation was significant for a young female with a normal neurologic exam.  At this point time I believe that the patient is having 1 of her chronic migraine headaches.  The patient was treated with parental morphine and parental Zofran at her request.  The patient did request parental Dilaudid, however this is not indicated in this patient.  The patient is stable for discharge to home after treatment.  Her diagnosis is a non intractable headache.  She is to continue her home medications as previously prescribed and is to otherwise follow up with her PCP for further care.          Scribe Attestation:   Scribe #1: I performed the above scribed service and the documentation accurately describes the services I performed. I  attest to the accuracy of the note.              I, Dr. Oniel Grewal, personally performed the services described in this documentation. All medical record entries made by the scribe were at my direction and in my presence.  I have reviewed the chart and agree that the record reflects my personal performance and is accurate and complete. Oniel Grewal MD.  4:42 PM 01/02/2022      Clinical Impression:   Final diagnoses:  [G43.909] Migraine without status migrainosus, not intractable, unspecified migraine type (Primary)          ED Disposition Condition    Discharge Stable        ED Prescriptions     Medication Sig Dispense Start Date End Date Auth. Provider    ondansetron (ZOFRAN) 4 MG tablet Take 1 tablet (4 mg total) by mouth every 8 (eight) hours as needed for Nausea. 12 tablet 1/2/2022  Oniel Grewal MD        Follow-up Information     Follow up With Specialties Details Why Contact Info    Crow Kennedy MD Family Medicine Schedule an appointment as soon as possible for a visit   [address] [place] [address]  [address] [place] [id number]  [phone]             Oniel Grewal MD  01/02/22 3744

## 2022-01-03 ENCOUNTER — PATIENT MESSAGE (OUTPATIENT)
Dept: FAMILY MEDICINE | Facility: CLINIC | Age: 32
End: 2022-01-03
Payer: MEDICAID

## 2022-01-03 DIAGNOSIS — G50.0 TRIGEMINAL NEURALGIA: ICD-10-CM

## 2022-01-05 NOTE — TELEPHONE ENCOUNTER
Patient called in regards to refill of Clonazepam and Percocet (last prescription was sent on 12/20/21 for 90 tabs. She also has problems with excessive sweating and would like to know if you could prescribe a medication.  Percocet prescription pended with date of 01/20/22

## 2022-01-06 DIAGNOSIS — F41.9 ANXIETY: ICD-10-CM

## 2022-01-06 RX ORDER — CLONAZEPAM 1 MG/1
1 TABLET ORAL DAILY
Qty: 30 TABLET | Refills: 0 | Status: SHIPPED | OUTPATIENT
Start: 2022-01-06 | End: 2022-03-21

## 2022-01-06 NOTE — TELEPHONE ENCOUNTER
Patient last seen on 12/3/21  Clonazepam prescribed 12/3/21. She was given 30 with no refills.  Patient is requesting a refill for Clonazepam.

## 2022-01-08 ENCOUNTER — HOSPITAL ENCOUNTER (EMERGENCY)
Facility: HOSPITAL | Age: 32
Discharge: HOME | End: 2022-01-08
Attending: EMERGENCY MEDICINE
Payer: MEDICAID

## 2022-01-08 VITALS
WEIGHT: 293 LBS | BODY MASS INDEX: 48.82 KG/M2 | HEIGHT: 65 IN | HEART RATE: 100 BPM | OXYGEN SATURATION: 100 % | DIASTOLIC BLOOD PRESSURE: 77 MMHG | TEMPERATURE: 98 F | SYSTOLIC BLOOD PRESSURE: 126 MMHG | RESPIRATION RATE: 16 BRPM

## 2022-01-08 DIAGNOSIS — G43.009 MIGRAINE WITHOUT AURA AND WITHOUT STATUS MIGRAINOSUS, NOT INTRACTABLE: Primary | ICD-10-CM

## 2022-01-08 PROCEDURE — 63600175 PHARM REV CODE 636 W HCPCS: Performed by: EMERGENCY MEDICINE

## 2022-01-08 PROCEDURE — 99284 EMERGENCY DEPT VISIT MOD MDM: CPT | Mod: 25

## 2022-01-08 PROCEDURE — 96374 THER/PROPH/DIAG INJ IV PUSH: CPT

## 2022-01-08 RX ORDER — KETOROLAC TROMETHAMINE 30 MG/ML
30 INJECTION, SOLUTION INTRAMUSCULAR; INTRAVENOUS
Status: DISCONTINUED | OUTPATIENT
Start: 2022-01-08 | End: 2022-01-08

## 2022-01-08 RX ORDER — ACETAMINOPHEN 325 MG/1
650 TABLET ORAL
Status: DISCONTINUED | OUTPATIENT
Start: 2022-01-08 | End: 2022-01-08

## 2022-01-08 RX ORDER — BUTALBITAL, ACETAMINOPHEN AND CAFFEINE 50; 325; 40 MG/1; MG/1; MG/1
1 TABLET ORAL
Status: DISCONTINUED | OUTPATIENT
Start: 2022-01-08 | End: 2022-01-08 | Stop reason: HOSPADM

## 2022-01-08 RX ORDER — DROPERIDOL 2.5 MG/ML
2.5 INJECTION, SOLUTION INTRAMUSCULAR; INTRAVENOUS
Status: DISCONTINUED | OUTPATIENT
Start: 2022-01-08 | End: 2022-01-08

## 2022-01-08 RX ORDER — METOCLOPRAMIDE HYDROCHLORIDE 5 MG/ML
10 INJECTION INTRAMUSCULAR; INTRAVENOUS
Status: DISCONTINUED | OUTPATIENT
Start: 2022-01-08 | End: 2022-01-08 | Stop reason: HOSPADM

## 2022-01-08 RX ORDER — DEXAMETHASONE SODIUM PHOSPHATE 4 MG/ML
8 INJECTION, SOLUTION INTRA-ARTICULAR; INTRALESIONAL; INTRAMUSCULAR; INTRAVENOUS; SOFT TISSUE
Status: COMPLETED | OUTPATIENT
Start: 2022-01-08 | End: 2022-01-08

## 2022-01-08 RX ADMIN — DEXAMETHASONE SODIUM PHOSPHATE 8 MG: 4 INJECTION, SOLUTION INTRA-ARTICULAR; INTRALESIONAL; INTRAMUSCULAR; INTRAVENOUS; SOFT TISSUE at 05:01

## 2022-01-08 NOTE — ED PROVIDER NOTES
"Encounter Date: 1/8/2022    SCRIBE #1 NOTE: IKyle, gomez scribing for, and in the presence of, Scott Parr III, MD.       History     Chief Complaint   Patient presents with    Headache     S/S since yesterday; attempted tx with Fioricet to no avail     Time seen by provider: 5:04 PM on 01/08/2022    Jasper Guerrero is a 31 y.o. female who presents to the ED with an onset of migraines since yesterday. Patient reports she has a hx of migraines and notes she has been unable to get pain "under control" today. She affirms being nauseated at this time. Patient was seen at the ED last week for similar symptoms. The patient denies any fever, visual problems, speech issues, or any other symptoms at this time. No pertinent PMHx or PSHx. Patient is not a smoker.       The history is provided by the patient.     Review of patient's allergies indicates:   Allergen Reactions    Benadryl [diphenhydramine hcl]      Paralysis on right side body     Codeine      Paralysis right body    Flexeril [cyclobenzaprine] Other (See Comments)     Numbness on right side of body    Nsaids (non-steroidal anti-inflammatory drug) Diarrhea and Nausea And Vomiting     Ulcers     Penicillins Anaphylaxis and Hives    Magnesium      "made me feel horrible"      Past Medical History:   Diagnosis Date    Agoraphobia     Carpal tunnel syndrome     Depression     DVT (deep venous thrombosis)     2019 had blood clot in right arm    Fibromyalgia     Migraine headache     Nerve injury 08/2016    Lingual Nerve Injury    Obese     PTSD (post-traumatic stress disorder)     Trigeminal neuralgia      Past Surgical History:   Procedure Laterality Date    CARPAL TUNNEL RELEASE Right 12/27/2018    Dr Lozada     CARPAL TUNNEL RELEASE Left 1/28/2020    Procedure: RELEASE, CARPAL TUNNEL;  Surgeon: Brendon Lozada Jr., MD;  Location: [facility];  Service: Orthopedics;  Laterality: Left;    MOUTH SURGERY      WISDOM TOOTH EXTRACTION   "     Family History   Problem Relation Age of Onset    No Known Problems Mother     No Known Problems Father     Cancer Maternal Aunt         endometrial    No Known Problems Maternal Uncle     No Known Problems Paternal Aunt     No Known Problems Paternal Uncle     No Known Problems Maternal Grandmother     No Known Problems Maternal Grandfather     No Known Problems Paternal Grandmother     No Known Problems Paternal Grandfather     No Known Problems Daughter     No Known Problems Son      Social History     Tobacco Use    Smoking status: Never Smoker    Smokeless tobacco: Never Used   Substance Use Topics    Alcohol use: No    Drug use: No     Review of Systems   Constitutional: Negative for activity change, appetite change, chills, fatigue and fever.   Eyes: Negative for visual disturbance.   Respiratory: Negative for apnea and shortness of breath.    Cardiovascular: Negative for chest pain and palpitations.   Gastrointestinal: Positive for nausea. Negative for abdominal distention and abdominal pain.   Genitourinary: Negative for difficulty urinating.   Musculoskeletal: Negative for neck pain.   Skin: Negative for pallor and rash.   Neurological: Positive for headaches.   Hematological: Does not bruise/bleed easily.   Psychiatric/Behavioral: Negative for agitation.       Physical Exam     Initial Vitals [01/08/22 1648]   BP Pulse Resp Temp SpO2   121/68 (!) 112 18 97.6 °F (36.4 °C) 98 %      MAP       --         Physical Exam    Nursing note and vitals reviewed.  Constitutional: She appears well-developed and well-nourished.   HENT:   Head: Normocephalic and atraumatic.   Temporal and occipital tenderness.    Eyes: Conjunctivae are normal.   Neck: Neck supple.   Normal range of motion.  Cardiovascular: Normal rate, regular rhythm and normal heart sounds. Exam reveals no gallop and no friction rub.    No murmur heard.  Pulmonary/Chest: Effort normal and breath sounds normal. No respiratory  distress. She has no wheezes. She has no rhonchi. She has no rales.   Musculoskeletal:         General: Normal range of motion.      Cervical back: Normal range of motion and neck supple.     Neurological: She is alert and oriented to person, place, and time.   Skin: Skin is warm and dry. No erythema.   Psychiatric: She has a normal mood and affect.         ED Course   Procedures  Labs Reviewed - No data to display       Imaging Results    None          Medications   butalbital-acetaminophen-caffeine -40 mg per tablet 1 tablet (1 tablet Oral Not Given 1/8/22 1715)   metoclopramide HCl injection 10 mg (10 mg Intravenous Not Given 1/8/22 1900)   dexamethasone injection 8 mg (8 mg Intravenous Given 1/8/22 1756)     Medical Decision Making:   History:   Old Medical Records: I decided to obtain old medical records.  ED Management:  31-year-old female with a history of frequent emergency department visits for headaches presents with a bilateral frontal headache.  She is prescribed chronic opiates for ongoing pain in his previously received parental opiates for her migraines.  I have informed her that I am unwilling to administer opiates as an initial treatment.  She refuses all other treatment options and wishes to be discharged.  I do not suspect intracranial hemorrhage or infectious process.       APC / Resident Notes:   I, Dr. Scott Parr III, personally performed the services described in this documentation. All medical record entries made by the scribe were at my direction and in my presence.  I have reviewed the chart and agree that the record reflects my personal performance and is accurate and complete     Scribe Attestation:   Scribe #1: I performed the above scribed service and the documentation accurately describes the services I performed. I attest to the accuracy of the note.                 Clinical Impression:   Final diagnoses:  [G43.009] Migraine without aura and without status migrainosus, not  intractable (Primary)          ED Disposition Condition    Discharge Stable        ED Prescriptions     None        Follow-up Information     Follow up With Specialties Details Why Contact Info    Crow Kennedy MD Family Medicine In 1 day  [address] [facility] [facility]  [address]  [facility] [address]  [phone]             Scott Parr III, MD  01/08/22 6430

## 2022-01-09 ENCOUNTER — HOSPITAL ENCOUNTER (EMERGENCY)
Facility: HOSPITAL | Age: 32
Discharge: HOME | End: 2022-01-09
Attending: EMERGENCY MEDICINE
Payer: MEDICAID

## 2022-01-09 VITALS
WEIGHT: 293 LBS | RESPIRATION RATE: 20 BRPM | OXYGEN SATURATION: 98 % | TEMPERATURE: 98 F | HEIGHT: 65 IN | SYSTOLIC BLOOD PRESSURE: 139 MMHG | BODY MASS INDEX: 48.82 KG/M2 | DIASTOLIC BLOOD PRESSURE: 78 MMHG | HEART RATE: 121 BPM

## 2022-01-09 DIAGNOSIS — G43.809 OTHER MIGRAINE WITHOUT STATUS MIGRAINOSUS, NOT INTRACTABLE: Primary | ICD-10-CM

## 2022-01-09 LAB — SARS-COV-2 RDRP RESP QL NAA+PROBE: NEGATIVE

## 2022-01-09 PROCEDURE — 99282 EMERGENCY DEPT VISIT SF MDM: CPT

## 2022-01-09 PROCEDURE — U0002 COVID-19 LAB TEST NON-CDC: HCPCS | Performed by: EMERGENCY MEDICINE

## 2022-01-09 NOTE — ED NOTES
Pt presents to ED c/o migraine. Pt states she was seen at [facility] ED last week for a migraine and was treated w/ morphine and zofran which helped. Yesterday she was seen again at [facility] ED for migraine and was treated with decadron which did not help symptoms. Pt states she cannot take any NSAIDS due to ulcers. She takes fioricet and reglan at home w/ no relief in symptoms and says compazine does not work. Pt states only morphine and zofran has been able to control the symptoms.    Pt states she's anxious, c/o headache and trigeminal nerval pain, rated 9/10 w/ nothing making it better or worse. Pt is tachycardic w/ otherwise stable vital signs, is in no distress, and is AOx4.

## 2022-01-10 RX ORDER — OXYCODONE AND ACETAMINOPHEN 5; 325 MG/1; MG/1
1 TABLET ORAL EVERY 4 HOURS PRN
Qty: 90 EACH | Refills: 0 | Status: SHIPPED | OUTPATIENT
Start: 2022-01-20 | End: 2022-01-31 | Stop reason: SDUPTHER

## 2022-01-10 NOTE — ED PROVIDER NOTES
"Encounter Date: 1/9/2022       History     Chief Complaint   Patient presents with    Migraine     Constant migraine since last night, went to [facility] last night with no relief      Presents with complaint of " migraine HA"  Pt has history of same.  Pt was seen at [facility] on 1/2 and given Morphine and phenergan for her HA with good relief  She returned yesterday to [facility] and was denied Morphine She is here requesting Morphine as this is the only thing that helps her HA  She has fioricet at home but states she has taken it without relief Denies emesis or blurred vision         Review of patient's allergies indicates:   Allergen Reactions    Benadryl [diphenhydramine hcl]      Paralysis on right side body     Codeine      Paralysis right body    Flexeril [cyclobenzaprine] Other (See Comments)     Numbness on right side of body    Nsaids (non-steroidal anti-inflammatory drug) Diarrhea and Nausea And Vomiting     Ulcers     Penicillins Anaphylaxis and Hives    Magnesium      "made me feel horrible"      Past Medical History:   Diagnosis Date    Agoraphobia     Carpal tunnel syndrome     Depression     DVT (deep venous thrombosis)     2019 had blood clot in right arm    Fibromyalgia     Migraine headache     Nerve injury 08/2016    Lingual Nerve Injury    Obese     PTSD (post-traumatic stress disorder)     Trigeminal neuralgia      Past Surgical History:   Procedure Laterality Date    CARPAL TUNNEL RELEASE Right 12/27/2018    Dr Lozada     CARPAL TUNNEL RELEASE Left 1/28/2020    Procedure: RELEASE, CARPAL TUNNEL;  Surgeon: Brendon Lozada Jr., MD;  Location: [facility];  Service: Orthopedics;  Laterality: Left;    MOUTH SURGERY      WISDOM TOOTH EXTRACTION       Family History   Problem Relation Age of Onset    No Known Problems Mother     No Known Problems Father     Cancer Maternal Aunt         endometrial    No Known Problems Maternal Uncle     No Known Problems Paternal Aunt     No Known " Problems Paternal Uncle     No Known Problems Maternal Grandmother     No Known Problems Maternal Grandfather     No Known Problems Paternal Grandmother     No Known Problems Paternal Grandfather     No Known Problems Daughter     No Known Problems Son      Social History     Tobacco Use    Smoking status: Never Smoker    Smokeless tobacco: Never Used   Substance Use Topics    Alcohol use: No    Drug use: No     Review of Systems   Constitutional: Negative for fever.   HENT: Negative for congestion.    Respiratory: Negative for cough, shortness of breath and wheezing.    Cardiovascular: Negative for chest pain, palpitations and leg swelling.   Gastrointestinal: Negative for abdominal pain, diarrhea, nausea and vomiting.   Genitourinary: Negative for dysuria.   Musculoskeletal: Negative for back pain.   Skin: Negative for rash.   Neurological: Positive for headaches. Negative for dizziness and weakness.       Physical Exam     Initial Vitals [01/09/22 1304]   BP Pulse Resp Temp SpO2   139/78 (!) 121 20 98 °F (36.7 °C) 98 %      MAP       --         Physical Exam    Constitutional: She appears well-developed and well-nourished.   HENT:   Head: Normocephalic and atraumatic.   Mouth/Throat: Oropharynx is clear and moist.   Eyes: Conjunctivae are normal.   Neck: Neck supple.   Normal range of motion.  Cardiovascular: Normal rate, regular rhythm and normal heart sounds.   Pulmonary/Chest: Breath sounds normal. No respiratory distress. She has no wheezes. She has no rhonchi.   Musculoskeletal:         General: Normal range of motion.      Cervical back: Normal range of motion and neck supple.     Neurological: She is alert and oriented to person, place, and time. No sensory deficit. GCS score is 15. GCS eye subscore is 4. GCS verbal subscore is 5. GCS motor subscore is 6.   Skin: Skin is warm and dry. Capillary refill takes less than 2 seconds.   Psychiatric: She has a normal mood and affect. Thought content  "normal.         ED Course   Procedures  Labs Reviewed   SARS-COV-2 RNA AMPLIFICATION, QUAL          Imaging Results    None          Medications - No data to display  Medical Decision Making:   Initial Assessment:   Presents with complaint of " migraine HA"  Pt was seen at [facility] 1/2 and given Morphine which helped resolve her HA  She presents again yesterday to same facility and was then denied Morphine   ED Management:  Pt was again denies Morphine for her HA I have explained to this pt that this is not the appropriate treatment for Migraine's and that it can cause rebound HA.  I wanted to given her toradol compazine and benadryl with NS bolus  She is allergic to benadryl and NSAID's We had a long discussion regarding the red flags that her allergies are  I have also explained that I would not give narcotics for a HA as it is not appropriate.  I offered Fioricet and fluids but she refused. This pt left without treatment  She was very cooperative and polite even though she did not get the treatment she requested.    Have discussed this pt with Dr. Combs                       Clinical Impression:   Final diagnoses:  [G45.739] Other migraine without status migrainosus, not intractable (Primary)          ED Disposition Condition    LEXIE Mcfarland NP  01/09/22 4882    "

## 2022-01-11 ENCOUNTER — PATIENT MESSAGE (OUTPATIENT)
Dept: FAMILY MEDICINE | Facility: CLINIC | Age: 32
End: 2022-01-11
Payer: MEDICAID

## 2022-01-18 ENCOUNTER — PATIENT MESSAGE (OUTPATIENT)
Dept: FAMILY MEDICINE | Facility: CLINIC | Age: 32
End: 2022-01-18
Payer: MEDICAID

## 2022-01-24 ENCOUNTER — PATIENT MESSAGE (OUTPATIENT)
Dept: FAMILY MEDICINE | Facility: CLINIC | Age: 32
End: 2022-01-24
Payer: MEDICAID

## 2022-01-25 ENCOUNTER — PATIENT MESSAGE (OUTPATIENT)
Dept: FAMILY MEDICINE | Facility: CLINIC | Age: 32
End: 2022-01-25
Payer: MEDICAID

## 2022-01-29 ENCOUNTER — PATIENT MESSAGE (OUTPATIENT)
Dept: FAMILY MEDICINE | Facility: CLINIC | Age: 32
End: 2022-01-29
Payer: MEDICAID

## 2022-01-29 DIAGNOSIS — G50.0 TRIGEMINAL NEURALGIA: ICD-10-CM

## 2022-01-31 ENCOUNTER — HOSPITAL ENCOUNTER (EMERGENCY)
Facility: HOSPITAL | Age: 32
Discharge: HOME | End: 2022-01-31
Attending: EMERGENCY MEDICINE
Payer: MEDICAID

## 2022-01-31 VITALS
RESPIRATION RATE: 18 BRPM | WEIGHT: 293 LBS | OXYGEN SATURATION: 97 % | SYSTOLIC BLOOD PRESSURE: 147 MMHG | DIASTOLIC BLOOD PRESSURE: 78 MMHG | TEMPERATURE: 98 F | BODY MASS INDEX: 48.82 KG/M2 | HEIGHT: 65 IN | HEART RATE: 120 BPM

## 2022-01-31 DIAGNOSIS — R51.9 CHRONIC FACIAL PAIN: Primary | ICD-10-CM

## 2022-01-31 DIAGNOSIS — G89.29 CHRONIC FACIAL PAIN: Primary | ICD-10-CM

## 2022-01-31 PROCEDURE — 99283 EMERGENCY DEPT VISIT LOW MDM: CPT

## 2022-01-31 PROCEDURE — 25000003 PHARM REV CODE 250: Performed by: EMERGENCY MEDICINE

## 2022-01-31 RX ORDER — OXYCODONE AND ACETAMINOPHEN 5; 325 MG/1; MG/1
2 TABLET ORAL
Status: COMPLETED | OUTPATIENT
Start: 2022-01-31 | End: 2022-01-31

## 2022-01-31 RX ADMIN — OXYCODONE AND ACETAMINOPHEN 2 TABLET: 5; 325 TABLET ORAL at 09:01

## 2022-01-31 NOTE — FIRST PROVIDER EVALUATION
" Emergency Department TeleTriage Encounter Note      CHIEF COMPLAINT    Chief Complaint   Patient presents with    Headache       VITAL SIGNS   Initial Vitals [01/31/22 1704]   BP Pulse Resp Temp SpO2   (!) 177/95 (!) 131 20 98.2 °F (36.8 °C) 98 %      MAP       --            ALLERGIES    Review of patient's allergies indicates:   Allergen Reactions    Benadryl [diphenhydramine hcl]      Paralysis on right side body     Codeine      Paralysis right body    Flexeril [cyclobenzaprine] Other (See Comments)     Numbness on right side of body    Nsaids (non-steroidal anti-inflammatory drug) Diarrhea and Nausea And Vomiting     Ulcers     Penicillins Anaphylaxis and Hives    Magnesium      "made me feel horrible"        PROVIDER TRIAGE NOTE  This is a teletriage evaluation of a 31 y.o. female presenting to the ED complaining of flare-up of Trigeminal Neuralgia and associated headache.  She has taken OTC medication with no improvement.  No fever, no neck pain.      Initial orders will be placed and care will be transferred to an alternate provider when patient is roomed for a full evaluation. Any additional orders and the final disposition will be determined by that provider.         ORDERS  Labs Reviewed - No data to display    ED Orders (720h ago, onward)    None            Virtual Visit Note: The provider triage portion of this emergency department evaluation and documentation was performed via [employer], a HIPAA-compliant telemedicine application, in concert with a tele-presenter in the room. A face to face patient evaluation with one of my colleagues will occur once the patient is placed in an emergency department room.      DISCLAIMER: This note was prepared with [employer] voice recognition transcription software. Garbled syntax, mangled pronouns, and other bizarre constructions may be attributed to that software system.  "

## 2022-01-31 NOTE — TELEPHONE ENCOUNTER
Pt asking for pain script to be sent to the pharmacy for next months fill, dated 2/19 due to Jan having 31 days.

## 2022-02-01 NOTE — ED PROVIDER NOTES
"Encounter Date: 1/31/2022       History     Chief Complaint   Patient presents with    Headache     Patient presents with   Facial Pain      IM HAVING A TRIGEMINAL NEUROULGIA FLARE UP     30 year old female with anxiety, depression, PTSD, OCD and trigeminal neuralgia presents to the ER today with complaints of new onset right sided facial pain, nausea, vomiting and diarrhea that began today just upon waking up. She reports she has a hx of trigeminal neuralgia that she began to have in 2016 ever since her lingual nerve was severed as a compilation from a right lower wisdom tooth extraction procedure. Ever since 2016, she has suffered with chronic "nerve pain" that intermittently exacerbates. She reports today it was cold outside, and her pain exacerbation was noticed right upon waking up. She took her prescribed Norco 5 today and reports no relief of her symptoms. She states her pain continued and was so bad that it made her sick, with several episodes of vomiting and watery diarrhea that she believes to be associated with her pain exacerbation. Denies abdominal pain, flank pain, urinary complaints, headache, blood in stool, fever, dental pain, facial swelling or other symptoms. She reports she has a neurologist, but does not have an appointment to see until December. Also has an upcomming oral surgeon appointment to further discuss ongoing management options with her chronic exacerbations of trigeminal neurologia. She reports she takes Norco 5 TID daily for her chronic pain and was told if her pain exacerbates, she should take OTC tylenol. She has previously had MRI, MRA, and MRV to further evaluate her symptoms. Also reports she has tried Tegretol, oral lidocaine, topical lidocaine, botox, NSAIDS without releif of her symptoms.      The above-note is  from a visti  November 14 2021    Chief complaint today is right facial pain.  The pain is  not responding to her normal Percocet pain medicine and Oxycarbazapine " "medicine.  She states when this happens she usually comes to the emergency room gets either morphine shot or Dilaudid shot to help her with her chronic right-sided facial pain in the cheek area.  She states she has pain every day in that area but it occasional gets worse with sharp intermittent pains that she describes.  She has been diagnosed with what she says is trigeminal neuralgia.  All this started from an operation 2016 as noted in the previous visitation.  As I walk into the room the patient appears to be in no distress whatsoever.        Review of patient's allergies indicates:   Allergen Reactions    Benadryl [diphenhydramine hcl]      Paralysis on right side body     Codeine      Paralysis right body    Flexeril [cyclobenzaprine] Other (See Comments)     Numbness on right side of body    Nsaids (non-steroidal anti-inflammatory drug) Diarrhea and Nausea And Vomiting     Ulcers     Penicillins Anaphylaxis and Hives    Magnesium      "made me feel horrible"      Past Medical History:   Diagnosis Date    Agoraphobia     Carpal tunnel syndrome     Depression     DVT (deep venous thrombosis)     2019 had blood clot in right arm    Fibromyalgia     Migraine headache     Nerve injury 08/2016    Lingual Nerve Injury    Obese     PTSD (post-traumatic stress disorder)     Trigeminal neuralgia      Past Surgical History:   Procedure Laterality Date    CARPAL TUNNEL RELEASE Right 12/27/2018    Dr Lozada     CARPAL TUNNEL RELEASE Left 1/28/2020    Procedure: RELEASE, CARPAL TUNNEL;  Surgeon: Brendon Lozada Jr., MD;  Location: [facility];  Service: Orthopedics;  Laterality: Left;    MOUTH SURGERY      WISDOM TOOTH EXTRACTION       Family History   Problem Relation Age of Onset    No Known Problems Mother     No Known Problems Father     Cancer Maternal Aunt         endometrial    No Known Problems Maternal Uncle     No Known Problems Paternal Aunt     No Known Problems Paternal Uncle     No Known " Problems Maternal Grandmother     No Known Problems Maternal Grandfather     No Known Problems Paternal Grandmother     No Known Problems Paternal Grandfather     No Known Problems Daughter     No Known Problems Son      Social History     Tobacco Use    Smoking status: Never Smoker    Smokeless tobacco: Never Used   Substance Use Topics    Alcohol use: No    Drug use: No     Review of Systems   Constitutional: Negative for chills and fever.   HENT: Negative for ear pain, rhinorrhea and sore throat.    Eyes: Negative for pain and visual disturbance.   Respiratory: Negative for cough and shortness of breath.    Cardiovascular: Negative for chest pain and palpitations.   Gastrointestinal: Negative for abdominal pain, constipation, diarrhea, nausea and vomiting.   Genitourinary: Negative for dysuria, frequency, hematuria and urgency.   Musculoskeletal: Negative for back pain, joint swelling and myalgias.   Skin: Negative for rash.   Neurological: Negative for dizziness, seizures, weakness and headaches.         The patient has slight decreased sensation to the right cheek compared to left and this is chronic in nature.  No facial droop noted.   Psychiatric/Behavioral: Negative for dysphoric mood. The patient is not nervous/anxious.        Physical Exam     Initial Vitals [01/31/22 1704]   BP Pulse Resp Temp SpO2   (!) 177/95 (!) 131 20 98.2 °F (36.8 °C) 98 %      MAP       --         Physical Exam    Nursing note and vitals reviewed.  Constitutional: She appears well-developed and well-nourished.   HENT:   Head: Normocephalic and atraumatic.   Eyes: Conjunctivae, EOM and lids are normal. Pupils are equal, round, and reactive to light.   Neck: Trachea normal. Neck supple. No thyroid mass and no thyromegaly present.   Normal range of motion.  Cardiovascular: Normal rate, regular rhythm and normal heart sounds.   Pulmonary/Chest: Effort normal and breath sounds normal.   Abdominal: Abdomen is soft. Bowel sounds  are normal. There is no abdominal tenderness.   Musculoskeletal:         General: Normal range of motion.      Cervical back: Normal range of motion and neck supple.     Neurological: She is alert and oriented to person, place, and time. She has normal strength and normal reflexes. No cranial nerve deficit or sensory deficit.   Slight decreased sensation right cheek.  No facial droop.   Skin: Skin is warm and dry.   Psychiatric: She has a normal mood and affect. Her speech is normal and behavior is normal. Judgment and thought content normal.         ED Course   Procedures  Labs Reviewed - No data to display       Imaging Results    None          Medications   oxyCODONE-acetaminophen 5-325 mg per tablet 2 tablet (2 tablets Oral Given 1/31/22 2152)     Medical Decision Making:   ED Management:  Her heart rate on ever evaluation in the ER as always approximately 110. I elected not to give her any morphine or Dilaudid as she requested.  I gave her a 10 mg Percocet discharge her with instructions to follow-up with her doctor.  She can increase her Percocet 5 mg to 10 mg dose if need be for the a short term.  She also can adjust her trigeminal neuralgia medicine as well.  I want her to see her doctor and make those adjustments.                      Clinical Impression:   Final diagnoses:  [R51.9, G89.29] Chronic facial pain (Primary)          ED Disposition Condition    Discharge Stable        ED Prescriptions     None        Follow-up Information    None          Tracy Dunn MD  01/31/22 3615       Tracy Dunn MD  03/01/22 2823

## 2022-02-01 NOTE — ED NOTES
"Pt states her HR is high due to the "pain and anxiety." Pt is requesting morphine and zofran for headache/nausea.   "

## 2022-02-01 NOTE — DISCHARGE INSTRUCTIONS
For pain control you may increase her Percocet to 10 mg every 6 hours temporarily.  Also you can increase the oxycarbazapine  as well to 450 mg a day.  You can actually increase your oxycarbazepine  up to 0546-1446 mg a day but do this with the permission and guidance of your family physician who has you on that medicine.

## 2022-02-02 ENCOUNTER — PATIENT MESSAGE (OUTPATIENT)
Dept: FAMILY MEDICINE | Facility: CLINIC | Age: 32
End: 2022-02-02
Payer: MEDICAID

## 2022-02-02 RX ORDER — OXYCODONE AND ACETAMINOPHEN 5; 325 MG/1; MG/1
1 TABLET ORAL EVERY 4 HOURS PRN
Qty: 90 EACH | Refills: 0 | Status: SHIPPED | OUTPATIENT
Start: 2022-02-19 | End: 2022-03-12 | Stop reason: SDUPTHER

## 2022-02-09 DIAGNOSIS — G43.719 INTRACTABLE CHRONIC MIGRAINE WITHOUT AURA AND WITHOUT STATUS MIGRAINOSUS: ICD-10-CM

## 2022-02-10 ENCOUNTER — PATIENT MESSAGE (OUTPATIENT)
Dept: FAMILY MEDICINE | Facility: CLINIC | Age: 32
End: 2022-02-10
Payer: MEDICAID

## 2022-02-11 DIAGNOSIS — G43.719 INTRACTABLE CHRONIC MIGRAINE WITHOUT AURA AND WITHOUT STATUS MIGRAINOSUS: ICD-10-CM

## 2022-02-11 RX ORDER — BUTALBITAL, ACETAMINOPHEN AND CAFFEINE 50; 325; 40 MG/1; MG/1; MG/1
1 TABLET ORAL EVERY 6 HOURS PRN
Qty: 10 TABLET | Refills: 1 | Status: SHIPPED | OUTPATIENT
Start: 2022-02-11 | End: 2022-04-05 | Stop reason: SDUPTHER

## 2022-02-11 NOTE — TELEPHONE ENCOUNTER
Spoke to patient regarding appointment and offered same day appointment with Dr. Chase. Patient refused and said she wants to wait until Monday and just have Dr. Kennedy call in antibiotic. Advised patient that if infection worsens please go to urgent care or emergency room. Voiced understanding.

## 2022-02-12 RX ORDER — BUTALBITAL, ACETAMINOPHEN AND CAFFEINE 50; 325; 40 MG/1; MG/1; MG/1
1 TABLET ORAL EVERY 6 HOURS PRN
Qty: 10 TABLET | Refills: 1 | OUTPATIENT
Start: 2022-02-12

## 2022-02-13 ENCOUNTER — HOSPITAL ENCOUNTER (EMERGENCY)
Facility: HOSPITAL | Age: 32
Discharge: HOME | End: 2022-02-13
Attending: EMERGENCY MEDICINE
Payer: MEDICAID

## 2022-02-13 VITALS
OXYGEN SATURATION: 98 % | TEMPERATURE: 98 F | RESPIRATION RATE: 20 BRPM | WEIGHT: 293 LBS | HEART RATE: 103 BPM | BODY MASS INDEX: 48.82 KG/M2 | HEIGHT: 65 IN | DIASTOLIC BLOOD PRESSURE: 89 MMHG | SYSTOLIC BLOOD PRESSURE: 139 MMHG

## 2022-02-13 DIAGNOSIS — G89.29 CHRONIC FACIAL PAIN: Primary | ICD-10-CM

## 2022-02-13 DIAGNOSIS — R51.9 CHRONIC FACIAL PAIN: Primary | ICD-10-CM

## 2022-02-13 PROCEDURE — 99281 EMR DPT VST MAYX REQ PHY/QHP: CPT

## 2022-02-13 NOTE — ED PROVIDER NOTES
"Encounter Date: 2/13/2022       History     Chief Complaint   Patient presents with    Facial Pain     31-year-old female with chronic pain presents to the ER with right-sided facial pain.  She reports that she is out of her pain medication.  She reports that she typically gets morphine or Dilaudid in the emergency room.  Facial pain is chronic.  No other complaints.    The history is provided by the patient.     Review of patient's allergies indicates:   Allergen Reactions    Benadryl [diphenhydramine hcl]      Paralysis on right side body     Codeine      Paralysis right body    Flexeril [cyclobenzaprine] Other (See Comments)     Numbness on right side of body    Nsaids (non-steroidal anti-inflammatory drug) Diarrhea and Nausea And Vomiting     Ulcers     Penicillins Anaphylaxis and Hives    Magnesium      "made me feel horrible"      Past Medical History:   Diagnosis Date    Agoraphobia     Carpal tunnel syndrome     Depression     DVT (deep venous thrombosis)     2019 had blood clot in right arm    Fibromyalgia     Migraine headache     Nerve injury 08/2016    Lingual Nerve Injury    Obese     PTSD (post-traumatic stress disorder)     Trigeminal neuralgia      Past Surgical History:   Procedure Laterality Date    CARPAL TUNNEL RELEASE Right 12/27/2018    Dr Lozada     CARPAL TUNNEL RELEASE Left 1/28/2020    Procedure: RELEASE, CARPAL TUNNEL;  Surgeon: Brendon Lozada Jr., MD;  Location: [facility];  Service: Orthopedics;  Laterality: Left;    MOUTH SURGERY      WISDOM TOOTH EXTRACTION       Family History   Problem Relation Age of Onset    No Known Problems Mother     No Known Problems Father     Cancer Maternal Aunt         endometrial    No Known Problems Maternal Uncle     No Known Problems Paternal Aunt     No Known Problems Paternal Uncle     No Known Problems Maternal Grandmother     No Known Problems Maternal Grandfather     No Known Problems Paternal Grandmother     No Known " Problems Paternal Grandfather     No Known Problems Daughter     No Known Problems Son      Social History     Tobacco Use    Smoking status: Never Smoker    Smokeless tobacco: Never Used   Substance Use Topics    Alcohol use: No    Drug use: No     Review of Systems   Constitutional: Negative for fever.   HENT:        Right facial pain   Musculoskeletal: Negative for neck pain.       Physical Exam     Initial Vitals [02/13/22 1242]   BP Pulse Resp Temp SpO2   139/89 (!) 130 18 97.6 °F (36.4 °C) 97 %      MAP       --         Physical Exam    Nursing note and vitals reviewed.  Constitutional: She appears well-developed and well-nourished. She is not diaphoretic.  Non-toxic appearance. She does not have a sickly appearance. She does not appear ill. No distress.   HENT:   Head: Normocephalic and atraumatic.   No facial tenderness   Eyes: EOM are normal.   Neck: Neck supple. No stridor present. No tracheal tenderness present.   Normal range of motion.   Full passive range of motion without pain.     Pulmonary/Chest: No respiratory distress.   Musculoskeletal:         General: Normal range of motion.      Cervical back: Full passive range of motion without pain, normal range of motion and neck supple.     Neurological: She is alert and oriented to person, place, and time.   Skin: Skin is warm and dry.   Psychiatric: She has a normal mood and affect. Her behavior is normal. Judgment and thought content normal.         ED Course   Procedures  Labs Reviewed   HIV 1 / 2 ANTIBODY   HEPATITIS C ANTIBODY          Imaging Results    None          Medications - No data to display              ED Course as of 02/13/22 1257   Sun Feb 13, 2022   1246 BP: 139/89 [EF]   1246 Temp: 97.6 °F (36.4 °C) [EF]   1246 Temp src: Oral [EF]   1246 Pulse(!): 130 [EF]   1246 Resp: 18 [EF]   1246 SpO2: 97 % [EF]      ED Course User Index  [EF] Jesus Bustamante MD             Clinical Impression:   Final diagnoses:  [R51.9, G89.29] Chronic  facial pain (Primary)          ED Disposition Condition    Discharge Stable        ED Prescriptions     None        Follow-up Information     Follow up With Specialties Details Why Contact Info    Crow Kennedy MD Family Medicine Call in 1 day  [address]  [address] [place] [id number] [phone]            31-year-old female presents to the ER with chronic right facial pain.  She is requesting intramuscular narcotics by name.  She is advised that we do not treat chronic pain with narcotics in the emergency room.  Follow-up primary care for this.  She is agreeable to this plan.     Jesus Bustamante MD  02/13/22 [id number]

## 2022-02-13 NOTE — ED NOTES
Pt called earlier regarding Trigeminal nerve pain and asked if she could be given pain medications that she knows works for her Pain. Nurse explain that the medication would be determined by the MD.

## 2022-02-13 NOTE — ED NOTES
D/C instructions in pt possession along with belongings. No other needs at this time. AAOx4, ABC's intact, NADN. Pt ambulatory to ED Lobby without assistance, steady gait.

## 2022-02-13 NOTE — ED NOTES
Dr. Bustamante with pt discussing POC. Pt reports that she is out of he pain medication that she was prescribed, she c/o migraine HA, a cracked tooth and recurring trigeminal nerve pain. Pt is requesting morphine and Zofran for pain control. She is AAOx4, ABC's intact, NADN.

## 2022-02-14 ENCOUNTER — PATIENT OUTREACH (OUTPATIENT)
Dept: EMERGENCY MEDICINE | Facility: HOSPITAL | Age: 32
End: 2022-02-14
Payer: MEDICAID

## 2022-02-18 ENCOUNTER — TELEPHONE (OUTPATIENT)
Dept: FAMILY MEDICINE | Facility: CLINIC | Age: 32
End: 2022-02-18
Payer: MEDICAID

## 2022-02-18 ENCOUNTER — PATIENT MESSAGE (OUTPATIENT)
Dept: FAMILY MEDICINE | Facility: CLINIC | Age: 32
End: 2022-02-18
Payer: MEDICAID

## 2022-02-18 NOTE — TELEPHONE ENCOUNTER
Responded to patient's portal message. This refill has already been called in to the pharmacy. The earliest the pharmacy can fill this medication is tomorrow, February 19th. Patient stated in the portal message that the medicine can stay at the previous location, so I reminded her as well that the refill was called in to the [facility].

## 2022-02-18 NOTE — TELEPHONE ENCOUNTER
----- Message from Kassy Maldonado sent at 2/18/2022 10:00 AM CST -----  Patient request her Percoset be sent to [facility] on [facility]. She said she needs this med by tomorrow.

## 2022-02-27 ENCOUNTER — PATIENT MESSAGE (OUTPATIENT)
Dept: FAMILY MEDICINE | Facility: CLINIC | Age: 32
End: 2022-02-27
Payer: MEDICAID

## 2022-02-28 ENCOUNTER — PATIENT MESSAGE (OUTPATIENT)
Dept: FAMILY MEDICINE | Facility: CLINIC | Age: 32
End: 2022-02-28
Payer: MEDICAID

## 2022-03-04 ENCOUNTER — PATIENT MESSAGE (OUTPATIENT)
Dept: FAMILY MEDICINE | Facility: CLINIC | Age: 32
End: 2022-03-04
Payer: MEDICAID

## 2022-03-09 ENCOUNTER — PATIENT MESSAGE (OUTPATIENT)
Dept: FAMILY MEDICINE | Facility: CLINIC | Age: 32
End: 2022-03-09
Payer: MEDICAID

## 2022-03-09 NOTE — TELEPHONE ENCOUNTER
Spoke to pt and she states she never got a response from anyone and wanted to make sure her message was given to Dr. Kennedy. I resent previous message to him.

## 2022-03-11 ENCOUNTER — PATIENT MESSAGE (OUTPATIENT)
Dept: FAMILY MEDICINE | Facility: CLINIC | Age: 32
End: 2022-03-11
Payer: MEDICAID

## 2022-03-12 DIAGNOSIS — G50.0 TRIGEMINAL NEURALGIA: ICD-10-CM

## 2022-03-14 ENCOUNTER — LAB VISIT (OUTPATIENT)
Dept: LAB | Facility: HOSPITAL | Age: 32
End: 2022-03-14
Attending: FAMILY MEDICINE
Payer: MEDICAID

## 2022-03-14 DIAGNOSIS — Z13.220 ENCOUNTER FOR LIPID SCREENING FOR CARDIOVASCULAR DISEASE: ICD-10-CM

## 2022-03-14 DIAGNOSIS — Z11.59 ENCOUNTER FOR HEPATITIS C SCREENING TEST FOR LOW RISK PATIENT: ICD-10-CM

## 2022-03-14 DIAGNOSIS — Z13.1 DIABETES MELLITUS SCREENING: ICD-10-CM

## 2022-03-14 DIAGNOSIS — Z11.4 ENCOUNTER FOR SCREENING FOR HIV: ICD-10-CM

## 2022-03-14 DIAGNOSIS — Z13.6 ENCOUNTER FOR LIPID SCREENING FOR CARDIOVASCULAR DISEASE: ICD-10-CM

## 2022-03-14 DIAGNOSIS — Z13.0 SCREENING FOR DEFICIENCY ANEMIA: ICD-10-CM

## 2022-03-14 LAB
ALBUMIN SERPL BCP-MCNC: 4 G/DL (ref 3.5–5.2)
ALP SERPL-CCNC: 67 U/L (ref 55–135)
ALT SERPL W/O P-5'-P-CCNC: 75 U/L (ref 10–44)
ANION GAP SERPL CALC-SCNC: 11 MMOL/L (ref 8–16)
AST SERPL-CCNC: 80 U/L (ref 10–40)
BASOPHILS # BLD AUTO: 0.03 K/UL (ref 0–0.2)
BASOPHILS NFR BLD: 0.5 % (ref 0–1.9)
BILIRUB SERPL-MCNC: 0.6 MG/DL (ref 0.1–1)
BUN SERPL-MCNC: 10 MG/DL (ref 6–20)
CALCIUM SERPL-MCNC: 9.1 MG/DL (ref 8.7–10.5)
CHLORIDE SERPL-SCNC: 99 MMOL/L (ref 95–110)
CHOLEST SERPL-MCNC: 254 MG/DL (ref 120–199)
CHOLEST/HDLC SERPL: 6.4 {RATIO} (ref 2–5)
CO2 SERPL-SCNC: 23 MMOL/L (ref 23–29)
CREAT SERPL-MCNC: 0.8 MG/DL (ref 0.5–1.4)
DIFFERENTIAL METHOD: ABNORMAL
EOSINOPHIL # BLD AUTO: 0.1 K/UL (ref 0–0.5)
EOSINOPHIL NFR BLD: 1.5 % (ref 0–8)
ERYTHROCYTE [DISTWIDTH] IN BLOOD BY AUTOMATED COUNT: 13.2 % (ref 11.5–14.5)
EST. GFR  (AFRICAN AMERICAN): >60 ML/MIN/1.73 M^2
EST. GFR  (NON AFRICAN AMERICAN): >60 ML/MIN/1.73 M^2
GLUCOSE SERPL-MCNC: 179 MG/DL (ref 70–110)
HCT VFR BLD AUTO: 43 % (ref 37–48.5)
HDLC SERPL-MCNC: 40 MG/DL (ref 40–75)
HDLC SERPL: 15.7 % (ref 20–50)
HGB BLD-MCNC: 13.7 G/DL (ref 12–16)
IMM GRANULOCYTES # BLD AUTO: 0.02 K/UL (ref 0–0.04)
IMM GRANULOCYTES NFR BLD AUTO: 0.3 % (ref 0–0.5)
LDLC SERPL CALC-MCNC: 168.4 MG/DL (ref 63–159)
LYMPHOCYTES # BLD AUTO: 1.9 K/UL (ref 1–4.8)
LYMPHOCYTES NFR BLD: 29.2 % (ref 18–48)
MCH RBC QN AUTO: 28.7 PG (ref 27–31)
MCHC RBC AUTO-ENTMCNC: 31.9 G/DL (ref 32–36)
MCV RBC AUTO: 90 FL (ref 82–98)
MONOCYTES # BLD AUTO: 0.5 K/UL (ref 0.3–1)
MONOCYTES NFR BLD: 8.2 % (ref 4–15)
NEUTROPHILS # BLD AUTO: 3.9 K/UL (ref 1.8–7.7)
NEUTROPHILS NFR BLD: 60.3 % (ref 38–73)
NONHDLC SERPL-MCNC: 214 MG/DL
NRBC BLD-RTO: 0 /100 WBC
PLATELET # BLD AUTO: 328 K/UL (ref 150–450)
PMV BLD AUTO: 9.8 FL (ref 9.2–12.9)
POTASSIUM SERPL-SCNC: 3.3 MMOL/L (ref 3.5–5.1)
PROT SERPL-MCNC: 7.5 G/DL (ref 6–8.4)
RBC # BLD AUTO: 4.77 M/UL (ref 4–5.4)
SODIUM SERPL-SCNC: 133 MMOL/L (ref 136–145)
TRIGL SERPL-MCNC: 228 MG/DL (ref 30–150)
WBC # BLD AUTO: 6.47 K/UL (ref 3.9–12.7)

## 2022-03-14 PROCEDURE — 87389 HIV-1 AG W/HIV-1&-2 AB AG IA: CPT | Performed by: FAMILY MEDICINE

## 2022-03-14 PROCEDURE — 36415 COLL VENOUS BLD VENIPUNCTURE: CPT | Performed by: FAMILY MEDICINE

## 2022-03-14 PROCEDURE — 80053 COMPREHEN METABOLIC PANEL: CPT | Performed by: FAMILY MEDICINE

## 2022-03-14 PROCEDURE — 86803 HEPATITIS C AB TEST: CPT | Performed by: FAMILY MEDICINE

## 2022-03-14 PROCEDURE — 80061 LIPID PANEL: CPT | Performed by: FAMILY MEDICINE

## 2022-03-14 PROCEDURE — 85025 COMPLETE CBC W/AUTO DIFF WBC: CPT | Performed by: FAMILY MEDICINE

## 2022-03-15 LAB
HCV AB S/CO SERPL IA: <0.1 S/CO RATIO (ref 0–0.9)
HIV 1+2 AB+HIV1 P24 AG SERPL QL IA: NON REACTIVE

## 2022-03-15 RX ORDER — OXYCODONE AND ACETAMINOPHEN 5; 325 MG/1; MG/1
1 TABLET ORAL EVERY 4 HOURS PRN
Qty: 90 EACH | Refills: 0 | Status: SHIPPED | OUTPATIENT
Start: 2022-03-15 | End: 2022-04-11 | Stop reason: SDUPTHER

## 2022-03-16 ENCOUNTER — PATIENT MESSAGE (OUTPATIENT)
Dept: FAMILY MEDICINE | Facility: CLINIC | Age: 32
End: 2022-03-16
Payer: MEDICAID

## 2022-03-21 ENCOUNTER — OFFICE VISIT (OUTPATIENT)
Dept: FAMILY MEDICINE | Facility: CLINIC | Age: 32
End: 2022-03-21
Payer: MEDICAID

## 2022-03-21 VITALS
HEART RATE: 110 BPM | BODY MASS INDEX: 48.82 KG/M2 | OXYGEN SATURATION: 96 % | TEMPERATURE: 98 F | SYSTOLIC BLOOD PRESSURE: 130 MMHG | DIASTOLIC BLOOD PRESSURE: 76 MMHG | HEIGHT: 65 IN | WEIGHT: 293 LBS

## 2022-03-21 DIAGNOSIS — R11.0 NAUSEA: ICD-10-CM

## 2022-03-21 DIAGNOSIS — E11.9 TYPE 2 DIABETES MELLITUS WITHOUT COMPLICATION, WITHOUT LONG-TERM CURRENT USE OF INSULIN: Primary | ICD-10-CM

## 2022-03-21 DIAGNOSIS — I10 PRIMARY HYPERTENSION: ICD-10-CM

## 2022-03-21 DIAGNOSIS — E78.49 OTHER HYPERLIPIDEMIA: ICD-10-CM

## 2022-03-21 PROCEDURE — 99214 PR OFFICE/OUTPT VISIT, EST, LEVL IV, 30-39 MIN: ICD-10-PCS | Mod: S$PBB,,, | Performed by: FAMILY MEDICINE

## 2022-03-21 PROCEDURE — 3075F SYST BP GE 130 - 139MM HG: CPT | Mod: ,,, | Performed by: FAMILY MEDICINE

## 2022-03-21 PROCEDURE — 3075F PR MOST RECENT SYSTOLIC BLOOD PRESS GE 130-139MM HG: ICD-10-PCS | Mod: ,,, | Performed by: FAMILY MEDICINE

## 2022-03-21 PROCEDURE — 3078F DIAST BP <80 MM HG: CPT | Mod: ,,, | Performed by: FAMILY MEDICINE

## 2022-03-21 PROCEDURE — 3008F PR BODY MASS INDEX (BMI) DOCUMENTED: ICD-10-PCS | Mod: ,,, | Performed by: FAMILY MEDICINE

## 2022-03-21 PROCEDURE — 3078F PR MOST RECENT DIASTOLIC BLOOD PRESSURE < 80 MM HG: ICD-10-PCS | Mod: ,,, | Performed by: FAMILY MEDICINE

## 2022-03-21 PROCEDURE — 3008F BODY MASS INDEX DOCD: CPT | Mod: ,,, | Performed by: FAMILY MEDICINE

## 2022-03-21 PROCEDURE — 4010F PR ACE/ARB THEARPY RXD/TAKEN: ICD-10-PCS | Mod: ,,, | Performed by: FAMILY MEDICINE

## 2022-03-21 PROCEDURE — 99214 OFFICE O/P EST MOD 30 MIN: CPT | Mod: S$PBB,,, | Performed by: FAMILY MEDICINE

## 2022-03-21 PROCEDURE — 99215 OFFICE O/P EST HI 40 MIN: CPT | Performed by: FAMILY MEDICINE

## 2022-03-21 PROCEDURE — 4010F ACE/ARB THERAPY RXD/TAKEN: CPT | Mod: ,,, | Performed by: FAMILY MEDICINE

## 2022-03-21 RX ORDER — ZOLPIDEM TARTRATE 10 MG/1
10 TABLET ORAL NIGHTLY
COMMUNITY
Start: 2022-03-10 | End: 2023-04-17

## 2022-03-21 RX ORDER — ALPRAZOLAM 1 MG/1
1 TABLET ORAL 2 TIMES DAILY
COMMUNITY
Start: 2022-03-10 | End: 2022-05-12

## 2022-03-21 RX ORDER — LISINOPRIL 20 MG/1
20 TABLET ORAL DAILY
Qty: 90 TABLET | Refills: 3 | Status: SHIPPED | OUTPATIENT
Start: 2022-03-21 | End: 2022-07-27 | Stop reason: ALTCHOICE

## 2022-03-21 RX ORDER — ONDANSETRON 8 MG/1
8 TABLET, ORALLY DISINTEGRATING ORAL 2 TIMES DAILY
Qty: 30 TABLET | Refills: 0 | Status: SHIPPED | OUTPATIENT
Start: 2022-03-21 | End: 2022-05-12

## 2022-03-21 RX ORDER — BUSPIRONE HYDROCHLORIDE 15 MG/1
15 TABLET ORAL DAILY
COMMUNITY
Start: 2022-03-10 | End: 2024-01-04 | Stop reason: SDUPTHER

## 2022-03-21 RX ORDER — ROSUVASTATIN CALCIUM 10 MG/1
10 TABLET, COATED ORAL NIGHTLY
Qty: 90 TABLET | Refills: 3 | Status: SHIPPED | OUTPATIENT
Start: 2022-03-21 | End: 2023-04-17

## 2022-03-21 RX ORDER — METFORMIN HYDROCHLORIDE 500 MG/1
500 TABLET, EXTENDED RELEASE ORAL
Qty: 90 TABLET | Refills: 3 | Status: SHIPPED | OUTPATIENT
Start: 2022-03-21 | End: 2023-04-05 | Stop reason: SDUPTHER

## 2022-03-21 NOTE — PROGRESS NOTES
SUBJECTIVE:    Patient ID: Jasper Guerrero is a 31 y.o. female.    Chief Complaint: Hypertension  31-year-old female here today to follow-up so she can get refills on her chronic narcotics that she takes for trigeminal neuralgia patient today asked me to increase her medication does to 4 times a day I have declined.    She has a history of hypertension her blood pressure is well controlled.    Her fasting blood sugar were elevated in the 170 range will need to start her on medications for new onset diabetes.  Will also start her on statin for her elevated cholesterol with a history of diabetes.  Had a long discussion with patient about the need for weight loss and diet management to control her diabetes.     Patient has chronic trigeminal neuralgia is on chronic narcotics we have been unsuccessful in getting her enrolled in to pain management clinic or being followed by Neurology due to her lack of insurance . She needs pain medication refills for her trigeminal neuralgia.     Chronic nausea.  Her nausea started 2018 pt is nauseous throughout the day every day.  She has been evaluated by Gastroenterology but has never followed up for the testing the requested.  Will place referral back to Gastroenterology for evaluation of her chronic nausea and her daily loose stools.      Significant past medical history  Agoraphobia/Anxiety:   Fluvoxamine (LUVOX) 100mg, Buspar 7.5  Hypertension:  Lisinopril 20 mg  Depression: Effexor   PTSD: Fluvoxamine (Luvox) 100mg  Trigeminal Neuralgia: Percocet 5/325, Oxcarbazepine (trileptal) 150mg  DVT (upper extremity right arm 2019):  Migraine headaches:  Fibromyalgia:  Insomnia:    GERD:  Pantoprazole 40 mg  Migraines: Fiorocet, Imitrex, Verapamil  Nausea:  Zofran      Specialists  Orthopedics:  Dr. Maher  Neurology:  Dr. Wilder Woodson, Dr Diaz (has been discharged)   Psychiatry: [facility]: Dr Kearney (Pt is trying to get back into treatment)  GYN: Dr CHIDI Reynolds  GI:   [facility]     Smoke: None  ETOH: None  Exercise: None     Chronic Pain  Past Medical History Includes::  Chronic pain syndrome  Chronicity:  Chronic  Onset:  More than 1 year ago  Frequency:  Constantly  Progression since onset:  Unchanged  Pain location: Face.  Pain - numeric:  10/10  Medications Tried:  Oxycodone/acetaminophen (Percocet)  Current treatment:  Oxycodone/acetaminophen (Percocet)  Associated symptoms: no chest pain, no constipation, no shortness of breath, no headaches and no weakness    Psychiatric Disorders:  Depression      Diabetes  She presents for her initial diabetic visit. She has type 2 diabetes mellitus. Her disease course has been stable. Pertinent negatives for hypoglycemia include no confusion or headaches. Pertinent negatives for diabetes include no blurred vision, no chest pain, no fatigue, no foot paresthesias, no foot ulcerations, no polydipsia, no polyphagia, no polyuria, no visual change, no weakness and no weight loss. Symptoms are stable.   Hypertension  This is a chronic problem. The current episode started more than 1 year ago. The problem is unchanged. The problem is controlled. Pertinent negatives include no blurred vision, chest pain, headaches, neck pain, palpitations or shortness of breath. Risk factors for coronary artery disease include diabetes mellitus, dyslipidemia, obesity and sedentary lifestyle. Past treatments include ACE inhibitors.         Past Medical History:   Diagnosis Date    Agoraphobia     Carpal tunnel syndrome     Depression     DVT (deep venous thrombosis)     2019 had blood clot in right arm    Fibromyalgia     Migraine headache     Nerve injury 08/2016    Lingual Nerve Injury    Obese     PTSD (post-traumatic stress disorder)     Trigeminal neuralgia      Social History     Socioeconomic History    Marital status:    Tobacco Use    Smoking status: Never Smoker    Smokeless tobacco: Never Used   Substance and Sexual Activity     Alcohol use: No    Drug use: No    Sexual activity: Yes     Partners: Male     Birth control/protection: Condom     Social Determinants of Health     Financial Resource Strain: Low Risk     Difficulty of Paying Living Expenses: Not hard at all   Food Insecurity: No Food Insecurity    Worried About Running Out of Food in the Last Year: Never true    Ran Out of Food in the Last Year: Never true   Transportation Needs: No Transportation Needs    Lack of Transportation (Medical): No    Lack of Transportation (Non-Medical): No   Physical Activity: Insufficiently Active    Days of Exercise per Week: 3 days    Minutes of Exercise per Session: 30 min   Stress: Stress Concern Present    Feeling of Stress : Very much   Social Connections: Unknown    Frequency of Communication with Friends and Family: More than three times a week    Frequency of Social Gatherings with Friends and Family: More than three times a week    Active Member of Clubs or Organizations: No    Attends Club or Organization Meetings: Patient refused    Marital Status:    Housing Stability: Low Risk     Unable to Pay for Housing in the Last Year: No    Number of Places Lived in the Last Year: 1    Unstable Housing in the Last Year: No     Past Surgical History:   Procedure Laterality Date    CARPAL TUNNEL RELEASE Right 12/27/2018    Dr Lozada     CARPAL TUNNEL RELEASE Left 1/28/2020    Procedure: RELEASE, CARPAL TUNNEL;  Surgeon: Brendon Lozada Jr., MD;  Location: [facility];  Service: Orthopedics;  Laterality: Left;    MOUTH SURGERY      WISDOM TOOTH EXTRACTION       Family History   Problem Relation Age of Onset    No Known Problems Mother     No Known Problems Father     Cancer Maternal Aunt         endometrial    No Known Problems Maternal Uncle     No Known Problems Paternal Aunt     No Known Problems Paternal Uncle     No Known Problems Maternal Grandmother     No Known Problems Maternal Grandfather     No Known  Problems Paternal Grandmother     No Known Problems Paternal Grandfather     No Known Problems Daughter     No Known Problems Son      Current Outpatient Medications   Medication Sig Dispense Refill    acetaminophen (TYLENOL) 500 MG tablet Take 500 mg by mouth every 4 to 6 hours as needed.       butalbital-acetaminophen-caffeine -40 mg (FIORICET, ESGIC) -40 mg per tablet Take 1 tablet by mouth every 6 (six) hours as needed for Headaches. for pain. 10 tablet 1    fluvoxaMINE (LUVOX) 100 MG tablet Take 1 tablet (100 mg total) by mouth 3 (three) times daily. 90 tablet 2    metoclopramide HCl (REGLAN) 10 MG tablet TAKE 1 TABLET BY MOUTH EVERY 6 HOURS AS NEEDED FOR MIGRAINE AND NAUSEA (Patient taking differently: Take 10 mg by mouth every 6 (six) hours as needed. TAKE 1 TABLET BY MOUTH EVERY 6 HOURS AS NEEDED FOR MIGRAINE AND NAUSEA) 60 tablet 11    ondansetron (ZOFRAN) 4 MG tablet Take 1 tablet (4 mg total) by mouth every 8 (eight) hours as needed for Nausea. 12 tablet 0    OXcarbazepine (TRILEPTAL) 150 MG Tab Take 1 tablet (150 mg total) by mouth 2 (two) times daily. 60 tablet 11    oxyCODONE-acetaminophen (PERCOCET) 5-325 mg per tablet Take 1 tablet by mouth every 4 (four) hours as needed for Pain. 90 each 0    venlafaxine (EFFEXOR XR) 150 MG Cp24 Take 1 capsule (150 mg total) by mouth once daily. 30 capsule 2    ALPRAZolam (XANAX) 1 MG tablet Take 1 mg by mouth 2 (two) times daily. as needed for anxiety.      busPIRone (BUSPAR) 15 MG tablet Take 15 mg by mouth once daily.      lisinopriL (PRINIVIL,ZESTRIL) 20 MG tablet Take 1 tablet (20 mg total) by mouth once daily. 90 tablet 3    metFORMIN (GLUCOPHAGE-XR) 500 MG ER 24hr tablet Take 1 tablet (500 mg total) by mouth daily with breakfast. 90 tablet 3    ondansetron (ZOFRAN-ODT) 8 MG TbDL Take 1 tablet (8 mg total) by mouth 2 (two) times daily. 30 tablet 0    rosuvastatin (CRESTOR) 10 MG tablet Take 1 tablet (10 mg total) by mouth every  "evening. 90 tablet 3    zolpidem (AMBIEN) 10 mg Tab Take 10 mg by mouth nightly.       No current facility-administered medications for this visit.     Review of patient's allergies indicates:   Allergen Reactions    Benadryl [diphenhydramine hcl]      Paralysis on right side body     Codeine      Paralysis right body    Flexeril [cyclobenzaprine] Other (See Comments)     Numbness on right side of body    Nsaids (non-steroidal anti-inflammatory drug) Diarrhea and Nausea And Vomiting     Ulcers     Penicillins Anaphylaxis and Hives    Magnesium      "made me feel horrible"        Review of Systems   Constitutional: Negative for activity change, diaphoresis, fatigue, unexpected weight change and weight loss.   HENT: Negative for congestion, hearing loss, rhinorrhea and trouble swallowing.    Eyes: Negative for blurred vision, discharge and visual disturbance.   Respiratory: Negative for cough, chest tightness, shortness of breath and wheezing.    Cardiovascular: Negative for chest pain and palpitations.   Gastrointestinal: Negative for abdominal distention, abdominal pain, blood in stool, constipation, diarrhea and vomiting.   Endocrine: Negative for polydipsia, polyphagia and polyuria.   Genitourinary: Negative for difficulty urinating, dysuria, hematuria and menstrual problem.   Musculoskeletal: Negative for arthralgias, joint swelling and neck pain.   Neurological: Negative for weakness and headaches.   Psychiatric/Behavioral: Negative for confusion and dysphoric mood.          Blood pressure 130/76, pulse 110, temperature 97.5 °F (36.4 °C), temperature source Oral, height 5' 5" (1.651 m), weight (!) 156.7 kg (345 lb 6.4 oz), SpO2 96 %. Body mass index is 57.48 kg/m².   Objective:      Physical Exam  Vitals reviewed.   Constitutional:       General: She is not in acute distress.     Appearance: Normal appearance. She is obese. She is not ill-appearing or toxic-appearing.   HENT:      Head: Normocephalic and " atraumatic.   Cardiovascular:      Rate and Rhythm: Regular rhythm. Tachycardia present.      Heart sounds: Normal heart sounds.   Pulmonary:      Effort: No respiratory distress.      Breath sounds: Normal breath sounds. No wheezing.   Skin:     General: Skin is warm and dry.      Capillary Refill: Capillary refill takes less than 2 seconds.   Neurological:      Mental Status: She is alert and oriented to person, place, and time.             Assessment:       1. Type 2 diabetes mellitus without complication, without long-term current use of insulin    2. Nausea    3. Other hyperlipidemia    4. Primary hypertension         Plan:           Type 2 diabetes mellitus without complication, without long-term current use of insulin  -     metFORMIN (GLUCOPHAGE-XR) 500 MG ER 24hr tablet; Take 1 tablet (500 mg total) by mouth daily with breakfast.  Dispense: 90 tablet; Refill: 3  -     Hemoglobin A1C; Future; Expected date: 03/21/2022  Patient came in today complaining of multiple issues along with her new onset diabetes hyperlipidemia and chronic pain explained to patient and a 20 minute appointment all the seems to be difficult to address I asked if she needed follow-up sooner than every 3 months she declined.  Nausea  -     Ambulatory referral/consult to Gastroenterology; Future; Expected date: 03/28/2022  -     Helicobacter pylori Urea Breath Test; Future; Expected date: 03/21/2022    Other hyperlipidemia  -     rosuvastatin (CRESTOR) 10 MG tablet; Take 1 tablet (10 mg total) by mouth every evening.  Dispense: 90 tablet; Refill: 3    Primary hypertension  -     lisinopriL (PRINIVIL,ZESTRIL) 20 MG tablet; Take 1 tablet (20 mg total) by mouth once daily.  Dispense: 90 tablet; Refill: 3    Other orders  -     ondansetron (ZOFRAN-ODT) 8 MG TbDL; Take 1 tablet (8 mg total) by mouth 2 (two) times daily.  Dispense: 30 tablet; Refill: 0

## 2022-03-24 ENCOUNTER — PATIENT MESSAGE (OUTPATIENT)
Dept: FAMILY MEDICINE | Facility: CLINIC | Age: 32
End: 2022-03-24
Payer: MEDICAID

## 2022-03-28 ENCOUNTER — PATIENT MESSAGE (OUTPATIENT)
Dept: FAMILY MEDICINE | Facility: CLINIC | Age: 32
End: 2022-03-28
Payer: MEDICAID

## 2022-04-03 ENCOUNTER — HOSPITAL ENCOUNTER (EMERGENCY)
Facility: HOSPITAL | Age: 32
Discharge: HOME | End: 2022-04-03
Attending: EMERGENCY MEDICINE
Payer: MEDICAID

## 2022-04-03 VITALS
SYSTOLIC BLOOD PRESSURE: 138 MMHG | WEIGHT: 293 LBS | OXYGEN SATURATION: 94 % | HEART RATE: 100 BPM | DIASTOLIC BLOOD PRESSURE: 88 MMHG | HEIGHT: 65 IN | TEMPERATURE: 98 F | RESPIRATION RATE: 19 BRPM | BODY MASS INDEX: 48.82 KG/M2

## 2022-04-03 DIAGNOSIS — G43.009 MIGRAINE WITHOUT AURA AND WITHOUT STATUS MIGRAINOSUS, NOT INTRACTABLE: Primary | ICD-10-CM

## 2022-04-03 PROCEDURE — 96374 THER/PROPH/DIAG INJ IV PUSH: CPT

## 2022-04-03 PROCEDURE — 63600175 PHARM REV CODE 636 W HCPCS: Performed by: EMERGENCY MEDICINE

## 2022-04-03 PROCEDURE — 99284 EMERGENCY DEPT VISIT MOD MDM: CPT | Mod: 25

## 2022-04-03 RX ORDER — BUTORPHANOL TARTRATE 1 MG/ML
1 INJECTION INTRAMUSCULAR; INTRAVENOUS
Status: COMPLETED | OUTPATIENT
Start: 2022-04-03 | End: 2022-04-03

## 2022-04-03 RX ORDER — DROPERIDOL 2.5 MG/ML
2.5 INJECTION, SOLUTION INTRAMUSCULAR; INTRAVENOUS
Status: DISCONTINUED | OUTPATIENT
Start: 2022-04-03 | End: 2022-04-03

## 2022-04-03 RX ADMIN — BUTORPHANOL TARTRATE 1 MG: 1 INJECTION, SOLUTION INTRAMUSCULAR; INTRAVENOUS at 02:04

## 2022-04-03 NOTE — ED NOTES
Jasper Guerrero presents to the ED with c/o a migraine. Patient reports that she woke up with an awful migraine. Patient reports pain is behind her eyes and to the back of her head. Associated complaints are nausea and photophobia. Patient reports that she started taking meds when she woke up this morning. She has taken 2,000 mg of tylenol, 2 Fioricet, reglan and zofran without any relief. Patient reports that she has not had a headache for the past few weeks. AAOx4. Mucous membranes are pink and moist. Skin is warm, dry and intact. Lungs are clear bilaterally, respirations are regular and unlabored. Denies SOB, cough, congestion or rhinorrhea. BS active x4, no tenderness with palpation, abd is soft and not distended. Denies any appetite or activity change. S1S2, capillary refill is < 2 seconds. Denies dysuria, difficulty urinating, frequency, numbness, tingling or weakness. MARQUIS GLOVER  Verified patient's name and date of birth.

## 2022-04-03 NOTE — ED NOTES
Patient called  for a ride home.   Upon discharge, patient is AAOx4, no cardiac or respiratory complications. Follow up care reviewed with patient and has been instructed to return to the ER if needed. Patient verbalized understanding and ambulated to the lobby without difficulty. ADALBERTO CHO

## 2022-04-03 NOTE — ED PROVIDER NOTES
"Encounter Date: 4/3/2022       History     Chief Complaint   Patient presents with    Headache     Started this am      Patient is a 31-year-old female with a past medical history of fibromyalgia migraines obesity PTSD trigeminal neuralgia DVT right arm depression agoraphobia who presents emergency room for evaluation of a frontal headache.  The patient states she took 2 fioricets, 2 reglan, Benadryl.  Patient states she has never had a lumbar puncture no history of idiopathic intracranial hypertension.  She states her neurologist look for papilledema in the past and therefore they decided against it.  She has multiple allergies and states would really works for her headache is morphine Dilaudid and occasionally stadol.         Review of patient's allergies indicates:   Allergen Reactions    Benadryl [diphenhydramine hcl]      Paralysis on right side body     Codeine      Paralysis right body    Flexeril [cyclobenzaprine] Other (See Comments)     Numbness on right side of body    Nsaids (non-steroidal anti-inflammatory drug) Diarrhea and Nausea And Vomiting     Ulcers     Penicillins Anaphylaxis and Hives    Magnesium      "made me feel horrible"      Past Medical History:   Diagnosis Date    Agoraphobia     Carpal tunnel syndrome     Depression     DVT (deep venous thrombosis)     2019 had blood clot in right arm    Fibromyalgia     Migraine headache     Nerve injury 08/2016    Lingual Nerve Injury    Obese     PTSD (post-traumatic stress disorder)     Trigeminal neuralgia      Past Surgical History:   Procedure Laterality Date    CARPAL TUNNEL RELEASE Right 12/27/2018    Dr Lozada     CARPAL TUNNEL RELEASE Left 1/28/2020    Procedure: RELEASE, CARPAL TUNNEL;  Surgeon: Brendon Lozada Jr., MD;  Location: [facility];  Service: Orthopedics;  Laterality: Left;    MOUTH SURGERY      WISDOM TOOTH EXTRACTION       Family History   Problem Relation Age of Onset    No Known Problems Mother     No Known " Problems Father     Cancer Maternal Aunt         endometrial    No Known Problems Maternal Uncle     No Known Problems Paternal Aunt     No Known Problems Paternal Uncle     No Known Problems Maternal Grandmother     No Known Problems Maternal Grandfather     No Known Problems Paternal Grandmother     No Known Problems Paternal Grandfather     No Known Problems Daughter     No Known Problems Son      Social History     Tobacco Use    Smoking status: Never Smoker    Smokeless tobacco: Never Used   Substance Use Topics    Alcohol use: No    Drug use: No     Review of Systems   Constitutional: Negative for fever.   HENT: Negative for ear pain, rhinorrhea and sore throat.    Eyes: Negative for pain and visual disturbance.   Respiratory: Negative for cough and shortness of breath.    Cardiovascular: Negative for chest pain.   Gastrointestinal: Negative for diarrhea, nausea and vomiting.   Genitourinary: Negative for difficulty urinating.   Musculoskeletal: Negative for arthralgias, back pain and joint swelling.   Skin: Negative for rash.   Neurological: Positive for headaches. Negative for syncope, weakness, light-headedness and numbness.   Psychiatric/Behavioral: Negative for agitation and confusion.   All other systems reviewed and are negative.      Physical Exam     Initial Vitals [04/03/22 1359]   BP Pulse Resp Temp SpO2   (!) 141/93 (!) 117 18 97.8 °F (36.6 °C) (!) 94 %      MAP       --         Physical Exam    Nursing note and vitals reviewed.  Constitutional: She appears well-developed and well-nourished.  Non-toxic appearance. No distress.   HENT:   Head: Normocephalic and atraumatic.   Mouth/Throat: Oropharynx is clear and moist.   Eyes: EOM are normal. Pupils are equal, round, and reactive to light.   Neck: Neck supple.   Cardiovascular: Normal rate, regular rhythm, normal heart sounds and intact distal pulses. Exam reveals no gallop and no friction rub.    No murmur heard.  Pulmonary/Chest:  Breath sounds normal. No respiratory distress. She has no decreased breath sounds. She has no wheezes. She has no rhonchi. She has no rales.   Abdominal: Abdomen is soft. Bowel sounds are normal. She exhibits no distension. There is no abdominal tenderness.   Musculoskeletal:         General: Normal range of motion.      Cervical back: Neck supple.     Neurological: She is alert and oriented to person, place, and time. She has normal strength. No sensory deficit. GCS score is 15. GCS eye subscore is 4. GCS verbal subscore is 5. GCS motor subscore is 6.   Skin: Skin is warm and dry.   Psychiatric: She has a normal mood and affect.         ED Course   Procedures  Labs Reviewed - No data to display       Imaging Results    None          Medications   butorphanol injection 1 mg (1 mg Intravenous Given 4/3/22 4555)     Medical Decision Making:   Patient appears to be neurologically intact.  She does appear to be in pain.  I gave her a dose of state all he here.  I will refer to outpatient Neurology.  She has no neurologic deficits.  No fevers.  No meningismus.  She has longstanding history of migraines.  I offered droperidol and state all but she states droperidol causes side effects.  I believe she is stable for discharge at this time.  She will follow-up with her neurologist                       Clinical Impression:   Final diagnoses:  [G43.009] Migraine without aura and without status migrainosus, not intractable (Primary)          ED Disposition Condition    Discharge Stable        ED Prescriptions     None        Follow-up Information     Follow up With Specialties Details Why Contact Info    Beck Woodson MD [facility], Neurology Schedule an appointment as soon as possible for a visit   [address] [place] [id number]  [phone]             Justus Joseph MD  04/03/22 3402

## 2022-04-04 ENCOUNTER — PATIENT MESSAGE (OUTPATIENT)
Dept: FAMILY MEDICINE | Facility: CLINIC | Age: 32
End: 2022-04-04

## 2022-04-05 ENCOUNTER — PATIENT OUTREACH (OUTPATIENT)
Dept: EMERGENCY MEDICINE | Facility: HOSPITAL | Age: 32
End: 2022-04-05

## 2022-04-05 DIAGNOSIS — G43.719 INTRACTABLE CHRONIC MIGRAINE WITHOUT AURA AND WITHOUT STATUS MIGRAINOSUS: ICD-10-CM

## 2022-04-06 ENCOUNTER — PATIENT MESSAGE (OUTPATIENT)
Dept: FAMILY MEDICINE | Facility: CLINIC | Age: 32
End: 2022-04-06

## 2022-04-06 DIAGNOSIS — G50.0 TRIGEMINAL NEURALGIA: ICD-10-CM

## 2022-04-06 RX ORDER — OXYCODONE AND ACETAMINOPHEN 5; 325 MG/1; MG/1
1 TABLET ORAL EVERY 4 HOURS PRN
Qty: 90 EACH | Refills: 0 | Status: CANCELLED | OUTPATIENT
Start: 2022-04-06

## 2022-04-09 ENCOUNTER — HOSPITAL ENCOUNTER (EMERGENCY)
Facility: HOSPITAL | Age: 32
Discharge: LEFT BEFORE DISCHARGE | End: 2022-04-09
Attending: EMERGENCY MEDICINE
Payer: MEDICAID

## 2022-04-09 VITALS
WEIGHT: 293 LBS | OXYGEN SATURATION: 97 % | HEART RATE: 124 BPM | RESPIRATION RATE: 18 BRPM | DIASTOLIC BLOOD PRESSURE: 83 MMHG | SYSTOLIC BLOOD PRESSURE: 143 MMHG | HEIGHT: 65 IN | BODY MASS INDEX: 48.82 KG/M2 | TEMPERATURE: 98 F

## 2022-04-09 DIAGNOSIS — G43.009 MIGRAINE WITHOUT AURA AND WITHOUT STATUS MIGRAINOSUS, NOT INTRACTABLE: Primary | ICD-10-CM

## 2022-04-09 PROCEDURE — 99281 EMR DPT VST MAYX REQ PHY/QHP: CPT

## 2022-04-09 RX ORDER — HALOPERIDOL 5 MG/ML
5 INJECTION INTRAMUSCULAR
Status: DISCONTINUED | OUTPATIENT
Start: 2022-04-09 | End: 2022-04-09

## 2022-04-09 RX ORDER — ACETAMINOPHEN 325 MG/1
650 TABLET ORAL
Status: DISCONTINUED | OUTPATIENT
Start: 2022-04-09 | End: 2022-04-09

## 2022-04-09 RX ORDER — BUTALBITAL, ACETAMINOPHEN AND CAFFEINE 50; 325; 40 MG/1; MG/1; MG/1
1 TABLET ORAL
Status: DISCONTINUED | OUTPATIENT
Start: 2022-04-09 | End: 2022-04-09

## 2022-04-09 RX ORDER — SUMATRIPTAN 6 MG/.5ML
6 INJECTION, SOLUTION SUBCUTANEOUS
Status: DISCONTINUED | OUTPATIENT
Start: 2022-04-09 | End: 2022-04-09 | Stop reason: HOSPADM

## 2022-04-09 RX ORDER — DEXAMETHASONE SODIUM PHOSPHATE 4 MG/ML
8 INJECTION, SOLUTION INTRA-ARTICULAR; INTRALESIONAL; INTRAMUSCULAR; INTRAVENOUS; SOFT TISSUE
Status: DISCONTINUED | OUTPATIENT
Start: 2022-04-09 | End: 2022-04-09 | Stop reason: HOSPADM

## 2022-04-09 RX ORDER — ONDANSETRON 2 MG/ML
8 INJECTION INTRAMUSCULAR; INTRAVENOUS
Status: DISCONTINUED | OUTPATIENT
Start: 2022-04-09 | End: 2022-04-09 | Stop reason: HOSPADM

## 2022-04-09 RX ORDER — DROPERIDOL 2.5 MG/ML
2.5 INJECTION, SOLUTION INTRAMUSCULAR; INTRAVENOUS
Status: DISCONTINUED | OUTPATIENT
Start: 2022-04-09 | End: 2022-04-09

## 2022-04-09 RX ORDER — BENZTROPINE MESYLATE 1 MG/ML
1 INJECTION, SOLUTION INTRAMUSCULAR; INTRAVENOUS
Status: DISCONTINUED | OUTPATIENT
Start: 2022-04-09 | End: 2022-04-09

## 2022-04-09 NOTE — ED NOTES
Called to the room and patient wanted me to  notify the doctor that she took tylenol, Reglan, and Fioricet 2 hrs prior to coming to ER. PT also stated droperidol has a bad effect on her. Dr Parr at bedside to talk with patient.

## 2022-04-09 NOTE — ED PROVIDER NOTES
"Encounter Date: 4/9/2022       History     Chief Complaint   Patient presents with    Headache     Chief complaint:  Headache    HPI:  31-year-old female with frequent ED visits for headaches presents with a 1 day history of a bilateral temporal headache.  She reportedly has a history trigeminal neuralgia.  She is prescribed chronic oxycodone but has been out for the past 4-5 days.  She has no fever or neck stiffness.  She denies any visual or speech impairment.  She has no weakness or numbness.  She reports the headache is similar to previous migraines.        Review of patient's allergies indicates:   Allergen Reactions    Benadryl [diphenhydramine hcl]      Paralysis on right side body     Codeine      Paralysis right body    Flexeril [cyclobenzaprine] Other (See Comments)     Numbness on right side of body    Nsaids (non-steroidal anti-inflammatory drug) Diarrhea and Nausea And Vomiting     Ulcers     Penicillins Anaphylaxis and Hives    Magnesium      "made me feel horrible"      Past Medical History:   Diagnosis Date    Agoraphobia     Carpal tunnel syndrome     Depression     DVT (deep venous thrombosis)     2019 had blood clot in right arm    Fibromyalgia     Migraine headache     Nerve injury 08/2016    Lingual Nerve Injury    Obese     PTSD (post-traumatic stress disorder)     Trigeminal neuralgia      Past Surgical History:   Procedure Laterality Date    CARPAL TUNNEL RELEASE Right 12/27/2018    Dr Lozada     CARPAL TUNNEL RELEASE Left 1/28/2020    Procedure: RELEASE, CARPAL TUNNEL;  Surgeon: Brendon Lozada Jr., MD;  Location: [facility];  Service: Orthopedics;  Laterality: Left;    MOUTH SURGERY      WISDOM TOOTH EXTRACTION       Family History   Problem Relation Age of Onset    No Known Problems Mother     No Known Problems Father     Cancer Maternal Aunt         endometrial    No Known Problems Maternal Uncle     No Known Problems Paternal Aunt     No Known Problems Paternal " Uncle     No Known Problems Maternal Grandmother     No Known Problems Maternal Grandfather     No Known Problems Paternal Grandmother     No Known Problems Paternal Grandfather     No Known Problems Daughter     No Known Problems Son      Social History     Tobacco Use    Smoking status: Never Smoker    Smokeless tobacco: Never Used   Substance Use Topics    Alcohol use: No    Drug use: No     Review of Systems   Constitutional: Negative for activity change, appetite change, chills, fatigue and fever.   Eyes: Negative for visual disturbance.   Respiratory: Negative for apnea and shortness of breath.    Cardiovascular: Negative for chest pain and palpitations.   Gastrointestinal: Positive for diarrhea, nausea and vomiting. Negative for abdominal distention and abdominal pain.   Genitourinary: Negative for difficulty urinating.   Musculoskeletal: Negative for neck pain.   Skin: Negative for pallor and rash.   Neurological: Positive for headaches.   Hematological: Does not bruise/bleed easily.   Psychiatric/Behavioral: Negative for agitation.       Physical Exam     Initial Vitals [04/09/22 1719]   BP Pulse Resp Temp SpO2   (!) 143/83 (!) 124 18 97.9 °F (36.6 °C) 97 %      MAP       --         Physical Exam    Nursing note and vitals reviewed.  Constitutional: She appears well-developed and well-nourished.   HENT:   Head: Normocephalic and atraumatic.   Temporal tenderness   Eyes: Conjunctivae are normal.   Neck: Neck supple.   Normal range of motion.  Cardiovascular: Normal rate, regular rhythm and normal heart sounds. Exam reveals no gallop and no friction rub.    No murmur heard.  Pulmonary/Chest: Effort normal and breath sounds normal. No respiratory distress. She has no wheezes. She has no rhonchi. She has no rales.   Abdominal: Abdomen is soft. She exhibits no distension. There is no abdominal tenderness.   Musculoskeletal:         General: Normal range of motion.      Cervical back: Normal range of  motion and neck supple.     Neurological: She is alert and oriented to person, place, and time.   Skin: Skin is warm and dry. No erythema.   Psychiatric: She has a normal mood and affect.         ED Course   Procedures  Labs Reviewed - No data to display       Imaging Results    None          Medications - No data to display  Medical Decision Making:   ED Management:  31-year-old female with a history of chronic opiate use and frequent emergency department visits presents with a bilateral frontal headache.  She has multiple allergies limiting therapeutic options.  She is informed that the headache will not be treated with opiates at which point the patient promptly eloped without notification.                      Clinical Impression:   Final diagnoses:  [G43.009] Migraine without aura and without status migrainosus, not intractable (Primary)          ED Disposition Condition    Eloped               Scott Parr III, MD  04/09/22 4365

## 2022-04-11 ENCOUNTER — PATIENT MESSAGE (OUTPATIENT)
Dept: FAMILY MEDICINE | Facility: CLINIC | Age: 32
End: 2022-04-11

## 2022-04-11 DIAGNOSIS — G50.0 TRIGEMINAL NEURALGIA: ICD-10-CM

## 2022-04-11 RX ORDER — OXYCODONE AND ACETAMINOPHEN 5; 325 MG/1; MG/1
1 TABLET ORAL EVERY 4 HOURS PRN
Qty: 90 EACH | Refills: 0 | Status: SHIPPED | OUTPATIENT
Start: 2022-04-11 | End: 2022-05-12 | Stop reason: SDUPTHER

## 2022-04-11 RX ORDER — BUTALBITAL, ACETAMINOPHEN AND CAFFEINE 50; 325; 40 MG/1; MG/1; MG/1
1 TABLET ORAL EVERY 6 HOURS PRN
Qty: 10 TABLET | Refills: 1 | Status: SHIPPED | OUTPATIENT
Start: 2022-04-11 | End: 2022-06-27 | Stop reason: SDUPTHER

## 2022-04-13 ENCOUNTER — PATIENT MESSAGE (OUTPATIENT)
Dept: FAMILY MEDICINE | Facility: CLINIC | Age: 32
End: 2022-04-13

## 2022-04-19 ENCOUNTER — TELEPHONE (OUTPATIENT)
Dept: FAMILY MEDICINE | Facility: CLINIC | Age: 32
End: 2022-04-19

## 2022-04-19 DIAGNOSIS — R51.9 FACIAL PAIN: Primary | ICD-10-CM

## 2022-04-19 NOTE — TELEPHONE ENCOUNTER
We have found a pain management doctor that is currently taking medicaid. He is out to July on appointments but we can at least get her started.

## 2022-04-20 ENCOUNTER — PATIENT MESSAGE (OUTPATIENT)
Dept: FAMILY MEDICINE | Facility: CLINIC | Age: 32
End: 2022-04-20

## 2022-04-20 NOTE — TELEPHONE ENCOUNTER
Please let patient know I have placed an order to pain management have her call and attempt to get an appointment.

## 2022-04-21 NOTE — TELEPHONE ENCOUNTER
Spoke to patient and advised to keep calling every other week until they start accepting new medicaid patients again.

## 2022-04-29 ENCOUNTER — PATIENT MESSAGE (OUTPATIENT)
Dept: FAMILY MEDICINE | Facility: CLINIC | Age: 32
End: 2022-04-29

## 2022-05-01 ENCOUNTER — HOSPITAL ENCOUNTER (EMERGENCY)
Facility: HOSPITAL | Age: 32
Discharge: LEFT BEFORE DISCHARGE | End: 2022-05-01
Attending: EMERGENCY MEDICINE
Payer: MEDICAID

## 2022-05-01 VITALS
HEART RATE: 119 BPM | OXYGEN SATURATION: 97 % | DIASTOLIC BLOOD PRESSURE: 92 MMHG | TEMPERATURE: 97 F | BODY MASS INDEX: 56.08 KG/M2 | WEIGHT: 293 LBS | RESPIRATION RATE: 18 BRPM | SYSTOLIC BLOOD PRESSURE: 136 MMHG

## 2022-05-01 DIAGNOSIS — G43.809 OTHER MIGRAINE WITHOUT STATUS MIGRAINOSUS, NOT INTRACTABLE: Primary | ICD-10-CM

## 2022-05-01 DIAGNOSIS — Z53.29 LEFT AGAINST MEDICAL ADVICE: ICD-10-CM

## 2022-05-01 PROCEDURE — 99283 EMERGENCY DEPT VISIT LOW MDM: CPT

## 2022-05-01 RX ORDER — CETIRIZINE HYDROCHLORIDE 10 MG/1
10 TABLET ORAL DAILY
COMMUNITY
Start: 2022-04-21 | End: 2022-05-12

## 2022-05-01 RX ORDER — POLYETHYLENE GLYCOL 3350 17 G/17G
POWDER, FOR SOLUTION ORAL
COMMUNITY
Start: 2022-04-11 | End: 2022-07-05

## 2022-05-01 RX ORDER — HYDROCODONE BITARTRATE AND ACETAMINOPHEN 5; 325 MG/1; MG/1
TABLET ORAL
COMMUNITY
Start: 2022-02-14 | End: 2022-05-12

## 2022-05-01 RX ORDER — PROMETHAZINE HYDROCHLORIDE 25 MG/1
25 TABLET ORAL
COMMUNITY
Start: 2022-04-11 | End: 2022-07-05

## 2022-05-01 RX ORDER — AZITHROMYCIN 250 MG/1
250 TABLET, FILM COATED ORAL
COMMUNITY
Start: 2022-04-21 | End: 2022-05-12

## 2022-05-01 RX ORDER — VENLAFAXINE HYDROCHLORIDE 75 MG/1
75 CAPSULE, EXTENDED RELEASE ORAL DAILY
COMMUNITY
Start: 2022-04-07 | End: 2023-04-17

## 2022-05-01 RX ORDER — BENZONATATE 100 MG/1
100 CAPSULE ORAL 3 TIMES DAILY PRN
COMMUNITY
Start: 2022-04-21 | End: 2022-05-12

## 2022-05-01 RX ORDER — CLONAZEPAM 1 MG/1
1 TABLET ORAL DAILY PRN
COMMUNITY
Start: 2022-02-06 | End: 2022-05-12

## 2022-05-01 RX ORDER — BISACODYL 5 MG
TABLET, DELAYED RELEASE (ENTERIC COATED) ORAL
COMMUNITY
Start: 2022-04-11 | End: 2022-07-05

## 2022-05-01 RX ORDER — FLUTICASONE PROPIONATE 50 MCG
SPRAY, SUSPENSION (ML) NASAL
COMMUNITY
Start: 2022-04-21 | End: 2022-05-12

## 2022-05-01 NOTE — ED PROVIDER NOTES
"Encounter Date: 5/1/2022       History     Chief Complaint   Patient presents with    Migraine     Migraine for 4 days    Nausea     31-year-old well-appearing female presents to the emergency department reports that she has been having a migraine for the last 4 days reports that her regimen of your set, and oxycodone is not helping her headache she reports associated nausea denies vomiting, or fever.  Patient reports that she has had migraines off and on for many years secondary to chronic facial nerve pain.  Patient is requesting to receive IV stadol and IV morphine for her pain.        Review of patient's allergies indicates:   Allergen Reactions    Benadryl [diphenhydramine hcl]      Paralysis on right side body     Codeine      Paralysis right body    Flexeril [cyclobenzaprine] Other (See Comments)     Numbness on right side of body    Nsaids (non-steroidal anti-inflammatory drug) Diarrhea and Nausea And Vomiting     Ulcers     Penicillins Anaphylaxis and Hives    Magnesium      "made me feel horrible"      Past Medical History:   Diagnosis Date    Agoraphobia     Carpal tunnel syndrome     Depression     DVT (deep venous thrombosis)     2019 had blood clot in right arm    Fibromyalgia     Migraine headache     Nerve injury 08/2016    Lingual Nerve Injury    Obese     PTSD (post-traumatic stress disorder)     Trigeminal neuralgia      Past Surgical History:   Procedure Laterality Date    CARPAL TUNNEL RELEASE Right 12/27/2018    Dr Lozada     CARPAL TUNNEL RELEASE Left 1/28/2020    Procedure: RELEASE, CARPAL TUNNEL;  Surgeon: Brendon Lozada Jr., MD;  Location: [facility];  Service: Orthopedics;  Laterality: Left;    MOUTH SURGERY      WISDOM TOOTH EXTRACTION       Family History   Problem Relation Age of Onset    No Known Problems Mother     No Known Problems Father     Cancer Maternal Aunt         endometrial    No Known Problems Maternal Uncle     No Known Problems Paternal Aunt     No " Known Problems Paternal Uncle     No Known Problems Maternal Grandmother     No Known Problems Maternal Grandfather     No Known Problems Paternal Grandmother     No Known Problems Paternal Grandfather     No Known Problems Daughter     No Known Problems Son      Social History     Tobacco Use    Smoking status: Never Smoker    Smokeless tobacco: Never Used   Substance Use Topics    Alcohol use: No    Drug use: No     Review of Systems   Constitutional: Negative.  Negative for fever.   Musculoskeletal: Negative.    Skin: Negative.    Neurological: Positive for headaches.   All other systems reviewed and are negative.      Physical Exam     Initial Vitals [05/01/22 1526]   BP Pulse Resp Temp SpO2   (!) 136/92 (!) 119 18 97.4 °F (36.3 °C) 97 %      MAP       --         Physical Exam    Constitutional: She appears well-developed and well-nourished.   HENT:   Head: Normocephalic.   Musculoskeletal:         General: Normal range of motion.     Neurological: She is alert and oriented to person, place, and time.   Skin: Skin is warm. No rash noted.   Psychiatric: She has a normal mood and affect.         ED Course   Procedures  Labs Reviewed   URINALYSIS, REFLEX TO URINE CULTURE   DRUG SCREEN PANEL, URINE EMERGENCY   POCT URINE PREGNANCY          Imaging Results    None          Medications - No data to display  Medical Decision Making:   ED Management:  Patient presents emergency department reports that she has a migraine for the last 4 days she reports that she has chronic facial pain she has no obvious focal neuro deficits the patient requested IV stadol and IV morphine for her headache.  I reviewed the patient's chart along with prescription monitoring program patient receives Ambien, Fioricet, and oxycodone she reported to her primary care provider on she care checked that she has seen Dr. Wilder Woodson a neurologist who does not give pain medications for headaches she states she is on a wait list to see  another neurologist.  I instructed the patient that I do not treat migraine headaches with narcotics the patient is requesting to see a physician.  The patient did wait in the emergency department for a while however a physician was unable to see her immediately therefore the patient signed out against medical advice she has no clinical signs of intoxication she understands the risk and the benefits she was instructed that she may return if she changes her mind regarding further evaluation                      Clinical Impression:   Final diagnoses:  [G43.809] Other migraine without status migrainosus, not intractable (Primary)  [Z53.29] Left against medical advice          ED Disposition Condition    AMA               Fauzia Cuevas, [facility]  05/01/22 2562

## 2022-05-01 NOTE — ED NOTES
Pt left ama after seeing ERP.  Pt verbalized an understanding of risks associated with AMA discharge.

## 2022-05-04 ENCOUNTER — PATIENT MESSAGE (OUTPATIENT)
Dept: FAMILY MEDICINE | Facility: CLINIC | Age: 32
End: 2022-05-04

## 2022-05-08 ENCOUNTER — PATIENT MESSAGE (OUTPATIENT)
Dept: FAMILY MEDICINE | Facility: CLINIC | Age: 32
End: 2022-05-08

## 2022-05-08 DIAGNOSIS — G43.719 INTRACTABLE CHRONIC MIGRAINE WITHOUT AURA AND WITHOUT STATUS MIGRAINOSUS: Primary | ICD-10-CM

## 2022-05-08 DIAGNOSIS — G50.9 TRIGEMINAL NEUROPATHY: ICD-10-CM

## 2022-05-09 NOTE — TELEPHONE ENCOUNTER
I will pend these requested medications if you want her to have them. Her pain medication is due 5-16-22. I will pend that as well if you want to go ahead and send that as well.

## 2022-05-10 ENCOUNTER — PATIENT MESSAGE (OUTPATIENT)
Dept: FAMILY MEDICINE | Facility: CLINIC | Age: 32
End: 2022-05-10

## 2022-05-11 ENCOUNTER — PATIENT MESSAGE (OUTPATIENT)
Dept: FAMILY MEDICINE | Facility: CLINIC | Age: 32
End: 2022-05-11

## 2022-05-12 ENCOUNTER — OFFICE VISIT (OUTPATIENT)
Dept: FAMILY MEDICINE | Facility: CLINIC | Age: 32
End: 2022-05-12
Payer: MEDICAID

## 2022-05-12 VITALS
WEIGHT: 293 LBS | OXYGEN SATURATION: 98 % | TEMPERATURE: 98 F | BODY MASS INDEX: 48.82 KG/M2 | SYSTOLIC BLOOD PRESSURE: 126 MMHG | DIASTOLIC BLOOD PRESSURE: 80 MMHG | HEART RATE: 116 BPM | HEIGHT: 65 IN

## 2022-05-12 DIAGNOSIS — G50.0 TRIGEMINAL NEURALGIA: ICD-10-CM

## 2022-05-12 DIAGNOSIS — H61.23 BILATERAL IMPACTED CERUMEN: Primary | ICD-10-CM

## 2022-05-12 DIAGNOSIS — G43.709 CHRONIC MIGRAINE WITHOUT AURA WITHOUT STATUS MIGRAINOSUS, NOT INTRACTABLE: ICD-10-CM

## 2022-05-12 PROCEDURE — 1159F PR MEDICATION LIST DOCUMENTED IN MEDICAL RECORD: ICD-10-PCS | Mod: CPTII,,, | Performed by: FAMILY MEDICINE

## 2022-05-12 PROCEDURE — 69209 EAR CERUMEN REMOVAL: ICD-10-PCS | Mod: 50,S$PBB,, | Performed by: FAMILY MEDICINE

## 2022-05-12 PROCEDURE — 69209 REMOVE IMPACTED EAR WAX UNI: CPT | Mod: 50,S$PBB,, | Performed by: FAMILY MEDICINE

## 2022-05-12 PROCEDURE — 4010F ACE/ARB THERAPY RXD/TAKEN: CPT | Mod: CPTII,,, | Performed by: FAMILY MEDICINE

## 2022-05-12 PROCEDURE — 69210 REMOVE IMPACTED EAR WAX UNI: CPT | Mod: 50,PBBFAC | Performed by: FAMILY MEDICINE

## 2022-05-12 PROCEDURE — 99215 OFFICE O/P EST HI 40 MIN: CPT | Performed by: FAMILY MEDICINE

## 2022-05-12 PROCEDURE — 4010F PR ACE/ARB THEARPY RXD/TAKEN: ICD-10-PCS | Mod: CPTII,,, | Performed by: FAMILY MEDICINE

## 2022-05-12 PROCEDURE — 99214 PR OFFICE/OUTPT VISIT, EST, LEVL IV, 30-39 MIN: ICD-10-PCS | Mod: 25,S$PBB,, | Performed by: FAMILY MEDICINE

## 2022-05-12 PROCEDURE — 3008F PR BODY MASS INDEX (BMI) DOCUMENTED: ICD-10-PCS | Mod: CPTII,,, | Performed by: FAMILY MEDICINE

## 2022-05-12 PROCEDURE — 1159F MED LIST DOCD IN RCRD: CPT | Mod: CPTII,,, | Performed by: FAMILY MEDICINE

## 2022-05-12 PROCEDURE — 99214 OFFICE O/P EST MOD 30 MIN: CPT | Mod: 25,S$PBB,, | Performed by: FAMILY MEDICINE

## 2022-05-12 PROCEDURE — 3008F BODY MASS INDEX DOCD: CPT | Mod: CPTII,,, | Performed by: FAMILY MEDICINE

## 2022-05-12 RX ORDER — OXYCODONE AND ACETAMINOPHEN 5; 325 MG/1; MG/1
1 TABLET ORAL EVERY 4 HOURS PRN
Qty: 90 EACH | Refills: 0 | Status: SHIPPED | OUTPATIENT
Start: 2022-05-12 | End: 2022-07-05

## 2022-05-12 RX ORDER — OXYCODONE AND ACETAMINOPHEN 5; 325 MG/1; MG/1
1 TABLET ORAL EVERY 4 HOURS PRN
Qty: 90 EACH | Refills: 0 | Status: SHIPPED | OUTPATIENT
Start: 2022-06-12 | End: 2022-07-05 | Stop reason: SDUPTHER

## 2022-05-12 RX ORDER — RIZATRIPTAN BENZOATE 10 MG/1
10 TABLET ORAL
Qty: 18 TABLET | Refills: 11 | Status: SHIPPED | OUTPATIENT
Start: 2022-05-12 | End: 2023-05-17 | Stop reason: SDUPTHER

## 2022-05-12 RX ORDER — OXYCODONE AND ACETAMINOPHEN 5; 325 MG/1; MG/1
1 TABLET ORAL EVERY 4 HOURS PRN
Qty: 90 EACH | Refills: 0 | Status: SHIPPED | OUTPATIENT
Start: 2022-07-12 | End: 2022-07-05 | Stop reason: ALTCHOICE

## 2022-05-12 RX ORDER — DIAZEPAM 5 MG/1
5 TABLET ORAL 2 TIMES DAILY PRN
COMMUNITY
Start: 2022-05-05 | End: 2023-04-17

## 2022-05-12 NOTE — PROGRESS NOTES
SUBJECTIVE:    Patient ID: Jasper Guerrero is a 31 y.o. female.    Chief Complaint: Migraine and Medication Refill  31-year-old female here today to follow-up on her chronic facial pain, pt had a dental procedure that turned out poorly in 2013 and has been suffering from chronic facial pain and migraines since then, She has had > 20 ER visits in the past 12 months and has messaged this office at least 20 times in the past 30 days. She was fired by her her previous Neurologist and migrane specialist. The next neurologist she was referred to the patient did not like. She has been referred to [facility] twice, I have expressed again to her today that this is her best current option for getting the pain from her trigeminal neuralgia and her migraines controlled.    Pt today is complaining of impacted cerumin due to Q-tip use.      Significant past medical history  Agoraphobia/Anxiety:  Valium , Fluvoxamine (LUVOX) 300mg, Buspar 7.5  Trigeminal Neuralgia: Trileptal 150mg BID  Depression: Effexor XR 225mg  PTSD:  DVT (upper extremity right arm 2019):  Migraine headaches:  Fibromyalgia:  Insomnia:    GERD:  Pantoprazole 40 mg  Migraines: Fiorocet, Imitrex, Verapamil  Nausea associated with Migraines: Reglan, Zofran      Specialists  Orthopedics:  Dr. Maher  Neurology:  Dr. Wilder Woodson, Dr Diaz (has been discharged)   Psychiatry: [facility]: Dr Kearney (Pt is trying to get back into treatment) Therapist Tonya  GYN: Dr CHIDI Reynolds  GI: Dr Villeda     Smoke: None  ETOH: None  Exercise: None     HPI      Past Medical History:   Diagnosis Date    Agoraphobia     Carpal tunnel syndrome     Depression     DVT (deep venous thrombosis)     2019 had blood clot in right arm    Fibromyalgia     Migraine headache     Nerve injury 08/2016    Lingual Nerve Injury    Obese     PTSD (post-traumatic stress disorder)     Trigeminal neuralgia      Social History     Socioeconomic History    Marital status:     Tobacco Use    Smoking status: Never Smoker    Smokeless tobacco: Never Used   Substance and Sexual Activity    Alcohol use: No    Drug use: No    Sexual activity: Yes     Partners: Male     Birth control/protection: Condom     Social Determinants of Health     Financial Resource Strain: Low Risk     Difficulty of Paying Living Expenses: Not hard at all   Food Insecurity: No Food Insecurity    Worried About Running Out of Food in the Last Year: Never true    Ran Out of Food in the Last Year: Never true   Transportation Needs: No Transportation Needs    Lack of Transportation (Medical): No    Lack of Transportation (Non-Medical): No   Physical Activity: Insufficiently Active    Days of Exercise per Week: 3 days    Minutes of Exercise per Session: 30 min   Stress: Stress Concern Present    Feeling of Stress : To some extent   Social Connections: Unknown    Frequency of Communication with Friends and Family: More than three times a week    Frequency of Social Gatherings with Friends and Family: More than three times a week    Active Member of Clubs or Organizations: No    Attends Club or Organization Meetings: Never    Marital Status:    Housing Stability: Low Risk     Unable to Pay for Housing in the Last Year: No    Number of Places Lived in the Last Year: 1    Unstable Housing in the Last Year: No     Past Surgical History:   Procedure Laterality Date    CARPAL TUNNEL RELEASE Right 12/27/2018    Dr Lozada     CARPAL TUNNEL RELEASE Left 1/28/2020    Procedure: RELEASE, CARPAL TUNNEL;  Surgeon: Brendon Lozada Jr., MD;  [facility]: [facility];  Service: Orthopedics;  Laterality: Left;    MOUTH SURGERY      WISDOM TOOTH EXTRACTION       Family History   Problem Relation Age of Onset    No Known Problems Mother     No Known Problems Father     Cancer Maternal Aunt         endometrial    No Known Problems Maternal Uncle     No Known Problems Paternal Aunt     No Known Problems  Paternal Uncle     No Known Problems Maternal Grandmother     No Known Problems Maternal Grandfather     No Known Problems Paternal Grandmother     No Known Problems Paternal Grandfather     No Known Problems Daughter     No Known Problems Son      Current Outpatient Medications   Medication Sig Dispense Refill    acetaminophen (TYLENOL) 500 MG tablet Take 500 mg by mouth every 4 to 6 hours as needed.       busPIRone (BUSPAR) 15 MG tablet Take 15 mg by mouth once daily.      butalbital-acetaminophen-caffeine -40 mg (FIORICET, ESGIC) -40 mg per tablet Take 1 tablet by mouth every 6 (six) hours as needed for Headaches. for pain. 10 tablet 1    fluvoxaMINE (LUVOX) 100 MG tablet Take 1 tablet (100 mg total) by mouth 3 (three) times daily. 90 tablet 2    lisinopriL (PRINIVIL,ZESTRIL) 20 MG tablet Take 1 tablet (20 mg total) by mouth once daily. 90 tablet 3    metFORMIN (GLUCOPHAGE-XR) 500 MG ER 24hr tablet Take 1 tablet (500 mg total) by mouth daily with breakfast. 90 tablet 3    metoclopramide HCl (REGLAN) 10 MG tablet TAKE 1 TABLET BY MOUTH EVERY 6 HOURS AS NEEDED FOR MIGRAINE AND NAUSEA (Patient taking differently: Take 10 mg by mouth every 6 (six) hours as needed. TAKE 1 TABLET BY MOUTH EVERY 6 HOURS AS NEEDED FOR MIGRAINE AND NAUSEA) 60 tablet 11    ondansetron (ZOFRAN) 4 MG tablet Take 1 tablet (4 mg total) by mouth every 8 (eight) hours as needed for Nausea. 12 tablet 0    OXcarbazepine (TRILEPTAL) 150 MG Tab Take 1 tablet (150 mg total) by mouth 2 (two) times daily. 60 tablet 11    rosuvastatin (CRESTOR) 10 MG tablet Take 1 tablet (10 mg total) by mouth every evening. 90 tablet 3    venlafaxine (EFFEXOR XR) 150 MG Cp24 Take 1 capsule (150 mg total) by mouth once daily. 30 capsule 2    venlafaxine (EFFEXOR-XR) 75 MG 24 hr capsule Take 75 mg by mouth once daily.      zolpidem (AMBIEN) 10 mg Tab Take 10 mg by mouth every evening.      bisacodyL (DULCOLAX) 5 mg EC tablet As directed on  "prep sheet      MIRALAX 17 gram/dose powder As directed on prep sheet      oxyCODONE-acetaminophen (PERCOCET) 5-325 mg per tablet Take 1 tablet by mouth every 4 (four) hours as needed for Pain. 90 each 0    [START ON 6/12/2022] oxyCODONE-acetaminophen (PERCOCET) 5-325 mg per tablet Take 1 tablet by mouth every 4 (four) hours as needed for Pain. 90 each 0    [START ON 7/12/2022] oxyCODONE-acetaminophen (PERCOCET) 5-325 mg per tablet Take 1 tablet by mouth every 4 (four) hours as needed for Pain. 90 each 0    promethazine (PHENERGAN) 25 MG tablet Take 25 mg by mouth every 4 to 6 hours as needed for Nausea.      rizatriptan (MAXALT) 10 MG tablet Take 1 tablet (10 mg total) by mouth as needed for Migraine. May repeat every 2 hours for max 3 tabs in 24 hours. Use no more than 10 days per month. 18 tablet 11    VALIUM 5 mg tablet Take 5 mg by mouth 2 (two) times daily as needed.       No current facility-administered medications for this visit.     Review of patient's allergies indicates:   Allergen Reactions    Benadryl [diphenhydramine hcl]      Paralysis on right side body     Codeine      Paralysis right body    Flexeril [cyclobenzaprine] Other (See Comments)     Numbness on right side of body    Nsaids (non-steroidal anti-inflammatory drug) Diarrhea and Nausea And Vomiting     Ulcers     Penicillins Anaphylaxis and Hives    Magnesium      "made me feel horrible"        Review of Systems   Constitutional: Negative for activity change, diaphoresis, fatigue and unexpected weight change.   HENT: Negative for congestion, hearing loss, rhinorrhea and trouble swallowing.         Chronic right sided facial pain.   Eyes: Negative for discharge and visual disturbance.   Respiratory: Negative for cough, chest tightness, shortness of breath and wheezing.    Cardiovascular: Negative for chest pain and palpitations.   Gastrointestinal: Negative for blood in stool, constipation, diarrhea and vomiting.   Endocrine: " "Negative for polydipsia and polyuria.   Genitourinary: Negative for difficulty urinating, dysuria, hematuria and menstrual problem.   Musculoskeletal: Negative for arthralgias, joint swelling and neck pain.   Neurological: Negative for weakness and headaches.   Psychiatric/Behavioral: Negative for confusion and dysphoric mood.          Blood pressure 126/80, pulse (!) 116, temperature 98.1 °F (36.7 °C), temperature source Oral, height 5' 5" (1.651 m), weight (!) 148.5 kg (327 lb 4.8 oz), SpO2 98 %. Body mass index is 54.47 kg/m².   Objective:      Physical Exam  Vitals reviewed.   Constitutional:       General: She is not in acute distress.     Appearance: Normal appearance. She is obese. She is not ill-appearing or toxic-appearing.   HENT:      Right Ear: Tympanic membrane normal. There is impacted cerumen.      Left Ear: Tympanic membrane normal. There is impacted cerumen.      Nose: Nose normal. No congestion or rhinorrhea.   Cardiovascular:      Rate and Rhythm: Normal rate and regular rhythm.      Heart sounds: Normal heart sounds. No murmur heard.  Skin:     General: Skin is warm and dry.      Capillary Refill: Capillary refill takes less than 2 seconds.   Neurological:      Mental Status: She is alert and oriented to person, place, and time.             Assessment:       1. Bilateral impacted cerumen    2. Trigeminal neuralgia    3. Chronic migraine without aura without status migrainosus, not intractable         Plan:           Bilateral impacted cerumen  -     Ear Cerumen Removal    Trigeminal neuralgia  -     oxyCODONE-acetaminophen (PERCOCET) 5-325 mg per tablet; Take 1 tablet by mouth every 4 (four) hours as needed for Pain.  Dispense: 90 each; Refill: 0  -     oxyCODONE-acetaminophen (PERCOCET) 5-325 mg per tablet; Take 1 tablet by mouth every 4 (four) hours as needed for Pain.  Dispense: 90 each; Refill: 0  -     oxyCODONE-acetaminophen (PERCOCET) 5-325 mg per tablet; Take 1 tablet by mouth every 4 " (four) hours as needed for Pain.  Dispense: 90 each; Refill: 0    Chronic migraine without aura without status migrainosus, not intractable  -     rizatriptan (MAXALT) 10 MG tablet; Take 1 tablet (10 mg total) by mouth as needed for Migraine. May repeat every 2 hours for max 3 tabs in 24 hours. Use no more than 10 days per month.  Dispense: 18 tablet; Refill: 11

## 2022-05-13 NOTE — PROCEDURES
"Ear Cerumen Removal    Date/Time: 5/12/2022 11:00 AM  Performed by: Crow Kennedy MD  Authorized by: Crow Kennedy MD     Time out: Immediately prior to procedure a "time out" was called to verify the correct patient, procedure, equipment, support staff and site/side marked as required.    Consent Done?:  Yes (Written)    Local anesthetic:  None  Location details:  Both ears  Procedure type: irrigation    Cerumen  Removal Results:  Cerumen completely removed      "

## 2022-06-27 ENCOUNTER — HOSPITAL ENCOUNTER (EMERGENCY)
Facility: HOSPITAL | Age: 32
Discharge: HOME | End: 2022-06-27
Attending: EMERGENCY MEDICINE
Payer: MEDICAID

## 2022-06-27 VITALS
HEART RATE: 89 BPM | DIASTOLIC BLOOD PRESSURE: 82 MMHG | HEIGHT: 65 IN | RESPIRATION RATE: 20 BRPM | BODY MASS INDEX: 48.82 KG/M2 | TEMPERATURE: 97 F | SYSTOLIC BLOOD PRESSURE: 129 MMHG | WEIGHT: 293 LBS | OXYGEN SATURATION: 98 %

## 2022-06-27 DIAGNOSIS — R51.9 NONINTRACTABLE HEADACHE, UNSPECIFIED CHRONICITY PATTERN, UNSPECIFIED HEADACHE TYPE: Primary | ICD-10-CM

## 2022-06-27 LAB — B-HCG UR QL: NEGATIVE

## 2022-06-27 PROCEDURE — 99284 EMERGENCY DEPT VISIT MOD MDM: CPT | Mod: 25

## 2022-06-27 PROCEDURE — 96374 THER/PROPH/DIAG INJ IV PUSH: CPT

## 2022-06-27 PROCEDURE — 81025 URINE PREGNANCY TEST: CPT | Performed by: EMERGENCY MEDICINE

## 2022-06-27 PROCEDURE — 96361 HYDRATE IV INFUSION ADD-ON: CPT

## 2022-06-27 PROCEDURE — 25000003 PHARM REV CODE 250: Performed by: EMERGENCY MEDICINE

## 2022-06-27 PROCEDURE — 63600175 PHARM REV CODE 636 W HCPCS: Performed by: EMERGENCY MEDICINE

## 2022-06-27 PROCEDURE — 96375 TX/PRO/DX INJ NEW DRUG ADDON: CPT

## 2022-06-27 RX ORDER — ACETAMINOPHEN 500 MG
1000 TABLET ORAL
Status: DISCONTINUED | OUTPATIENT
Start: 2022-06-27 | End: 2022-06-27 | Stop reason: HOSPADM

## 2022-06-27 RX ORDER — ONDANSETRON 2 MG/ML
8 INJECTION INTRAMUSCULAR; INTRAVENOUS
Status: COMPLETED | OUTPATIENT
Start: 2022-06-27 | End: 2022-06-27

## 2022-06-27 RX ORDER — BUTORPHANOL TARTRATE 1 MG/ML
1 INJECTION INTRAMUSCULAR; INTRAVENOUS
Status: COMPLETED | OUTPATIENT
Start: 2022-06-27 | End: 2022-06-27

## 2022-06-27 RX ORDER — PROCHLORPERAZINE EDISYLATE 5 MG/ML
10 INJECTION INTRAMUSCULAR; INTRAVENOUS
Status: DISCONTINUED | OUTPATIENT
Start: 2022-06-27 | End: 2022-06-27 | Stop reason: HOSPADM

## 2022-06-27 RX ADMIN — BUTORPHANOL TARTRATE 1 MG: 1 INJECTION, SOLUTION INTRAMUSCULAR; INTRAVENOUS at 01:06

## 2022-06-27 RX ADMIN — SODIUM CHLORIDE 1000 ML: 0.9 INJECTION, SOLUTION INTRAVENOUS at 01:06

## 2022-06-27 RX ADMIN — ONDANSETRON 8 MG: 2 INJECTION INTRAMUSCULAR; INTRAVENOUS at 01:06

## 2022-06-27 NOTE — ED NOTES
Pt AAOx4, Abc's intact. NADN. No adverse reaction to medication given. Pt ambulatory to ED Lobby without assistance, steady gait. Pt on phone to  for a ride home.

## 2022-06-27 NOTE — ED NOTES
Pt refused Tylenol, she states she took some PTA, and also stated that it does not work. She refused Compazine, she reports that is gives her the fight or flight feeling. Pt asked nurse if Doctor would giver her more Stadol, Nurse reported that Doctor had already said No. She asked to speak with her doctor. MD aware.

## 2022-06-27 NOTE — ED PROVIDER NOTES
"Encounter Date: 6/27/2022    SCRIBE #1 NOTE: I, Kell Mercedes, am scribing for, and in the presence of, Nish Cunningham MD.       History     Chief Complaint   Patient presents with    Headache     Time seen by provider: 1:04 PM on 06/27/2022    Jasper Guerrero is a 31 y.o. female who presents to the ED with an onset of HA with associated photophobia, blurred vision, and nausea. Sx began yesterday and has been worsening since despite use of Maxalt, Tylenol, and Fioricet which she typically takes for migraines. Last migraine episode was 2 months ago. Patient states speech was slightly slurred earlier but is currently improved. She notes Hx of chronic diarrhea. The patient denies facial droop, fever, cough, SOB, CP, constipation, vomiting, weakness on one side of the body or the other, or any other symptoms at this time. In the past, Stadol and Zofran has provided relief of migraines. She has appointment with new neurologist in [place] in August. No Hx of stroke, prior lumbar puncture, or intracranial HTN. PMHx of migraine, fibromyalgia, trigeminal neuralgia, depression, DVT. PSHx of wisdom tooth extraction and mouth surgery.    The history is provided by the patient.     Review of patient's allergies indicates:   Allergen Reactions    Benadryl [diphenhydramine hcl]      Paralysis on right side body     Codeine      Paralysis right body    Flexeril [cyclobenzaprine] Other (See Comments)     Numbness on right side of body    Nsaids (non-steroidal anti-inflammatory drug) Diarrhea and Nausea And Vomiting     Ulcers     Penicillins Anaphylaxis and Hives    Magnesium      "made me feel horrible"      Past Medical History:   Diagnosis Date    Agoraphobia     Carpal tunnel syndrome     Depression     DVT (deep venous thrombosis)     2019 had blood clot in right arm    Fibromyalgia     Migraine headache     Nerve injury 08/2016    Lingual Nerve Injury    Obese     PTSD (post-traumatic stress disorder)  "    Trigeminal neuralgia      Past Surgical History:   Procedure Laterality Date    CARPAL TUNNEL RELEASE Right 12/27/2018    Dr Lozada     CARPAL TUNNEL RELEASE Left 1/28/2020    Procedure: RELEASE, CARPAL TUNNEL;  Surgeon: Brnedon Lozada Jr., MD;  Location: [facility];  Service: Orthopedics;  Laterality: Left;    MOUTH SURGERY      WISDOM TOOTH EXTRACTION       Family History   Problem Relation Age of Onset    No Known Problems Mother     No Known Problems Father     Cancer Maternal Aunt         endometrial    No Known Problems Maternal Uncle     No Known Problems Paternal Aunt     No Known Problems Paternal Uncle     No Known Problems Maternal Grandmother     No Known Problems Maternal Grandfather     No Known Problems Paternal Grandmother     No Known Problems Paternal Grandfather     No Known Problems Daughter     No Known Problems Son      Social History     Tobacco Use    Smoking status: Never Smoker    Smokeless tobacco: Never Used   Substance Use Topics    Alcohol use: No    Drug use: No     Review of Systems   Constitutional: Negative for activity change, diaphoresis and fever.   HENT: Negative for ear pain, rhinorrhea, sore throat and trouble swallowing.    Eyes: Positive for photophobia and visual disturbance. Negative for pain.   Respiratory: Negative for cough, shortness of breath and stridor.    Cardiovascular: Negative for chest pain.   Gastrointestinal: Positive for diarrhea (chronic) and nausea. Negative for abdominal pain, blood in stool, constipation and vomiting.   Genitourinary: Negative for dysuria, hematuria, vaginal bleeding and vaginal discharge.   Musculoskeletal: Negative for gait problem.   Skin: Negative for rash and wound.   Neurological: Positive for headaches. Negative for seizures, facial asymmetry, speech difficulty and weakness.   Psychiatric/Behavioral: Negative for hallucinations and suicidal ideas.       Physical Exam     Initial Vitals [06/27/22 1244]   BP  Pulse Resp Temp SpO2   (!) 140/93 87 18 97.3 °F (36.3 °C) 98 %      MAP       --         Physical Exam    Nursing note and vitals reviewed.  Constitutional: She appears well-developed. She is not diaphoretic. She is Obese . No distress.   HENT:   Head: Normocephalic and atraumatic.   Nose: Nose normal.   Eyes: EOM are normal. No scleral icterus.   Neck: Neck supple.   Normal range of motion.  Cardiovascular: Normal rate, regular rhythm, normal heart sounds and intact distal pulses. Exam reveals no gallop and no friction rub.    No murmur heard.  Pulmonary/Chest: Breath sounds normal. No stridor. No respiratory distress. She has no wheezes. She has no rhonchi. She has no rales.   Abdominal: Abdomen is soft. Bowel sounds are normal. She exhibits no distension. There is no abdominal tenderness. There is no rebound and no guarding.   Musculoskeletal:         General: Normal range of motion.      Cervical back: Normal range of motion and neck supple.     Neurological: She is alert and oriented to person, place, and time. She has normal strength. No cranial nerve deficit or sensory deficit. GCS score is 15. GCS eye subscore is 4. GCS verbal subscore is 5. GCS motor subscore is 6.   Cranial nerves II-XII grossly intact. Finger-to-nose normal. Tone normal. Sensation intact to light touch. No drift. No disdiadochokinesia. 5/5 BUE and BLE strength. Normal gait. Negative Romberg. Speech and cognition is normal. No focal neurologic deficit.   Skin: Skin is warm and dry. Capillary refill takes less than 2 seconds. No rash noted.   Psychiatric: She has a normal mood and affect.         ED Course   Procedures  Labs Reviewed   PREGNANCY TEST, URINE RAPID    Narrative:     Specimen Source->Urine          Imaging Results    None          Medications   prochlorperazine injection Soln 10 mg (10 mg Intravenous Not Given 6/27/22 1528)   acetaminophen tablet 1,000 mg (1,000 mg Oral Not Given 6/27/22 1530)   sodium chloride 0.9% bolus  1,000 mL (1,000 mLs Intravenous New Bag 6/27/22 1323)   ondansetron injection 8 mg (8 mg Intravenous Given by Other 6/27/22 1324)   butorphanol injection 1 mg (1 mg Intravenous Given 6/27/22 1348)     Medical Decision Making:   History:   Old Medical Records: I decided to obtain old medical records.  Clinical Tests:   Lab Tests: Ordered and Reviewed  ED Management:  31 y.o. female presenting with chronic right-sided facial pain attributed to trigeminal neuralgia and chronic migraines pw HA consistent with prior migraines.  She is on chronic outpatient opiate analgesia.  I have very low suspicion for alternative causes of symptoms such as intracranial hemorrhage, ischemic process, meningitis, idiopathic intracranial hypertension, or cavernous venous sinus thrombosis.  The patient has a non-focal neurological examination with history not consistent with emergent causes of headache warranting present therapeutic intervention.  I do not think further brain imaging or lumbar puncture are indicated at this time.  Pt feels improved after headache medications and requesting discharge. Pt understands and agrees with discharge instructions. Pt also given strict return precautions for any new or worsening symptoms and plans to follow up closely with neurology.             Scribe Attestation:   Scribe #1: I performed the above scribed service and the documentation accurately describes the services I performed. I attest to the accuracy of the note.                   Attending Attestation:     Physician Attestation for Scribe:    I, Dr. Nish Cunningham, personally performed the services described in this documentation.   All medical record entries made by the scribe were at my direction and in my presence.   I have reviewed the chart and agree that the record is accurate and complete.   Nish Cunningham MD  3:55 PM 06/27/2022     DISCLAIMER: This note was prepared with [employer] voice recognition transcription  software. Garbled syntax, mangled pronouns, and other bizarre constructions may be attributed to that software system.    Clinical Impression:   Final diagnoses:  [R51.9] Nonintractable headache, unspecified chronicity pattern, unspecified headache type (Primary)          ED Disposition Condition    Discharge Stable        ED Prescriptions     None        Follow-up Information     Follow up With Specialties Details Why Contact Info    Crow Kennedy MD Family Medicine Schedule an appointment as soon as possible for a visit   [address] [place] [address]  [address] [place] [address]  [phone]      [facility] Emergency Dept Emergency Medicine Go to  As needed, If symptoms worsen [address] [place] [id number] [phone]           Nish Cunningham MD  06/27/22 6372

## 2022-06-27 NOTE — ED NOTES
Pt presents with migraine, unrelieved by home medications. She is experiencing photophobia wearing sunglasses and nausea. She does not report dizziness at this time. She is AAOx4, ABC's intact, NADN. Ambulates without assistance.

## 2022-06-27 NOTE — LETTER
Patient: Jasper Guerrero  YOB: 1990  Date: 6/27/2022 Time: 3:33 PM  Location: [facility]    Leaving the Hospital Against Medical Advice    Chart #:[id number]    This will certify that I, the undersigned,    ______________________________________________________________________    A patient in the above named medical center, having requested discharge and removal from the medical center against the advice of my attending physician(s), hereby release [facility], its physicians, officers and employees, severally and individually, from any and all liability of any nature whatsoever for any injury or harm or complication of any kind that may result directly or indirectly, by reason of my terminating my stay as a patient at [facility] and my departure from [facility], and hereby waive any and all rights of action I may now have or later acquire as a result of my voluntary departure from [facility] and the termination of my stay as a patient therein.    This release is made with the full knowledge of the danger that may result from the action which I am taking.      Date:_______________________                         ___________________________                                                                                    Patient/Legal Representative    Witness:        ____________________________                          ___________________________  Nurse                                                                        Physician

## 2022-06-27 NOTE — ED NOTES
Pt asked if her doctor would give her another dose of  Stadol. She reports that the Stadol did help her HA, but that she does not feel it managed it enough for her to go home yet. Pt AAOx4, Abc's intact. NADN. No adverse reaction to medication given. Pain is down to a 5

## 2022-06-28 DIAGNOSIS — R11.0 NAUSEA: ICD-10-CM

## 2022-06-28 DIAGNOSIS — R10.13 EPIGASTRIC PAIN: ICD-10-CM

## 2022-06-28 DIAGNOSIS — K59.1 DIARRHEA, FUNCTIONAL: Primary | ICD-10-CM

## 2022-06-28 DIAGNOSIS — R10.814 LEFT LOWER QUADRANT ABDOMINAL TENDERNESS: ICD-10-CM

## 2022-06-29 ENCOUNTER — HOSPITAL ENCOUNTER (OUTPATIENT)
Dept: RADIOLOGY | Facility: HOSPITAL | Age: 32
Discharge: HOME | End: 2022-06-29
Attending: SPECIALIST
Payer: MEDICAID

## 2022-06-29 DIAGNOSIS — R10.13 EPIGASTRIC PAIN: ICD-10-CM

## 2022-06-29 DIAGNOSIS — R10.814 LEFT LOWER QUADRANT ABDOMINAL TENDERNESS: ICD-10-CM

## 2022-06-29 DIAGNOSIS — R11.0 NAUSEA: ICD-10-CM

## 2022-06-29 DIAGNOSIS — K59.1 DIARRHEA, FUNCTIONAL: ICD-10-CM

## 2022-06-29 PROCEDURE — 74018 RADEX ABDOMEN 1 VIEW: CPT | Mod: TC,PO

## 2022-07-03 ENCOUNTER — PATIENT MESSAGE (OUTPATIENT)
Dept: FAMILY MEDICINE | Facility: CLINIC | Age: 32
End: 2022-07-03

## 2022-07-04 ENCOUNTER — HOSPITAL ENCOUNTER (EMERGENCY)
Facility: HOSPITAL | Age: 32
Discharge: HOME | End: 2022-07-04
Attending: EMERGENCY MEDICINE
Payer: MEDICAID

## 2022-07-04 VITALS
HEART RATE: 110 BPM | BODY MASS INDEX: 48.82 KG/M2 | RESPIRATION RATE: 15 BRPM | TEMPERATURE: 98 F | SYSTOLIC BLOOD PRESSURE: 140 MMHG | OXYGEN SATURATION: 96 % | HEIGHT: 65 IN | WEIGHT: 293 LBS | DIASTOLIC BLOOD PRESSURE: 72 MMHG

## 2022-07-04 DIAGNOSIS — G89.29 CHRONIC ABDOMINAL PAIN: ICD-10-CM

## 2022-07-04 DIAGNOSIS — R10.9 CHRONIC ABDOMINAL PAIN: ICD-10-CM

## 2022-07-04 DIAGNOSIS — G43.719 INTRACTABLE CHRONIC MIGRAINE WITHOUT AURA AND WITHOUT STATUS MIGRAINOSUS: ICD-10-CM

## 2022-07-04 DIAGNOSIS — Z79.891 CHRONICALLY ON OPIATE THERAPY: ICD-10-CM

## 2022-07-04 DIAGNOSIS — R10.84 GENERALIZED ABDOMINAL PAIN: Primary | ICD-10-CM

## 2022-07-04 LAB
ALBUMIN SERPL BCP-MCNC: 4.2 G/DL (ref 3.5–5.2)
ALP SERPL-CCNC: 75 U/L (ref 55–135)
ALT SERPL W/O P-5'-P-CCNC: 42 U/L (ref 10–44)
ANION GAP SERPL CALC-SCNC: 5 MMOL/L (ref 8–16)
AST SERPL-CCNC: 24 U/L (ref 10–40)
B-HCG UR QL: NEGATIVE
BACTERIA #/AREA URNS HPF: ABNORMAL /HPF
BASOPHILS # BLD AUTO: 0.02 K/UL (ref 0–0.2)
BASOPHILS NFR BLD: 0.2 % (ref 0–1.9)
BILIRUB SERPL-MCNC: 0.3 MG/DL (ref 0.1–1)
BILIRUB UR QL STRIP: NEGATIVE
BUN SERPL-MCNC: 10 MG/DL (ref 6–20)
CALCIUM SERPL-MCNC: 9 MG/DL (ref 8.7–10.5)
CHLORIDE SERPL-SCNC: 102 MMOL/L (ref 95–110)
CLARITY UR: CLEAR
CO2 SERPL-SCNC: 27 MMOL/L (ref 23–29)
COLOR UR: YELLOW
CREAT SERPL-MCNC: 0.8 MG/DL (ref 0.5–1.4)
CTP QC/QA: YES
DIFFERENTIAL METHOD: NORMAL
EOSINOPHIL # BLD AUTO: 0.1 K/UL (ref 0–0.5)
EOSINOPHIL NFR BLD: 0.9 % (ref 0–8)
ERYTHROCYTE [DISTWIDTH] IN BLOOD BY AUTOMATED COUNT: 13.9 % (ref 11.5–14.5)
EST. GFR  (AFRICAN AMERICAN): >60 ML/MIN/1.73 M^2
EST. GFR  (NON AFRICAN AMERICAN): >60 ML/MIN/1.73 M^2
GLUCOSE SERPL-MCNC: 130 MG/DL (ref 70–110)
GLUCOSE UR QL STRIP: NEGATIVE
HCT VFR BLD AUTO: 40.6 % (ref 37–48.5)
HGB BLD-MCNC: 13.2 G/DL (ref 12–16)
HGB UR QL STRIP: NEGATIVE
HYALINE CASTS #/AREA URNS LPF: 4 /LPF
IMM GRANULOCYTES # BLD AUTO: 0.03 K/UL (ref 0–0.04)
IMM GRANULOCYTES NFR BLD AUTO: 0.3 % (ref 0–0.5)
KETONES UR QL STRIP: NEGATIVE
LEUKOCYTE ESTERASE UR QL STRIP: ABNORMAL
LIPASE SERPL-CCNC: 210 U/L (ref 4–60)
LYMPHOCYTES # BLD AUTO: 4.7 K/UL (ref 1–4.8)
LYMPHOCYTES NFR BLD: 39.9 % (ref 18–48)
MCH RBC QN AUTO: 28.7 PG (ref 27–31)
MCHC RBC AUTO-ENTMCNC: 32.5 G/DL (ref 32–36)
MCV RBC AUTO: 88 FL (ref 82–98)
MICROSCOPIC COMMENT: ABNORMAL
MONOCYTES # BLD AUTO: 0.8 K/UL (ref 0.3–1)
MONOCYTES NFR BLD: 6.5 % (ref 4–15)
NEUTROPHILS # BLD AUTO: 6.1 K/UL (ref 1.8–7.7)
NEUTROPHILS NFR BLD: 52.2 % (ref 38–73)
NITRITE UR QL STRIP: NEGATIVE
NRBC BLD-RTO: 0 /100 WBC
PH UR STRIP: 7 [PH] (ref 5–8)
PLATELET # BLD AUTO: 349 K/UL (ref 150–450)
PMV BLD AUTO: 9.3 FL (ref 9.2–12.9)
POTASSIUM SERPL-SCNC: 3.9 MMOL/L (ref 3.5–5.1)
PROT SERPL-MCNC: 7.8 G/DL (ref 6–8.4)
PROT UR QL STRIP: NEGATIVE
RBC # BLD AUTO: 4.6 M/UL (ref 4–5.4)
RBC #/AREA URNS HPF: 5 /HPF (ref 0–4)
SODIUM SERPL-SCNC: 134 MMOL/L (ref 136–145)
SP GR UR STRIP: 1.02 (ref 1–1.03)
SQUAMOUS #/AREA URNS HPF: 8 /HPF
URN SPEC COLLECT METH UR: ABNORMAL
UROBILINOGEN UR STRIP-ACNC: NEGATIVE EU/DL
WBC # BLD AUTO: 11.71 K/UL (ref 3.9–12.7)
WBC #/AREA URNS HPF: 5 /HPF (ref 0–5)

## 2022-07-04 PROCEDURE — 81025 URINE PREGNANCY TEST: CPT | Performed by: NURSE PRACTITIONER

## 2022-07-04 PROCEDURE — 85025 COMPLETE CBC W/AUTO DIFF WBC: CPT | Performed by: NURSE PRACTITIONER

## 2022-07-04 PROCEDURE — 96375 TX/PRO/DX INJ NEW DRUG ADDON: CPT

## 2022-07-04 PROCEDURE — 96374 THER/PROPH/DIAG INJ IV PUSH: CPT | Mod: 59

## 2022-07-04 PROCEDURE — 96376 TX/PRO/DX INJ SAME DRUG ADON: CPT

## 2022-07-04 PROCEDURE — 80053 COMPREHEN METABOLIC PANEL: CPT | Performed by: NURSE PRACTITIONER

## 2022-07-04 PROCEDURE — 83690 ASSAY OF LIPASE: CPT | Performed by: NURSE PRACTITIONER

## 2022-07-04 PROCEDURE — 25000003 PHARM REV CODE 250: Performed by: STUDENT IN AN ORGANIZED HEALTH CARE EDUCATION/TRAINING PROGRAM

## 2022-07-04 PROCEDURE — 25500020 PHARM REV CODE 255: Performed by: EMERGENCY MEDICINE

## 2022-07-04 PROCEDURE — 81001 URINALYSIS AUTO W/SCOPE: CPT | Performed by: NURSE PRACTITIONER

## 2022-07-04 PROCEDURE — 96361 HYDRATE IV INFUSION ADD-ON: CPT

## 2022-07-04 PROCEDURE — 63600175 PHARM REV CODE 636 W HCPCS: Performed by: STUDENT IN AN ORGANIZED HEALTH CARE EDUCATION/TRAINING PROGRAM

## 2022-07-04 PROCEDURE — 99285 EMERGENCY DEPT VISIT HI MDM: CPT | Mod: 25

## 2022-07-04 RX ORDER — HYDROCODONE BITARTRATE AND ACETAMINOPHEN 5; 325 MG/1; MG/1
1 TABLET ORAL EVERY 6 HOURS PRN
COMMUNITY
Start: 2022-06-08 | End: 2022-07-05

## 2022-07-04 RX ORDER — MORPHINE SULFATE 4 MG/ML
4 INJECTION, SOLUTION INTRAMUSCULAR; INTRAVENOUS
Status: COMPLETED | OUTPATIENT
Start: 2022-07-04 | End: 2022-07-04

## 2022-07-04 RX ORDER — DICYCLOMINE HYDROCHLORIDE 10 MG/1
10 CAPSULE ORAL 4 TIMES DAILY PRN
COMMUNITY
Start: 2022-06-08 | End: 2022-10-03

## 2022-07-04 RX ORDER — OXCARBAZEPINE 600 MG/1
600 TABLET, FILM COATED ORAL DAILY
COMMUNITY
Start: 2022-06-09 | End: 2022-07-14

## 2022-07-04 RX ORDER — CHLORDIAZEPOXIDE HYDROCHLORIDE AND CLIDINIUM BROMIDE 5; 2.5 MG/1; MG/1
1 CAPSULE ORAL 3 TIMES DAILY PRN
COMMUNITY
Start: 2022-06-26 | End: 2022-10-03

## 2022-07-04 RX ORDER — ONDANSETRON 2 MG/ML
4 INJECTION INTRAMUSCULAR; INTRAVENOUS
Status: COMPLETED | OUTPATIENT
Start: 2022-07-04 | End: 2022-07-04

## 2022-07-04 RX ADMIN — MORPHINE SULFATE 4 MG: 4 INJECTION, SOLUTION INTRAMUSCULAR; INTRAVENOUS at 05:07

## 2022-07-04 RX ADMIN — IOHEXOL 100 ML: 350 INJECTION, SOLUTION INTRAVENOUS at 07:07

## 2022-07-04 RX ADMIN — ONDANSETRON 4 MG: 2 INJECTION INTRAMUSCULAR; INTRAVENOUS at 05:07

## 2022-07-04 RX ADMIN — SODIUM CHLORIDE 1000 ML: 0.9 INJECTION, SOLUTION INTRAVENOUS at 05:07

## 2022-07-04 RX ADMIN — MORPHINE SULFATE 4 MG: 4 INJECTION, SOLUTION INTRAMUSCULAR; INTRAVENOUS at 07:07

## 2022-07-04 NOTE — FIRST PROVIDER EVALUATION
"Medical screening exam completed.  I have conducted a focused provider triage encounter, findings are as follows:    Brief history of present illness:  Nausea diarrhea, diffuse abdominal pain for 1 month ever since having colonoscopy. Also here because she ran out of percocet meds for her trigeminal neuralgia and having facial pain and trigeminal flare not improving with OTC tylenol.     Vitals:    07/04/22 1620   BP: (!) 144/89   BP Location: Left arm   Patient Position: Sitting   Pulse: 105   Resp: 18   Temp: 98.5 °F (36.9 °C)   TempSrc: Oral   SpO2: 99%   Weight: (!) 148.3 kg (327 lb)   Height: 5' 5" (1.651 m)       Pertinent physical exam:  Appears in no acute distress, slightly tachy, otherwise normal vitals.     Brief workup plan:  See orders placed.     Preliminary workup initiated; this workup will be continued and followed by the physician or advanced practice provider that is assigned to the patient when roomed.  "

## 2022-07-04 NOTE — ED PROVIDER NOTES
"Encounter Date: 7/4/2022       History     Chief Complaint   Patient presents with    Facial Pain     RT FACE ONSET THIS, IM HAVING TRIGEMINAL NERVE PAIN. OUT OF PERCOCETS    Abdominal Pain     X 1 MOS    Nausea    Diarrhea     HPI     31F with hx of chronic trigeminal neuralgia and migraine, chronic abdominal pain on opiates, presenting with diffuse abdominal pain and right sided headache consistent with her trigeminal neuralgia and chronic abdominal pain. She says she ran out of her home percocet a few days ago and is having increased pain, will not be able to get a refill for a week. Denies fever, neck pain, stiffness, photophobia, vomiting. Has chronic loose stools, denies hematochezia/melena, denies urinary symptoms. Says the only meds that work for her headache are morphine and zofran. Of note, many presentations for similar.     Review of patient's allergies indicates:   Allergen Reactions    Benadryl [diphenhydramine hcl]      Paralysis on right side body     Codeine      Paralysis right body    Flexeril [cyclobenzaprine] Other (See Comments)     Numbness on right side of body    Nsaids (non-steroidal anti-inflammatory drug) Diarrhea and Nausea And Vomiting     Ulcers     Penicillins Anaphylaxis and Hives    Magnesium      "made me feel horrible"      Past Medical History:   Diagnosis Date    Agoraphobia     Carpal tunnel syndrome     Depression     DVT (deep venous thrombosis)     2019 had blood clot in right arm    Fibromyalgia     Migraine headache     Nerve injury 08/2016    Lingual Nerve Injury    Obese     PTSD (post-traumatic stress disorder)     Trigeminal neuralgia      Past Surgical History:   Procedure Laterality Date    CARPAL TUNNEL RELEASE Right 12/27/2018    Dr Lozada     CARPAL TUNNEL RELEASE Left 1/28/2020    Procedure: RELEASE, CARPAL TUNNEL;  Surgeon: Brendon Lozada Jr., MD;  [facility]: [facility];  Service: Orthopedics;  Laterality: Left;    MOUTH SURGERY      WISDOM " TOOTH EXTRACTION       Family History   Problem Relation Age of Onset    No Known Problems Mother     No Known Problems Father     Cancer Maternal Aunt         endometrial    No Known Problems Maternal Uncle     No Known Problems Paternal Aunt     No Known Problems Paternal Uncle     No Known Problems Maternal Grandmother     No Known Problems Maternal Grandfather     No Known Problems Paternal Grandmother     No Known Problems Paternal Grandfather     No Known Problems Daughter     No Known Problems Son      Social History     Tobacco Use    Smoking status: Never Smoker    Smokeless tobacco: Never Used   Substance Use Topics    Alcohol use: No    Drug use: No     Review of Systems   Constitutional: Negative for chills, diaphoresis, fatigue and fever.   HENT: Negative for sore throat.    Respiratory: Negative for shortness of breath.    Cardiovascular: Negative for chest pain.   Gastrointestinal: Positive for abdominal pain and nausea. Negative for abdominal distention, anal bleeding, blood in stool and vomiting. Diarrhea: chronic loose stools.   Genitourinary: Negative for dysuria.   Musculoskeletal: Negative for back pain, neck pain and neck stiffness.   Skin: Negative for rash.   Neurological: Positive for headaches. Negative for syncope, weakness, light-headedness and numbness.   Hematological: Does not bruise/bleed easily.       Physical Exam     Initial Vitals [07/04/22 1620]   BP Pulse Resp Temp SpO2   (!) 144/89 105 18 98.5 °F (36.9 °C) 99 %      MAP       --         Physical Exam    Nursing note and vitals reviewed.  Constitutional: She appears well-developed and well-nourished. She is not diaphoretic.   HENT:   Head: Normocephalic and atraumatic.   Right Ear: External ear normal.   Left Ear: External ear normal.   Eyes: Conjunctivae and EOM are normal.   Neck: Neck supple.   Cardiovascular: Regular rhythm, normal heart sounds and intact distal pulses.   Mild tachycardia 100s    Pulmonary/Chest: Breath sounds normal. No respiratory distress. She has no wheezes. She has no rhonchi. She has no rales.   Abdominal: Abdomen is soft. She exhibits no distension. There is abdominal tenderness (mild diffuse tenderness). There is no rebound and no guarding.   Musculoskeletal:         General: No tenderness or edema. Normal range of motion.      Cervical back: Neck supple.     Neurological: She is alert and oriented to person, place, and time. GCS score is 15. GCS eye subscore is 4. GCS verbal subscore is 5. GCS motor subscore is 6.   Skin: Skin is warm. Capillary refill takes less than 2 seconds. No rash noted.   Psychiatric: She has a normal mood and affect.         ED Course   Procedures  Labs Reviewed   COMPREHENSIVE METABOLIC PANEL - Abnormal; Notable for the following components:       Result Value    Sodium 134 (*)     Glucose 130 (*)     Anion Gap 5 (*)     All other components within normal limits   LIPASE - Abnormal; Notable for the following components:    Lipase 210 (*)     All other components within normal limits   URINALYSIS, REFLEX TO URINE CULTURE - Abnormal; Notable for the following components:    Leukocytes, UA Trace (*)     All other components within normal limits    Narrative:     Specimen Source->Urine   URINALYSIS MICROSCOPIC - Abnormal; Notable for the following components:    RBC, UA 5 (*)     Hyaline Casts, UA 4 (*)     All other components within normal limits    Narrative:     Specimen Source->Urine   CBC W/ AUTO DIFFERENTIAL   POCT URINE PREGNANCY          Imaging Results          CT Abdomen Pelvis With Contrast (Final result)  Result time 07/04/22 19:38:36    Final result by Javier Nguyen MD (07/04/22 19:38:36)                 Narrative:    CMS MANDATED QUALITY DATA-CT RADIATION DOSE-436  All CT scans at this facility use dose modulation, iterative reconstruction, and or weight-based dosing when appropriate to reduce radiation dose to as low as reasonably  achievable.    HISTORY: Acute epigastric abdominal pain, elevated lipase    FINDINGS: Axial postcontrast imaging was performed with 100 Omnipaque 350 IV contrast. Comparison to multiple prior exams.    CT ABDOMEN: The lung bases are clear. The liver and gallbladder enhance normally, with no calcified gallstones or biliary ductal dilatation. The spleen is normal in size and enhances normally, with the pancreas, adrenal glands and kidneys enhancing normally. There are no renal calculi or hydronephrosis.    The abdominal aorta and iliac arteries enhance normally, and are normal in caliber. There are no enlarged mesenteric or retroperitoneal lymph nodes. No bowel wall thickening, inflammation, or bowel obstruction. The appendix is normal. There is no ascites or intraperitoneal free air. No ventral abdominal hernia.    CT PELVIS: The rectum, uterus, adnexa and urinary bladder enhance normally. There is no pelvic free fluid or mass, with no enlarged pelvic or inguinal lymph nodes.    The extraperitoneal soft tissues enhance normally. There are no acute fractures or destructive osseous lesions. There is sacralization of the left L5 vertebral body.    IMPRESSION: No acute findings in the abdomen or pelvis.    Electronically signed by:  Javier Nguyen MD  7/4/2022 7:38 PM CDT Workstation: [id number]                               Medications   ondansetron injection 4 mg (4 mg Intravenous Given 7/4/22 1756)   morphine injection 4 mg (4 mg Intravenous Given 7/4/22 1756)   sodium chloride 0.9% bolus 1,000 mL (0 mLs Intravenous Stopped 7/4/22 1857)   morphine injection 4 mg (4 mg Intravenous Given 7/4/22 1935)   iohexoL (OMNIPAQUE 350) injection 100 mL (100 mLs Intravenous Given 7/4/22 1920)     Medical Decision Making:   History:   Old Medical Records: I decided to obtain old medical records.  Initial Assessment:   31F w chronic HA/trigeminal neuralgia, chronic abd pain on opiates, presenting with abd pain and headache. No  fever/nuchal rigidity/photophobia, michelle vomiting or urinary sx so low suspicion of any . Exam with soft abd, not tense, mild tenderness to palpation. Seems to be flair of her chronic pain, also could be pancreatitis, cholecystitis, gastroenteritis, complex migraine. Plan to treat pain and provide IVF for mild tachycardia. Will reassess.   Clinical Tests:   Lab Tests: Ordered and Reviewed  Radiological Study: Ordered and Reviewed             ED Course as of 07/04/22 2113 Mon Jul 04, 2022 1905 Re-evaluated patient. Continues with pain- reporting epigastric pain radiating to back. Given this Sx and elevated lipase, will obtain CTAP. Patient amenable. [VS]   1958 CT Abdomen Pelvis With Contrast  CTAP negative for any acute process. Pancreas w normal enhancement. Plan to discharge and recommend continued close follow up with her care team.  [VS]      ED Course User Index  [VS] Katelyn Rowland DO             Clinical Impression:   Final diagnoses:  [R10.84] Generalized abdominal pain (Primary)  [R10.9, G89.29] Chronic abdominal pain  [G43.719] Intractable chronic migraine without aura and without status migrainosus  [Z79.891] Chronically on opiate therapy          ED Disposition Condition    Discharge Stable        ED Prescriptions     None        Follow-up Information     Follow up With Specialties Details Why Contact Info Additional Information    Crow Kennedy MD Family Medicine Call  Please call for continued health concerns and schedule an appointment. [address]  [address]  [facility] [address]  [phone]       [facility] - Emergency Dept Emergency Medicine Go to  As needed, If symptoms worsen [address] [facility] [id number] [phone] 1st floor           Katelyn Rowland DO  Resident  07/04/22 2113

## 2022-07-05 ENCOUNTER — PATIENT MESSAGE (OUTPATIENT)
Dept: FAMILY MEDICINE | Facility: CLINIC | Age: 32
End: 2022-07-05

## 2022-07-05 ENCOUNTER — OFFICE VISIT (OUTPATIENT)
Dept: FAMILY MEDICINE | Facility: CLINIC | Age: 32
End: 2022-07-05
Payer: MEDICAID

## 2022-07-05 VITALS
BODY MASS INDEX: 48.82 KG/M2 | OXYGEN SATURATION: 98 % | HEIGHT: 65 IN | SYSTOLIC BLOOD PRESSURE: 138 MMHG | TEMPERATURE: 98 F | WEIGHT: 293 LBS | DIASTOLIC BLOOD PRESSURE: 80 MMHG | HEART RATE: 107 BPM

## 2022-07-05 DIAGNOSIS — R10.30 LOWER ABDOMINAL PAIN: ICD-10-CM

## 2022-07-05 DIAGNOSIS — G50.0 TRIGEMINAL NEURALGIA: ICD-10-CM

## 2022-07-05 DIAGNOSIS — R10.30 LOWER ABDOMINAL PAIN: Primary | ICD-10-CM

## 2022-07-05 PROCEDURE — 3044F PR MOST RECENT HEMOGLOBIN A1C LEVEL <7.0%: ICD-10-PCS | Mod: CPTII,,, | Performed by: FAMILY MEDICINE

## 2022-07-05 PROCEDURE — 1159F PR MEDICATION LIST DOCUMENTED IN MEDICAL RECORD: ICD-10-PCS | Mod: CPTII,,, | Performed by: FAMILY MEDICINE

## 2022-07-05 PROCEDURE — 3079F PR MOST RECENT DIASTOLIC BLOOD PRESSURE 80-89 MM HG: ICD-10-PCS | Mod: CPTII,,, | Performed by: FAMILY MEDICINE

## 2022-07-05 PROCEDURE — 3075F PR MOST RECENT SYSTOLIC BLOOD PRESS GE 130-139MM HG: ICD-10-PCS | Mod: CPTII,,, | Performed by: FAMILY MEDICINE

## 2022-07-05 PROCEDURE — 3079F DIAST BP 80-89 MM HG: CPT | Mod: CPTII,,, | Performed by: FAMILY MEDICINE

## 2022-07-05 PROCEDURE — 99213 PR OFFICE/OUTPT VISIT, EST, LEVL III, 20-29 MIN: ICD-10-PCS | Mod: S$PBB,,, | Performed by: FAMILY MEDICINE

## 2022-07-05 PROCEDURE — 4010F PR ACE/ARB THEARPY RXD/TAKEN: ICD-10-PCS | Mod: CPTII,,, | Performed by: FAMILY MEDICINE

## 2022-07-05 PROCEDURE — 1159F MED LIST DOCD IN RCRD: CPT | Mod: CPTII,,, | Performed by: FAMILY MEDICINE

## 2022-07-05 PROCEDURE — 99215 OFFICE O/P EST HI 40 MIN: CPT | Performed by: FAMILY MEDICINE

## 2022-07-05 PROCEDURE — 3008F PR BODY MASS INDEX (BMI) DOCUMENTED: ICD-10-PCS | Mod: CPTII,,, | Performed by: FAMILY MEDICINE

## 2022-07-05 PROCEDURE — 3008F BODY MASS INDEX DOCD: CPT | Mod: CPTII,,, | Performed by: FAMILY MEDICINE

## 2022-07-05 PROCEDURE — 4010F ACE/ARB THERAPY RXD/TAKEN: CPT | Mod: CPTII,,, | Performed by: FAMILY MEDICINE

## 2022-07-05 PROCEDURE — 3075F SYST BP GE 130 - 139MM HG: CPT | Mod: CPTII,,, | Performed by: FAMILY MEDICINE

## 2022-07-05 PROCEDURE — 3044F HG A1C LEVEL LT 7.0%: CPT | Mod: CPTII,,, | Performed by: FAMILY MEDICINE

## 2022-07-05 PROCEDURE — 99213 OFFICE O/P EST LOW 20 MIN: CPT | Mod: S$PBB,,, | Performed by: FAMILY MEDICINE

## 2022-07-05 RX ORDER — COLESEVELAM 180 1/1
1875 TABLET ORAL 2 TIMES DAILY WITH MEALS
Qty: 180 TABLET | Refills: 3 | Status: SHIPPED | OUTPATIENT
Start: 2022-07-05 | End: 2022-10-03

## 2022-07-05 RX ORDER — OXYCODONE AND ACETAMINOPHEN 5; 325 MG/1; MG/1
1 TABLET ORAL
Qty: 90 EACH | Refills: 0 | Status: SHIPPED | OUTPATIENT
Start: 2022-07-05 | End: 2022-07-05 | Stop reason: SDUPTHER

## 2022-07-05 RX ORDER — COLESEVELAM 180 1/1
1875 TABLET ORAL 2 TIMES DAILY WITH MEALS
Qty: 540 TABLET | Refills: 3 | Status: SHIPPED | OUTPATIENT
Start: 2022-07-05 | End: 2022-07-05 | Stop reason: SDUPTHER

## 2022-07-05 RX ORDER — DIPHENOXYLATE HYDROCHLORIDE AND ATROPINE SULFATE 2.5; .025 MG/1; MG/1
1 TABLET ORAL DAILY PRN
Qty: 20 TABLET | Refills: 0 | Status: SHIPPED | OUTPATIENT
Start: 2022-07-05 | End: 2022-07-15

## 2022-07-05 RX ORDER — OXYCODONE AND ACETAMINOPHEN 5; 325 MG/1; MG/1
1 TABLET ORAL
Qty: 90 EACH | Refills: 0 | Status: SHIPPED | OUTPATIENT
Start: 2022-07-05 | End: 2022-07-19 | Stop reason: SDUPTHER

## 2022-07-05 NOTE — DISCHARGE INSTRUCTIONS
Future Appointments   Date Time Provider Department Center   8/8/2022  3:00 PM Crow Kennedy MD [facility] MD [facility]     Your CT scan was negative for any acute process today. Please continue to follow up with your primary care doctor and specialists.

## 2022-07-05 NOTE — PROGRESS NOTES
SUBJECTIVE:    Patient ID: Jasper Guerrero is a 31 y.o. female.    Chief Complaint: Abdominal Pain  31-year-old female here today complaining of > 1 month of abdominal pain. Pt had been referred to GI for the complaint of chronic nausea. Pt has a long hx of chronic pain, (Trigeinal neuralgia, and Head aches) she is on chronic narcotics.   Today she is complaining of Abdominal pain, feels like her intestines are being stretched. Pt states that this started June 8th after her colonoscopy. Pain has gotten worse since then. She initially had some rectal bleeding and followed up in the ER and she was seen in the ER yesterday for abdominal pain.  She had an abdominal CT that did not identify any abdominal or pelvic pathology. In the ER she had an elevated Lipase. Pt is having watery diarrhea for the past month. Her abdominal pain is not relieved with bowel movements. Pt has been prescribed Bentyl and Librax. Pt has a virtual appointment in 4 days with GI.    Since some of her ER visits and her GI appointments are not in the network I cannot tell which parts of her workup for abdominal pain and diarrhea has been completed.      Pt is having watery diarrhea for the past month. He abdominal pain is not relieved with bowel movements.     Pt has been prescribed Bentyl and Librax,     Pt has recently had a HIDA scan and she has not received the results.  Lipase 4 - 60 U/L 210 High           CT ABDOMEN: The lung bases are clear. The liver and gallbladder enhance normally, with no calcified gallstones or biliary ductal dilatation. The spleen is normal in size and enhances normally, with the pancreas, adrenal glands and kidneys enhancing normally. There are no renal calculi or hydronephrosis.     The abdominal aorta and iliac arteries enhance normally, and are normal in caliber. There are no enlarged mesenteric or retroperitoneal lymph nodes. No bowel wall thickening, inflammation, or bowel obstruction. The appendix is  normal. There is no ascites or intraperitoneal free air. No ventral abdominal hernia.     CT PELVIS: The rectum, uterus, adnexa and urinary bladder enhance normally. There is no pelvic free fluid or mass, with no enlarged pelvic or inguinal lymph nodes.     The extraperitoneal soft tissues enhance normally. There are no acute fractures or destructive osseous lesions. There is sacralization of the left L5 vertebral body.     IMPRESSION: No acute findings in the abdomen or pelvis.    Significant past medical history  Agoraphobia/Anxiety:  Valium , Fluvoxamine (LUVOX) 300mg, Buspar 7.5  Trigeminal Neuralgia: Trileptal 150mg BID  Depression: Effexor XR 225mg  PTSD:  DVT (upper extremity right arm 2019):  Migraine headaches:  Fibromyalgia:  Insomnia:    GERD:  Pantoprazole 40 mg  Migraines: Fiorocet, Imitrex, Verapamil  Nausea associated with Migraines: Reglan, Zofran      Specialists  Orthopedics:  Dr. Maher  Neurology:  Dr. Wilder Woodson, Dr Diaz (has been discharged)   Psychiatry: [facility]: Dr Kearney (Pt is trying to get back into treatment) Therapist Tonya  GYN: Dr CHIDI Reynolds  GI: Dr Villeda     Smoke: None  ETOH: None  Exercise: None     Abdominal Pain  This is a new problem. The current episode started 1 to 4 weeks ago. The onset quality is sudden. The problem occurs constantly. The most recent episode lasted 7 days. The problem has been rapidly worsening. The pain is located in the generalized abdominal region. The pain is at a severity of 9/10. The pain is severe. The quality of the pain is aching, cramping, dull, sharp and tearing. Associated symptoms include anorexia, arthralgias, diarrhea, myalgias, nausea and vomiting. Pertinent negatives include no belching, constipation, dysuria, fever, flatus, frequency, headaches, hematochezia, hematuria, melena or weight loss. The pain is aggravated by movement. The pain is relieved by nothing. She has tried acetaminophen, antacids and oral narcotic  analgesics for the symptoms. The treatment provided significant relief. Prior diagnostic workup includes CT scan, GI consult, lower endoscopy and ultrasound. Her past medical history is significant for GERD, irritable bowel syndrome and PUD. There is no history of abdominal surgery, colon cancer, Crohn's disease, gallstones, pancreatitis or ulcerative colitis. Patient's medical history does not include kidney stones and UTI.         Past Medical History:   Diagnosis Date    Agoraphobia     Carpal tunnel syndrome     Depression     DVT (deep venous thrombosis)     2019 had blood clot in right arm    Fibromyalgia     Migraine headache     Nerve injury 08/2016    Lingual Nerve Injury    Obese     PTSD (post-traumatic stress disorder)     Trigeminal neuralgia      Social History     Socioeconomic History    Marital status:    Tobacco Use    Smoking status: Never Smoker    Smokeless tobacco: Never Used   Substance and Sexual Activity    Alcohol use: No    Drug use: No    Sexual activity: Yes     Partners: Male     Birth control/protection: Condom     Social Determinants of Health     Financial Resource Strain: Low Risk     Difficulty of Paying Living Expenses: Not hard at all   Food Insecurity: No Food Insecurity    Worried About Running Out of Food in the Last Year: Never true    Ran Out of Food in the Last Year: Never true   Transportation Needs: No Transportation Needs    Lack of Transportation (Medical): No    Lack of Transportation (Non-Medical): No   Physical Activity: Inactive    Days of Exercise per Week: 0 days    Minutes of Exercise per Session: 0 min   Stress: Stress Concern Present    Feeling of Stress : To some extent   Social Connections: Unknown    Frequency of Communication with Friends and Family: More than three times a week    Frequency of Social Gatherings with Friends and Family: More than three times a week    Active Member of Clubs or Organizations: No    Attends  Club or Organization Meetings: Never    Marital Status:    Housing Stability: Low Risk     Unable to Pay for Housing in the Last Year: No    Number of Places Lived in the Last Year: 1    Unstable Housing in the Last Year: No     Past Surgical History:   Procedure Laterality Date    CARPAL TUNNEL RELEASE Right 12/27/2018    Dr Lozada     CARPAL TUNNEL RELEASE Left 1/28/2020    Procedure: RELEASE, CARPAL TUNNEL;  Surgeon: Brendon Lozada Jr., MD;  Location: [facility];  Service: Orthopedics;  Laterality: Left;    MOUTH SURGERY      WISDOM TOOTH EXTRACTION       Family History   Problem Relation Age of Onset    No Known Problems Mother     No Known Problems Father     Cancer Maternal Aunt         endometrial    No Known Problems Maternal Uncle     No Known Problems Paternal Aunt     No Known Problems Paternal Uncle     No Known Problems Maternal Grandmother     No Known Problems Maternal Grandfather     No Known Problems Paternal Grandmother     No Known Problems Paternal Grandfather     No Known Problems Daughter     No Known Problems Son      Current Outpatient Medications   Medication Sig Dispense Refill    acetaminophen (TYLENOL) 500 MG tablet Take 500 mg by mouth every 4 to 6 hours as needed.       busPIRone (BUSPAR) 15 MG tablet Take 15 mg by mouth once daily.      butalbital-acetaminophen-caffeine -40 mg (FIORICET, ESGIC) -40 mg per tablet Take 1 tablet by mouth every 6 (six) hours as needed for Headaches. for pain. 10 tablet 1    chlordiazepoxide-clidinium 5-2.5 mg (LIBRAX) 5-2.5 mg Cap Take 1 capsule by mouth 3 (three) times daily as needed.      dicyclomine (BENTYL) 10 MG capsule Take 10 mg by mouth 4 (four) times daily as needed.      fluvoxaMINE (LUVOX) 100 MG tablet Take 1 tablet (100 mg total) by mouth 3 (three) times daily. 90 tablet 2    lisinopriL (PRINIVIL,ZESTRIL) 20 MG tablet Take 1 tablet (20 mg total) by mouth once daily. 90 tablet 3    metFORMIN  (GLUCOPHAGE-XR) 500 MG ER 24hr tablet Take 1 tablet (500 mg total) by mouth daily with breakfast. 90 tablet 3    metoclopramide HCl (REGLAN) 10 MG tablet TAKE 1 TABLET BY MOUTH EVERY 6 HOURS AS NEEDED FOR MIGRAINE AND NAUSEA (Patient taking differently: Take 10 mg by mouth every 6 (six) hours as needed. TAKE 1 TABLET BY MOUTH EVERY 6 HOURS AS NEEDED FOR MIGRAINE AND NAUSEA) 60 tablet 11    ondansetron (ZOFRAN) 4 MG tablet Take 1 tablet (4 mg total) by mouth every 8 (eight) hours as needed for Nausea. 12 tablet 0    OXcarbazepine (TRILEPTAL) 600 MG Tab Take 600 mg by mouth once daily.      rizatriptan (MAXALT) 10 MG tablet Take 1 tablet (10 mg total) by mouth as needed for Migraine. May repeat every 2 hours for max 3 tabs in 24 hours. Use no more than 10 days per month. 18 tablet 11    rosuvastatin (CRESTOR) 10 MG tablet Take 1 tablet (10 mg total) by mouth every evening. 90 tablet 3    VALIUM 5 mg tablet Take 5 mg by mouth 2 (two) times daily as needed.      venlafaxine (EFFEXOR XR) 150 MG Cp24 Take 1 capsule (150 mg total) by mouth once daily. 30 capsule 2    venlafaxine (EFFEXOR-XR) 75 MG 24 hr capsule Take 75 mg by mouth once daily.      zolpidem (AMBIEN) 10 mg Tab Take 10 mg by mouth every evening.      colesevelam (WELCHOL) 625 mg tablet Take 3 tablets (1,875 mg total) by mouth 2 (two) times daily with meals. 540 tablet 3    diphenoxylate-atropine 2.5-0.025 mg (LOMOTIL) 2.5-0.025 mg per tablet Take 1 tablet by mouth daily as needed for Diarrhea. 20 tablet 0    oxyCODONE-acetaminophen (PERCOCET) 5-325 mg per tablet Take 1 tablet by mouth every 24 hours as needed for Pain. 90 each 0    promethazine (PHENERGAN) 25 MG tablet Take 25 mg by mouth every 4 to 6 hours as needed for Nausea.       No current facility-administered medications for this visit.     Review of patient's allergies indicates:   Allergen Reactions    Benadryl [diphenhydramine hcl]      Paralysis on right side body     Codeine       "Paralysis right body    Flexeril [cyclobenzaprine] Other (See Comments)     Numbness on right side of body    Nsaids (non-steroidal anti-inflammatory drug) Diarrhea and Nausea And Vomiting     Ulcers     Penicillins Anaphylaxis and Hives    Magnesium      "made me feel horrible"     Prochlorperazine Anxiety and Palpitations       Review of Systems   Constitutional: Negative for fever and weight loss.   Gastrointestinal: Positive for abdominal pain, anorexia, diarrhea, nausea and vomiting. Negative for constipation, flatus, hematochezia and melena.   Genitourinary: Negative for dysuria, frequency and hematuria.   Musculoskeletal: Positive for arthralgias and myalgias.   Neurological: Negative for headaches.          Blood pressure 138/80, pulse 107, temperature 97.5 °F (36.4 °C), temperature source Oral, height 5' 5" (1.651 m), weight (!) 151.2 kg (333 lb 6.4 oz), last menstrual period 06/23/2022, SpO2 98 %. Body mass index is 55.48 kg/m².   Objective:      Physical Exam  Vitals reviewed.   Constitutional:       General: She is not in acute distress.     Appearance: Normal appearance. She is obese. She is not ill-appearing or toxic-appearing.   HENT:      Head: Normocephalic and atraumatic.   Cardiovascular:      Rate and Rhythm: Tachycardia present.   Pulmonary:      Effort: Pulmonary effort is normal.   Abdominal:      General: Abdomen is protuberant.      Comments: Abdominal examination is difficult due to body habitus, diffuse lower abdominal tenderness, no guarding, no rigidity, no peritoneal symptoms (shake/heal tap)    Skin:     General: Skin is warm and dry.      Capillary Refill: Capillary refill takes less than 2 seconds.   Neurological:      Mental Status: She is alert and oriented to person, place, and time.             Assessment:       1. Lower abdominal pain    2. Trigeminal neuralgia         Plan:           Lower abdominal pain  -     colesevelam (WELCHOL) 625 mg tablet; Take 3 tablets (1,875 mg " total) by mouth 2 (two) times daily with meals.  Dispense: 540 tablet; Refill: 3  -     diphenoxylate-atropine 2.5-0.025 mg (LOMOTIL) 2.5-0.025 mg per tablet; Take 1 tablet by mouth daily as needed for Diarrhea.  Dispense: 20 tablet; Refill: 0  Pt with abdominal pain x 1 month, elevated lipase, normal CT will have her discontinue the Trileptal and treat for possible IBS with her hx of chronic diarrhea.    Trigeminal neuralgia  -     Discontinue: oxyCODONE-acetaminophen (PERCOCET) 5-325 mg per tablet; Take 1 tablet by mouth every 24 hours as needed for Pain.  Dispense: 90 each; Refill: 0  -     oxyCODONE-acetaminophen (PERCOCET) 5-325 mg per tablet; Take 1 tablet by mouth every 24 hours as needed for Pain.  Dispense: 90 each; Refill: 0

## 2022-07-06 ENCOUNTER — PATIENT MESSAGE (OUTPATIENT)
Dept: FAMILY MEDICINE | Facility: CLINIC | Age: 32
End: 2022-07-06

## 2022-07-08 ENCOUNTER — PATIENT MESSAGE (OUTPATIENT)
Dept: FAMILY MEDICINE | Facility: CLINIC | Age: 32
End: 2022-07-08

## 2022-07-10 ENCOUNTER — PATIENT MESSAGE (OUTPATIENT)
Dept: FAMILY MEDICINE | Facility: CLINIC | Age: 32
End: 2022-07-10

## 2022-07-12 ENCOUNTER — PATIENT MESSAGE (OUTPATIENT)
Dept: FAMILY MEDICINE | Facility: CLINIC | Age: 32
End: 2022-07-12

## 2022-07-12 DIAGNOSIS — G50.0 TRIGEMINAL NEURALGIA: Primary | ICD-10-CM

## 2022-07-14 ENCOUNTER — TELEPHONE (OUTPATIENT)
Dept: FAMILY MEDICINE | Facility: CLINIC | Age: 32
End: 2022-07-14

## 2022-07-14 ENCOUNTER — OFFICE VISIT (OUTPATIENT)
Dept: FAMILY MEDICINE | Facility: CLINIC | Age: 32
End: 2022-07-14
Payer: MEDICAID

## 2022-07-14 VITALS
BODY MASS INDEX: 48.82 KG/M2 | WEIGHT: 293 LBS | OXYGEN SATURATION: 96 % | HEART RATE: 100 BPM | DIASTOLIC BLOOD PRESSURE: 82 MMHG | HEIGHT: 65 IN | TEMPERATURE: 98 F | SYSTOLIC BLOOD PRESSURE: 136 MMHG

## 2022-07-14 DIAGNOSIS — G50.9 TRIGEMINAL NEUROPATHY: Primary | ICD-10-CM

## 2022-07-14 PROCEDURE — 1159F MED LIST DOCD IN RCRD: CPT | Mod: CPTII,,, | Performed by: FAMILY MEDICINE

## 2022-07-14 PROCEDURE — 4010F PR ACE/ARB THEARPY RXD/TAKEN: ICD-10-PCS | Mod: CPTII,,, | Performed by: FAMILY MEDICINE

## 2022-07-14 PROCEDURE — 1159F PR MEDICATION LIST DOCUMENTED IN MEDICAL RECORD: ICD-10-PCS | Mod: CPTII,,, | Performed by: FAMILY MEDICINE

## 2022-07-14 PROCEDURE — 3075F SYST BP GE 130 - 139MM HG: CPT | Mod: CPTII,,, | Performed by: FAMILY MEDICINE

## 2022-07-14 PROCEDURE — 3008F PR BODY MASS INDEX (BMI) DOCUMENTED: ICD-10-PCS | Mod: CPTII,,, | Performed by: FAMILY MEDICINE

## 2022-07-14 PROCEDURE — 99215 OFFICE O/P EST HI 40 MIN: CPT | Performed by: FAMILY MEDICINE

## 2022-07-14 PROCEDURE — 3044F PR MOST RECENT HEMOGLOBIN A1C LEVEL <7.0%: ICD-10-PCS | Mod: CPTII,,, | Performed by: FAMILY MEDICINE

## 2022-07-14 PROCEDURE — 3079F PR MOST RECENT DIASTOLIC BLOOD PRESSURE 80-89 MM HG: ICD-10-PCS | Mod: CPTII,,, | Performed by: FAMILY MEDICINE

## 2022-07-14 PROCEDURE — 99213 PR OFFICE/OUTPT VISIT, EST, LEVL III, 20-29 MIN: ICD-10-PCS | Mod: S$PBB,,, | Performed by: FAMILY MEDICINE

## 2022-07-14 PROCEDURE — 99213 OFFICE O/P EST LOW 20 MIN: CPT | Mod: S$PBB,,, | Performed by: FAMILY MEDICINE

## 2022-07-14 PROCEDURE — 4010F ACE/ARB THERAPY RXD/TAKEN: CPT | Mod: CPTII,,, | Performed by: FAMILY MEDICINE

## 2022-07-14 PROCEDURE — 3079F DIAST BP 80-89 MM HG: CPT | Mod: CPTII,,, | Performed by: FAMILY MEDICINE

## 2022-07-14 PROCEDURE — 3075F PR MOST RECENT SYSTOLIC BLOOD PRESS GE 130-139MM HG: ICD-10-PCS | Mod: CPTII,,, | Performed by: FAMILY MEDICINE

## 2022-07-14 PROCEDURE — 3044F HG A1C LEVEL LT 7.0%: CPT | Mod: CPTII,,, | Performed by: FAMILY MEDICINE

## 2022-07-14 PROCEDURE — 3008F BODY MASS INDEX DOCD: CPT | Mod: CPTII,,, | Performed by: FAMILY MEDICINE

## 2022-07-14 RX ORDER — PANTOPRAZOLE SODIUM 40 MG/1
40 TABLET, DELAYED RELEASE ORAL DAILY
COMMUNITY
Start: 2022-07-08 | End: 2023-10-23

## 2022-07-14 NOTE — PROGRESS NOTES
SUBJECTIVE:    Patient ID: Jasper Guerrero is a 31 y.o. female.    Chief Complaint: Discuss Issues  31-year-old female  here needing more pain med,   Over the past year have been very patient with this patient and her chronic narcotic needs for trigeminal neuralgia- and migraines.  Patient has been referred to pain management 3 times a Neurology 3 times, and these providers either do not accept her insurance or they refuse to treat trigeminal neuralgia. Pt has not been taking the medications as prescribed, she will use extre when her pain is increased and she has run out early on several occasions needing sooner refills. I have looked up the Neurology and Neurosurgery departments at [facility] I suspect that they accept her insurance and treat these conditions.    Pt states that she has tried multiple medications that are typically used to treat trigeminal neuralgia ( Gabapentin, Oxcarbazepine, Carbamezapine, Zanaflex, Tapentadol) and either she could not afford, she had side effects or it did not relieve her pain. Chronic narcotics have been the only medication that has worked for her Trigeminal neuralgia. She was eventually fired by her previous neurologist.    Neurology has tried multiple medications also to treat her migraines that are traditional migraine medications and none of these have been effective.    Pt has been into the ER 10 times in the past year for similar complaints.    I suspect that she has developed tolerance to her chronic narcotic medications. I have reviewed the appropriate treatment of trigeminal neuralgia, I explained that long term 3 times a day dosing of narcotics should not be the long term treatment plan for her pain, I have asked that she follow up with neurology or neurosurgery to get treatment.My plan to to continually wean her off of the narcotics.    Pt was very tearful during this visit I suspect that weaning her off of these medications is going to be difficult, I suspect that she  will have withdrawal symptoms, she does not understand that chronic narcotic dependence at age 31 is not the appropriate regimen for her continued long term care. Pt is due to have a cholecystectomy (she does not know the date) she is concerned about who will be providing the pain medications after her surgery.       Significant past medical history  Agoraphobia/Anxiety:  Valium , Fluvoxamine (LUVOX) 300mg, Buspar 7.5  Trigeminal Neuralgia:   Depression: Effexor XR 225mg  PTSD:  DVT (upper extremity right arm 2019):  Migraine headaches:  Fibromyalgia:  Insomnia:    GERD:  Pantoprazole 40 mg  Migraines: Fiorocet, Imitrex, Verapamil  Nausea associated with Migraines: Reglan, Zofran      Specialists  Orthopedics:  Dr. Maher  Neurology:  Dr. Wilder Woodson, Dr Diaz (has been discharged)   Psychiatry: [facility]: Dr Kearney (Pt is trying to get back into treatment) Therapist Tonya  GYN: Dr CHIDI Reynolds  GI: Dr Villeda     Smoke: None  ETOH: None  Exercise: None      HPI      Past Medical History:   Diagnosis Date    Agoraphobia     Carpal tunnel syndrome     Depression     DVT (deep venous thrombosis)     2019 had blood clot in right arm    Fibromyalgia     Migraine headache     Nerve injury 08/2016    Lingual Nerve Injury    Obese     PTSD (post-traumatic stress disorder)     Trigeminal neuralgia      Social History     Socioeconomic History    Marital status:    Tobacco Use    Smoking status: Never Smoker    Smokeless tobacco: Never Used   Substance and Sexual Activity    Alcohol use: No    Drug use: No    Sexual activity: Yes     Partners: Male     Birth control/protection: Condom     Social Determinants of Health     Financial Resource Strain: Low Risk     Difficulty of Paying Living Expenses: Not hard at all   Food Insecurity: No Food Insecurity    Worried About Running Out of Food in the Last Year: Never true    Ran Out of Food in the Last Year: Never true   Transportation Needs: No  Transportation Needs    Lack of Transportation (Medical): No    Lack of Transportation (Non-Medical): No   Physical Activity: Inactive    Days of Exercise per Week: 0 days    Minutes of Exercise per Session: 0 min   Stress: No Stress Concern Present    Feeling of Stress : Not at all   Social Connections: Unknown    Frequency of Communication with Friends and Family: More than three times a week    Frequency of Social Gatherings with Friends and Family: More than three times a week    Active Member of Clubs or Organizations: No    Attends Club or Organization Meetings: Patient refused    Marital Status:    Housing Stability: Low Risk     Unable to Pay for Housing in the Last Year: No    Number of Places Lived in the Last Year: 1    Unstable Housing in the Last Year: No     Past Surgical History:   Procedure Laterality Date    CARPAL TUNNEL RELEASE Right 12/27/2018    Dr Lozada     CARPAL TUNNEL RELEASE Left 1/28/2020    Procedure: RELEASE, CARPAL TUNNEL;  Surgeon: Brendon Lozada Jr., MD;  [facility]: [facility];  Service: Orthopedics;  Laterality: Left;    MOUTH SURGERY      WISDOM TOOTH EXTRACTION       Family History   Problem Relation Age of Onset    No Known Problems Mother     No Known Problems Father     Cancer Maternal Aunt         endometrial    No Known Problems Maternal Uncle     No Known Problems Paternal Aunt     No Known Problems Paternal Uncle     No Known Problems Maternal Grandmother     No Known Problems Maternal Grandfather     No Known Problems Paternal Grandmother     No Known Problems Paternal Grandfather     No Known Problems Daughter     No Known Problems Son      Current Outpatient Medications   Medication Sig Dispense Refill    acetaminophen (TYLENOL) 500 MG tablet Take 500 mg by mouth every 4 to 6 hours as needed.       busPIRone (BUSPAR) 15 MG tablet Take 15 mg by mouth once daily.      butalbital-acetaminophen-caffeine -40 mg (FIORICET, ESGIC)  -40 mg per tablet Take 1 tablet by mouth every 6 (six) hours as needed for Headaches. for pain. 10 tablet 1    chlordiazepoxide-clidinium 5-2.5 mg (LIBRAX) 5-2.5 mg Cap Take 1 capsule by mouth 3 (three) times daily as needed.      colesevelam (WELCHOL) 625 mg tablet Take 3 tablets (1,875 mg total) by mouth 2 (two) times daily with meals. 180 tablet 3    dicyclomine (BENTYL) 10 MG capsule Take 10 mg by mouth 4 (four) times daily as needed.      diphenoxylate-atropine 2.5-0.025 mg (LOMOTIL) 2.5-0.025 mg per tablet Take 1 tablet by mouth daily as needed for Diarrhea. 20 tablet 0    fluvoxaMINE (LUVOX) 100 MG tablet Take 1 tablet (100 mg total) by mouth 3 (three) times daily. 90 tablet 2    lisinopriL (PRINIVIL,ZESTRIL) 20 MG tablet Take 1 tablet (20 mg total) by mouth once daily. 90 tablet 3    metFORMIN (GLUCOPHAGE-XR) 500 MG ER 24hr tablet Take 1 tablet (500 mg total) by mouth daily with breakfast. 90 tablet 3    metoclopramide HCl (REGLAN) 10 MG tablet TAKE 1 TABLET BY MOUTH EVERY 6 HOURS AS NEEDED FOR MIGRAINE AND NAUSEA (Patient taking differently: Take 10 mg by mouth every 6 (six) hours as needed. TAKE 1 TABLET BY MOUTH EVERY 6 HOURS AS NEEDED FOR MIGRAINE AND NAUSEA) 60 tablet 11    ondansetron (ZOFRAN) 4 MG tablet Take 1 tablet (4 mg total) by mouth every 8 (eight) hours as needed for Nausea. 12 tablet 0    oxyCODONE-acetaminophen (PERCOCET) 5-325 mg per tablet Take 1 tablet by mouth every 24 hours as needed for Pain. 90 each 0    pantoprazole (PROTONIX) 40 MG tablet Take 40 mg by mouth once daily.      rizatriptan (MAXALT) 10 MG tablet Take 1 tablet (10 mg total) by mouth as needed for Migraine. May repeat every 2 hours for max 3 tabs in 24 hours. Use no more than 10 days per month. 18 tablet 11    rosuvastatin (CRESTOR) 10 MG tablet Take 1 tablet (10 mg total) by mouth every evening. 90 tablet 3    VALIUM 5 mg tablet Take 5 mg by mouth 2 (two) times daily as needed.      venlafaxine  "(EFFEXOR XR) 150 MG Cp24 Take 1 capsule (150 mg total) by mouth once daily. 30 capsule 2    venlafaxine (EFFEXOR-XR) 75 MG 24 hr capsule Take 75 mg by mouth once daily.      zolpidem (AMBIEN) 10 mg Tab Take 10 mg by mouth every evening.       No current facility-administered medications for this visit.     Review of patient's allergies indicates:   Allergen Reactions    Benadryl [diphenhydramine hcl]      Paralysis on right side body     Codeine      Paralysis right body    Flexeril [cyclobenzaprine] Other (See Comments)     Numbness on right side of body    Nsaids (non-steroidal anti-inflammatory drug) Diarrhea and Nausea And Vomiting     Ulcers     Penicillins Anaphylaxis and Hives    Magnesium      "made me feel horrible"     Prochlorperazine Anxiety and Palpitations       Review of Systems   Constitutional: Negative for activity change, diaphoresis, fatigue and unexpected weight change.        Chronic face pain on the left and chronic migraines.    HENT: Negative for hearing loss, rhinorrhea and trouble swallowing.    Eyes: Negative for discharge and visual disturbance.   Respiratory: Negative for chest tightness and wheezing.    Cardiovascular: Negative for chest pain and palpitations.   Gastrointestinal: Negative for blood in stool, constipation, diarrhea and vomiting.   Endocrine: Negative for polydipsia and polyuria.   Genitourinary: Negative for difficulty urinating, dysuria, hematuria and menstrual problem.   Musculoskeletal: Negative for arthralgias, joint swelling and neck pain.   Neurological: Negative for weakness and headaches.   Psychiatric/Behavioral: Negative for confusion and dysphoric mood.          Blood pressure 136/82, pulse 100, temperature 97.5 °F (36.4 °C), temperature source Oral, height 5' 5" (1.651 m), weight (!) 153 kg (337 lb 3.2 oz), last menstrual period 06/23/2022, SpO2 96 %. Body mass index is 56.11 kg/m².   Objective:      Physical Exam  Vitals reviewed.   Constitutional: "       General: She is not in acute distress.     Appearance: Normal appearance. She is obese. She is not ill-appearing or toxic-appearing.   HENT:      Head: Normocephalic and atraumatic.   Cardiovascular:      Rate and Rhythm: Tachycardia present.   Skin:     General: Skin is warm and dry.   Neurological:      Mental Status: She is alert.   Psychiatric:         Attention and Perception: Attention normal.         Mood and Affect: Mood is anxious and depressed.         Speech: Speech normal.      Comments: Pt has little insight.              Assessment:       1. Trigeminal neuropathy         Plan:           Trigeminal neuropathy    Pt will continue the medications at 3 times a day until she runs out then will decreased to BID for a month then Q day. I am hoping that [facility] neurology or neurosurgery will help her with more appropriate treatments for trigeminal neuralgia and migraines, or provide her with a procedure to help relieve her pain. Pt has little insight and feels like I am being punitive.

## 2022-07-14 NOTE — TELEPHONE ENCOUNTER
----- Message from Stacie Maloney RN sent at 7/13/2022  9:35 AM CDT -----  A referral to [facility] was sent but is in [facility] referral que. A paper referral may should be sent to [facility] for scheduling.   Dr. Hough does not treat Trigeminal Neuralgia at [facility] [place].

## 2022-07-15 ENCOUNTER — TELEPHONE (OUTPATIENT)
Dept: FAMILY MEDICINE | Facility: CLINIC | Age: 32
End: 2022-07-15

## 2022-07-15 ENCOUNTER — PATIENT MESSAGE (OUTPATIENT)
Dept: FAMILY MEDICINE | Facility: CLINIC | Age: 32
End: 2022-07-15

## 2022-07-15 NOTE — TELEPHONE ENCOUNTER
Please print out the referral and send to [facility] neurosurgery in [place].    Here is their phone number please call and get their fax number.   [phone]

## 2022-07-17 ENCOUNTER — HOSPITAL ENCOUNTER (EMERGENCY)
Facility: HOSPITAL | Age: 32
Discharge: HOME | End: 2022-07-17
Attending: EMERGENCY MEDICINE
Payer: MEDICAID

## 2022-07-17 VITALS
BODY MASS INDEX: 48.82 KG/M2 | TEMPERATURE: 99 F | SYSTOLIC BLOOD PRESSURE: 134 MMHG | DIASTOLIC BLOOD PRESSURE: 84 MMHG | OXYGEN SATURATION: 97 % | HEIGHT: 65 IN | HEART RATE: 102 BPM | WEIGHT: 293 LBS | RESPIRATION RATE: 18 BRPM

## 2022-07-17 DIAGNOSIS — R10.9 ABDOMINAL PAIN: ICD-10-CM

## 2022-07-17 LAB
ALBUMIN SERPL BCP-MCNC: 4.2 G/DL (ref 3.5–5.2)
ALP SERPL-CCNC: 69 U/L (ref 55–135)
ALT SERPL W/O P-5'-P-CCNC: 55 U/L (ref 10–44)
AMPHET+METHAMPHET UR QL: NEGATIVE
ANION GAP SERPL CALC-SCNC: 10 MMOL/L (ref 8–16)
APTT PPP: 31.8 SEC (ref 23.3–35.1)
AST SERPL-CCNC: 46 U/L (ref 10–40)
B-HCG UR QL: NEGATIVE
BARBITURATES UR QL SCN>200 NG/ML: NEGATIVE
BASOPHILS # BLD AUTO: 0.03 K/UL (ref 0–0.2)
BASOPHILS NFR BLD: 0.2 % (ref 0–1.9)
BENZODIAZ UR QL SCN>200 NG/ML: ABNORMAL
BILIRUB SERPL-MCNC: 0.7 MG/DL (ref 0.1–1)
BILIRUB UR QL STRIP: NEGATIVE
BNP SERPL-MCNC: 18 PG/ML (ref 0–99)
BUN SERPL-MCNC: 9 MG/DL (ref 6–20)
BZE UR QL SCN: NEGATIVE
CALCIUM SERPL-MCNC: 9.2 MG/DL (ref 8.7–10.5)
CANNABINOIDS UR QL SCN: NEGATIVE
CHLORIDE SERPL-SCNC: 96 MMOL/L (ref 95–110)
CLARITY UR: CLEAR
CO2 SERPL-SCNC: 28 MMOL/L (ref 23–29)
COLOR UR: YELLOW
CREAT SERPL-MCNC: 0.7 MG/DL (ref 0.5–1.4)
CREAT UR-MCNC: 229 MG/DL (ref 15–325)
CRP SERPL-MCNC: 2.2 MG/DL
CTP QC/QA: YES
DIFFERENTIAL METHOD: ABNORMAL
EOSINOPHIL # BLD AUTO: 0.1 K/UL (ref 0–0.5)
EOSINOPHIL NFR BLD: 0.4 % (ref 0–8)
ERYTHROCYTE [DISTWIDTH] IN BLOOD BY AUTOMATED COUNT: 13.4 % (ref 11.5–14.5)
EST. GFR  (AFRICAN AMERICAN): >60 ML/MIN/1.73 M^2
EST. GFR  (NON AFRICAN AMERICAN): >60 ML/MIN/1.73 M^2
GLUCOSE SERPL-MCNC: 169 MG/DL (ref 70–110)
GLUCOSE UR QL STRIP: NEGATIVE
HCT VFR BLD AUTO: 43 % (ref 37–48.5)
HGB BLD-MCNC: 13.9 G/DL (ref 12–16)
HGB UR QL STRIP: NEGATIVE
IMM GRANULOCYTES # BLD AUTO: 0.03 K/UL (ref 0–0.04)
IMM GRANULOCYTES NFR BLD AUTO: 0.2 % (ref 0–0.5)
INFLUENZA A, MOLECULAR: NEGATIVE
INFLUENZA B, MOLECULAR: NEGATIVE
INR PPP: 1.1
KETONES UR QL STRIP: NEGATIVE
LACTATE SERPL-SCNC: 1.4 MMOL/L (ref 0.5–1.9)
LEUKOCYTE ESTERASE UR QL STRIP: NEGATIVE
LIPASE SERPL-CCNC: 75 U/L (ref 4–60)
LYMPHOCYTES # BLD AUTO: 2.8 K/UL (ref 1–4.8)
LYMPHOCYTES NFR BLD: 23.1 % (ref 18–48)
MAGNESIUM SERPL-MCNC: 1.9 MG/DL (ref 1.6–2.6)
MCH RBC QN AUTO: 28.5 PG (ref 27–31)
MCHC RBC AUTO-ENTMCNC: 32.3 G/DL (ref 32–36)
MCV RBC AUTO: 88 FL (ref 82–98)
MONOCYTES # BLD AUTO: 0.7 K/UL (ref 0.3–1)
MONOCYTES NFR BLD: 5.5 % (ref 4–15)
NEUTROPHILS # BLD AUTO: 8.5 K/UL (ref 1.8–7.7)
NEUTROPHILS NFR BLD: 70.6 % (ref 38–73)
NITRITE UR QL STRIP: NEGATIVE
NRBC BLD-RTO: 0 /100 WBC
OPIATES UR QL SCN: ABNORMAL
PCP UR QL SCN>25 NG/ML: NEGATIVE
PH UR STRIP: >8 [PH] (ref 5–8)
PLATELET # BLD AUTO: 283 K/UL (ref 150–450)
PMV BLD AUTO: 9.5 FL (ref 9.2–12.9)
POTASSIUM SERPL-SCNC: 3.8 MMOL/L (ref 3.5–5.1)
PROCALCITONIN SERPL IA-MCNC: <0.05 NG/ML (ref 0–0.5)
PROT SERPL-MCNC: 7.6 G/DL (ref 6–8.4)
PROT UR QL STRIP: ABNORMAL
PROTHROMBIN TIME: 13.2 SEC (ref 11.4–13.7)
RBC # BLD AUTO: 4.87 M/UL (ref 4–5.4)
SARS-COV-2 RDRP RESP QL NAA+PROBE: NEGATIVE
SODIUM SERPL-SCNC: 134 MMOL/L (ref 136–145)
SP GR UR STRIP: >1.03 (ref 1–1.03)
SPECIMEN SOURCE: NORMAL
TOXICOLOGY INFORMATION: ABNORMAL
TROPONIN I SERPL DL<=0.01 NG/ML-MCNC: <0.03 NG/ML
TSH SERPL DL<=0.005 MIU/L-ACNC: 1.83 UIU/ML (ref 0.34–5.6)
URN SPEC COLLECT METH UR: ABNORMAL
UROBILINOGEN UR STRIP-ACNC: ABNORMAL EU/DL
WBC # BLD AUTO: 12.06 K/UL (ref 3.9–12.7)

## 2022-07-17 PROCEDURE — 25000003 PHARM REV CODE 250: Performed by: EMERGENCY MEDICINE

## 2022-07-17 PROCEDURE — U0002 COVID-19 LAB TEST NON-CDC: HCPCS | Performed by: EMERGENCY MEDICINE

## 2022-07-17 PROCEDURE — 87502 INFLUENZA DNA AMP PROBE: CPT | Performed by: EMERGENCY MEDICINE

## 2022-07-17 PROCEDURE — 85025 COMPLETE CBC W/AUTO DIFF WBC: CPT | Performed by: STUDENT IN AN ORGANIZED HEALTH CARE EDUCATION/TRAINING PROGRAM

## 2022-07-17 PROCEDURE — 63600175 PHARM REV CODE 636 W HCPCS: Performed by: EMERGENCY MEDICINE

## 2022-07-17 PROCEDURE — 96375 TX/PRO/DX INJ NEW DRUG ADDON: CPT | Mod: 59

## 2022-07-17 PROCEDURE — 87081 CULTURE SCREEN ONLY: CPT | Performed by: EMERGENCY MEDICINE

## 2022-07-17 PROCEDURE — 80053 COMPREHEN METABOLIC PANEL: CPT | Performed by: STUDENT IN AN ORGANIZED HEALTH CARE EDUCATION/TRAINING PROGRAM

## 2022-07-17 PROCEDURE — 83735 ASSAY OF MAGNESIUM: CPT | Performed by: EMERGENCY MEDICINE

## 2022-07-17 PROCEDURE — 96365 THER/PROPH/DIAG IV INF INIT: CPT

## 2022-07-17 PROCEDURE — 80307 DRUG TEST PRSMV CHEM ANLYZR: CPT | Performed by: EMERGENCY MEDICINE

## 2022-07-17 PROCEDURE — 86140 C-REACTIVE PROTEIN: CPT | Performed by: EMERGENCY MEDICINE

## 2022-07-17 PROCEDURE — 87591 N.GONORRHOEAE DNA AMP PROB: CPT | Mod: 59 | Performed by: EMERGENCY MEDICINE

## 2022-07-17 PROCEDURE — 93005 ELECTROCARDIOGRAM TRACING: CPT | Performed by: INTERNAL MEDICINE

## 2022-07-17 PROCEDURE — 96376 TX/PRO/DX INJ SAME DRUG ADON: CPT | Mod: 59

## 2022-07-17 PROCEDURE — 81025 URINE PREGNANCY TEST: CPT | Performed by: STUDENT IN AN ORGANIZED HEALTH CARE EDUCATION/TRAINING PROGRAM

## 2022-07-17 PROCEDURE — 87040 BLOOD CULTURE FOR BACTERIA: CPT | Performed by: EMERGENCY MEDICINE

## 2022-07-17 PROCEDURE — 87210 SMEAR WET MOUNT SALINE/INK: CPT | Performed by: EMERGENCY MEDICINE

## 2022-07-17 PROCEDURE — 85610 PROTHROMBIN TIME: CPT | Performed by: EMERGENCY MEDICINE

## 2022-07-17 PROCEDURE — 83605 ASSAY OF LACTIC ACID: CPT | Performed by: EMERGENCY MEDICINE

## 2022-07-17 PROCEDURE — 84145 PROCALCITONIN (PCT): CPT | Performed by: EMERGENCY MEDICINE

## 2022-07-17 PROCEDURE — 93010 ELECTROCARDIOGRAM REPORT: CPT | Mod: ,,, | Performed by: INTERNAL MEDICINE

## 2022-07-17 PROCEDURE — 81003 URINALYSIS AUTO W/O SCOPE: CPT | Mod: 59 | Performed by: STUDENT IN AN ORGANIZED HEALTH CARE EDUCATION/TRAINING PROGRAM

## 2022-07-17 PROCEDURE — 84484 ASSAY OF TROPONIN QUANT: CPT | Performed by: EMERGENCY MEDICINE

## 2022-07-17 PROCEDURE — 84443 ASSAY THYROID STIM HORMONE: CPT | Performed by: EMERGENCY MEDICINE

## 2022-07-17 PROCEDURE — 25500020 PHARM REV CODE 255: Performed by: EMERGENCY MEDICINE

## 2022-07-17 PROCEDURE — 93010 EKG 12-LEAD: ICD-10-PCS | Mod: ,,, | Performed by: INTERNAL MEDICINE

## 2022-07-17 PROCEDURE — 85730 THROMBOPLASTIN TIME PARTIAL: CPT | Performed by: EMERGENCY MEDICINE

## 2022-07-17 PROCEDURE — 83880 ASSAY OF NATRIURETIC PEPTIDE: CPT | Performed by: EMERGENCY MEDICINE

## 2022-07-17 PROCEDURE — 87205 SMEAR GRAM STAIN: CPT | Performed by: EMERGENCY MEDICINE

## 2022-07-17 PROCEDURE — 36415 COLL VENOUS BLD VENIPUNCTURE: CPT | Performed by: EMERGENCY MEDICINE

## 2022-07-17 PROCEDURE — 87491 CHLMYD TRACH DNA AMP PROBE: CPT | Performed by: EMERGENCY MEDICINE

## 2022-07-17 PROCEDURE — 83690 ASSAY OF LIPASE: CPT | Performed by: STUDENT IN AN ORGANIZED HEALTH CARE EDUCATION/TRAINING PROGRAM

## 2022-07-17 PROCEDURE — 99285 EMERGENCY DEPT VISIT HI MDM: CPT | Mod: 25

## 2022-07-17 RX ORDER — MORPHINE SULFATE 2 MG/ML
2 INJECTION, SOLUTION INTRAMUSCULAR; INTRAVENOUS
Status: COMPLETED | OUTPATIENT
Start: 2022-07-17 | End: 2022-07-17

## 2022-07-17 RX ORDER — ONDANSETRON 2 MG/ML
4 INJECTION INTRAMUSCULAR; INTRAVENOUS
Status: COMPLETED | OUTPATIENT
Start: 2022-07-17 | End: 2022-07-17

## 2022-07-17 RX ORDER — DOXYCYCLINE 100 MG/1
100 CAPSULE ORAL 2 TIMES DAILY
Qty: 20 CAPSULE | Refills: 0 | Status: SHIPPED | OUTPATIENT
Start: 2022-07-17 | End: 2022-07-27

## 2022-07-17 RX ORDER — HYDROCODONE BITARTRATE AND ACETAMINOPHEN 5; 325 MG/1; MG/1
1 TABLET ORAL
Status: COMPLETED | OUTPATIENT
Start: 2022-07-17 | End: 2022-07-17

## 2022-07-17 RX ORDER — HYDROMORPHONE HYDROCHLORIDE 1 MG/ML
0.5 INJECTION, SOLUTION INTRAMUSCULAR; INTRAVENOUS; SUBCUTANEOUS
Status: COMPLETED | OUTPATIENT
Start: 2022-07-17 | End: 2022-07-17

## 2022-07-17 RX ADMIN — MORPHINE SULFATE 2 MG: 2 INJECTION, SOLUTION INTRAMUSCULAR; INTRAVENOUS at 07:07

## 2022-07-17 RX ADMIN — ONDANSETRON 4 MG: 2 INJECTION INTRAMUSCULAR; INTRAVENOUS at 07:07

## 2022-07-17 RX ADMIN — HYDROCODONE BITARTRATE AND ACETAMINOPHEN 1 TABLET: 5; 325 TABLET ORAL at 11:07

## 2022-07-17 RX ADMIN — ONDANSETRON 4 MG: 2 INJECTION INTRAMUSCULAR; INTRAVENOUS at 09:07

## 2022-07-17 RX ADMIN — HYDROMORPHONE HYDROCHLORIDE 0.5 MG: 1 INJECTION, SOLUTION INTRAMUSCULAR; INTRAVENOUS; SUBCUTANEOUS at 09:07

## 2022-07-17 RX ADMIN — IOHEXOL 100 ML: 350 INJECTION, SOLUTION INTRAVENOUS at 06:07

## 2022-07-17 RX ADMIN — CEFTRIAXONE 1 G: 1 INJECTION, SOLUTION INTRAVENOUS at 09:07

## 2022-07-17 NOTE — ED PROVIDER NOTES
Encounter Date: 7/17/2022       History     Chief Complaint   Patient presents with    Abdominal Pain     N/V/D x 1 month. Pt reports Hx of gastroparesis and issues with her gall bladder. Pt states that her PCP believes that she has pancreatitis but did not order anything because she is being seen in 2 days by general surgery for cholecystectomy evaluation.     Vomiting     The history is provided by the patient.   Abdominal Pain  Illness onset: Approximately 2 months ago. The onset of the illness was gradual. The problem has been gradually worsening. The abdominal pain is located in the epigastric region, RUQ, periumbilical region and LLQ. The abdominal pain does not radiate. The severity of the abdominal pain is 8/10. The abdominal pain is relieved by nothing. The abdominal pain is exacerbated by eating (Today the patient states the pain was worse after eating although this had not been the case with previous episodes of pain.). The other symptoms of the illness include fatigue, nausea, vomiting and diarrhea. The other symptoms of the illness do not include fever, jaundice, melena, shortness of breath, dysuria, hematemesis, hematochezia, vaginal discharge or vaginal bleeding.   Symptoms associated with the illness do not include chills, constipation, urgency, hematuria, frequency or back pain.   Vomiting   Associated symptoms include abdominal pain and diarrhea. Pertinent negatives include no arthralgias, no chills, no cough, no fever, no headaches and no myalgias.   Patient reports she has been seen and evaluated in the emergency room as well as by her primary care physician for this problem.  It is suspected that the pain could be secondary to her gallbladder.  She has an appointment with a general surgeon in 2 days.  She presents today as she states the pain has significantly worsened today.  She states the pain is worse today than it had been during other assessments.  She states the pain had not previously  "affected her appetite however today she has not wanted to eat and the pain did get worse after eating this morning.  She denies fever, chest pain or shortness of breath.  She denies gastrointestinal bleeding.  She denies dysuria frequency or vaginal discharge.  She denies any other problems or complaints.  Review of patient's allergies indicates:   Allergen Reactions    Benadryl [diphenhydramine hcl]      Paralysis on right side body     Codeine      Paralysis right body    Flexeril [cyclobenzaprine] Other (See Comments)     Numbness on right side of body    Nsaids (non-steroidal anti-inflammatory drug) Diarrhea and Nausea And Vomiting     Ulcers     Penicillins Anaphylaxis and Hives    Magnesium      "made me feel horrible"     Prochlorperazine Anxiety and Palpitations     Past Medical History:   Diagnosis Date    Agoraphobia     Carpal tunnel syndrome     Depression     DVT (deep venous thrombosis)     2019 had blood clot in right arm    Fibromyalgia     Migraine headache     Nerve injury 08/2016    Lingual Nerve Injury    Obese     PTSD (post-traumatic stress disorder)     Trigeminal neuralgia      Past Surgical History:   Procedure Laterality Date    CARPAL TUNNEL RELEASE Right 12/27/2018    Dr Lozada     CARPAL TUNNEL RELEASE Left 1/28/2020    Procedure: RELEASE, CARPAL TUNNEL;  Surgeon: Brendon Lozada Jr., MD;  Location: [facility];  Service: Orthopedics;  Laterality: Left;    MOUTH SURGERY      WISDOM TOOTH EXTRACTION       Family History   Problem Relation Age of Onset    No Known Problems Mother     No Known Problems Father     Cancer Maternal Aunt         endometrial    No Known Problems Maternal Uncle     No Known Problems Paternal Aunt     No Known Problems Paternal Uncle     No Known Problems Maternal Grandmother     No Known Problems Maternal Grandfather     No Known Problems Paternal Grandmother     No Known Problems Paternal Grandfather     No Known Problems Daughter     " No Known Problems Son      Social History     Tobacco Use    Smoking status: Never Smoker    Smokeless tobacco: Never Used   Substance Use Topics    Alcohol use: No    Drug use: No     Review of Systems   Constitutional: Positive for fatigue. Negative for activity change, appetite change, chills and fever.   HENT: Negative.  Negative for congestion, ear pain, rhinorrhea, sinus pain and sore throat.    Eyes: Negative.  Negative for photophobia, redness and visual disturbance.   Respiratory: Negative.  Negative for cough, chest tightness, shortness of breath and wheezing.    Cardiovascular: Negative.  Negative for chest pain, palpitations and leg swelling.   Gastrointestinal: Positive for abdominal pain, diarrhea, nausea and vomiting. Negative for abdominal distention, anal bleeding, blood in stool, constipation, hematemesis, hematochezia, jaundice and melena.   Endocrine: Negative.    Genitourinary: Positive for pelvic pain. Negative for decreased urine volume, difficulty urinating, dysuria, flank pain, frequency, genital sores, hematuria, urgency, vaginal bleeding, vaginal discharge and vaginal pain.        Patient is sexually active with 1 partner only, her .  Denies vaginal discharge.  Has not had a pelvic exam since the onset of these symptoms however.   Musculoskeletal: Negative.  Negative for arthralgias, back pain, gait problem, joint swelling, myalgias, neck pain and neck stiffness.   Skin: Negative.  Negative for color change, pallor and rash.   Neurological: Negative.  Negative for syncope, speech difficulty, weakness, light-headedness and headaches.   Hematological: Negative.  Does not bruise/bleed easily.   Psychiatric/Behavioral: Negative.  Negative for confusion.   All other systems reviewed and are negative.      Physical Exam     Initial Vitals [07/17/22 1623]   BP Pulse Resp Temp SpO2   (!) 161/99 110 18 98.1 °F (36.7 °C) 96 %      MAP       --         Physical Exam    Nursing note and  vitals reviewed.  Constitutional: She is not diaphoretic. She is active and cooperative. She does not appear ill. No distress.   HENT:   Head: Normocephalic and atraumatic.   Nose: Nose normal.   Mouth/Throat: Uvula is midline, oropharynx is clear and moist and mucous membranes are normal. No oral lesions. No uvula swelling. No oropharyngeal exudate, posterior oropharyngeal edema or posterior oropharyngeal erythema.   Eyes: Conjunctivae, EOM and lids are normal. Pupils are equal, round, and reactive to light. No scleral icterus.   Neck: Trachea normal and phonation normal. Neck supple.   Normal range of motion.   Full passive range of motion without pain.     Cardiovascular: Normal rate, regular rhythm, normal heart sounds, intact distal pulses and normal pulses. Exam reveals no gallop, no friction rub and no decreased pulses.    No murmur heard.  Pulmonary/Chest: Effort normal and breath sounds normal. No accessory muscle usage or stridor. No tachypnea. No respiratory distress. She has no decreased breath sounds. She has no wheezes. She has no rhonchi. She has no rales.   Abdominal: Abdomen is soft. Bowel sounds are normal. She exhibits no distension, no pulsatile midline mass and no mass. There is abdominal tenderness in the right upper quadrant, epigastric area, periumbilical area and left lower quadrant.   No right CVA tenderness.  No left CVA tenderness. There is no rigidity, no rebound and no guarding. negative psoas sign and negative Rovsing's sign  Musculoskeletal:         General: No tenderness or edema. Normal range of motion.      Right hand: Normal. Normal range of motion. Normal capillary refill. Normal pulse.      Left hand: Normal. Normal range of motion. Normal capillary refill. Normal pulse.      Cervical back: Normal, full passive range of motion without pain, normal range of motion and neck supple. No bony tenderness. No spinous process tenderness or muscular tenderness. Normal range of motion.       Thoracic back: Normal. No bony tenderness. Normal range of motion.      Lumbar back: Normal. No bony tenderness. Normal range of motion.      Right foot: Normal. Normal capillary refill. No tenderness. Normal pulse.      Left foot: Normal. Normal capillary refill. No tenderness. Normal pulse.      Comments: Pulses are 2+ throughout, cap refill is less than 2 sec throughout, extremities are nontender throughout with full range of motion. There is no spinal tenderness to palpation.     Neurological: She is alert and oriented to person, place, and time. She has normal strength. She displays normal reflexes. No cranial nerve deficit or sensory deficit.   No focal deficits.   Skin: Skin is warm, dry and intact. Capillary refill takes less than 2 seconds. No ecchymosis, no petechiae and no rash noted. No erythema. No pallor.   Psychiatric: She has a normal mood and affect. Her speech is normal and behavior is normal. Judgment and thought content normal. Cognition and memory are normal.         ED Course   Procedures  Labs Reviewed   CBC W/ AUTO DIFFERENTIAL - Abnormal; Notable for the following components:       Result Value    Gran # (ANC) 8.5 (*)     All other components within normal limits   COMPREHENSIVE METABOLIC PANEL - Abnormal; Notable for the following components:    Sodium 134 (*)     Glucose 169 (*)     AST 46 (*)     ALT 55 (*)     All other components within normal limits   LIPASE - Abnormal; Notable for the following components:    Lipase 75 (*)     All other components within normal limits   URINALYSIS, REFLEX TO URINE CULTURE - Abnormal; Notable for the following components:    pH, UA >8.0 (*)     Specific Gravity, UA >1.030 (*)     Protein, UA Trace (*)     Urobilinogen, UA 2.0-3.0 (*)     All other components within normal limits    Narrative:     Specimen Source->Urine   C. TRACHOMATIS/N. GONORRHOEAE BY AMP DNA   VAGINAL SCREEN   CULTURE, GONOCOCCUS   C. TRACHOMATIS/N. GONORRHOEAE BY AMP DNA    CULTURE, BLOOD   CULTURE, BLOOD   B-TYPE NATRIURETIC PEPTIDE   MAGNESIUM   TROPONIN I   TSH   APTT   PROTIME-INR   APTT   PROTIME-INR   DRUG SCREEN PANEL, URINE EMERGENCY   SEDIMENTATION RATE   C-REACTIVE PROTEIN   TSH   SARS-COV-2 RNA AMPLIFICATION, QUAL   INFLUENZA A AND B ANTIGEN   MAGNESIUM   B-TYPE NATRIURETIC PEPTIDE   TROPONIN I   DRUG SCREEN PANEL, URINE EMERGENCY   PROCALCITONIN   LACTIC ACID, PLASMA   DRUG SCREEN PANEL, URINE EMERGENCY   POCT URINE PREGNANCY          Imaging Results    None          Medications   morphine injection 2 mg (has no administration in time range)   ondansetron injection 4 mg (has no administration in time range)                          Clinical Impression:   Final diagnoses:  [R10.9] Abdominal pain                 Meghna Reed MD  07/18/22 5491

## 2022-07-18 ENCOUNTER — PATIENT MESSAGE (OUTPATIENT)
Dept: FAMILY MEDICINE | Facility: CLINIC | Age: 32
End: 2022-07-18

## 2022-07-18 LAB
SPECIMEN SOURCE: NORMAL
T VAGINALIS GENITAL QL WET PREP: NORMAL
YEAST GENITAL QL WET PREP: NORMAL

## 2022-07-18 NOTE — DISCHARGE INSTRUCTIONS
Please read and follow discharge instructions and return precautions.  Rest, avoid any strenuous activity, over exertion or overheating.  Keep well hydrated.  Return immediately if you develop new or worsening symptoms or if you have new problems or concerns.  Important to keep your upcoming appointment with the general surgeon with whom you have an appointment in 2 days.  Important to follow up closely outpatient with Dr. Reynolds in the next 2-3 days for comprehensive evaluation as well as follow up with culture results.  Antibiotics as directed until completed or according to your physician based on culture results.  Follow pelvic rest precautions--no douching, no tampons in no intercourse until your cleared for this type of activity by your gynecologist.

## 2022-07-19 ENCOUNTER — PATIENT MESSAGE (OUTPATIENT)
Dept: FAMILY MEDICINE | Facility: CLINIC | Age: 32
End: 2022-07-19

## 2022-07-19 ENCOUNTER — HOSPITAL ENCOUNTER (EMERGENCY)
Facility: HOSPITAL | Age: 32
Discharge: HOME | End: 2022-07-19
Attending: EMERGENCY MEDICINE
Payer: MEDICAID

## 2022-07-19 ENCOUNTER — TELEPHONE (OUTPATIENT)
Dept: FAMILY MEDICINE | Facility: CLINIC | Age: 32
End: 2022-07-19

## 2022-07-19 VITALS
HEART RATE: 101 BPM | DIASTOLIC BLOOD PRESSURE: 62 MMHG | TEMPERATURE: 98 F | WEIGHT: 293 LBS | OXYGEN SATURATION: 96 % | HEIGHT: 65 IN | SYSTOLIC BLOOD PRESSURE: 129 MMHG | BODY MASS INDEX: 48.82 KG/M2 | RESPIRATION RATE: 18 BRPM

## 2022-07-19 DIAGNOSIS — R10.11 RIGHT UPPER QUADRANT ABDOMINAL PAIN: Primary | ICD-10-CM

## 2022-07-19 DIAGNOSIS — G50.0 TRIGEMINAL NEURALGIA: ICD-10-CM

## 2022-07-19 LAB
ALBUMIN SERPL BCP-MCNC: 4.3 G/DL (ref 3.5–5.2)
ALP SERPL-CCNC: 65 U/L (ref 55–135)
ALT SERPL W/O P-5'-P-CCNC: 40 U/L (ref 10–44)
ANION GAP SERPL CALC-SCNC: 10 MMOL/L (ref 8–16)
AST SERPL-CCNC: 27 U/L (ref 10–40)
B-HCG UR QL: NEGATIVE
BASOPHILS # BLD AUTO: 0.02 K/UL (ref 0–0.2)
BASOPHILS NFR BLD: 0.2 % (ref 0–1.9)
BILIRUB SERPL-MCNC: 0.7 MG/DL (ref 0.1–1)
BILIRUB UR QL STRIP: ABNORMAL
BUN SERPL-MCNC: 8 MG/DL (ref 6–20)
CALCIUM SERPL-MCNC: 9.4 MG/DL (ref 8.7–10.5)
CHLORIDE SERPL-SCNC: 100 MMOL/L (ref 95–110)
CLARITY UR: CLEAR
CO2 SERPL-SCNC: 24 MMOL/L (ref 23–29)
COLOR UR: YELLOW
CREAT SERPL-MCNC: 0.8 MG/DL (ref 0.5–1.4)
CTP QC/QA: YES
DIFFERENTIAL METHOD: ABNORMAL
EOSINOPHIL # BLD AUTO: 0 K/UL (ref 0–0.5)
EOSINOPHIL NFR BLD: 0.3 % (ref 0–8)
ERYTHROCYTE [DISTWIDTH] IN BLOOD BY AUTOMATED COUNT: 13.7 % (ref 11.5–14.5)
EST. GFR  (AFRICAN AMERICAN): >60 ML/MIN/1.73 M^2
EST. GFR  (NON AFRICAN AMERICAN): >60 ML/MIN/1.73 M^2
GLUCOSE SERPL-MCNC: 141 MG/DL (ref 70–110)
GLUCOSE UR QL STRIP: NEGATIVE
HCT VFR BLD AUTO: 42.2 % (ref 37–48.5)
HGB BLD-MCNC: 13.8 G/DL (ref 12–16)
HGB UR QL STRIP: NEGATIVE
IMM GRANULOCYTES # BLD AUTO: 0.05 K/UL (ref 0–0.04)
IMM GRANULOCYTES NFR BLD AUTO: 0.4 % (ref 0–0.5)
KETONES UR QL STRIP: ABNORMAL
LEUKOCYTE ESTERASE UR QL STRIP: NEGATIVE
LIPASE SERPL-CCNC: 36 U/L (ref 4–60)
LYMPHOCYTES # BLD AUTO: 3.5 K/UL (ref 1–4.8)
LYMPHOCYTES NFR BLD: 29.3 % (ref 18–48)
MCH RBC QN AUTO: 29.1 PG (ref 27–31)
MCHC RBC AUTO-ENTMCNC: 32.7 G/DL (ref 32–36)
MCV RBC AUTO: 89 FL (ref 82–98)
MONOCYTES # BLD AUTO: 0.6 K/UL (ref 0.3–1)
MONOCYTES NFR BLD: 5.1 % (ref 4–15)
NEUTROPHILS # BLD AUTO: 7.7 K/UL (ref 1.8–7.7)
NEUTROPHILS NFR BLD: 64.7 % (ref 38–73)
NITRITE UR QL STRIP: NEGATIVE
NRBC BLD-RTO: 0 /100 WBC
PH UR STRIP: 6 [PH] (ref 5–8)
PLATELET # BLD AUTO: 311 K/UL (ref 150–450)
PMV BLD AUTO: 9.8 FL (ref 9.2–12.9)
POTASSIUM SERPL-SCNC: 3.6 MMOL/L (ref 3.5–5.1)
PROT SERPL-MCNC: 8.3 G/DL (ref 6–8.4)
PROT UR QL STRIP: ABNORMAL
RBC # BLD AUTO: 4.74 M/UL (ref 4–5.4)
SODIUM SERPL-SCNC: 134 MMOL/L (ref 136–145)
SP GR UR STRIP: >1.03 (ref 1–1.03)
URN SPEC COLLECT METH UR: ABNORMAL
UROBILINOGEN UR STRIP-ACNC: NEGATIVE EU/DL
WBC # BLD AUTO: 11.96 K/UL (ref 3.9–12.7)

## 2022-07-19 PROCEDURE — 81025 URINE PREGNANCY TEST: CPT | Performed by: NURSE PRACTITIONER

## 2022-07-19 PROCEDURE — 83690 ASSAY OF LIPASE: CPT | Performed by: EMERGENCY MEDICINE

## 2022-07-19 PROCEDURE — 81003 URINALYSIS AUTO W/O SCOPE: CPT | Performed by: EMERGENCY MEDICINE

## 2022-07-19 PROCEDURE — 85025 COMPLETE CBC W/AUTO DIFF WBC: CPT | Performed by: NURSE PRACTITIONER

## 2022-07-19 PROCEDURE — 25000003 PHARM REV CODE 250: Performed by: STUDENT IN AN ORGANIZED HEALTH CARE EDUCATION/TRAINING PROGRAM

## 2022-07-19 PROCEDURE — 96361 HYDRATE IV INFUSION ADD-ON: CPT

## 2022-07-19 PROCEDURE — 96374 THER/PROPH/DIAG INJ IV PUSH: CPT

## 2022-07-19 PROCEDURE — 63600175 PHARM REV CODE 636 W HCPCS: Performed by: STUDENT IN AN ORGANIZED HEALTH CARE EDUCATION/TRAINING PROGRAM

## 2022-07-19 PROCEDURE — 99284 EMERGENCY DEPT VISIT MOD MDM: CPT | Mod: 25

## 2022-07-19 PROCEDURE — 36415 COLL VENOUS BLD VENIPUNCTURE: CPT | Performed by: NURSE PRACTITIONER

## 2022-07-19 PROCEDURE — 80053 COMPREHEN METABOLIC PANEL: CPT | Performed by: NURSE PRACTITIONER

## 2022-07-19 RX ORDER — OXYCODONE AND ACETAMINOPHEN 5; 325 MG/1; MG/1
1 TABLET ORAL EVERY 12 HOURS PRN
Qty: 60 EACH | Refills: 0 | Status: SHIPPED | OUTPATIENT
Start: 2022-07-19 | End: 2022-10-03

## 2022-07-19 RX ORDER — METOCLOPRAMIDE HYDROCHLORIDE 5 MG/ML
10 INJECTION INTRAMUSCULAR; INTRAVENOUS
Status: DISCONTINUED | OUTPATIENT
Start: 2022-07-19 | End: 2022-07-19 | Stop reason: HOSPADM

## 2022-07-19 RX ORDER — MORPHINE SULFATE 4 MG/ML
4 INJECTION, SOLUTION INTRAMUSCULAR; INTRAVENOUS
Status: COMPLETED | OUTPATIENT
Start: 2022-07-19 | End: 2022-07-19

## 2022-07-19 RX ADMIN — SODIUM CHLORIDE 1000 ML: 0.9 INJECTION, SOLUTION INTRAVENOUS at 04:07

## 2022-07-19 RX ADMIN — MORPHINE SULFATE 4 MG: 4 INJECTION, SOLUTION INTRAMUSCULAR; INTRAVENOUS at 04:07

## 2022-07-19 NOTE — ED NOTES
"REPORTS ABDOMINAL DISCOMFORT. PRIVATE ROOM. EVEN AND NON LABORED RESPIRATIONS.  AIRWAY CLEAR.  PULSES REGULAR.  < 3" CAPILLARY REFILL. SKIN WDI.  MAEW.  NON DISTENDEDOBESE ABDOMEN. LBM REPORTED TODAY. ALERT, ORIENTED AND AMBULATORY. NAD AT THIS TIME.  CALL LIGHT IN REACH.  "

## 2022-07-19 NOTE — FIRST PROVIDER EVALUATION
"Medical screening exam completed.  I have conducted a focused provider triage encounter, findings are as follows:    Brief history of present illness:  Reports RUQ abdominal pain. Seen in this ED 2 days ago for same complaint. Has been recently diagnosed with gastroparesis and informed gall bladder is not functioning. Met with surgeon today. Reports pain has worsened.     Vitals:    07/19/22 1458   BP: (!) 144/86   BP Location: Left arm   Patient Position: Sitting   Pulse: 104   Resp: 20   Temp: 98 °F (36.7 °C)   TempSrc: Oral   SpO2: 99%   Weight: (!) 151 kg (333 lb)   Height: 5' 5" (1.651 m)       Pertinent physical exam:  RUQ tenderness    Brief workup plan:  Labs, imaging    Preliminary workup initiated; this workup will be continued and followed by the physician or advanced practice provider that is assigned to the patient when roomed.  "

## 2022-07-19 NOTE — DISCHARGE INSTRUCTIONS
We did not find any evidence of an acute surgical emergency at this time. Please return to this facility or another ED as needed for any new or worsening symptoms including chest pain, shortness of breath, abdominal pain, nausea or vomiting, fever or chills. Please follow up with your primary care doctor in 3 days. Please take all medicines as prescribed.  Keep all your follow-up appointment with the surgeons in her GI doctor.

## 2022-07-19 NOTE — ED PROVIDER NOTES
"Encounter Date: 7/19/2022       History     Chief Complaint   Patient presents with    Abdominal Pain     Patient reports severe RUQ abdominal pain, reports seen here Sunday for same thing      HPI   31-year-old woman presents for evaluation of chronic right upper abdominal pain as well as diffuse lower abdominal pain.  Right upper abdominal pain is intermittent, crampy, sometimes worse when she eats, it is worsening, was poorly by the over-the-counter medicine she has tried home.  Lower abdominal pain is similar in characteristic.  She has had a workup for these symptoms including CT scan on Sunday, prior right upper quadrant ultrasound, HIDA, colonoscopy.  She was seen by a surgeon earlier today for evaluation of removal of her gallbladder.  Denies fever, chills, HA, changes in vision, chest pain, sob, vaginal bleeding, vaginal discharge.       Review of patient's allergies indicates:   Allergen Reactions    Benadryl [diphenhydramine hcl]      Paralysis on right side body     Codeine      Paralysis right body    Flexeril [cyclobenzaprine] Other (See Comments)     Numbness on right side of body    Nsaids (non-steroidal anti-inflammatory drug) Diarrhea and Nausea And Vomiting     Ulcers     Penicillins Anaphylaxis and Hives    Magnesium      "made me feel horrible"     Prochlorperazine Anxiety and Palpitations     Past Medical History:   Diagnosis Date    Agoraphobia     Carpal tunnel syndrome     Depression     DVT (deep venous thrombosis)     2019 had blood clot in right arm    Fibromyalgia     Migraine headache     Nerve injury 08/2016    Lingual Nerve Injury    Obese     PTSD (post-traumatic stress disorder)     Trigeminal neuralgia      Past Surgical History:   Procedure Laterality Date    CARPAL TUNNEL RELEASE Right 12/27/2018    Dr Lozada     CARPAL TUNNEL RELEASE Left 1/28/2020    Procedure: RELEASE, CARPAL TUNNEL;  Surgeon: Brendon Lozada Jr., MD;  Location: [facility];  Service: " Orthopedics;  Laterality: Left;    MOUTH SURGERY      WISDOM TOOTH EXTRACTION       Family History   Problem Relation Age of Onset    No Known Problems Mother     No Known Problems Father     Cancer Maternal Aunt         endometrial    No Known Problems Maternal Uncle     No Known Problems Paternal Aunt     No Known Problems Paternal Uncle     No Known Problems Maternal Grandmother     No Known Problems Maternal Grandfather     No Known Problems Paternal Grandmother     No Known Problems Paternal Grandfather     No Known Problems Daughter     No Known Problems Son      Social History     Tobacco Use    Smoking status: Never Smoker    Smokeless tobacco: Never Used   Substance Use Topics    Alcohol use: No    Drug use: No     Review of Systems   Constitutional: Negative for chills and fever.   HENT: Negative for congestion and sore throat.    Eyes: Negative for redness and visual disturbance.   Respiratory: Negative for shortness of breath and wheezing.    Cardiovascular: Negative for chest pain and palpitations.   Gastrointestinal: Positive for abdominal pain, nausea and vomiting.   Genitourinary: Negative for difficulty urinating and dysuria.   Skin: Negative for rash and wound.   Neurological: Negative for weakness and light-headedness.       Physical Exam     Initial Vitals [07/19/22 1458]   BP Pulse Resp Temp SpO2   (!) 144/86 104 20 98 °F (36.7 °C) 99 %      MAP       --         Physical Exam    Nursing note and vitals reviewed.  Constitutional: She appears well-developed. She is not diaphoretic.   HENT:   Head: Normocephalic and atraumatic.   Eyes: Right eye exhibits no discharge. Left eye exhibits no discharge.   Neck: No tracheal deviation present.   Cardiovascular: Normal rate and regular rhythm.   Pulmonary/Chest: Breath sounds normal. No stridor. No respiratory distress.   Abdominal: Abdomen is soft. There is abdominal tenderness.   Mild RUQ ttp without guarding or rebound, overall soft  abdomen There is no rebound and no guarding.   Musculoskeletal:         General: No tenderness.     Neurological: She is alert and oriented to person, place, and time.   Skin: Skin is warm and dry.         ED Course   Procedures  Labs Reviewed   CBC W/ AUTO DIFFERENTIAL - Abnormal; Notable for the following components:       Result Value    Immature Grans (Abs) 0.05 (*)     All other components within normal limits   COMPREHENSIVE METABOLIC PANEL - Abnormal; Notable for the following components:    Sodium 134 (*)     Glucose 141 (*)     All other components within normal limits   URINALYSIS, REFLEX TO URINE CULTURE - Abnormal; Notable for the following components:    Specific Gravity, UA >1.030 (*)     Protein, UA Trace (*)     Ketones, UA Trace (*)     Bilirubin (UA) 1+ (*)     All other components within normal limits    Narrative:     Specimen Source->Urine   LIPASE   POCT URINE PREGNANCY          Imaging Results          US Abdomen Limited (Final result)  Result time 07/19/22 16:11:46    Final result by Maury Gallo MD (07/19/22 16:11:46)                 Narrative:    Reason: RUQ abdominal pain    COMPARISON: CT abdomen pelvis July 17, 2022    FINDINGS:    Diffuse increased echogenicity throughout hepatic parenchyma suggest hepatic steatosis. Although detection of focal hepatic lesions in setting of steatosis is limited, no focal hepatic mass or intrahepatic bile duct dilation identified. Hepatopedal flow in main portal vein.    Gallbladder is normal. Common duct diameter is normal.    Visualized pancreas is unremarkable. Right kidney is free of nephrolithiasis or hydronephrosis. Aorta is nonaneurysmal.    IMPRESSION:    Hepatic steatosis.    Electronically signed by:  Maury Gallo DO  7/19/2022 4:11 PM CDT Workstation: [id number]                               Medications   metoclopramide HCl injection 10 mg (has no administration in time range)   morphine injection 4 mg (4 mg Intravenous Given 7/19/22  8941)   sodium chloride 0.9% bolus 1,000 mL (0 mLs Intravenous Stopped 7/19/22 4020)     Medical Decision Making:   History:   Old Medical Records: I decided to obtain old medical records.  Old Records Summarized: records from clinic visits and records from previous admission(s).       <> Summary of Records: Seen on 7/17 w/negative CT  Initial Assessment:   30 yo woman with acute on chronic abd pain, prior imaging without obvious etiology. Mild htn otherwise hds. Well appearing, soft abdomen, mild ruq ttp.   Differential Diagnosis:   Biliary pathology, pancreatitis, metabolic derangement, medication effect, tox, endocrine derangement   Clinical Tests:   Lab Tests: Ordered and Reviewed  The following lab test(s) were unremarkable: CBC, CMP, UPT and Urinalysis       <> Summary of Lab: No acute emergent etiology  Radiological Study: Ordered and Reviewed  ED Management:  Overall workup without acute emergent etiology including lfts, bili, normal lipase and RUQ US. She felt better with treatment. She has extensive follow up with surgery and GI. Return precautions/follow up instructions/treatment plan given, expressed agreement and understanding.   Allan Nichols, HO3  [id number]                      Clinical Impression:   Final diagnoses:  [R10.11] Right upper quadrant abdominal pain (Primary)          ED Disposition Condition    Discharge Stable        ED Prescriptions     None        Follow-up Information     Follow up With Specialties Details Why Contact Info Additional Information    Crow Kennedy MD Family Medicine In 2 days For follow-up [address]  [address]  [facility] [address]  [phone]       [facility] - Emergency Dept Emergency Medicine Go to  As needed, If symptoms worsen, For fever, chills, nausea, vomiting [address] [facility] [address] [phone] 1st floor           Allan Nichols MD  Resident  07/19/22 7386

## 2022-07-20 LAB
CHLAMYDIA, AMPLIFIED DNA: NEGATIVE
N GONORRHOEAE, AMPLIFIED DNA: NEGATIVE

## 2022-07-20 NOTE — TELEPHONE ENCOUNTER
Spoke with the pharmacist to let them know that the patient was taking the last prescription every 4 hours. They released the prescription from 7/19/22.

## 2022-07-21 LAB
BACTERIA GENITAL AEROBE CULT: NORMAL
GRAM STN SPEC: NORMAL

## 2022-07-22 LAB
BACTERIA BLD CULT: NORMAL
BACTERIA BLD CULT: NORMAL

## 2022-07-27 ENCOUNTER — PATIENT MESSAGE (OUTPATIENT)
Dept: FAMILY MEDICINE | Facility: CLINIC | Age: 32
End: 2022-07-27

## 2022-07-27 RX ORDER — METOPROLOL SUCCINATE 50 MG/1
50 TABLET, EXTENDED RELEASE ORAL DAILY
Qty: 30 TABLET | Refills: 11 | Status: SHIPPED | OUTPATIENT
Start: 2022-07-27 | End: 2023-09-14 | Stop reason: SDUPTHER

## 2022-07-29 ENCOUNTER — PATIENT MESSAGE (OUTPATIENT)
Dept: FAMILY MEDICINE | Facility: CLINIC | Age: 32
End: 2022-07-29

## 2022-08-01 ENCOUNTER — PATIENT MESSAGE (OUTPATIENT)
Dept: FAMILY MEDICINE | Facility: CLINIC | Age: 32
End: 2022-08-01

## 2022-08-02 ENCOUNTER — PATIENT MESSAGE (OUTPATIENT)
Dept: FAMILY MEDICINE | Facility: CLINIC | Age: 32
End: 2022-08-02

## 2022-08-03 ENCOUNTER — PATIENT MESSAGE (OUTPATIENT)
Dept: FAMILY MEDICINE | Facility: CLINIC | Age: 32
End: 2022-08-03

## 2022-08-11 ENCOUNTER — PATIENT MESSAGE (OUTPATIENT)
Dept: FAMILY MEDICINE | Facility: CLINIC | Age: 32
End: 2022-08-11

## 2022-08-13 ENCOUNTER — PATIENT MESSAGE (OUTPATIENT)
Dept: FAMILY MEDICINE | Facility: CLINIC | Age: 32
End: 2022-08-13

## 2022-08-15 ENCOUNTER — HOSPITAL ENCOUNTER (EMERGENCY)
Facility: HOSPITAL | Age: 32
Discharge: HOME | End: 2022-08-15
Attending: EMERGENCY MEDICINE
Payer: MEDICAID

## 2022-08-15 VITALS
TEMPERATURE: 98 F | SYSTOLIC BLOOD PRESSURE: 154 MMHG | BODY MASS INDEX: 48.82 KG/M2 | RESPIRATION RATE: 20 BRPM | HEIGHT: 65 IN | OXYGEN SATURATION: 96 % | HEART RATE: 102 BPM | DIASTOLIC BLOOD PRESSURE: 95 MMHG | WEIGHT: 293 LBS

## 2022-08-15 DIAGNOSIS — G89.29 CHRONIC UPPER ABDOMINAL PAIN: Primary | ICD-10-CM

## 2022-08-15 DIAGNOSIS — R10.10 CHRONIC UPPER ABDOMINAL PAIN: Primary | ICD-10-CM

## 2022-08-15 LAB
ALBUMIN SERPL BCP-MCNC: 3.9 G/DL (ref 3.5–5.2)
ALP SERPL-CCNC: 68 U/L (ref 55–135)
ALT SERPL W/O P-5'-P-CCNC: 36 U/L (ref 10–44)
ANION GAP SERPL CALC-SCNC: 12 MMOL/L (ref 8–16)
AST SERPL-CCNC: 22 U/L (ref 10–40)
B-HCG UR QL: NEGATIVE
BASOPHILS # BLD AUTO: 0.03 K/UL (ref 0–0.2)
BASOPHILS NFR BLD: 0.2 % (ref 0–1.9)
BILIRUB SERPL-MCNC: 0.4 MG/DL (ref 0.1–1)
BILIRUB UR QL STRIP: NEGATIVE
BUN SERPL-MCNC: 11 MG/DL (ref 6–20)
CALCIUM SERPL-MCNC: 9.7 MG/DL (ref 8.7–10.5)
CHLORIDE SERPL-SCNC: 104 MMOL/L (ref 95–110)
CLARITY UR: CLEAR
CO2 SERPL-SCNC: 22 MMOL/L (ref 23–29)
COLOR UR: YELLOW
CREAT SERPL-MCNC: 0.8 MG/DL (ref 0.5–1.4)
DIFFERENTIAL METHOD: ABNORMAL
EOSINOPHIL # BLD AUTO: 0.1 K/UL (ref 0–0.5)
EOSINOPHIL NFR BLD: 0.5 % (ref 0–8)
ERYTHROCYTE [DISTWIDTH] IN BLOOD BY AUTOMATED COUNT: 13.1 % (ref 11.5–14.5)
EST. GFR  (NO RACE VARIABLE): >60 ML/MIN/1.73 M^2
GLUCOSE SERPL-MCNC: 170 MG/DL (ref 70–110)
GLUCOSE UR QL STRIP: NEGATIVE
HCT VFR BLD AUTO: 38.6 % (ref 37–48.5)
HGB BLD-MCNC: 13.2 G/DL (ref 12–16)
HGB UR QL STRIP: NEGATIVE
IMM GRANULOCYTES # BLD AUTO: 0.04 K/UL (ref 0–0.04)
IMM GRANULOCYTES NFR BLD AUTO: 0.3 % (ref 0–0.5)
KETONES UR QL STRIP: NEGATIVE
LEUKOCYTE ESTERASE UR QL STRIP: NEGATIVE
LIPASE SERPL-CCNC: 38 U/L (ref 4–60)
LYMPHOCYTES # BLD AUTO: 3.5 K/UL (ref 1–4.8)
LYMPHOCYTES NFR BLD: 24.8 % (ref 18–48)
MCH RBC QN AUTO: 29.5 PG (ref 27–31)
MCHC RBC AUTO-ENTMCNC: 34.2 G/DL (ref 32–36)
MCV RBC AUTO: 86 FL (ref 82–98)
MONOCYTES # BLD AUTO: 0.9 K/UL (ref 0.3–1)
MONOCYTES NFR BLD: 6.2 % (ref 4–15)
NEUTROPHILS # BLD AUTO: 9.6 K/UL (ref 1.8–7.7)
NEUTROPHILS NFR BLD: 68 % (ref 38–73)
NITRITE UR QL STRIP: NEGATIVE
NRBC BLD-RTO: 0 /100 WBC
PH UR STRIP: 6 [PH] (ref 5–8)
PLATELET # BLD AUTO: 292 K/UL (ref 150–450)
PMV BLD AUTO: 9.6 FL (ref 9.2–12.9)
POTASSIUM SERPL-SCNC: 3.9 MMOL/L (ref 3.5–5.1)
PROT SERPL-MCNC: 7.6 G/DL (ref 6–8.4)
PROT UR QL STRIP: NEGATIVE
RBC # BLD AUTO: 4.47 M/UL (ref 4–5.4)
SODIUM SERPL-SCNC: 138 MMOL/L (ref 136–145)
SP GR UR STRIP: >=1.03 (ref 1–1.03)
URN SPEC COLLECT METH UR: ABNORMAL
UROBILINOGEN UR STRIP-ACNC: NEGATIVE EU/DL
WBC # BLD AUTO: 14.14 K/UL (ref 3.9–12.7)

## 2022-08-15 PROCEDURE — 83690 ASSAY OF LIPASE: CPT | Performed by: EMERGENCY MEDICINE

## 2022-08-15 PROCEDURE — 99284 EMERGENCY DEPT VISIT MOD MDM: CPT | Mod: 25

## 2022-08-15 PROCEDURE — 80053 COMPREHEN METABOLIC PANEL: CPT | Performed by: EMERGENCY MEDICINE

## 2022-08-15 PROCEDURE — 81025 URINE PREGNANCY TEST: CPT | Performed by: EMERGENCY MEDICINE

## 2022-08-15 PROCEDURE — 96374 THER/PROPH/DIAG INJ IV PUSH: CPT

## 2022-08-15 PROCEDURE — 81003 URINALYSIS AUTO W/O SCOPE: CPT | Performed by: EMERGENCY MEDICINE

## 2022-08-15 PROCEDURE — 36415 COLL VENOUS BLD VENIPUNCTURE: CPT | Performed by: EMERGENCY MEDICINE

## 2022-08-15 PROCEDURE — 96361 HYDRATE IV INFUSION ADD-ON: CPT | Mod: 25

## 2022-08-15 PROCEDURE — 85025 COMPLETE CBC W/AUTO DIFF WBC: CPT | Performed by: EMERGENCY MEDICINE

## 2022-08-15 PROCEDURE — 63600175 PHARM REV CODE 636 W HCPCS: Performed by: EMERGENCY MEDICINE

## 2022-08-15 PROCEDURE — 96375 TX/PRO/DX INJ NEW DRUG ADDON: CPT | Mod: 59

## 2022-08-15 PROCEDURE — 25000003 PHARM REV CODE 250: Performed by: EMERGENCY MEDICINE

## 2022-08-15 RX ORDER — HYDROMORPHONE HYDROCHLORIDE 2 MG/ML
0.5 INJECTION, SOLUTION INTRAMUSCULAR; INTRAVENOUS; SUBCUTANEOUS
Status: COMPLETED | OUTPATIENT
Start: 2022-08-15 | End: 2022-08-15

## 2022-08-15 RX ORDER — BUTORPHANOL TARTRATE 1 MG/ML
1 INJECTION INTRAMUSCULAR; INTRAVENOUS ONCE
Status: COMPLETED | OUTPATIENT
Start: 2022-08-15 | End: 2022-08-15

## 2022-08-15 RX ORDER — ONDANSETRON 2 MG/ML
4 INJECTION INTRAMUSCULAR; INTRAVENOUS
Status: COMPLETED | OUTPATIENT
Start: 2022-08-15 | End: 2022-08-15

## 2022-08-15 RX ADMIN — ONDANSETRON 4 MG: 2 INJECTION INTRAMUSCULAR; INTRAVENOUS at 08:08

## 2022-08-15 RX ADMIN — HYDROMORPHONE HYDROCHLORIDE 0.5 MG: 2 INJECTION, SOLUTION INTRAMUSCULAR; INTRAVENOUS; SUBCUTANEOUS at 09:08

## 2022-08-15 RX ADMIN — BUTORPHANOL TARTRATE 1 MG: 1 INJECTION, SOLUTION INTRAMUSCULAR; INTRAVENOUS at 08:08

## 2022-08-15 RX ADMIN — SODIUM CHLORIDE 1000 ML: 0.9 INJECTION, SOLUTION INTRAVENOUS at 08:08

## 2022-08-15 NOTE — LETTER
Patient: Jasper Guerrero  YOB: 1990  Date: 8/15/2022 Time: 9:48 PM  Location: [facility]    Leaving the Hospital Against Medical Advice    Chart #:[id number]    This will certify that I, the undersigned,    ______________________________________________________________________    A patient in the above named medical center, having requested discharge and removal from the medical center against the advice of my attending physician(s), hereby release [facility], its physicians, officers and employees, severally and individually, from any and all liability of any nature whatsoever for any injury or harm or complication of any kind that may result directly or indirectly, by reason of my terminating my stay as a patient at [facility] and my departure from [facility], and hereby waive any and all rights of action I may now have or later acquire as a result of my voluntary departure from [facility] and the termination of my stay as a patient therein.    This release is made with the full knowledge of the danger that may result from the action which I am taking.      Date:_______________________                         ___________________________                                                                                    Patient/Legal Representative    Witness:        ____________________________                          ___________________________  Nurse                                                                        Physician

## 2022-08-16 NOTE — ED PROVIDER NOTES
"Encounter Date: 8/15/2022    SCRIBE #1 NOTE: I, Alanna Khan, am scribing for, and in the presence of, Kwabena Jade MD.       History     Chief Complaint   Patient presents with    Abdominal Pain     Hx. Gastroparesis      Time seen by provider: 8:20 PM on 08/15/2022    Jasper Guerrero is a 31 y.o. female with a PMHx of obesity who presents to the ED with an onset of intermittent abdominal pain that started this morning and occasionally wraps around to her back. Patient reports she has tried taking Bentyl, Librax, Tylenol and Percocet but states none of them are touching her pain. She does not take any NSAIDs. The patient denies constipation or any other symptoms at this time. Patient does not suspect she is pregnant. She states she had a colonoscopy in June and was diagnosed with gastroparesis. She reportedly takes Protonix and anti-diarrhea medication daily. Patient reports she is adalberto to have an upper endoscopy and her gall bladder evaluated in September.      The history is provided by the patient.     Review of patient's allergies indicates:   Allergen Reactions    Benadryl [diphenhydramine hcl]      Paralysis on right side body     Codeine      Paralysis right body    Flexeril [cyclobenzaprine] Other (See Comments)     Numbness on right side of body    Nsaids (non-steroidal anti-inflammatory drug) Diarrhea and Nausea And Vomiting     Ulcers     Penicillins Anaphylaxis and Hives    Magnesium      "made me feel horrible"     Prochlorperazine Anxiety and Palpitations     Past Medical History:   Diagnosis Date    Agoraphobia     Carpal tunnel syndrome     Depression     DVT (deep venous thrombosis)     2019 had blood clot in right arm    Fibromyalgia     Migraine headache     Nerve injury 08/2016    Lingual Nerve Injury    Obese     PTSD (post-traumatic stress disorder)     Trigeminal neuralgia      Past Surgical History:   Procedure Laterality Date    CARPAL TUNNEL RELEASE Right " 12/27/2018    Dr Lozada     CARPAL TUNNEL RELEASE Left 1/28/2020    Procedure: RELEASE, CARPAL TUNNEL;  Surgeon: Brendon Lozada Jr., MD;  Location: [facility];  Service: Orthopedics;  Laterality: Left;    MOUTH SURGERY      WISDOM TOOTH EXTRACTION       Family History   Problem Relation Age of Onset    No Known Problems Mother     No Known Problems Father     Cancer Maternal Aunt         endometrial    No Known Problems Maternal Uncle     No Known Problems Paternal Aunt     No Known Problems Paternal Uncle     No Known Problems Maternal Grandmother     No Known Problems Maternal Grandfather     No Known Problems Paternal Grandmother     No Known Problems Paternal Grandfather     No Known Problems Daughter     No Known Problems Son      Social History     Tobacco Use    Smoking status: Never Smoker    Smokeless tobacco: Never Used   Substance Use Topics    Alcohol use: No    Drug use: No     Review of Systems   Constitutional: Negative for fever.   HENT: Negative for congestion.    Eyes: Negative for visual disturbance.   Respiratory: Negative for wheezing.    Cardiovascular: Negative for chest pain.   Gastrointestinal: Positive for abdominal pain. Negative for constipation.   Genitourinary: Negative for dysuria.   Musculoskeletal: Negative for joint swelling.   Skin: Negative for rash.   Neurological: Negative for syncope.   Hematological: Does not bruise/bleed easily.   Psychiatric/Behavioral: Negative for confusion.       Physical Exam     Initial Vitals [08/15/22 1843]   BP Pulse Resp Temp SpO2   (!) 142/86 109 20 97.5 °F (36.4 °C) 97 %      MAP       --         Physical Exam    Nursing note and vitals reviewed.  Constitutional: Vital signs are normal. She appears well-nourished. She is Obese . No distress.   Obese    HENT:   Head: Normocephalic and atraumatic.   Eyes: Conjunctivae and EOM are normal.   Neck: Neck supple. No thyroid mass present.   Normal range of motion.  Cardiovascular: Normal  rate, regular rhythm and normal heart sounds. Exam reveals no gallop and no friction rub.    No murmur heard.  Pulmonary/Chest: Breath sounds normal. She has no wheezes. She has no rhonchi. She has no rales.   Abdominal: Abdomen is soft. Bowel sounds are normal. She exhibits no distension. There is no abdominal tenderness. There is no rebound and no guarding.   Musculoskeletal:      Cervical back: Normal range of motion and neck supple.     Neurological: She is alert and oriented to person, place, and time. She has normal strength. No cranial nerve deficit or sensory deficit.   Skin: Skin is warm and dry. No rash noted. No erythema.   Psychiatric: She has a normal mood and affect. Her speech is normal. Cognition and memory are normal.         ED Course   Procedures  Labs Reviewed   CBC W/ AUTO DIFFERENTIAL - Abnormal; Notable for the following components:       Result Value    WBC 14.14 (*)     Gran # (ANC) 9.6 (*)     All other components within normal limits   COMPREHENSIVE METABOLIC PANEL - Abnormal; Notable for the following components:    CO2 22 (*)     Glucose 170 (*)     All other components within normal limits   URINALYSIS, REFLEX TO URINE CULTURE - Abnormal; Notable for the following components:    Specific Gravity, UA >=1.030 (*)     All other components within normal limits    Narrative:     Specimen Source->Urine   LIPASE   PREGNANCY TEST, URINE RAPID    Narrative:     Specimen Source->Urine          Imaging Results    None          Medications   sodium chloride 0.9% bolus 1,000 mL (0 mLs Intravenous Stopped 8/15/22 2148)   ondansetron injection 4 mg (4 mg Intravenous Given 8/15/22 2048)   butorphanol injection 1 mg (1 mg Intravenous Given 8/15/22 2055)   HYDROmorphone (PF) injection 0.5 mg (0.5 mg Intravenous Given 8/15/22 2153)     Medical Decision Making:   History:   Old Medical Records: I decided to obtain old medical records.  Clinical Tests:   Lab Tests: Ordered and Reviewed  ED Management:  This  patient was emergently a social early after arrival.  Initial vital signs are stable.  She is reporting familiar style pain which she describes as an exacerbation of gastroparesis.  Patient initially requesting stay all of Zofran, later requesting stronger narcotic pain medication.  She was provided a single dose of this but educated that I suspect chronic opioid therapy will only worsen her symptoms associated with gastroparesis.  Screening labs are significant for mild leukocytosis of 14,000.  I did attempt to obtain a CT scan and/or ultrasound of the upper abdomen, considering the leukocytosis, complaints of uncontrolled pain at home and her reported past history of a dysfunctional gallbladder.  Patient denies wanting to receive these imaging modalities at this time and states she would like to focus on pain control.  She is educated that discharge without confirmation of the absence of surgical pathology would be against medical advice.  She is educated this may result in death, permanent disability, inability to maintain current lifestyle, the need for additional hospital admissions, doctor visits, surgeries.  She is strongly encouraged to follow up with her primary care doctor and gastroenterologist as soon as possible.  She is asked to return to the emergency department immediately for any new, concerning, or worsening symptoms.  Patient agreeable and she was discharged stable condition with a  against medical advice.          Scribe Attestation:   Scribe #1: I performed the above scribed service and the documentation accurately describes the services I performed. I attest to the accuracy of the note.               I, Dr. Kwabena Jade, personally performed the services described in this documentation. All medical record entries made by the scribe were at my direction and in my presence.  I have reviewed the chart and agree that the record reflects my personal performance and is accurate and complete.  Kwabena Jade MD.  2:05 AM 08/16/2022    Clinical Impression:   Final diagnoses:  [R10.10, G89.29] Chronic upper abdominal pain (Primary)          ED Disposition Condition    AMA               Kwabena Jade MD  08/16/22 0202

## 2022-08-17 ENCOUNTER — PATIENT MESSAGE (OUTPATIENT)
Dept: FAMILY MEDICINE | Facility: CLINIC | Age: 32
End: 2022-08-17

## 2022-08-17 ENCOUNTER — TELEPHONE (OUTPATIENT)
Dept: FAMILY MEDICINE | Facility: CLINIC | Age: 32
End: 2022-08-17

## 2022-08-17 RX ORDER — DIPHENOXYLATE HYDROCHLORIDE AND ATROPINE SULFATE 2.5; .025 MG/1; MG/1
1 TABLET ORAL 4 TIMES DAILY PRN
Qty: 10 TABLET | Refills: 0 | Status: SHIPPED | OUTPATIENT
Start: 2022-08-17 | End: 2022-08-27

## 2022-08-25 DIAGNOSIS — G43.719 INTRACTABLE CHRONIC MIGRAINE WITHOUT AURA AND WITHOUT STATUS MIGRAINOSUS: ICD-10-CM

## 2022-08-26 RX ORDER — BUTALBITAL, ACETAMINOPHEN AND CAFFEINE 50; 325; 40 MG/1; MG/1; MG/1
1 TABLET ORAL EVERY 6 HOURS PRN
Qty: 10 TABLET | Refills: 1 | Status: SHIPPED | OUTPATIENT
Start: 2022-08-26 | End: 2022-10-07 | Stop reason: SDUPTHER

## 2022-08-28 ENCOUNTER — HOSPITAL ENCOUNTER (EMERGENCY)
Facility: HOSPITAL | Age: 32
Discharge: HOME | End: 2022-08-28
Attending: EMERGENCY MEDICINE
Payer: MEDICAID

## 2022-08-28 VITALS
OXYGEN SATURATION: 96 % | SYSTOLIC BLOOD PRESSURE: 168 MMHG | BODY MASS INDEX: 48.82 KG/M2 | HEIGHT: 65 IN | WEIGHT: 293 LBS | DIASTOLIC BLOOD PRESSURE: 78 MMHG | RESPIRATION RATE: 18 BRPM | TEMPERATURE: 98 F | HEART RATE: 105 BPM

## 2022-08-28 VITALS
HEIGHT: 65 IN | HEART RATE: 114 BPM | OXYGEN SATURATION: 96 % | BODY MASS INDEX: 48.82 KG/M2 | RESPIRATION RATE: 20 BRPM | DIASTOLIC BLOOD PRESSURE: 74 MMHG | WEIGHT: 293 LBS | TEMPERATURE: 97 F | SYSTOLIC BLOOD PRESSURE: 127 MMHG

## 2022-08-28 DIAGNOSIS — G89.29 CHRONIC ABDOMINAL PAIN: Primary | ICD-10-CM

## 2022-08-28 DIAGNOSIS — R10.9 ABDOMINAL PAIN: ICD-10-CM

## 2022-08-28 DIAGNOSIS — R10.9 CHRONIC ABDOMINAL PAIN: Primary | ICD-10-CM

## 2022-08-28 DIAGNOSIS — R10.13 EPIGASTRIC ABDOMINAL PAIN: Primary | ICD-10-CM

## 2022-08-28 LAB
ALBUMIN SERPL BCP-MCNC: 4 G/DL (ref 3.5–5.2)
ALP SERPL-CCNC: 83 U/L (ref 55–135)
ALT SERPL W/O P-5'-P-CCNC: 34 U/L (ref 10–44)
ANION GAP SERPL CALC-SCNC: 13 MMOL/L (ref 8–16)
AST SERPL-CCNC: 23 U/L (ref 10–40)
B-HCG UR QL: NEGATIVE
BASOPHILS # BLD AUTO: 0.02 K/UL (ref 0–0.2)
BASOPHILS NFR BLD: 0.3 % (ref 0–1.9)
BILIRUB SERPL-MCNC: 0.5 MG/DL (ref 0.1–1)
BILIRUB UR QL STRIP: NEGATIVE
BUN SERPL-MCNC: 9 MG/DL (ref 6–20)
CALCIUM SERPL-MCNC: 9.6 MG/DL (ref 8.7–10.5)
CHLORIDE SERPL-SCNC: 103 MMOL/L (ref 95–110)
CLARITY UR: CLEAR
CO2 SERPL-SCNC: 23 MMOL/L (ref 23–29)
COLOR UR: YELLOW
CREAT SERPL-MCNC: 0.8 MG/DL (ref 0.5–1.4)
DIFFERENTIAL METHOD: ABNORMAL
EOSINOPHIL # BLD AUTO: 0 K/UL (ref 0–0.5)
EOSINOPHIL NFR BLD: 0.6 % (ref 0–8)
ERYTHROCYTE [DISTWIDTH] IN BLOOD BY AUTOMATED COUNT: 12.8 % (ref 11.5–14.5)
EST. GFR  (NO RACE VARIABLE): >60 ML/MIN/1.73 M^2
GLUCOSE SERPL-MCNC: 123 MG/DL (ref 70–110)
GLUCOSE UR QL STRIP: NEGATIVE
HCT VFR BLD AUTO: 41.9 % (ref 37–48.5)
HGB BLD-MCNC: 13.6 G/DL (ref 12–16)
HGB UR QL STRIP: NEGATIVE
IMM GRANULOCYTES # BLD AUTO: 0.03 K/UL (ref 0–0.04)
IMM GRANULOCYTES NFR BLD AUTO: 0.5 % (ref 0–0.5)
KETONES UR QL STRIP: NEGATIVE
LEUKOCYTE ESTERASE UR QL STRIP: NEGATIVE
LIPASE SERPL-CCNC: 27 U/L (ref 4–60)
LYMPHOCYTES # BLD AUTO: 1.4 K/UL (ref 1–4.8)
LYMPHOCYTES NFR BLD: 22.1 % (ref 18–48)
MCH RBC QN AUTO: 28.8 PG (ref 27–31)
MCHC RBC AUTO-ENTMCNC: 32.5 G/DL (ref 32–36)
MCV RBC AUTO: 89 FL (ref 82–98)
MONOCYTES # BLD AUTO: 0.6 K/UL (ref 0.3–1)
MONOCYTES NFR BLD: 9.6 % (ref 4–15)
NEUTROPHILS # BLD AUTO: 4.3 K/UL (ref 1.8–7.7)
NEUTROPHILS NFR BLD: 66.9 % (ref 38–73)
NITRITE UR QL STRIP: NEGATIVE
NRBC BLD-RTO: 0 /100 WBC
PH UR STRIP: 6 [PH] (ref 5–8)
PLATELET # BLD AUTO: 268 K/UL (ref 150–450)
PMV BLD AUTO: 9 FL (ref 9.2–12.9)
POTASSIUM SERPL-SCNC: 3.6 MMOL/L (ref 3.5–5.1)
PROT SERPL-MCNC: 7.7 G/DL (ref 6–8.4)
PROT UR QL STRIP: NEGATIVE
RBC # BLD AUTO: 4.72 M/UL (ref 4–5.4)
SODIUM SERPL-SCNC: 139 MMOL/L (ref 136–145)
SP GR UR STRIP: >=1.03 (ref 1–1.03)
URN SPEC COLLECT METH UR: ABNORMAL
UROBILINOGEN UR STRIP-ACNC: NEGATIVE EU/DL
WBC # BLD AUTO: 6.46 K/UL (ref 3.9–12.7)

## 2022-08-28 PROCEDURE — 85025 COMPLETE CBC W/AUTO DIFF WBC: CPT | Performed by: EMERGENCY MEDICINE

## 2022-08-28 PROCEDURE — 83690 ASSAY OF LIPASE: CPT | Performed by: EMERGENCY MEDICINE

## 2022-08-28 PROCEDURE — 96375 TX/PRO/DX INJ NEW DRUG ADDON: CPT

## 2022-08-28 PROCEDURE — 99283 EMERGENCY DEPT VISIT LOW MDM: CPT

## 2022-08-28 PROCEDURE — 36415 COLL VENOUS BLD VENIPUNCTURE: CPT | Performed by: EMERGENCY MEDICINE

## 2022-08-28 PROCEDURE — 96374 THER/PROPH/DIAG INJ IV PUSH: CPT

## 2022-08-28 PROCEDURE — 25000003 PHARM REV CODE 250: Performed by: EMERGENCY MEDICINE

## 2022-08-28 PROCEDURE — 93005 ELECTROCARDIOGRAM TRACING: CPT | Performed by: INTERNAL MEDICINE

## 2022-08-28 PROCEDURE — 93010 ELECTROCARDIOGRAM REPORT: CPT | Mod: ,,, | Performed by: INTERNAL MEDICINE

## 2022-08-28 PROCEDURE — 81003 URINALYSIS AUTO W/O SCOPE: CPT | Performed by: EMERGENCY MEDICINE

## 2022-08-28 PROCEDURE — 63600175 PHARM REV CODE 636 W HCPCS: Performed by: EMERGENCY MEDICINE

## 2022-08-28 PROCEDURE — 99284 EMERGENCY DEPT VISIT MOD MDM: CPT | Mod: 25

## 2022-08-28 PROCEDURE — 93010 EKG 12-LEAD: ICD-10-PCS | Mod: ,,, | Performed by: INTERNAL MEDICINE

## 2022-08-28 PROCEDURE — 80053 COMPREHEN METABOLIC PANEL: CPT | Performed by: EMERGENCY MEDICINE

## 2022-08-28 PROCEDURE — 81025 URINE PREGNANCY TEST: CPT | Performed by: EMERGENCY MEDICINE

## 2022-08-28 RX ORDER — ONDANSETRON 2 MG/ML
8 INJECTION INTRAMUSCULAR; INTRAVENOUS
Status: COMPLETED | OUTPATIENT
Start: 2022-08-28 | End: 2022-08-28

## 2022-08-28 RX ORDER — BUTORPHANOL TARTRATE 1 MG/ML
2 INJECTION INTRAMUSCULAR; INTRAVENOUS
Status: COMPLETED | OUTPATIENT
Start: 2022-08-28 | End: 2022-08-28

## 2022-08-28 RX ORDER — ORPHENADRINE CITRATE 30 MG/ML
60 INJECTION INTRAMUSCULAR; INTRAVENOUS
Status: DISCONTINUED | OUTPATIENT
Start: 2022-08-28 | End: 2022-08-28 | Stop reason: HOSPADM

## 2022-08-28 RX ORDER — LIDOCAINE HYDROCHLORIDE 20 MG/ML
15 SOLUTION OROPHARYNGEAL ONCE
Status: COMPLETED | OUTPATIENT
Start: 2022-08-28 | End: 2022-08-28

## 2022-08-28 RX ORDER — DROPERIDOL 2.5 MG/ML
1.25 INJECTION, SOLUTION INTRAMUSCULAR; INTRAVENOUS ONCE
Status: DISCONTINUED | OUTPATIENT
Start: 2022-08-28 | End: 2022-08-28 | Stop reason: HOSPADM

## 2022-08-28 RX ORDER — NALOXONE HYDROCHLORIDE 4 MG/.1ML
SPRAY NASAL
COMMUNITY
Start: 2022-08-09 | End: 2022-10-03

## 2022-08-28 RX ORDER — TOPIRAMATE 25 MG/1
TABLET ORAL
COMMUNITY
Start: 2022-08-03 | End: 2022-10-03

## 2022-08-28 RX ORDER — HYDROCODONE BITARTRATE AND ACETAMINOPHEN 5; 325 MG/1; MG/1
1 TABLET ORAL EVERY 4 HOURS PRN
COMMUNITY
Start: 2022-08-03 | End: 2022-09-25

## 2022-08-28 RX ORDER — MAG HYDROX/ALUMINUM HYD/SIMETH 200-200-20
30 SUSPENSION, ORAL (FINAL DOSE FORM) ORAL ONCE
Status: COMPLETED | OUTPATIENT
Start: 2022-08-28 | End: 2022-08-28

## 2022-08-28 RX ORDER — OXCARBAZEPINE 150 MG/1
150 TABLET, FILM COATED ORAL 2 TIMES DAILY
COMMUNITY
Start: 2022-08-01 | End: 2022-10-03

## 2022-08-28 RX ADMIN — LIDOCAINE HYDROCHLORIDE 15 ML: 20 SOLUTION ORAL; TOPICAL at 02:08

## 2022-08-28 RX ADMIN — ONDANSETRON 8 MG: 2 INJECTION INTRAMUSCULAR; INTRAVENOUS at 02:08

## 2022-08-28 RX ADMIN — ALUMINUM HYDROXIDE, MAGNESIUM HYDROXIDE, AND SIMETHICONE 30 ML: 200; 200; 20 SUSPENSION ORAL at 02:08

## 2022-08-28 RX ADMIN — BUTORPHANOL TARTRATE 2 MG: 1 INJECTION, SOLUTION INTRAMUSCULAR; INTRAVENOUS at 02:08

## 2022-08-28 NOTE — ED PROVIDER NOTES
"Encounter Date: 8/28/2022       History     Chief Complaint   Patient presents with    Abdominal Pain     SINCE June 8TH, SEEN AND TREATED 1 HR AGO     31 y.o. female who presents to the ED with  epigastric abdominal pain which radiates to her back that has been present since 06/08/2022 s/p Colonoscopy 2 day prior. She describes pain as aching.  She feels it in her upper abdomen and she feels it in her lower back radiating down her legs bilaterally.  No bowel or bladder incontinence.  Not an IV drug user.  Patient states she has had minor abdominal pain since 2019 which she was controlling with Bentyl. She had a 2 CT scans in July which were both negative as well as an ultra sound which read normal gallbladder and fatty liver. Patient report she is following with Dr. Rosendo Shaver and has an Upper GI Scope scheduled for 09/19/2022. She reports taking Bentyl, Librax, and Tylenol for pain. . The patient denies constipation, dysuria, hematuria, SOB, vomiting. Patient reports PCP is Dr. Kennedy and Psychiatric Dr. Cobb. She denies recent alcohol consumption. She denies smoking or taking antiinflammatories. She denies past abdominal surgeries. PMHx of gastric ulcers, gastroparesis, obese, trigeminal neuralgia, fibromyalgia.  Patient was just at [facility] nor sure and seems that she left against medical advice.  She had a full workup of labs including CBC, CMP, lipase, urinalysis and urine pregnancy which all were within normal limits.  White blood cell count was normal.  Patient says her main goal here is "pain control".  She says morphine and Dilaudid have helped in the past.    Review of patient's allergies indicates:   Allergen Reactions    Benadryl [diphenhydramine hcl]      Paralysis on right side body     Codeine      Paralysis right body    Flexeril [cyclobenzaprine] Other (See Comments)     Numbness on right side of body    Nsaids (non-steroidal anti-inflammatory drug) Diarrhea and Nausea And Vomiting     " "Ulcers     Penicillins Anaphylaxis and Hives    Magnesium      "made me feel horrible"     Prochlorperazine Anxiety and Palpitations     Past Medical History:   Diagnosis Date    Agoraphobia     Carpal tunnel syndrome     Depression     DVT (deep venous thrombosis)     2019 had blood clot in right arm    Fibromyalgia     Migraine headache     Nerve injury 08/2016    Lingual Nerve Injury    Obese     PTSD (post-traumatic stress disorder)     Trigeminal neuralgia      Past Surgical History:   Procedure Laterality Date    CARPAL TUNNEL RELEASE Right 12/27/2018    Dr Lozada     CARPAL TUNNEL RELEASE Left 1/28/2020    Procedure: RELEASE, CARPAL TUNNEL;  Surgeon: Brendon Lozada Jr., MD;  Location: [facility];  Service: Orthopedics;  Laterality: Left;    MOUTH SURGERY      WISDOM TOOTH EXTRACTION       Family History   Problem Relation Age of Onset    No Known Problems Mother     No Known Problems Father     Cancer Maternal Aunt         endometrial    No Known Problems Maternal Uncle     No Known Problems Paternal Aunt     No Known Problems Paternal Uncle     No Known Problems Maternal Grandmother     No Known Problems Maternal Grandfather     No Known Problems Paternal Grandmother     No Known Problems Paternal Grandfather     No Known Problems Daughter     No Known Problems Son      Social History     Tobacco Use    Smoking status: Never    Smokeless tobacco: Never   Substance Use Topics    Alcohol use: No    Drug use: No     Review of Systems   Constitutional:  Negative for chills and fever.   HENT:  Negative for sore throat.    Respiratory:  Negative for shortness of breath.    Cardiovascular:  Negative for chest pain.   Gastrointestinal:  Positive for abdominal pain. Negative for diarrhea, nausea and vomiting.   Genitourinary:  Negative for dysuria.   Musculoskeletal:  Negative for back pain.   Skin:  Negative for rash.   Neurological:  Negative for weakness.   Hematological:  Does not bruise/bleed easily.   All other " systems reviewed and are negative.    Physical Exam     Initial Vitals [08/28/22 1821]   BP Pulse Resp Temp SpO2   125/77 (!) 116 20 97.3 °F (36.3 °C) 97 %      MAP       --         Physical Exam    Nursing note and vitals reviewed.  Constitutional: She appears well-developed and well-nourished. No distress.   Severely morbidly obese   HENT:   Head: Normocephalic and atraumatic.   Right Ear: External ear normal.   Left Ear: External ear normal.   Mouth/Throat: No oropharyngeal exudate.   Eyes: Conjunctivae and EOM are normal. Pupils are equal, round, and reactive to light.   Neck: Neck supple. No tracheal deviation present.   Normal range of motion.  Cardiovascular:  Regular rhythm, normal heart sounds and intact distal pulses.           No murmur heard.  Tachycardic   Pulmonary/Chest: Breath sounds normal. No stridor. No respiratory distress. She has no wheezes. She has no rhonchi. She has no rales.   Abdominal: Abdomen is soft. She exhibits no distension. There is no abdominal tenderness. There is no rebound and no guarding.   Musculoskeletal:         General: No tenderness or edema. Normal range of motion.      Cervical back: Normal range of motion and neck supple.     Lymphadenopathy:     She has no cervical adenopathy.   Neurological: She is alert and oriented to person, place, and time. She has normal strength. No cranial nerve deficit or sensory deficit. GCS score is 15. GCS eye subscore is 4. GCS verbal subscore is 5. GCS motor subscore is 6.   5/5 strength in upper and lower extremities bilaterally.  Normal finger-to-nose.  Speech is normal.  No facial droop.   Skin: Skin is warm and dry. Capillary refill takes less than 2 seconds. No rash noted. No erythema. No pallor.   Psychiatric: Judgment and thought content normal. Her affect is blunt.       ED Course   Procedures  Labs Reviewed - No data to display       Imaging Results    None          Medications - No data to display  Medical Decision Making:   ED  "Management:  31-year-old female presents emergency department as stated above.  Was just evaluated that outside the ER for your apparently eloped or left AMA unclear.  She was evaluated here by myself.  I reviewed the records.  She has had this pain ongoing for 2 months.  She has had several recent CT scans and stated she did not want another CT scan.  I ordered her some medication for her pain as she said she was mostly interested in "pain control".  When medications brought to her she refused medication stated she wanted to leave against medical advice.      All relevant clinical information was discussed with the patient. There is no criteria for involuntary commitment. Patient has a GCS of 15 and seems to have capacitance to make her own decisions. The patient is aware of suspected diagnosis low back pain and abdominal pain. The patient verbalizes, acknowledges and understands reasons for recommending further evaluation in the emergency department. The patient verbalizes, acknowledges and understands the risk of death, permanent mental and neurological impairment, loss of limb, loss of current lifestyle and worsening of current condition and chooses to leave against medical advice.  The patient is welcome to return to this hospital at any time for any concerns or worsening of current condition. The patient left the ER prior to her receiving discharge instructions. She signed the against medical advice form.  I do not believe that she had any life-threatening emergent surgical pathology given her reassuring abdominal exam.  Given her request for opioids, I do have some concern for drug-seeking behavior.  She will follow up with GI and PCP on an outpatient basis.    A dictation software program was used for this note.  Please expect some simple typographical  errors in this note.    This patient was seen during the context of the Covid 19 global pandemic where local, state, hospital guidelines, were followed to " the best of ability given the circumstances of the pandemic.           ED Course as of 08/29/22 0025   Sun Aug 28, 2022   1940 She states that she wants to leave against medical advice.  When she learned she was not getting Dilaudid or morphine she chose to leave against medical advice. [JR]      ED Course User Index  [JR] Gildardo Hinkle DO             Clinical Impression:   Final diagnoses:  [R10.9] Abdominal pain  [R10.9, G89.29] Chronic abdominal pain (Primary)      ED Disposition Condition    LEXIE Hinkle DO  08/29/22 0025

## 2022-08-28 NOTE — ED PROVIDER NOTES
"Encounter Date: 8/28/2022    SCRIBE #1 NOTE: I, Shawna Negrodimitry, am scribing for, and in the presence of,  Justus Joseph.     History     Chief Complaint   Patient presents with    Abdominal Pain     Time seen by provider: 2:18 PM on 08/28/2022    Jasper Guerrero is a 31 y.o. female who presents to the ED with an onset of severe abdominal pain which radiates to her back that has been present since 06/08/2022 s/p Colonoscopy 2 day prior. She describes the pain as "twisting and ripping." Patient states she has had minor abdominal pain since 2019 which she was controlling with Bentyl. She had a 2 CT scans in July which were both negative as well as an ultra sound which read normal gallbladder and fatty liver. Patient report she is following with Dr. Rosendo Shaver and has an Upper GI Scope scheduled for 09/19/2022. She reports taking Bentyl, Librax, and Tylenol for pain. Patient notes pain does not radiate from back to front. The patient denies constipation, dysuria, hematuria, SOB, vomiting, or any other symptoms at this time. Patient reports PCP is Dr. Kennedy and Psychiatric Dr. Cobb. She denies recent alcohol consumption or pancreatitis. She denies smoking or taking antiinflammatories. She denies past abdominal surgeries. PMHx of gastric ulcers, gastroparesis, obese, trigeminal neuralgia, fibromyalgia, DVT, and depression. No pertinent PSHx.       The history is provided by the patient and medical records.   Review of patient's allergies indicates:   Allergen Reactions    Benadryl [diphenhydramine hcl]      Paralysis on right side body     Codeine      Paralysis right body    Flexeril [cyclobenzaprine] Other (See Comments)     Numbness on right side of body    Nsaids (non-steroidal anti-inflammatory drug) Diarrhea and Nausea And Vomiting     Ulcers     Penicillins Anaphylaxis and Hives    Magnesium      "made me feel horrible"     Prochlorperazine Anxiety and Palpitations     Past Medical History:   Diagnosis " Date    Agoraphobia     Carpal tunnel syndrome     Depression     DVT (deep venous thrombosis)     2019 had blood clot in right arm    Fibromyalgia     Migraine headache     Nerve injury 08/2016    Lingual Nerve Injury    Obese     PTSD (post-traumatic stress disorder)     Trigeminal neuralgia      Past Surgical History:   Procedure Laterality Date    CARPAL TUNNEL RELEASE Right 12/27/2018    Dr Lozada     CARPAL TUNNEL RELEASE Left 1/28/2020    Procedure: RELEASE, CARPAL TUNNEL;  Surgeon: Brendon Lozada Jr., MD;  Location: [facility];  Service: Orthopedics;  Laterality: Left;    MOUTH SURGERY      WISDOM TOOTH EXTRACTION       Family History   Problem Relation Age of Onset    No Known Problems Mother     No Known Problems Father     Cancer Maternal Aunt         endometrial    No Known Problems Maternal Uncle     No Known Problems Paternal Aunt     No Known Problems Paternal Uncle     No Known Problems Maternal Grandmother     No Known Problems Maternal Grandfather     No Known Problems Paternal Grandmother     No Known Problems Paternal Grandfather     No Known Problems Daughter     No Known Problems Son      Social History     Tobacco Use    Smoking status: Never    Smokeless tobacco: Never   Substance Use Topics    Alcohol use: No    Drug use: No     Review of Systems   Constitutional:  Negative for fever.   HENT:  Negative for sore throat.    Respiratory:  Negative for shortness of breath.    Cardiovascular:  Negative for chest pain.   Gastrointestinal:  Positive for abdominal pain. Negative for constipation, nausea and vomiting.   Genitourinary:  Negative for dysuria and hematuria.   Musculoskeletal:  Positive for back pain.   Skin:  Negative for rash.   Neurological:  Negative for weakness.   Hematological:  Does not bruise/bleed easily.     Physical Exam     Initial Vitals [08/28/22 1338]   BP Pulse Resp Temp SpO2   (!) 168/78 105 18 98.1 °F (36.7 °C) 96 %      MAP       --         Physical Exam    Nursing note  and vitals reviewed.  Constitutional: She appears well-developed.  Non-toxic appearance.   HENT:   Head: Normocephalic and atraumatic.   Eyes: EOM are normal. Pupils are equal, round, and reactive to light.   Neck: Neck supple.   Cardiovascular:  Normal rate, regular rhythm, normal heart sounds and intact distal pulses.     Exam reveals no gallop and no friction rub.       No murmur heard.  Pulmonary/Chest: Breath sounds normal. No respiratory distress. She has no decreased breath sounds. She has no wheezes. She has no rhonchi. She has no rales.   Abdominal: Abdomen is soft. Bowel sounds are normal. She exhibits no distension.   Minimal abdominal tenderness noted to the mid-epigastric region.    Musculoskeletal:         General: Normal range of motion.      Cervical back: Neck supple.     Neurological: She is alert and oriented to person, place, and time.   Skin: Skin is warm and dry.   Psychiatric: She exhibits a depressed mood.   Flat affect on exam.        ED Course   Procedures  Labs Reviewed   CBC W/ AUTO DIFFERENTIAL - Abnormal; Notable for the following components:       Result Value    MPV 9.0 (*)     All other components within normal limits   COMPREHENSIVE METABOLIC PANEL - Abnormal; Notable for the following components:    Glucose 123 (*)     All other components within normal limits   URINALYSIS, REFLEX TO URINE CULTURE - Abnormal; Notable for the following components:    Specific Gravity, UA >=1.030 (*)     All other components within normal limits    Narrative:     Specimen Source->Urine   LIPASE   PREGNANCY TEST, URINE RAPID    Narrative:     Specimen Source->Urine          Imaging Results    None          Medications   aluminum-magnesium hydroxide-simethicone 200-200-20 mg/5 mL suspension 30 mL (30 mLs Oral Given 8/28/22 1457)     And   LIDOcaine HCl 2% oral solution 15 mL (15 mLs Oral Given 8/28/22 1457)   butorphanol injection 2 mg (2 mg Intravenous Given 8/28/22 1455)   ondansetron injection 8 mg  (8 mg Intravenous Given 8/28/22 1453)     Medical Decision Making:   History:   Old Medical Records: I decided to obtain old medical records.  Clinical Tests:   Lab Tests: Ordered and Reviewed        Scribe Attestation:   Scribe #1: I performed the above scribed service and the documentation accurately describes the services I performed. I attest to the accuracy of the note.      ED Course as of 08/31/22 0112   Sun Aug 28, 2022   1608 Unclear the etiology of the patient's abdominal pain.  She has asked multiple times for narcotics.  Her vital signs are stable except for mild hypertension.  She has had extensive workup for abdominal pain:  Colonoscopy several CT scans ultrasound.  She states she has a decreased ejection fraction on HIDA scan.  She has no rebound or guarding.  Labs are all stable with no signs of transaminitis leukocytosis.  She has no fevers.  She has follow-up with Gastroenterology.  I do not think she needs any emergent imaging at this time.  Patient was on narcotics for several years and then stopped when she was told she had gastroparesis.  Perhaps her gastroparesis is flaring up.  I doubt she has acute cholecystitis given her exam at this time. [JS]      ED Course User Index  [JS] Justus Joseph MD           I, Dr. Justus Joseph personally performed the services described in this documentation. All medical record entries made by the scribe were at my direction and in my presence.  I have reviewed the chart and agree that the record reflects my personal performance and is accurate and complete. Justus Joseph MD.  1:13 AM 08/31/2022    DISCLAIMER: This note was prepared with Dragon NaturallySpeaking voice recognition transcription software. Garbled syntax, mangled pronouns, and other bizarre constructions may be attributed to that software system   Clinical Impression:   Final diagnoses:  [R10.13] Epigastric abdominal pain (Primary)      ED Disposition Condition    Discharge Stable           ED Prescriptions    None       Follow-up Information       Follow up With Specialties Details Why Contact Info    Crow Kennedy MD Family Medicine Schedule an appointment as soon as possible for a visit   [address]  [address]  [facility] [address]  [phone]          Call your gastroenterologist to see if you can ensure closer follow-up             Justus Joseph MD  08/31/22 0113

## 2022-08-29 NOTE — ED NOTES
LOC: The patient is awake, alert and aware of environment with an appropriate affect, the patient is oriented x 3 and speaking appropriately.  APPEARANCE: Patient resting comfortably and in no acute distress, patient is clean and well groomed, patient's clothing properly fastened.  SKIN: The skin is warm and dry, color consistent with ethnicity, patient has normal skin turgor and moist mucus membranes, skin intact, no breakdown or brusing noted.  MUSKULOSKELETAL: Patient moving all extremities well, no obvious swelling or deformities noted.  RESPIRATORY: Airway is open and patent, respirations are spontaneous, patient has a normal effort and rate, no accessory muscle use noted.  CARDIAC: Patient has a normal rate and rhythm, no peripheral edema noted, capillary refill < 3 seconds.  ABDOMEN: Soft and non tender to palpation, no distention noted. Pt reports generalized abdominal pain. Seen at [facility] prior to arrival to ER.   NEUROLOGIC: PERRL, 3mm bilaterally, eyes open spontaneously, behavior appropriate to situation, follows commands, facial expression symmetrical, bilateral hand grasp equal and even, purposeful motor response noted, normal sensation in all extremities when touched with a finger.

## 2022-09-15 LAB
CHLAMYDIA, AMPLIFIED DNA: NORMAL
N GONORRHOEAE, AMPLIFIED DNA: NORMAL

## 2022-09-25 ENCOUNTER — HOSPITAL ENCOUNTER (EMERGENCY)
Facility: HOSPITAL | Age: 32
Discharge: HOME | End: 2022-09-25
Attending: EMERGENCY MEDICINE
Payer: MEDICAID

## 2022-09-25 VITALS
HEART RATE: 78 BPM | TEMPERATURE: 98 F | OXYGEN SATURATION: 98 % | BODY MASS INDEX: 48.82 KG/M2 | SYSTOLIC BLOOD PRESSURE: 140 MMHG | RESPIRATION RATE: 19 BRPM | HEIGHT: 65 IN | WEIGHT: 293 LBS | DIASTOLIC BLOOD PRESSURE: 80 MMHG

## 2022-09-25 DIAGNOSIS — G50.0 TRIGEMINAL NEURALGIA: Primary | ICD-10-CM

## 2022-09-25 PROCEDURE — 63600175 PHARM REV CODE 636 W HCPCS: Performed by: EMERGENCY MEDICINE

## 2022-09-25 PROCEDURE — 25000003 PHARM REV CODE 250: Performed by: EMERGENCY MEDICINE

## 2022-09-25 PROCEDURE — 99284 EMERGENCY DEPT VISIT MOD MDM: CPT | Mod: 25

## 2022-09-25 PROCEDURE — 96372 THER/PROPH/DIAG INJ SC/IM: CPT | Performed by: EMERGENCY MEDICINE

## 2022-09-25 RX ORDER — HYDROMORPHONE HYDROCHLORIDE 2 MG/ML
1.5 INJECTION, SOLUTION INTRAMUSCULAR; INTRAVENOUS; SUBCUTANEOUS
Status: COMPLETED | OUTPATIENT
Start: 2022-09-25 | End: 2022-09-25

## 2022-09-25 RX ORDER — ONDANSETRON 4 MG/1
8 TABLET, ORALLY DISINTEGRATING ORAL
Status: COMPLETED | OUTPATIENT
Start: 2022-09-25 | End: 2022-09-25

## 2022-09-25 RX ORDER — HYDROCODONE BITARTRATE AND ACETAMINOPHEN 5; 325 MG/1; MG/1
1 TABLET ORAL EVERY 4 HOURS PRN
Qty: 10 TABLET | Refills: 0 | Status: SHIPPED | OUTPATIENT
Start: 2022-09-25 | End: 2022-09-27

## 2022-09-25 RX ADMIN — HYDROMORPHONE HYDROCHLORIDE 1.5 MG: 2 INJECTION INTRAMUSCULAR; INTRAVENOUS; SUBCUTANEOUS at 09:09

## 2022-09-25 RX ADMIN — ONDANSETRON 8 MG: 4 TABLET, ORALLY DISINTEGRATING ORAL at 09:09

## 2022-09-26 NOTE — ED NOTES
Pt states that she called ED earlier because she only wants to see Dr. Quiros. She does not want to see Dr Joseph. Both doctors are working tonight and made aware of pts preference. Pt made aware that the ER is very busy tonight and nurse will pass along the message.

## 2022-09-26 NOTE — ED PROVIDER NOTES
"Encounter Date: 9/25/2022       History     Chief Complaint   Patient presents with    Facial Pain     From trigeminal neuralgia     HPI patient 31-year-old woman with a history trigeminal neuralgia the presents emergency department complaining of 2 days of right facial pain after biting the inside of her cheek chewing.  She states the pain is consistent with previous flare up of trigeminal neuralgia.  She has a note from her neurologist stating that the patient has been on chronic pain medicines in the past and will likely need to be on chronic pain therapy.  She has gone to see a pain management doctor that said they did not treat her condition and is looking for further pain management.  She has follow-up established with Neurology.  Symptoms are constant, severe and not associated with fever.  Review of patient's allergies indicates:   Allergen Reactions    Benadryl [diphenhydramine hcl]      Paralysis on right side body     Codeine      Paralysis right body    Flexeril [cyclobenzaprine] Other (See Comments)     Numbness on right side of body    Nsaids (non-steroidal anti-inflammatory drug) Diarrhea and Nausea And Vomiting     Ulcers     Penicillins Anaphylaxis and Hives    Magnesium      "made me feel horrible"     Prochlorperazine Anxiety and Palpitations     Past Medical History:   Diagnosis Date    Agoraphobia     Carpal tunnel syndrome     Depression     DVT (deep venous thrombosis)     2019 had blood clot in right arm    Fibromyalgia     Migraine headache     Nerve injury 08/2016    Lingual Nerve Injury    Obese     PTSD (post-traumatic stress disorder)     Trigeminal neuralgia      Past Surgical History:   Procedure Laterality Date    CARPAL TUNNEL RELEASE Right 12/27/2018    Dr Lozada     CARPAL TUNNEL RELEASE Left 1/28/2020    Procedure: RELEASE, CARPAL TUNNEL;  Surgeon: Brendon Lozada Jr., MD;  Location: [facility];  Service: Orthopedics;  Laterality: Left;    MOUTH SURGERY      WISDOM TOOTH EXTRACTION   "     Family History   Problem Relation Age of Onset    No Known Problems Mother     No Known Problems Father     Cancer Maternal Aunt         endometrial    No Known Problems Maternal Uncle     No Known Problems Paternal Aunt     No Known Problems Paternal Uncle     No Known Problems Maternal Grandmother     No Known Problems Maternal Grandfather     No Known Problems Paternal Grandmother     No Known Problems Paternal Grandfather     No Known Problems Daughter     No Known Problems Son      Social History     Tobacco Use    Smoking status: Never    Smokeless tobacco: Never   Substance Use Topics    Alcohol use: No    Drug use: No     Review of Systems   Constitutional:  Negative for fever.   HENT:  Negative for sore throat.         Positive for right facial pain   Respiratory:  Negative for shortness of breath.    Cardiovascular:  Negative for chest pain.   Gastrointestinal:  Negative for nausea.   Genitourinary:  Negative for dysuria.   Musculoskeletal:  Negative for back pain.   Skin:  Negative for rash.   Neurological:  Negative for weakness.   Hematological:  Does not bruise/bleed easily.     Physical Exam     Initial Vitals [09/25/22 1939]   BP Pulse Resp Temp SpO2   (!) 141/87 95 18 98.3 °F (36.8 °C) 98 %      MAP       --         Physical Exam    Nursing note and vitals reviewed.  Constitutional: She appears well-developed and well-nourished. No distress.   HENT:   Head: Normocephalic and atraumatic.   Mouth/Throat: Oropharynx is clear and moist.   No facial swelling or erythema.  No evidence of intraoral  trauma   Eyes: EOM are normal. Pupils are equal, round, and reactive to light.   Neck: Neck supple.   Pulmonary/Chest: No respiratory distress.   Musculoskeletal:         General: Normal range of motion.      Cervical back: Neck supple.     Neurological: She is alert and oriented to person, place, and time.   Skin: Skin is warm and dry.       ED Course   Procedures  Labs Reviewed - No data to display        Imaging Results    None          Medications   HYDROmorphone (PF) injection 1.5 mg (1.5 mg Intramuscular Given 9/25/22 2146)   ondansetron disintegrating tablet 8 mg (8 mg Oral Given 9/25/22 2146)     Medical Decision Making:   History:   Old Medical Records: I decided to obtain old medical records.  Initial Assessment:   31-year-old woman flare-up of her trigeminal neuralgia.  I doubt facial cellulitis, abscess, fracture or infection.  Her pain was addressed in the ED.  She will be given a short prescription for Medicine and Neurology/pain management follow-up.  Return precautions discussed.  Discharged in no acute distress.                        Clinical Impression:   Final diagnoses:  [G50.0] Trigeminal neuralgia (Primary)      ED Disposition Condition    Discharge Stable          ED Prescriptions       Medication Sig Dispense Start Date End Date Auth. Provider    HYDROcodone-acetaminophen (NORCO) 5-325 mg per tablet Take 1 tablet by mouth every 4 (four) hours as needed. 10 tablet 9/25/2022 9/27/2022 Silver Quiros MD          Follow-up Information       Follow up With Specialties Details Why Contact [facility] Emergency Dept Emergency Medicine  As needed, If symptoms worsen [address] [place] [id number] [phone]             Silver Quiros MD  09/25/22 1346

## 2022-09-26 NOTE — DISCHARGE INSTRUCTIONS
Future Appointments   Date Time Provider Department Center   10/3/2022  2:20 PM Crow Kennedy MD [facility] FA MD [facility]

## 2022-09-26 NOTE — ED NOTES
Pt AAOx4, Abc's intact. NADN. No adverse reaction to medication given. D/C instructions and Rx in pt possession along with belongings. No other needs at this time.

## 2022-10-03 ENCOUNTER — OFFICE VISIT (OUTPATIENT)
Dept: FAMILY MEDICINE | Facility: CLINIC | Age: 32
End: 2022-10-03
Payer: MEDICAID

## 2022-10-03 ENCOUNTER — PATIENT MESSAGE (OUTPATIENT)
Dept: FAMILY MEDICINE | Facility: CLINIC | Age: 32
End: 2022-10-03

## 2022-10-03 VITALS
DIASTOLIC BLOOD PRESSURE: 90 MMHG | HEART RATE: 84 BPM | TEMPERATURE: 98 F | SYSTOLIC BLOOD PRESSURE: 132 MMHG | OXYGEN SATURATION: 98 % | HEIGHT: 65 IN | BODY MASS INDEX: 48.82 KG/M2 | WEIGHT: 293 LBS

## 2022-10-03 DIAGNOSIS — R10.10 PAIN OF UPPER ABDOMEN: Primary | ICD-10-CM

## 2022-10-03 PROCEDURE — 99213 PR OFFICE/OUTPT VISIT, EST, LEVL III, 20-29 MIN: ICD-10-PCS | Mod: S$PBB,,, | Performed by: FAMILY MEDICINE

## 2022-10-03 PROCEDURE — 4010F ACE/ARB THERAPY RXD/TAKEN: CPT | Mod: CPTII,,, | Performed by: FAMILY MEDICINE

## 2022-10-03 PROCEDURE — 3008F PR BODY MASS INDEX (BMI) DOCUMENTED: ICD-10-PCS | Mod: CPTII,,, | Performed by: FAMILY MEDICINE

## 2022-10-03 PROCEDURE — 3008F BODY MASS INDEX DOCD: CPT | Mod: CPTII,,, | Performed by: FAMILY MEDICINE

## 2022-10-03 PROCEDURE — 1159F MED LIST DOCD IN RCRD: CPT | Mod: CPTII,,, | Performed by: FAMILY MEDICINE

## 2022-10-03 PROCEDURE — 3075F PR MOST RECENT SYSTOLIC BLOOD PRESS GE 130-139MM HG: ICD-10-PCS | Mod: CPTII,,, | Performed by: FAMILY MEDICINE

## 2022-10-03 PROCEDURE — 3044F PR MOST RECENT HEMOGLOBIN A1C LEVEL <7.0%: ICD-10-PCS | Mod: CPTII,,, | Performed by: FAMILY MEDICINE

## 2022-10-03 PROCEDURE — 99215 OFFICE O/P EST HI 40 MIN: CPT | Performed by: FAMILY MEDICINE

## 2022-10-03 PROCEDURE — 3044F HG A1C LEVEL LT 7.0%: CPT | Mod: CPTII,,, | Performed by: FAMILY MEDICINE

## 2022-10-03 PROCEDURE — 1159F PR MEDICATION LIST DOCUMENTED IN MEDICAL RECORD: ICD-10-PCS | Mod: CPTII,,, | Performed by: FAMILY MEDICINE

## 2022-10-03 PROCEDURE — 3080F PR MOST RECENT DIASTOLIC BLOOD PRESSURE >= 90 MM HG: ICD-10-PCS | Mod: CPTII,,, | Performed by: FAMILY MEDICINE

## 2022-10-03 PROCEDURE — 3075F SYST BP GE 130 - 139MM HG: CPT | Mod: CPTII,,, | Performed by: FAMILY MEDICINE

## 2022-10-03 PROCEDURE — 99213 OFFICE O/P EST LOW 20 MIN: CPT | Mod: S$PBB,,, | Performed by: FAMILY MEDICINE

## 2022-10-03 PROCEDURE — 3080F DIAST BP >= 90 MM HG: CPT | Mod: CPTII,,, | Performed by: FAMILY MEDICINE

## 2022-10-03 PROCEDURE — 4010F PR ACE/ARB THEARPY RXD/TAKEN: ICD-10-PCS | Mod: CPTII,,, | Performed by: FAMILY MEDICINE

## 2022-10-03 RX ORDER — DIPHENOXYLATE HCL/ATROPINE 2.5-.025/5
LIQUID (ML) ORAL
COMMUNITY
End: 2022-10-12

## 2022-10-03 RX ORDER — COLESEVELAM HYDROCHLORIDE 3.75 G/1
1 POWDER, FOR SUSPENSION ORAL DAILY
Qty: 30 EACH | Refills: 0 | Status: SHIPPED | OUTPATIENT
Start: 2022-10-03 | End: 2022-10-27 | Stop reason: ALTCHOICE

## 2022-10-03 NOTE — PROGRESS NOTES
"  SUBJECTIVE:    Patient ID: Jasper Guerrero is a 31 y.o. female.    Chief Complaint: Follow-up and Pain  31-year-old female  here today complaining of her chronic head pain she was told at her previous visits that I was no longer going to be able to provide her with her chronic pain medications. I had explained to her at several visits that the constant calling and messaging was in appropriate, the requests for early refills or increases in her medications, she was referred to several pain management physicians but pt has Medicaid and most pain management offices do not accept medicaid, she was referred to ([facility] ) and they explained that they do not treat Trigeminal neuralgia. She was referred to Neurology at [facility] they recommended traditional medications, and chronic narcotics but they were unwilling to prescribe her chronic narcotics, she has follow up appointments with Neurosurgery for possible procedure. She also wanted to tell me that her psychiatrist thinks that it is appropriate that she be treated with chronic opoid but that provider is not comfortable with actually writing the prescription.    I explained that I am no longer refilling her narcotics.      Pt today is also having chronic abdominal pain, she has been evaluated by Gastroenterology, she has had upper and lower scopes and is scheduled for a repete upper endoscopy. Her US of her gallbladder has been normal, she has a questionably abnormal HIDA scan and has follow up with general surgery.    Her abdominal pain is described as "It feels like my guts are twisting" not associated with eating or bowel movement, not relieved by anything but pain medication ( pt states that Morphine and Dilaudid have helped in the past). She has tried Bentyl, Librax, I have prescribed Welchol but insurance would not cover this medication, will try a different medication. Discussed getting stool samples but pt states that her " GI physician has obtained these already. Pt went to 2 different Emergency rooms on the same day 28 Aug  both time she left AMA            One week for Upper GI, and General Surgery on 22 October.     Significant past medical history  Agoraphobia/Anxiety:  Valium , Fluvoxamine (LUVOX) 300mg, Buspar 7.5  Trigeminal Neuralgia:   Depression: Effexor XR 225mg  PTSD:  DVT (upper extremity right arm 2019):  Fibromyalgia:  Insomnia:    GERD:  Pantoprazole 40 mg  Migraines: Fiorocet, Verapamil  Nausea associated with Migraines: Reglan, Zofran      Specialists  Orthopedics:  Dr. Maher  Neurology:  Dr. Wilder Woodson, Dr Diaz (has been discharged)   Psychiatry: [facility]: Dr Kearney (Pt is trying to get back into treatment) Therapist Tonya  GYN: Dr CHIDI Reynolds  GI: Dr Villeda     Smoke: None  ETOH: None  Exercise: None  HPI      Past Medical History:   Diagnosis Date    Agoraphobia     Carpal tunnel syndrome     Depression     DVT (deep venous thrombosis)     2019 had blood clot in right arm    Fibromyalgia     Migraine headache     Nerve injury 08/2016    Lingual Nerve Injury    Obese     PTSD (post-traumatic stress disorder)     Trigeminal neuralgia      Social History     Socioeconomic History    Marital status:    Tobacco Use    Smoking status: Never    Smokeless tobacco: Never   Substance and Sexual Activity    Alcohol use: No    Drug use: No    Sexual activity: Yes     Partners: Male     Birth control/protection: Condom     Social Determinants of Health     Financial Resource Strain: Low Risk     Difficulty of Paying Living Expenses: Not hard at all   Food Insecurity: No Food Insecurity    Worried About Running Out of Food in the Last Year: Never true    Ran Out of Food in the Last Year: Never true   Transportation Needs: No Transportation Needs    Lack of Transportation (Medical): No    Lack of Transportation (Non-Medical): No   Physical Activity: Inactive    Days of Exercise per Week: 0 days    Minutes  of Exercise per Session: 0 min   Stress: Stress Concern Present    Feeling of Stress : Very much   Social Connections: Unknown    Frequency of Communication with Friends and Family: More than three times a week    Frequency of Social Gatherings with Friends and Family: More than three times a week    Active Member of Clubs or Organizations: No    Attends Club or Organization Meetings: Never    Marital Status:    Housing Stability: Low Risk     Unable to Pay for Housing in the Last Year: No    Number of Places Lived in the Last Year: 1    Unstable Housing in the Last Year: No     Past Surgical History:   Procedure Laterality Date    CARPAL TUNNEL RELEASE Right 12/27/2018    Dr Lozada     CARPAL TUNNEL RELEASE Left 1/28/2020    Procedure: RELEASE, CARPAL TUNNEL;  Surgeon: Brendon Lozada Jr., MD;  Location: [facility];  Service: Orthopedics;  Laterality: Left;    MOUTH SURGERY      WISDOM TOOTH EXTRACTION       Family History   Problem Relation Age of Onset    No Known Problems Mother     No Known Problems Father     Cancer Maternal Aunt         endometrial    No Known Problems Maternal Uncle     No Known Problems Paternal Aunt     No Known Problems Paternal Uncle     No Known Problems Maternal Grandmother     No Known Problems Maternal Grandfather     No Known Problems Paternal Grandmother     No Known Problems Paternal Grandfather     No Known Problems Daughter     No Known Problems Son      Current Outpatient Medications   Medication Sig Dispense Refill    acetaminophen (TYLENOL) 500 MG tablet Take 500 mg by mouth every 4 to 6 hours as needed.       busPIRone (BUSPAR) 15 MG tablet Take 15 mg by mouth once daily.      butalbital-acetaminophen-caffeine -40 mg (FIORICET, ESGIC) -40 mg per tablet Take 1 tablet by mouth every 6 (six) hours as needed for Headaches. for pain. 10 tablet 1    diphenoxylate-atropine 2.5-0.025 mg/5 ml (LOMOTIL) 2.5-0.025 mg/5 mL liquid       fluvoxaMINE (LUVOX) 100 MG tablet  Take 1 tablet (100 mg total) by mouth 3 (three) times daily. 90 tablet 2    metFORMIN (GLUCOPHAGE-XR) 500 MG ER 24hr tablet Take 1 tablet (500 mg total) by mouth daily with breakfast. 90 tablet 3    metoclopramide HCl (REGLAN) 10 MG tablet TAKE 1 TABLET BY MOUTH EVERY 6 HOURS AS NEEDED FOR MIGRAINE AND NAUSEA (Patient taking differently: Take 10 mg by mouth every 6 (six) hours as needed. TAKE 1 TABLET BY MOUTH EVERY 6 HOURS AS NEEDED FOR MIGRAINE AND NAUSEA) 60 tablet 11    metoprolol succinate (TOPROL-XL) 50 MG 24 hr tablet Take 1 tablet (50 mg total) by mouth once daily. 30 tablet 11    ondansetron (ZOFRAN) 4 MG tablet Take 1 tablet (4 mg total) by mouth every 8 (eight) hours as needed for Nausea. 12 tablet 0    pantoprazole (PROTONIX) 40 MG tablet Take 40 mg by mouth once daily.      rizatriptan (MAXALT) 10 MG tablet Take 1 tablet (10 mg total) by mouth as needed for Migraine. May repeat every 2 hours for max 3 tabs in 24 hours. Use no more than 10 days per month. 18 tablet 11    rosuvastatin (CRESTOR) 10 MG tablet Take 1 tablet (10 mg total) by mouth every evening. 90 tablet 3    VALIUM 5 mg tablet Take 5 mg by mouth 2 (two) times daily as needed.      venlafaxine (EFFEXOR XR) 150 MG Cp24 Take 1 capsule (150 mg total) by mouth once daily. 30 capsule 2    venlafaxine (EFFEXOR-XR) 75 MG 24 hr capsule Take 75 mg by mouth once daily.      zolpidem (AMBIEN) 10 mg Tab Take 10 mg by mouth every evening.      colesevelam (WELCHOL) 3.75 gram PwPk Take 1 packet by mouth once daily. 30 each 0     No current facility-administered medications for this visit.     Review of patient's allergies indicates:   Allergen Reactions    Benadryl [diphenhydramine hcl]      Paralysis on right side body     Codeine      Paralysis right body    Flexeril [cyclobenzaprine] Other (See Comments)     Numbness on right side of body    Nsaids (non-steroidal anti-inflammatory drug) Diarrhea and Nausea And Vomiting     Ulcers     Penicillins  "Anaphylaxis and Hives    Droperidol Anxiety and Other (See Comments)    Magnesium      "made me feel horrible"     Prochlorperazine Anxiety and Palpitations       Review of Systems   Constitutional:  Negative for activity change and unexpected weight change.   HENT:  Negative for congestion, hearing loss, rhinorrhea and trouble swallowing.    Eyes:  Negative for discharge and visual disturbance.   Respiratory:  Negative for chest tightness and wheezing.    Cardiovascular:  Negative for chest pain and palpitations.   Gastrointestinal:  Positive for abdominal pain. Negative for abdominal distention, anal bleeding, blood in stool, constipation, diarrhea and vomiting.   Endocrine: Negative for polydipsia and polyuria.   Genitourinary:  Negative for difficulty urinating, dysuria, hematuria and menstrual problem.   Musculoskeletal:  Negative for arthralgias, joint swelling and neck pain.   Neurological:  Negative for weakness and headaches.   Psychiatric/Behavioral:  Negative for confusion and dysphoric mood.         Blood pressure (!) 132/90, pulse 84, temperature 97.8 °F (36.6 °C), temperature source Oral, height 5' 5" (1.651 m), weight (!) 158.6 kg (349 lb 11.2 oz), last menstrual period 09/05/2022, SpO2 98 %. Body mass index is 58.19 kg/m².   Objective:      Physical Exam  Vitals reviewed.   Constitutional:       General: She is not in acute distress.     Appearance: Normal appearance. She is obese. She is not ill-appearing or toxic-appearing.   HENT:      Head: Normocephalic and atraumatic.   Abdominal:      General: Abdomen is flat and protuberant. There is no distension.      Palpations: Abdomen is soft. There is no mass.      Tenderness: There is no abdominal tenderness. There is no guarding or rebound.      Hernia: No hernia is present.      Comments: Pt with a non-focal exam, no evidence of gallbladder pathology, negative murphie's and mc burneys, no left lower quadrant tenderness. Pt has a protuberant abdomen " which makes exam difficult.    Neurological:      Mental Status: She is alert.           Assessment:       1. Pain of upper abdomen         Plan:           Pain of upper abdomen  -     colesevelam (WELCHOL) 3.75 gram PwPk; Take 1 packet by mouth once daily.  Dispense: 30 each; Refill: 0  Will attempt treatment for IBS pt has follow up with GI and general surgery.

## 2022-10-07 ENCOUNTER — PATIENT MESSAGE (OUTPATIENT)
Dept: FAMILY MEDICINE | Facility: CLINIC | Age: 32
End: 2022-10-07

## 2022-10-09 ENCOUNTER — HOSPITAL ENCOUNTER (EMERGENCY)
Facility: HOSPITAL | Age: 32
Discharge: HOME | End: 2022-10-09
Attending: EMERGENCY MEDICINE
Payer: MEDICAID

## 2022-10-09 VITALS
DIASTOLIC BLOOD PRESSURE: 89 MMHG | HEIGHT: 65 IN | SYSTOLIC BLOOD PRESSURE: 132 MMHG | BODY MASS INDEX: 48.82 KG/M2 | HEART RATE: 92 BPM | TEMPERATURE: 98 F | RESPIRATION RATE: 18 BRPM | OXYGEN SATURATION: 97 % | WEIGHT: 293 LBS

## 2022-10-09 DIAGNOSIS — G50.0 TRIGEMINAL NEURALGIA: Primary | ICD-10-CM

## 2022-10-09 PROCEDURE — 25000003 PHARM REV CODE 250: Performed by: EMERGENCY MEDICINE

## 2022-10-09 PROCEDURE — 96372 THER/PROPH/DIAG INJ SC/IM: CPT | Performed by: EMERGENCY MEDICINE

## 2022-10-09 PROCEDURE — 99284 EMERGENCY DEPT VISIT MOD MDM: CPT

## 2022-10-09 PROCEDURE — 63600175 PHARM REV CODE 636 W HCPCS: Performed by: EMERGENCY MEDICINE

## 2022-10-09 RX ORDER — HYDROMORPHONE HYDROCHLORIDE 1 MG/ML
2 INJECTION, SOLUTION INTRAMUSCULAR; INTRAVENOUS; SUBCUTANEOUS
Status: COMPLETED | OUTPATIENT
Start: 2022-10-09 | End: 2022-10-09

## 2022-10-09 RX ORDER — OXYCODONE AND ACETAMINOPHEN 5; 325 MG/1; MG/1
1 TABLET ORAL EVERY 4 HOURS PRN
Qty: 10 TABLET | Refills: 0 | Status: SHIPPED | OUTPATIENT
Start: 2022-10-09 | End: 2022-11-29

## 2022-10-09 RX ORDER — ONDANSETRON 4 MG/1
8 TABLET, ORALLY DISINTEGRATING ORAL
Status: COMPLETED | OUTPATIENT
Start: 2022-10-09 | End: 2022-10-09

## 2022-10-09 RX ADMIN — HYDROMORPHONE HYDROCHLORIDE 2 MG: 1 INJECTION, SOLUTION INTRAMUSCULAR; INTRAVENOUS; SUBCUTANEOUS at 02:10

## 2022-10-09 RX ADMIN — ONDANSETRON 8 MG: 4 TABLET, ORALLY DISINTEGRATING ORAL at 02:10

## 2022-10-09 NOTE — ED PROVIDER NOTES
"Encounter Date: 10/9/2022       History     Chief Complaint   Patient presents with    Facial Pain     RT SIDE X 2 DAYS     31-year-old female presented emergency department with right-sided facial pain consistent with her previous episodes of trigeminal neurologia pain.  Patient had these symptoms multiple times in the past.  Patient has a letter from neurologist the narcotics other only things that are helping her pain and can be prescribed.  Patient not under care of pain management doctors at this time.  Patient said she had previous trigeminal nerve graft and having pain since then and has follow-up with neurosurgeon in the next 10 days.    Review of patient's allergies indicates:   Allergen Reactions    Benadryl [diphenhydramine hcl]      Paralysis on right side body     Codeine      Paralysis right body    Flexeril [cyclobenzaprine] Other (See Comments)     Numbness on right side of body    Nsaids (non-steroidal anti-inflammatory drug) Diarrhea and Nausea And Vomiting     Ulcers     Penicillins Anaphylaxis and Hives    Droperidol Anxiety and Other (See Comments)    Magnesium      "made me feel horrible"     Prochlorperazine Anxiety and Palpitations     Past Medical History:   Diagnosis Date    Agoraphobia     Carpal tunnel syndrome     Depression     DVT (deep venous thrombosis)     2019 had blood clot in right arm    Fibromyalgia     Migraine headache     Nerve injury 08/2016    Lingual Nerve Injury    Obese     PTSD (post-traumatic stress disorder)     Trigeminal neuralgia      Past Surgical History:   Procedure Laterality Date    CARPAL TUNNEL RELEASE Right 12/27/2018    Dr Lozada     CARPAL TUNNEL RELEASE Left 1/28/2020    Procedure: RELEASE, CARPAL TUNNEL;  Surgeon: Brendon Lozada Jr., MD;  Location: [facility];  Service: Orthopedics;  Laterality: Left;    MOUTH SURGERY      WISDOM TOOTH EXTRACTION       Family History   Problem Relation Age of Onset    No Known Problems Mother     " No Known Problems Father     Cancer Maternal Aunt         endometrial    No Known Problems Maternal Uncle     No Known Problems Paternal Aunt     No Known Problems Paternal Uncle     No Known Problems Maternal Grandmother     No Known Problems Maternal Grandfather     No Known Problems Paternal Grandmother     No Known Problems Paternal Grandfather     No Known Problems Daughter     No Known Problems Son      Social History     Tobacco Use    Smoking status: Never    Smokeless tobacco: Never   Substance Use Topics    Alcohol use: No    Drug use: No     Review of Systems   Constitutional: Negative.    HENT: Negative.          Right facial pain   Eyes: Negative.    Respiratory: Negative.     Cardiovascular: Negative.  Negative for chest pain.   Gastrointestinal: Negative.    Endocrine: Negative.    Genitourinary: Negative.    Musculoskeletal: Negative.    Skin: Negative.    Allergic/Immunologic: Negative.    Neurological: Negative.    Hematological: Negative.    Psychiatric/Behavioral: Negative.     All other systems reviewed and are negative.    Physical Exam     Initial Vitals [10/09/22 1305]   BP Pulse Resp Temp SpO2   (!) 145/99 100 18 98.5 °F (36.9 °C) 96 %      MAP       --         Physical Exam    Nursing note and vitals reviewed.  Constitutional: She appears well-developed and well-nourished.   HENT:   Head: Normocephalic and atraumatic.   Nose: Nose normal.   Mouth/Throat: Oropharynx is clear and moist.   Right facial tenderness in the area of right trigeminal nerve   Eyes: Conjunctivae and EOM are normal. Pupils are equal, round, and reactive to light.   Neck: Neck supple. No thyromegaly present. No tracheal deviation present. No JVD present.   Normal range of motion.  Cardiovascular:  Normal rate, regular rhythm, normal heart sounds and intact distal pulses.           No murmur heard.  Pulmonary/Chest: Breath sounds normal. No stridor. No respiratory distress. She has no wheezes. She has no  rales.   Abdominal: Abdomen is soft. Bowel sounds are normal.   Musculoskeletal:         General: No edema. Normal range of motion.      Cervical back: Normal range of motion and neck supple.     Neurological: She is alert and oriented to person, place, and time. She has normal strength. GCS score is 15. GCS eye subscore is 4. GCS verbal subscore is 5. GCS motor subscore is 6.   Skin: Skin is warm. Capillary refill takes less than 2 seconds.   Psychiatric: She has a normal mood and affect. Thought content normal.       ED Course   Procedures  Labs Reviewed - No data to display       Imaging Results    None          Medications   HYDROmorphone injection 2 mg (2 mg Intramuscular Given 10/9/22 1434)   ondansetron disintegrating tablet 8 mg (8 mg Oral Given 10/9/22 1433)     Medical Decision Making:   Differential Diagnosis:   Patient with chronic pain in the right trigeminal neuralgia.  Pain managed.  Patient given short course of pain medication as recommended by patient's neurologist.  Discharged with instructions and follow-up and return precautions                         Clinical Impression:   Final diagnoses:  [G50.0] Trigeminal neuralgia (Primary)        ED Disposition Condition    Discharge Stable          ED Prescriptions       Medication Sig Dispense Start Date End Date Auth. Provider    oxyCODONE-acetaminophen (PERCOCET) 5-325 mg per tablet Take 1 tablet by mouth every 4 (four) hours as needed for Pain. 10 tablet 10/9/2022 -- Brandin Brewer MD          Follow-up Information       Follow up With Specialties Details Why Contact Info    Crow Kennedy MD Family Medicine In 2 days  [address] [facility]  [address] [place] [id number]  [phone]               Brandin Brewer MD  10/09/22 2771

## 2022-10-12 RX ORDER — DIPHENOXYLATE HYDROCHLORIDE AND ATROPINE SULFATE 2.5; .025 MG/1; MG/1
1 TABLET ORAL 4 TIMES DAILY PRN
Qty: 20 TABLET | Refills: 0 | Status: SHIPPED | OUTPATIENT
Start: 2022-10-12 | End: 2022-10-22

## 2022-10-16 ENCOUNTER — HOSPITAL ENCOUNTER (EMERGENCY)
Facility: HOSPITAL | Age: 32
Discharge: HOME | End: 2022-10-16
Attending: EMERGENCY MEDICINE
Payer: MEDICAID

## 2022-10-16 VITALS
OXYGEN SATURATION: 99 % | WEIGHT: 293 LBS | DIASTOLIC BLOOD PRESSURE: 84 MMHG | RESPIRATION RATE: 18 BRPM | HEIGHT: 65 IN | HEART RATE: 84 BPM | BODY MASS INDEX: 48.82 KG/M2 | TEMPERATURE: 98 F | SYSTOLIC BLOOD PRESSURE: 152 MMHG

## 2022-10-16 DIAGNOSIS — G50.0 TRIGEMINAL NEURALGIA OF RIGHT SIDE OF FACE: Primary | ICD-10-CM

## 2022-10-16 PROCEDURE — 99283 EMERGENCY DEPT VISIT LOW MDM: CPT

## 2022-10-16 PROCEDURE — 25000003 PHARM REV CODE 250: Performed by: EMERGENCY MEDICINE

## 2022-10-16 RX ORDER — OXYCODONE AND ACETAMINOPHEN 5; 325 MG/1; MG/1
1 TABLET ORAL EVERY 6 HOURS PRN
Qty: 10 TABLET | Refills: 0 | Status: SHIPPED | OUTPATIENT
Start: 2022-10-16 | End: 2022-10-19

## 2022-10-16 RX ORDER — OXYCODONE AND ACETAMINOPHEN 10; 325 MG/1; MG/1
1 TABLET ORAL ONCE
Status: COMPLETED | OUTPATIENT
Start: 2022-10-16 | End: 2022-10-16

## 2022-10-16 RX ADMIN — OXYCODONE HYDROCHLORIDE AND ACETAMINOPHEN 1 TABLET: 10; 325 TABLET ORAL at 08:10

## 2022-10-17 NOTE — ED PROVIDER NOTES
"Encounter Date: 10/16/2022       History     Chief Complaint   Patient presents with    Facial Pain     Pt has trigeminal neurologia and pain is not being relieved at home     31-year-old female past medical history of DVT, migraine, trigeminal neuralgia, chronic abdominal pain, obesity, presents emergency concern for her trigeminal neuralgia.  She says that she has an appointment with a neurosurgeon and 5 days.  She says she was seen here recently for trigeminal neuralgia given "a shot" and pain pills and discharged home.  She has a letter from a neurologist at [facility] he says that the patient requires opioid therapy chronically.  Patient's PCP on willing to write opioid therapy per the patient.  Patient neurologist unwilling to write opioid therapy.  Patient states she can not get in with pain management secondary to her being on medicate.  She says that she is hoping the neurosurgeon on Friday can help.  No new complaints, no trouble eating or drinking.  No fever chills.  No new symptoms.    Review of patient's allergies indicates:   Allergen Reactions    Benadryl [diphenhydramine hcl]      Paralysis on right side body     Codeine      Paralysis right body    Flexeril [cyclobenzaprine] Other (See Comments)     Numbness on right side of body    Nsaids (non-steroidal anti-inflammatory drug) Diarrhea and Nausea And Vomiting     Ulcers     Penicillins Anaphylaxis and Hives    Droperidol Anxiety and Other (See Comments)    Magnesium      "made me feel horrible"     Prochlorperazine Anxiety and Palpitations     Past Medical History:   Diagnosis Date    Agoraphobia     Carpal tunnel syndrome     Depression     DVT (deep venous thrombosis)     2019 had blood clot in right arm    Fibromyalgia     Migraine headache     Nerve injury 08/2016    Lingual Nerve Injury    Obese     PTSD (post-traumatic stress disorder)     Trigeminal neuralgia      Past Surgical History:   Procedure Laterality Date    CARPAL " TUNNEL RELEASE Right 12/27/2018    Dr Lozada     CARPAL TUNNEL RELEASE Left 1/28/2020    Procedure: RELEASE, CARPAL TUNNEL;  Surgeon: Brendon Lozada Jr., MD;  Location: [facility];  Service: Orthopedics;  Laterality: Left;    MOUTH SURGERY      WISDOM TOOTH EXTRACTION       Family History   Problem Relation Age of Onset    No Known Problems Mother     No Known Problems Father     Cancer Maternal Aunt         endometrial    No Known Problems Maternal Uncle     No Known Problems Paternal Aunt     No Known Problems Paternal Uncle     No Known Problems Maternal Grandmother     No Known Problems Maternal Grandfather     No Known Problems Paternal Grandmother     No Known Problems Paternal Grandfather     No Known Problems Daughter     No Known Problems Son      Social History     Tobacco Use    Smoking status: Never    Smokeless tobacco: Never   Substance Use Topics    Alcohol use: No    Drug use: No     Review of Systems   Constitutional:  Negative for chills and fever.   HENT:  Negative for sore throat.         Facial pain   Respiratory:  Negative for shortness of breath.    Cardiovascular:  Negative for chest pain and palpitations.   Gastrointestinal:  Negative for abdominal pain, nausea and vomiting.   Genitourinary:  Negative for dysuria and flank pain.   Musculoskeletal:  Negative for back pain.   Skin:  Negative for rash.   Neurological:  Negative for seizures, syncope, weakness and headaches.   Hematological:  Does not bruise/bleed easily.   All other systems reviewed and are negative.    Physical Exam     Initial Vitals [10/16/22 1905]   BP Pulse Resp Temp SpO2   (!) 165/109 96 16 98.4 °F (36.9 °C) 98 %      MAP       --         Physical Exam    Nursing note and vitals reviewed.  Constitutional: She appears well-developed and well-nourished. No distress.   HENT:   Head: Normocephalic and atraumatic.   Mouth/Throat: No oropharyngeal exudate.   Mild infraorbital tenderness, no erythema, no rash, no induration.  Voice  "is normal.  Uvula midline, tongue and lips are normal.  No obvious dental abscess.   Eyes: Conjunctivae and EOM are normal. Pupils are equal, round, and reactive to light.   Neck: Neck supple. No tracheal deviation present.   Normal range of motion.  Cardiovascular:  Normal rate, regular rhythm, normal heart sounds and intact distal pulses.           No murmur heard.  Pulmonary/Chest: Breath sounds normal. No stridor. No respiratory distress. She has no wheezes. She has no rhonchi. She has no rales.   Abdominal: Abdomen is soft. She exhibits no distension. There is no abdominal tenderness. There is no rebound and no guarding.   Musculoskeletal:         General: No tenderness or edema. Normal range of motion.      Cervical back: Normal range of motion and neck supple.     Lymphadenopathy:     She has no cervical adenopathy.   Neurological: She is alert and oriented to person, place, and time. She has normal strength. No cranial nerve deficit or sensory deficit. GCS score is 15. GCS eye subscore is 4. GCS verbal subscore is 5. GCS motor subscore is 6.   Skin: Skin is warm and dry. Capillary refill takes less than 2 seconds. No rash noted. No erythema. No pallor.   Psychiatric: She has a normal mood and affect. Her behavior is normal. Judgment and thought content normal.       ED Course   Procedures  Labs Reviewed - No data to display       Imaging Results    None          Medications   oxyCODONE-acetaminophen  mg per tablet 1 tablet (has no administration in time range)     Medical Decision Making:   ED Management:  Well-appearing 31-year-old female requesting pain medication for her trigeminal neuralgia.  She is requesting "a shot" because it works better.  She is unable to receive this in supra track.  I did offer her oral pain medication.  She requested Percocet because it works better.  She will be discharged home with several Percocet for breakthrough pain, but should stick with NSAIDs.  I do not suspect " any bacterial tracheitis, epiglottitis, retropharyngeal abscess, or any other acute pathology likely a exacerbation of her chronic pain.   She will follow up with neurosurgery/ PCP/pain management on an outpatient basis.    I had a detailed discussion with the patient and/or guardian regarding: The historical points, exam findings, and diagnostic results supporting the discharge diagnosis, lab results, pertinent radiology results, and the need for outpatient follow-up, for definitive care with a family practitioner and to return to the emergency department if symptoms worsen or persist or if there are any questions or concerns that arise at home. All questions have been answered in detail. Strict return to Emergency Department precautions have been provided.    A dictation software program was used for this note.  Please expect some simple typographical  errors in this note.                         Clinical Impression:   Final diagnoses:  [G50.0] Trigeminal neuralgia of right side of face (Primary)        ED Disposition Condition    Discharge Stable          ED Prescriptions       Medication Sig Dispense Start Date End Date Auth. Provider    oxyCODONE-acetaminophen (PERCOCET) 5-325 mg per tablet Take 1 tablet by mouth every 6 (six) hours as needed for Pain. 10 tablet 10/16/2022 10/19/2022 Gildardo Hinkle DO          Follow-up Information       Follow up With Specialties Details Why Contact Info Additional Information    Crow Kennedy MD Family Medicine In 3 days  [address] [facility]  [address]  [facility] [address]  [phone]       [facility] - Emergency Dept Emergency Medicine  If symptoms worsen [address] [facility] [address]  [phone] 1st floor             Gildardo Hinkle DO  10/16/22 2003

## 2022-10-18 ENCOUNTER — PATIENT MESSAGE (OUTPATIENT)
Dept: FAMILY MEDICINE | Facility: CLINIC | Age: 32
End: 2022-10-18

## 2022-10-19 ENCOUNTER — PATIENT MESSAGE (OUTPATIENT)
Dept: FAMILY MEDICINE | Facility: CLINIC | Age: 32
End: 2022-10-19

## 2022-10-20 ENCOUNTER — PATIENT MESSAGE (OUTPATIENT)
Dept: FAMILY MEDICINE | Facility: CLINIC | Age: 32
End: 2022-10-20

## 2022-10-20 DIAGNOSIS — R53.83 FATIGUE, UNSPECIFIED TYPE: Primary | ICD-10-CM

## 2022-10-24 ENCOUNTER — PATIENT MESSAGE (OUTPATIENT)
Dept: FAMILY MEDICINE | Facility: CLINIC | Age: 32
End: 2022-10-24

## 2022-10-27 ENCOUNTER — TELEPHONE (OUTPATIENT)
Dept: FAMILY MEDICINE | Facility: CLINIC | Age: 32
End: 2022-10-27

## 2022-10-27 ENCOUNTER — PATIENT MESSAGE (OUTPATIENT)
Dept: FAMILY MEDICINE | Facility: CLINIC | Age: 32
End: 2022-10-27

## 2022-10-27 RX ORDER — CHOLESTYRAMINE 4 G/9G
4 POWDER, FOR SUSPENSION ORAL
Qty: 270 PACKET | Refills: 3 | Status: SHIPPED | OUTPATIENT
Start: 2022-10-27 | End: 2022-11-29

## 2022-10-27 NOTE — TELEPHONE ENCOUNTER
Colesevelam is denied for this patient. Alternates are:  Cholestyramine/sucrose powder  Colestipol granules/packet  Colestipol tablet

## 2022-10-28 NOTE — TELEPHONE ENCOUNTER
Patient wanted to give you a FYI that she was having her gall bladder removed on Monday at [facility].   Spoke with patient about her pre-op clearance. Patient advised to reach out to doctor doing the procedure to order her testing where she is having her procedure done. Patient told to  call and schedule her pre-op appointment bring her results with her to that appointment. Patient expressed verbal understanding.

## 2022-11-01 ENCOUNTER — PATIENT MESSAGE (OUTPATIENT)
Dept: FAMILY MEDICINE | Facility: CLINIC | Age: 32
End: 2022-11-01

## 2022-11-04 ENCOUNTER — HOSPITAL ENCOUNTER (EMERGENCY)
Facility: HOSPITAL | Age: 32
Discharge: LEFT BEFORE DISCHARGE | End: 2022-11-04
Attending: EMERGENCY MEDICINE
Payer: MEDICAID

## 2022-11-04 VITALS
OXYGEN SATURATION: 98 % | DIASTOLIC BLOOD PRESSURE: 75 MMHG | BODY MASS INDEX: 48.82 KG/M2 | RESPIRATION RATE: 18 BRPM | HEIGHT: 65 IN | WEIGHT: 293 LBS | TEMPERATURE: 98 F | SYSTOLIC BLOOD PRESSURE: 128 MMHG | HEART RATE: 104 BPM

## 2022-11-04 DIAGNOSIS — R10.9 ABDOMINAL PAIN, UNSPECIFIED ABDOMINAL LOCATION: Primary | ICD-10-CM

## 2022-11-04 LAB
ALBUMIN SERPL BCP-MCNC: 4.2 G/DL (ref 3.5–5.2)
ALP SERPL-CCNC: 84 U/L (ref 55–135)
ALT SERPL W/O P-5'-P-CCNC: 47 U/L (ref 10–44)
ANION GAP SERPL CALC-SCNC: 15 MMOL/L (ref 8–16)
AST SERPL-CCNC: 30 U/L (ref 10–40)
B-HCG UR QL: NEGATIVE
BASOPHILS # BLD AUTO: 0.03 K/UL (ref 0–0.2)
BASOPHILS NFR BLD: 0.2 % (ref 0–1.9)
BILIRUB SERPL-MCNC: 0.4 MG/DL (ref 0.1–1)
BILIRUB UR QL STRIP: NEGATIVE
BUN SERPL-MCNC: 8 MG/DL (ref 6–20)
CALCIUM SERPL-MCNC: 10.5 MG/DL (ref 8.7–10.5)
CHLORIDE SERPL-SCNC: 100 MMOL/L (ref 95–110)
CLARITY UR: CLEAR
CO2 SERPL-SCNC: 25 MMOL/L (ref 23–29)
COLOR UR: YELLOW
CREAT SERPL-MCNC: 0.9 MG/DL (ref 0.5–1.4)
DIFFERENTIAL METHOD: ABNORMAL
EOSINOPHIL # BLD AUTO: 0.1 K/UL (ref 0–0.5)
EOSINOPHIL NFR BLD: 0.7 % (ref 0–8)
ERYTHROCYTE [DISTWIDTH] IN BLOOD BY AUTOMATED COUNT: 13.1 % (ref 11.5–14.5)
EST. GFR  (NO RACE VARIABLE): >60 ML/MIN/1.73 M^2
GLUCOSE SERPL-MCNC: 144 MG/DL (ref 70–110)
GLUCOSE UR QL STRIP: NEGATIVE
HCT VFR BLD AUTO: 44.9 % (ref 37–48.5)
HGB BLD-MCNC: 14.5 G/DL (ref 12–16)
HGB UR QL STRIP: NEGATIVE
IMM GRANULOCYTES # BLD AUTO: 0.03 K/UL (ref 0–0.04)
IMM GRANULOCYTES NFR BLD AUTO: 0.2 % (ref 0–0.5)
KETONES UR QL STRIP: NEGATIVE
LEUKOCYTE ESTERASE UR QL STRIP: NEGATIVE
LIPASE SERPL-CCNC: 34 U/L (ref 4–60)
LYMPHOCYTES # BLD AUTO: 3.8 K/UL (ref 1–4.8)
LYMPHOCYTES NFR BLD: 28.4 % (ref 18–48)
MCH RBC QN AUTO: 28.7 PG (ref 27–31)
MCHC RBC AUTO-ENTMCNC: 32.3 G/DL (ref 32–36)
MCV RBC AUTO: 89 FL (ref 82–98)
MONOCYTES # BLD AUTO: 0.6 K/UL (ref 0.3–1)
MONOCYTES NFR BLD: 4.8 % (ref 4–15)
NEUTROPHILS # BLD AUTO: 8.9 K/UL (ref 1.8–7.7)
NEUTROPHILS NFR BLD: 65.7 % (ref 38–73)
NITRITE UR QL STRIP: NEGATIVE
NRBC BLD-RTO: 0 /100 WBC
PH UR STRIP: 8 [PH] (ref 5–8)
PLATELET # BLD AUTO: 400 K/UL (ref 150–450)
PMV BLD AUTO: 9.2 FL (ref 9.2–12.9)
POTASSIUM SERPL-SCNC: 4.1 MMOL/L (ref 3.5–5.1)
PROT SERPL-MCNC: 8.4 G/DL (ref 6–8.4)
PROT UR QL STRIP: NEGATIVE
RBC # BLD AUTO: 5.05 M/UL (ref 4–5.4)
SODIUM SERPL-SCNC: 140 MMOL/L (ref 136–145)
SP GR UR STRIP: 1.02 (ref 1–1.03)
URN SPEC COLLECT METH UR: NORMAL
UROBILINOGEN UR STRIP-ACNC: NEGATIVE EU/DL
WBC # BLD AUTO: 13.47 K/UL (ref 3.9–12.7)

## 2022-11-04 PROCEDURE — 87389 HIV-1 AG W/HIV-1&-2 AB AG IA: CPT | Performed by: EMERGENCY MEDICINE

## 2022-11-04 PROCEDURE — 83690 ASSAY OF LIPASE: CPT | Performed by: PHYSICIAN ASSISTANT

## 2022-11-04 PROCEDURE — 81025 URINE PREGNANCY TEST: CPT | Performed by: PHYSICIAN ASSISTANT

## 2022-11-04 PROCEDURE — 80053 COMPREHEN METABOLIC PANEL: CPT | Performed by: PHYSICIAN ASSISTANT

## 2022-11-04 PROCEDURE — 81003 URINALYSIS AUTO W/O SCOPE: CPT | Performed by: PHYSICIAN ASSISTANT

## 2022-11-04 PROCEDURE — 99283 EMERGENCY DEPT VISIT LOW MDM: CPT | Mod: 25

## 2022-11-04 PROCEDURE — 25000003 PHARM REV CODE 250: Performed by: PHYSICIAN ASSISTANT

## 2022-11-04 PROCEDURE — 85025 COMPLETE CBC W/AUTO DIFF WBC: CPT | Performed by: PHYSICIAN ASSISTANT

## 2022-11-04 PROCEDURE — 36415 COLL VENOUS BLD VENIPUNCTURE: CPT | Performed by: EMERGENCY MEDICINE

## 2022-11-04 PROCEDURE — 86803 HEPATITIS C AB TEST: CPT | Performed by: EMERGENCY MEDICINE

## 2022-11-04 RX ORDER — ACETAMINOPHEN 500 MG
1000 TABLET ORAL
Status: DISCONTINUED | OUTPATIENT
Start: 2022-11-04 | End: 2022-11-04 | Stop reason: HOSPADM

## 2022-11-04 RX ORDER — METOCLOPRAMIDE 10 MG/1
10 TABLET ORAL
Status: COMPLETED | OUTPATIENT
Start: 2022-11-04 | End: 2022-11-04

## 2022-11-04 RX ADMIN — METOCLOPRAMIDE 10 MG: 10 TABLET ORAL at 04:11

## 2022-11-04 NOTE — LETTER
Patient: Jasper Guerrero  YOB: 1990  Date: 11/4/2022 Time: 5:02 PM  Location: [facility]    Leaving the Hospital Against Medical Advice    Chart #:[id number]    This will certify that I, the undersigned,    ______________________________________________________________________    A patient in the above named medical center, having requested discharge and removal from the medical center against the advice of my attending physician(s), hereby release [facility], its physicians, officers and employees, severally and individually, from any and all liability of any nature whatsoever for any injury or harm or complication of any kind that may result directly or indirectly, by reason of my terminating my stay as a patient at [facility] and my departure from [facility], and hereby waive any and all rights of action I may now have or later acquire as a result of my voluntary departure from [facility] and the termination of my stay as a patient therein.    This release is made with the full knowledge of the danger that may result from the action which I am taking.      Date:_______________________                         ___________________________                                                                                    Patient/Legal Representative    Witness:        ____________________________                          ___________________________  Nurse                                                                        Physician

## 2022-11-04 NOTE — ED PROVIDER NOTES
"Encounter Date: 11/4/2022    SCRIBE #1 NOTE: I, Shawna Rosado, am scribing for, and in the presence of,  Ysabel Park PA-C.     History     Chief Complaint   Patient presents with    Abdominal Pain     Generalized abdominal pain s/p gall bladder surgery on Monday. States pain radiates to her back on both sides. Pt states she can't get the pain under control.     Time seen by provider: 4:11 PM on 11/04/2022    Jasper Guerrero is a 31 y.o. female who presents to the ED with an onset of generalized abdominal pain and nausea s/p cholecystectomy 4 days ago. Patient states the pain started to radiate to her back bilaterally this morning, and she is unable to control the pain. She notes having had multiple bowel movements since the surgery. The patient reports being discharged on Percocet and Tramadol. She notes she has run out of Percocet, and she states Tramadol has not improved pain. The patient denies fever, vomiting, dysuria, hematuria, urinary frequency, abdominal distention, or any other symptoms at this time. She denies any other abdominal surgeries. PMHx of trigeminal neuralgia. PSHx of cholecystectomy.       The history is provided by the patient.   Review of patient's allergies indicates:   Allergen Reactions    Benadryl [diphenhydramine hcl]      Paralysis on right side body     Codeine      Paralysis right body    Flexeril [cyclobenzaprine] Other (See Comments)     Numbness on right side of body    Nsaids (non-steroidal anti-inflammatory drug) Diarrhea and Nausea And Vomiting     Ulcers     Penicillins Anaphylaxis and Hives    Droperidol Anxiety and Other (See Comments)    Magnesium      "made me feel horrible"     Prochlorperazine Anxiety and Palpitations     Past Medical History:   Diagnosis Date    Agoraphobia     Carpal tunnel syndrome     Depression     DVT (deep venous thrombosis)     2019 had blood clot in right arm    Fibromyalgia     Migraine headache     Nerve injury 08/2016    Lingual Nerve " Injury    Obese     PTSD (post-traumatic stress disorder)     Trigeminal neuralgia      Past Surgical History:   Procedure Laterality Date    CARPAL TUNNEL RELEASE Right 12/27/2018    Dr Lozada     CARPAL TUNNEL RELEASE Left 1/28/2020    Procedure: RELEASE, CARPAL TUNNEL;  Surgeon: Brendon Lozada Jr., MD;  Location: [facility];  Service: Orthopedics;  Laterality: Left;    MOUTH SURGERY      WISDOM TOOTH EXTRACTION       Family History   Problem Relation Age of Onset    No Known Problems Mother     No Known Problems Father     Cancer Maternal Aunt         endometrial    No Known Problems Maternal Uncle     No Known Problems Paternal Aunt     No Known Problems Paternal Uncle     No Known Problems Maternal Grandmother     No Known Problems Maternal Grandfather     No Known Problems Paternal Grandmother     No Known Problems Paternal Grandfather     No Known Problems Daughter     No Known Problems Son      Social History     Tobacco Use    Smoking status: Never    Smokeless tobacco: Never   Substance Use Topics    Alcohol use: No    Drug use: No     Review of Systems   Constitutional:  Negative for activity change, appetite change, chills and fever.   HENT:  Negative for congestion, rhinorrhea and sore throat.    Eyes:  Negative for redness and visual disturbance.   Respiratory:  Negative for cough, chest tightness and shortness of breath.    Cardiovascular:  Negative for chest pain.   Gastrointestinal:  Positive for abdominal pain and nausea. Negative for abdominal distention, diarrhea and vomiting.   Genitourinary:  Negative for dysuria, frequency and hematuria.   Musculoskeletal:  Positive for back pain. Negative for neck pain and neck stiffness.   Skin:  Negative for rash.   Neurological:  Negative for dizziness, syncope, numbness and headaches.     Physical Exam     Initial Vitals [11/04/22 1526]   BP Pulse Resp Temp SpO2   128/75 104 18 98.3 °F (36.8 °C) 98 %      MAP       --         Physical Exam    Nursing note and  vitals reviewed.  Constitutional: Vital signs are normal. She appears well-developed and well-nourished. She is cooperative.  Non-toxic appearance. She does not have a sickly appearance.   HENT:   Head: Normocephalic and atraumatic.   Right Ear: External ear normal.   Left Ear: External ear normal.   Nose: Nose normal.   Eyes: Conjunctivae and lids are normal.   Neck: Neck supple.   Normal range of motion.   Full passive range of motion without pain.     Cardiovascular:  Normal rate, regular rhythm and normal heart sounds.     Exam reveals no gallop and no friction rub.       No murmur heard.  Pulmonary/Chest: Breath sounds normal. She has no wheezes. She has no rhonchi. She has no rales.   Abdominal: Abdomen is soft. There is abdominal tenderness.   Well healing incisions noted on exam with some mild generalized abdominal tenderness.  There is no rebound and no guarding.   Musculoskeletal:      Cervical back: Full passive range of motion without pain, normal range of motion and neck supple.     Neurological: She is alert and oriented to person, place, and time.   Skin: Skin is warm, dry and intact. No rash noted.       ED Course   Procedures  Labs Reviewed   CBC W/ AUTO DIFFERENTIAL - Abnormal; Notable for the following components:       Result Value    WBC 13.47 (*)     Gran # (ANC) 8.9 (*)     All other components within normal limits    Narrative:     Release to patient->Immediate   COMPREHENSIVE METABOLIC PANEL - Abnormal; Notable for the following components:    Glucose 144 (*)     ALT 47 (*)     All other components within normal limits    Narrative:     Release to patient->Immediate   LIPASE    Narrative:     Release to patient->Immediate   URINALYSIS, REFLEX TO URINE CULTURE    Narrative:     Specimen Source->Urine   PREGNANCY TEST, URINE RAPID    Narrative:     Specimen Source->Urine   HIV 1 / 2 ANTIBODY   HEPATITIS C ANTIBODY          Imaging Results    None          Medications   metoclopramide HCl  tablet 10 mg (10 mg Oral Given 11/4/22 1631)     Medical Decision Making:   History:   Old Medical Records: I decided to obtain old medical records.  Clinical Tests:   Lab Tests: Ordered and Reviewed  Radiological Study: Ordered     APC / Resident Notes:   Emergent evaluation of a 31-year-old female who presents with generalized abdominal pain status post cholecystectomy 4 days ago.  She reports mild nausea with no vomiting.  She has run out of her Percocet states she cannot get her pain controlled.  She has well-healing incisions with mild generalized tenderness.  No rebound or guarding.  Patient did have mild leukocytosis.  She decided to leave the ER against medical advice when she was not given narcotic medication. She is alert and not intoxicated. She is able to make decisions and decided to leave AMA.   Scribe Attestation:   Scribe #1: I performed the above scribed service and the documentation accurately describes the services I performed. I attest to the accuracy of the note.                 I, Ysabel Park PA-C, personally performed the services described in this documentation. All medical record entries made by the scribe were at my direction and in my presence.  I have reviewed the chart and agree that the record reflects my personal performance and is accurate and complete. Ysabel Park PA-C.  8:38 PM 11/04/2022    Clinical Impression:   Final diagnoses:  [R10.9] Abdominal pain, unspecified abdominal location (Primary)      ED Disposition Condition    AMA                 Ysabel Park PA-C  11/04/22 2040

## 2022-11-04 NOTE — FIRST PROVIDER EVALUATION
" Emergency Department TeleTriage Encounter Note      CHIEF COMPLAINT    Chief Complaint   Patient presents with    Abdominal Pain     Generalized abdominal pain s/p gall bladder surgery on Monday. States pain radiates to her back on both sides. Pt states she can't get the pain under control.       VITAL SIGNS   Initial Vitals [11/04/22 1526]   BP Pulse Resp Temp SpO2   128/75 104 18 98.3 °F (36.8 °C) 98 %      MAP       --            ALLERGIES    Review of patient's allergies indicates:   Allergen Reactions    Benadryl [diphenhydramine hcl]      Paralysis on right side body     Codeine      Paralysis right body    Flexeril [cyclobenzaprine] Other (See Comments)     Numbness on right side of body    Nsaids (non-steroidal anti-inflammatory drug) Diarrhea and Nausea And Vomiting     Ulcers     Penicillins Anaphylaxis and Hives    Droperidol Anxiety and Other (See Comments)    Magnesium      "made me feel horrible"     Prochlorperazine Anxiety and Palpitations       PROVIDER TRIAGE NOTE  This is a teletriage evaluation of a 31 y.o. female presenting to the ED complaining of abdominal pain. Patient is s/p cholecystectomy on 10/31/22. She reports worsening pain that wraps around both sides of her upper abdomen and into her back. She is nauseated but denies vomiting. She has had diarrhea. She denies fever. Patient has been taking tramadol without relief. She took all of the pain medication prescribed.     Initial orders will be placed and care will be transferred to an alternate provider when patient is roomed for a full evaluation. Any additional orders and the final disposition will be determined by that provider.         ORDERS  Labs Reviewed   HIV 1 / 2 ANTIBODY   HEPATITIS C ANTIBODY   CBC W/ AUTO DIFFERENTIAL   COMPREHENSIVE METABOLIC PANEL   LIPASE   URINALYSIS, REFLEX TO URINE CULTURE   POCT URINE PREGNANCY       ED Orders (720h ago, onward)      Start Ordered     Status Ordering Provider    11/04/22 1532 11/04/22 " 1531  Vital signs  Every 2 hours         Ordered TEE FERRARO.    11/04/22 1532 11/04/22 1531  Diet NPO  Diet effective now         Ordered TEE FERRARO.    11/04/22 1532 11/04/22 1531  Insert peripheral IV  Once         Ordered TEE FERRARO.    11/04/22 1532 11/04/22 1531  CBC W/ AUTO DIFFERENTIAL  STAT         Pending Collection TEE FERRARO.    11/04/22 1532 11/04/22 1531  Comp. Metabolic Panel  STAT         Pending Collection RONANTEE VEGA.    11/04/22 1532 11/04/22 1531  Lipase  STAT         Pending Collection RONANTEE VEGA.    11/04/22 1532 11/04/22 1531  Urinalysis, Reflex to Urine Culture Urine, Clean Catch  STAT         Ordered TEE FERRARO.    11/04/22 1532 11/04/22 1531  POCT urine pregnancy  Once         Ordered TEE FERRARO.    11/04/22 1525 11/04/22 1524  HIV 1/2 Ag/Ab (4th Gen)  STAT         Pending Collection WILLIAMS WICK    11/04/22 1525 11/04/22 1524  Hepatitis C Antibody  STAT         Pending Collection WILLIAMS WICK              Virtual Visit Note: The provider triage portion of this emergency department evaluation and documentation was performed via [employer], a HIPAA-compliant telemedicine application, in concert with a tele-presenter in the room. A face to face patient evaluation with one of my colleagues will occur once the patient is placed in an emergency department room.      DISCLAIMER: This note was prepared with [employer] voice recognition transcription software. Garbled syntax, mangled pronouns, and other bizarre constructions may be attributed to that software system.

## 2022-11-05 LAB
HCV AB SERPL QL IA: NORMAL
HIV 1+2 AB+HIV1 P24 AG SERPL QL IA: NORMAL

## 2022-11-09 ENCOUNTER — PATIENT MESSAGE (OUTPATIENT)
Dept: FAMILY MEDICINE | Facility: CLINIC | Age: 32
End: 2022-11-09

## 2022-11-09 ENCOUNTER — HOSPITAL ENCOUNTER (EMERGENCY)
Facility: HOSPITAL | Age: 32
Discharge: HOME | End: 2022-11-09
Attending: EMERGENCY MEDICINE
Payer: MEDICAID

## 2022-11-09 VITALS
TEMPERATURE: 98 F | RESPIRATION RATE: 18 BRPM | OXYGEN SATURATION: 99 % | SYSTOLIC BLOOD PRESSURE: 152 MMHG | DIASTOLIC BLOOD PRESSURE: 91 MMHG | WEIGHT: 293 LBS | BODY MASS INDEX: 58.24 KG/M2 | HEART RATE: 101 BPM

## 2022-11-09 DIAGNOSIS — M79.2 NEURALGIA: Primary | ICD-10-CM

## 2022-11-09 DIAGNOSIS — G89.29 OTHER CHRONIC PAIN: ICD-10-CM

## 2022-11-09 PROCEDURE — 96372 THER/PROPH/DIAG INJ SC/IM: CPT | Performed by: NURSE PRACTITIONER

## 2022-11-09 PROCEDURE — 63600175 PHARM REV CODE 636 W HCPCS: Performed by: NURSE PRACTITIONER

## 2022-11-09 PROCEDURE — 99284 EMERGENCY DEPT VISIT MOD MDM: CPT

## 2022-11-09 RX ORDER — ONDANSETRON 2 MG/ML
4 INJECTION INTRAMUSCULAR; INTRAVENOUS
Status: COMPLETED | OUTPATIENT
Start: 2022-11-09 | End: 2022-11-09

## 2022-11-09 RX ORDER — MORPHINE SULFATE 4 MG/ML
4 INJECTION, SOLUTION INTRAMUSCULAR; INTRAVENOUS
Status: COMPLETED | OUTPATIENT
Start: 2022-11-09 | End: 2022-11-09

## 2022-11-09 RX ADMIN — ONDANSETRON 4 MG: 2 INJECTION INTRAMUSCULAR; INTRAVENOUS at 10:11

## 2022-11-09 RX ADMIN — MORPHINE SULFATE 4 MG: 4 INJECTION, SOLUTION INTRAMUSCULAR; INTRAVENOUS at 10:11

## 2022-11-09 NOTE — Clinical Note
"Jasper "Jasper" Yolanda was seen and treated in our emergency department on 11/9/2022.  She may return to work on 11/11/2022.       If you have any questions or concerns, please don't hesitate to call.      Naida Riddle NP"

## 2022-11-10 NOTE — ED PROVIDER NOTES
"Encounter Date: 11/9/2022       History     Chief Complaint   Patient presents with    Mouth Injury     Reports "right side of face hurting" onset "today"      31 year old female with history of trigeminal neuralgia that resulted after a dental procedure in 2016, presents to the ER today with complaints of acute exacerbation of her chronic trigeminal pain. She states she is in the process of trying to find a chronic pain management specialist that will take her Medicaid insurance. She has attempted to make appointments with many pain specialists in the area, but none will take her insurance and none will also take cash payment. She reports she usually takes 1,000 mg of tylenol TID for pain. She also has night terrors and takes diazepam nightly for this. Also has insomnia and takes Ambien for this. She reports she is allergic to ibuprofen. She has seen a neurologist for her trigeminal neuralgia and has taken many neurogenic pain meds in the past such as gabapentin,etc that do not seem to work for her pain. She states opioids are only meds that have been able to improve her acute exacerbations of her trigeminal neuralgia. She states she has a PCP but in the process of finding a new one because she does not feel that he is treating her chronic medical conditions. She also presents with a letter from her neurologist which is addressed to her PCP suggesting to consider opoiods for acute exacerbations of her pain but that he and his office is not in a position to be able to prescribe chronic opoiod pain medication. She states Percocet is usually only medication that works for her acute exacerbations. She reports today she woke up and noticed acute pain to begin spontaneously to right face. This pain was so bad it caused her to accidentally bite her tongue because the pain was so intense and now she has right lateral tongue pain from biting her tongue ontop of her trigimenal pain.  Denies any facial redness warmth any " "noticeable mouth ulcers or lacerations to her tongue.  She states usually when she bites her tongue the ulcer usually presents itself 2 or 3 days later and right now she cannot see anything in her mouth evident of this tongue trauma.    Review of patient's allergies indicates:   Allergen Reactions    Benadryl [diphenhydramine hcl]      Paralysis on right side body     Codeine      Paralysis right body    Flexeril [cyclobenzaprine] Other (See Comments)     Numbness on right side of body    Nsaids (non-steroidal anti-inflammatory drug) Diarrhea and Nausea And Vomiting     Ulcers     Penicillins Anaphylaxis and Hives    Droperidol Anxiety and Other (See Comments)    Magnesium      "made me feel horrible"     Prochlorperazine Anxiety and Palpitations     Past Medical History:   Diagnosis Date    Agoraphobia     Carpal tunnel syndrome     Depression     DVT (deep venous thrombosis)     2019 had blood clot in right arm    Fibromyalgia     Migraine headache     Nerve injury 08/2016    Lingual Nerve Injury    Obese     PTSD (post-traumatic stress disorder)     Trigeminal neuralgia      Past Surgical History:   Procedure Laterality Date    CARPAL TUNNEL RELEASE Right 12/27/2018    Dr Lozada     CARPAL TUNNEL RELEASE Left 1/28/2020    Procedure: RELEASE, CARPAL TUNNEL;  Surgeon: Brendon Lozada Jr., MD;  Location: [facility];  Service: Orthopedics;  Laterality: Left;    MOUTH SURGERY      WISDOM TOOTH EXTRACTION       Family History   Problem Relation Age of Onset    No Known Problems Mother     No Known Problems Father     Cancer Maternal Aunt         endometrial    No Known Problems Maternal Uncle     No Known Problems Paternal Aunt     No Known Problems Paternal Uncle     No Known Problems Maternal Grandmother     No Known Problems Maternal Grandfather     No Known Problems Paternal Grandmother     No Known Problems Paternal Grandfather     No Known Problems Daughter     No Known Problems Son      Social History     Tobacco Use "    Smoking status: Never    Smokeless tobacco: Never   Substance Use Topics    Alcohol use: No    Drug use: No     Review of Systems   Constitutional:  Negative for chills, diaphoresis, fatigue and fever.   HENT:  Negative for congestion, dental problem, drooling, postnasal drip, rhinorrhea, sinus pressure, sinus pain, sneezing, sore throat and trouble swallowing.    Eyes:  Negative for photophobia and visual disturbance.   Respiratory:  Negative for shortness of breath.    Cardiovascular:  Negative for chest pain.   Gastrointestinal:  Negative for nausea.   Endocrine: Negative for polydipsia, polyphagia and polyuria.   Genitourinary:  Negative for dysuria.   Musculoskeletal:  Negative for back pain, myalgias, neck pain and neck stiffness.   Skin:  Negative for rash.   Allergic/Immunologic: Negative for immunocompromised state.   Neurological:  Negative for dizziness, syncope, speech difficulty, weakness, light-headedness and numbness.   Hematological:  Does not bruise/bleed easily.   Psychiatric/Behavioral:  Negative for agitation and confusion.    All other systems reviewed and are negative.    Physical Exam     Initial Vitals [11/09/22 1926]   BP Pulse Resp Temp SpO2   (!) 150/87 (!) 112 18 97.8 °F (36.6 °C) 98 %      MAP       --         Physical Exam    Nursing note and vitals reviewed.  Constitutional: She appears well-developed and well-nourished. She is not diaphoretic. No distress.   HENT:   Head: Normocephalic and atraumatic.   Right Ear: External ear normal.   Nose: Nose normal.   Mouth/Throat: Uvula is midline, oropharynx is clear and moist and mucous membranes are normal. She does not have dentures. No oral lesions. No trismus in the jaw. Normal dentition. No dental abscesses, uvula swelling, lacerations or dental caries.   Eyes: Conjunctivae are normal.   Neck: Neck supple.   Normal range of motion.  Cardiovascular:  Normal rate.           Pulmonary/Chest: Breath sounds normal. No respiratory distress.  She has no wheezes. She has no rhonchi. She has no rales.   Abdominal: Abdomen is soft. Bowel sounds are normal. There is no abdominal tenderness.   Musculoskeletal:         General: Normal range of motion.      Cervical back: Normal range of motion and neck supple.     Neurological: She is oriented to person, place, and time. She has normal strength. GCS score is 15. GCS eye subscore is 4. GCS verbal subscore is 5. GCS motor subscore is 6.   Skin: Skin is warm and dry. No rash noted. No erythema.   Psychiatric: Her mood appears anxious.       ED Course   Procedures  Labs Reviewed   POCT URINE PREGNANCY            Imaging Results    None          Medications   morphine injection 4 mg (4 mg Intramuscular Given 11/9/22 2234)   ondansetron injection 4 mg (4 mg Intramuscular Given 11/9/22 2234)     Medical Decision Making:   I had a long discussion with patient about the ER capabilities managing chronic pain.  I also reviewed over patient's prescription monitoring record as well as her prior notes with primary care and various other doctors in prior ER visits.  Just today her PCP refused to call and any opioid medication for her.  I also reviewed over her prescription monitoring record and she has in the past 1 month been prescribed Fioricet Ambien Percocet Ultram diazepam and has a current active prescription for diazepam and Ambien concurrently this very moment.  I evaluated her mouth/tongue injury that she reports as a result of her trigeminal neuralgia pain and I am not able to appreciate any mouth ulcer sore tongue laceration or other evidence of trauma to her tongue that she describes.  She appears very anxious about getting a ongoing prescription of opioid pain medication from this ER visit and discussed in detail that I will not write a prescription for ongoing pain with opioids as I do have concerns based off of evaluating her prescription monitor and record and she is on to very sedating medications and  oversedating her.  She needs to follow-up with a primary care doctor and especially she needs to be under the care of a chronic pain management specialist as this is a recurring issue for many years now.  I will refer to a chronic pain managed specialist in the area, I will treat her acute pain here in the ER with an IM shot of morphine that patient specifically requests and I discussed the patient that we will not prescribe any ongoing prescription medication for opioids in the future and she needs to establish herself with a chronic pain management specialist for her exacerbations in the future.  She is more than welcome to check in, in the future, if she feels she is having a medical emergency such as acute intractable pain and we will be happy to see her, evaluate her, and manage her emergency,  but she needs to have a plan in place for future exacerbations of this chronic medical condition. She seems to malinger during our conversation regarding all of the facts, and this does make me truly concerned she has an underlying addiction issue, that ultimately needs to be addressed. We will treat her acute pain in the ER with IM morphine 1 time dose, she needs to call PCP and chronic pain for ongoing mgmt. She has been advised that we will not administer any narcotic medication for pain in the ER until she has her ride here and able to drive her home safely. She reports her  will be coming to pick her up .                         Clinical Impression:   Final diagnoses:  [M79.2] Neuralgia (Primary)  [G89.29] Other chronic pain        ED Disposition Condition    Discharge Stable          ED Prescriptions    None       Follow-up Information       Follow up With Specialties Details Why Contact Info Additional Information    Crow Kennedy MD Family Medicine Schedule an appointment as soon as possible for a visit in 1 day for ER visit follow up and re-evaluation [address] [facility]  [address]  [place]  [address]  [phone]       Justus Truong MD Pain Medicine Schedule an appointment as soon as possible for a visit in 1 day for ER visit follow up and re-evaluation [address] [place] [address]  [address]  [facility] [address]  [phone]       [facility] - Emergency Dept Emergency Medicine Go to  As needed, If symptoms worsen [address] [facility] [address]  [phone] 1st floor             Naida Riddle NP  11/09/22 8673

## 2022-11-11 ENCOUNTER — PATIENT MESSAGE (OUTPATIENT)
Dept: FAMILY MEDICINE | Facility: CLINIC | Age: 32
End: 2022-11-11

## 2022-11-16 RX ORDER — DIPHENOXYLATE HYDROCHLORIDE AND ATROPINE SULFATE 2.5; .025 MG/1; MG/1
1 TABLET ORAL 4 TIMES DAILY PRN
Qty: 20 TABLET | Refills: 0 | Status: SHIPPED | OUTPATIENT
Start: 2022-11-16 | End: 2022-11-26

## 2022-11-17 ENCOUNTER — PATIENT MESSAGE (OUTPATIENT)
Dept: NEUROLOGY | Facility: CLINIC | Age: 32
End: 2022-11-17
Payer: MEDICAID

## 2022-11-17 ENCOUNTER — PATIENT OUTREACH (OUTPATIENT)
Dept: FAMILY MEDICINE | Facility: CLINIC | Age: 32
End: 2022-11-17

## 2022-11-22 ENCOUNTER — PATIENT MESSAGE (OUTPATIENT)
Dept: FAMILY MEDICINE | Facility: CLINIC | Age: 32
End: 2022-11-22

## 2022-11-29 ENCOUNTER — PATIENT MESSAGE (OUTPATIENT)
Dept: FAMILY MEDICINE | Facility: CLINIC | Age: 32
End: 2022-11-29

## 2022-11-29 ENCOUNTER — OFFICE VISIT (OUTPATIENT)
Dept: FAMILY MEDICINE | Facility: CLINIC | Age: 32
End: 2022-11-29
Payer: MEDICAID

## 2022-11-29 ENCOUNTER — LAB VISIT (OUTPATIENT)
Dept: LAB | Facility: HOSPITAL | Age: 32
End: 2022-11-29
Attending: FAMILY MEDICINE
Payer: MEDICAID

## 2022-11-29 VITALS
DIASTOLIC BLOOD PRESSURE: 82 MMHG | SYSTOLIC BLOOD PRESSURE: 138 MMHG | WEIGHT: 293 LBS | HEIGHT: 65 IN | BODY MASS INDEX: 48.82 KG/M2 | HEART RATE: 87 BPM | OXYGEN SATURATION: 97 % | TEMPERATURE: 98 F

## 2022-11-29 DIAGNOSIS — R30.0 DYSURIA: Primary | ICD-10-CM

## 2022-11-29 DIAGNOSIS — M54.9 BACK PAIN, UNSPECIFIED BACK LOCATION, UNSPECIFIED BACK PAIN LATERALITY, UNSPECIFIED CHRONICITY: ICD-10-CM

## 2022-11-29 DIAGNOSIS — G43.909 MIGRAINE WITHOUT STATUS MIGRAINOSUS, NOT INTRACTABLE, UNSPECIFIED MIGRAINE TYPE: ICD-10-CM

## 2022-11-29 DIAGNOSIS — R51.9 FACE PAIN: ICD-10-CM

## 2022-11-29 DIAGNOSIS — R30.0 DYSURIA: ICD-10-CM

## 2022-11-29 DIAGNOSIS — J30.9 ALLERGIC RHINITIS, UNSPECIFIED SEASONALITY, UNSPECIFIED TRIGGER: ICD-10-CM

## 2022-11-29 LAB
BILIRUB UR QL STRIP: NEGATIVE
BILIRUB UR QL STRIP: NEGATIVE
CLARITY UR: CLEAR
COLOR UR: YELLOW
GLUCOSE UR QL STRIP: ABNORMAL
GLUCOSE UR QL STRIP: NEGATIVE
HGB UR QL STRIP: NEGATIVE
KETONES UR QL STRIP: NEGATIVE
KETONES UR QL STRIP: NEGATIVE
LEUKOCYTE ESTERASE UR QL STRIP: NEGATIVE
LEUKOCYTE ESTERASE UR QL STRIP: NEGATIVE
NITRITE UR QL STRIP: NEGATIVE
PH UR STRIP: 7 [PH] (ref 5–8)
PH, POC UA: 6 (ref 5–8.5)
POC BLOOD, URINE: NEGATIVE
POC NITRATES, URINE: NEGATIVE
PROT UR QL STRIP: NEGATIVE
PROT UR QL STRIP: NEGATIVE
SP GR UR STRIP: 1.02 (ref 1–1.03)
SP GR UR STRIP: 1.02 (ref 1–1.03)
URN SPEC COLLECT METH UR: ABNORMAL
UROBILINOGEN UR STRIP-ACNC: NEGATIVE EU/DL
UROBILINOGEN UR STRIP-ACNC: NORMAL (ref 0.2–8)

## 2022-11-29 PROCEDURE — 4010F ACE/ARB THERAPY RXD/TAKEN: CPT | Mod: CPTII,,, | Performed by: FAMILY MEDICINE

## 2022-11-29 PROCEDURE — 99214 OFFICE O/P EST MOD 30 MIN: CPT | Mod: S$PBB,,, | Performed by: FAMILY MEDICINE

## 2022-11-29 PROCEDURE — 81003 URINALYSIS AUTO W/O SCOPE: CPT | Performed by: FAMILY MEDICINE

## 2022-11-29 PROCEDURE — 81003 URINALYSIS AUTO W/O SCOPE: CPT | Mod: PBBFAC | Performed by: FAMILY MEDICINE

## 2022-11-29 PROCEDURE — 3044F HG A1C LEVEL LT 7.0%: CPT | Mod: CPTII,,, | Performed by: FAMILY MEDICINE

## 2022-11-29 PROCEDURE — 3008F PR BODY MASS INDEX (BMI) DOCUMENTED: ICD-10-PCS | Mod: CPTII,,, | Performed by: FAMILY MEDICINE

## 2022-11-29 PROCEDURE — 3044F PR MOST RECENT HEMOGLOBIN A1C LEVEL <7.0%: ICD-10-PCS | Mod: CPTII,,, | Performed by: FAMILY MEDICINE

## 2022-11-29 PROCEDURE — 4010F PR ACE/ARB THEARPY RXD/TAKEN: ICD-10-PCS | Mod: CPTII,,, | Performed by: FAMILY MEDICINE

## 2022-11-29 PROCEDURE — 3008F BODY MASS INDEX DOCD: CPT | Mod: CPTII,,, | Performed by: FAMILY MEDICINE

## 2022-11-29 PROCEDURE — 99214 PR OFFICE/OUTPT VISIT, EST, LEVL IV, 30-39 MIN: ICD-10-PCS | Mod: S$PBB,,, | Performed by: FAMILY MEDICINE

## 2022-11-29 PROCEDURE — 3075F SYST BP GE 130 - 139MM HG: CPT | Mod: CPTII,,, | Performed by: FAMILY MEDICINE

## 2022-11-29 PROCEDURE — 3075F PR MOST RECENT SYSTOLIC BLOOD PRESS GE 130-139MM HG: ICD-10-PCS | Mod: CPTII,,, | Performed by: FAMILY MEDICINE

## 2022-11-29 PROCEDURE — 99215 OFFICE O/P EST HI 40 MIN: CPT | Performed by: FAMILY MEDICINE

## 2022-11-29 PROCEDURE — 1159F PR MEDICATION LIST DOCUMENTED IN MEDICAL RECORD: ICD-10-PCS | Mod: CPTII,,, | Performed by: FAMILY MEDICINE

## 2022-11-29 PROCEDURE — 3079F DIAST BP 80-89 MM HG: CPT | Mod: CPTII,,, | Performed by: FAMILY MEDICINE

## 2022-11-29 PROCEDURE — 1159F MED LIST DOCD IN RCRD: CPT | Mod: CPTII,,, | Performed by: FAMILY MEDICINE

## 2022-11-29 PROCEDURE — 3079F PR MOST RECENT DIASTOLIC BLOOD PRESSURE 80-89 MM HG: ICD-10-PCS | Mod: CPTII,,, | Performed by: FAMILY MEDICINE

## 2022-11-29 RX ORDER — DICYCLOMINE HYDROCHLORIDE 20 MG/1
20 TABLET ORAL 2 TIMES DAILY PRN
COMMUNITY
Start: 2022-09-29 | End: 2023-10-23

## 2022-11-29 RX ORDER — DIPHENOXYLATE HCL/ATROPINE 2.5-.025/5
5 LIQUID (ML) ORAL 4 TIMES DAILY PRN
COMMUNITY
End: 2023-04-17

## 2022-11-29 RX ORDER — TRAMADOL HYDROCHLORIDE 50 MG/1
50 TABLET ORAL EVERY 6 HOURS PRN
COMMUNITY
Start: 2022-11-21 | End: 2022-11-30

## 2022-11-29 RX ORDER — AZELASTINE 1 MG/ML
1 SPRAY, METERED NASAL 2 TIMES DAILY
Qty: 30 ML | Refills: 0 | Status: SHIPPED | OUTPATIENT
Start: 2022-11-29 | End: 2023-04-17

## 2022-11-29 RX ORDER — BUTALBITAL, ASPIRIN, AND CAFFEINE 325; 50; 40 MG/1; MG/1; MG/1
1 CAPSULE ORAL EVERY 4 HOURS PRN
COMMUNITY
End: 2022-11-29

## 2022-11-29 RX ORDER — LOPERAMIDE HYDROCHLORIDE 2 MG/1
2 CAPSULE ORAL 4 TIMES DAILY PRN
COMMUNITY
End: 2023-04-17

## 2022-11-29 RX ORDER — SUMATRIPTAN SUCCINATE 100 MG/1
100 TABLET ORAL ONCE
Qty: 8 TABLET | Refills: 0 | Status: SHIPPED | OUTPATIENT
Start: 2022-11-29 | End: 2022-12-15 | Stop reason: SDUPTHER

## 2022-11-29 NOTE — PROGRESS NOTES
SUBJECTIVE:    Patient ID: Jasper Guerrero is a 31 y.o. female.    Chief Complaint: Urinary Frequency (Burning) and Urine Odor  31-year-old female  here today complaining of her chronic head pain she was told at her previous visits that I was no longer going to be able to provide her with her chronic pain medications. She is here today again requesting pain medications. She has been evaluated by neurosurgery and has been offered a procedure but pt states she has no way to get to the procedure because she is unwilling to drive to [place].    She would also like to Change her Migraine medications, she is currently on Maxalt, she states the Maxalt in the Fioricet is not resolving her migraines, she had been on Imitrex in the past she states that she thinks that may work better and wanted to know if I was willing to change her migraine medications.  Since she is been fired by Dr. Diaz ([facility]) she is been unable to get her migraines under control.    Patient today is also complaining of urinary symptoms she is complaining of dysuria frequency urgency and lower back pain her point of care testing urine test in clinic today was normal will get a urine micro and culture       Her lower back pain and upper back pain, the pain in the lower back in the area of the SI joints and pain along the paraspinal muscles along her thoracic spine.  She is requesting referral to Dr. Maher she is seen him in the past I explained that I did not think that she had a neurosurgical worse Ortho Spine surgical procedure to be performed that she would most likely benefit from physical therapy she requested referral again to Dr. Maher so I will place one.  I will also place a referral to physical therapy.       Patient is also complaining of upper respiratory symptoms with postnasal drip and a cough      Significant past medical history  Agoraphobia/Anxiety:  Valium , Fluvoxamine (LUVOX) 300mg, Buspar 7.5  Trigeminal  Neuralgia:   Depression: Effexor XR 225mg  PTSD:  DVT (upper extremity right arm 2019):  Fibromyalgia:  Insomnia:    GERD:  Pantoprazole 40 mg  Migraines: Fiorocet, Verapamil  Nausea associated with Migraines: Reglan, Zofran      Specialists  Orthopedics:  Dr. Maher  Neurology:  Dr. Wilder Woodson, Dr Diaz (has been discharged)   Psychiatry: [facility]: Dr Kearney (Pt is trying to get back into treatment) Therapist Tonya  GYN: Dr CHIDI Reynolds  GI: Dr Villeda     Smoke: None  ETOH: None  Exercise: None    Dysuria   This is a new problem. The current episode started in the past 7 days. The problem occurs every urination. The problem has been waxing and waning. The quality of the pain is described as burning. The pain is at a severity of 4/10. The pain is moderate. There has been no fever. The fever has been present for Less than 1 day. She is Sexually active. There is No history of pyelonephritis. Associated symptoms include flank pain, frequency, nausea and urgency. Pertinent negatives include no behavior changes, chills, discharge, hematuria, hesitancy, possible pregnancy, sweats, vomiting, weight loss, constipation, rash or withholding. She has tried acetaminophen and increased fluids for the symptoms. The treatment provided no relief. Her past medical history is significant for diabetes mellitus. There is no history of catheterization, diabetes insipidus, genitourinary reflux, hypertension, kidney stones, recurrent UTIs, a single kidney, STD, urinary stasis or a urological procedure.   Back Pain  This is a chronic problem. The current episode started more than 1 year ago. The pain is present in the thoracic spine and sacro-iliac. The pain is at a severity of 4/10. The pain is moderate. The symptoms are aggravated by sitting and standing. Associated symptoms include dysuria and headaches. Pertinent negatives include no chest pain, fever, weakness or weight loss.   URI   This is a new problem. The current  episode started 1 to 4 weeks ago. The problem has been unchanged. There has been no fever. Associated symptoms include dysuria, headaches and nausea. Pertinent negatives include no chest pain, congestion, coughing, rash, rhinorrhea, sinus pain, vomiting or wheezing. She has tried decongestant for the symptoms.       Past Medical History:   Diagnosis Date    Agoraphobia     Carpal tunnel syndrome     Depression     DVT (deep venous thrombosis)     2019 had blood clot in right arm    Fibromyalgia     Migraine headache     Nerve injury 08/2016    Lingual Nerve Injury    Obese     PTSD (post-traumatic stress disorder)     Trigeminal neuralgia      Social History     Socioeconomic History    Marital status:    Tobacco Use    Smoking status: Never    Smokeless tobacco: Never   Substance and Sexual Activity    Alcohol use: No    Drug use: No    Sexual activity: Yes     Partners: Male     Birth control/protection: Condom     Social Determinants of Health     Financial Resource Strain: Low Risk     Difficulty of Paying Living Expenses: Not hard at all   Food Insecurity: No Food Insecurity    Worried About Running Out of Food in the Last Year: Never true    Ran Out of Food in the Last Year: Never true   Transportation Needs: No Transportation Needs    Lack of Transportation (Medical): No    Lack of Transportation (Non-Medical): No   Physical Activity: Inactive    Days of Exercise per Week: 0 days    Minutes of Exercise per Session: 0 min   Stress: Stress Concern Present    Feeling of Stress : Very much   Social Connections: Unknown    Frequency of Communication with Friends and Family: More than three times a week    Frequency of Social Gatherings with Friends and Family: More than three times a week    Active Member of Clubs or Organizations: No    Attends Club or Organization Meetings: Never    Marital Status:    Housing Stability: Low Risk     Unable to Pay for Housing in the Last Year: No    Number of Places  Lived in the Last Year: 1    Unstable Housing in the Last Year: No     Past Surgical History:   Procedure Laterality Date    CARPAL TUNNEL RELEASE Right 12/27/2018    Dr Lozada     CARPAL TUNNEL RELEASE Left 01/28/2020    Procedure: RELEASE, CARPAL TUNNEL;  Surgeon: Brendon Lozada Jr., MD;  Location: [facility];  Service: Orthopedics;  Laterality: Left;    CHOLECYSTECTOMY      MOUTH SURGERY      WISDOM TOOTH EXTRACTION       Family History   Problem Relation Age of Onset    No Known Problems Mother     No Known Problems Father     Cancer Maternal Aunt         endometrial    No Known Problems Maternal Uncle     No Known Problems Paternal Aunt     No Known Problems Paternal Uncle     No Known Problems Maternal Grandmother     No Known Problems Maternal Grandfather     No Known Problems Paternal Grandmother     No Known Problems Paternal Grandfather     No Known Problems Daughter     No Known Problems Son      Current Outpatient Medications   Medication Sig Dispense Refill    acetaminophen (TYLENOL) 500 MG tablet Take 500 mg by mouth every 4 to 6 hours as needed.       busPIRone (BUSPAR) 15 MG tablet Take 15 mg by mouth once daily.      butalbital-acetaminophen-caffeine -40 mg (FIORICET, ESGIC) -40 mg per tablet Take 1 tablet by mouth every 6 (six) hours as needed for Headaches. for pain. 10 tablet 1    dicyclomine (BENTYL) 20 mg tablet Take 20 mg by mouth 2 (two) times daily as needed.      diphenoxylate-atropine 2.5-0.025 mg/5 ml (LOMOTIL) 2.5-0.025 mg/5 mL liquid Take 5 mLs by mouth 4 (four) times daily as needed.      fluvoxaMINE (LUVOX) 100 MG tablet Take 1 tablet (100 mg total) by mouth 3 (three) times daily. 90 tablet 2    loperamide (IMODIUM) 2 mg capsule Take 2 mg by mouth 4 (four) times daily as needed.      metFORMIN (GLUCOPHAGE-XR) 500 MG ER 24hr tablet Take 1 tablet (500 mg total) by mouth daily with breakfast. 90 tablet 3    metoclopramide HCl (REGLAN) 10 MG tablet TAKE 1 TABLET BY MOUTH EVERY 6  HOURS AS NEEDED FOR MIGRAINE AND NAUSEA (Patient taking differently: Take 10 mg by mouth every 6 (six) hours as needed. TAKE 1 TABLET BY MOUTH EVERY 6 HOURS AS NEEDED FOR MIGRAINE AND NAUSEA) 60 tablet 11    metoprolol succinate (TOPROL-XL) 50 MG 24 hr tablet Take 1 tablet (50 mg total) by mouth once daily. 30 tablet 11    ondansetron (ZOFRAN) 4 MG tablet Take 1 tablet (4 mg total) by mouth every 8 (eight) hours as needed for Nausea. 12 tablet 0    pantoprazole (PROTONIX) 40 MG tablet Take 40 mg by mouth once daily.      rizatriptan (MAXALT) 10 MG tablet Take 1 tablet (10 mg total) by mouth as needed for Migraine. May repeat every 2 hours for max 3 tabs in 24 hours. Use no more than 10 days per month. 18 tablet 11    rosuvastatin (CRESTOR) 10 MG tablet Take 1 tablet (10 mg total) by mouth every evening. 90 tablet 3    traMADoL (ULTRAM) 50 mg tablet Take 50 mg by mouth every 6 (six) hours as needed.      VALIUM 5 mg tablet Take 5 mg by mouth 2 (two) times daily as needed.      venlafaxine (EFFEXOR XR) 150 MG Cp24 Take 1 capsule (150 mg total) by mouth once daily. 30 capsule 2    venlafaxine (EFFEXOR-XR) 75 MG 24 hr capsule Take 75 mg by mouth once daily.      zolpidem (AMBIEN) 10 mg Tab Take 10 mg by mouth every evening.      azelastine (ASTELIN) 137 mcg (0.1 %) nasal spray 1 spray (137 mcg total) by Nasal route 2 (two) times daily. 30 mL 0    butalbital-aspirin-caffeine -40 mg (FIORINAL) -40 mg Cap Take 1 capsule by mouth every 4 (four) hours as needed.      cholestyramine (QUESTRAN) 4 gram packet Take 1 packet (4 g total) by mouth 3 (three) times daily with meals. (Patient not taking: Reported on 11/29/2022) 270 packet 3    oxyCODONE-acetaminophen (PERCOCET) 5-325 mg per tablet Take 1 tablet by mouth every 4 (four) hours as needed for Pain. (Patient not taking: Reported on 11/29/2022) 10 tablet 0    sumatriptan (IMITREX) 100 MG tablet Take 1 tablet (100 mg total) by mouth once. May repeat the dose once  "after 2 hours. for 1 dose 8 tablet 0     No current facility-administered medications for this visit.     Review of patient's allergies indicates:   Allergen Reactions    Benadryl [diphenhydramine hcl]      Paralysis on right side body     Codeine      Paralysis right body    Flexeril [cyclobenzaprine] Other (See Comments)     Numbness on right side of body    Nsaids (non-steroidal anti-inflammatory drug) Diarrhea and Nausea And Vomiting     Ulcers     Penicillins Anaphylaxis and Hives    Droperidol Anxiety and Other (See Comments)    Magnesium      "made me feel horrible"     Prochlorperazine Anxiety and Palpitations       Review of Systems   Constitutional:  Negative for activity change, chills, fever and weight loss.   HENT:  Negative for congestion, postnasal drip, rhinorrhea, sinus pressure and sinus pain.    Respiratory:  Negative for cough, chest tightness, shortness of breath and wheezing.    Cardiovascular:  Negative for chest pain and palpitations.   Gastrointestinal:  Positive for nausea. Negative for constipation and vomiting.   Genitourinary:  Positive for dysuria, flank pain, frequency and urgency. Negative for hematuria and hesitancy.   Musculoskeletal:  Positive for back pain.   Skin:  Negative for rash.   Neurological:  Positive for headaches. Negative for weakness.        Blood pressure 138/82, pulse 87, temperature 97.5 °F (36.4 °C), temperature source Oral, height 5' 5" (1.651 m), weight (!) 157.7 kg (347 lb 9.6 oz), last menstrual period 10/10/2022, SpO2 97 %. Body mass index is 57.84 kg/m².   Objective:      Physical Exam  Vitals reviewed.   Constitutional:       General: She is not in acute distress.     Appearance: Normal appearance. She is obese. She is not ill-appearing or toxic-appearing.   HENT:      Head: Normocephalic and atraumatic.   Musculoskeletal:      Thoracic back: Tenderness present. No swelling, edema, deformity, signs of trauma, lacerations, spasms or bony tenderness. Normal " range of motion. No scoliosis.      Lumbar back: Tenderness present. No swelling, edema, deformity, signs of trauma, lacerations, spasms or bony tenderness. Normal range of motion. Negative right straight leg raise test and negative left straight leg raise test. No scoliosis.        Back:    Skin:     General: Skin is warm and dry.      Capillary Refill: Capillary refill takes less than 2 seconds.   Neurological:      General: No focal deficit present.      Mental Status: She is alert and oriented to person, place, and time.      Motor: No weakness.      Deep Tendon Reflexes: Reflexes normal.           Assessment:       1. Dysuria    2. Back pain, unspecified back location, unspecified back pain laterality, unspecified chronicity    3. Face pain    4. Allergic rhinitis, unspecified seasonality, unspecified trigger    5. Migraine without status migrainosus, not intractable, unspecified migraine type         Plan:           Dysuria  -     POCT Urinalysis, Dipstick, Automated, W/O Scope  -     Urinalysis, Reflex to Urine Culture Urine, Clean Catch; Future; Expected date: 11/29/2022  Will get a lab performed urinalysis with microscopic to ensure the patient does not have a urinary tract infection.  Back pain, unspecified back location, unspecified back pain laterality, unspecified chronicity  -     Ambulatory referral/consult to Physical/Occupational Therapy; Future; Expected date: 12/06/2022  -     Ambulatory referral/consult to Orthopedics; Future; Expected date: 12/06/2022  Patient with mild tenderness to palpation no neurologic deficit normal exam, will refer to physical therapy patient is requesting referral to orthopedics will place referral    Face pain  -     Ambulatory referral/consult to Pain Clinic; Future; Expected date: 12/06/2022  Patient with chronic facial pain has been diagnosed with trigeminal neuralgia being followed by neurosurgery for possible procedure will put in referral to pain  management.    Allergic rhinitis, unspecified seasonality, unspecified trigger  -     azelastine (ASTELIN) 137 mcg (0.1 %) nasal spray; 1 spray (137 mcg total) by Nasal route 2 (two) times daily.  Dispense: 30 mL; Refill: 0    Migraine without status migrainosus, not intractable, unspecified migraine type  -     sumatriptan (IMITREX) 100 MG tablet; Take 1 tablet (100 mg total) by mouth once. May repeat the dose once after 2 hours. for 1 dose  Dispense: 8 tablet; Refill: 0

## 2022-11-30 ENCOUNTER — OFFICE VISIT (OUTPATIENT)
Dept: ORTHOPEDICS | Facility: CLINIC | Age: 32
End: 2022-11-30
Payer: MEDICAID

## 2022-11-30 ENCOUNTER — PATIENT MESSAGE (OUTPATIENT)
Dept: FAMILY MEDICINE | Facility: CLINIC | Age: 32
End: 2022-11-30

## 2022-11-30 VITALS
BODY MASS INDEX: 48.82 KG/M2 | DIASTOLIC BLOOD PRESSURE: 94 MMHG | WEIGHT: 293 LBS | HEIGHT: 65 IN | SYSTOLIC BLOOD PRESSURE: 132 MMHG

## 2022-11-30 DIAGNOSIS — M54.9 BACK PAIN, UNSPECIFIED BACK LOCATION, UNSPECIFIED BACK PAIN LATERALITY, UNSPECIFIED CHRONICITY: Primary | ICD-10-CM

## 2022-11-30 DIAGNOSIS — G89.29 CHRONIC BILATERAL LOW BACK PAIN WITHOUT SCIATICA: Primary | ICD-10-CM

## 2022-11-30 DIAGNOSIS — M54.50 CHRONIC BILATERAL LOW BACK PAIN WITHOUT SCIATICA: Primary | ICD-10-CM

## 2022-11-30 DIAGNOSIS — M54.9 BACK PAIN, UNSPECIFIED BACK LOCATION, UNSPECIFIED BACK PAIN LATERALITY, UNSPECIFIED CHRONICITY: ICD-10-CM

## 2022-11-30 PROCEDURE — 3075F PR MOST RECENT SYSTOLIC BLOOD PRESS GE 130-139MM HG: ICD-10-PCS | Mod: CPTII,S$GLB,, | Performed by: ORTHOPAEDIC SURGERY

## 2022-11-30 PROCEDURE — 4010F ACE/ARB THERAPY RXD/TAKEN: CPT | Mod: CPTII,S$GLB,, | Performed by: ORTHOPAEDIC SURGERY

## 2022-11-30 PROCEDURE — 4010F PR ACE/ARB THEARPY RXD/TAKEN: ICD-10-PCS | Mod: CPTII,S$GLB,, | Performed by: ORTHOPAEDIC SURGERY

## 2022-11-30 PROCEDURE — 1159F MED LIST DOCD IN RCRD: CPT | Mod: CPTII,S$GLB,, | Performed by: ORTHOPAEDIC SURGERY

## 2022-11-30 PROCEDURE — 99213 OFFICE O/P EST LOW 20 MIN: CPT | Mod: S$GLB,,, | Performed by: ORTHOPAEDIC SURGERY

## 2022-11-30 PROCEDURE — 3044F PR MOST RECENT HEMOGLOBIN A1C LEVEL <7.0%: ICD-10-PCS | Mod: CPTII,S$GLB,, | Performed by: ORTHOPAEDIC SURGERY

## 2022-11-30 PROCEDURE — 99213 PR OFFICE/OUTPT VISIT, EST, LEVL III, 20-29 MIN: ICD-10-PCS | Mod: S$GLB,,, | Performed by: ORTHOPAEDIC SURGERY

## 2022-11-30 PROCEDURE — 3080F PR MOST RECENT DIASTOLIC BLOOD PRESSURE >= 90 MM HG: ICD-10-PCS | Mod: CPTII,S$GLB,, | Performed by: ORTHOPAEDIC SURGERY

## 2022-11-30 PROCEDURE — 1160F RVW MEDS BY RX/DR IN RCRD: CPT | Mod: CPTII,S$GLB,, | Performed by: ORTHOPAEDIC SURGERY

## 2022-11-30 PROCEDURE — 3080F DIAST BP >= 90 MM HG: CPT | Mod: CPTII,S$GLB,, | Performed by: ORTHOPAEDIC SURGERY

## 2022-11-30 PROCEDURE — 1160F PR REVIEW ALL MEDS BY PRESCRIBER/CLIN PHARMACIST DOCUMENTED: ICD-10-PCS | Mod: CPTII,S$GLB,, | Performed by: ORTHOPAEDIC SURGERY

## 2022-11-30 PROCEDURE — 3008F BODY MASS INDEX DOCD: CPT | Mod: CPTII,S$GLB,, | Performed by: ORTHOPAEDIC SURGERY

## 2022-11-30 PROCEDURE — 3075F SYST BP GE 130 - 139MM HG: CPT | Mod: CPTII,S$GLB,, | Performed by: ORTHOPAEDIC SURGERY

## 2022-11-30 PROCEDURE — 3044F HG A1C LEVEL LT 7.0%: CPT | Mod: CPTII,S$GLB,, | Performed by: ORTHOPAEDIC SURGERY

## 2022-11-30 PROCEDURE — 3008F PR BODY MASS INDEX (BMI) DOCUMENTED: ICD-10-PCS | Mod: CPTII,S$GLB,, | Performed by: ORTHOPAEDIC SURGERY

## 2022-11-30 PROCEDURE — 1159F PR MEDICATION LIST DOCUMENTED IN MEDICAL RECORD: ICD-10-PCS | Mod: CPTII,S$GLB,, | Performed by: ORTHOPAEDIC SURGERY

## 2022-11-30 RX ORDER — HYDROCODONE BITARTRATE AND ACETAMINOPHEN 5; 325 MG/1; MG/1
1 TABLET ORAL EVERY 6 HOURS PRN
Qty: 28 TABLET | Refills: 0 | Status: SHIPPED | OUTPATIENT
Start: 2022-11-30 | End: 2023-04-17

## 2022-11-30 NOTE — PROGRESS NOTES
Subjective:       Patient ID: Jasper Guerrero is a 31 y.o. female.    Chief Complaint: Pain of the Spine (Patient is here with complaints of severe back pain x 14 years, gradually getting worse. Pain radiates up to her mid back, no leg pain or numbness. )      History of Present Illness    Prior to meeting with the patient I reviewed the medical chart in [facility]. This included reviewing the previous progress notes from our office, review of the patient's last appointment with their primary care provider, review of any visits to the emergency room, and review of any pain management appointments or procedures.   Patient is here for initial evaluation chief complaint of chronic low back pain.  Denies any lower extremity radicular symptoms.  Also complains of some significant lower cervical and upper thoracic pain.  Unfortunately we do not have a referral for this.    Current Medications  Current Outpatient Medications   Medication Sig Dispense Refill    acetaminophen (TYLENOL) 500 MG tablet Take 500 mg by mouth every 4 to 6 hours as needed.       azelastine (ASTELIN) 137 mcg (0.1 %) nasal spray 1 spray (137 mcg total) by Nasal route 2 (two) times daily. 30 mL 0    busPIRone (BUSPAR) 15 MG tablet Take 15 mg by mouth once daily.      butalbital-acetaminophen-caffeine -40 mg (FIORICET, ESGIC) -40 mg per tablet Take 1 tablet by mouth every 6 (six) hours as needed for Headaches. for pain. 10 tablet 1    dicyclomine (BENTYL) 20 mg tablet Take 20 mg by mouth 2 (two) times daily as needed.      diphenoxylate-atropine 2.5-0.025 mg/5 ml (LOMOTIL) 2.5-0.025 mg/5 mL liquid Take 5 mLs by mouth 4 (four) times daily as needed.      fluvoxaMINE (LUVOX) 100 MG tablet Take 1 tablet (100 mg total) by mouth 3 (three) times daily. 90 tablet 2    loperamide (IMODIUM) 2 mg capsule Take 2 mg by mouth 4 (four) times daily as needed.      metFORMIN (GLUCOPHAGE-XR) 500 MG ER 24hr tablet Take 1 tablet (500 mg total) by mouth daily  with breakfast. 90 tablet 3    metoclopramide HCl (REGLAN) 10 MG tablet TAKE 1 TABLET BY MOUTH EVERY 6 HOURS AS NEEDED FOR MIGRAINE AND NAUSEA (Patient taking differently: Take 10 mg by mouth every 6 (six) hours as needed. TAKE 1 TABLET BY MOUTH EVERY 6 HOURS AS NEEDED FOR MIGRAINE AND NAUSEA) 60 tablet 11    metoprolol succinate (TOPROL-XL) 50 MG 24 hr tablet Take 1 tablet (50 mg total) by mouth once daily. 30 tablet 11    ondansetron (ZOFRAN) 4 MG tablet Take 1 tablet (4 mg total) by mouth every 8 (eight) hours as needed for Nausea. 12 tablet 0    pantoprazole (PROTONIX) 40 MG tablet Take 40 mg by mouth once daily.      rizatriptan (MAXALT) 10 MG tablet Take 1 tablet (10 mg total) by mouth as needed for Migraine. May repeat every 2 hours for max 3 tabs in 24 hours. Use no more than 10 days per month. 18 tablet 11    rosuvastatin (CRESTOR) 10 MG tablet Take 1 tablet (10 mg total) by mouth every evening. 90 tablet 3    sumatriptan (IMITREX) 100 MG tablet Take 1 tablet (100 mg total) by mouth once. May repeat the dose once after 2 hours. for 1 dose 8 tablet 0    traMADoL (ULTRAM) 50 mg tablet Take 50 mg by mouth every 6 (six) hours as needed.      VALIUM 5 mg tablet Take 5 mg by mouth 2 (two) times daily as needed.      venlafaxine (EFFEXOR XR) 150 MG Cp24 Take 1 capsule (150 mg total) by mouth once daily. 30 capsule 2    venlafaxine (EFFEXOR-XR) 75 MG 24 hr capsule Take 75 mg by mouth once daily.      zolpidem (AMBIEN) 10 mg Tab Take 10 mg by mouth every evening.      HYDROcodone-acetaminophen (NORCO) 5-325 mg per tablet Take 1 tablet by mouth every 6 (six) hours as needed for Pain. 28 tablet 0     No current facility-administered medications for this visit.       Allergies  Review of patient's allergies indicates:   Allergen Reactions    Benadryl [diphenhydramine hcl]      Paralysis on right side body     Codeine      Paralysis right body    Flexeril [cyclobenzaprine] Other (See Comments)     Numbness on right side  "of body    Nsaids (non-steroidal anti-inflammatory drug) Diarrhea and Nausea And Vomiting     Ulcers     Penicillins Anaphylaxis and Hives    Droperidol Anxiety and Other (See Comments)    Magnesium      "made me feel horrible"     Prochlorperazine Anxiety and Palpitations       Past Medical History  Past Medical History:   Diagnosis Date    Agoraphobia     Carpal tunnel syndrome     Depression     DVT (deep venous thrombosis)     2019 had blood clot in right arm    Fibromyalgia     Migraine headache     Nerve injury 08/2016    Lingual Nerve Injury    Obese     PTSD (post-traumatic stress disorder)     Trigeminal neuralgia        Surgical History  Past Surgical History:   Procedure Laterality Date    CARPAL TUNNEL RELEASE Right 12/27/2018    Dr Lozada     CARPAL TUNNEL RELEASE Left 01/28/2020    Procedure: RELEASE, CARPAL TUNNEL;  Surgeon: Brendon Lozada Jr., MD;  Location: [facility];  Service: Orthopedics;  Laterality: Left;    CHOLECYSTECTOMY N/A 10/31/2022    MOUTH SURGERY      WISDOM TOOTH EXTRACTION         Family History:   Family History   Problem Relation Age of Onset    No Known Problems Mother     No Known Problems Father     Cancer Maternal Aunt         endometrial    No Known Problems Maternal Uncle     No Known Problems Paternal Aunt     No Known Problems Paternal Uncle     No Known Problems Maternal Grandmother     No Known Problems Maternal Grandfather     No Known Problems Paternal Grandmother     No Known Problems Paternal Grandfather     No Known Problems Daughter     No Known Problems Son        Social History:   Social History     Socioeconomic History    Marital status:    Tobacco Use    Smoking status: Never    Smokeless tobacco: Never   Substance and Sexual Activity    Alcohol use: No    Drug use: No    Sexual activity: Yes     Partners: Male     Birth control/protection: Condom     Social Determinants of Health     Financial Resource Strain: Low Risk     Difficulty of Paying Living " Expenses: Not hard at all   Food Insecurity: No Food Insecurity    Worried About Running Out of Food in the Last Year: Never true    Ran Out of Food in the Last Year: Never true   Transportation Needs: No Transportation Needs    Lack of Transportation (Medical): No    Lack of Transportation (Non-Medical): No   Physical Activity: Inactive    Days of Exercise per Week: 0 days    Minutes of Exercise per Session: 0 min   Stress: Stress Concern Present    Feeling of Stress : Very much   Social Connections: Unknown    Frequency of Communication with Friends and Family: More than three times a week    Frequency of Social Gatherings with Friends and Family: More than three times a week    Active Member of Clubs or Organizations: No    Attends Club or Organization Meetings: Never    Marital Status:    Housing Stability: Low Risk     Unable to Pay for Housing in the Last Year: No    Number of Places Lived in the Last Year: 1    Unstable Housing in the Last Year: No       Hospitalization/Major Diagnostic Procedure:     Review of Systems     General/Constitutional:  Chills denies. Fatigue denies. Fever denies. Weight gain denies. Weight loss denies.    Respiratory:  Shortness of breath denies.    Cardiovascular:  Chest pain denies.    Gastrointestinal:  Constipation denies. Diarrhea denies. Nausea denies. Vomiting denies.     Hematology:  Easy bruising denies. Prolonged bleeding denies.     Genitourinary:  Frequent urination denies. Pain in lower back denies. Painful urination denies.     Musculoskeletal:  See HPI for details    Skin:  Rash denies.    Neurologic:  Dizziness denies. Gait abnormalities denies. Seizures denies. Tingling/Numbess denies.    Psychiatric:  Anxiety denies. Depressed mood denies.     Objective:   Vital Signs:   Vitals:    11/30/22 0941   BP: (!) 132/94        Physical Exam      General Examination:     Constitutional: The patient is alert and oriented to lace person and time. Mood is pleasant.      Head/Face: Normal facial features normal eyebrows    Eyes: Normal extraocular motion bilaterally    Lungs: Respirations are equal and unlabored    Gait is coordinated.    Cardiovascular: There are no swelling or varicosities present.    Lymphatic: Negative for adenopathy    Skin: Normal    Neurological: Level of consciousness normal. Oriented to place person and time and situation    Psychiatric: Oriented to time place person and situation    Patient is morbidly obese.  She has a nonantalgic gait.  Lumbar flexion within normal limits and pain free but extension is significantly limited and painful.  Bilateral lower extremities demonstrate full active range of motion, equal symmetric DTRs, normal strength and negative straight leg raise maneuver.    Cervical exam deferred because we do not have a referral for this.    XRAY Report/ Interpretation:  Lumbar AP and lateral x-rays taken in the office today and reviewed with the patient demonstrates mild decreased disc height at L4-5 and L5-S1 with some corresponding facet sclerosis.    AP pelvis x-ray taken in the office today reviewed the patient demonstrates a normal appearance except for some sacralization of the L5 vertebrae    Thoracic AP and lateral x-rays taken in the office today reviewed the patient demonstrates no abnormality      Assessment:       1. Chronic bilateral low back pain without sciatica    2. Back pain, unspecified back location, unspecified back pain laterality, unspecified chronicity          Plan:       Jasper was seen today for pain.    Diagnoses and all orders for this visit:    Chronic bilateral low back pain without sciatica  -     X-Ray Lumbar Spine Ap And Lateral  -     X-Ray Pelvis Routine AP  -     HYDROcodone-acetaminophen (NORCO) 5-325 mg per tablet; Take 1 tablet by mouth every 6 (six) hours as needed for Pain.    Back pain, unspecified back location, unspecified back pain laterality, unspecified chronicity  -     Ambulatory  referral/consult to Orthopedics  -     X-Ray Thoracic Spine AP Lateral  -     HYDROcodone-acetaminophen (NORCO) 5-325 mg per tablet; Take 1 tablet by mouth every 6 (six) hours as needed for Pain.       No follow-ups on file.  This is to attest that the physician's assistant Franklin Mello served in the capacity as a scribe for this patient's encounter.  This is also to verify that I have reviewed the patient's history and helped formulate the treatment plan and discussed it with the physician's assistant.  I have actively participated  in the evaluation and treatment plan for this patient visit.  The treatment plan and medical decision-making is as outlined below:  Patient has already been referred to physical therapy by her primary care provider.  She should start this as soon as possible.  This is for her lumbar spine.  Follow-up in 6 weeks or sooner if needed.  Patient specifically extra pain medication today.  Patient states that she cannot tolerate NSAIDs and tramadol does not help.  I did discuss with the patient that we have to be very judicious with the use of opioids especially with patients that take benzodiazepines on a long-term basis.  I gave her prescription of Norco documented as a 7 day supply but told her I expect this last at least 14 days.    Treatment options were discussed with regards to the nature of the medical condition. Conservative pain intervention and surgical options were discussed in detail. The probability of success of each separate treatment option was discussed. The patient expressed a clear understanding of the treatment options. With regards to surgery, the procedure risk, benefits, complications, and outcomes were discussed. No guarantees were given with regards to surgical outcome.   The risk of complications, morbidity, and mortality of patient management decisions have been made at the time of this visit. These are associated with the patient's problems, diagnostic  procedures and treatment options. This includes the possible management options selected and those considered but not selected by the patient after shared medical decision making we discussed with the patient.     This note was created using Dragon voice recognition software that occasionally misinterpreted phrases or words.

## 2022-12-06 ENCOUNTER — TELEPHONE (OUTPATIENT)
Dept: ORTHOPEDICS | Facility: HOSPITAL | Age: 32
End: 2022-12-06
Payer: MEDICAID

## 2022-12-06 DIAGNOSIS — G89.29 CHRONIC BILATERAL LOW BACK PAIN WITHOUT SCIATICA: Primary | ICD-10-CM

## 2022-12-06 DIAGNOSIS — M54.50 CHRONIC BILATERAL LOW BACK PAIN WITHOUT SCIATICA: Primary | ICD-10-CM

## 2022-12-06 RX ORDER — METHYLPREDNISOLONE 4 MG/1
TABLET ORAL
Qty: 1 EACH | Refills: 0 | Status: SHIPPED | OUTPATIENT
Start: 2022-12-06 | End: 2023-04-17

## 2022-12-06 NOTE — TELEPHONE ENCOUNTER
No to Percocet. Maybe offer MDP    ----- Message from Lana Buckner sent at 12/5/2022  3:54 PM CST -----  Regarding: Rx Refill  Pt called and stated she fell out of bed playing with her daughter and hurt her back. The NORCO prescribed before is not relieving the pain. Pt is requesting PERCOCET . -B         Phone #: [phone]  Patient is requesting a refill of   Pharmacy:   [id number] - [place] - [address] AT [facility] & [address]  [address] [id number]  Phone: [phone] Fax: [phone]

## 2022-12-06 NOTE — TELEPHONE ENCOUNTER
Spoke with patient. Explained provider does not want to prescribe percocet for the fall she had. Offered medrol dose luis and sent to pharmacy.

## 2022-12-12 ENCOUNTER — PATIENT MESSAGE (OUTPATIENT)
Dept: ORTHOPEDICS | Facility: CLINIC | Age: 32
End: 2022-12-12

## 2022-12-13 ENCOUNTER — PATIENT MESSAGE (OUTPATIENT)
Dept: ORTHOPEDICS | Facility: CLINIC | Age: 32
End: 2022-12-13

## 2022-12-13 ENCOUNTER — TELEPHONE (OUTPATIENT)
Dept: ORTHOPEDICS | Facility: HOSPITAL | Age: 32
End: 2022-12-13
Payer: MEDICAID

## 2022-12-13 ENCOUNTER — PATIENT MESSAGE (OUTPATIENT)
Dept: FAMILY MEDICINE | Facility: CLINIC | Age: 32
End: 2022-12-13

## 2022-12-13 NOTE — TELEPHONE ENCOUNTER
Patient called asking for a refill on opioid medications. She was initially evaluated by us on 11-30-22. A 32yr old female with chronic complete axial spine pain. She demonstrated some exaggerated pain behaviors during exam; otherwise benign. At the time I offered her NSAIDs and/ or Tramadol when she asked for pain meds. She stated that she cannot tolerate NSAIDs and that Tramadol does not help her pain. I did give her some Norco and told her to stretch it out and make it last.     I reviewed her chart further today. She has been to the ED 8 times in the last 4 months. She has seen neurology for Trigeminal neuralgia and HAs. She has been discharged from a separate neurology practice. She has made several phone calls to multiple physicians ask for pain meds.    Legally and Ethically we cannot refill any pain meds for her. She needs to see a pain management specialist. She should contact Dr. Kennedy for a referral.

## 2022-12-15 ENCOUNTER — PATIENT MESSAGE (OUTPATIENT)
Dept: FAMILY MEDICINE | Facility: CLINIC | Age: 32
End: 2022-12-15

## 2022-12-15 DIAGNOSIS — K52.9 CHRONIC DIARRHEA: Primary | ICD-10-CM

## 2022-12-16 ENCOUNTER — PATIENT MESSAGE (OUTPATIENT)
Dept: FAMILY MEDICINE | Facility: CLINIC | Age: 32
End: 2022-12-16

## 2022-12-16 RX ORDER — LOPERAMIDE HYDROCHLORIDE 2 MG/1
2 CAPSULE ORAL 4 TIMES DAILY PRN
Qty: 20 CAPSULE | Refills: 0 | Status: SHIPPED | OUTPATIENT
Start: 2022-12-16 | End: 2022-12-26

## 2022-12-16 NOTE — TELEPHONE ENCOUNTER
Patient is requesting Lomotil. You stopped giving her medication for her pain. Are you going to continue giving her this one because it is a   Mu-opioid receptor and she is probably using it for the feeling of the opoid?   Please advise.

## 2022-12-19 ENCOUNTER — PATIENT MESSAGE (OUTPATIENT)
Dept: FAMILY MEDICINE | Facility: CLINIC | Age: 32
End: 2022-12-19

## 2022-12-21 ENCOUNTER — PATIENT MESSAGE (OUTPATIENT)
Dept: FAMILY MEDICINE | Facility: CLINIC | Age: 32
End: 2022-12-21

## 2023-01-03 ENCOUNTER — PATIENT MESSAGE (OUTPATIENT)
Dept: FAMILY MEDICINE | Facility: CLINIC | Age: 33
End: 2023-01-03

## 2023-01-17 ENCOUNTER — TELEPHONE (OUTPATIENT)
Dept: NEUROLOGY | Facility: CLINIC | Age: 33
End: 2023-01-17
Payer: MEDICAID

## 2023-01-17 NOTE — TELEPHONE ENCOUNTER
Spoke with Ms Hutchinson, informed her the virtual appointment she scheduled herself via the portal with Dr Diaz will be canceled.  Dr Diaz documented she will no longer treat this patient and advised she find another provider.

## 2023-01-31 ENCOUNTER — HOSPITAL ENCOUNTER (EMERGENCY)
Facility: HOSPITAL | Age: 33
Discharge: HOME | End: 2023-01-31
Attending: EMERGENCY MEDICINE
Payer: MEDICAID

## 2023-01-31 VITALS
BODY MASS INDEX: 48.82 KG/M2 | RESPIRATION RATE: 18 BRPM | TEMPERATURE: 99 F | HEART RATE: 91 BPM | DIASTOLIC BLOOD PRESSURE: 72 MMHG | WEIGHT: 293 LBS | HEIGHT: 65 IN | OXYGEN SATURATION: 98 % | SYSTOLIC BLOOD PRESSURE: 132 MMHG

## 2023-01-31 DIAGNOSIS — H66.91 RIGHT OTITIS MEDIA, UNSPECIFIED OTITIS MEDIA TYPE: Primary | ICD-10-CM

## 2023-01-31 LAB
ALBUMIN SERPL BCP-MCNC: 4.1 G/DL (ref 3.5–5.2)
ALP SERPL-CCNC: 73 U/L (ref 55–135)
ALT SERPL W/O P-5'-P-CCNC: 31 U/L (ref 10–44)
ANION GAP SERPL CALC-SCNC: 10 MMOL/L (ref 8–16)
AST SERPL-CCNC: 28 U/L (ref 10–40)
B-HCG UR QL: NEGATIVE
BASOPHILS # BLD AUTO: 0.04 K/UL (ref 0–0.2)
BASOPHILS NFR BLD: 0.4 % (ref 0–1.9)
BILIRUB SERPL-MCNC: 0.6 MG/DL (ref 0.1–1)
BUN SERPL-MCNC: 10 MG/DL (ref 6–20)
CALCIUM SERPL-MCNC: 9.5 MG/DL (ref 8.7–10.5)
CHLORIDE SERPL-SCNC: 102 MMOL/L (ref 95–110)
CO2 SERPL-SCNC: 26 MMOL/L (ref 23–29)
CREAT SERPL-MCNC: 0.9 MG/DL (ref 0.5–1.4)
CTP QC/QA: YES
DIFFERENTIAL METHOD: ABNORMAL
EOSINOPHIL # BLD AUTO: 0.1 K/UL (ref 0–0.5)
EOSINOPHIL NFR BLD: 0.8 % (ref 0–8)
ERYTHROCYTE [DISTWIDTH] IN BLOOD BY AUTOMATED COUNT: 13.2 % (ref 11.5–14.5)
EST. GFR  (NO RACE VARIABLE): >60 ML/MIN/1.73 M^2
GLUCOSE SERPL-MCNC: 123 MG/DL (ref 70–110)
HCT VFR BLD AUTO: 40.4 % (ref 37–48.5)
HGB BLD-MCNC: 13.2 G/DL (ref 12–16)
IMM GRANULOCYTES # BLD AUTO: 0.02 K/UL (ref 0–0.04)
IMM GRANULOCYTES NFR BLD AUTO: 0.2 % (ref 0–0.5)
LYMPHOCYTES # BLD AUTO: 4.9 K/UL (ref 1–4.8)
LYMPHOCYTES NFR BLD: 46 % (ref 18–48)
MCH RBC QN AUTO: 28.4 PG (ref 27–31)
MCHC RBC AUTO-ENTMCNC: 32.7 G/DL (ref 32–36)
MCV RBC AUTO: 87 FL (ref 82–98)
MONOCYTES # BLD AUTO: 0.6 K/UL (ref 0.3–1)
MONOCYTES NFR BLD: 5.8 % (ref 4–15)
NEUTROPHILS # BLD AUTO: 5 K/UL (ref 1.8–7.7)
NEUTROPHILS NFR BLD: 46.8 % (ref 38–73)
NRBC BLD-RTO: 0 /100 WBC
PLATELET # BLD AUTO: 318 K/UL (ref 150–450)
PMV BLD AUTO: 9.2 FL (ref 9.2–12.9)
POTASSIUM SERPL-SCNC: 3.5 MMOL/L (ref 3.5–5.1)
PROT SERPL-MCNC: 7.9 G/DL (ref 6–8.4)
RBC # BLD AUTO: 4.64 M/UL (ref 4–5.4)
SODIUM SERPL-SCNC: 138 MMOL/L (ref 136–145)
WBC # BLD AUTO: 10.61 K/UL (ref 3.9–12.7)

## 2023-01-31 PROCEDURE — 85025 COMPLETE CBC W/AUTO DIFF WBC: CPT | Performed by: NURSE PRACTITIONER

## 2023-01-31 PROCEDURE — 96374 THER/PROPH/DIAG INJ IV PUSH: CPT

## 2023-01-31 PROCEDURE — 80053 COMPREHEN METABOLIC PANEL: CPT | Performed by: NURSE PRACTITIONER

## 2023-01-31 PROCEDURE — 96375 TX/PRO/DX INJ NEW DRUG ADDON: CPT

## 2023-01-31 PROCEDURE — 96361 HYDRATE IV INFUSION ADD-ON: CPT

## 2023-01-31 PROCEDURE — 63600175 PHARM REV CODE 636 W HCPCS: Performed by: EMERGENCY MEDICINE

## 2023-01-31 PROCEDURE — 81025 URINE PREGNANCY TEST: CPT | Performed by: EMERGENCY MEDICINE

## 2023-01-31 PROCEDURE — 25000003 PHARM REV CODE 250: Performed by: EMERGENCY MEDICINE

## 2023-01-31 PROCEDURE — 99284 EMERGENCY DEPT VISIT MOD MDM: CPT | Mod: 25

## 2023-01-31 RX ORDER — AZITHROMYCIN 250 MG/1
250 TABLET, FILM COATED ORAL DAILY
Qty: 6 TABLET | Refills: 0 | Status: SHIPPED | OUTPATIENT
Start: 2023-01-31 | End: 2023-04-17

## 2023-01-31 RX ORDER — ONDANSETRON 2 MG/ML
4 INJECTION INTRAMUSCULAR; INTRAVENOUS
Status: COMPLETED | OUTPATIENT
Start: 2023-01-31 | End: 2023-01-31

## 2023-01-31 RX ORDER — MORPHINE SULFATE 2 MG/ML
1 INJECTION, SOLUTION INTRAMUSCULAR; INTRAVENOUS
Status: COMPLETED | OUTPATIENT
Start: 2023-01-31 | End: 2023-01-31

## 2023-01-31 RX ADMIN — SODIUM CHLORIDE 500 ML: 0.9 INJECTION, SOLUTION INTRAVENOUS at 08:01

## 2023-01-31 RX ADMIN — MORPHINE SULFATE 1 MG: 2 INJECTION, SOLUTION INTRAMUSCULAR; INTRAVENOUS at 09:01

## 2023-01-31 RX ADMIN — ONDANSETRON 4 MG: 2 INJECTION INTRAMUSCULAR; INTRAVENOUS at 09:01

## 2023-02-01 NOTE — FIRST PROVIDER EVALUATION
"Medical screening examination initiated.  I have conducted a focused provider triage encounter, findings are as follows:    Brief history of present illness:  Patient presents to the ED for trigeminal neuralgia.  Patient states she is a known diagnosis and she always has pain in the right side of her face.  Patient states the pain got worse yesterday and progressively got worse today.  Patient states she took Percocet and 3000 mg of Tylenol at home and it did not helped.  Patient reports she is nauseated but denies any vomiting.    Vitals:    01/31/23 1907   BP: (!) 164/95   BP Location: Left arm   Patient Position: Sitting   Pulse: 97   Resp: 18   Temp: 99.4 °F (37.4 °C)   TempSrc: Oral   SpO2: 97%   Weight: (!) 158.8 kg (350 lb)   Height: 5' 5" (1.651 m)       Pertinent physical exam:  Patient is awake and alert in no acute distress.    Brief workup plan:  Further examination by a provider once in the room    Preliminary workup initiated; this workup will be continued and followed by the physician or advanced practice provider that is assigned to the patient when roomed.  "

## 2023-02-01 NOTE — ED PROVIDER NOTES
"Encounter Date: 1/31/2023       History     Chief Complaint   Patient presents with    Headache     Reports trigeminal neuralgia      32-year-old female presents complaining of headache patient reports that she has a history of trigeminal neuralgia patient reports pain to her right maxillary region patient reports that this pain is chronic and sometimes causes a headache patient reports that she is under the care of a neurologist patient denies fever cough or shortness of breath patient denies weakness patient denies numbness patient denies worst headache of life patient denies sudden onset patient has no other acute complaints at this time.  Patient reports that she has trigeminal neuralgia that is refractory to any medication except narcotics patient reports that she has notes from her neurologist and dentist attesting to this.    Review of patient's allergies indicates:   Allergen Reactions    Benadryl [diphenhydramine hcl]      Paralysis on right side body     Codeine      Paralysis right body    Flexeril [cyclobenzaprine] Other (See Comments)     Numbness on right side of body    Nsaids (non-steroidal anti-inflammatory drug) Diarrhea and Nausea And Vomiting     Ulcers     Penicillins Anaphylaxis and Hives    Droperidol Anxiety and Other (See Comments)    Magnesium      "made me feel horrible"     Prochlorperazine Anxiety and Palpitations     Past Medical History:   Diagnosis Date    Agoraphobia     Carpal tunnel syndrome     Depression     DVT (deep venous thrombosis)     2019 had blood clot in right arm    Fibromyalgia     Migraine headache     Nerve injury 08/2016    Lingual Nerve Injury    Obese     PTSD (post-traumatic stress disorder)     Trigeminal neuralgia      Past Surgical History:   Procedure Laterality Date    CARPAL TUNNEL RELEASE Right 12/27/2018    Dr Lozada     CARPAL TUNNEL RELEASE Left 01/28/2020    Procedure: RELEASE, CARPAL TUNNEL;  Surgeon: Brendon Lozada Jr., MD;  Location: [facility] OR;  " Service: Orthopedics;  Laterality: Left;    CHOLECYSTECTOMY N/A 10/31/2022    MOUTH SURGERY      WISDOM TOOTH EXTRACTION       Family History   Problem Relation Age of Onset    No Known Problems Mother     No Known Problems Father     Cancer Maternal Aunt         endometrial    No Known Problems Maternal Uncle     No Known Problems Paternal Aunt     No Known Problems Paternal Uncle     No Known Problems Maternal Grandmother     No Known Problems Maternal Grandfather     No Known Problems Paternal Grandmother     No Known Problems Paternal Grandfather     No Known Problems Daughter     No Known Problems Son      Social History     Tobacco Use    Smoking status: Never    Smokeless tobacco: Never   Substance Use Topics    Alcohol use: No    Drug use: No     Review of Systems   Constitutional:  Negative for fever.   HENT:  Negative for congestion, rhinorrhea, sore throat and trouble swallowing.    Eyes:  Negative for visual disturbance.   Respiratory:  Negative for cough, chest tightness, shortness of breath and wheezing.    Cardiovascular:  Negative for chest pain, palpitations and leg swelling.   Gastrointestinal:  Negative for abdominal distention, abdominal pain, constipation, diarrhea, nausea and vomiting.   Genitourinary:  Negative for difficulty urinating, dysuria, flank pain and frequency.   Musculoskeletal:  Negative for arthralgias, back pain, joint swelling and neck pain.   Skin:  Negative for color change and rash.   Neurological:  Positive for headaches. Negative for dizziness, syncope, speech difficulty, weakness and numbness.   All other systems reviewed and are negative.    Physical Exam     Initial Vitals [01/31/23 1907]   BP Pulse Resp Temp SpO2   (!) 164/95 97 18 99.4 °F (37.4 °C) 97 %      MAP       --         Physical Exam    Nursing note and vitals reviewed.  Constitutional: She appears well-developed and well-nourished. She is not diaphoretic. No distress.   HENT:   Head: Normocephalic and  atraumatic.   Left Ear: External ear normal.   Nose: Nose normal.   Mouth/Throat: Oropharynx is clear and moist. No oropharyngeal exudate.   Patient has mild erythema noted to the right tympanic membrane with mild dullness   Eyes: Conjunctivae and EOM are normal. Pupils are equal, round, and reactive to light. Right eye exhibits no discharge. Left eye exhibits no discharge. No scleral icterus.   Neck: Neck supple. No thyromegaly present. No tracheal deviation present. No JVD present.   Normal range of motion.  Cardiovascular:  Normal rate, regular rhythm, normal heart sounds and intact distal pulses.     Exam reveals no gallop and no friction rub.       No murmur heard.  Pulmonary/Chest: Breath sounds normal. No stridor. No respiratory distress. She has no wheezes. She has no rhonchi. She has no rales. She exhibits no tenderness.   Abdominal: Abdomen is soft. Bowel sounds are normal. She exhibits no distension and no mass. There is no abdominal tenderness. There is no rebound and no guarding.   Musculoskeletal:         General: No tenderness or edema. Normal range of motion.      Cervical back: Normal range of motion and neck supple.     Lymphadenopathy:     She has no cervical adenopathy.   Neurological: She is alert and oriented to person, place, and time. She has normal strength. No cranial nerve deficit or sensory deficit.   Patient has no gross neuro deficits no facial droop or weakness   Skin: Skin is warm and dry. No rash and no abscess noted. No erythema. No pallor.       ED Course   Procedures  Labs Reviewed   CBC W/ AUTO DIFFERENTIAL - Abnormal; Notable for the following components:       Result Value    Lymph # 4.9 (*)     All other components within normal limits   COMPREHENSIVE METABOLIC PANEL - Abnormal; Notable for the following components:    Glucose 123 (*)     All other components within normal limits   POCT URINE PREGNANCY          Imaging Results    None          Medications   sodium chloride  0.9% bolus 500 mL 500 mL (0 mLs Intravenous Stopped 1/31/23 2141)   morphine injection 1 mg (1 mg Intravenous Given 1/31/23 2145)   ondansetron injection 4 mg (4 mg Intravenous Given 1/31/23 2143)     Medical Decision Making:   History:   Old Medical Records: I decided to obtain old medical records.  Initial Assessment:   Emergent evaluation of a 32-year-old female presenting with pain to the right trigeminal region patient has no weakness or numbness patient has normal neurologic exam patient does have mild erythema to the right ear canal however patient has history of chronic pain in the right face, I have low suspicion for intracranial abnormality however to rule out dental abnormality or intracranial abnormality I offered imaging of brain and facial structures however patient declined.  Patient understands this limits our diagnostic capabilities patient reports that she has had multiple imaging in the past and she reports that she does not wish further imaging.  Patient understand that refusal limits our diagnostic capabilities and that by refusing imaging there is a risk of death disability and deterioration patient understands that she can return at any time if she changes her mind patient appears to have capacity to make her own medical decisions.    Patient is referred to neurology for further evaluation and management of her chronic trigeminal neuralgia.  I see no sign of emergent medical condition at this time.  Patient is advised to return immediately to the ER for any worsening or for any further concerns.  Out of an abundance of caution patient started on a course of antibiotics for possible early otitis media.          Attending Attestation:             Attending ED Notes:   MDM    Patient presents for emergent evaluation of acute complaint that poses a possible threat to life and/or bodily function.    I may have ordered labs and personally reviewed them. If applicable, Labs significant for  abnormalities noted above.    I may have ordered X-rays and personally reviewed them and reviewed the radiologist interpretation.  If applicable, Xray significant for findings noted above.    I may have ordered EKG and personally reviewed it.  If applicable, EKG significant for findings noted above.    I may have ordered CT scan and personally reviewed it and reviewed the radiologist interpretation.  If applicable, CT significant for findings noted above.      I had a detailed discussion with the patient and/or guardian regarding: The historical points, exam findings, and diagnostic results supporting the discharge diagnosis, lab results, pertinent radiology results, and the need for outpatient follow-up, for definitive care with a family practitioner and to return to the emergency department if symptoms worsen or persist or if there are any questions or concerns that arise at home. All questions have been answered in detail. Strict return to Emergency Department precautions have been provided.     A dictation software program was used for this note.  Please expect some simple typographical  errors in this note.                        Clinical Impression:   Final diagnoses:  [H66.91] Right otitis media, unspecified otitis media type (Primary)        ED Disposition Condition    Discharge Stable          ED Prescriptions       Medication Sig Dispense Start Date End Date Auth. Provider    azithromycin (Z-MARILIA) 250 MG tablet Take 1 tablet (250 mg total) by mouth once daily. Take first 2 tablets together, then 1 every day until finished. 6 tablet 1/31/2023 -- Joey Osman MD          Follow-up Information       Follow up With Specialties Details Why Contact Info Additional Information    [facility] - Emergency Dept Emergency Medicine  If symptoms worsen [address] [facility] [address]  [phone] 1st floor    Crow Kennedy MD Family Medicine Schedule an appointment as soon as possible  for a visit in 2 days  [address]  [address]  [place] [address]  [phone]       Beck Woodson MD Vascular Neurology, Neurology Schedule an appointment as soon as possible for a visit in 2 days  [address]  [place] [address]  [phone]                Joey Osman MD  01/31/23 0667

## 2023-02-02 ENCOUNTER — HOSPITAL ENCOUNTER (EMERGENCY)
Facility: HOSPITAL | Age: 33
Discharge: HOME | End: 2023-02-02
Attending: EMERGENCY MEDICINE
Payer: MEDICAID

## 2023-02-02 ENCOUNTER — HOSPITAL ENCOUNTER (EMERGENCY)
Facility: HOSPITAL | Age: 33
Discharge: LEFT BEFORE DISCHARGE | End: 2023-02-02
Payer: MEDICAID

## 2023-02-02 VITALS
HEART RATE: 94 BPM | HEIGHT: 65 IN | BODY MASS INDEX: 48.82 KG/M2 | RESPIRATION RATE: 17 BRPM | DIASTOLIC BLOOD PRESSURE: 104 MMHG | SYSTOLIC BLOOD PRESSURE: 180 MMHG | OXYGEN SATURATION: 100 % | WEIGHT: 293 LBS | TEMPERATURE: 97 F

## 2023-02-02 VITALS
BODY MASS INDEX: 48.82 KG/M2 | RESPIRATION RATE: 18 BRPM | HEART RATE: 87 BPM | DIASTOLIC BLOOD PRESSURE: 110 MMHG | OXYGEN SATURATION: 100 % | WEIGHT: 293 LBS | TEMPERATURE: 98 F | HEIGHT: 65 IN | SYSTOLIC BLOOD PRESSURE: 184 MMHG

## 2023-02-02 DIAGNOSIS — G50.0 TRIGEMINAL NEURALGIA OF RIGHT SIDE OF FACE: ICD-10-CM

## 2023-02-02 DIAGNOSIS — G89.4 CHRONIC PAIN SYNDROME: Primary | ICD-10-CM

## 2023-02-02 LAB
AMPHET+METHAMPHET UR QL: NEGATIVE
B-HCG UR QL: NEGATIVE
BARBITURATES UR QL SCN>200 NG/ML: ABNORMAL
BENZODIAZ UR QL SCN>200 NG/ML: ABNORMAL
BZE UR QL SCN: NEGATIVE
CANNABINOIDS UR QL SCN: NEGATIVE
CREAT UR-MCNC: 187 MG/DL (ref 15–325)
CTP QC/QA: YES
OPIATES UR QL SCN: NEGATIVE
PCP UR QL SCN>25 NG/ML: NEGATIVE
TOXICOLOGY INFORMATION: ABNORMAL

## 2023-02-02 PROCEDURE — 81025 URINE PREGNANCY TEST: CPT

## 2023-02-02 PROCEDURE — 99282 EMERGENCY DEPT VISIT SF MDM: CPT

## 2023-02-02 PROCEDURE — 99281 EMR DPT VST MAYX REQ PHY/QHP: CPT

## 2023-02-02 PROCEDURE — 80307 DRUG TEST PRSMV CHEM ANLYZR: CPT

## 2023-02-02 RX ORDER — TRAMADOL HYDROCHLORIDE 50 MG/1
50 TABLET ORAL EVERY 6 HOURS PRN
COMMUNITY
Start: 2022-11-30 | End: 2023-04-17

## 2023-02-02 RX ORDER — OXYCODONE AND ACETAMINOPHEN 10; 325 MG/1; MG/1
1 TABLET ORAL DAILY PRN
COMMUNITY
Start: 2023-01-11 | End: 2023-10-23

## 2023-02-03 NOTE — ED PROVIDER NOTES
"Encounter Date: 2/2/2023       History     Chief Complaint   Patient presents with    pain nerve     Patient suffers from Trigeminal neuralgia and states she is having a flare      Patient is a 32-year-old female with a past medical history of migraine headache fibromyalgia trigeminal neuralgia agoraphobia PTSD who has been seen by neurology neurosurgery fired by neurology saw different neurologist pain management who presents the emergency room stating she has an exacerbation of her right-sided trigeminal neuralgia because of the weather.  She went to [facility] yesterday where she received 1 milligram of morphine IV.  The patient states that just did not work.  Patient states the only thing that works for her pain is opiates and that is what she needs here in the emergency room.  She denies any fevers vision changes dental pain difficulty swallowing difficulty breathing.  This feels just like her prior trigeminal neuralgia.  She is an appointment with her neurologist next week.    Review of patient's allergies indicates:   Allergen Reactions    Benadryl [diphenhydramine hcl]      Paralysis on right side body     Codeine      Paralysis right body    Flexeril [cyclobenzaprine] Other (See Comments)     Numbness on right side of body    Nsaids (non-steroidal anti-inflammatory drug) Diarrhea and Nausea And Vomiting     Ulcers     Penicillins Anaphylaxis and Hives    Droperidol Anxiety and Other (See Comments)    Magnesium      "made me feel horrible"     Prochlorperazine Anxiety and Palpitations     Past Medical History:   Diagnosis Date    Agoraphobia     Carpal tunnel syndrome     Depression     DVT (deep venous thrombosis)     2019 had blood clot in right arm    Fibromyalgia     Migraine headache     Nerve injury 08/2016    Lingual Nerve Injury    Obese     PTSD (post-traumatic stress disorder)     Trigeminal neuralgia      Past Surgical History:   Procedure Laterality Date    CARPAL TUNNEL RELEASE Right 12/27/2018    " [facility]     CARPAL TUNNEL RELEASE Left 01/28/2020    Procedure: RELEASE, CARPAL TUNNEL;  Surgeon: Brendon Lozada Jr., MD;  Location: [facility] OR;  Service: Orthopedics;  Laterality: Left;    CHOLECYSTECTOMY N/A 10/31/2022    MOUTH SURGERY      WISDOM TOOTH EXTRACTION       Family History   Problem Relation Age of Onset    No Known Problems Mother     No Known Problems Father     Cancer Maternal Aunt         endometrial    No Known Problems Maternal Uncle     No Known Problems Paternal Aunt     No Known Problems Paternal Uncle     No Known Problems Maternal Grandmother     No Known Problems Maternal Grandfather     No Known Problems Paternal Grandmother     No Known Problems Paternal Grandfather     No Known Problems Daughter     No Known Problems Son      Social History     Tobacco Use    Smoking status: Never    Smokeless tobacco: Never   Substance Use Topics    Alcohol use: No    Drug use: No     Review of Systems   HENT:  Negative for congestion, ear pain, facial swelling, rhinorrhea, sinus pressure and sinus pain.    Respiratory:  Negative for cough and shortness of breath.    Cardiovascular:  Negative for chest pain.   Gastrointestinal:  Negative for abdominal pain, diarrhea and nausea.   Genitourinary:  Negative for dysuria and flank pain.   Musculoskeletal:  Negative for back pain.   Skin:  Negative for rash and wound.   Neurological:  Negative for weakness, light-headedness and numbness.     Physical Exam     Initial Vitals [02/02/23 1716]   BP Pulse Resp Temp SpO2   (!) 184/110 90 20 98.1 °F (36.7 °C) 100 %      MAP       --         Physical Exam    Nursing note and vitals reviewed.  Constitutional: She appears well-developed and well-nourished.  Non-toxic appearance.   HENT:   Head: Normocephalic and atraumatic.   Mouth/Throat: Oropharynx is clear and moist.   Mild tenderness over the right zygoma.  No cellulitis.  No abscess.  No signs of dental tenderness.  No facial swelling.  No trismus hoarseness or  stridor.   Eyes: Conjunctivae and EOM are normal. Pupils are equal, round, and reactive to light.   Neck: Neck supple.   Normal range of motion.  Cardiovascular:  Normal rate, regular rhythm, normal heart sounds and intact distal pulses.     Exam reveals no gallop and no friction rub.       No murmur heard.  Pulmonary/Chest: Breath sounds normal. No respiratory distress. She has no decreased breath sounds. She has no wheezes. She has no rhonchi. She has no rales.   Abdominal: Abdomen is soft. Bowel sounds are normal. She exhibits no distension. There is no abdominal tenderness.   Musculoskeletal:         General: Normal range of motion.      Cervical back: Normal range of motion and neck supple.     Neurological: She is alert and oriented to person, place, and time. She has normal strength. No sensory deficit.   Skin: Skin is warm and dry.   Psychiatric:   Slightly anxious       ED Course   Procedures  Labs Reviewed - No data to display       Imaging Results    None          Medications - No data to display  Medical Decision Making:   Patient presents with chronic pain.  She very likely well may have trigeminal neuralgia.  I offered her any other medications for treatment other than opiates but she states they are the only thing that works.  I do not feel that this would benefit her at this time.  She is been fired by her neurologist and primary care physician has refused to provide her chronic pain medicine.  I do not think she has an acute emergent condition requiring opiates at this time.  She can follow up with her new neurologist and pain management physician.  Stable for discharge.                        Clinical Impression:   Final diagnoses:  [G89.4] Chronic pain syndrome (Primary)  [G50.0] Trigeminal neuralgia of right side of face        ED Disposition Condition    Discharge Stable          ED Prescriptions    None       Follow-up Information    None          Justus Joseph MD  02/02/23 2995

## 2023-02-03 NOTE — DISCHARGE INSTRUCTIONS
You will need to follow-up with your pain management physician and your neurologist for treatment of your chronic pain.  We will not provide you opiates for chronic pain in this emergency room unless we deem it necessary for an acute problem.

## 2023-02-03 NOTE — FIRST PROVIDER EVALUATION
"Medical screening examination initiated.  I have conducted a focused provider triage encounter, findings are as follows:    Brief history of present illness:  Patient presents to ED for flare of trigeminal neuralgia.  Patient states that it was the right side of her face and when it flares only thing that helps his narcotics.  Patient states she was here on Tuesday and was given 1 mg of morphine that did not help.  Patient states she went to [facility] today and they refused to give her any pain medications so she came here.  Patient states she has an appointment with her pain management doctor on Wednesday 2/8.  Patient reports no other medications help her pain except for narcotics.  Patient states she ran out of her Percocet yesterday.  Patient states she took 3000 mg of Tylenol today, Imitrex, Fioricet, and Maxalt.    Vitals:    02/02/23 1846   BP: (!) 180/104   BP Location: Left arm   Patient Position: Sitting   Pulse: 94   Resp: 17   Temp: 97.4 °F (36.3 °C)   TempSrc: Oral   SpO2: 100%   Weight: (!) 158.8 kg (350 lb)   Height: 5' 5" (1.651 m)       Pertinent physical exam:  Patient is awake and alert in no acute distress.    Brief workup plan:  Urine, further exam once in the room.    Preliminary workup initiated; this workup will be continued and followed by the physician or advanced practice provider that is assigned to the patient when roomed.  "

## 2023-02-09 ENCOUNTER — TELEPHONE (OUTPATIENT)
Dept: FAMILY MEDICINE | Facility: CLINIC | Age: 33
End: 2023-02-09

## 2023-02-09 RX ORDER — BUTALBITAL, ACETAMINOPHEN AND CAFFEINE 300; 40; 50 MG/1; MG/1; MG/1
1 CAPSULE ORAL 3 TIMES DAILY PRN
Qty: 10 CAPSULE | Refills: 0 | Status: SHIPPED | OUTPATIENT
Start: 2023-02-09 | End: 2023-03-08 | Stop reason: SDUPTHER

## 2023-02-09 NOTE — TELEPHONE ENCOUNTER
Last Office Visit 11/29/22  Next Office Visit Non scheduled    butalbital-acetaminophen-caffeine -40 mg (FIORICET, ESGIC) -40 mg per tablet is no longer covered by patients plan. Preferred alternatives are   BUT/APAP/CAF/CAP  BUTAPAPCAFCAP  ESGICCAP  ZEBUTALCAP     Letter in media under drug change request

## 2023-03-05 ENCOUNTER — HOSPITAL ENCOUNTER (EMERGENCY)
Facility: HOSPITAL | Age: 33
Discharge: HOME | End: 2023-03-05
Attending: EMERGENCY MEDICINE
Payer: MEDICAID

## 2023-03-05 VITALS
TEMPERATURE: 98 F | BODY MASS INDEX: 48.82 KG/M2 | DIASTOLIC BLOOD PRESSURE: 93 MMHG | HEART RATE: 93 BPM | WEIGHT: 293 LBS | RESPIRATION RATE: 18 BRPM | SYSTOLIC BLOOD PRESSURE: 147 MMHG | HEIGHT: 65 IN | OXYGEN SATURATION: 98 %

## 2023-03-05 DIAGNOSIS — G50.0 TRIGEMINAL NEURALGIA OF RIGHT SIDE OF FACE: Primary | ICD-10-CM

## 2023-03-05 PROCEDURE — 99282 EMERGENCY DEPT VISIT SF MDM: CPT

## 2023-03-05 NOTE — ED PROVIDER NOTES
"Encounter Date: 3/5/2023       History     Chief Complaint   Patient presents with    Facial Pain     HX TRIGEMINAL NEUROLGIA, BITING TONGUE TRIGGERED PAIN ON THURSDAY     Patient with a history of trigeminal neuralgia.  Patient reports only thing that works on her pain is parental narcotics including morphine.  Patient reports increased pain to right side of the face.  She bit tongue several days ago exacerbating pain.  Home pain medication that working.  Patient with multiple drug allergies and intolerances.  There is no fever chills.    Review of patient's allergies indicates:   Allergen Reactions    Benadryl [diphenhydramine hcl]      Paralysis on right side body     Codeine      Paralysis right body    Flexeril [cyclobenzaprine] Other (See Comments)     Numbness on right side of body    Nsaids (non-steroidal anti-inflammatory drug) Diarrhea and Nausea And Vomiting     Ulcers     Penicillins Anaphylaxis and Hives    Droperidol Anxiety and Other (See Comments)    Magnesium      "made me feel horrible"     Prochlorperazine Anxiety and Palpitations     Past Medical History:   Diagnosis Date    Agoraphobia     Carpal tunnel syndrome     Depression     DVT (deep venous thrombosis)     2019 had blood clot in right arm    Fibromyalgia     Migraine headache     Nerve injury 08/2016    Lingual Nerve Injury    Obese     Pain management     PTSD (post-traumatic stress disorder)     Trigeminal neuralgia      Past Surgical History:   Procedure Laterality Date    CARPAL TUNNEL RELEASE Right 12/27/2018    Dr Lozada     CARPAL TUNNEL RELEASE Left 01/28/2020    Procedure: RELEASE, CARPAL TUNNEL;  Surgeon: Brendon Lozada Jr., MD;  Location: [facility];  Service: Orthopedics;  Laterality: Left;    CHOLECYSTECTOMY N/A 10/31/2022    MOUTH SURGERY      WISDOM TOOTH EXTRACTION       Family History   Problem Relation Age of Onset    No Known Problems Mother     No Known Problems Father     Cancer Maternal Aunt         endometrial    No " Known Problems Maternal Uncle     No Known Problems Paternal Aunt     No Known Problems Paternal Uncle     No Known Problems Maternal Grandmother     No Known Problems Maternal Grandfather     No Known Problems Paternal Grandmother     No Known Problems Paternal Grandfather     No Known Problems Daughter     No Known Problems Son      Social History     Tobacco Use    Smoking status: Never    Smokeless tobacco: Never   Substance Use Topics    Alcohol use: No    Drug use: No     Review of Systems   Constitutional:  Negative for chills and fever.   HENT:  Negative for congestion.    Eyes:  Negative for visual disturbance.   Respiratory:  Negative for shortness of breath.    Cardiovascular:  Negative for chest pain and palpitations.   Gastrointestinal:  Negative for abdominal pain and vomiting.   Neurological:  Negative for seizures and syncope.   Psychiatric/Behavioral:  Negative for confusion.      Physical Exam     Initial Vitals [03/05/23 1639]   BP Pulse Resp Temp SpO2   (!) 147/93 93 18 97.8 °F (36.6 °C) 98 %      MAP       --         Physical Exam    Nursing note and vitals reviewed.  Constitutional: She is not diaphoretic. No distress.   HENT:   Head: Normocephalic and atraumatic.   No intraoral lesions.  No tongue lacerations or abrasions apparent.  Face is symmetric with no swelling.  Sensation grossly intact bilaterally.   Eyes: Conjunctivae and EOM are normal. Pupils are equal, round, and reactive to light.   Neck: Neck supple.   Normal range of motion.  Cardiovascular:  Normal rate.           Pulmonary/Chest: Breath sounds normal.   Abdominal: Abdomen is soft. There is no abdominal tenderness.   Musculoskeletal:         General: Normal range of motion.      Cervical back: Normal range of motion and neck supple.     Neurological: She is alert.   No gross deficits   Skin: No rash noted.   Psychiatric: She has a normal mood and affect.       ED Course   Procedures  Labs Reviewed - No data to display        Imaging Results    None          Medications - No data to display  Medical Decision Making:   History:   Old Medical Records: I decided to obtain old medical records.  ED Management:  Patient presents with trigeminal neuralgia exacerbation apparently.  Patient is sitting in bed comfortable in no distress.  Review all records show similar symptoms in the past.  I am not comfortable with giving parental narcotics for this condition.  I discussed with patient she will need to see her neurologist or pain management doctor.  Patient left before discharge instructions                        Clinical Impression:   Final diagnoses:  [G50.0] Trigeminal neuralgia of right side of face (Primary)        ED Disposition Condition    Discharge Stable          ED Prescriptions    None       Follow-up Information    None          Salvador Combs MD  03/05/23 5388

## 2023-04-10 ENCOUNTER — PATIENT MESSAGE (OUTPATIENT)
Dept: FAMILY MEDICINE | Facility: CLINIC | Age: 33
End: 2023-04-10

## 2023-04-10 NOTE — TELEPHONE ENCOUNTER
You will need to schedule an appointment to discuss this with  in the clinic. You can call [phone] and select Option 1 to schedule an appointment with the first available provider that accepts your insurance.   .  In the meantime here are some home remedies that you can try. Clinical strength antiperspirant when you wake and at bedtime.  Keeping your armpits clean and hair free may help. Wear light weight clothing, moisture wicking materials and lighter colors.   Stay hydrated, avoid spicy foods,  cut down on deep fried, fatty processed foods and caffeine. Increase your fruit and vegetable consumption to help your digestion, which will help regulate your sweating.   If you use powder get the one with corn starch and that will help absorb the moisture as well.

## 2023-04-17 ENCOUNTER — OFFICE VISIT (OUTPATIENT)
Dept: FAMILY MEDICINE | Facility: CLINIC | Age: 33
End: 2023-04-17
Payer: MEDICAID

## 2023-04-17 ENCOUNTER — TELEPHONE (OUTPATIENT)
Dept: FAMILY MEDICINE | Facility: CLINIC | Age: 33
End: 2023-04-17

## 2023-04-17 ENCOUNTER — LAB VISIT (OUTPATIENT)
Dept: LAB | Facility: HOSPITAL | Age: 33
End: 2023-04-17
Attending: FAMILY MEDICINE
Payer: MEDICAID

## 2023-04-17 VITALS
SYSTOLIC BLOOD PRESSURE: 139 MMHG | OXYGEN SATURATION: 98 % | BODY MASS INDEX: 48.82 KG/M2 | WEIGHT: 293 LBS | HEART RATE: 94 BPM | HEIGHT: 65 IN | DIASTOLIC BLOOD PRESSURE: 86 MMHG

## 2023-04-17 DIAGNOSIS — E11.9 TYPE 2 DIABETES MELLITUS WITHOUT COMPLICATION, WITHOUT LONG-TERM CURRENT USE OF INSULIN: ICD-10-CM

## 2023-04-17 DIAGNOSIS — G47.30 SLEEP APNEA, UNSPECIFIED TYPE: Primary | ICD-10-CM

## 2023-04-17 DIAGNOSIS — R00.2 PALPITATIONS: ICD-10-CM

## 2023-04-17 DIAGNOSIS — R19.7 DIARRHEA, UNSPECIFIED TYPE: ICD-10-CM

## 2023-04-17 DIAGNOSIS — L91.8 SKIN TAG: ICD-10-CM

## 2023-04-17 DIAGNOSIS — R61 EXCESSIVE SWEATING: ICD-10-CM

## 2023-04-17 LAB
BASOPHILS # BLD AUTO: 0.04 K/UL (ref 0–0.2)
BASOPHILS NFR BLD: 0.3 % (ref 0–1.9)
DIFFERENTIAL METHOD: ABNORMAL
EOSINOPHIL # BLD AUTO: 0 K/UL (ref 0–0.5)
EOSINOPHIL NFR BLD: 0.3 % (ref 0–8)
ERYTHROCYTE [DISTWIDTH] IN BLOOD BY AUTOMATED COUNT: 13.2 % (ref 11.5–14.5)
ESTIMATED AVG GLUCOSE: 151 MG/DL (ref 68–131)
HBA1C MFR BLD: 6.9 % (ref 4.5–6.2)
HCT VFR BLD AUTO: 42.6 % (ref 37–48.5)
HGB BLD-MCNC: 14.1 G/DL (ref 12–16)
IMM GRANULOCYTES # BLD AUTO: 0.04 K/UL (ref 0–0.04)
IMM GRANULOCYTES NFR BLD AUTO: 0.3 % (ref 0–0.5)
LYMPHOCYTES # BLD AUTO: 3.2 K/UL (ref 1–4.8)
LYMPHOCYTES NFR BLD: 27.1 % (ref 18–48)
MCH RBC QN AUTO: 28.8 PG (ref 27–31)
MCHC RBC AUTO-ENTMCNC: 33.1 G/DL (ref 32–36)
MCV RBC AUTO: 87 FL (ref 82–98)
MONOCYTES # BLD AUTO: 0.6 K/UL (ref 0.3–1)
MONOCYTES NFR BLD: 5.1 % (ref 4–15)
NEUTROPHILS # BLD AUTO: 8 K/UL (ref 1.8–7.7)
NEUTROPHILS NFR BLD: 66.9 % (ref 38–73)
NRBC BLD-RTO: 0 /100 WBC
PLATELET # BLD AUTO: 292 K/UL (ref 150–450)
PMV BLD AUTO: 9.9 FL (ref 9.2–12.9)
RBC # BLD AUTO: 4.89 M/UL (ref 4–5.4)
WBC # BLD AUTO: 11.94 K/UL (ref 3.9–12.7)

## 2023-04-17 PROCEDURE — 3008F BODY MASS INDEX DOCD: CPT | Mod: CPTII,,, | Performed by: FAMILY MEDICINE

## 2023-04-17 PROCEDURE — 36415 COLL VENOUS BLD VENIPUNCTURE: CPT | Performed by: FAMILY MEDICINE

## 2023-04-17 PROCEDURE — 84403 ASSAY OF TOTAL TESTOSTERONE: CPT | Performed by: FAMILY MEDICINE

## 2023-04-17 PROCEDURE — 83002 ASSAY OF GONADOTROPIN (LH): CPT | Performed by: FAMILY MEDICINE

## 2023-04-17 PROCEDURE — 99214 OFFICE O/P EST MOD 30 MIN: CPT | Mod: S$PBB,,, | Performed by: FAMILY MEDICINE

## 2023-04-17 PROCEDURE — 3079F DIAST BP 80-89 MM HG: CPT | Mod: CPTII,,, | Performed by: FAMILY MEDICINE

## 2023-04-17 PROCEDURE — 1159F MED LIST DOCD IN RCRD: CPT | Mod: CPTII,,, | Performed by: FAMILY MEDICINE

## 2023-04-17 PROCEDURE — 99214 PR OFFICE/OUTPT VISIT, EST, LEVL IV, 30-39 MIN: ICD-10-PCS | Mod: S$PBB,,, | Performed by: FAMILY MEDICINE

## 2023-04-17 PROCEDURE — 3075F PR MOST RECENT SYSTOLIC BLOOD PRESS GE 130-139MM HG: ICD-10-PCS | Mod: CPTII,,, | Performed by: FAMILY MEDICINE

## 2023-04-17 PROCEDURE — 86480 TB TEST CELL IMMUN MEASURE: CPT | Performed by: FAMILY MEDICINE

## 2023-04-17 PROCEDURE — 3044F HG A1C LEVEL LT 7.0%: CPT | Mod: CPTII,,, | Performed by: FAMILY MEDICINE

## 2023-04-17 PROCEDURE — 85025 COMPLETE CBC W/AUTO DIFF WBC: CPT | Performed by: FAMILY MEDICINE

## 2023-04-17 PROCEDURE — 99215 OFFICE O/P EST HI 40 MIN: CPT | Performed by: FAMILY MEDICINE

## 2023-04-17 PROCEDURE — 3079F PR MOST RECENT DIASTOLIC BLOOD PRESSURE 80-89 MM HG: ICD-10-PCS | Mod: CPTII,,, | Performed by: FAMILY MEDICINE

## 2023-04-17 PROCEDURE — 3008F PR BODY MASS INDEX (BMI) DOCUMENTED: ICD-10-PCS | Mod: CPTII,,, | Performed by: FAMILY MEDICINE

## 2023-04-17 PROCEDURE — 83036 HEMOGLOBIN GLYCOSYLATED A1C: CPT | Performed by: FAMILY MEDICINE

## 2023-04-17 PROCEDURE — 83001 ASSAY OF GONADOTROPIN (FSH): CPT | Performed by: FAMILY MEDICINE

## 2023-04-17 PROCEDURE — 3075F SYST BP GE 130 - 139MM HG: CPT | Mod: CPTII,,, | Performed by: FAMILY MEDICINE

## 2023-04-17 PROCEDURE — 3044F PR MOST RECENT HEMOGLOBIN A1C LEVEL <7.0%: ICD-10-PCS | Mod: CPTII,,, | Performed by: FAMILY MEDICINE

## 2023-04-17 PROCEDURE — 1159F PR MEDICATION LIST DOCUMENTED IN MEDICAL RECORD: ICD-10-PCS | Mod: CPTII,,, | Performed by: FAMILY MEDICINE

## 2023-04-17 RX ORDER — TIZANIDINE HYDROCHLORIDE 4 MG/1
1 CAPSULE, GELATIN COATED ORAL DAILY PRN
COMMUNITY
Start: 2023-03-29 | End: 2023-10-23

## 2023-04-17 RX ORDER — BUTALBITAL, ACETAMINOPHEN AND CAFFEINE 300; 40; 50 MG/1; MG/1; MG/1
CAPSULE ORAL
COMMUNITY
End: 2023-06-06 | Stop reason: SDUPTHER

## 2023-04-17 RX ORDER — BUTALBITAL, ACETAMINOPHEN AND CAFFEINE 50; 325; 40 MG/1; MG/1; MG/1
1 TABLET ORAL EVERY 6 HOURS PRN
COMMUNITY
Start: 2023-03-08 | End: 2023-10-23

## 2023-04-17 RX ORDER — SEMAGLUTIDE 0.68 MG/ML
INJECTION, SOLUTION SUBCUTANEOUS
Qty: 6 ML | Refills: 0 | Status: SHIPPED | OUTPATIENT
Start: 2023-04-17 | End: 2023-05-31

## 2023-04-17 RX ORDER — COLESEVELAM 180 1/1
1875 TABLET ORAL 2 TIMES DAILY WITH MEALS
Qty: 540 TABLET | Refills: 3 | Status: SHIPPED | OUTPATIENT
Start: 2023-04-17 | End: 2023-10-23

## 2023-04-17 RX ORDER — ARIPIPRAZOLE 10 MG/1
10 TABLET ORAL
COMMUNITY
Start: 2023-04-06 | End: 2023-10-23

## 2023-04-17 RX ORDER — ZOLPIDEM TARTRATE 12.5 MG/1
12.5 TABLET, FILM COATED, EXTENDED RELEASE ORAL NIGHTLY
COMMUNITY
Start: 2023-04-03 | End: 2023-10-23

## 2023-04-17 RX ORDER — MORPHINE SULFATE 15 MG/1
15 TABLET, FILM COATED, EXTENDED RELEASE ORAL EVERY 12 HOURS PRN
COMMUNITY
Start: 2023-03-22 | End: 2023-10-23

## 2023-04-17 RX ORDER — ONDANSETRON HYDROCHLORIDE 8 MG/1
8 TABLET, FILM COATED ORAL
COMMUNITY
Start: 2023-03-08 | End: 2023-10-23

## 2023-04-17 RX ORDER — COLESEVELAM 180 1/1
1875 TABLET ORAL 2 TIMES DAILY WITH MEALS
Qty: 540 TABLET | Refills: 3 | Status: SHIPPED | OUTPATIENT
Start: 2023-04-17 | End: 2023-04-17 | Stop reason: SDUPTHER

## 2023-04-17 NOTE — PROGRESS NOTES
SUBJECTIVE:    Patient ID: Jasper Guerrero is a 32 y.o. female.    Chief Complaint: Excessive Sweating and Referral  32-year-old female  here today complaining of lifelong excessive sweating, pt also has more complaints and requests for referrals than can be managed in a 20 minute visit. Her excessive sweating has been ongoing since child lynn, it is mainly confined to her head, axilla, and upper back. She states when she is active ( grocery shopping) she becomes flushed and sweaty.    Pt continues to have diarrhea that is not associated with the flushing and sweating ( hyperhidrosis has been ongoing since childhood). Pt has been using lomotil up to 4 times a day when she has diarrhea, she has been going to pain management and has been started on MS contin and her diarrhea alternates with constipation.    She denies any symptoms consistent with TB, or infection, her Menstrual cycle is irregular (may be perimenopausal), no lymphadenopathy.    Pt is requesting referrals to Cardiology for palpitations, she has several moles and a large skin tag she would like evaluated bydermatology. She had been referred to Pulmonary in the past to evaluate for AQUILINO, pt did not make the appointment. I have suggested to the patient that I can place the referrals but finding providers in the area that accept medicade may be difficult and she may need to contact her insurance company to find providers that accept her medicaide.         Significant past medical history  Agoraphobia/Anxiety:  Valium , Fluvoxamine (LUVOX) 300mg, Buspar 7.5  Trigeminal Neuralgia:   Depression: Effexor XR 225mg  PTSD:  DVT (upper extremity right arm 2019):  Fibromyalgia:  Insomnia:    GERD:  Pantoprazole 40 mg  Migraines: Fiorocet, Verapamil  Nausea associated with Migraines: Regcleopatra Zofran      Specialists  Orthopedics:  Dr. Maher  Neurology:  Dr. Wilder Woodson, Dr Diaz (has been discharged)   Psychiatry: [facility]: Dr Kearney (Pt is trying to  get back into treatment) Therapist Tonya  GYN: Dr CHIDI Reynolds  GI: Dr Villeda     Smoke: None  ETOH: None  Exercise: None     HPI      Past Medical History:   Diagnosis Date    Agoraphobia     Carpal tunnel syndrome     Depression     Diabetes mellitus, type 2     DVT (deep venous thrombosis)     2019 had blood clot in right arm    Fibromyalgia     Migraine headache     Nerve injury 08/2016    Lingual Nerve Injury    Obese     Pain management     PTSD (post-traumatic stress disorder)     Trigeminal neuralgia      Social History     Socioeconomic History    Marital status:    Tobacco Use    Smoking status: Never    Smokeless tobacco: Never   Substance and Sexual Activity    Alcohol use: No    Drug use: No    Sexual activity: Yes     Partners: Male     Birth control/protection: Condom     Social Determinants of Health     Financial Resource Strain: Low Risk     Difficulty of Paying Living Expenses: Not hard at all   Food Insecurity: No Food Insecurity    Worried About Running Out of Food in the Last Year: Never true    Ran Out of Food in the Last Year: Never true   Transportation Needs: No Transportation Needs    Lack of Transportation (Medical): No    Lack of Transportation (Non-Medical): No   Physical Activity: Insufficiently Active    Days of Exercise per Week: 1 day    Minutes of Exercise per Session: 10 min   Stress: Stress Concern Present    Feeling of Stress : Rather much   Social Connections: Unknown    Frequency of Communication with Friends and Family: More than three times a week    Frequency of Social Gatherings with Friends and Family: More than three times a week    Active Member of Clubs or Organizations: No    Attends Club or Organization Meetings: Never    Marital Status:    Housing Stability: Low Risk     Unable to Pay for Housing in the Last Year: No    Number of Places Lived in the Last Year: 1    Unstable Housing in the Last Year: No     Past Surgical History:   Procedure  Laterality Date    CARPAL TUNNEL RELEASE Right 12/27/2018    Dr Lozada     CARPAL TUNNEL RELEASE Left 01/28/2020    Procedure: RELEASE, CARPAL TUNNEL;  Surgeon: Brendon Lozada Jr., MD;  Location: [facility];  Service: Orthopedics;  Laterality: Left;    CHOLECYSTECTOMY N/A 10/31/2022    MOUTH SURGERY      WISDOM TOOTH EXTRACTION       Family History   Problem Relation Age of Onset    No Known Problems Mother     No Known Problems Father     Cancer Maternal Aunt         endometrial    No Known Problems Maternal Uncle     No Known Problems Paternal Aunt     No Known Problems Paternal Uncle     No Known Problems Maternal Grandmother     No Known Problems Maternal Grandfather     No Known Problems Paternal Grandmother     No Known Problems Paternal Grandfather     No Known Problems Daughter     No Known Problems Son      Current Outpatient Medications   Medication Sig Dispense Refill    busPIRone (BUSPAR) 15 MG tablet Take 15 mg by mouth once daily.      dicyclomine (BENTYL) 20 mg tablet Take 20 mg by mouth 2 (two) times daily as needed.      fluvoxaMINE (LUVOX) 100 MG tablet Take 1 tablet (100 mg total) by mouth 3 (three) times daily. 90 tablet 2    metFORMIN (GLUCOPHAGE-XR) 500 MG ER 24hr tablet Take 1 tablet (500 mg total) by mouth daily with breakfast. 90 tablet 3    metoclopramide HCl (REGLAN) 10 MG tablet TAKE 1 TABLET BY MOUTH EVERY 6 HOURS AS NEEDED FOR MIGRAINE AND NAUSEA (Patient taking differently: Take 10 mg by mouth every 6 (six) hours as needed. TAKE 1 TABLET BY MOUTH EVERY 6 HOURS AS NEEDED FOR MIGRAINE AND NAUSEA) 60 tablet 11    metoprolol succinate (TOPROL-XL) 50 MG 24 hr tablet Take 1 tablet (50 mg total) by mouth once daily. 30 tablet 11    oxyCODONE-acetaminophen (PERCOCET)  mg per tablet Take 1 tablet by mouth daily as needed.      pantoprazole (PROTONIX) 40 MG tablet Take 40 mg by mouth once daily.      rizatriptan (MAXALT) 10 MG tablet Take 1 tablet (10 mg total) by mouth as needed for Migraine.  May repeat every 2 hours for max 3 tabs in 24 hours. Use no more than 10 days per month. 18 tablet 11    rosuvastatin (CRESTOR) 10 MG tablet Take 1 tablet (10 mg total) by mouth every evening. 90 tablet 3    sumatriptan (IMITREX) 100 MG tablet Take 1 tablet (100 mg total) by mouth once. May repeat the dose once after 2 hours. for 1 dose 8 tablet 0    venlafaxine (EFFEXOR XR) 150 MG Cp24 Take 1 capsule (150 mg total) by mouth once daily. 30 capsule 2    aluminum chloride (DRYSOL) 20 % external solution Apply topically every evening. 60 mL 0    ARIPiprazole (ABILIFY) 10 MG Tab Take 10 mg by mouth.      butalbital-acetaminophen-caff (FIORICET) -40 mg Cap 1 capsule as needed Orally every 4 hrs for 30 days      butalbital-acetaminophen-caffeine -40 mg (FIORICET, ESGIC) -40 mg per tablet Take 1 tablet by mouth every 6 (six) hours as needed.      colesevelam (WELCHOL) 625 mg tablet Take 3 tablets (1,875 mg total) by mouth 2 (two) times daily with meals. 540 tablet 3    morphine (MS CONTIN) 15 MG 12 hr tablet Take 15 mg by mouth every 12 (twelve) hours as needed.      ondansetron (ZOFRAN) 8 MG tablet Take 8 mg by mouth.      semaglutide (OZEMPIC) 0.25 mg or 0.5 mg(2 mg/1.5 mL) pen injector Inject 0.25 mg into the skin every 7 days for 30 days, THEN 0.5 mg every 7 days. 5.25 mL 0    tiZANidine 4 mg Cap Take 1 capsule by mouth daily as needed.      zolpidem (AMBIEN CR) 12.5 MG CR tablet Take 12.5 mg by mouth every evening.       No current facility-administered medications for this visit.     Review of patient's allergies indicates:   Allergen Reactions    Benadryl [diphenhydramine hcl]      Paralysis on right side body     Codeine      Paralysis right body    Flexeril [cyclobenzaprine] Other (See Comments)     Numbness on right side of body    Nsaids (non-steroidal anti-inflammatory drug) Diarrhea and Nausea And Vomiting     Ulcers     Penicillins Anaphylaxis and Hives    Droperidol Anxiety and Other (See  "Comments)    Magnesium      "made me feel horrible"     Prochlorperazine Anxiety and Palpitations       Review of Systems   Constitutional:  Negative for activity change, diaphoresis, fatigue, fever and unexpected weight change.   HENT:  Negative for congestion, hearing loss, rhinorrhea and trouble swallowing.    Eyes:  Negative for discharge and visual disturbance.   Respiratory:  Negative for cough, chest tightness, shortness of breath and wheezing.    Cardiovascular:  Positive for palpitations. Negative for chest pain.   Gastrointestinal:  Negative for blood in stool, constipation, diarrhea and vomiting.   Endocrine: Negative for polydipsia and polyuria.   Genitourinary:  Negative for difficulty urinating, dysuria, hematuria and menstrual problem.   Musculoskeletal:  Negative for arthralgias, joint swelling and neck pain.   Skin:         Sweating  Several moles and large skin tag on her left hip.    Neurological:  Negative for weakness and headaches.   Psychiatric/Behavioral:  Negative for confusion and dysphoric mood.         Blood pressure 139/86, pulse 94, height 5' 5" (1.651 m), weight (!) 151.5 kg (334 lb), SpO2 98 %. Body mass index is 55.58 kg/m².   Objective:      Physical Exam  Vitals reviewed.   Constitutional:       General: She is not in acute distress.     Appearance: Normal appearance. She is obese. She is not ill-appearing or toxic-appearing.   HENT:      Head: Normocephalic and atraumatic.      Nose: Nose normal. No congestion or rhinorrhea.   Cardiovascular:      Rate and Rhythm: Normal rate and regular rhythm.   Pulmonary:      Effort: Pulmonary effort is normal. No respiratory distress.      Breath sounds: Normal breath sounds. No wheezing.   Skin:     General: Skin is warm and moist.      Capillary Refill: Capillary refill takes less than 2 seconds.      Coloration: Skin is not pale.      Comments: Pt feels warm on her forehead and moist , but cheeks are cool.    Neurological:      Mental " Status: She is alert and oriented to person, place, and time.           Assessment:       1. Sleep apnea, unspecified type    2. Type 2 diabetes mellitus without complication, without long-term current use of insulin    3. Excessive sweating    4. Diarrhea, unspecified type    5. Skin tag    6. Palpitations         Plan:           Sleep apnea, unspecified type  -     Ambulatory referral/consult to Pulmonology; Future; Expected date: 04/24/2023    Type 2 diabetes mellitus without complication, without long-term current use of insulin  -     semaglutide (OZEMPIC) 0.25 mg or 0.5 mg(2 mg/1.5 mL) pen injector; Inject 0.25 mg into the skin every 7 days for 30 days, THEN 0.5 mg every 7 days.  Dispense: 5.25 mL; Refill: 0  -     Hemoglobin A1C; Future; Expected date: 04/17/2023    Excessive sweating  -     Follicle Stimulating Hormone; Future; Expected date: 04/17/2023  -     Luteinizing Hormone; Future; Expected date: 04/17/2023  -     Testosterone; Future; Expected date: 04/17/2023  -     CBC auto differential; Future; Expected date: 04/17/2023  -     QuantiFERON-TB Gold Plus; Future; Expected date: 04/17/2023  -     aluminum chloride (DRYSOL) 20 % external solution; Apply topically every evening.  Dispense: 60 mL; Refill: 0    Diarrhea, unspecified type  -     colesevelam (WELCHOL) 625 mg tablet; Take 3 tablets (1,875 mg total) by mouth 2 (two) times daily with meals.  Dispense: 540 tablet; Refill: 3    Skin tag  -     Ambulatory referral/consult to Dermatology; Future; Expected date: 04/24/2023    Palpitations  -     Ambulatory referral/consult to Cardiology; Future; Expected date: 04/24/2023

## 2023-04-17 NOTE — TELEPHONE ENCOUNTER
----- Message from Lori Gregorio sent at 4/17/2023 12:46 PM CDT -----  Hi. We just received a cardiology consult on this patient.  We are not taking new Medicaid patients at this time.  Sorry for any inconvenience.    Katy TAVERA

## 2023-04-18 ENCOUNTER — PATIENT MESSAGE (OUTPATIENT)
Dept: FAMILY MEDICINE | Facility: CLINIC | Age: 33
End: 2023-04-18

## 2023-04-18 ENCOUNTER — TELEPHONE (OUTPATIENT)
Dept: FAMILY MEDICINE | Facility: CLINIC | Age: 33
End: 2023-04-18

## 2023-04-18 LAB
FSH SERPL-ACNC: 5.7 MIU/ML
LH SERPL-ACNC: 3.9 MIU/ML
TESTOST SERPL-MCNC: 35 NG/DL (ref 13–71)

## 2023-04-18 NOTE — TELEPHONE ENCOUNTER
Called patient to ask her to contact her insurance company to see what cardiologist is on her plan. Patient expressed verbal understanding.

## 2023-04-18 NOTE — TELEPHONE ENCOUNTER
Please call the patient and ask her for the name of a cardiologist that is in her insurance network.

## 2023-04-19 ENCOUNTER — PATIENT MESSAGE (OUTPATIENT)
Dept: FAMILY MEDICINE | Facility: CLINIC | Age: 33
End: 2023-04-19

## 2023-04-19 NOTE — TELEPHONE ENCOUNTER
Patient was called and asked to contact her insurance company to see what cardiologist is covered on her plan. Patient expressed verbal understanding.

## 2023-04-20 LAB
GAMMA INTERFERON BACKGROUND BLD IA-ACNC: 0.01 IU/ML
M TB IFN-G BLD-IMP: NEGATIVE
M TB IFN-G CD4+ T-CELLS BLD-ACNC: 0.14 IU/ML
M TBIFN-G CD4+ CD8+T-CELLS BLD-ACNC: 0.13 IU/ML
MITOGEN IGNF BLD-ACNC: >10 IU/ML
QUANTIFERON CRITERIA: NORMAL

## 2023-04-21 ENCOUNTER — PATIENT MESSAGE (OUTPATIENT)
Dept: FAMILY MEDICINE | Facility: CLINIC | Age: 33
End: 2023-04-21

## 2023-04-21 ENCOUNTER — TELEPHONE (OUTPATIENT)
Dept: FAMILY MEDICINE | Facility: CLINIC | Age: 33
End: 2023-04-21

## 2023-04-21 NOTE — TELEPHONE ENCOUNTER
Patients insurance denied PA for Colesevelam HCl tablets 625 mg.She did not met medically necessary criteria. Letter is in media.

## 2023-04-21 NOTE — TELEPHONE ENCOUNTER
Patient wanted to know if labs have come back but she has already looked at them. I'm sure she is asking because she wants the oral medication for her excessive sweating.  Please advise.

## 2023-04-23 ENCOUNTER — PATIENT MESSAGE (OUTPATIENT)
Dept: FAMILY MEDICINE | Facility: CLINIC | Age: 33
End: 2023-04-23

## 2023-04-27 ENCOUNTER — TELEPHONE (OUTPATIENT)
Dept: PHARMACY | Facility: CLINIC | Age: 33
End: 2023-04-27
Payer: MEDICAID

## 2023-04-27 NOTE — TELEPHONE ENCOUNTER
PA denied         The prior authorization for Jasper Guerrero's Colesevelam HCl 625mg tablets prescription has been DENIED for the following reason(s):     [facility]   The plan guideline for [place] Medicaid (Medically Necessary Criteria) requires all of the following criteria to be met prior to consideration of approval:   Approval Criteria for Medically Necessary Justification:   1. The requested medication is being used for a medically accepted indication, age, dosage, and duration as defined using the following sources [Source(s) are stated on the request]:   a. Food and Drug Administration (FDA); OR   b. servtagedex; OR   c. [facility] Formulary Service (AHFS); OR   d. Drug-specific prescribing information (PI); OR   e. Disease state specific standard of care guidelines; OR   f. Credible scientific evidence published in peer-reviewed medical literature generally recognized by the relevant medical community;   3. This medication is medically necessary for the diagnosis, age, and dosage requested.     Note: If you believe the member has met criteria (1 & 3) as described above, please resubmit your request with additional clinically supportive documentation for consideration (please email: [email or web address] or fax: [phone]).     If you would like to APPEAL the decision, please contact the patient's insurance at [phone].   The member's ID# is [id number]   Case ID# [id number]   Appeal Fax: [phone]     Patient has been notified of the denial decision on 4/27/2023.     If there are any additional questions or concerns, please contact me.     Sincerely,   German Olea   Prior Authorization Department   [facility]

## 2023-05-06 ENCOUNTER — PATIENT MESSAGE (OUTPATIENT)
Dept: FAMILY MEDICINE | Facility: CLINIC | Age: 33
End: 2023-05-06

## 2023-05-15 ENCOUNTER — PATIENT MESSAGE (OUTPATIENT)
Dept: FAMILY MEDICINE | Facility: CLINIC | Age: 33
End: 2023-05-15

## 2023-05-17 ENCOUNTER — PATIENT MESSAGE (OUTPATIENT)
Dept: FAMILY MEDICINE | Facility: CLINIC | Age: 33
End: 2023-05-17

## 2023-05-17 ENCOUNTER — TELEPHONE (OUTPATIENT)
Dept: FAMILY MEDICINE | Facility: CLINIC | Age: 33
End: 2023-05-17

## 2023-05-17 DIAGNOSIS — G43.709 CHRONIC MIGRAINE WITHOUT AURA WITHOUT STATUS MIGRAINOSUS, NOT INTRACTABLE: ICD-10-CM

## 2023-05-18 RX ORDER — RIZATRIPTAN BENZOATE 10 MG/1
10 TABLET ORAL
Qty: 18 TABLET | Refills: 11 | Status: SHIPPED | OUTPATIENT
Start: 2023-05-18 | End: 2023-10-23

## 2023-05-22 ENCOUNTER — PATIENT MESSAGE (OUTPATIENT)
Dept: FAMILY MEDICINE | Facility: CLINIC | Age: 33
End: 2023-05-22

## 2023-05-26 ENCOUNTER — PATIENT MESSAGE (OUTPATIENT)
Dept: FAMILY MEDICINE | Facility: CLINIC | Age: 33
End: 2023-05-26

## 2023-05-31 RX ORDER — SEMAGLUTIDE 1.34 MG/ML
1 INJECTION, SOLUTION SUBCUTANEOUS
Qty: 3 ML | Refills: 1 | Status: SHIPPED | OUTPATIENT
Start: 2023-05-31 | End: 2023-08-16

## 2023-06-06 DIAGNOSIS — G43.909 MIGRAINE WITHOUT STATUS MIGRAINOSUS, NOT INTRACTABLE, UNSPECIFIED MIGRAINE TYPE: ICD-10-CM

## 2023-06-07 ENCOUNTER — HOSPITAL ENCOUNTER (EMERGENCY)
Facility: HOSPITAL | Age: 33
Discharge: HOME | End: 2023-06-07
Attending: EMERGENCY MEDICINE
Payer: MEDICAID

## 2023-06-07 VITALS
DIASTOLIC BLOOD PRESSURE: 78 MMHG | TEMPERATURE: 98 F | RESPIRATION RATE: 14 BRPM | SYSTOLIC BLOOD PRESSURE: 134 MMHG | HEIGHT: 65 IN | OXYGEN SATURATION: 99 % | BODY MASS INDEX: 48.82 KG/M2 | WEIGHT: 293 LBS | HEART RATE: 94 BPM

## 2023-06-07 DIAGNOSIS — G50.0 TRIGEMINAL NEURALGIA OF RIGHT SIDE OF FACE: ICD-10-CM

## 2023-06-07 DIAGNOSIS — R51.9 RIGHT SIDED FACIAL PAIN: Primary | ICD-10-CM

## 2023-06-07 PROCEDURE — 63600175 PHARM REV CODE 636 W HCPCS: Performed by: EMERGENCY MEDICINE

## 2023-06-07 PROCEDURE — 99284 EMERGENCY DEPT VISIT MOD MDM: CPT

## 2023-06-07 PROCEDURE — 96372 THER/PROPH/DIAG INJ SC/IM: CPT | Performed by: EMERGENCY MEDICINE

## 2023-06-07 RX ORDER — MORPHINE SULFATE 4 MG/ML
8 INJECTION, SOLUTION INTRAMUSCULAR; INTRAVENOUS
Status: COMPLETED | OUTPATIENT
Start: 2023-06-07 | End: 2023-06-07

## 2023-06-07 RX ORDER — BUTALBITAL, ACETAMINOPHEN AND CAFFEINE 300; 40; 50 MG/1; MG/1; MG/1
CAPSULE ORAL
Qty: 30 CAPSULE | Refills: 0 | Status: SHIPPED | OUTPATIENT
Start: 2023-06-07 | End: 2023-07-08 | Stop reason: SDUPTHER

## 2023-06-07 RX ORDER — SUMATRIPTAN SUCCINATE 100 MG/1
100 TABLET ORAL ONCE
Qty: 8 TABLET | Refills: 0 | Status: SHIPPED | OUTPATIENT
Start: 2023-06-07 | End: 2023-07-08 | Stop reason: SDUPTHER

## 2023-06-07 RX ADMIN — MORPHINE SULFATE 8 MG: 4 INJECTION INTRAVENOUS at 06:06

## 2023-06-07 NOTE — ED PROVIDER NOTES
"Encounter Date: 6/7/2023    SCRIBE #1 NOTE: I, Demarco Noble, am scribing for, and in the presence of,  Kwabena Jade MD.     History     Chief Complaint   Patient presents with    mouth pain     Right side mouth pain      Time seen by provider: 6:03 PM on 06/07/2023    Jasper Guerrero is a 32 y.o. female who presents to the ED with an onset of acute on chronic pain to the right side of her face that began recently. She states that she has a history of trigeminal neuralgia and that she is experiencing a flare-up. She is prescribed percocet which she states has not been relieving her pain. She states she is unable to take NSAIDs due to her gastroparesis. She states that she has an appointment with her pain management provider next Wednesday. The patient denies fever or any other symptoms at this time. PMHx of depression, agoraphobia, PTSD, trigeminal neuralgia, fibromyalgia, obesity, and DM II. There is no pertinent PSHx.    The history is provided by the patient.   Review of patient's allergies indicates:   Allergen Reactions    Benadryl [diphenhydramine hcl]      Paralysis on right side body     Codeine      Paralysis right body    Flexeril [cyclobenzaprine] Other (See Comments)     Numbness on right side of body    Nsaids (non-steroidal anti-inflammatory drug) Diarrhea and Nausea And Vomiting     Ulcers     Penicillins Anaphylaxis and Hives    Droperidol Anxiety and Other (See Comments)    Magnesium      "made me feel horrible"     Prochlorperazine Anxiety and Palpitations     Past Medical History:   Diagnosis Date    Agoraphobia     Carpal tunnel syndrome     Depression     Diabetes mellitus, type 2     DVT (deep venous thrombosis)     2019 had blood clot in right arm    Fibromyalgia     Migraine headache     Nerve injury 08/2016    Lingual Nerve Injury    Obese     Pain management     PTSD (post-traumatic stress disorder)     Trigeminal neuralgia      Past Surgical History:   Procedure Laterality Date    " CARPAL TUNNEL RELEASE Right 12/27/2018    Dr Lozada     CARPAL TUNNEL RELEASE Left 01/28/2020    Procedure: RELEASE, CARPAL TUNNEL;  Surgeon: Brendon Lozada Jr., MD;  Location: [facility] OR;  Service: Orthopedics;  Laterality: Left;    CHOLECYSTECTOMY N/A 10/31/2022    MOUTH SURGERY      WISDOM TOOTH EXTRACTION       Family History   Problem Relation Age of Onset    No Known Problems Mother     No Known Problems Father     Cancer Maternal Aunt         endometrial    No Known Problems Maternal Uncle     No Known Problems Paternal Aunt     No Known Problems Paternal Uncle     No Known Problems Maternal Grandmother     No Known Problems Maternal Grandfather     No Known Problems Paternal Grandmother     No Known Problems Paternal Grandfather     No Known Problems Daughter     No Known Problems Son      Social History     Tobacco Use    Smoking status: Never    Smokeless tobacco: Never   Substance Use Topics    Alcohol use: No    Drug use: No     Review of Systems   Constitutional:  Negative for fever.   HENT:  Negative for congestion.    Eyes:  Negative for visual disturbance.   Respiratory:  Negative for wheezing.    Cardiovascular:  Negative for chest pain.   Gastrointestinal:  Negative for abdominal pain.   Genitourinary:  Negative for dysuria.   Musculoskeletal:  Positive for myalgias. Negative for joint swelling.   Skin:  Negative for rash.   Neurological:  Negative for syncope.   Hematological:  Does not bruise/bleed easily.   Psychiatric/Behavioral:  Negative for confusion.      Physical Exam     Initial Vitals [06/07/23 1738]   BP Pulse Resp Temp SpO2   (!) 146/82 103 20 97.8 °F (36.6 °C) 98 %      MAP       --         Physical Exam    Nursing note and vitals reviewed.  Constitutional: She appears well-developed and well-nourished. She is Obese .   HENT:   Head: Normocephalic and atraumatic.   Eyes: Conjunctivae and EOM are normal.   Neck:   Normal range of motion.  Pulmonary/Chest: No respiratory distress.    Abdominal: She exhibits no distension.   Musculoskeletal:         General: Normal range of motion.      Cervical back: Normal range of motion.     Neurological: She is alert and oriented to person, place, and time. She has normal strength. No cranial nerve deficit. GCS score is 15. GCS eye subscore is 4. GCS verbal subscore is 5. GCS motor subscore is 6.   Skin: Skin is warm and dry. Capillary refill takes less than 2 seconds.   Psychiatric: She has a normal mood and affect. Thought content normal.       ED Course   Procedures  Labs Reviewed - No data to display       Imaging Results    None          Medications   morphine injection 8 mg (8 mg Intramuscular Given 6/7/23 1859)     Medical Decision Making:   History:   Old Medical Records: I decided to obtain old medical records.  ED Management:  Patient rapidly assessed.  Initial vital signs are stable.  Patient reporting acute exacerbation familiar trigeminal neuralgia pain.  She confirms she has Percocet at home.  Provided single dosing IM morphine with improvement.  Encouraged to follow-up with her pain management doctor as soon as possible.  Asked to return to the ER for any new, concerning, or worsening symptoms.  Patient agreeable and discharged in stable condition with her  as a .        Scribe Attestation:   Scribe #1: I performed the above scribed service and the documentation accurately describes the services I performed. I attest to the accuracy of the note.            I, Dr. Kwabena Jade, personally performed the services described in this documentation. All medical record entries made by the scribe were at my direction and in my presence.  I have reviewed the chart and agree that the record reflects my personal performance and is accurate and complete. Kwabena Jade MD.  9:32 PM 06/07/2023         Clinical Impression:   Final diagnoses:  [R51.9] Right sided facial pain (Primary)  [G50.0] Trigeminal neuralgia of right side of face        ED  Disposition Condition    Discharge Stable          ED Prescriptions    None       Follow-up Information       Follow up With Specialties Details Why Contact Info    Crow Kennedy MD Family Medicine Schedule an appointment as soon as possible for a visit   [address]  [address]  [facility] [address]  [phone]      Luca Miramontes MD Anesthesiology, Pain Medicine Schedule an appointment as soon as possible for a visit   [address] [address]  [phone]               Kwabena Jade MD  06/07/23 9714

## 2023-06-07 NOTE — ED NOTES
Patient presents to ED reports left mouth pain. Patient reports hx of trigeminal neuralgia. Patient takes Percocet 10 at home, took today with no relief. Patient reports taking 3g total tylenol today.

## 2023-06-08 NOTE — ED NOTES
Pt is A & O x 3, denies SOB and chest pain. Respirations are even and unlabored. Skin is warm, dry and pink. VS.

## 2023-07-08 DIAGNOSIS — G43.909 MIGRAINE WITHOUT STATUS MIGRAINOSUS, NOT INTRACTABLE, UNSPECIFIED MIGRAINE TYPE: ICD-10-CM

## 2023-07-10 ENCOUNTER — HOSPITAL ENCOUNTER (EMERGENCY)
Facility: HOSPITAL | Age: 33
Discharge: HOME | End: 2023-07-10
Attending: EMERGENCY MEDICINE
Payer: MEDICAID

## 2023-07-10 VITALS
RESPIRATION RATE: 20 BRPM | HEIGHT: 65 IN | BODY MASS INDEX: 48.82 KG/M2 | OXYGEN SATURATION: 96 % | SYSTOLIC BLOOD PRESSURE: 140 MMHG | DIASTOLIC BLOOD PRESSURE: 79 MMHG | TEMPERATURE: 98 F | HEART RATE: 103 BPM | WEIGHT: 293 LBS

## 2023-07-10 DIAGNOSIS — G50.0 TRIGEMINAL NEURALGIA: Primary | ICD-10-CM

## 2023-07-10 PROCEDURE — 99284 EMERGENCY DEPT VISIT MOD MDM: CPT

## 2023-07-10 PROCEDURE — 63600175 PHARM REV CODE 636 W HCPCS: Performed by: EMERGENCY MEDICINE

## 2023-07-10 PROCEDURE — 96372 THER/PROPH/DIAG INJ SC/IM: CPT | Performed by: EMERGENCY MEDICINE

## 2023-07-10 RX ORDER — BUTALBITAL, ACETAMINOPHEN AND CAFFEINE 300; 40; 50 MG/1; MG/1; MG/1
CAPSULE ORAL
Qty: 30 CAPSULE | Refills: 0 | Status: SHIPPED | OUTPATIENT
Start: 2023-07-10 | End: 2023-10-23

## 2023-07-10 RX ORDER — MORPHINE SULFATE 10 MG/ML
10 INJECTION INTRAMUSCULAR; INTRAVENOUS; SUBCUTANEOUS
Status: COMPLETED | OUTPATIENT
Start: 2023-07-10 | End: 2023-07-10

## 2023-07-10 RX ORDER — SUMATRIPTAN SUCCINATE 100 MG/1
100 TABLET ORAL ONCE
Qty: 8 TABLET | Refills: 3 | Status: SHIPPED | OUTPATIENT
Start: 2023-07-10 | End: 2023-10-23

## 2023-07-10 RX ADMIN — MORPHINE SULFATE 10 MG: 10 INJECTION INTRAVENOUS at 05:07

## 2023-07-10 NOTE — ED PROVIDER NOTES
"Encounter Date: 7/10/2023       History     Chief Complaint   Patient presents with    trigeminal neuralgia     HPI 32-year-old woman with a history of trigeminal neuralgia presents emergency department for evaluation flare up of her trigeminal neuralgia.  She complains of sharp shooting pains of her right face.  She called her pain management physician who told her to come to the ED.  She will see pain management on Wednesday.  Review of patient's allergies indicates:   Allergen Reactions    Benadryl [diphenhydramine hcl]      Paralysis on right side body     Codeine      Paralysis right body    Flexeril [cyclobenzaprine] Other (See Comments)     Numbness on right side of body    Nsaids (non-steroidal anti-inflammatory drug) Diarrhea and Nausea And Vomiting     Ulcers     Penicillins Anaphylaxis and Hives    Droperidol Anxiety and Other (See Comments)    Magnesium      "made me feel horrible"     Prochlorperazine Anxiety and Palpitations     Past Medical History:   Diagnosis Date    Agoraphobia     Carpal tunnel syndrome     Depression     Diabetes mellitus, type 2     DVT (deep venous thrombosis)     2019 had blood clot in right arm    Fibromyalgia     Migraine headache     Nerve injury 08/2016    Lingual Nerve Injury    Obese     Pain management     PTSD (post-traumatic stress disorder)     Trigeminal neuralgia      Past Surgical History:   Procedure Laterality Date    CARPAL TUNNEL RELEASE Right 12/27/2018    Dr Lozada     CARPAL TUNNEL RELEASE Left 01/28/2020    Procedure: RELEASE, CARPAL TUNNEL;  Surgeon: Brendon Lozada Jr., MD;  Location: [facility];  Service: Orthopedics;  Laterality: Left;    CHOLECYSTECTOMY N/A 10/31/2022    MOUTH SURGERY      WISDOM TOOTH EXTRACTION       Family History   Problem Relation Age of Onset    No Known Problems Mother     No Known Problems Father     Cancer Maternal Aunt         endometrial    No Known Problems Maternal Uncle     No Known Problems Paternal Aunt     No Known " Problems Paternal Uncle     No Known Problems Maternal Grandmother     No Known Problems Maternal Grandfather     No Known Problems Paternal Grandmother     No Known Problems Paternal Grandfather     No Known Problems Daughter     No Known Problems Son      Social History     Tobacco Use    Smoking status: Never    Smokeless tobacco: Never   Substance Use Topics    Alcohol use: No    Drug use: No     Review of Systems   HENT:          Positive for facial pain.   All other systems reviewed and are negative.    Physical Exam     Initial Vitals [07/10/23 1551]   BP Pulse Resp Temp SpO2   (!) 140/79 103 20 97.9 °F (36.6 °C) 96 %      MAP       --         Physical Exam    Nursing note and vitals reviewed.  Constitutional: She appears well-developed and well-nourished. No distress.   HENT:   Head: Normocephalic and atraumatic.   No facial swelling or erythema.  Positive for right facial tenderness   Eyes: EOM are normal. Pupils are equal, round, and reactive to light.   Neck: Neck supple.   Pulmonary/Chest: No respiratory distress.   Musculoskeletal:         General: Normal range of motion.      Cervical back: Neck supple.     Neurological: She is alert and oriented to person, place, and time.   Skin: Skin is warm and dry.       ED Course   Procedures  Labs Reviewed - No data to display       Imaging Results    None          Medications   morphine injection 10 mg (10 mg Intramuscular Given 7/10/23 1703)     Medical Decision Making:   History:   Old Medical Records: I decided to obtain old medical records.  Initial Assessment:   31 yo woman hx trigeminal neuralgia followed by pain management presents for acute on chrinic facial pain from her trigeminal neuralgia. Has close fu established with PM however is requesting pain relief from the ED as she can not get in to pain management today. No signs of infection on exam. Pain consistent with her pain from the TN. Given IM Morphine with some relief. PT discharged in NAD.                         Clinical Impression:   Final diagnoses:  [G50.0] Trigeminal neuralgia (Primary)        ED Disposition Condition    Discharge Stable          ED Prescriptions    None       Follow-up Information       Follow up With Specialties Details Why Contact Info Additional Information    [place] [facility] Emergency Medicine  As needed, If symptoms worsen [address] [place] [address] [address] [place] [address] 1st floor    Justus Truong MD Pain Medicine   [address] [place] [address]  [address]  [place] [address]  [phone]                Silver Quiros MD  07/11/23 0980

## 2023-07-25 ENCOUNTER — TELEPHONE (OUTPATIENT)
Dept: FAMILY MEDICINE | Facility: CLINIC | Age: 33
End: 2023-07-25

## 2023-07-25 NOTE — TELEPHONE ENCOUNTER
Drug change request for Rosuvastatin. The preferred alternative is   Atorvastatin  Simvastatin  Letter is in media  Please advise.

## 2023-07-26 ENCOUNTER — PATIENT MESSAGE (OUTPATIENT)
Dept: FAMILY MEDICINE | Facility: CLINIC | Age: 33
End: 2023-07-26

## 2023-07-26 RX ORDER — ATORVASTATIN CALCIUM 20 MG/1
20 TABLET, FILM COATED ORAL DAILY
Qty: 90 TABLET | Refills: 3 | Status: SHIPPED | OUTPATIENT
Start: 2023-07-26 | End: 2023-10-23

## 2023-07-26 NOTE — TELEPHONE ENCOUNTER
Please explain to the patient that her insurance company does not want to cover her Rosuvastatin, I have switched her medications to Atorvastatin.

## 2023-08-01 ENCOUNTER — TELEPHONE (OUTPATIENT)
Dept: FAMILY MEDICINE | Facility: CLINIC | Age: 33
End: 2023-08-01

## 2023-08-01 RX ORDER — METFORMIN HYDROCHLORIDE 500 MG/1
500 TABLET ORAL 2 TIMES DAILY WITH MEALS
Qty: 180 TABLET | Refills: 3 | Status: SHIPPED | OUTPATIENT
Start: 2023-08-01 | End: 2023-12-27

## 2023-08-01 NOTE — TELEPHONE ENCOUNTER
Received letter from pharmacy requesting drug change on metformin ER, stating the patients insurance does not cover medication.  Letter scanned in media.   Preferred alternative is:  Metformin

## 2023-08-02 ENCOUNTER — PATIENT MESSAGE (OUTPATIENT)
Dept: FAMILY MEDICINE | Facility: CLINIC | Age: 33
End: 2023-08-02

## 2023-08-02 NOTE — TELEPHONE ENCOUNTER
I have sen the metformin 500mg it needs to be taken 2 times a day, let her know the insurance company has denied the extended release medication.    Hide Include Location In Plan Question?: No Detail Level: Zone

## 2023-08-06 ENCOUNTER — HOSPITAL ENCOUNTER (EMERGENCY)
Facility: HOSPITAL | Age: 33
Discharge: HOME | End: 2023-08-06
Attending: EMERGENCY MEDICINE
Payer: MEDICAID

## 2023-08-06 VITALS
SYSTOLIC BLOOD PRESSURE: 139 MMHG | HEART RATE: 96 BPM | DIASTOLIC BLOOD PRESSURE: 85 MMHG | WEIGHT: 293 LBS | BODY MASS INDEX: 48.82 KG/M2 | HEIGHT: 65 IN | OXYGEN SATURATION: 97 % | TEMPERATURE: 98 F | RESPIRATION RATE: 18 BRPM

## 2023-08-06 DIAGNOSIS — Z86.69 HISTORY OF TRIGEMINAL NEURALGIA: Primary | ICD-10-CM

## 2023-08-06 LAB
B-HCG UR QL: NEGATIVE
CTP QC/QA: YES

## 2023-08-06 PROCEDURE — 96372 THER/PROPH/DIAG INJ SC/IM: CPT | Performed by: PHYSICIAN ASSISTANT

## 2023-08-06 PROCEDURE — 63600175 PHARM REV CODE 636 W HCPCS: Performed by: PHYSICIAN ASSISTANT

## 2023-08-06 PROCEDURE — 81025 URINE PREGNANCY TEST: CPT | Performed by: NURSE PRACTITIONER

## 2023-08-06 PROCEDURE — 99284 EMERGENCY DEPT VISIT MOD MDM: CPT

## 2023-08-06 RX ORDER — DEXAMETHASONE SODIUM PHOSPHATE 4 MG/ML
8 INJECTION, SOLUTION INTRA-ARTICULAR; INTRALESIONAL; INTRAMUSCULAR; INTRAVENOUS; SOFT TISSUE
Status: COMPLETED | OUTPATIENT
Start: 2023-08-06 | End: 2023-08-06

## 2023-08-06 RX ORDER — DEXAMETHASONE SODIUM PHOSPHATE 4 MG/ML
INJECTION, SOLUTION INTRA-ARTICULAR; INTRALESIONAL; INTRAMUSCULAR; INTRAVENOUS; SOFT TISSUE
Status: DISCONTINUED
Start: 2023-08-06 | End: 2023-08-06 | Stop reason: HOSPADM

## 2023-08-06 RX ADMIN — DEXAMETHASONE SODIUM PHOSPHATE 8 MG: 4 INJECTION, SOLUTION INTRA-ARTICULAR; INTRALESIONAL; INTRAMUSCULAR; INTRAVENOUS; SOFT TISSUE at 05:08

## 2023-08-06 NOTE — FIRST PROVIDER EVALUATION
" Emergency Department TeleTriage Encounter Note      CHIEF COMPLAINT    Chief Complaint   Patient presents with    trigeminal neuralgia.      Pt states unable to control pain at home.        VITAL SIGNS   Initial Vitals [08/06/23 1259]   BP Pulse Resp Temp SpO2   139/85 96 18 97.6 °F (36.4 °C) 97 %      MAP       --            ALLERGIES    Review of patient's allergies indicates:   Allergen Reactions    Benadryl [diphenhydramine hcl]      Paralysis on right side body     Codeine      Paralysis right body    Flexeril [cyclobenzaprine] Other (See Comments)     Numbness on right side of body    Nsaids (non-steroidal anti-inflammatory drug) Diarrhea and Nausea And Vomiting     Ulcers     Penicillins Anaphylaxis and Hives    Droperidol Anxiety and Other (See Comments)    Magnesium      "made me feel horrible"     Prochlorperazine Anxiety and Palpitations       PROVIDER TRIAGE NOTE  This is a teletriage evaluation of a 32 y.o. female presenting to the ED complaining of right sided facial pain- chronic. States hx of trigeminal neuralgia. States that she is in pain management. Rx usually percocet but nationwide shortage and hydrocodone is not helping. .     Initial orders will be placed and care will be transferred to an alternate provider when patient is roomed for a full evaluation. Any additional orders and the final disposition will be determined by that provider.         ORDERS  Labs Reviewed   POCT URINE PREGNANCY       ED Orders (720h ago, onward)      Start Ordered     Status Ordering Provider    08/06/23 1305 08/06/23 1304  POCT urine pregnancy  Once         Ordered BECCA GOMEZ              Virtual Visit Note: The provider triage portion of this emergency department evaluation and documentation was performed via [employer], a HIPAA-compliant telemedicine application, in concert with a tele-presenter in the room. A face to face patient evaluation with one of my colleagues will occur once the patient " is placed in an emergency department room.      DISCLAIMER: This note was prepared with Etable voice recognition transcription software. Garbled syntax, mangled pronouns, and other bizarre constructions may be attributed to that software system.

## 2023-08-06 NOTE — FIRST PROVIDER EVALUATION
" Emergency Department TeleTriage Encounter Note      CHIEF COMPLAINT    Chief Complaint   Patient presents with    trigeminal neuralgia.      Pt states unable to control pain at home.        VITAL SIGNS   Initial Vitals [08/06/23 1259]   BP Pulse Resp Temp SpO2   139/85 96 18 97.6 °F (36.4 °C) 97 %      MAP       --            ALLERGIES    Review of patient's allergies indicates:   Allergen Reactions    Benadryl [diphenhydramine hcl]      Paralysis on right side body     Codeine      Paralysis right body    Flexeril [cyclobenzaprine] Other (See Comments)     Numbness on right side of body    Nsaids (non-steroidal anti-inflammatory drug) Diarrhea and Nausea And Vomiting     Ulcers     Penicillins Anaphylaxis and Hives    Droperidol Anxiety and Other (See Comments)    Magnesium      "made me feel horrible"     Prochlorperazine Anxiety and Palpitations       PROVIDER TRIAGE NOTE  ***      ORDERS  Labs Reviewed - No data to display    ED Orders (720h ago, onward)      None              Virtual Visit Note: The provider triage portion of this emergency department evaluation and documentation was performed via [employer], a HIPAA-compliant telemedicine application, in concert with a tele-presenter in the room. A face to face patient evaluation with one of my colleagues will occur once the patient is placed in an emergency department room.      DISCLAIMER: This note was prepared with M*Modal voice recognition transcription software. Garbled syntax, mangled pronouns, and other bizarre constructions may be attributed to that software system.    "

## 2023-08-07 NOTE — ED PROVIDER NOTES
"Encounter Date: 8/6/2023       History     Chief Complaint   Patient presents with    trigeminal neuralgia.      Pt states unable to control pain at home.      Jasper Guerrero is a 32 y.o. female presenting for evaluation of persistent right sided facial pain.  She has been diagnosed with Trigeminal Neuralgia and sees Pain Management.  She states that Pain Management recently changed her home medications from Oxycodone to Hydrocodone, but the hydrocodone doesn't seem to help her pain as well as the Oxycodone.  She has been evaluated by Neurology and Neurosurgery.  She states her symptoms began from a dental source.  No fever, no chills.  She has tried Tegretol and other medications in the past with no relief.  She is "allergic" to NSAIDs.  She would like to have an IM shot of Morphine or Dilaudid, because they have helped in the past.  She has a past medical history of Agoraphobia, Carpal tunnel syndrome, Depression, Diabetes mellitus, type 2, DVT (deep venous thrombosis), Fibromyalgia, Migraine headache, Nerve injury (08/2016), Obese, Pain management, PTSD (post-traumatic stress disorder), and Trigeminal neuralgia.      The history is provided by the patient.     Review of patient's allergies indicates:   Allergen Reactions    Benadryl [diphenhydramine hcl]      Paralysis on right side body     Codeine      Paralysis right body    Flexeril [cyclobenzaprine] Other (See Comments)     Numbness on right side of body    Nsaids (non-steroidal anti-inflammatory drug) Diarrhea and Nausea And Vomiting     Ulcers     Penicillins Anaphylaxis and Hives    Droperidol Anxiety and Other (See Comments)    Magnesium      "made me feel horrible"     Prochlorperazine Anxiety and Palpitations     Past Medical History:   Diagnosis Date    Agoraphobia     Carpal tunnel syndrome     Depression     Diabetes mellitus, type 2     DVT (deep venous thrombosis)     2019 had blood clot in right arm    Fibromyalgia     Migraine headache     " Nerve injury 08/2016    Lingual Nerve Injury    Obese     Pain management     PTSD (post-traumatic stress disorder)     Trigeminal neuralgia      Past Surgical History:   Procedure Laterality Date    CARPAL TUNNEL RELEASE Right 12/27/2018    Dr Lozada     CARPAL TUNNEL RELEASE Left 01/28/2020    Procedure: RELEASE, CARPAL TUNNEL;  Surgeon: Brendon Lozada Jr., MD;  Location: [facility];  Service: Orthopedics;  Laterality: Left;    CHOLECYSTECTOMY N/A 10/31/2022    MOUTH SURGERY      WISDOM TOOTH EXTRACTION       Family History   Problem Relation Age of Onset    No Known Problems Mother     No Known Problems Father     Cancer Maternal Aunt         endometrial    No Known Problems Maternal Uncle     No Known Problems Paternal Aunt     No Known Problems Paternal Uncle     No Known Problems Maternal Grandmother     No Known Problems Maternal Grandfather     No Known Problems Paternal Grandmother     No Known Problems Paternal Grandfather     No Known Problems Daughter     No Known Problems Son      Social History     Tobacco Use    Smoking status: Never    Smokeless tobacco: Never   Substance Use Topics    Alcohol use: No    Drug use: No     Review of Systems   Constitutional:  Negative for chills and fever.   HENT:  Negative for congestion, dental problem, ear pain, facial swelling, rhinorrhea, sore throat and trouble swallowing.         Right facial pain.    Musculoskeletal:  Negative for arthralgias, back pain, joint swelling, myalgias, neck pain and neck stiffness.   Skin:  Negative for color change, pallor, rash and wound.   Neurological:  Negative for dizziness, syncope, weakness, light-headedness, numbness and headaches.   Hematological:  Does not bruise/bleed easily.       Physical Exam     Initial Vitals [08/06/23 1259]   BP Pulse Resp Temp SpO2   139/85 96 18 97.6 °F (36.4 °C) 97 %      MAP       --         Physical Exam    Nursing note and vitals reviewed.  Constitutional: She appears well-developed and  well-nourished. She is not diaphoretic. No distress.   HENT:   Head: Normocephalic and atraumatic.   Right Ear: Hearing, tympanic membrane, external ear and ear canal normal.   Left Ear: Hearing and external ear normal.   Nose: Nose normal.   Mouth/Throat: Uvula is midline, oropharynx is clear and moist and mucous membranes are normal. No oral lesions. No trismus in the jaw. Normal dentition. No dental abscesses, uvula swelling or dental caries.   Pt reports associated TTP to right sided mandibular region.  No erythema, edema noted.  No gingival swelling or active dental pain on exam.  No oral lesions.  No trismus.    Eyes: Conjunctivae are normal.   Neck: Neck supple.   Normal range of motion.  Cardiovascular:  Intact distal pulses.           Musculoskeletal:         General: No tenderness or edema. Normal range of motion.      Cervical back: Normal range of motion and neck supple.     Neurological: She is alert and oriented to person, place, and time. She has normal strength. No sensory deficit.   Skin: Skin is warm and dry. No rash and no abscess noted. No erythema.         ED Course   Procedures  Labs Reviewed   POCT URINE PREGNANCY          Imaging Results    None          Medications   dexAMETHasone injection 8 mg (8 mg Intramuscular Given 8/6/23 1721)     Medical Decision Making:   History:   Old Medical Records: I decided to obtain old medical records.  Old Records Summarized: records from clinic visits and records from previous admission(s).  Differential Diagnosis:   Trigeminal neuralgia   Chronic pain   Opioid Dependence     ED Management:  Pt emergently evaluated here in the ED.    Pt is well appearing on exam without tachycardia or distress.  There is no evidence associated dental abscess or pulpitis at this time.  No evidence for otitis media or other ear pathology.  She has a history of Trigeminal Neuralgia and her chronic pain is management by pain management.  According to the , she had 45  "Oxycodone filled on 7/12/23 and 90 Hydrocodone filled on 7/26/23.  Although is requesting a dose of either IM Morphine or Dilaudid here in the ED today, we don't feel comfortable giving her additional doses of narcotic pain medication.  Unfortunately, she is "allergic" to all NSAIDs.  Will give an IM dose of Decadron and discharge her home to follow-up with pain management and/or neurology/neurosurgery for more definitive care and treatment of her chronic condition.  Patient voices understanding and is agreeable to the plan.  Specific return precautions are given.                             Clinical Impression:   Final diagnoses:  [Z86.69] History of trigeminal neuralgia (Primary)        ED Disposition Condition    Discharge Stable          ED Prescriptions    None       Follow-up Information       Follow up With Specialties Details Why Contact Info Additional Information    [place] [facility] Emergency Medicine  As needed, If symptoms worsen [address] [place] [address] [address] [place] [phone] 1st floor    Neurology   for re-evaluation and further management      Pain Management   for re-evaluation and further management               Shawna Collado PASYDNEE  08/06/23 2024    "

## 2023-08-15 ENCOUNTER — PATIENT MESSAGE (OUTPATIENT)
Dept: FAMILY MEDICINE | Facility: CLINIC | Age: 33
End: 2023-08-15

## 2023-08-15 DIAGNOSIS — E11.9 TYPE 2 DIABETES MELLITUS WITHOUT COMPLICATION, WITHOUT LONG-TERM CURRENT USE OF INSULIN: Primary | ICD-10-CM

## 2023-08-15 RX ORDER — SEMAGLUTIDE 1.34 MG/ML
1 INJECTION, SOLUTION SUBCUTANEOUS
Qty: 3 ML | Refills: 1 | Status: CANCELLED | OUTPATIENT
Start: 2023-08-15 | End: 2024-08-14

## 2023-08-15 NOTE — TELEPHONE ENCOUNTER
Last Office Visit 4/17/23  Next Office Visit 10/23/23  Patient is wanting to know if you can increase her Ozempic to 2 mg

## 2023-08-16 RX ORDER — SEMAGLUTIDE 2.68 MG/ML
2 INJECTION, SOLUTION SUBCUTANEOUS
Qty: 3 ML | Refills: 11 | Status: SHIPPED | OUTPATIENT
Start: 2023-08-16 | End: 2023-08-20 | Stop reason: RX

## 2023-08-17 ENCOUNTER — PATIENT MESSAGE (OUTPATIENT)
Dept: FAMILY MEDICINE | Facility: CLINIC | Age: 33
End: 2023-08-17

## 2023-08-17 DIAGNOSIS — E11.9 TYPE 2 DIABETES MELLITUS WITHOUT COMPLICATION, WITHOUT LONG-TERM CURRENT USE OF INSULIN: Primary | ICD-10-CM

## 2023-08-20 RX ORDER — SEMAGLUTIDE 1.34 MG/ML
1 INJECTION, SOLUTION SUBCUTANEOUS
Qty: 3 ML | Refills: 11 | Status: SHIPPED | OUTPATIENT
Start: 2023-08-20 | End: 2023-08-21 | Stop reason: SDUPTHER

## 2023-08-21 ENCOUNTER — PATIENT MESSAGE (OUTPATIENT)
Dept: FAMILY MEDICINE | Facility: CLINIC | Age: 33
End: 2023-08-21

## 2023-08-21 RX ORDER — SEMAGLUTIDE 1.34 MG/ML
1 INJECTION, SOLUTION SUBCUTANEOUS
Qty: 3 ML | Refills: 11 | Status: SHIPPED | OUTPATIENT
Start: 2023-08-21 | End: 2023-10-23 | Stop reason: ALTCHOICE

## 2023-08-21 NOTE — TELEPHONE ENCOUNTER
New prescription pended with diagnosis code. Medicaid will not let them accept verbal orders, they need a new prescription.

## 2023-08-31 ENCOUNTER — PATIENT MESSAGE (OUTPATIENT)
Dept: FAMILY MEDICINE | Facility: CLINIC | Age: 33
End: 2023-08-31

## 2023-08-31 LAB
ANTIBODY SCREEN: NEGATIVE
HBV SURFACE AG SERPL QL IA: NEGATIVE
HIV 1+2 AB+HIV1 P24 AG SERPL QL IA: NEGATIVE
RPR: NONREACTIVE
RUBELLA IMMUNE STATUS: NORMAL

## 2023-08-31 NOTE — TELEPHONE ENCOUNTER
Her OB GYN will have a better list of which medications definitely need to be stopped.      Stop these medications  Ozempic  Atorvastatin (lipitor)   Welchol ( Colesevelam)    She should discuss with her Ob GYN about the metoprolol, It has risk in the second and third trimester.     She needs to discuss with OB and her Psych provider about the [facility] medications.  They usually have risk further in pregnancy

## 2023-09-01 ENCOUNTER — TELEPHONE (OUTPATIENT)
Dept: FAMILY MEDICINE | Facility: CLINIC | Age: 33
End: 2023-09-01

## 2023-09-01 NOTE — TELEPHONE ENCOUNTER
Patient sent portal message asking the following question:    What about Metformin, fioricet, and imitrex?    Please advise.

## 2023-09-06 ENCOUNTER — HOSPITAL ENCOUNTER (EMERGENCY)
Facility: HOSPITAL | Age: 33
Discharge: LEFT BEFORE DISCHARGE | End: 2023-09-06
Attending: EMERGENCY MEDICINE
Payer: MEDICAID

## 2023-09-06 VITALS
RESPIRATION RATE: 18 BRPM | TEMPERATURE: 99 F | WEIGHT: 293 LBS | BODY MASS INDEX: 48.82 KG/M2 | HEIGHT: 65 IN | HEART RATE: 124 BPM | SYSTOLIC BLOOD PRESSURE: 144 MMHG | DIASTOLIC BLOOD PRESSURE: 103 MMHG | OXYGEN SATURATION: 96 %

## 2023-09-06 DIAGNOSIS — O20.0 THREATENED MISCARRIAGE IN EARLY PREGNANCY: Primary | ICD-10-CM

## 2023-09-06 DIAGNOSIS — N93.9 VAGINAL BLEEDING: ICD-10-CM

## 2023-09-06 LAB
ABO + RH BLD: NORMAL
ALBUMIN SERPL BCP-MCNC: 4.2 G/DL (ref 3.5–5.2)
ALP SERPL-CCNC: 62 U/L (ref 55–135)
ALT SERPL W/O P-5'-P-CCNC: 31 U/L (ref 10–44)
ANION GAP SERPL CALC-SCNC: 8 MMOL/L (ref 8–16)
AST SERPL-CCNC: 23 U/L (ref 10–40)
B-HCG UR QL: POSITIVE
BACTERIA #/AREA URNS HPF: NORMAL /HPF
BASOPHILS # BLD AUTO: 0.04 K/UL (ref 0–0.2)
BASOPHILS NFR BLD: 0.2 % (ref 0–1.9)
BILIRUB SERPL-MCNC: 0.6 MG/DL (ref 0.1–1)
BILIRUB UR QL STRIP: NEGATIVE
BUN SERPL-MCNC: 7 MG/DL (ref 6–20)
CALCIUM SERPL-MCNC: 9.3 MG/DL (ref 8.7–10.5)
CHLORIDE SERPL-SCNC: 103 MMOL/L (ref 95–110)
CLARITY UR: CLEAR
CO2 SERPL-SCNC: 25 MMOL/L (ref 23–29)
COLOR UR: YELLOW
CREAT SERPL-MCNC: 0.7 MG/DL (ref 0.5–1.4)
CTP QC/QA: YES
DIFFERENTIAL METHOD: ABNORMAL
EOSINOPHIL # BLD AUTO: 0 K/UL (ref 0–0.5)
EOSINOPHIL NFR BLD: 0.2 % (ref 0–8)
ERYTHROCYTE [DISTWIDTH] IN BLOOD BY AUTOMATED COUNT: 13.7 % (ref 11.5–14.5)
EST. GFR  (NO RACE VARIABLE): >60 ML/MIN/1.73 M^2
GLUCOSE SERPL-MCNC: 148 MG/DL (ref 70–110)
GLUCOSE UR QL STRIP: NEGATIVE
HCG INTACT+B SERPL-ACNC: 3271 MIU/ML
HCT VFR BLD AUTO: 42.4 % (ref 37–48.5)
HGB BLD-MCNC: 14.2 G/DL (ref 12–16)
HGB UR QL STRIP: NEGATIVE
HYALINE CASTS #/AREA URNS LPF: 0 /LPF
IMM GRANULOCYTES # BLD AUTO: 0.06 K/UL (ref 0–0.04)
IMM GRANULOCYTES NFR BLD AUTO: 0.4 % (ref 0–0.5)
KETONES UR QL STRIP: NEGATIVE
LEUKOCYTE ESTERASE UR QL STRIP: NEGATIVE
LYMPHOCYTES # BLD AUTO: 4.4 K/UL (ref 1–4.8)
LYMPHOCYTES NFR BLD: 26.4 % (ref 18–48)
MCH RBC QN AUTO: 29.7 PG (ref 27–31)
MCHC RBC AUTO-ENTMCNC: 33.5 G/DL (ref 32–36)
MCV RBC AUTO: 89 FL (ref 82–98)
MICROSCOPIC COMMENT: NORMAL
MONOCYTES # BLD AUTO: 1.2 K/UL (ref 0.3–1)
MONOCYTES NFR BLD: 7.2 % (ref 4–15)
NEUTROPHILS # BLD AUTO: 10.9 K/UL (ref 1.8–7.7)
NEUTROPHILS NFR BLD: 65.6 % (ref 38–73)
NITRITE UR QL STRIP: NEGATIVE
NRBC BLD-RTO: 0 /100 WBC
PH UR STRIP: 8 [PH] (ref 5–8)
PLATELET # BLD AUTO: 359 K/UL (ref 150–450)
PMV BLD AUTO: 9.5 FL (ref 9.2–12.9)
POTASSIUM SERPL-SCNC: 3.3 MMOL/L (ref 3.5–5.1)
PROT SERPL-MCNC: 7.7 G/DL (ref 6–8.4)
PROT UR QL STRIP: ABNORMAL
RBC # BLD AUTO: 4.78 M/UL (ref 4–5.4)
RBC #/AREA URNS HPF: 2 /HPF (ref 0–4)
SODIUM SERPL-SCNC: 136 MMOL/L (ref 136–145)
SP GR UR STRIP: 1 (ref 1–1.03)
URN SPEC COLLECT METH UR: ABNORMAL
UROBILINOGEN UR STRIP-ACNC: NEGATIVE EU/DL
WBC # BLD AUTO: 16.57 K/UL (ref 3.9–12.7)
WBC #/AREA URNS HPF: 4 /HPF (ref 0–5)

## 2023-09-06 PROCEDURE — 80053 COMPREHEN METABOLIC PANEL: CPT

## 2023-09-06 PROCEDURE — 85025 COMPLETE CBC W/AUTO DIFF WBC: CPT

## 2023-09-06 PROCEDURE — 86900 BLOOD TYPING SEROLOGIC ABO: CPT

## 2023-09-06 PROCEDURE — 99281 EMR DPT VST MAYX REQ PHY/QHP: CPT | Mod: 25

## 2023-09-06 PROCEDURE — 84702 CHORIONIC GONADOTROPIN TEST: CPT

## 2023-09-06 PROCEDURE — 81025 URINE PREGNANCY TEST: CPT

## 2023-09-06 PROCEDURE — 25000003 PHARM REV CODE 250

## 2023-09-06 PROCEDURE — 81001 URINALYSIS AUTO W/SCOPE: CPT

## 2023-09-06 RX ORDER — POTASSIUM CHLORIDE 20 MEQ/1
20 TABLET, EXTENDED RELEASE ORAL ONCE
Status: COMPLETED | OUTPATIENT
Start: 2023-09-06 | End: 2023-09-06

## 2023-09-06 RX ADMIN — POTASSIUM CHLORIDE 20 MEQ: 1500 TABLET, EXTENDED RELEASE ORAL at 05:09

## 2023-09-06 NOTE — ED PROVIDER NOTES
"Encounter Date: 2023       History     Chief Complaint   Patient presents with    Pregnancy Complications-vaginal bleeding      Pt states " I had some dark red blood after going number 2, it's very small amount, but I don't like it" pt reports she is 5 wks and 6 days pregnant by date of conception, pt has an OBGYN and reports she's had lab work and urine pregnancy test to confirm pregnancy, NO U/S yet, vaginal bleeding today,      32-year-old  well-appearing female presents with a chief complaint of vaginal bleeding in 1st trimester of pregnancy.  Patient states she had 1 incident of a small amount of dark red blood using the bathroom today.  Patient states she was having a bowel movement when it occurred, but she is sure it came from her vagina.  She is having mild cramping with nausea and vomiting.  Patient states she is had 7 positive home pregnancy test and Dr. Reynolds Kb an hCG level in office.  He did not want to do an ultrasound until 8 weeks and she does have an appointment with him tomorrow.  However, she is nervous since seeing the blood.  She estimates being 5-6 weeks pregnant based on the date of conception.  She states having irregular menstrual cycles but she remembers the day that she had sex.  Patient has a self-reported history of anxiety.  Chart reviewed reveals she is got a history of depression, diabetes, DVT in 2019, fibromyalgia, migraine headaches, obesity, PTSD, and trigeminal neuralgia.  Patient states she is off of her anxiety medication because she is pregnant and also discontinued BuSpar so she is feeling extremely anxious due to vaginal bleeding.    The history is provided by the patient.     Review of patient's allergies indicates:   Allergen Reactions    Benadryl [diphenhydramine hcl]      Paralysis on right side body     Codeine      Paralysis right body    Flexeril [cyclobenzaprine] Other (See Comments)     Numbness on right side of body    Nsaids (non-steroidal " "anti-inflammatory drug) Diarrhea and Nausea And Vomiting     Ulcers     Penicillins Anaphylaxis and Hives    Droperidol Anxiety and Other (See Comments)    Magnesium      "made me feel horrible"     Prochlorperazine Anxiety and Palpitations     Past Medical History:   Diagnosis Date    Agoraphobia     Carpal tunnel syndrome     Depression     Diabetes mellitus, type 2     DVT (deep venous thrombosis)     2019 had blood clot in right arm    Fibromyalgia     Migraine headache     Nerve injury 08/2016    Lingual Nerve Injury    Obese     Pain management     PTSD (post-traumatic stress disorder)     Trigeminal neuralgia      Past Surgical History:   Procedure Laterality Date    CARPAL TUNNEL RELEASE Right 12/27/2018    Dr Lozada     CARPAL TUNNEL RELEASE Left 01/28/2020    Procedure: RELEASE, CARPAL TUNNEL;  Surgeon: Brendon Lozada Jr., MD;  Location: [facility];  Service: Orthopedics;  Laterality: Left;    CHOLECYSTECTOMY N/A 10/31/2022    MOUTH SURGERY      WISDOM TOOTH EXTRACTION       Family History   Problem Relation Age of Onset    No Known Problems Mother     No Known Problems Father     Cancer Maternal Aunt         endometrial    No Known Problems Maternal Uncle     No Known Problems Paternal Aunt     No Known Problems Paternal Uncle     No Known Problems Maternal Grandmother     No Known Problems Maternal Grandfather     No Known Problems Paternal Grandmother     No Known Problems Paternal Grandfather     No Known Problems Daughter     No Known Problems Son      Social History     Tobacco Use    Smoking status: Never    Smokeless tobacco: Never   Substance Use Topics    Alcohol use: No    Drug use: No     Review of Systems   Constitutional:  Positive for fatigue. Negative for fever.   Gastrointestinal:  Positive for abdominal pain, nausea and vomiting.   All other systems reviewed and are negative.      Physical Exam     Initial Vitals [09/06/23 1358]   BP Pulse Resp Temp SpO2   (!) 171/109 (!) 116 20 98.6 °F (37 " °C) 97 %      MAP       --         Physical Exam    Constitutional: She appears well-developed and well-nourished. No distress.   HENT:   Head: Normocephalic and atraumatic.   Eyes: Pupils are equal, round, and reactive to light.   Neck:   Normal range of motion.  Cardiovascular:  Normal rate.           Pulmonary/Chest: Breath sounds normal.   Abdominal: Abdomen is soft. Bowel sounds are normal. She exhibits no distension. There is no abdominal tenderness.   Musculoskeletal:         General: Normal range of motion.      Cervical back: Normal range of motion.     Lymphadenopathy:     She has no cervical adenopathy.   Neurological: She is alert and oriented to person, place, and time.   Skin: Skin is warm and dry. Capillary refill takes less than 2 seconds.   Psychiatric: She has a normal mood and affect. Her behavior is normal. Judgment and thought content normal.         ED Course   Procedures  Labs Reviewed   CBC W/ AUTO DIFFERENTIAL - Abnormal; Notable for the following components:       Result Value    WBC 16.57 (*)     Gran # (ANC) 10.9 (*)     Immature Grans (Abs) 0.06 (*)     Mono # 1.2 (*)     All other components within normal limits    Narrative:     Release to patient->Immediate   COMPREHENSIVE METABOLIC PANEL - Abnormal; Notable for the following components:    Potassium 3.3 (*)     Glucose 148 (*)     All other components within normal limits    Narrative:     Release to patient->Immediate   URINALYSIS, REFLEX TO URINE CULTURE - Abnormal; Notable for the following components:    Protein, UA 1+ (*)     All other components within normal limits    Narrative:     Specimen Source->Urine   POCT URINE PREGNANCY - Abnormal; Notable for the following components:    POC Preg Test, Ur Positive (*)     All other components within normal limits   HCG, QUANTITATIVE    Narrative:     Release to patient->Immediate   URINALYSIS MICROSCOPIC    Narrative:     Specimen Source->Urine   GROUP & RH          Imaging Results               US OB <14 Wks TransAbd & TransVag, Single Gestation (XPD) (Final result)  Result time 23 15:43:19      Final result by Sanjay Martinez Jr., MD (23 15:43:19)                   Narrative:    Examination: Limited obstetrical sonogram transvaginal    CLINICAL HISTORY: Vaginal bleeding.    COMPARISON: No affiliated comparison study with the current examination.    FINDINGS:    Uterus is normally formed maintaining anteverted configuration without evidence of contour deforming mass. Ovoid gestational sac detected within endometrial stripe mean sac diameter of 7 mm, no demonstrable fetal pole is generated at the current time due to either blighted ovum or early intrauterine gestation. Recommend short-term follow-up to establish continuity of care. The ovaries are obscured by the overlying bowel gas.    IMPRESSION: Well-defined gestational sac, though without evidence of active fetal pole or imaging priors. Findings may reflect presence of either a blighted ovum or early IUP. Recommend short-term follow-up for continuity of care as well as evaluation based on the patient's hCG status.    Electronically signed by:  Sanjay Martinez MD  2023 3:43 PM CDT Workstation: [id number]                                     Medications   potassium chloride SA CR tablet 20 mEq (20 mEq Oral Given 23 1755)     Medical Decision Making  Chief complaint vaginal bleeding.    Urgent considerations include threatened miscarriage, ectopic pregnancy, blighted ovum, urinary tract infection, hyperemesis gravidarum    32-year-old well-appearing female  presents with new onset vaginal bleeding that occurred today wants after having a bowel movement.  Patient states that she wiped and had a small amount of dark red blood on the tissue.  She is taken several home pregnancy tests which were all positive and estimates she is approximately 5-6 weeks pregnant based on the date that she had sex.  She does not have a  regular menstrual cycles and can not state in LMP.  Complaints of mild abdominal cramping with nausea and vomiting.  She is tachycardic with a pulse of 116.  Patient has refused IV fluids at this time.  Patient declines medication for nausea.  She would like to wait until she sees her OBGYN in the morning.  Patient is hypertensive with a blood pressure of 170/109.  Denies history of hypertension.  Denies chest pain.  Denies shortness of breath.  Lungs are clear.  Patient declines having a pelvic exam or vaginal screening for STDs.  She states she will see her OBGYN in the morning and have those tests done at that time.  CBC reviewed and is unremarkable.  CMP reviewed and potassium is 3.3.  Potassium replaced with 20 mEq p.o. Urinalysis reviewed and is negative for urinary tract infection.  Blood type is O positive and there is no need for RhoGAM at this time.  Quantitative HCG is 3000, increased from previous which was 250.    Transvaginal ultrasound reviewed:  Negative for ectopic pregnancy.    Well-defined gestational sac, though without evidence of active fetal pole or imaging priors. Findings may reflect presence of either a blighted ovum or early IUP. Recommend short-term follow-up for continuity of care as well as evaluation based on the patient's hCG status.    I explained to the patient that she is got a very early pregnancy at this time and we can not confirm fetal heart tones or fetal pole.  I stressed to her that she needs close follow-up.  I explained all test results and she agrees to see her OBGYN at the scheduled appointment in the morning.  I did explain to the patient that her heart rate is elevated which could be indicative of dehydration and it was recommended that she have fluids before discharging.  Patient refuses IV fluids, nausea medicine, pelvic exam, and vaginal screens.  She is requesting to sign an AMA form.    The likelihood of other entities in the differential is insufficient to  justify any further testing for them.  This was explained to the patient. The patient was advised that persistent or worsening symptoms would require further evaluations. Returned precautions discussed and discharge plan reviewed. Patient verbalizes understanding and when to return the emergency room.      Amount and/or Complexity of Data Reviewed  External Data Reviewed: labs.  Labs: ordered. Decision-making details documented in ED Course.  Radiology: ordered. Decision-making details documented in ED Course.    Risk  Prescription drug management.                               Clinical Impression:   Final diagnoses:  [O20.0] Threatened miscarriage in early pregnancy (Primary)  [N93.9] Vaginal bleeding        ED Disposition Condition    AMA Stable                Valeria Metzger, FREDERIC  09/07/23 0003

## 2023-09-06 NOTE — DISCHARGE INSTRUCTIONS
Please follow-up keep your appointment with your OBGYN tomorrow.  If you develop bright red vaginal bleeding with abdominal cramping please return to the emergency room.

## 2023-09-09 LAB
C TRACH RRNA SPEC QL PROBE: NEGATIVE
N GONORRHOEAE, AMPLIFIED DNA: NEGATIVE

## 2023-09-13 ENCOUNTER — PATIENT MESSAGE (OUTPATIENT)
Dept: FAMILY MEDICINE | Facility: CLINIC | Age: 33
End: 2023-09-13

## 2023-09-13 DIAGNOSIS — I10 PRIMARY HYPERTENSION: Primary | ICD-10-CM

## 2023-09-14 RX ORDER — METOPROLOL SUCCINATE 50 MG/1
50 TABLET, EXTENDED RELEASE ORAL DAILY
Qty: 30 TABLET | Refills: 11 | Status: SHIPPED | OUTPATIENT
Start: 2023-09-14 | End: 2024-09-13

## 2023-10-11 LAB — HBA1C MFR BLD: 5.6 % (ref 4–6)

## 2023-10-23 ENCOUNTER — OFFICE VISIT (OUTPATIENT)
Dept: FAMILY MEDICINE | Facility: CLINIC | Age: 33
End: 2023-10-23
Payer: MEDICAID

## 2023-10-23 VITALS
OXYGEN SATURATION: 98 % | WEIGHT: 293 LBS | SYSTOLIC BLOOD PRESSURE: 129 MMHG | DIASTOLIC BLOOD PRESSURE: 83 MMHG | HEART RATE: 96 BPM | HEIGHT: 65 IN | BODY MASS INDEX: 48.82 KG/M2

## 2023-10-23 DIAGNOSIS — J30.9 ALLERGIC RHINITIS, UNSPECIFIED SEASONALITY, UNSPECIFIED TRIGGER: ICD-10-CM

## 2023-10-23 DIAGNOSIS — E11.9 TYPE 2 DIABETES MELLITUS WITHOUT COMPLICATION, WITHOUT LONG-TERM CURRENT USE OF INSULIN: Primary | ICD-10-CM

## 2023-10-23 DIAGNOSIS — E78.49 OTHER HYPERLIPIDEMIA: ICD-10-CM

## 2023-10-23 DIAGNOSIS — I15.2 HYPERTENSION ASSOCIATED WITH DIABETES: ICD-10-CM

## 2023-10-23 DIAGNOSIS — E11.59 HYPERTENSION ASSOCIATED WITH DIABETES: ICD-10-CM

## 2023-10-23 PROCEDURE — 99214 PR OFFICE/OUTPT VISIT, EST, LEVL IV, 30-39 MIN: ICD-10-PCS | Mod: S$PBB,,, | Performed by: FAMILY MEDICINE

## 2023-10-23 PROCEDURE — 3008F PR BODY MASS INDEX (BMI) DOCUMENTED: ICD-10-PCS | Mod: CPTII,,, | Performed by: FAMILY MEDICINE

## 2023-10-23 PROCEDURE — 3044F PR MOST RECENT HEMOGLOBIN A1C LEVEL <7.0%: ICD-10-PCS | Mod: CPTII,,, | Performed by: FAMILY MEDICINE

## 2023-10-23 PROCEDURE — 3079F DIAST BP 80-89 MM HG: CPT | Mod: CPTII,,, | Performed by: FAMILY MEDICINE

## 2023-10-23 PROCEDURE — 3044F HG A1C LEVEL LT 7.0%: CPT | Mod: CPTII,,, | Performed by: FAMILY MEDICINE

## 2023-10-23 PROCEDURE — 3008F BODY MASS INDEX DOCD: CPT | Mod: CPTII,,, | Performed by: FAMILY MEDICINE

## 2023-10-23 PROCEDURE — 99213 OFFICE O/P EST LOW 20 MIN: CPT | Performed by: FAMILY MEDICINE

## 2023-10-23 PROCEDURE — 3079F PR MOST RECENT DIASTOLIC BLOOD PRESSURE 80-89 MM HG: ICD-10-PCS | Mod: CPTII,,, | Performed by: FAMILY MEDICINE

## 2023-10-23 PROCEDURE — 3074F PR MOST RECENT SYSTOLIC BLOOD PRESSURE < 130 MM HG: ICD-10-PCS | Mod: CPTII,,, | Performed by: FAMILY MEDICINE

## 2023-10-23 PROCEDURE — 1159F PR MEDICATION LIST DOCUMENTED IN MEDICAL RECORD: ICD-10-PCS | Mod: CPTII,,, | Performed by: FAMILY MEDICINE

## 2023-10-23 PROCEDURE — 1159F MED LIST DOCD IN RCRD: CPT | Mod: CPTII,,, | Performed by: FAMILY MEDICINE

## 2023-10-23 PROCEDURE — 3074F SYST BP LT 130 MM HG: CPT | Mod: CPTII,,, | Performed by: FAMILY MEDICINE

## 2023-10-23 PROCEDURE — 99214 OFFICE O/P EST MOD 30 MIN: CPT | Mod: S$PBB,,, | Performed by: FAMILY MEDICINE

## 2023-10-23 RX ORDER — ASPIRIN 81 MG/1
81 TABLET ORAL DAILY
COMMUNITY

## 2023-10-23 RX ORDER — CITALOPRAM 40 MG/1
40 TABLET, FILM COATED ORAL
COMMUNITY
Start: 2023-10-05 | End: 2024-01-04 | Stop reason: SDUPTHER

## 2023-10-23 RX ORDER — PROGESTERONE 200 MG/1
200 CAPSULE ORAL NIGHTLY
COMMUNITY
Start: 2023-09-29 | End: 2023-12-12

## 2023-10-23 RX ORDER — AZELASTINE 1 MG/ML
1 SPRAY, METERED NASAL 2 TIMES DAILY
Qty: 30 ML | Refills: 0 | Status: SHIPPED | OUTPATIENT
Start: 2023-10-23 | End: 2023-12-12

## 2023-10-23 RX ORDER — MONTELUKAST SODIUM 10 MG/1
10 TABLET ORAL NIGHTLY
Qty: 30 TABLET | Refills: 0 | Status: SHIPPED | OUTPATIENT
Start: 2023-10-23 | End: 2023-11-22

## 2023-10-23 RX ORDER — FLUTICASONE PROPIONATE 50 MCG
1 SPRAY, SUSPENSION (ML) NASAL DAILY
Qty: 11.1 ML | Refills: 1 | Status: SHIPPED | OUTPATIENT
Start: 2023-10-23 | End: 2023-12-12

## 2023-10-23 RX ORDER — ACETAMINOPHEN AND DIPHENHYDRAMINE HYDROCHLORIDE 500; 25 MG/1; MG/1
1 TABLET, FILM COATED ORAL NIGHTLY PRN
COMMUNITY
End: 2024-01-18

## 2023-10-23 NOTE — PROGRESS NOTES
SUBJECTIVE:    Patient ID: Jasper Guerrero is a 32 y.o. female.    Chief Complaint: Diabetes (Pt declined the Influenza vaccine)  32-year-old female  here today to follow up on her diabetes, pt was on Metformin and Ozempic, she is currently pregnant so her Ozempic was discontinued, she has lost weight. Will need to get A1C to determine if her medications need to be adjusted.     Pt today is complaining of bilateral ear fullness.        Almost all medications have been Held until after Pregnancy.    Significant past medical history  Agoraphobia/Anxiety:  Valium , Fluvoxamine (LUVOX) 300mg, Buspar 7.5  Trigeminal Neuralgia:   Depression: Effexor XR 225mg  PTSD:  DVT (upper extremity right arm 2019):  Fibromyalgia:  Insomnia:    GERD:  Pantoprazole 40 mg  Migraines: Fiorocet, Verapamil  Nausea associated with Migraines: Reglan, Zofran   HTN: Metoprolol 50mg     Specialists  Orthopedics:  Dr. Maher  Neurology:  Dr. Wilder Woodson, Dr Diaz (has been discharged)   Psychiatry: [facility]: Dr Kearney (Pt is trying to get back into treatment) Therapist Tonya  GYN: Dr CHIDI Reynolds  GI: Dr Villeda     Smoke: None  ETOH: None  Exercise: None     Diabetes  She presents for her follow-up diabetic visit. She has type 2 diabetes mellitus. Disease course: awaiting A1C. There are no hypoglycemic associated symptoms. Pertinent negatives for hypoglycemia include no confusion or headaches. Pertinent negatives for diabetes include no chest pain, no fatigue, no polydipsia, no polyuria and no weakness. There are no hypoglycemic complications. Symptoms are stable. Risk factors for coronary artery disease include obesity, sedentary lifestyle and hypertension.   Otalgia   There is pain in both ears. This is a new problem. The current episode started in the past 7 days. The problem has been unchanged. There has been no fever. The pain is at a severity of 2/10. The pain is mild. Pertinent negatives include no abdominal pain,  coughing, diarrhea, ear discharge, headaches, hearing loss, neck pain, rash, rhinorrhea, sore throat or vomiting. She has tried nothing for the symptoms. The treatment provided no relief.   Hypertension  This is a chronic problem. The current episode started more than 1 year ago. The problem is unchanged. The problem is controlled. Pertinent negatives include no chest pain, headaches, neck pain or palpitations. There are no associated agents to hypertension. Risk factors for coronary artery disease include diabetes mellitus, obesity and sedentary lifestyle. Past treatments include beta blockers. Compliance problems include exercise and diet.          Past Medical History:   Diagnosis Date    Agoraphobia     Carpal tunnel syndrome     Depression     Diabetes mellitus, type 2     DVT (deep venous thrombosis)     2019 had blood clot in right arm    Fibromyalgia     Migraine headache     Nerve injury 08/2016    Lingual Nerve Injury    Obese     Pain management     PTSD (post-traumatic stress disorder)     Trigeminal neuralgia      Social History     Socioeconomic History    Marital status:    Tobacco Use    Smoking status: Never    Smokeless tobacco: Never   Substance and Sexual Activity    Alcohol use: No    Drug use: No    Sexual activity: Yes     Partners: Male     Birth control/protection: Condom     Social Determinants of Health     Financial Resource Strain: Low Risk  (10/22/2023)    Overall Financial Resource Strain (CARDIA)     Difficulty of Paying Living Expenses: Not hard at all   Food Insecurity: No Food Insecurity (10/22/2023)    Hunger Vital Sign     Worried About Running Out of Food in the Last Year: Never true     Ran Out of Food in the Last Year: Never true   Transportation Needs: No Transportation Needs (10/22/2023)    PRAPARE - Transportation     Lack of Transportation (Medical): No     Lack of Transportation (Non-Medical): No   Physical Activity: Inactive (10/22/2023)    Exercise Vital Sign      Days of Exercise per Week: 0 days     Minutes of Exercise per Session: 0 min   Stress: Stress Concern Present (10/22/2023)    [employer] [place] [employer] - Occupational Stress Questionnaire     Feeling of Stress : To some extent   Social Connections: Unknown (10/22/2023)    Social Connection and Isolation Panel [NHANES]     Frequency of Communication with Friends and Family: More than three times a week     Frequency of Social Gatherings with Friends and Family: More than three times a week     Active Member of Clubs or Organizations: No     Attends Club or Organization Meetings: Never     Marital Status:    Housing Stability: Low Risk  (10/22/2023)    Housing Stability Vital Sign     Unable to Pay for Housing in the Last Year: No     Number of Places Lived in the Last Year: 1     Unstable Housing in the Last Year: No     Past Surgical History:   Procedure Laterality Date    CARPAL TUNNEL RELEASE Right 12/27/2018    Dr Lozada     CARPAL TUNNEL RELEASE Left 01/28/2020    Procedure: RELEASE, CARPAL TUNNEL;  Surgeon: Brendon Lozada Jr., MD;  Location: [facility];  Service: Orthopedics;  Laterality: Left;    CHOLECYSTECTOMY N/A 10/31/2022    MOUTH SURGERY      WISDOM TOOTH EXTRACTION       Family History   Problem Relation Age of Onset    No Known Problems Mother     No Known Problems Father     Cancer Maternal Aunt         endometrial    No Known Problems Maternal Uncle     No Known Problems Paternal Aunt     No Known Problems Paternal Uncle     No Known Problems Maternal Grandmother     No Known Problems Maternal Grandfather     No Known Problems Paternal Grandmother     No Known Problems Paternal Grandfather     No Known Problems Daughter     No Known Problems Son      Current Outpatient Medications   Medication Sig Dispense Refill    acetaminophen (TYLENOL ORAL) Take 500 mg by mouth 2 (two) times daily as needed.      aspirin (ECOTRIN) 81 MG EC tablet Take 81 mg by mouth once daily.      busPIRone  "(BUSPAR) 15 MG tablet Take 15 mg by mouth once daily.      citalopram (CELEXA) 40 MG tablet Take 40 mg by mouth.      diphenhydrAMINE-acetaminophen (TYLENOL PM EXTRA STRENGTH)  mg Tab Take 1 tablet by mouth nightly as needed.      metFORMIN (GLUCOPHAGE) 500 MG tablet Take 1 tablet (500 mg total) by mouth 2 (two) times daily with meals. 180 tablet 3    metoprolol succinate (TOPROL-XL) 50 MG 24 hr tablet Take 1 tablet (50 mg total) by mouth once daily. 30 tablet 11    prenatal vit no.124/iron/folic (PRENATAL VITAMIN ORAL) Take by mouth once daily.      progesterone (PROMETRIUM) 200 MG capsule Take 200 mg by mouth every evening.      azelastine (ASTELIN) 137 mcg (0.1 %) nasal spray 1 spray (137 mcg total) by Nasal route 2 (two) times daily. 30 mL 0    fluticasone propionate (FLONASE) 50 mcg/actuation nasal spray 1 spray (50 mcg total) by Each Nostril route once daily. 11.1 mL 1    montelukast (SINGULAIR) 10 mg tablet Take 1 tablet (10 mg total) by mouth every evening. 30 tablet 0     No current facility-administered medications for this visit.     Review of patient's allergies indicates:   Allergen Reactions    Benadryl [diphenhydramine hcl]      Paralysis on right side body     Codeine      Paralysis right body    Flexeril [cyclobenzaprine] Other (See Comments)     Numbness on right side of body    Nsaids (non-steroidal anti-inflammatory drug) Diarrhea and Nausea And Vomiting     Ulcers     Penicillins Anaphylaxis and Hives    Droperidol Anxiety and Other (See Comments)    Magnesium      "made me feel horrible"     Prochlorperazine Anxiety and Palpitations       Review of Systems   Constitutional:  Negative for activity change, diaphoresis, fatigue and unexpected weight change.   HENT:  Positive for ear pain. Negative for ear discharge, hearing loss, rhinorrhea, sore throat and trouble swallowing.    Eyes:  Negative for discharge and visual disturbance.   Respiratory:  Negative for cough, chest tightness and " "wheezing.    Cardiovascular:  Negative for chest pain and palpitations.   Gastrointestinal:  Negative for abdominal pain, blood in stool, constipation, diarrhea and vomiting.   Endocrine: Negative for polydipsia and polyuria.   Genitourinary:  Negative for difficulty urinating, dysuria, hematuria and menstrual problem.   Musculoskeletal:  Negative for arthralgias, joint swelling and neck pain.   Skin:  Negative for rash.   Neurological:  Negative for weakness and headaches.   Psychiatric/Behavioral:  Negative for confusion and dysphoric mood.           Blood pressure 129/83, pulse 96, height 5' 5" (1.651 m), weight (!) 140.6 kg (310 lb), last menstrual period 07/27/2023, SpO2 98 %. Body mass index is 51.59 kg/m².   Objective:      Physical Exam  Vitals reviewed.   Constitutional:       General: She is not in acute distress.     Appearance: Normal appearance. She is obese. She is not ill-appearing or toxic-appearing.   HENT:      Head: Normocephalic and atraumatic.      Ears:      Comments: Negative valsalve bilat.     Nose: Rhinorrhea present. No congestion.   Cardiovascular:      Rate and Rhythm: Normal rate and regular rhythm.   Pulmonary:      Effort: Pulmonary effort is normal.      Breath sounds: Normal breath sounds.   Skin:     General: Skin is warm and dry.      Capillary Refill: Capillary refill takes less than 2 seconds.   Neurological:      Mental Status: She is alert and oriented to person, place, and time.             Assessment:       1. Type 2 diabetes mellitus without complication, without long-term current use of insulin    2. Other hyperlipidemia    3. Allergic rhinitis, unspecified seasonality, unspecified trigger    4. Hypertension associated with diabetes         Plan:           Type 2 diabetes mellitus without complication, without long-term current use of insulin  -     Hemoglobin A1C; Future; Expected date: 10/23/2023  -     Microalbumin/creatinine urine ratio; Future; Expected date: " 10/23/2023  Will continue on metformin, evaluate her A1C to determine if treatment needs to be adjusted.    Other hyperlipidemia  -     Lipid Panel; Future; Expected date: 10/23/2023  Evl her lipids, will not restart statin until after delivery.    Allergic rhinitis, unspecified seasonality, unspecified trigger  -     fluticasone propionate (FLONASE) 50 mcg/actuation nasal spray; 1 spray (50 mcg total) by Each Nostril route once daily.  Dispense: 11.1 mL; Refill: 1  -     azelastine (ASTELIN) 137 mcg (0.1 %) nasal spray; 1 spray (137 mcg total) by Nasal route 2 (two) times daily.  Dispense: 30 mL; Refill: 0  -     montelukast (SINGULAIR) 10 mg tablet; Take 1 tablet (10 mg total) by mouth every evening.  Dispense: 30 tablet; Refill: 0  Will treat for Allergic rhinitis to see if this resolves her eustacian tube dysfunction.    Hypertension associated with diabetes  Bp well controll will continue on her BB.

## 2023-12-01 ENCOUNTER — PATIENT MESSAGE (OUTPATIENT)
Dept: FAMILY MEDICINE | Facility: CLINIC | Age: 33
End: 2023-12-01

## 2023-12-05 ENCOUNTER — TELEPHONE (OUTPATIENT)
Dept: MATERNAL FETAL MEDICINE | Facility: CLINIC | Age: 33
End: 2023-12-05
Payer: MEDICAID

## 2023-12-05 DIAGNOSIS — Z36.89 ENCOUNTER FOR FETAL ANATOMIC SURVEY: Primary | ICD-10-CM

## 2023-12-05 NOTE — TELEPHONE ENCOUNTER
Spoke with patient and scheduled her for a Ultrasound and consult with Maternal Fetal Medicine on 12/12 at 10 AM.  Patient verbalized her understanding.

## 2023-12-06 ENCOUNTER — PATIENT MESSAGE (OUTPATIENT)
Dept: FAMILY MEDICINE | Facility: CLINIC | Age: 33
End: 2023-12-06

## 2023-12-11 PROBLEM — O24.919: Status: ACTIVE | Noted: 2023-12-11

## 2023-12-11 PROBLEM — O10.919 CHRONIC HYPERTENSION AFFECTING PREGNANCY: Status: ACTIVE | Noted: 2023-12-11

## 2023-12-12 ENCOUNTER — PATIENT MESSAGE (OUTPATIENT)
Dept: MATERNAL FETAL MEDICINE | Facility: CLINIC | Age: 33
End: 2023-12-12

## 2023-12-12 ENCOUNTER — PROCEDURE VISIT (OUTPATIENT)
Dept: MATERNAL FETAL MEDICINE | Facility: CLINIC | Age: 33
End: 2023-12-12
Payer: MEDICAID

## 2023-12-12 ENCOUNTER — OFFICE VISIT (OUTPATIENT)
Dept: MATERNAL FETAL MEDICINE | Facility: CLINIC | Age: 33
End: 2023-12-12
Payer: MEDICAID

## 2023-12-12 VITALS
WEIGHT: 293 LBS | HEIGHT: 65 IN | SYSTOLIC BLOOD PRESSURE: 129 MMHG | BODY MASS INDEX: 48.82 KG/M2 | DIASTOLIC BLOOD PRESSURE: 69 MMHG

## 2023-12-12 DIAGNOSIS — O10.919 CHRONIC HYPERTENSION AFFECTING PREGNANCY: ICD-10-CM

## 2023-12-12 DIAGNOSIS — Z36.89 ENCOUNTER FOR FETAL ANATOMIC SURVEY: ICD-10-CM

## 2023-12-12 DIAGNOSIS — Z79.891 CHRONIC PRESCRIPTION OPIATE USE: ICD-10-CM

## 2023-12-12 DIAGNOSIS — O24.919 DIABETES MELLITUS COMPLICATING PREGNANCY, UNSPECIFIED TRIMESTER: Primary | ICD-10-CM

## 2023-12-12 PROCEDURE — 3074F PR MOST RECENT SYSTOLIC BLOOD PRESSURE < 130 MM HG: ICD-10-PCS | Mod: CPTII,,, | Performed by: STUDENT IN AN ORGANIZED HEALTH CARE EDUCATION/TRAINING PROGRAM

## 2023-12-12 PROCEDURE — 1159F PR MEDICATION LIST DOCUMENTED IN MEDICAL RECORD: ICD-10-PCS | Mod: CPTII,,, | Performed by: STUDENT IN AN ORGANIZED HEALTH CARE EDUCATION/TRAINING PROGRAM

## 2023-12-12 PROCEDURE — 1160F PR REVIEW ALL MEDS BY PRESCRIBER/CLIN PHARMACIST DOCUMENTED: ICD-10-PCS | Mod: CPTII,,, | Performed by: STUDENT IN AN ORGANIZED HEALTH CARE EDUCATION/TRAINING PROGRAM

## 2023-12-12 PROCEDURE — 99204 OFFICE O/P NEW MOD 45 MIN: CPT | Mod: S$PBB,TH,, | Performed by: STUDENT IN AN ORGANIZED HEALTH CARE EDUCATION/TRAINING PROGRAM

## 2023-12-12 PROCEDURE — 76811 OB US DETAILED SNGL FETUS: CPT | Mod: PBBFAC | Performed by: STUDENT IN AN ORGANIZED HEALTH CARE EDUCATION/TRAINING PROGRAM

## 2023-12-12 PROCEDURE — 99213 OFFICE O/P EST LOW 20 MIN: CPT | Mod: PBBFAC,25,TH | Performed by: STUDENT IN AN ORGANIZED HEALTH CARE EDUCATION/TRAINING PROGRAM

## 2023-12-12 PROCEDURE — 3074F SYST BP LT 130 MM HG: CPT | Mod: CPTII,,, | Performed by: STUDENT IN AN ORGANIZED HEALTH CARE EDUCATION/TRAINING PROGRAM

## 2023-12-12 PROCEDURE — 1160F RVW MEDS BY RX/DR IN RCRD: CPT | Mod: CPTII,,, | Performed by: STUDENT IN AN ORGANIZED HEALTH CARE EDUCATION/TRAINING PROGRAM

## 2023-12-12 PROCEDURE — 3008F BODY MASS INDEX DOCD: CPT | Mod: CPTII,,, | Performed by: STUDENT IN AN ORGANIZED HEALTH CARE EDUCATION/TRAINING PROGRAM

## 2023-12-12 PROCEDURE — 76811 US MFM PROCEDURE (VIEWPOINT): ICD-10-PCS | Mod: 26,S$PBB,, | Performed by: STUDENT IN AN ORGANIZED HEALTH CARE EDUCATION/TRAINING PROGRAM

## 2023-12-12 PROCEDURE — 3044F PR MOST RECENT HEMOGLOBIN A1C LEVEL <7.0%: ICD-10-PCS | Mod: CPTII,,, | Performed by: STUDENT IN AN ORGANIZED HEALTH CARE EDUCATION/TRAINING PROGRAM

## 2023-12-12 PROCEDURE — 3078F DIAST BP <80 MM HG: CPT | Mod: CPTII,,, | Performed by: STUDENT IN AN ORGANIZED HEALTH CARE EDUCATION/TRAINING PROGRAM

## 2023-12-12 PROCEDURE — 3078F PR MOST RECENT DIASTOLIC BLOOD PRESSURE < 80 MM HG: ICD-10-PCS | Mod: CPTII,,, | Performed by: STUDENT IN AN ORGANIZED HEALTH CARE EDUCATION/TRAINING PROGRAM

## 2023-12-12 PROCEDURE — 99999 PR PBB SHADOW E&M-EST. PATIENT-LVL III: ICD-10-PCS | Mod: PBBFAC,,, | Performed by: STUDENT IN AN ORGANIZED HEALTH CARE EDUCATION/TRAINING PROGRAM

## 2023-12-12 PROCEDURE — 99999 PR PBB SHADOW E&M-EST. PATIENT-LVL III: CPT | Mod: PBBFAC,,, | Performed by: STUDENT IN AN ORGANIZED HEALTH CARE EDUCATION/TRAINING PROGRAM

## 2023-12-12 PROCEDURE — 99204 PR OFFICE/OUTPT VISIT, NEW, LEVL IV, 45-59 MIN: ICD-10-PCS | Mod: S$PBB,TH,, | Performed by: STUDENT IN AN ORGANIZED HEALTH CARE EDUCATION/TRAINING PROGRAM

## 2023-12-12 PROCEDURE — 3008F PR BODY MASS INDEX (BMI) DOCUMENTED: ICD-10-PCS | Mod: CPTII,,, | Performed by: STUDENT IN AN ORGANIZED HEALTH CARE EDUCATION/TRAINING PROGRAM

## 2023-12-12 PROCEDURE — 3044F HG A1C LEVEL LT 7.0%: CPT | Mod: CPTII,,, | Performed by: STUDENT IN AN ORGANIZED HEALTH CARE EDUCATION/TRAINING PROGRAM

## 2023-12-12 PROCEDURE — 1159F MED LIST DOCD IN RCRD: CPT | Mod: CPTII,,, | Performed by: STUDENT IN AN ORGANIZED HEALTH CARE EDUCATION/TRAINING PROGRAM

## 2023-12-12 NOTE — ASSESSMENT & PLAN NOTE
Age of Dx: 31  Comorbidities: MO, HTN  Prepregnancy regimen: Metformin 500 BID, Patient is insulin naive   Baseline HgA1c: 6.9% 2023. Per patient, most recent A1c as of  was 5.4%  Current regimen (as of 2023):  Metformin 500 BID  Blood glucose log review:  Patient did not bring blood glucose logs with her. She reports her fasting blood glucoses range between . Post prandials for all meals between 70s-100s. Denies hypoglycemic episodes.      Risks of pre-existing diabetes in pregnancy include increased risk of anomalies, macrosomia with subsequent difficult delivery, delayed fetal pulmonary maturation, potential  hypoglycemia and an increased risk of  jaundice. Maternal risks are also elevated with inadequate control, including diabetic ketoacidosis/hyperglycemic hyperosmolar syndrome, preeclampsia and  delivery. Tight glycemic control is the most critical component of care for the diabetic pregnant patient. While the above risks cannot be completely avoided, their likelihood can be dramatically reduced with attentive and responsive management of blood sugars.    High levels of hyperglycemia can correlate with devastating fetal outcomes, with rates of congenital anomalies that dramatically increase with increasing A1c. The most common anomalies are complex cardiac defects, neural tube defects and skeletal malformations. HbA1c ranging near normal (5-6%) have rates of fetal malformations comparable to the general population (2-3%), while A1c of 10% and over can have anomaly rates at least 4 times that of the general populations, even up to 25%. As these defects relate to hyperglycemia through organogenesis, correction of this hyperglycemia moving forward in pregnancy will not attenuate that risk.   Optimization of blood sugars with insulin is the gold standard for medical management of diabetes in pregnancy. While a subset of type II patients who came into pregnancy on oral  hypoglycemics can be effectively managed with their preexisting medication(s), evidence bears out that the majority of pregnant diabetics will be able to achieve the best outcomes with insulin. In our practice, we generally tend to use a basal/bolus pattern involving NPH and short acting insulin. Other effective regimens include long acting insulin. Choice of insulin should be determined based on the clinical situation.     Recommendations:  Blood glucose control  - ADA diet.   - We recommend consultation with a diabetic educator during pregnancy to optimize diet.   - Pregnancy blood glucose checking regimen: Fasting + 2 hour postprandial (after meals) three times daily. Discussed importance of tracking blood sugars and sending in glucose logs in.   - Target blood glucose levels: <95 fasting, <120 postprandial  - Labor considerations: During labor, it is important to monitor maternal blood glucose levels. Fingerstick blood sugars are obtained hourly until stable and then every other hour through latent labor, increasing to hourly during active labor. The goal is  mg/dl. An insulin drip is a reasonable option for suboptimally controlled blood sugars.   - Regimen: Continue metformin 500 BID     2. Prenatal care  - Add folic acid 1 mg to prenatal vitamin given risk of neural tube defects. She should continue folic acid supplementation through her reproductive years.  - ASA 81 mg daily  - Eye exam during pregnancy.   - Delivery timing: Well controlled without comorbidities - 38w0d to 38w6d  Suboptimal control / polyhydramnios / BMI > 40 / EFW > 90% - 37w0d - 37w6d  Preexisting vascular complications or comorbidities - 36w0d    3. Labwork  - Establish baseline preeclampsia labs, including creatinine, transaminases and urine protein screen  - Given risk of concurrent autoimmune disease, check TSH  - HgA1c each trimester    4. Diabetes care (primary team to coordinate)  - Schedule eye exam  - Diabetic foot exam  -  Order EKG  - Order maternal transthoracic echocardiogram  - HgA1c each trimester    5. Imaging & Antepartum Fetal Surveillance  - Targeted midtrimester congenital anomaly screen performed today, however incomplete   - Repeat targeted anatomy in 3 weeks  - Fetal echo at 22-24 weeks - needs  - Monthly third trimester growth US  - Twice weekly prenatal testing to start at 32 weeks     6. Follow Up  - Patient to send in blood sugars weekly via [employer]  - Follow up with MFM in 1 week for blood sugar review/regimen adjustment

## 2023-12-12 NOTE — ASSESSMENT & PLAN NOTE
Dx at age 31. Prior to pregnancy was taking metoprolol 50mg for both cHTN and palpitations.   Does not check blood pressures at home but does have a cuff at home.     Today I counseled the patient on maternal/fetal risks associated with CHTN during pregnancy. Risks include but not limited to fetal growth restriction, miscarriage, abruption, maternal end organ disease (renal failure, MI, and stroke),  delivery, development of superimposed preeclampsia, and eclampsia. She was counseled on the recommendations for blood pressure control, serial ultrasound for fetal growth assessment and  testing, and timing of delivery. I also counseled her on the recommendation for aspirin 81 mg daily which may decrease her risk of developing superimposed preeclampsia.     Recommendations:  Continued close observation of patient's blood pressures. Avoid hypotension as this has been associated with uteroplacental insufficiency.  Recommend treatment to a goal blood pressure < 140/90  For women with evidence of end-organ damage (prior CVA, renal disease, etc) or co-morbid conditions (ie diabetes), a lower threshold may be recommended  Twice weekly testing if control is poor, multiple comorbidities are present, or requires several medications for control   Delivery timing:  Controlled on single agent, comorbid conditions*: 37 0/7 - 38 6/7 weeks gestation  Uncontrolled or requiring ? 2 medications: 36 0/7 - 37 6/7 weeks gestation    *Comorbid conditions include BMI >= 40, diabetes, and complex medical condition associated with placental dysfunction (ie lupus or other vascular disease)  Delivery may be recommended earlier pending results of fetal growth ultrasounds, AFV assessment, or antepartum testing results.

## 2023-12-12 NOTE — PROGRESS NOTES
MATERNAL-FETAL MEDICINE   CONSULT NOTE    Provider requesting consultation: Dr. Reynolds    SUBJECTIVE:     Ms. Jasper Guerrero is a 33 y.o.  female with IUP at 19w1d who is seen in consultation by MFM for evaluation and management of:  Problem   Chronic Opiate Use   Diabetes Mellitus Complicating Pregnancy, Unspecified Trimester   Chronic Hypertension Affecting Pregnancy   Obesity Affecting Pregnancy in Second Trimester     Patient presents for consultation for above problems. Problem based HPI as below:     cHTN: Patient was diagnosed about a year and a half ago. Was previously on lisinopril but patient stopped as she did not like side effects (urination). She is on metoprolol 50 that is also used to control her palpitations. Patient doesn't check blood pressures at home. Reports earlier this year was having blood pressures in 140s but recently was in 120s-130s.     T2DM: Diagnosed about 2 years ago. She is insulin naive, currently on metformin 500mg BID. She has not met with diabetes education and has not ever gotten a CGM. She has been checking fasting and 2 hour pp sugars. Reports sugars in fasting are . Post prandials 70s-120s.     Chronic pain/trigeminal neuralgia: Patient has long h/o of trigeminal neuralgia. She has been on narcotic pain medication and was being managed by a pain management doctor. Most recently she was on percocet 10mg TID, however she successfully weaned off. Reports okay control without narcotics and that she has recent flares with weather changes. Interested in restarting medication.     Migraines: currently controlled with tylenol and rare Fioricet.     OB History: 2 prior SVDs at 39 and 40 weeks. Denies h/o pre-e.        Medication List with Changes/Refills   Current Medications    ACETAMINOPHEN (TYLENOL ORAL)    Take 500 mg by mouth 2 (two) times daily as needed.    ASPIRIN (ECOTRIN) 81 MG EC TABLET    Take 81 mg by mouth once daily.    BUSPIRONE (BUSPAR) 15 MG TABLET     "Take 15 mg by mouth once daily.    CITALOPRAM (CELEXA) 40 MG TABLET    Take 40 mg by mouth.    DIPHENHYDRAMINE-ACETAMINOPHEN (TYLENOL PM EXTRA STRENGTH)  MG TAB    Take 1 tablet by mouth nightly as needed.    METFORMIN (GLUCOPHAGE) 500 MG TABLET    Take 1 tablet (500 mg total) by mouth 2 (two) times daily with meals.    METOPROLOL SUCCINATE (TOPROL-XL) 50 MG 24 HR TABLET    Take 1 tablet (50 mg total) by mouth once daily.    PRENATAL VIT NO.124/IRON/FOLIC (PRENATAL VITAMIN ORAL)    Take by mouth once daily.   Discontinued Medications    AZELASTINE (ASTELIN) 137 MCG (0.1 %) NASAL SPRAY    1 spray (137 mcg total) by Nasal route 2 (two) times daily.    FLUTICASONE PROPIONATE (FLONASE) 50 MCG/ACTUATION NASAL SPRAY    1 spray (50 mcg total) by Each Nostril route once daily.    PROGESTERONE (PROMETRIUM) 200 MG CAPSULE    Take 200 mg by mouth every evening.       Review of patient's allergies indicates:   Allergen Reactions    Benadryl [diphenhydramine hcl]      Paralysis on right side body     Codeine      Paralysis right body    Flexeril [cyclobenzaprine] Other (See Comments)     Numbness on right side of body    Nsaids (non-steroidal anti-inflammatory drug) Diarrhea and Nausea And Vomiting     Ulcers     Penicillins Anaphylaxis and Hives    Droperidol Anxiety and Other (See Comments)    Magnesium      "made me feel horrible"     Prochlorperazine Anxiety and Palpitations       PMH:  Past Medical History:   Diagnosis Date    Agoraphobia     Carpal tunnel syndrome     Depression     Diabetes mellitus, type 2     DVT (deep venous thrombosis)     2019 had blood clot in right arm    Fibromyalgia     Migraine headache     Nerve injury 2016    Lingual Nerve Injury    Obese     Pain management     PTSD (post-traumatic stress disorder)     Trigeminal neuralgia        PObHx:  OB History    Para Term  AB Living   3 1 1     1   SAB IAB Ectopic Multiple Live Births           1      # Outcome Date GA Lbr Berto/ " "Weight Sex Delivery Anes PTL Lv   3 Current            2 Term 05/14/15 40w0d  3.771 kg (8 lb 5 oz) M Vag-Spont  N ABBY   1                 PSH:  Past Surgical History:   Procedure Laterality Date    CARPAL TUNNEL RELEASE Right 2018    Dr Lozada     CARPAL TUNNEL RELEASE Left 2020    Procedure: RELEASE, CARPAL TUNNEL;  Surgeon: Brendon Lozada Jr., MD;  Location: [facility];  Service: Orthopedics;  Laterality: Left;    CHOLECYSTECTOMY N/A 10/31/2022    MOUTH SURGERY      WISDOM TOOTH EXTRACTION         Family history:family history includes Cancer in her maternal aunt; No Known Problems in her daughter, father, maternal grandfather, maternal grandmother, maternal uncle, mother, paternal aunt, paternal grandfather, paternal grandmother, paternal uncle, and son.    Social history: reports that she has never smoked. She has never used smokeless tobacco. She reports that she does not drink alcohol and does not use drugs.    Genetic history:  The patient denies any inherited genetic diseases or birth defects in herself or her partner's personal history or family.    Objective:   /69 (BP Location: Left arm, Patient Position: Sitting, BP Method: Large (Automatic))   Ht 5' 5" (1.651 m)   Wt (!) 139.3 kg (307 lb 1.6 oz)   LMP 2023 (Approximate)   BMI 51.10 kg/m²     Ultrasound performed. See viewpoint for full ultrasound report.    Significant labs/imaging:  Lab Results   Component Value Date    HGB 14.2 2023    HCT 42.4 2023     2023    CREATININE 0.7 2023    AST 23 2023    ALT 31 2023        ASSESSMENT/PLAN:     33 y.o.  female with IUP at 19w1d     Diabetes mellitus complicating pregnancy, unspecified trimester  Age of Dx: 31  Comorbidities: MO, HTN  Prepregnancy regimen: Metformin 500 BID, Patient is insulin naive   Baseline HgA1c: 6.9% 2023. Per patient, most recent A1c as of  was 5.4%  Current regimen (as of 2023):  Metformin 500 " BID  Blood glucose log review:  Patient did not bring blood glucose logs with her. She reports her fasting blood glucoses range between . Post prandials for all meals between 70s-100s. Denies hypoglycemic episodes.      Risks of pre-existing diabetes in pregnancy include increased risk of anomalies, macrosomia with subsequent difficult delivery, delayed fetal pulmonary maturation, potential  hypoglycemia and an increased risk of  jaundice. Maternal risks are also elevated with inadequate control, including diabetic ketoacidosis/hyperglycemic hyperosmolar syndrome, preeclampsia and  delivery. Tight glycemic control is the most critical component of care for the diabetic pregnant patient. While the above risks cannot be completely avoided, their likelihood can be dramatically reduced with attentive and responsive management of blood sugars.    High levels of hyperglycemia can correlate with devastating fetal outcomes, with rates of congenital anomalies that dramatically increase with increasing A1c. The most common anomalies are complex cardiac defects, neural tube defects and skeletal malformations. HbA1c ranging near normal (5-6%) have rates of fetal malformations comparable to the general population (2-3%), while A1c of 10% and over can have anomaly rates at least 4 times that of the general populations, even up to 25%. As these defects relate to hyperglycemia through organogenesis, correction of this hyperglycemia moving forward in pregnancy will not attenuate that risk.   Optimization of blood sugars with insulin is the gold standard for medical management of diabetes in pregnancy. While a subset of type II patients who came into pregnancy on oral hypoglycemics can be effectively managed with their preexisting medication(s), evidence bears out that the majority of pregnant diabetics will be able to achieve the best outcomes with insulin. In our practice, we generally tend to use a  basal/bolus pattern involving NPH and short acting insulin. Other effective regimens include long acting insulin. Choice of insulin should be determined based on the clinical situation.     Recommendations:  Blood glucose control  - ADA diet.   - We recommend consultation with a diabetic educator during pregnancy to optimize diet.   - Pregnancy blood glucose checking regimen: Fasting + 2 hour postprandial (after meals) three times daily. Discussed importance of tracking blood sugars and sending in glucose logs in.   - Target blood glucose levels: <95 fasting, <120 postprandial  - Labor considerations: During labor, it is important to monitor maternal blood glucose levels. Fingerstick blood sugars are obtained hourly until stable and then every other hour through latent labor, increasing to hourly during active labor. The goal is  mg/dl. An insulin drip is a reasonable option for suboptimally controlled blood sugars.   - Regimen: Continue metformin 500 BID     2. Prenatal care  - Add folic acid 1 mg to prenatal vitamin given risk of neural tube defects. She should continue folic acid supplementation through her reproductive years.  - ASA 81 mg daily  - Eye exam during pregnancy.   - Delivery timing: Well controlled without comorbidities - 38w0d to 38w6d  Suboptimal control / polyhydramnios / BMI > 40 / EFW > 90% - 37w0d - 37w6d  Preexisting vascular complications or comorbidities - 36w0d    3. Labwork  - Establish baseline preeclampsia labs, including creatinine, transaminases and urine protein screen  - Given risk of concurrent autoimmune disease, check TSH  - HgA1c each trimester    4. Diabetes care (primary team to coordinate)  - Schedule eye exam  - Diabetic foot exam  - Order EKG  - Order maternal transthoracic echocardiogram  - HgA1c each trimester    5. Imaging & Antepartum Fetal Surveillance  - Targeted midtrimester congenital anomaly screen performed today, however incomplete   - Repeat targeted  anatomy in 3 weeks  - Fetal echo at 22-24 weeks - needs  - Monthly third trimester growth US  - Twice weekly prenatal testing to start at 32 weeks     6. Follow Up  - Patient to send in blood sugars weekly via [employer]  - Follow up with MFM in 1 week for blood sugar review/regimen adjustment     Chronic hypertension affecting pregnancy  Dx at age 31. Prior to pregnancy was taking metoprolol 50mg for both cHTN and palpitations.   Does not check blood pressures at home but does have a cuff at home.     Today I counseled the patient on maternal/fetal risks associated with CHTN during pregnancy. Risks include but not limited to fetal growth restriction, miscarriage, abruption, maternal end organ disease (renal failure, MI, and stroke),  delivery, development of superimposed preeclampsia, and eclampsia. She was counseled on the recommendations for blood pressure control, serial ultrasound for fetal growth assessment and  testing, and timing of delivery. I also counseled her on the recommendation for aspirin 81 mg daily which may decrease her risk of developing superimposed preeclampsia.     Recommendations:  Continued close observation of patient's blood pressures. Avoid hypotension as this has been associated with uteroplacental insufficiency.  Recommend treatment to a goal blood pressure < 140/90  For women with evidence of end-organ damage (prior CVA, renal disease, etc) or co-morbid conditions (ie diabetes), a lower threshold may be recommended  Twice weekly testing if control is poor, multiple comorbidities are present, or requires several medications for control   Delivery timing:  Controlled on single agent, comorbid conditions*: 37 0/7 - 38 6/7 weeks gestation  Uncontrolled or requiring ? 2 medications: 36 0/7 - 37 6/7 weeks gestation    *Comorbid conditions include BMI >= 40, diabetes, and complex medical condition associated with placental dysfunction (ie lupus or other vascular  disease)  Delivery may be recommended earlier pending results of fetal growth ultrasounds, AFV assessment, or antepartum testing results.        Chronic Opiate Use  - Patient has h/o of chronic opiate use for her trigeminal neuralgia.   - Patient was following with pain management. She was on percocet 10mg TID and was successfully weaned off around ~5 wga.   - Patient reports pain partially controlled on tylenol 1000mg BID.   - Reports recent painful flares given recent triggers (cold weather).  - Patient is interested in re-establishing care for narcotic prescription. Prior pain management physician no longer prescribing since she is pregnant.   - Discussed reaching out to neurology for pain management while pregnant. Patient has not seen neurology in over a year.         Obesity affecting pregnancy in second trimester  Obesity   risks associated with obesity which include and increased risk of hypertension, preeclampsia, gestational diabetes, venous thromboembolic disease,  delivery, macrosomia (with resultant shoulder dystocia, obstetric sphincter injury), IUFD, longer first stage of labor, decreased TOLAC success rate, emergent & scheduled  & complications of  (prolonged OR time, delayed delivery, excessive EBL, infection, wound separation/infection, higher spinal failure rate, more difficult intubation).  Obesity is also associated with fetal anomalies, specifically neural tube defects.  Studies have shown that the rate of complication increases with rising BMI and thus pregnancies with maternal morbid obesity are at increased risk.      Recommendations:  Discussed that TWG goal is 11-20 lbs  Consider early 1 hr GCT (between 14-16 weeks gestation); repeat at 24-28 weeks gestation  Continue low dose aspirin 81 mg daily preeclampsia risk reduction  For  ultrasound and prenatal testing recommendations, please see cHTN/T2DM problems  Lovenox 40 mg BID for VTE prophylaxis  while admitted to the hospital (antepartum or postpartum) if BMI >= 40.   Encouraged breastfeeding  Postpartum lifestyle modifications & weight loss        FOLLOW UP:   F/u in one week for virtual  blood sugar log check  F/u in 3 weeks for US/MFM visit      Mami Galicia  Maternal-Fetal Medicine    Electronically Signed by Mami Galicia December 12, 2023

## 2023-12-12 NOTE — ASSESSMENT & PLAN NOTE
Obesity   risks associated with obesity which include and increased risk of hypertension, preeclampsia, gestational diabetes, venous thromboembolic disease,  delivery, macrosomia (with resultant shoulder dystocia, obstetric sphincter injury), IUFD, longer first stage of labor, decreased TOLAC success rate, emergent & scheduled  & complications of  (prolonged OR time, delayed delivery, excessive EBL, infection, wound separation/infection, higher spinal failure rate, more difficult intubation).  Obesity is also associated with fetal anomalies, specifically neural tube defects.  Studies have shown that the rate of complication increases with rising BMI and thus pregnancies with maternal morbid obesity are at increased risk.      Recommendations:  Discussed that TWG goal is 11-20 lbs  Consider early 1 hr GCT (between 14-16 weeks gestation); repeat at 24-28 weeks gestation  Continue low dose aspirin 81 mg daily preeclampsia risk reduction  For  ultrasound and prenatal testing recommendations, please see cHTN/T2DM problems  Lovenox 40 mg BID for VTE prophylaxis while admitted to the hospital (antepartum or postpartum) if BMI >= 40.   Encouraged breastfeeding  Postpartum lifestyle modifications & weight loss

## 2023-12-12 NOTE — ASSESSMENT & PLAN NOTE
- Patient has h/o of chronic opiate use for her trigeminal neuralgia.   - Patient was following with pain management. She was on percocet 10mg TID and was successfully weaned off around ~5 wga.   - Patient reports pain partially controlled on tylenol 1000mg BID.   - Reports recent painful flares given recent triggers (cold weather).  - Patient is interested in re-establishing care for narcotic prescription. Prior pain management physician no longer prescribing since she is pregnant.   - Discussed reaching out to neurology for pain management while pregnant. Patient has not seen neurology in over a year.

## 2023-12-13 ENCOUNTER — PATIENT MESSAGE (OUTPATIENT)
Dept: MATERNAL FETAL MEDICINE | Facility: CLINIC | Age: 33
End: 2023-12-13
Payer: MEDICAID

## 2023-12-14 ENCOUNTER — PATIENT MESSAGE (OUTPATIENT)
Dept: MATERNAL FETAL MEDICINE | Facility: CLINIC | Age: 33
End: 2023-12-14
Payer: MEDICAID

## 2023-12-19 ENCOUNTER — PATIENT MESSAGE (OUTPATIENT)
Dept: MATERNAL FETAL MEDICINE | Facility: CLINIC | Age: 33
End: 2023-12-19
Payer: MEDICAID

## 2023-12-19 ENCOUNTER — CLINICAL SUPPORT (OUTPATIENT)
Dept: DIABETES | Facility: CLINIC | Age: 33
End: 2023-12-19
Payer: MEDICAID

## 2023-12-19 DIAGNOSIS — O24.919 DIABETES MELLITUS COMPLICATING PREGNANCY, UNSPECIFIED TRIMESTER: ICD-10-CM

## 2023-12-19 DIAGNOSIS — O24.919 DIABETES MELLITUS COMPLICATING PREGNANCY, UNSPECIFIED TRIMESTER: Primary | ICD-10-CM

## 2023-12-19 PROCEDURE — 99999PBSHW PR PBB SHADOW TECHNICAL ONLY FILED TO HB: ICD-10-PCS | Mod: PBBFAC,,,

## 2023-12-19 PROCEDURE — G0108 DIAB MANAGE TRN  PER INDIV: HCPCS | Mod: PBBFAC,PO | Performed by: DIETITIAN, REGISTERED

## 2023-12-19 PROCEDURE — 99999PBSHW PR PBB SHADOW TECHNICAL ONLY FILED TO HB: Mod: PBBFAC,,,

## 2023-12-19 PROCEDURE — 99212 OFFICE O/P EST SF 10 MIN: CPT | Mod: PBBFAC,PO | Performed by: DIETITIAN, REGISTERED

## 2023-12-19 PROCEDURE — 99999 PR PBB SHADOW E&M-EST. PATIENT-LVL II: CPT | Mod: PBBFAC,,, | Performed by: DIETITIAN, REGISTERED

## 2023-12-19 PROCEDURE — 99999 PR PBB SHADOW E&M-EST. PATIENT-LVL II: ICD-10-PCS | Mod: PBBFAC,,, | Performed by: DIETITIAN, REGISTERED

## 2023-12-19 RX ORDER — METFORMIN HYDROCHLORIDE 1000 MG/1
1000 TABLET ORAL
Qty: 90 TABLET | Refills: 3 | Status: SHIPPED | OUTPATIENT
Start: 2023-12-19 | End: 2024-01-18 | Stop reason: ALTCHOICE

## 2023-12-19 NOTE — TELEPHONE ENCOUNTER
Current regimen:   - metformin 500 BID     Blood sugar review (8 days):  - Fasting 8/8 above target (range )  Postprandial  - Breakfast 1/4 above target (range )  - Lunch 2/5 above target (range )  - Dinner 2/6 above target (range )    New regimen:   - Metformin 500mg AM and 1000mg PM

## 2023-12-21 VITALS — WEIGHT: 293 LBS | HEIGHT: 65 IN | BODY MASS INDEX: 48.82 KG/M2

## 2023-12-21 NOTE — PROGRESS NOTES
"Diabetes Care Specialist Progress Note  Author: Anisha Andrews RD  Date: 12/21/2023    Program Intake  Reason for Diabetes Program Visit:: Initial Diabetes Assessment  Current diabetes risk level:: moderate  In the last 12 months, have you:: none  Permission to speak with others about care:: no    Lab Results   Component Value Date    HGBA1C 6.9 (H) 04/17/2023       Clinical    Weight: (!) 139.4 kg (307 lb 6.9 oz)   Height: 5' 5" (165.1 cm)   Body mass index is 51.16 kg/m².    Problem Review  Reviewed Problem List with Patient: yes  Active comorbidities affecting diabetes self-care.: yes  Comorbidities: Other (comment) (hypertension in pregnacy)  Reviewed health maintenance: yes    Clinical Assessment  Current Diabetes Treatment: Oral Medication (Metformin 500 mg morning, 1000 mg evening)  Have you ever experienced hypoglycemia (low blood sugar)?: yes  In the last month, how often have you experienced low blood sugar?: once every other week  Are you able to tell when your blood sugar is low?: Yes  What symptoms do you experience?: Shaky, Dizzy/Light-headed  Have you ever been hospitalized because your blood sugar was too low?: no  How do you treat hypoglycemia (low blood sugar)?: other (ate something)  Have you ever experienced hyperglycemia (high blood sugar)?: no    Medication Information  How do you obtain your medications?: Patient drives  How many days a week do you miss your medications?: Never  Do you sometimes have difficulty refilling your medications?: No  Medication adherence impacting ability to self-manage diabetes?: No    Labs  Do you have regular lab work to monitor your medications?: Yes  Type of Regular Lab Work: A1c  Where do you get your labs drawn?: [facility]  Lab Compliance Barriers: No    Nutritional Status  Diet: Regular  Meal Plan 24 Hour Recall: Breakfast, Lunch, Dinner, Snack  Meal Plan 24 Hour Recall - Breakfast: 4 powdered donuts, code red mountain dew. Usually a Sprite with granola bar, " bagel, or donut  Meal Plan 24 Hour Recall - Lunch: turkey and cheese sandwich, Lean Cuisine, 6 in subway sandwich or [place] [employer]  Meal Plan 24 Hour Recall - Dinner: 6 in Subway sandwich or [place] [employer], grilled chicken or pork chip, noodles  Meal Plan 24 Hour Recall - Snack: chips and salsa or queso, Che[employer]  Change in appetite?: No  Dentation:: Intact  Current nutritional status an area of need that is impacting patient's ability to self-manage diabetes?: No    Additional Social History    Support  Does anyone support you with your diabetes care?: yes  Who supports you?: self  Who takes you to your medical appointments?: self  Does the current support meet the patient's needs?: Yes  Is Support an area impacting ability to self-manage diabetes?: No    Access to Mass Media & Technology  Does the patient have access to any of the following devices or technologies?: Smart phone, Internet Access  Media or technology needs impacting ability to self-manage diabetes?: No    Cognitive/Behavioral Health  Alert and Oriented: Yes  Difficulty Thinking: No  Requires Prompting: No  Requires assistance for routine expression?: No  Cognitive or behavioral barriers impacting ability to self-manage diabetes?: No    Culture/Mosque  Culture or Lutheran beliefs that may impact ability to access healthcare: No    Communication  Language preference: English  Hearing Problems: No  Vision Problems: No  Communication needs impacting ability to self-manage diabetes?: No    Health Literacy  Preferred Learning Method: Face to Face  How often do you need to have someone help you read instructions, pamphlets, or written material from your doctor or pharmacy?: Never  Health literacy needs impacting ability to self-manage diabetes?: No      Diabetes Self-Management Skills Assessment    Diabetes Disease Process/Treatment Options  Patient/caregiver able to state what happens when someone has diabetes.: yes  Patient/caregiver knows what type of  diabetes they have.: yes  Diabetes Type : Type II (Type 2 diabetes in pregnancy)  Patient/caregiver able to identify at least three signs and symptoms of diabetes.: no  Patient able to identify at least three risk factors for diabetes.: no  Diabetes Disease Process/Treatment Options: Skills Assessment Completed: Yes  Assessment indicates:: Instruction Needed  Area of need?: Yes    Nutrition/Healthy Eating  Challenges to healthy eating:: eating out, going to parties, portion control  Method of carbohydrate measurement:: no method  Nutrition/Healthy Eating Skills Assessment Completed:: Yes  Assessment indicates:: Instruction Needed, Knowledge deficit  Area of need?: Yes    Physical Activity/Exercise  Physical Activity/Exercise Skills Assessment Completed: : No  Deffered due to:: Time    Medications  Patient is able to describe current diabetes management routine.: yes  Diabetes management routine:: oral medications (Metformin)  Patient is able to identify current diabetes medications, dosages, and appropriate timing of medications.: yes  Patient understands the purpose of the medications taken for diabetes.: yes  Patient reports problems or concerns with current medication regimen.: no  Medication Skills Assessment Completed:: Yes  Assessment indicates:: Adequate understanding  Area of need?: No    Home Blood Glucose Monitoring  Patient states that blood sugar is checked at home daily.: yes  Monitoring Method:: home glucometer  How often do you check your blood sugar?: 4 times a day  When do you check your blood sugar?: Before breakfast, 2 hours after meal  When you check what is your typical blood sugar range? : See BG log  Blood glucose logs:: yes, encouraged to keep logs, encouraged to bring logs to provider visits  Blood glucose logs reviewed today?: yes  Home Blood Glucose Monitoring Skills Assessment Completed: : Yes  Assessment indicates:: Adequate understanding  Area of need?: No    Acute  Complications  Patient is able to identify types of acute complications: No  Acute Complications Skills Assessment Completed: : Yes  Assessment indicates:: Instruction Needed, Knowledge deficit  Area of need?: Yes    Chronic Complications  Chronic Complications Skills Assessment Completed: : No  Deferred due to:: Time    Psychosocial/Coping  Patient can identify ways of coping with chronic disease.: yes  Patient-stated ways of coping with chronic disease:: support from loved ones  Psychosocial/Coping Skills Assessment Completed: : Yes  Assessment indicates:: Adequate understanding  Area of need?: No      Assessment Summary and Plan    Based on today's diabetes care assessment, the following areas of need were identified:          12/19/2023    12:01 AM   Social   Support No   Access to Mass Media/Tech No   Cognitive/Behavioral Health No   Culture/Taoist No   Communication No   Health Literacy No            12/19/2023    12:01 AM   Clinical   Medication Adherence No   Lab Compliance No   Nutritional Status No            12/19/2023    12:01 AM   Diabetes Self-Management Skills   Diabetes Disease Process/Treatment Options Yes, see care plan   Nutrition/Healthy Eating Yes, see care plan   Medication No   Home Blood Glucose Monitoring No   Acute Complications Yes, see care plan   Psychosocial/Coping No          Today's interventions were provided through individual discussion, instruction, and written materials were provided.      Patient verbalized understanding of instruction and written materials.  Pt was able to return back demonstration of instructions today. Patient understood key points, needs reinforcement and further instruction.     Diabetes Self-Management Care Plan:    Today's Diabetes Self-Management Care Plan was developed with Jasper's input. Jasper has agreed to work toward the following goal(s) to improve his/her overall diabetes control.      Care Plan: Diabetes Management   Updates made since  11/21/2023 12:00 AM        Problem: Disease Process         Goal: Patient agrees to take steps toward understanding the diabetes disease process and treatment options by [facility] and removing sugar sweetened beverages from diet.    Start Date: 12/19/2023   Expected End Date: 4/23/2024   Priority: Low   Barriers: Lack of Motivation to Change        Task: Provided a basic introduction of the diabetes disease process, diagnosis, progression, and how diabetes can be successfully managed. Completed 12/21/2023        Task: Reviewed the following risk factors associated with diabetes: Being overweight, family history, reduced activity, ethnicity, age over 40, past history of Gestational DM         Task: Reviewed the following common signs and symptoms of diabetes:  Increased Thirst, Frequent Urination, Fatigue, Sexual Dysfunction, Blurred Vision, Frequent and Slow healing of infections         Task: Reviewed different types of diabetes Type 1, Type 2, Gestational, Prediabetes, and other forms, and described how each type is managed and reviewed different myths and stereotypes commonly associated with diabetes. Completed 12/21/2023        Task: Emphasized on the importance of controlling diabetes to prevent complications and reviewed both the short-term and long-term effects of uncontrolled diabetes. Completed 12/21/2023        Problem: Acute Complications         Goal: Patient agrees to identify and manage signs and symptoms of low blood sugar (hypoglycemia) by keeping a log of events and using proper treatment.    Start Date: 12/19/2023   Expected End Date: 4/23/2024   Priority: Medium   Barriers: No Barriers Identified   Note:    Treat hypoglycemic episodes under 60 mg/dl with protein and 15 g carbohydrates.  Repeat 15 minutes later until above 60 mg/dl if needed.       Task: Reviewed proper treatment of hypoglycemia with the rule of 15--patient to eat 15g simple carbohydrate (4 glucose tablets, 1 glucose gel, 5 pieces  hard candy, ½ cup fruit juice, ½ can regular soda, etc) and wait 15 minutes and recheck home glucose.         Task: Reviewed common causes and precautions to help prevent hyper/hypoglycemic events. Completed 12/21/2023        Task: Reviewed signs and symptoms of hyper/hypoglycemia, what range is considered to be hyper/hypoglycemia, and when to seek further medical attention. Completed 12/21/2023        Task: Discussed, sick day planning, natural disaster planning, and/or travel planning to prevent hyper/hypoglycemia.         Task: Discussed risk factors for developing diabetic ketoacidosis (DKA), strategies for reducing risk, testing with ketone test strips if BG is >240mg/dl, basic protocol for managing DKA, and when to seek further medical attention.         Problem: Healthy Eating         Goal: No regular sodas. Follow plate method guidelines at meals. Snacks in between meals can be 1-2 servings of carbohydrates + protein.  Max 60 g total carbohydrates per meal from frozen meals.    Start Date: 12/19/2023   Expected End Date: 4/23/2024   Priority: High   Barriers: No Barriers Identified   Note:    Find diet, sugar free, or zero alternatives to drinks.  Protein must be paired each time having a carbohydrates.  Will revisit fat content of meals if cannot get 2 hour post prandial under 120 mg/dl.  Showed her how to read food label for frozen meals.  Gave list of serving sizes of fruit and other carbohydrates.         Task: Reviewed the sources and role of Carbohydrate, Protein, and Fat and how each nutrient impacts blood sugar. Completed 12/21/2023        Task: Provided visual examples using dry measuring cups, food models, and other familiar objects such as computer mouse, deck or cards, tennis ball etc. to help with visualization of portions. Completed 12/21/2023        Task: Explained how to count carbohydrates using the food label and the use of dry measuring cups for accurate carb counting. Completed 12/21/2023         Task: Discussed strategies for choosing healthier menu options when dining out. Completed 12/21/2023        Task: Recommended replacing beverages containing high sugar content with noncaloric/sugar free options and/or water. Completed 12/21/2023        Task: Review the importance of balancing carbohydrates with each meal using portion control techniques to count servings of carbohydrate and label reading to identify serving size and amount of total carbs per serving. Completed 12/21/2023        Task: Provided Sample plate method and reviewed the use of the plate to estimate amounts of carbohydrate per meal.           Follow Up Plan     Follow up if symptoms worsen or fail to improve, for continued education of diabetes type 2 in pregnancy..  Asked patient to follow up for Type 2 diabetes education after delivery.  Focused on diet changes.  Patient drinks multiple sodas and powderade drinks a day and eating fast food often.  Reviewed how to treat hypoglycemic episode. She has experienced multiple episodes since she has been pregnant.      Today's care plan and follow up schedule was discussed with patient.  Jasper verbalized understanding of the care plan, goals, and agrees to follow up plan.        The patient was encouraged to communicate with his/her health care provider/physician and care team regarding his/her condition(s) and treatment.  I provided the patient with my contact information today and encouraged to contact me via phone or [facility] Patient Portal as needed.     Length of Visit   Total Time: 60 Minutes

## 2023-12-26 ENCOUNTER — PATIENT MESSAGE (OUTPATIENT)
Dept: MATERNAL FETAL MEDICINE | Facility: CLINIC | Age: 33
End: 2023-12-26
Payer: MEDICAID

## 2023-12-26 ENCOUNTER — TELEPHONE (OUTPATIENT)
Dept: MATERNAL FETAL MEDICINE | Facility: CLINIC | Age: 33
End: 2023-12-26
Payer: MEDICAID

## 2023-12-26 NOTE — TELEPHONE ENCOUNTER
Patient cancelled appointment thinking it was for today and not tomorrow.  Patient rescheduled back in same spot for tomorrow.    ----- Message from Stef Trent MA sent at 12/26/2023 11:48 AM CST -----  Patient called in regards to rescheduling her bs check appointment.    Patient contact: [phone]

## 2023-12-27 ENCOUNTER — OFFICE VISIT (OUTPATIENT)
Dept: MATERNAL FETAL MEDICINE | Facility: CLINIC | Age: 33
End: 2023-12-27
Payer: MEDICAID

## 2023-12-27 DIAGNOSIS — O16.2 HYPERTENSION AFFECTING PREGNANCY IN SECOND TRIMESTER: ICD-10-CM

## 2023-12-27 DIAGNOSIS — O24.415 GESTATIONAL DIABETES MELLITUS (GDM) CONTROLLED ON ORAL HYPOGLYCEMIC DRUG, ANTEPARTUM: Primary | ICD-10-CM

## 2023-12-27 DIAGNOSIS — O16.3 HYPERTENSION AFFECTING PREGNANCY IN THIRD TRIMESTER: ICD-10-CM

## 2023-12-27 PROCEDURE — 3044F HG A1C LEVEL LT 7.0%: CPT | Mod: CPTII,95,, | Performed by: OBSTETRICS & GYNECOLOGY

## 2023-12-27 PROCEDURE — 99214 OFFICE O/P EST MOD 30 MIN: CPT | Mod: TH,95,, | Performed by: OBSTETRICS & GYNECOLOGY

## 2023-12-27 PROCEDURE — 3044F PR MOST RECENT HEMOGLOBIN A1C LEVEL <7.0%: ICD-10-PCS | Mod: CPTII,95,, | Performed by: OBSTETRICS & GYNECOLOGY

## 2023-12-27 PROCEDURE — 99214 PR OFFICE/OUTPT VISIT, EST, LEVL IV, 30-39 MIN: ICD-10-PCS | Mod: TH,95,, | Performed by: OBSTETRICS & GYNECOLOGY

## 2023-12-27 RX ORDER — METFORMIN HYDROCHLORIDE 1000 MG/1
1000 TABLET ORAL 2 TIMES DAILY WITH MEALS
Qty: 180 TABLET | Refills: 3 | Status: CANCELLED | OUTPATIENT
Start: 2023-12-27 | End: 2024-12-26

## 2023-12-27 RX ORDER — METFORMIN HYDROCHLORIDE 1000 MG/1
1000 TABLET ORAL 2 TIMES DAILY WITH MEALS
Qty: 180 TABLET | Refills: 3 | Status: SHIPPED | OUTPATIENT
Start: 2023-12-27 | End: 2024-12-26

## 2023-12-27 NOTE — PROGRESS NOTES
MATERNAL-FETAL MEDICINE   CONSULT NOTE     Provider requesting consultation: Dr. Reynolds     SUBJECTIVE:      Ms. Jasper Guerrero is a 33 y.o.  female with IUP at 21w3d who is seen in consultation by M for evaluation and management of:  Problem   Chronic Opiate Use   Diabetes Mellitus Complicating Pregnancy, Unspecified Trimester   Chronic Hypertension Affecting Pregnancy   Obesity Affecting Pregnancy in Second Trimester           cHTN: Patient was diagnosed about a year and a half ago. Was previously on lisinopril but patient stopped as she did not like side effects (urination). She is on metoprolol 50 that is also used to control her palpitations. Patient doesn't check blood pressures at home. Reports earlier this year was having blood pressures in 140s but recently was in 120s-130s.      T2DM: Diagnosed about 2 years ago. She is insulin naive, currently on metformin 500mg BID. She has not met with diabetes education and has not ever gotten a CGM. She has been checking fasting and 2 hour pp sugars. Reports sugars in fasting are . Post prandials 70s-120s.      Chronic pain/trigeminal neuralgia: Patient has long h/o of trigeminal neuralgia. She has been on narcotic pain medication and was being managed by a pain management doctor. Most recently she was on percocet 10mg TID, however she successfully weaned off. Reports okay control without narcotics and that she has recent flares with weather changes. Interested in restarting medication.      Migraines: currently controlled with tylenol and rare Fioricet.      OB History: 2 prior SVDs at 39 and 40 weeks. Denies h/o pre-e.           Medication List with Changes/Refills   Current Medications     ACETAMINOPHEN (TYLENOL ORAL)    Take 500 mg by mouth 2 (two) times daily as needed.     ASPIRIN (ECOTRIN) 81 MG EC TABLET    Take 81 mg by mouth once daily.     BUSPIRONE (BUSPAR) 15 MG TABLET    Take 15 mg by mouth once daily.     CITALOPRAM (CELEXA) 40 MG TABLET    " Take 40 mg by mouth.     DIPHENHYDRAMINE-ACETAMINOPHEN (TYLENOL PM EXTRA STRENGTH)  MG TAB    Take 1 tablet by mouth nightly as needed.     METFORMIN (GLUCOPHAGE) 500 MG TABLET    Take 1 tablet (500 mg total) by mouth 2 (two) times daily with meals.     METOPROLOL SUCCINATE (TOPROL-XL) 50 MG 24 HR TABLET    Take 1 tablet (50 mg total) by mouth once daily.     PRENATAL VIT NO.124/IRON/FOLIC (PRENATAL VITAMIN ORAL)    Take by mouth once daily.   Discontinued Medications     AZELASTINE (ASTELIN) 137 MCG (0.1 %) NASAL SPRAY    1 spray (137 mcg total) by Nasal route 2 (two) times daily.     FLUTICASONE PROPIONATE (FLONASE) 50 MCG/ACTUATION NASAL SPRAY    1 spray (50 mcg total) by Each Nostril route once daily.     PROGESTERONE (PROMETRIUM) 200 MG CAPSULE    Take 200 mg by mouth every evening.               Review of patient's allergies indicates:   Allergen Reactions    Benadryl [diphenhydramine hcl]         Paralysis on right side body     Codeine         Paralysis right body    Flexeril [cyclobenzaprine] Other (See Comments)       Numbness on right side of body    Nsaids (non-steroidal anti-inflammatory drug) Diarrhea and Nausea And Vomiting       Ulcers     Penicillins Anaphylaxis and Hives    Droperidol Anxiety and Other (See Comments)    Magnesium         "made me feel horrible"     Prochlorperazine Anxiety and Palpitations         PMH:       Past Medical History:   Diagnosis Date    Agoraphobia      Carpal tunnel syndrome      Depression      Diabetes mellitus, type 2      DVT (deep venous thrombosis)       2019 had blood clot in right arm    Fibromyalgia      Migraine headache      Nerve injury 2016     Lingual Nerve Injury    Obese      Pain management      PTSD (post-traumatic stress disorder)      Trigeminal neuralgia           PObHx:                   OB History    Para Term  AB Living   3 1 1     1   SAB IAB Ectopic Multiple Live Births              1          # Outcome Date GA Lbr " Berto/2nd Weight Sex Delivery Anes PTL Lv   3 Current                     2 Term 05/14/15 40w0d   3.771 kg (8 lb 5 oz) M Vag-Spont   N ABBY   1                            PSH:        Past Surgical History:   Procedure Laterality Date    CARPAL TUNNEL RELEASE Right 2018     Dr Lozada     CARPAL TUNNEL RELEASE Left 2020     Procedure: RELEASE, CARPAL TUNNEL;  Surgeon: Brendon Lozada Jr., MD;  Location: [facility];  Service: Orthopedics;  Laterality: Left;    CHOLECYSTECTOMY N/A 10/31/2022    MOUTH SURGERY        WISDOM TOOTH EXTRACTION             Family history:family history includes Cancer in her maternal aunt; No Known Problems in her daughter, father, maternal grandfather, maternal grandmother, maternal uncle, mother, paternal aunt, paternal grandfather, paternal grandmother, paternal uncle, and son.     Social history: reports that she has never smoked. She has never used smokeless tobacco. She reports that she does not drink alcohol and does not use drugs.     Genetic history:  The patient denies any inherited genetic diseases or birth defects in herself or her partner's personal history or family.     Objective:   VS: None, Virtual Visit           Significant labs/imaging:        Lab Results   Component Value Date     HGB 14.2 2023     HCT 42.4 2023      2023     CREATININE 0.7 2023     AST 23 2023     ALT 31 2023         ASSESSMENT/PLAN:      33 y.o.  female with IUP at 21w3d      Diabetes mellitus complicating pregnancy, unspecified trimester  Age of Dx: 31  Comorbidities: MO, HTN  Prepregnancy regimen: Metformin 500 BID, Patient is insulin naive   Baseline HgA1c: 6.9% 2023. Per patient, most recent A1c as of  was 5.4%  Current regimen (as of 2023):  Metformin 500 BID  Blood glucose log review:      Prev Counseled:  See Dr. Zendejas's note.      Recommendations:  Blood glucose control  - ADA diet.   - We recommend consultation with a  diabetic educator during pregnancy to optimize diet.   - Pregnancy blood glucose checking regimen: Fasting + 2 hour postprandial (after meals) three times daily. Discussed importance of tracking blood sugars and sending in glucose logs in.   - Target blood glucose levels: <95 fasting, <120 postprandial  - Labor considerations: During labor, it is important to monitor maternal blood glucose levels. Fingerstick blood sugars are obtained hourly until stable and then every other hour through latent labor, increasing to hourly during active labor. The goal is  mg/dl. An insulin drip is a reasonable option for suboptimally controlled blood sugars.   - Regimen: Continue metformin 500 BID      2. Prenatal care  - Add folic acid 1 mg to prenatal vitamin given risk of neural tube defects. She should continue folic acid supplementation through her reproductive years.  - ASA 81 mg daily  - Eye exam during pregnancy.   - Delivery timing: Well controlled without comorbidities - 38w0d to 38w6d  Suboptimal control / polyhydramnios / BMI > 40 / EFW > 90% - 37w0d - 37w6d  Preexisting vascular complications or comorbidities - 36w0d     3. Labwork  - Establish baseline preeclampsia labs, including creatinine, transaminases and urine protein screen  - Given risk of concurrent autoimmune disease, check TSH  - HgA1c each trimester     4. Diabetes care (primary team to coordinate)  - Schedule eye exam  - Diabetic foot exam  - Order EKG  - Order maternal transthoracic echocardiogram  - HgA1c each trimester     5. Imaging & Antepartum Fetal Surveillance  - Targeted midtrimester congenital anomaly screen performed today, however incomplete   - Repeat targeted anatomy in 3 weeks  - Fetal echo at 22-24 weeks - needs  - Monthly third trimester growth US  - Twice weekly prenatal testing to start at 32 weeks      6. Follow Up  - Patient to send in blood sugars weekly via [facility]  - Follow up with me today to adjust medications and assess  BS.  PBS reviewed.  Metformin increased to 1000 mg BID .  Anticipate patient will need insulin therapy.      Chronic hypertension affecting pregnancy  Dx at age 31. Prior to pregnancy was taking metoprolol 50mg for both cHTN and palpitations.   Does not check blood pressures at home but does have a cuff at home.    Prev Counseled      Recommendations:  Continued close observation of patient's blood pressures. Avoid hypotension as this has been associated with uteroplacental insufficiency.  Recommend treatment to a goal blood pressure < 140/90  For women with evidence of end-organ damage (prior CVA, renal disease, etc) or co-morbid conditions (ie diabetes), a lower threshold may be recommended  Twice weekly testing if control is poor, multiple comorbidities are present, or requires several medications for control   Delivery timing:  Controlled on single agent, comorbid conditions*: 37 0/7 - 38 6/7 weeks gestation  Uncontrolled or requiring ? 2 medications: 36 0/7 - 37 6/7 weeks gestation     *Comorbid conditions include BMI >= 40, diabetes, and complex medical condition associated with placental dysfunction (ie lupus or other vascular disease)  Delivery may be recommended earlier pending results of fetal growth ultrasounds, AFV assessment, or antepartum testing results.           Chronic Opiate Use  - Patient has h/o of chronic opiate use for her trigeminal neuralgia.   - Patient was following with pain management. She was on percocet 10mg TID and was successfully weaned off around ~5 wga.   - Patient reports pain partially controlled on tylenol 1000mg BID.   - Reports recent painful flares given recent triggers (cold weather).  - Patient is interested in re-establishing care for narcotic prescription. Prior pain management physician no longer prescribing since she is pregnant.   - Discussed reaching out to neurology for pain management while pregnant. Patient has not seen neurology in over a year.            Obesity  affecting pregnancy in second trimester  Obesity  Prev Counseled:  See Dr. Zendejas's note     Recommendations:  Discussed that TWG goal is 11-20 lbs  Consider early 1 hr GCT (between 14-16 weeks gestation); repeat at 24-28 weeks gestation  Continue low dose aspirin 81 mg daily preeclampsia risk reduction  For  ultrasound and prenatal testing recommendations, please see cHTN/T2DM problems  Lovenox 40 mg BID for VTE prophylaxis while admitted to the hospital (antepartum or postpartum) if BMI >= 40.   Encouraged breastfeeding  Postpartum lifestyle modifications & weight loss           FOLLOW UP:   Patient has an appt at [facility] next Thursday  To keep appointment and we will review BS at that visit.  Anticipate patient will require insulin therapy  The patient location is: home  The chief complaint leading to consultation is: BS check  Rxn to pharmacy metformin 1,000 mg BID     Visit type: audiovisual    Face to Face time with patient: 12  20 minutes of total time spent on the encounter, which includes face to face time and non-face to face time preparing to see the patient (eg, review of tests), Obtaining and/or reviewing separately obtained history, Documenting clinical information in the electronic or other health record, Independently interpreting results (not separately reported) and communicating results to the patient/family/caregiver, or Care coordination (not separately reported).  Each patient to whom he or she provides medical services by telemedicine is:  (1) informed of the relationship between the physician and patient and the respective role of any other health care provider with respect to management of the patient; and (2) notified that he or she may decline to receive medical services by telemedicine and may withdraw from such care at any time.

## 2024-01-04 ENCOUNTER — PATIENT MESSAGE (OUTPATIENT)
Dept: PEDIATRIC CARDIOLOGY | Facility: CLINIC | Age: 34
End: 2024-01-04
Payer: MEDICAID

## 2024-01-04 ENCOUNTER — PATIENT MESSAGE (OUTPATIENT)
Dept: MATERNAL FETAL MEDICINE | Facility: CLINIC | Age: 34
End: 2024-01-04

## 2024-01-04 ENCOUNTER — PROCEDURE VISIT (OUTPATIENT)
Dept: MATERNAL FETAL MEDICINE | Facility: CLINIC | Age: 34
End: 2024-01-04
Attending: FAMILY MEDICINE
Payer: MEDICAID

## 2024-01-04 ENCOUNTER — PATIENT MESSAGE (OUTPATIENT)
Dept: FAMILY MEDICINE | Facility: CLINIC | Age: 34
End: 2024-01-04
Payer: MEDICAID

## 2024-01-04 ENCOUNTER — HOSPITAL ENCOUNTER (OUTPATIENT)
Facility: HOSPITAL | Age: 34
Discharge: HOME | End: 2024-01-04
Attending: SPECIALIST | Admitting: OBSTETRICS & GYNECOLOGY
Payer: MEDICAID

## 2024-01-04 ENCOUNTER — OFFICE VISIT (OUTPATIENT)
Dept: MATERNAL FETAL MEDICINE | Facility: CLINIC | Age: 34
End: 2024-01-04
Payer: MEDICAID

## 2024-01-04 ENCOUNTER — TELEPHONE (OUTPATIENT)
Dept: PEDIATRIC CARDIOLOGY | Facility: CLINIC | Age: 34
End: 2024-01-04
Payer: MEDICAID

## 2024-01-04 VITALS — SYSTOLIC BLOOD PRESSURE: 134 MMHG | DIASTOLIC BLOOD PRESSURE: 77 MMHG | BODY MASS INDEX: 50.59 KG/M2 | WEIGHT: 293 LBS

## 2024-01-04 DIAGNOSIS — O24.919 DIABETES MELLITUS COMPLICATING PREGNANCY, UNSPECIFIED TRIMESTER: Primary | ICD-10-CM

## 2024-01-04 DIAGNOSIS — O10.919 CHRONIC HYPERTENSION AFFECTING PREGNANCY: ICD-10-CM

## 2024-01-04 DIAGNOSIS — N89.8 VAGINAL DISCHARGE: ICD-10-CM

## 2024-01-04 DIAGNOSIS — O24.919 DIABETES IN PREGNANCY: ICD-10-CM

## 2024-01-04 DIAGNOSIS — Z36.89 ENCOUNTER FOR ULTRASOUND TO ASSESS FETAL GROWTH: ICD-10-CM

## 2024-01-04 DIAGNOSIS — F41.9 ANXIETY: Primary | ICD-10-CM

## 2024-01-04 PROCEDURE — 59025 FETAL NON-STRESS TEST: CPT

## 2024-01-04 PROCEDURE — 1159F MED LIST DOCD IN RCRD: CPT | Mod: CPTII,,, | Performed by: OBSTETRICS & GYNECOLOGY

## 2024-01-04 PROCEDURE — 3008F BODY MASS INDEX DOCD: CPT | Mod: CPTII,,, | Performed by: OBSTETRICS & GYNECOLOGY

## 2024-01-04 PROCEDURE — 99213 OFFICE O/P EST LOW 20 MIN: CPT | Mod: PBBFAC,TH,PN | Performed by: OBSTETRICS & GYNECOLOGY

## 2024-01-04 PROCEDURE — 3078F DIAST BP <80 MM HG: CPT | Mod: CPTII,,, | Performed by: OBSTETRICS & GYNECOLOGY

## 2024-01-04 PROCEDURE — 3075F SYST BP GE 130 - 139MM HG: CPT | Mod: CPTII,,, | Performed by: OBSTETRICS & GYNECOLOGY

## 2024-01-04 PROCEDURE — 76816 OB US FOLLOW-UP PER FETUS: CPT | Mod: PBBFAC | Performed by: OBSTETRICS & GYNECOLOGY

## 2024-01-04 PROCEDURE — 99214 OFFICE O/P EST MOD 30 MIN: CPT | Mod: TH,25,S$PBB, | Performed by: OBSTETRICS & GYNECOLOGY

## 2024-01-04 PROCEDURE — 99999 PR PBB SHADOW E&M-EST. PATIENT-LVL III: CPT | Mod: PBBFAC,,, | Performed by: OBSTETRICS & GYNECOLOGY

## 2024-01-04 RX ORDER — CITALOPRAM 40 MG/1
40 TABLET, FILM COATED ORAL DAILY
Qty: 90 TABLET | Refills: 0 | Status: SHIPPED | OUTPATIENT
Start: 2024-01-04

## 2024-01-04 RX ORDER — INSULIN HUMAN 100 [IU]/ML
8 INJECTION, SUSPENSION SUBCUTANEOUS NIGHTLY
Qty: 2.4 ML | Refills: 6 | Status: SHIPPED | OUTPATIENT
Start: 2024-01-04 | End: 2024-01-18 | Stop reason: SDUPTHER

## 2024-01-04 RX ORDER — BUSPIRONE HYDROCHLORIDE 15 MG/1
15 TABLET ORAL DAILY
Qty: 90 TABLET | Refills: 1 | Status: SHIPPED | OUTPATIENT
Start: 2024-01-04

## 2024-01-04 NOTE — PROGRESS NOTES
Maternal Fetal Medicine follow up consult    SUBJECTIVE:     Jasper Guerrero is a 33 y.o.  female with IUP at 22w4d  who is seen in follow up consultation by MFM.  Pregnancy complications include:   Problem   Diabetes Mellitus Complicating Pregnancy, Unspecified Trimester   Chronic Hypertension Affecting Pregnancy       Previous notes reviewed.   No changes to medical, surgical, family, social, or obstetric history.    Interval history since last MFM visit: no changes. Feels well. The patient reports adequate fetal movement. She denies leakage of fluid, vaginal bleeding, contractions.  The patient denies symptoms of pre-eclampsia including headache, blurred vision, right upper quadrant pain, chest pain, and shortness of breath.     Medications:  PNV  ASA  Metoprolol 50 daily  Metformin 1000 BID     OBJECTIVE:     Blood Pressure: 134/77    Ultrasound performed. See viewpoint for full ultrasound report.      ASSESSMENT/PLAN:     33 y.o.  female with IUP at 22w4d     Problems addressed at today's visit:  Chronic hypertension affecting pregnancy  See previous MFM consultation for full recommendations.  The patient's blood pressure has been normal since her last visit.    Recommendations:  - If needed, complete baseline evaluation as recommended in initial consultation (baseline preE labs, EKG/Echo)  - Continue aspirin 81 mg daily for preeclampsia risk reduction  - Continue current medications: metoprolol 50 daily  - Serial fetal growth ultrasounds every 4-6 weeks, beginning at 26-28 weeks.   - Continue close observation of patient's blood pressures. Avoid hypotension as this has been associated with uteroplacental insufficiency.  -Recommend treatment to a goal blood pressure < 140/90  - Initiate antepartum testing at 32 weeks  - Delivery timing:  No medications, no comorbid conditions: 39 0/7 - 39 6/7 weeks gestation  No medications, comorbid conditions: 38 0/7 - 38 6/7 weeks gestation  Controlled on single  agent, no comorbid conditions: 38 0/7 - 38 6/7 weeks gestation  Controlled on single agent, comorbid conditions: 37 0/7 - 38 6/7 weeks gestation  Uncontrolled or requiring ? 2 medications: 36 0/7 - 37 6/7 weeks gestation  Comorbid conditions include BMI >= 40, diabetes, and complex medical condition associated with placental dysfunction (ie lupus or other vascular disease)  Delivery may be recommended earlier pending results of fetal growth ultrasounds, AFV assessment, or antepartum testing results.    Diabetes mellitus complicating pregnancy, unspecified trimester  Age of Dx: 31  Comorbidities: MO, HTN  Prepregnancy regimen: Metformin 500 BID, Patient is insulin naive   Baseline HgA1c: 6.9% 4/2023. Per patient, most recent A1c as of 12/23 was 5.4%  Current regimen (as of 01/04/2024):  Metformin 1000 BID  Blood glucose log review:  BG log reviewed, in Media tab. All fasting BGs elevated. Minimal postprandial BGs evaluated. Patient hasn't cut out as many carbs as she could.      Recommendations:  Blood glucose control  - ADA diet  - We recommend consultation with a diabetic educator during pregnancy to optimize diet.   - Pregnancy blood glucose checking regimen: Fasting + 2 hour postprandial (after meals) three times daily. Discussed importance of tracking blood sugars and sending in glucose logs in.   - Target blood glucose levels: <95 fasting, <120 postprandial  - Labor considerations: During labor, it is important to monitor maternal blood glucose levels. Fingerstick blood sugars are obtained hourly until stable and then every other hour through latent labor, increasing to hourly during active labor. The goal is  mg/dl. An insulin drip is a reasonable option for suboptimally controlled blood sugars.   - Regimen: Continue metformin 1000 BID, NPH 8u qhs sent to pharmacy today    2. Prenatal care  - Add folic acid 1 mg to prenatal vitamin given risk of neural tube defects. She should continue folic acid  supplementation through her reproductive years.  - ASA 81 mg daily  - Eye exam during pregnancy.   - Delivery timin weeks unless indicated sooner    3. Labwork  - Establish baseline preeclampsia labs, including creatinine, transaminases and urine protein screen  - Given risk of concurrent autoimmune disease, check TSH  - HgA1c each trimester    4. Diabetes care (primary team to coordinate)  - Schedule eye exam  - Diabetic foot exam  - Order EKG  - Order maternal transthoracic echocardiogram  - HgA1c each trimester    5. Imaging & Antepartum Fetal Surveillance  - Targeted midtrimester congenital anomaly screen performed today, completed other than cardiac views  - Fetal echo at 22-24 weeks - scheduled  - Monthly third trimester growth US  - Twice weekly prenatal testing to start at 32 weeks     6. Follow Up  - Patient to send in blood sugars weekly via [employer]  - Follow up with MFM in 1 week for blood sugar review/regimen adjustment     Please see original MFM consultation for full details regarding management recommendations of these and other obstetric co-morbidities    FOLLOW UP:   F/u in 2 weeks for virtual visit and in 4 weeks for US/MFM visit    Today I have spent 30 minutes in the care of the patient. This includes face to face time and non-face to face time preparing to see the patient (eg, review of tests), obtaining and/or reviewing separately obtained history, documenting clinical information in the electronic or other health record, independently interpreting results and communicating results to the patient/family/caregiver, or care coordination.     Kristina Toure MD  Maternal Fetal Medicine

## 2024-01-04 NOTE — ASSESSMENT & PLAN NOTE
See previous M consultation for full recommendations.  The patient's blood pressure has been normal since her last visit.    Recommendations:  - If needed, complete baseline evaluation as recommended in initial consultation (baseline preE labs, EKG/Echo)  - Continue aspirin 81 mg daily for preeclampsia risk reduction  - Continue current medications: metoprolol 50 daily  - Serial fetal growth ultrasounds every 4-6 weeks, beginning at 26-28 weeks.   - Continue close observation of patient's blood pressures. Avoid hypotension as this has been associated with uteroplacental insufficiency.  -Recommend treatment to a goal blood pressure < 140/90  - Initiate antepartum testing at 32 weeks  - Delivery timing:  No medications, no comorbid conditions: 39 0/7 - 39 6/7 weeks gestation  No medications, comorbid conditions: 38 0/7 - 38 6/7 weeks gestation  Controlled on single agent, no comorbid conditions: 38 0/7 - 38 6/7 weeks gestation  Controlled on single agent, comorbid conditions: 37 0/7 - 38 6/7 weeks gestation  Uncontrolled or requiring ? 2 medications: 36 0/7 - 37 6/7 weeks gestation  Comorbid conditions include BMI >= 40, diabetes, and complex medical condition associated with placental dysfunction (ie lupus or other vascular disease)  Delivery may be recommended earlier pending results of fetal growth ultrasounds, AFV assessment, or antepartum testing results.

## 2024-01-04 NOTE — ASSESSMENT & PLAN NOTE
Age of Dx: 31  Comorbidities: MO, HTN  Prepregnancy regimen: Metformin 500 BID, Patient is insulin naive   Baseline HgA1c: 6.9% 2023. Per patient, most recent A1c as of  was 5.4%  Current regimen (as of 2024):  Metformin 1000 BID  Blood glucose log review:  BG log reviewed, in Media tab. All fasting BGs elevated. Minimal postprandial BGs evaluated. Patient hasn't cut out as many carbs as she could.      Recommendations:  Blood glucose control  - ADA diet  - We recommend consultation with a diabetic educator during pregnancy to optimize diet.   - Pregnancy blood glucose checking regimen: Fasting + 2 hour postprandial (after meals) three times daily. Discussed importance of tracking blood sugars and sending in glucose logs in.   - Target blood glucose levels: <95 fasting, <120 postprandial  - Labor considerations: During labor, it is important to monitor maternal blood glucose levels. Fingerstick blood sugars are obtained hourly until stable and then every other hour through latent labor, increasing to hourly during active labor. The goal is  mg/dl. An insulin drip is a reasonable option for suboptimally controlled blood sugars.   - Regimen: Continue metformin 1000 BID, NPH 8u qhs sent to pharmacy today    2. Prenatal care  - Add folic acid 1 mg to prenatal vitamin given risk of neural tube defects. She should continue folic acid supplementation through her reproductive years.  - ASA 81 mg daily  - Eye exam during pregnancy.   - Delivery timin weeks unless indicated sooner    3. Labwork  - Establish baseline preeclampsia labs, including creatinine, transaminases and urine protein screen  - Given risk of concurrent autoimmune disease, check TSH  - HgA1c each trimester    4. Diabetes care (primary team to coordinate)  - Schedule eye exam  - Diabetic foot exam  - Order EKG  - Order maternal transthoracic echocardiogram  - HgA1c each trimester    5. Imaging & Antepartum Fetal Surveillance  -  Targeted midtrimester congenital anomaly screen performed today, completed other than cardiac views  - Fetal echo at 22-24 weeks - scheduled  - Monthly third trimester growth US  - Twice weekly prenatal testing to start at 32 weeks     6. Follow Up  - Patient to send in blood sugars weekly via [employer]  - Follow up with MFM in 1 week for blood sugar review/regimen adjustment

## 2024-01-04 NOTE — TELEPHONE ENCOUNTER
Last Office Visit  10-23-23  Next Office Visit  4-29-24    Patient is requesting refills on celexa 40 mg and buspar 15 mg TID  Patient is pregnant  I did not fill in celexa

## 2024-01-04 NOTE — TELEPHONE ENCOUNTER
Call placed to patient in an attempt to schedule fetal echo. Call answered by voicemail. Message left requesting return call to [phone] to schedule.

## 2024-01-05 NOTE — NURSING
Patient arrived to l&d with c/o increased vaginal discharge. Denies bleeding. Patient admits to recent intercourse.SVE ordered per provider. Cervix closed. D/C home with precautions and education.

## 2024-01-07 ENCOUNTER — PATIENT MESSAGE (OUTPATIENT)
Dept: MATERNAL FETAL MEDICINE | Facility: CLINIC | Age: 34
End: 2024-01-07
Payer: MEDICAID

## 2024-01-08 ENCOUNTER — PATIENT MESSAGE (OUTPATIENT)
Dept: PEDIATRIC CARDIOLOGY | Facility: CLINIC | Age: 34
End: 2024-01-08
Payer: MEDICAID

## 2024-01-10 ENCOUNTER — PATIENT MESSAGE (OUTPATIENT)
Dept: DIABETES | Facility: CLINIC | Age: 34
End: 2024-01-10
Payer: MEDICAID

## 2024-01-10 ENCOUNTER — PATIENT MESSAGE (OUTPATIENT)
Dept: MATERNAL FETAL MEDICINE | Facility: CLINIC | Age: 34
End: 2024-01-10
Payer: MEDICAID

## 2024-01-10 ENCOUNTER — TELEPHONE (OUTPATIENT)
Dept: MATERNAL FETAL MEDICINE | Facility: CLINIC | Age: 34
End: 2024-01-10
Payer: MEDICAID

## 2024-01-10 DIAGNOSIS — O24.919 DIABETES MELLITUS COMPLICATING PREGNANCY, UNSPECIFIED TRIMESTER: Primary | ICD-10-CM

## 2024-01-10 RX ORDER — NAPROXEN SODIUM 220 MG
1 TABLET ORAL 3 TIMES DAILY PRN
Qty: 100 EACH | Refills: 3 | Status: SHIPPED | OUTPATIENT
Start: 2024-01-10

## 2024-01-18 ENCOUNTER — PATIENT MESSAGE (OUTPATIENT)
Dept: MATERNAL FETAL MEDICINE | Facility: CLINIC | Age: 34
End: 2024-01-18

## 2024-01-18 ENCOUNTER — OFFICE VISIT (OUTPATIENT)
Dept: MATERNAL FETAL MEDICINE | Facility: CLINIC | Age: 34
End: 2024-01-18
Payer: MEDICAID

## 2024-01-18 DIAGNOSIS — O24.112 PRE-EXISTING TYPE 2 DIABETES MELLITUS, IN PREGNANCY, SECOND TRIMESTER: ICD-10-CM

## 2024-01-18 DIAGNOSIS — O24.919 DIABETES MELLITUS COMPLICATING PREGNANCY, UNSPECIFIED TRIMESTER: Primary | ICD-10-CM

## 2024-01-18 PROCEDURE — 99214 OFFICE O/P EST MOD 30 MIN: CPT | Mod: TH,95,, | Performed by: OBSTETRICS & GYNECOLOGY

## 2024-01-18 PROCEDURE — 1160F RVW MEDS BY RX/DR IN RCRD: CPT | Mod: CPTII,95,, | Performed by: OBSTETRICS & GYNECOLOGY

## 2024-01-18 PROCEDURE — 1159F MED LIST DOCD IN RCRD: CPT | Mod: CPTII,95,, | Performed by: OBSTETRICS & GYNECOLOGY

## 2024-01-18 RX ORDER — INSULIN HUMAN 100 [IU]/ML
12 INJECTION, SUSPENSION SUBCUTANEOUS NIGHTLY
Qty: 2.4 ML | Refills: 6 | Status: SHIPPED | OUTPATIENT
Start: 2024-01-18 | End: 2024-02-22

## 2024-01-18 NOTE — PROGRESS NOTES
The patient location is: home  The chief complaint leading to consultation is: diabetes control    Visit type: audiovisual    Face to Face time with patient: 5 minutes  10 minutes of total time spent on the encounter, which includes face to face time and non-face to face time preparing to see the patient (eg, review of tests), Obtaining and/or reviewing separately obtained history, Documenting clinical information in the electronic or other health record, Independently interpreting results (not separately reported) and communicating results to the patient/family/caregiver, or Care coordination (not separately reported).         Each patient to whom he or she provides medical services by telemedicine is:  (1) informed of the relationship between the physician and patient and the respective role of any other health care provider with respect to management of the patient; and (2) notified that he or she may decline to receive medical services by telemedicine and may withdraw from such care at any time.    Notes:   Maternal Fetal Medicine follow up consult    SUBJECTIVE:     Jasper Guerrero is a 33 y.o.  female with IUP at 24w4d who is seen in follow up consultation by M.  Pregnancy complications include:   Problem   Diabetes Mellitus Complicating Pregnancy, Unspecified Trimester       Previous notes reviewed.   No changes to medical, surgical, family, social, or obstetric history.    Interval history since last MFM visit: glucose log reviewed      Medications:  Current Outpatient Medications   Medication Instructions    acetaminophen (TYLENOL ORAL) 500 mg, Oral, 2 times daily PRN    aspirin (ECOTRIN) 81 mg, Oral, Daily    busPIRone (BUSPAR) 15 mg, Oral, Daily    citalopram (CELEXA) 40 mg, Oral, Daily    diphenhydrAMINE-acetaminophen (TYLENOL PM EXTRA STRENGTH)  mg Tab 1 tablet, Oral, Nightly PRN    HumuLIN N NPH Insulin KwikPen 8 Units, Subcutaneous, Nightly    insulin syringe-needle U-100 0.5 mL 31  "gauge x 5/16" Syrg 1 Syringe, Misc.(Non-Drug; Combo Route), 3 times daily PRN    metFORMIN (GLUCOPHAGE) 1,000 mg, Oral, With breakfast    metFORMIN (GLUCOPHAGE) 1,000 mg, Oral, 2 times daily with meals    metoprolol succinate (TOPROL-XL) 50 mg, Oral, Daily    prenatal vit no.124/iron/folic (PRENATAL VITAMIN ORAL) Oral, Daily       Care team members:  Mike Reynolds MD - Primary OB       OBJECTIVE:   LMP 2023 (Approximate)         ASSESSMENT/PLAN:     33 y.o.  female with IUP at 24w4d    Pre-existing type 2 diabetes mellitus, in pregnancy, second trimester  See prior note by Dr. Toure for full counseling and recommendations    Age of Dx: 31  Comorbidities: MO, HTN  Prepregnancy regimen: Metformin 500 BID, Patient is insulin naive   Baseline HgA1c: 6.9% 2023. Per patient, most recent A1c as of  was 5.4%  Current regimen (as of 2024):  Metformin 1000 BID; NPH 8 units nightly  Blood glucose log review:  BG log reviewed, in Media tab. All fasting BGs elevated. Inconsistent postprandial glucose checks, but majority well controlled.     Recommendations:  - ADA diet  - We recommend consultation with a diabetic educator during pregnancy to optimize diet.   - Pregnancy blood glucose checking regimen: Fasting + 2 hour postprandial (after meals) three times daily. Discussed importance of tracking blood sugars and sending in glucose logs in.   - Target blood glucose levels: <95 fasting, <120 postprandial  - Labor considerations: During labor, it is important to monitor maternal blood glucose levels. Fingerstick blood sugars are obtained hourly until stable and then every other hour through latent labor, increasing to hourly during active labor. The goal is  mg/dl. An insulin drip is a reasonable option for suboptimally controlled blood sugars.   - New Regimen: Continue metformin 1000 BID, increase NPH 12u qhs sent to pharmacy today    Follow up:  Recommend virtual visit in 1 week to review " glucose log  Follow up MD visit with Dr. Haider in 2 weeks     Justus Cleaning MD  PGY 7  Maternal Fetal Medicine  [facility]

## 2024-01-18 NOTE — ASSESSMENT & PLAN NOTE
See prior note by Dr. Toure for full counseling and recommendations    Age of Dx: 31  Comorbidities: MO, HTN  Prepregnancy regimen: Metformin 500 BID, Patient is insulin naive   Baseline HgA1c: 6.9% 4/2023. Per patient, most recent A1c as of 12/23 was 5.4%  Current regimen (as of 01/14/2024):  Metformin 1000 BID; NPH 8 units nightly  Blood glucose log review:  BG log reviewed, in Media tab. All fasting BGs elevated. Inconsistent postprandial glucose checks, but majority well controlled.     Recommendations:  - ADA diet  - We recommend consultation with a diabetic educator during pregnancy to optimize diet.   - Pregnancy blood glucose checking regimen: Fasting + 2 hour postprandial (after meals) three times daily. Discussed importance of tracking blood sugars and sending in glucose logs in.   - Target blood glucose levels: <95 fasting, <120 postprandial  - Labor considerations: During labor, it is important to monitor maternal blood glucose levels. Fingerstick blood sugars are obtained hourly until stable and then every other hour through latent labor, increasing to hourly during active labor. The goal is  mg/dl. An insulin drip is a reasonable option for suboptimally controlled blood sugars.   - New Regimen: Continue metformin 1000 BID, increase NPH 12u qhs sent to pharmacy today    Follow up:  Recommend virtual visit in 1 week to review glucose log  Follow up MD visit with Dr. Haider in 2 weeks

## 2024-01-19 ENCOUNTER — OFFICE VISIT (OUTPATIENT)
Dept: PEDIATRIC CARDIOLOGY | Facility: CLINIC | Age: 34
End: 2024-01-19
Attending: PEDIATRICS
Payer: MEDICAID

## 2024-01-19 ENCOUNTER — CLINICAL SUPPORT (OUTPATIENT)
Dept: PEDIATRIC CARDIOLOGY | Facility: CLINIC | Age: 34
End: 2024-01-19
Payer: MEDICAID

## 2024-01-19 VITALS
WEIGHT: 293 LBS | DIASTOLIC BLOOD PRESSURE: 70 MMHG | BODY MASS INDEX: 48.82 KG/M2 | SYSTOLIC BLOOD PRESSURE: 126 MMHG | HEIGHT: 65 IN | HEART RATE: 117 BPM

## 2024-01-19 DIAGNOSIS — O24.112 PRE-EXISTING TYPE 2 DIABETES MELLITUS, IN PREGNANCY, SECOND TRIMESTER: Primary | ICD-10-CM

## 2024-01-19 DIAGNOSIS — O24.919 DIABETES MELLITUS COMPLICATING PREGNANCY, UNSPECIFIED TRIMESTER: ICD-10-CM

## 2024-01-19 DIAGNOSIS — O10.919 CHRONIC HYPERTENSION AFFECTING PREGNANCY: ICD-10-CM

## 2024-01-19 DIAGNOSIS — O99.212 OBESITY AFFECTING PREGNANCY IN SECOND TRIMESTER, UNSPECIFIED OBESITY TYPE: ICD-10-CM

## 2024-01-19 PROCEDURE — 93325 DOPPLER ECHO COLOR FLOW MAPG: CPT | Mod: PBBFAC | Performed by: PEDIATRICS

## 2024-01-19 PROCEDURE — 1160F RVW MEDS BY RX/DR IN RCRD: CPT | Mod: CPTII,,, | Performed by: PEDIATRICS

## 2024-01-19 PROCEDURE — 3078F DIAST BP <80 MM HG: CPT | Mod: CPTII,,, | Performed by: PEDIATRICS

## 2024-01-19 PROCEDURE — 93325 DOPPLER ECHO COLOR FLOW MAPG: CPT | Mod: 26,S$PBB,, | Performed by: PEDIATRICS

## 2024-01-19 PROCEDURE — 99999 PR PBB SHADOW E&M-EST. PATIENT-LVL III: CPT | Mod: PBBFAC,,, | Performed by: PEDIATRICS

## 2024-01-19 PROCEDURE — 1159F MED LIST DOCD IN RCRD: CPT | Mod: CPTII,,, | Performed by: PEDIATRICS

## 2024-01-19 PROCEDURE — 99213 OFFICE O/P EST LOW 20 MIN: CPT | Mod: PBBFAC | Performed by: PEDIATRICS

## 2024-01-19 PROCEDURE — 76825 ECHO EXAM OF FETAL HEART: CPT | Mod: PBBFAC | Performed by: PEDIATRICS

## 2024-01-19 PROCEDURE — 76825 ECHO EXAM OF FETAL HEART: CPT | Mod: 26,S$PBB,, | Performed by: PEDIATRICS

## 2024-01-19 PROCEDURE — 99203 OFFICE O/P NEW LOW 30 MIN: CPT | Mod: 25,S$PBB,, | Performed by: PEDIATRICS

## 2024-01-19 PROCEDURE — 3008F BODY MASS INDEX DOCD: CPT | Mod: CPTII,,, | Performed by: PEDIATRICS

## 2024-01-19 PROCEDURE — 3074F SYST BP LT 130 MM HG: CPT | Mod: CPTII,,, | Performed by: PEDIATRICS

## 2024-01-19 PROCEDURE — 76827 ECHO EXAM OF FETAL HEART: CPT | Mod: PBBFAC | Performed by: PEDIATRICS

## 2024-01-19 PROCEDURE — 76827 ECHO EXAM OF FETAL HEART: CPT | Mod: 26,S$PBB,, | Performed by: PEDIATRICS

## 2024-01-19 RX ORDER — PEN NEEDLE, DIABETIC 32GX 5/32"
NEEDLE, DISPOSABLE MISCELLANEOUS
Status: ON HOLD | COMMUNITY
Start: 2024-01-10 | End: 2024-04-18

## 2024-01-19 RX ORDER — ACETAMINOPHEN 500 MG
1000 TABLET ORAL DAILY
Status: ON HOLD | COMMUNITY
End: 2024-04-18

## 2024-01-19 RX ORDER — BUTALBITAL, ACETAMINOPHEN AND CAFFEINE 50; 325; 40 MG/1; MG/1; MG/1
1 TABLET ORAL EVERY 6 HOURS PRN
Status: ON HOLD | COMMUNITY
Start: 2023-12-20 | End: 2024-04-18

## 2024-01-19 RX ORDER — HYDROXYZINE PAMOATE 25 MG/1
CAPSULE ORAL
Status: ON HOLD | COMMUNITY
Start: 2024-01-10 | End: 2024-04-18

## 2024-01-19 RX ORDER — ONDANSETRON 4 MG/1
4 TABLET, FILM COATED ORAL EVERY 6 HOURS PRN
Status: ON HOLD | COMMUNITY
Start: 2024-01-10 | End: 2024-04-18

## 2024-01-19 NOTE — PROGRESS NOTES
"Ms. Guerrero  is a 33 y.o. year old  , referred by Dr. Toure because of the history of diabetes mellitus and hypertension.    The patient presented at approximately 24 5/7 weeks gestation (Patient's last menstrual period was 2023 (approximate).).  The patient denied any complaints.    Past medical history: Significant for obesity, type II DM and chronic hypertension.  Past surgical history: Lingual graft, wisdom tooth extraction, carpal tunnel surgery x 2, cholecystectomy .  Past gestational history: The patient has two healthy children.    Family history: Negative for congenital heart disease, and sudden death during childhood.    Medications:   Outpatient Encounter Medications as of 2024   Medication Sig Dispense Refill    acetaminophen (TYLENOL) 500 MG tablet Take 1,000 mg by mouth Daily.      aspirin (ECOTRIN) 81 MG EC tablet Take 81 mg by mouth once daily.      BD INEZ 2ND GEN PEN NEEDLE 32 gauge x 5/32" Ndle USE AS DIRECTED WITH NPH PEN EVERY DAY      busPIRone (BUSPAR) 15 MG tablet Take 1 tablet (15 mg total) by mouth once daily. (Patient taking differently: Take 15 mg by mouth once daily. Takes 2 tabs daily) 90 tablet 1    citalopram (CELEXA) 40 MG tablet Take 1 tablet (40 mg total) by mouth once daily. 90 tablet 0    hydrOXYzine pamoate (VISTARIL) 25 MG Cap SMARTSI Capsule(s) By Mouth Every 12 Hours PRN      insulin NPH isoph U-100 human (HUMULIN N NPH INSULIN KWIKPEN) 100 unit/mL (3 mL) InPn Inject 12 Units into the skin every evening. 2.4 mL 6    insulin syringe-needle U-100 0.5 mL 31 gauge x 5/16" Syrg 1 Syringe by Misc.(Non-Drug; Combo Route) route 3 (three) times daily as needed (use for insulin injection). 100 each 3    metFORMIN (GLUCOPHAGE) 1000 MG tablet Take 1 tablet (1,000 mg total) by mouth 2 (two) times daily with meals. 180 tablet 3    metoprolol succinate (TOPROL-XL) 50 MG 24 hr tablet Take 1 tablet (50 mg total) by mouth once daily. 30 tablet 11    prenatal vit " "no.124/iron/folic (PRENATAL VITAMIN ORAL) Take by mouth once daily.      butalbital-acetaminophen-caffeine -40 mg (FIORICET, ESGIC) -40 mg per tablet Take 1 tablet by mouth every 6 (six) hours as needed.      ondansetron (ZOFRAN) 4 MG tablet Take 4 mg by mouth every 6 (six) hours as needed.      [DISCONTINUED] acetaminophen (TYLENOL ORAL) Take 500 mg by mouth 2 (two) times daily as needed.      [DISCONTINUED] diphenhydrAMINE-acetaminophen (TYLENOL PM EXTRA STRENGTH)  mg Tab Take 1 tablet by mouth nightly as needed.      [DISCONTINUED] insulin NPH isoph U-100 human (HUMULIN N NPH INSULIN KWIKPEN) 100 unit/mL (3 mL) InPn Inject 8 Units into the skin every evening. 2.4 mL 6    [DISCONTINUED] metFORMIN (GLUCOPHAGE) 1000 MG tablet Take 1 tablet (1,000 mg total) by mouth daily with breakfast. 90 tablet 3     No facility-administered encounter medications on file as of 1/19/2024.       Allergies: Benadryl [diphenhydramine hcl], Codeine, Flexeril [cyclobenzaprine], Nsaids (non-steroidal anti-inflammatory drug), Penicillins, Droperidol, Magnesium, and Prochlorperazine    Blood pressure 126/70, pulse (!) 117, height 5' 5" (1.651 m), weight (!) 136.7 kg (301 lb 7.7 oz), last menstrual period 07/27/2023.    Fetal echocardiogram was technically difficult due to patient size (BMI > 50 kg/m2) and fetal position.  A four chamber fetal heart with situs solitus was seen.  The ventricles appeared to be equal in size.  The contractility of both ventricles was good.  The fetal heart rate was within the normal range, and regular.  The interventricular septum appeared to be intact.  There were normally related great arteries seen.  The ductal and aortic arch were visualized, and appeared to be patent.  There was no pleural or pericardial effusion seen.    Doppler analysis revealed a three vessel umbilical cord, with normal flow patterns, and velocities by Doppler.  There was a normal flow pattern seen in the ductus " venosus.  There was evidence of normal systemic, and pulmonary venous return seen.  There were normal inflow patterns seen across the AV-valves, without significant insufficiency.  There was no ventricular level shunt seen.  The right and left ventricular outflow tract, and ductal and aortic arch appeared to be unobstructed.    Impression:  It is our impression that Ms. Guerrero had a difficult fetal echocardiogram.  There were no cardiac abnormalities observed on the images obtained.  We discussed our findings with the patient, reviewed our images, and answered her questions. We also discussed the limitations of fetal echocardiography.  No further follow up is scheduled in our clinic, but, of course, we will always be available to reevaluate this patient, if needed.    The above information was discussed in detail including the use of diagrams, with 30 minutes of total face to face time, with greater than 50% with counseling and coordination of care.  The discussion of the diagnosis and treatment options is as described above.      Time spent: 30 minutes, 50% dedicated to counseling.

## 2024-01-21 ENCOUNTER — HOSPITAL ENCOUNTER (OUTPATIENT)
Facility: HOSPITAL | Age: 34
Discharge: HOME | End: 2024-01-21
Attending: SPECIALIST | Admitting: OBSTETRICS & GYNECOLOGY
Payer: MEDICAID

## 2024-01-21 VITALS
BODY MASS INDEX: 48.82 KG/M2 | WEIGHT: 293 LBS | HEIGHT: 65 IN | SYSTOLIC BLOOD PRESSURE: 120 MMHG | DIASTOLIC BLOOD PRESSURE: 63 MMHG | RESPIRATION RATE: 16 BRPM | HEART RATE: 88 BPM

## 2024-01-21 DIAGNOSIS — O36.8190 DECREASED FETAL MOVEMENT: ICD-10-CM

## 2024-01-21 PROCEDURE — 99211 OFF/OP EST MAY X REQ PHY/QHP: CPT

## 2024-01-21 NOTE — NURSING
[facility]  Department of Obstetrics and Gynecology  Labor & Delivery Triage Assessment    PATIENT NAME: Jasper Guerrero  MRN: [id number]  TODAY'S DATE: 2024    CHIEF COMPLAINT: Decreased Fetal Movement      OB History    Para Term  AB Living   3 2 2 0 0 2   SAB IAB Ectopic Multiple Live Births   0 0 0 0 2      # Outcome Date GA Lbr Berto/2nd Weight Sex Delivery Anes PTL Lv   3 Current            2 Term 05/14/15 40w0d  3.771 kg (8 lb 5 oz) M Vag-Spont  N ABBY   1 Term      Vag-Spont   ABBY     Past Medical History:   Diagnosis Date    Agoraphobia     Carpal tunnel syndrome     Depression     Diabetes mellitus, type 2     DVT (deep venous thrombosis)     2019 had blood clot in right arm    Fibromyalgia     Migraine headache     Nerve injury 2016    Lingual Nerve Injury    Obese     Pain management     PTSD (post-traumatic stress disorder)     Trigeminal neuralgia      Past Surgical History:   Procedure Laterality Date    CARPAL TUNNEL RELEASE Right 2018    Dr Lozada     CARPAL TUNNEL RELEASE Left 2020    Procedure: RELEASE, CARPAL TUNNEL;  Surgeon: Brendon Lozada Jr., MD;  Location: [facility];  Service: Orthopedics;  Laterality: Left;    CHOLECYSTECTOMY N/A 10/31/2022    MOUTH SURGERY      WISDOM TOOTH EXTRACTION           VITAL SIGNS - ABNORMAL VITALS INCLUDE TEMP >100.4,RR <12 or >26, SUSTAINED MATERNAL PULSE <60 or >120     VITAL SIGNS (Most Recent)  Pulse: 88 (24 1636)  Resp: 16 (24 1700)  BP: 120/63 (24 1636)    VITAL SIGNS     normal  HEADACHE    no     VOMITING    no  VISUAL DISTURBANCES  no  EPIGASTRIC PAIN        no  PROTEINURIA 2+ or MORE             no   EDEMA FACE/EXTREMITIES            no    FETAL MOVEMENT     FETAL MOVEMENT: Decreased  FETAL HEART RATE BASELINE =  145    normal  FETAL HEART RATE VARIABILITY:  Moderate  FETAL HEART RATE ACCELERATIONS FOR GESTATIONAL AGE: absent  FETAL HEART RATE DECELERATIONS: none   FHTs appropriate for  gestational age  ABDOMINAL PAIN/CRAMPING/CONTRACTIONS     Patient is not complaining of abdominal pain/cramping/contractions.    RUPTURE OF MEMBRANES OR LEAKING OF AMNIOTIC FLUID     Patient denies ROM or leaking of amniotic fluid.    VAGINAL BLEEDING     Patient denies vaginal bleeding.    VAGINAL EXAM     DILATION:  na  STATION:  na  EFFACEMENT:  na  PRESENTATION:  na    VAGINAL EXAM DEFERRED DUE TO:  Not in Labor    PAIN PRESENT ON ARRIVAL     ONSET:   na  LOCATION:  na  PAIN SCALE (0-10): 0  DESCRIPTION: na    Interventions     None.      PATIENT DISPOSITION     Discharged Home      Dr. Mabry notified at 1657 of the above assessment.    Vane Olivas, RN  [facility]  01/21/2024

## 2024-01-23 ENCOUNTER — PATIENT MESSAGE (OUTPATIENT)
Dept: MATERNAL FETAL MEDICINE | Facility: CLINIC | Age: 34
End: 2024-01-23
Payer: MEDICAID

## 2024-01-23 ENCOUNTER — TELEPHONE (OUTPATIENT)
Dept: FAMILY MEDICINE | Facility: CLINIC | Age: 34
End: 2024-01-23
Payer: MEDICAID

## 2024-01-23 NOTE — TELEPHONE ENCOUNTER
----- Message from Lila Buckley sent at 1/23/2024  2:41 PM CST -----  The patient is asking if Enoch prescribes Ozempic. She has diabetes. She is pregnant so she will not need it until she has the baby. Does she prescribe Celexa and Buspar?  If she does she would likie to be a new patient . If Enoch can see her Please send this message to Viktoria for a new patient appointment with Enoch. Pt's # [phone] GH

## 2024-01-24 ENCOUNTER — PATIENT MESSAGE (OUTPATIENT)
Dept: MATERNAL FETAL MEDICINE | Facility: CLINIC | Age: 34
End: 2024-01-24
Payer: MEDICAID

## 2024-01-24 ENCOUNTER — PATIENT MESSAGE (OUTPATIENT)
Dept: PULMONOLOGY | Facility: OTHER | Age: 34
End: 2024-01-24
Payer: MEDICAID

## 2024-01-24 DIAGNOSIS — O24.112 PRE-EXISTING TYPE 2 DIABETES MELLITUS, IN PREGNANCY, SECOND TRIMESTER: Primary | ICD-10-CM

## 2024-01-24 RX ORDER — BLOOD-GLUCOSE SENSOR
1 EACH MISCELLANEOUS ONCE
Qty: 3 EACH | Refills: 10 | Status: SHIPPED | OUTPATIENT
Start: 2024-01-24 | End: 2024-05-13

## 2024-01-24 NOTE — PROGRESS NOTES
Vast majority of fastings elevated. Very scant values the remainder of the day, although many are elevated. Suspect she does need insulin during the day, however cannot be certain due to the lack of recordings. I sent a dexcom to [facility] in hopes that this will help with compliance.    Currently on NPH 12u qhs. Can increase to 14. Send in log in 1 week.     DIMAS Del Castillo MD  Maternal Fetal Medicine Fellow   PGY-5

## 2024-01-25 ENCOUNTER — HOSPITAL ENCOUNTER (OUTPATIENT)
Facility: HOSPITAL | Age: 34
Discharge: HOME | End: 2024-01-25
Attending: SPECIALIST | Admitting: SPECIALIST
Payer: MEDICAID

## 2024-01-25 VITALS — RESPIRATION RATE: 16 BRPM | SYSTOLIC BLOOD PRESSURE: 121 MMHG | DIASTOLIC BLOOD PRESSURE: 58 MMHG | HEART RATE: 96 BPM

## 2024-01-25 DIAGNOSIS — O36.8190 DECREASED FETAL MOVEMENT: ICD-10-CM

## 2024-01-25 PROCEDURE — 99211 OFF/OP EST MAY X REQ PHY/QHP: CPT

## 2024-01-26 ENCOUNTER — PATIENT MESSAGE (OUTPATIENT)
Dept: MATERNAL FETAL MEDICINE | Facility: CLINIC | Age: 34
End: 2024-01-26
Payer: MEDICAID

## 2024-01-26 DIAGNOSIS — O24.112 PRE-EXISTING TYPE 2 DIABETES MELLITUS, IN PREGNANCY, SECOND TRIMESTER: Primary | ICD-10-CM

## 2024-01-26 RX ORDER — INSULIN ASPART 100 [IU]/ML
4 INJECTION, SOLUTION INTRAVENOUS; SUBCUTANEOUS
Qty: 3.6 ML | Refills: 3 | Status: SHIPPED | OUTPATIENT
Start: 2024-01-26 | End: 2024-02-21

## 2024-01-30 ENCOUNTER — PATIENT MESSAGE (OUTPATIENT)
Dept: MATERNAL FETAL MEDICINE | Facility: CLINIC | Age: 34
End: 2024-01-30
Payer: MEDICAID

## 2024-01-31 NOTE — PROGRESS NOTES
"Maternal Fetal Medicine Follow up  SUBJECTIVE:     Jasper Guerrero is a 33 y.o.  female with IUP at 26w4d who is seen for MFM follow up for management of:      Problem   Pre-Existing Type 2 Diabetes Mellitus, in Pregnancy, Second Trimester       Previous notes reviewed.   No changes to medical, surgical, family, social, or obstetric history.  Medications:  Current Outpatient Medications   Medication Instructions    acetaminophen (TYLENOL) 1,000 mg, Oral, Daily    aspirin (ECOTRIN) 81 mg, Oral, Daily    BD INEZ 2ND GEN PEN NEEDLE 32 gauge x 5/32" Ndle USE AS DIRECTED WITH NPH PEN EVERY DAY    blood-glucose sensor (DEXCOM G7 SENSOR) Mariely Change sensor every 10 days    busPIRone (BUSPAR) 15 mg, Oral, Daily    butalbital-acetaminophen-caffeine -40 mg (FIORICET, ESGIC) -40 mg per tablet 1 tablet, Oral, Every 6 hours PRN    citalopram (CELEXA) 40 mg, Oral, Daily    diphenhydrAMINE (BENADRYL) 25 mg, Oral, Every 6 hours PRN    HumuLIN N NPH Insulin KwikPen 12 Units, Subcutaneous, Nightly    hydrOXYzine pamoate (VISTARIL) 25 MG Cap SMARTSI Capsule(s) By Mouth Every 12 Hours PRN    insulin aspart U-100 (NOVOLOG) 4 Units, Subcutaneous, With dinner    insulin syringe-needle U-100 0.5 mL 31 gauge x 5/16" Syrg 1 Syringe, Misc.(Non-Drug; Combo Route), 3 times daily PRN    metFORMIN (GLUCOPHAGE) 1,000 mg, Oral, 2 times daily with meals    metoprolol succinate (TOPROL-XL) 50 mg, Oral, Daily    ondansetron (ZOFRAN) 4 mg, Oral, Every 6 hours PRN    prenatal vit no.124/iron/folic (PRENATAL VITAMIN ORAL) Oral, Daily       Review of patient's allergies indicates:   Allergen Reactions    Benadryl [diphenhydramine hcl]      Paralysis on right side body     Codeine      Paralysis right body    Flexeril [cyclobenzaprine] Other (See Comments)     Numbness on right side of body    Nsaids (non-steroidal anti-inflammatory drug) Diarrhea and Nausea And Vomiting     Ulcers     Penicillins Anaphylaxis and Hives    Droperidol " "Anxiety and Other (See Comments)    Magnesium      "made me feel horrible"     Prochlorperazine Anxiety and Palpitations       Care team members:  Mike Reynolds MD - Primary OB  OBJECTIVE:     Blood Pressure: /70   Pulse 102   LMP 2023 (Approximate)     Ultrasound performed. See viewpoint for full ultrasound report.  IUP at 26 weeks 4/7 days  Fetal size is AGA with the EFW at the 52% and the AC at the 83%. The EFW is 1003 g.  A limited repeat fetal anatomic survey shows no abnormalities of the structures that were adequately imaged.  AFV is normal.     ASSESSMENT/PLAN:     33 y.o.  female with IUP at 26w4d presents for [facility] follow up.    Pre-existing type 2 diabetes mellitus, in pregnancy, second trimester  Jasper Cadden presents for a blood glucose assessment. Logs as above.  See prior note by Dr. Toure for full counseling and recommendations    Age of Dx: 31  Comorbidities: MO, HTN  Prepregnancy regimen: Metformin 500 BID, Patient is insulin naive   Baseline HgA1c: 6.9% 2023. Per patient, most recent A1c as of  was 5.4%.   Current regimen   Metformin 1000 BID; NPH 16 units nightly, 4 units Novolog with Dinner  Blood glucose log review:  BG log reviewed     Recommendations:  - ADA diet  - Pregnancy blood glucose checking regimen: Fasting + 2 hour postprandial (after meals) three times daily. Discussed importance of tracking blood sugars and sending in glucose logs in.   - Target blood glucose levels: <95 fasting, <120 postprandial  - Labor considerations: During labor, it is important to monitor maternal blood glucose levels. Fingerstick blood sugars are obtained hourly until stable and then every other hour through latent labor, increasing to hourly during active labor. The goal is  mg/dl. An insulin drip is a reasonable option for suboptimally controlled blood sugars.   - New Regimen: Continue metformin 1000 BID, NPH  and continue 4 units with dinner of Novolog    Follow " up:  Follow up MD visit next week   Her medical history is also complicated by CHTN, chronic pain due to trigeminal neuralgia and obesity. She presents next week for complete assessment of medical comorbidities.      FOLLOW UP:     F/u in 1 weeks for US/MFM visit      20 minutes of total time spent on the encounter, which includes face to face time and non-face to face time preparing to see the patient (eg, review of tests), obtaining and/or reviewing separately obtained history, documenting clinical information in the electronic or other health record, independently interpreting results (not separately reported) and communicating results to the patient/family/caregiver, or care coordination (not separately reported).      Neisha Haider  Maternal-Fetal Medicine    Electronically Signed by Neisha Haider February 1, 2024

## 2024-01-31 NOTE — ASSESSMENT & PLAN NOTE
Jasper Shaejessy presents for a blood glucose assessment. Logs as above.  See prior note by Dr. Toure for full counseling and recommendations    Age of Dx: 31  Comorbidities: MO, HTN  Prepregnancy regimen: Metformin 500 BID, Patient is insulin naive   Baseline HgA1c: 6.9% 4/2023. Per patient, most recent A1c as of 12/23 was 5.4%.   Current regimen   Metformin 1000 BID; NPH 16 units nightly, 4 units Novolog with Dinner  Blood glucose log review:  BG log reviewed     Recommendations:  - ADA diet  - Pregnancy blood glucose checking regimen: Fasting + 2 hour postprandial (after meals) three times daily. Discussed importance of tracking blood sugars and sending in glucose logs in.   - Target blood glucose levels: <95 fasting, <120 postprandial  - Labor considerations: During labor, it is important to monitor maternal blood glucose levels. Fingerstick blood sugars are obtained hourly until stable and then every other hour through latent labor, increasing to hourly during active labor. The goal is  mg/dl. An insulin drip is a reasonable option for suboptimally controlled blood sugars.   - New Regimen: Continue metformin 1000 BID, NPH 6/20 and continue 4 units with dinner of Novolog    Follow up:  Follow up MD visit next week   Her medical history is also complicated by CHTN, chronic pain due to trigeminal neuralgia and obesity. She presents next week for complete assessment of medical comorbidities.

## 2024-02-01 ENCOUNTER — PROCEDURE VISIT (OUTPATIENT)
Dept: MATERNAL FETAL MEDICINE | Facility: CLINIC | Age: 34
End: 2024-02-01
Payer: MEDICAID

## 2024-02-01 ENCOUNTER — PATIENT MESSAGE (OUTPATIENT)
Dept: MATERNAL FETAL MEDICINE | Facility: CLINIC | Age: 34
End: 2024-02-01

## 2024-02-01 ENCOUNTER — OFFICE VISIT (OUTPATIENT)
Dept: MATERNAL FETAL MEDICINE | Facility: CLINIC | Age: 34
End: 2024-02-01
Payer: MEDICAID

## 2024-02-01 VITALS — HEART RATE: 102 BPM | SYSTOLIC BLOOD PRESSURE: 118 MMHG | DIASTOLIC BLOOD PRESSURE: 70 MMHG

## 2024-02-01 DIAGNOSIS — O24.112 PRE-EXISTING TYPE 2 DIABETES MELLITUS, IN PREGNANCY, SECOND TRIMESTER: Primary | ICD-10-CM

## 2024-02-01 DIAGNOSIS — O24.919 DIABETES IN PREGNANCY: ICD-10-CM

## 2024-02-01 PROCEDURE — 99999 PR PBB SHADOW E&M-EST. PATIENT-LVL II: CPT | Mod: PBBFAC,,, | Performed by: STUDENT IN AN ORGANIZED HEALTH CARE EDUCATION/TRAINING PROGRAM

## 2024-02-01 PROCEDURE — 3078F DIAST BP <80 MM HG: CPT | Mod: CPTII,,, | Performed by: STUDENT IN AN ORGANIZED HEALTH CARE EDUCATION/TRAINING PROGRAM

## 2024-02-01 PROCEDURE — 99212 OFFICE O/P EST SF 10 MIN: CPT | Mod: PBBFAC,TH,PN | Performed by: STUDENT IN AN ORGANIZED HEALTH CARE EDUCATION/TRAINING PROGRAM

## 2024-02-01 PROCEDURE — 3074F SYST BP LT 130 MM HG: CPT | Mod: CPTII,,, | Performed by: STUDENT IN AN ORGANIZED HEALTH CARE EDUCATION/TRAINING PROGRAM

## 2024-02-01 PROCEDURE — 76816 OB US FOLLOW-UP PER FETUS: CPT | Mod: PBBFAC,PN | Performed by: STUDENT IN AN ORGANIZED HEALTH CARE EDUCATION/TRAINING PROGRAM

## 2024-02-01 PROCEDURE — 99214 OFFICE O/P EST MOD 30 MIN: CPT | Mod: S$PBB,TH,, | Performed by: STUDENT IN AN ORGANIZED HEALTH CARE EDUCATION/TRAINING PROGRAM

## 2024-02-01 RX ORDER — DIPHENHYDRAMINE HCL 25 MG
25 CAPSULE ORAL EVERY 6 HOURS PRN
Status: ON HOLD | COMMUNITY
End: 2024-04-18

## 2024-02-02 NOTE — PROGRESS NOTES
"Maternal Fetal Medicine Follow up  SUBJECTIVE:     Jasper Guerrero is a 33 y.o.  female with IUP at 27w1d who is seen for MFM follow up for management of:    Problem   Pre-Existing Type 2 Diabetes Mellitus, in Pregnancy, Second Trimester   Chronic Hypertension Affecting Pregnancy   Chronic Opiate Use   Obesity Affecting Pregnancy in Second Trimester     Previous notes reviewed.   No changes to medical, surgical, family, social, or obstetric history.        Current Outpatient Medications   Medication Instructions    acetaminophen (TYLENOL) 1,000 mg, Oral, Daily    aspirin (ECOTRIN) 81 mg, Oral, Daily    BD INEZ 2ND GEN PEN NEEDLE 32 gauge x 5/32" Ndle USE AS DIRECTED WITH NPH PEN EVERY DAY    blood-glucose sensor (DEXCOM G7 SENSOR) Mariely Change sensor every 10 days    busPIRone (BUSPAR) 15 mg, Oral, Daily    butalbital-acetaminophen-caffeine -40 mg (FIORICET, ESGIC) -40 mg per tablet 1 tablet, Oral, Every 6 hours PRN    citalopram (CELEXA) 40 mg, Oral, Daily    diphenhydrAMINE (BENADRYL) 25 mg, Oral, Every 6 hours PRN    diphenhydrAMINE-acetaminophen (TYLENOL PM)  mg Tab 2 tablets, Oral, Nightly PRN    HumuLIN N NPH Insulin KwikPen 12 Units, Subcutaneous, Nightly    hydrOXYzine pamoate (VISTARIL) 25 MG Cap SMARTSI Capsule(s) By Mouth Every 12 Hours PRN    insulin aspart U-100 (NOVOLOG) 4 Units, Subcutaneous, With dinner    insulin syringe-needle U-100 0.5 mL 31 gauge x 5/16" Syrg 1 Syringe, Misc.(Non-Drug; Combo Route), 3 times daily PRN    metFORMIN (GLUCOPHAGE) 1,000 mg, Oral, 2 times daily with meals    metoprolol succinate (TOPROL-XL) 50 mg, Oral, Daily    ondansetron (ZOFRAN) 4 mg, Oral, Every 6 hours PRN    prenatal vit no.124/iron/folic (PRENATAL VITAMIN ORAL) Oral, Daily     Review of patient's allergies indicates:   Allergen Reactions    Benadryl [diphenhydramine hcl]      Paralysis on right side body     Codeine      Paralysis right body    Flexeril [cyclobenzaprine] Other (See " "Comments)     Numbness on right side of body    Nsaids (non-steroidal anti-inflammatory drug) Diarrhea and Nausea And Vomiting     Ulcers     Penicillins Anaphylaxis and Hives    Droperidol Anxiety and Other (See Comments)    Magnesium      "made me feel horrible"     Prochlorperazine Anxiety and Palpitations     Care team members:  MD Rodolfo - Primary OB  OBJECTIVE:   Blood Pressure: /62   Pulse 90   Wt (!) 139 kg (306 lb 7 oz)   LMP 2023 (Approximate)   BMI 50.99 kg/m²   Ultrasound performed. See viewpoint for full ultrasound report.  1. IUP at 27 weeks 1/7 days  2. Fetal heart tones 138 bpm  3. A limited assessment of fetal anatomy exhibits no abnormalities     Blood glucose log:    ASSESSMENT/PLAN:     33 y.o.  female with IUP at 27w1d presents for [facility] follow up.    Pre-existing type 2 diabetes mellitus, in pregnancy, second trimester  Jasper Cadden presents for a blood glucose assessment. Logs as above. Fetal echo completed- difficult however normal scan  See prior note by Dr. Toure for full counseling and recommendations    Age of Dx: 31  Comorbidities: MO, HTN  Prepregnancy regimen: Metformin 500 BID, Patient is insulin naive   Baseline HgA1c: 6.9% 2023. Per patient, most recent A1c as of  was 5.4%.   Current regimen   Metformin 1000 BID; NPH 16 units nightly, 4 units Novolog with Dinner  Blood glucose log review:  BG log reviewed     Recommendations:  - ADA diet  - Pregnancy blood glucose checking regimen: Fasting + 2 hour postprandial (after meals) three times daily. Discussed importance of tracking blood sugars and sending in glucose logs in.   - Target blood glucose levels: <95 fasting, <120 postprandial  - Labor considerations: During labor, it is important to monitor maternal blood glucose levels. Fingerstick blood sugars are obtained hourly until stable and then every other hour through latent labor, increasing to hourly during active labor. The goal is  mg/dl. An " insulin drip is a reasonable option for suboptimally controlled blood sugars.   - New Regimen: Continue metformin 1000 BID, NPH 10/20, Log      Recommendations: see initial consult note for full recommendations  Prenatal testing  Twice weekly BPP/ NST + AFV starting at 32 weeks.   Delivery timin 0/7 to 38 and 6/7 weeks if under good control without comorbidities  37 0/7 to 37 and 6/7 weeks if longstanding diabetes or poorly controlled, polyhydramnios, EFW>90th percentile, or BMI >= 40 CURRENTLY due to BMI  36 0/7 to 37 and 6/7 weeks if vascular complications, prior stillbirth or other complicating conditions.  Recommend consideration of earlier delivery if IUGR, HTN, or other complications  Recommend offering  for delivery is EFW is 4500g or more near the time of delivery    Follow up:  Follow up MD visit in 3 weeks with growth US    Chronic hypertension affecting pregnancy  See previous MFM consultation for full recommendations.  The patient's blood pressure has been normal since her last visit.    Recommendations:  - If needed, complete baseline evaluation as recommended in initial consultation (baseline preE labs, EKG/Echo)  - Continue aspirin 81 mg daily for preeclampsia risk reduction  - Continue current medications: metoprolol 50 daily  - Serial fetal growth ultrasounds every 4-6 weeks, beginning at 26-28 weeks. This has been scheduled  - Continue close observation of patient's blood pressures. Avoid hypotension as this has been associated with uteroplacental insufficiency.  -Recommend treatment to a goal blood pressure < 140/90  - Initiate antepartum testing at 32 weeks  - Delivery timing outlined under diabetes header    Chronic Opiate Use  See initial consult note        Obesity affecting pregnancy in second trimester  Previously counseled      FOLLOW UP:     F/u in 3 weeks for US/MFM visit    35 minutes of total time spent on the encounter, which includes face to face time and non-face  to face time preparing to see the patient (eg, review of tests), obtaining and/or reviewing separately obtained history, documenting clinical information in the electronic or other health record, independently interpreting results (not separately reported) and communicating results to the patient/family/caregiver, or care coordination (not separately reported).  Neisha Haider  Maternal-Fetal Medicine    Electronically Signed by Neisha Haider February 5, 2024

## 2024-02-02 NOTE — ASSESSMENT & PLAN NOTE
Jasper Guerrero presents for a blood glucose assessment. Logs as above. Fetal echo completed- difficult however normal scan  See prior note by Dr. Toure for full counseling and recommendations    Age of Dx: 31  Comorbidities: MO, HTN  Prepregnancy regimen: Metformin 500 BID, Patient is insulin naive   Baseline HgA1c: 6.9% 2023. Per patient, most recent A1c as of  was 5.4%.   Current regimen   Metformin 1000 BID; NPH 16 units nightly, 4 units Novolog with Dinner  Blood glucose log review:  BG log reviewed     Recommendations:  - ADA diet  - Pregnancy blood glucose checking regimen: Fasting + 2 hour postprandial (after meals) three times daily. Discussed importance of tracking blood sugars and sending in glucose logs in.   - Target blood glucose levels: <95 fasting, <120 postprandial  - Labor considerations: During labor, it is important to monitor maternal blood glucose levels. Fingerstick blood sugars are obtained hourly until stable and then every other hour through latent labor, increasing to hourly during active labor. The goal is  mg/dl. An insulin drip is a reasonable option for suboptimally controlled blood sugars.   - New Regimen: Continue metformin 1000 BID, NPH 10/20, Log      Recommendations: see initial consult note for full recommendations  Prenatal testing  Twice weekly BPP/ NST + AFV starting at 32 weeks.   Delivery timin 0/7 to 38 and 6/7 weeks if under good control without comorbidities  37 0/7 to 37 and 6/7 weeks if longstanding diabetes or poorly controlled, polyhydramnios, EFW>90th percentile, or BMI >= 40 CURRENTLY due to BMI  36 0/7 to 37 and 6/7 weeks if vascular complications, prior stillbirth or other complicating conditions.  Recommend consideration of earlier delivery if IUGR, HTN, or other complications  Recommend offering  for delivery is EFW is 4500g or more near the time of delivery    Follow up:  Follow up MD visit in 3 weeks with growth US

## 2024-02-02 NOTE — ASSESSMENT & PLAN NOTE
See previous M consultation for full recommendations.  The patient's blood pressure has been normal since her last visit.    Recommendations:  - If needed, complete baseline evaluation as recommended in initial consultation (baseline preE labs, EKG/Echo)  - Continue aspirin 81 mg daily for preeclampsia risk reduction  - Continue current medications: metoprolol 50 daily  - Serial fetal growth ultrasounds every 4-6 weeks, beginning at 26-28 weeks. This has been scheduled  - Continue close observation of patient's blood pressures. Avoid hypotension as this has been associated with uteroplacental insufficiency.  -Recommend treatment to a goal blood pressure < 140/90  - Initiate antepartum testing at 32 weeks  - Delivery timing outlined under diabetes header

## 2024-02-05 ENCOUNTER — PATIENT MESSAGE (OUTPATIENT)
Dept: MATERNAL FETAL MEDICINE | Facility: CLINIC | Age: 34
End: 2024-02-05

## 2024-02-05 ENCOUNTER — PROCEDURE VISIT (OUTPATIENT)
Dept: MATERNAL FETAL MEDICINE | Facility: CLINIC | Age: 34
End: 2024-02-05
Attending: FAMILY MEDICINE
Payer: MEDICAID

## 2024-02-05 ENCOUNTER — OFFICE VISIT (OUTPATIENT)
Dept: MATERNAL FETAL MEDICINE | Facility: CLINIC | Age: 34
End: 2024-02-05
Payer: MEDICAID

## 2024-02-05 VITALS
DIASTOLIC BLOOD PRESSURE: 62 MMHG | SYSTOLIC BLOOD PRESSURE: 122 MMHG | WEIGHT: 293 LBS | HEART RATE: 90 BPM | BODY MASS INDEX: 50.99 KG/M2

## 2024-02-05 DIAGNOSIS — O24.112 PRE-EXISTING TYPE 2 DIABETES MELLITUS, IN PREGNANCY, SECOND TRIMESTER: Primary | ICD-10-CM

## 2024-02-05 DIAGNOSIS — O99.212 OBESITY AFFECTING PREGNANCY IN SECOND TRIMESTER, UNSPECIFIED OBESITY TYPE: ICD-10-CM

## 2024-02-05 DIAGNOSIS — Z79.891 CHRONIC PRESCRIPTION OPIATE USE: ICD-10-CM

## 2024-02-05 DIAGNOSIS — O36.8121 DECREASED FETAL MOVEMENTS IN SECOND TRIMESTER, FETUS 1 OF MULTIPLE GESTATION: ICD-10-CM

## 2024-02-05 DIAGNOSIS — O10.919 CHRONIC HYPERTENSION AFFECTING PREGNANCY: ICD-10-CM

## 2024-02-05 PROCEDURE — 99213 OFFICE O/P EST LOW 20 MIN: CPT | Mod: PBBFAC,TH,PN | Performed by: STUDENT IN AN ORGANIZED HEALTH CARE EDUCATION/TRAINING PROGRAM

## 2024-02-05 PROCEDURE — 99999 PR PBB SHADOW E&M-EST. PATIENT-LVL III: CPT | Mod: PBBFAC,,, | Performed by: STUDENT IN AN ORGANIZED HEALTH CARE EDUCATION/TRAINING PROGRAM

## 2024-02-05 PROCEDURE — 99214 OFFICE O/P EST MOD 30 MIN: CPT | Mod: S$PBB,TH,, | Performed by: STUDENT IN AN ORGANIZED HEALTH CARE EDUCATION/TRAINING PROGRAM

## 2024-02-05 PROCEDURE — 3074F SYST BP LT 130 MM HG: CPT | Mod: CPTII,,, | Performed by: STUDENT IN AN ORGANIZED HEALTH CARE EDUCATION/TRAINING PROGRAM

## 2024-02-05 PROCEDURE — 1159F MED LIST DOCD IN RCRD: CPT | Mod: CPTII,,, | Performed by: STUDENT IN AN ORGANIZED HEALTH CARE EDUCATION/TRAINING PROGRAM

## 2024-02-05 PROCEDURE — 76815 OB US LIMITED FETUS(S): CPT | Mod: PBBFAC | Performed by: STUDENT IN AN ORGANIZED HEALTH CARE EDUCATION/TRAINING PROGRAM

## 2024-02-05 PROCEDURE — 3008F BODY MASS INDEX DOCD: CPT | Mod: CPTII,,, | Performed by: STUDENT IN AN ORGANIZED HEALTH CARE EDUCATION/TRAINING PROGRAM

## 2024-02-05 PROCEDURE — 3078F DIAST BP <80 MM HG: CPT | Mod: CPTII,,, | Performed by: STUDENT IN AN ORGANIZED HEALTH CARE EDUCATION/TRAINING PROGRAM

## 2024-02-05 NOTE — PROGRESS NOTES
Pt presents with Blood sugar log for review and management. Asked if she can have US due to concern of decreased fetal movement. Pt states anterior placenta, but would still like reassurance.

## 2024-02-07 ENCOUNTER — PATIENT MESSAGE (OUTPATIENT)
Dept: MATERNAL FETAL MEDICINE | Facility: CLINIC | Age: 34
End: 2024-02-07
Payer: MEDICAID

## 2024-02-09 ENCOUNTER — PATIENT MESSAGE (OUTPATIENT)
Dept: MATERNAL FETAL MEDICINE | Facility: CLINIC | Age: 34
End: 2024-02-09
Payer: MEDICAID

## 2024-02-11 ENCOUNTER — PATIENT MESSAGE (OUTPATIENT)
Dept: MATERNAL FETAL MEDICINE | Facility: CLINIC | Age: 34
End: 2024-02-11
Payer: MEDICAID

## 2024-02-12 ENCOUNTER — PATIENT MESSAGE (OUTPATIENT)
Dept: MATERNAL FETAL MEDICINE | Facility: CLINIC | Age: 34
End: 2024-02-12
Payer: MEDICAID

## 2024-02-16 ENCOUNTER — PATIENT MESSAGE (OUTPATIENT)
Dept: MATERNAL FETAL MEDICINE | Facility: CLINIC | Age: 34
End: 2024-02-16
Payer: MEDICAID

## 2024-02-19 ENCOUNTER — PATIENT MESSAGE (OUTPATIENT)
Dept: MATERNAL FETAL MEDICINE | Facility: CLINIC | Age: 34
End: 2024-02-19
Payer: MEDICAID

## 2024-02-19 NOTE — TELEPHONE ENCOUNTER
Current regimen:   - Continue metformin 1000 BID, NPH 10/20, Log 8/x/8       Blood sugar review (6 days):  - Fasting 3/7 above target (range )  Postprandial  - Breakfast 5/6 above target (range 110-160)  - Lunch 3/6 above target (range 103-156)  - Dinner 2/6 above target (range )    New regimen:   - Metformin 1000 BID  - NPH 12/22  - Novolog 10/x/8    DIMAS Del Castillo MD  Maternal Fetal Medicine Fellow   PGY-5

## 2024-02-21 ENCOUNTER — PATIENT MESSAGE (OUTPATIENT)
Dept: MATERNAL FETAL MEDICINE | Facility: CLINIC | Age: 34
End: 2024-02-21
Payer: MEDICAID

## 2024-02-21 ENCOUNTER — HOSPITAL ENCOUNTER (OUTPATIENT)
Facility: HOSPITAL | Age: 34
Discharge: HOME | End: 2024-02-21
Attending: SPECIALIST | Admitting: SPECIALIST
Payer: MEDICAID

## 2024-02-21 VITALS
SYSTOLIC BLOOD PRESSURE: 89 MMHG | HEART RATE: 97 BPM | BODY MASS INDEX: 48.82 KG/M2 | DIASTOLIC BLOOD PRESSURE: 50 MMHG | HEIGHT: 65 IN | RESPIRATION RATE: 17 BRPM | TEMPERATURE: 98 F | OXYGEN SATURATION: 97 % | WEIGHT: 293 LBS

## 2024-02-21 DIAGNOSIS — O24.112 PRE-EXISTING TYPE 2 DIABETES MELLITUS, IN PREGNANCY, SECOND TRIMESTER: Primary | ICD-10-CM

## 2024-02-21 DIAGNOSIS — O36.8190 DECREASED FETAL MOVEMENT: ICD-10-CM

## 2024-02-21 PROCEDURE — 99211 OFF/OP EST MAY X REQ PHY/QHP: CPT

## 2024-02-21 RX ORDER — ONDANSETRON 4 MG/1
8 TABLET, ORALLY DISINTEGRATING ORAL EVERY 8 HOURS PRN
Status: DISCONTINUED | OUTPATIENT
Start: 2024-02-21 | End: 2024-02-21

## 2024-02-21 RX ORDER — INSULIN ASPART 100 [IU]/ML
INJECTION, SOLUTION INTRAVENOUS; SUBCUTANEOUS
Qty: 12.6 ML | Refills: 3 | Status: ON HOLD | OUTPATIENT
Start: 2024-02-21 | End: 2024-04-18

## 2024-02-21 NOTE — NURSING
[facility]  Department of Obstetrics and Gynecology  Labor & Delivery Triage Assessment    PATIENT NAME: Jasper Guerrero  MRN: [id number]  TODAY'S DATE: 2024    CHIEF COMPLAINT: Decreased Fetal Movement      OB History    Para Term  AB Living   3 2 2 0 0 2   SAB IAB Ectopic Multiple Live Births   0 0 0 0 2      # Outcome Date GA Lbr Berto/2nd Weight Sex Delivery Anes PTL Lv   3 Current            2 Term 2018 40w0d  3.771 kg (8 lb 5 oz) F Vag-Spont  N ABBY   1 Term 2015    M Vag-Spont   ABBY     Past Medical History:   Diagnosis Date    Agoraphobia     Carpal tunnel syndrome     Depression     Diabetes mellitus, type 2     DVT (deep venous thrombosis)     2019 had blood clot in right arm    Fibromyalgia     Migraine headache     Nerve injury 2016    Lingual Nerve Injury    Obese     Pain management     PTSD (post-traumatic stress disorder)     Trigeminal neuralgia      Past Surgical History:   Procedure Laterality Date    CARPAL TUNNEL RELEASE Right 2018    Dr Lozada     CARPAL TUNNEL RELEASE Left 2020    Procedure: RELEASE, CARPAL TUNNEL;  Surgeon: Brendon Lozada Jr., MD;  Location: [facility];  Service: Orthopedics;  Laterality: Left;    CHOLECYSTECTOMY N/A 10/31/2022    MOUTH SURGERY      NERVE GRAFT  2016    WISDOM TOOTH EXTRACTION           VITAL SIGNS - ABNORMAL VITALS INCLUDE TEMP >100.4,RR <12 or >26, SUSTAINED MATERNAL PULSE <60 or >120     VITAL SIGNS (Most Recent)  Temp: 98.1 °F (36.7 °C) (24 1202)  Pulse: 97 (24 1214)  Resp: 17 (24 1214)  BP: (!) 89/50 (24 1214)  SpO2: 97 % (24 1211)    VITAL SIGNS  BP 89/50, Dr Reynolds notified and okayed for d/c home   normal  HEADACHE    no     VOMITING    no  VISUAL DISTURBANCES  no  EPIGASTRIC PAIN        no  EDEMA FACE/EXTREMITIES            no    FETAL MOVEMENT     FETAL MOVEMENT: Active  FETAL HEART RATE BASELINE =  140  normal  FETAL HEART RATE VARIABILITY:  Moderate  FETAL HEART RATE  ACCELERATIONS FOR GESTATIONAL AGE: present  FETAL HEART RATE DECELERATIONS: none    ABDOMINAL PAIN/CRAMPING/CONTRACTIONS     Patient is not complaining of abdominal pain/cramping/contractions.    RUPTURE OF MEMBRANES OR LEAKING OF AMNIOTIC FLUID     Patient denies ROM or leaking of amniotic fluid.    VAGINAL BLEEDING     Patient denies vaginal bleeding.        PAIN PRESENT ON ARRIVAL     ONSET:   N/A   LOCATION:  N/A  PAIN SCALE (0-10):  0  DESCRIPTION: N/A    Interventions     None.      PATIENT DISPOSITION     Discharged Home      Dr. Reynolds notified at 1251 of the above assessment.    Becca Arora RN  [facility]  02/21/2024

## 2024-02-21 NOTE — DISCHARGE INSTRUCTIONS
Keep your scheduled appointment with your provider.    Call your Doctor if you have any of the following:  Temperature above 100 degrees  Nausea, vomiting and/or diarrhea  Severe headache, dizziness, or blurred vision  Notable increase in swelling of hands or feet  Notable swelling of face and lips  Difficulty, pain or burning with urination  Foul smelling vaginal discharge  Decreased fetal movement    Come to the hospital if you have any of the following symptoms:  Your water breaks  More than 4-6 contractions in 1 hour for 2 or more hours  Vaginal bleeding like a period    After 28 weeks, you should feel 10 distinct fetal movements within a 2 hour period.    It is recommended that you drink 1/2 a gallon of water each day.  Tea, Soda and Juice are  in addition to this.

## 2024-02-21 NOTE — NURSING
OBGYN LABS ENTERED INTO RESULTS CONSOLE      1st Trimester Labs Entered Into Results Console     [] AOBRH   [x] Rubella Immune   [x] RPR   [x] HBsAg   [] HIV   [x] Chlamydia   [x] Gonorrhea   [] Cell-Free DNA   [] Hep-C   [] Varicella    2nd Trimester Labs Entered Into Results Console     []OB Glucose Screen      3rd Trimester Labs Entered Into Results Console      [] GBS   [] RPR    Drug Screen Entered Into Results Console     [] Benzodiazes   [] Methadone   [] Cocaine (Metab)   [] Opiate   [] Amphetamine   [] Marijuana   [] Creatinine   [] Amphetamines-Beaker   [] Barbituates-Beaker   [] Benzodiazepine-Beaker   [] Cannabinoids-Beaker   [] Cocaine-Beaker   [] Fentanyl-Beaker   [] MDMA-Beaker   [] Opiates-Beaker   [] Phencyclidine-Beaker        Results Entered by Becca Arora, RN    Results Verified for Manual Entry Accuracy by Aleah Ewing RN

## 2024-02-22 DIAGNOSIS — O24.919 DIABETES MELLITUS COMPLICATING PREGNANCY, UNSPECIFIED TRIMESTER: Primary | ICD-10-CM

## 2024-02-22 RX ORDER — INSULIN HUMAN 100 [IU]/ML
INJECTION, SUSPENSION SUBCUTANEOUS
Qty: 27 ML | Refills: 3 | Status: SHIPPED | OUTPATIENT
Start: 2024-02-22 | End: 2024-05-13

## 2024-02-23 ENCOUNTER — PATIENT MESSAGE (OUTPATIENT)
Dept: MATERNAL FETAL MEDICINE | Facility: CLINIC | Age: 34
End: 2024-02-23
Payer: MEDICAID

## 2024-02-26 ENCOUNTER — PROCEDURE VISIT (OUTPATIENT)
Dept: MATERNAL FETAL MEDICINE | Facility: CLINIC | Age: 34
End: 2024-02-26
Payer: MEDICAID

## 2024-02-26 ENCOUNTER — OFFICE VISIT (OUTPATIENT)
Dept: MATERNAL FETAL MEDICINE | Facility: CLINIC | Age: 34
End: 2024-02-26
Payer: MEDICAID

## 2024-02-26 ENCOUNTER — PATIENT MESSAGE (OUTPATIENT)
Dept: MATERNAL FETAL MEDICINE | Facility: CLINIC | Age: 34
End: 2024-02-26

## 2024-02-26 VITALS
BODY MASS INDEX: 51.73 KG/M2 | HEART RATE: 97 BPM | DIASTOLIC BLOOD PRESSURE: 62 MMHG | WEIGHT: 293 LBS | SYSTOLIC BLOOD PRESSURE: 131 MMHG

## 2024-02-26 DIAGNOSIS — O24.112 PRE-EXISTING TYPE 2 DIABETES MELLITUS, IN PREGNANCY, SECOND TRIMESTER: Primary | ICD-10-CM

## 2024-02-26 DIAGNOSIS — O10.919 CHRONIC HYPERTENSION AFFECTING PREGNANCY: ICD-10-CM

## 2024-02-26 DIAGNOSIS — Z36.89 ENCOUNTER FOR ULTRASOUND TO ASSESS FETAL GROWTH: ICD-10-CM

## 2024-02-26 DIAGNOSIS — O24.414 INSULIN CONTROLLED GESTATIONAL DIABETES MELLITUS (GDM) IN THIRD TRIMESTER: ICD-10-CM

## 2024-02-26 DIAGNOSIS — Z79.891 CHRONIC PRESCRIPTION OPIATE USE: ICD-10-CM

## 2024-02-26 PROCEDURE — 99999 PR PBB SHADOW E&M-EST. PATIENT-LVL II: CPT | Mod: PBBFAC,,, | Performed by: STUDENT IN AN ORGANIZED HEALTH CARE EDUCATION/TRAINING PROGRAM

## 2024-02-26 PROCEDURE — 99212 OFFICE O/P EST SF 10 MIN: CPT | Mod: PBBFAC,25,TH,PN | Performed by: STUDENT IN AN ORGANIZED HEALTH CARE EDUCATION/TRAINING PROGRAM

## 2024-02-26 PROCEDURE — 76816 OB US FOLLOW-UP PER FETUS: CPT | Mod: PBBFAC,PN | Performed by: STUDENT IN AN ORGANIZED HEALTH CARE EDUCATION/TRAINING PROGRAM

## 2024-02-26 PROCEDURE — 3075F SYST BP GE 130 - 139MM HG: CPT | Mod: CPTII,,, | Performed by: STUDENT IN AN ORGANIZED HEALTH CARE EDUCATION/TRAINING PROGRAM

## 2024-02-26 PROCEDURE — 99214 OFFICE O/P EST MOD 30 MIN: CPT | Mod: S$PBB,TH,, | Performed by: STUDENT IN AN ORGANIZED HEALTH CARE EDUCATION/TRAINING PROGRAM

## 2024-02-26 PROCEDURE — 3078F DIAST BP <80 MM HG: CPT | Mod: CPTII,,, | Performed by: STUDENT IN AN ORGANIZED HEALTH CARE EDUCATION/TRAINING PROGRAM

## 2024-02-26 PROCEDURE — 3008F BODY MASS INDEX DOCD: CPT | Mod: CPTII,,, | Performed by: STUDENT IN AN ORGANIZED HEALTH CARE EDUCATION/TRAINING PROGRAM

## 2024-02-26 NOTE — PROGRESS NOTES
"Maternal Fetal Medicine Follow up  SUBJECTIVE:     Jasper Guerrero is a 33 y.o.  female with IUP at 30w1d who is seen for MFM follow up for management of:    Problem   Pre-Existing Type 2 Diabetes Mellitus, in Pregnancy, Second Trimester   Chronic Hypertension Affecting Pregnancy   Chronic Opiate Use     Previous notes reviewed.   No changes to medical, surgical, family, social, or obstetric history.      Current Outpatient Medications   Medication Instructions    acetaminophen (TYLENOL) 1,000 mg, Oral, Daily    aspirin (ECOTRIN) 81 mg, Oral, Daily    BD INEZ 2ND GEN PEN NEEDLE 32 gauge x 5/32" Ndle USE AS DIRECTED WITH NPH PEN EVERY DAY    blood-glucose sensor (DEXCOM G7 SENSOR) Mariely Change sensor every 10 days    busPIRone (BUSPAR) 15 mg, Oral, Daily    butalbital-acetaminophen-caffeine -40 mg (FIORICET, ESGIC) -40 mg per tablet 1 tablet, Oral, Every 6 hours PRN    citalopram (CELEXA) 40 mg, Oral, Daily    diphenhydrAMINE (BENADRYL) 25 mg, Oral, Every 6 hours PRN    diphenhydrAMINE-acetaminophen (TYLENOL PM)  mg Tab 2 tablets, Oral, Nightly PRN    hydrOXYzine pamoate (VISTARIL) 25 MG Cap SMARTSI Capsule(s) By Mouth Every 12 Hours PRN    insulin aspart U-100 (NOVOLOG) 100 unit/mL (3 mL) InPn pen Inject 6 Units into the skin daily with breakfast AND 8 Units daily with dinner or evening meal.    insulin NPH isoph U-100 human (HUMULIN N NPH INSULIN KWIKPEN) 100 unit/mL (3 mL) InPn Inject 10 Units into the skin before breakfast AND 20 Units every evening.    insulin syringe-needle U-100 0.5 mL 31 gauge x 5/16" Syrg 1 Syringe, Misc.(Non-Drug; Combo Route), 3 times daily PRN    metFORMIN (GLUCOPHAGE) 1,000 mg, Oral, 2 times daily with meals    metoprolol succinate (TOPROL-XL) 50 mg, Oral, Daily    ondansetron (ZOFRAN) 4 mg, Oral, Every 6 hours PRN    prenatal vit no.124/iron/folic (PRENATAL VITAMIN ORAL) Oral, Daily     Review of patient's allergies indicates:   Allergen Reactions    Benadryl " "[diphenhydramine hcl]      Paralysis on right side body     Codeine      Paralysis right body    Flexeril [cyclobenzaprine] Other (See Comments)     Numbness on right side of body    Nsaids (non-steroidal anti-inflammatory drug) Diarrhea and Nausea And Vomiting     Ulcers     Penicillins Anaphylaxis and Hives    Droperidol Anxiety and Other (See Comments)    Magnesium      "made me feel horrible"     Prochlorperazine Anxiety and Palpitations     Care team members:  MD Rodolfo - Primary OB  OBJECTIVE:   Blood Pressure: /62   Pulse 97   Wt (!) 141 kg (310 lb 13.6 oz)   LMP 2023 (Approximate)   BMI 51.73 kg/m²   Ultrasound performed. See viewpoint for full ultrasound report.  IUP at 30 weeks 1/7 days  Fetal size is AGA with the EFW at the 47% and the AC at the 87%. The EFW is 1647 g.  A limited repeat fetal anatomic survey shows no abnormalities of the structures that were adequately imaged.  AFV is normal.         ASSESSMENT/PLAN:     33 y.o.  female with IUP at 30w1d presents for [facility] follow up.    Pre-existing type 2 diabetes mellitus, in pregnancy, second trimester  Jasper Cadden presents for a blood glucose assessment. Logs as above. Fetal echo completed- difficult however normal scan  See prior note by Dr. Toure for full counseling and recommendations    Age of Dx: 31  Comorbidities: MO, HTN  Prepregnancy regimen: Metformin 500 BID, Patient is insulin naive   Baseline HgA1c: 6.9% 2023. Per patient, most recent A1c as of  was 5.4%.   Current regimen   Metformin 1000 BID; NPH 10/22 units;8 units Novolog with Breakfast Dinner  Blood glucose log review:  BG log reviewed - as above    Recommendations:  - ADA diet  - Pregnancy blood glucose checking regimen: Fasting + 2 hour postprandial (after meals) three times daily. Discussed importance of tracking blood sugars and sending in glucose logs in.   - Target blood glucose levels: <95 fasting, <120 postprandial  - Labor considerations: During " labor, it is important to monitor maternal blood glucose levels. Fingerstick blood sugars are obtained hourly until stable and then every other hour through latent labor, increasing to hourly during active labor. The goal is  mg/dl. An insulin drip is a reasonable option for suboptimally controlled blood sugars.   - New Regimen: Continue metformin 1000 BID, NPH , Log      Recommendations: see initial consult note for full recommendations  Prenatal testing  Twice weekly BPP/ NST + AFV starting at 32 weeks.   Delivery timin 0/7 to 38 and 6/7 weeks if under good control without comorbidities  37 0/7 to 37 and 6/7 weeks if longstanding diabetes or poorly controlled, polyhydramnios, EFW>90th percentile, or BMI >= 40 CURRENTLY due to BMI  36 0/7 to 37 and 6/7 weeks if vascular complications, prior stillbirth or other complicating conditions.  Recommend consideration of earlier delivery if IUGR, HTN, or other complications  Recommend offering  for delivery is EFW is 4500g or more near the time of delivery    Follow up:  Follow up MD visit in 4 weeks with growth US    Chronic hypertension affecting pregnancy  See previous MFM consultation for full recommendations.  The patient's blood pressure has been normal since her last visit. She had one instance of low blood pressure recorded. Instructed to monitor closely. Today is normal and she has an OB visit tomorrow.    Recommendations:  - If needed, complete baseline evaluation as recommended in initial consultation (baseline preE labs, EKG/Echo)  - Continue aspirin 81 mg daily for preeclampsia risk reduction  - Continue current medications: metoprolol 50 daily  - Serial fetal growth ultrasounds every 4-6 weeks, beginning at 26-28 weeks. This has been scheduled  - Continue close observation of patient's blood pressures. Avoid hypotension as this has been associated with uteroplacental insufficiency.  -Recommend treatment to a goal blood pressure <  140/90  - Initiate antepartum testing at 32 weeks  - Delivery timing outlined under diabetes header    Chronic Opiate Use  Continues to take Tylenol 1000 BID for Trigeminal Neuralgia after weaning percocet( see prior notes)  Previously seeing a Neurologist who moved. She was then referred to pain management however insurance does not cover.    Recommendations  Neuro referral placed today        FOLLOW UP:   Will send blood sugars in portal in one week  F/u in 4 weeks for US/M visit    30 minutes of total time spent on the encounter, which includes face to face time and non-face to face time preparing to see the patient (eg, review of tests), obtaining and/or reviewing separately obtained history, documenting clinical information in the electronic or other health record, independently interpreting results (not separately reported) and communicating results to the patient/family/caregiver, or care coordination (not separately reported).  Neisha Haider  Maternal-Fetal Medicine    Electronically Signed by Neisha Haider February 26, 2024

## 2024-02-26 NOTE — ASSESSMENT & PLAN NOTE
Continues to take Tylenol 1000 BID for Trigeminal Neuralgia after weaning percocet( see prior notes)  Previously seeing a Neurologist who moved. She was then referred to pain management however insurance does not cover.    Recommendations  Neuro referral placed today

## 2024-02-26 NOTE — ASSESSMENT & PLAN NOTE
Jasper Guerrero presents for a blood glucose assessment. Logs as above. Fetal echo completed- difficult however normal scan  See prior note by Dr. Toure for full counseling and recommendations    Age of Dx: 31  Comorbidities: MO, HTN  Prepregnancy regimen: Metformin 500 BID, Patient is insulin naive   Baseline HgA1c: 6.9% 2023. Per patient, most recent A1c as of  was 5.4%.   Current regimen   Metformin 1000 BID; NPH 10/ units;8 units Novolog with Breakfast Dinner  Blood glucose log review:  BG log reviewed - as above    Recommendations:  - ADA diet  - Pregnancy blood glucose checking regimen: Fasting + 2 hour postprandial (after meals) three times daily. Discussed importance of tracking blood sugars and sending in glucose logs in.   - Target blood glucose levels: <95 fasting, <120 postprandial  - Labor considerations: During labor, it is important to monitor maternal blood glucose levels. Fingerstick blood sugars are obtained hourly until stable and then every other hour through latent labor, increasing to hourly during active labor. The goal is  mg/dl. An insulin drip is a reasonable option for suboptimally controlled blood sugars.   - New Regimen: Continue metformin 1000 BID, NPH , Log      Recommendations: see initial consult note for full recommendations  Prenatal testing  Twice weekly BPP/ NST + AFV starting at 32 weeks.   Delivery timin 0/7 to 38 and 6/7 weeks if under good control without comorbidities  37 0/7 to 37 and 6/7 weeks if longstanding diabetes or poorly controlled, polyhydramnios, EFW>90th percentile, or BMI >= 40 CURRENTLY due to BMI  36 0/7 to 37 and 6/7 weeks if vascular complications, prior stillbirth or other complicating conditions.  Recommend consideration of earlier delivery if IUGR, HTN, or other complications  Recommend offering  for delivery is EFW is 4500g or more near the time of delivery    Follow up:  Follow up MD visit in 4 weeks with growth  US

## 2024-02-26 NOTE — ASSESSMENT & PLAN NOTE
See previous M consultation for full recommendations.  The patient's blood pressure has been normal since her last visit. She had one instance of low blood pressure recorded. Instructed to monitor closely. Today is normal and she has an OB visit tomorrow.    Recommendations:  - If needed, complete baseline evaluation as recommended in initial consultation (baseline preE labs, EKG/Echo)  - Continue aspirin 81 mg daily for preeclampsia risk reduction  - Continue current medications: metoprolol 50 daily  - Serial fetal growth ultrasounds every 4-6 weeks, beginning at 26-28 weeks. This has been scheduled  - Continue close observation of patient's blood pressures. Avoid hypotension as this has been associated with uteroplacental insufficiency.  -Recommend treatment to a goal blood pressure < 140/90  - Initiate antepartum testing at 32 weeks  - Delivery timing outlined under diabetes header

## 2024-02-28 ENCOUNTER — PATIENT MESSAGE (OUTPATIENT)
Dept: MATERNAL FETAL MEDICINE | Facility: CLINIC | Age: 34
End: 2024-02-28
Payer: MEDICAID

## 2024-03-04 ENCOUNTER — PATIENT MESSAGE (OUTPATIENT)
Dept: MATERNAL FETAL MEDICINE | Facility: CLINIC | Age: 34
End: 2024-03-04
Payer: MEDICAID

## 2024-03-04 NOTE — TELEPHONE ENCOUNTER
Current regimen:   - metformin 1000 BID, NPH 14/22, Log 12/x/8      Blood sugar review (8 days):  - Fasting 1/8 above target (range )  Postprandial  - Breakfast 5/7 above target (range )  - Lunch 5/7 above target (range 116-206)  - Dinner 2/7 above target (range )    New regimen:   - metformin 1000 BID, NPH 18/22, Log 15/x/8      DIMAS Del Castillo MD  Maternal Fetal Medicine Fellow   PGY-5

## 2024-03-06 ENCOUNTER — PATIENT MESSAGE (OUTPATIENT)
Dept: MATERNAL FETAL MEDICINE | Facility: CLINIC | Age: 34
End: 2024-03-06
Payer: MEDICAID

## 2024-03-11 ENCOUNTER — PATIENT MESSAGE (OUTPATIENT)
Dept: MATERNAL FETAL MEDICINE | Facility: CLINIC | Age: 34
End: 2024-03-11

## 2024-03-12 DIAGNOSIS — I10 CHRONIC HYPERTENSION: ICD-10-CM

## 2024-03-12 DIAGNOSIS — O24.419 GESTATIONAL DIABETES: Primary | ICD-10-CM

## 2024-03-13 ENCOUNTER — HOSPITAL ENCOUNTER (OUTPATIENT)
Facility: HOSPITAL | Age: 34
Discharge: HOME | End: 2024-03-13
Attending: SPECIALIST | Admitting: SPECIALIST
Payer: MEDICAID

## 2024-03-13 VITALS — DIASTOLIC BLOOD PRESSURE: 63 MMHG | HEART RATE: 80 BPM | SYSTOLIC BLOOD PRESSURE: 127 MMHG | RESPIRATION RATE: 18 BRPM

## 2024-03-13 DIAGNOSIS — O36.8190 DECREASED FETAL MOVEMENT: ICD-10-CM

## 2024-03-13 PROCEDURE — 59025 FETAL NON-STRESS TEST: CPT

## 2024-03-13 RX ORDER — SODIUM CHLORIDE, SODIUM LACTATE, POTASSIUM CHLORIDE, CALCIUM CHLORIDE 600; 310; 30; 20 MG/100ML; MG/100ML; MG/100ML; MG/100ML
INJECTION, SOLUTION INTRAVENOUS CONTINUOUS
Status: DISCONTINUED | OUTPATIENT
Start: 2024-03-13 | End: 2024-03-13

## 2024-03-13 RX ORDER — ACETAMINOPHEN 500 MG
500 TABLET ORAL EVERY 6 HOURS PRN
Status: DISCONTINUED | OUTPATIENT
Start: 2024-03-13 | End: 2024-03-13

## 2024-03-13 RX ORDER — ONDANSETRON 4 MG/1
8 TABLET, ORALLY DISINTEGRATING ORAL EVERY 8 HOURS PRN
Status: DISCONTINUED | OUTPATIENT
Start: 2024-03-13 | End: 2024-03-13

## 2024-03-13 NOTE — DISCHARGE INSTRUCTIONS
Keep your scheduled appointment with your provider.    Call your Doctor if you have any of the following:  Temperature above 100 degrees  Nausea, vomiting and/or diarrhea  Severe headache, dizziness, or blurred vision  Notable increase in swelling of hands or feet  Notable swelling of face and lips  Difficulty, pain or burning with urination  Foul smelling vaginal discharge  Decreased fetal movement    Come to the hospital if you have any of the following symptoms:  Your water breaks  More than 4-6 contractions in 1 hour for 2 or more hours  Vaginal bleeding like a period    After 28 weeks, you should feel 10 distinct fetal movements within a 2 hour period.    It is recommended that you drink 1/2 a gallon of water each day.  Tea, Soda and Juice are  in addition to this.    Labor and Delivery Phone number: [phone]    If you need to be seen on Labor and delivery between the hours of 6pm and 7am, please enter through the Emergency room entrance.

## 2024-03-14 ENCOUNTER — PROCEDURE VISIT (OUTPATIENT)
Dept: MATERNAL FETAL MEDICINE | Facility: CLINIC | Age: 34
End: 2024-03-14
Attending: FAMILY MEDICINE
Payer: MEDICAID

## 2024-03-14 ENCOUNTER — HOSPITAL ENCOUNTER (OUTPATIENT)
Facility: HOSPITAL | Age: 34
Discharge: HOME | End: 2024-03-14
Attending: SPECIALIST | Admitting: SPECIALIST
Payer: MEDICAID

## 2024-03-14 ENCOUNTER — PATIENT MESSAGE (OUTPATIENT)
Dept: MATERNAL FETAL MEDICINE | Facility: CLINIC | Age: 34
End: 2024-03-14

## 2024-03-14 VITALS — RESPIRATION RATE: 18 BRPM | HEART RATE: 100 BPM | DIASTOLIC BLOOD PRESSURE: 58 MMHG | SYSTOLIC BLOOD PRESSURE: 124 MMHG

## 2024-03-14 DIAGNOSIS — O24.112 TYPE 2 DIABETES MELLITUS AFFECTING PREGNANCY IN SECOND TRIMESTER, ANTEPARTUM: ICD-10-CM

## 2024-03-14 DIAGNOSIS — O24.414 INSULIN CONTROLLED GESTATIONAL DIABETES MELLITUS (GDM) IN THIRD TRIMESTER: ICD-10-CM

## 2024-03-14 PROCEDURE — 76819 FETAL BIOPHYS PROFIL W/O NST: CPT | Mod: PBBFAC,PN | Performed by: STUDENT IN AN ORGANIZED HEALTH CARE EDUCATION/TRAINING PROGRAM

## 2024-03-14 PROCEDURE — 59025 FETAL NON-STRESS TEST: CPT

## 2024-03-14 NOTE — DISCHARGE INSTRUCTIONS
Keep your scheduled appointment with your provider.    Call your Doctor if you have any of the following:  Temperature above 100 degrees  Nausea, vomiting and/or diarrhea  Severe headache, dizziness, or blurred vision  Notable increase in swelling of hands or feet  Notable swelling of face and lips  Difficulty, pain or burning with urination  Foul smelling vaginal discharge  Decreased fetal movement    Come to the hospital if you have any of the following symptoms:  Your water breaks  More than 4-6 contractions in 1 hour for 2 or more hours  Vaginal bleeding like a period    After 28 weeks, you should feel 10 distinct fetal movements within a 2 hour period.    It is recommended that you drink 1/2 a gallon of water each day.  Tea, Soda and Juice are  in addition to this.    Labor and Delivery Phone number: [phone]    If you need to be seen on Labor and delivery between the hours of 6pm and 7am, please enter through the Emergency room entrance.

## 2024-03-18 ENCOUNTER — HOSPITAL ENCOUNTER (OUTPATIENT)
Facility: HOSPITAL | Age: 34
Discharge: HOME | End: 2024-03-18
Attending: SPECIALIST | Admitting: SPECIALIST
Payer: MEDICAID

## 2024-03-18 VITALS — SYSTOLIC BLOOD PRESSURE: 120 MMHG | DIASTOLIC BLOOD PRESSURE: 68 MMHG | HEART RATE: 91 BPM | RESPIRATION RATE: 19 BRPM

## 2024-03-18 DIAGNOSIS — O24.419 GESTATIONAL DIABETES: ICD-10-CM

## 2024-03-18 PROCEDURE — 59025 FETAL NON-STRESS TEST: CPT

## 2024-03-18 NOTE — DISCHARGE INSTRUCTIONS
Keep your scheduled appointment with your provider.    Call your Doctor if you have any of the following:  Temperature above 100 degrees  Nausea, vomiting and/or diarrhea  Severe headache, dizziness, or blurred vision  Notable increase in swelling of hands or feet  Notable swelling of face and lips  Difficulty, pain or burning with urination  Foul smelling vaginal discharge  Decreased fetal movement    Come to the hospital if you have any of the following symptoms:  Your water breaks  More than 4-6 contractions in 1 hour for 2 or more hours  Vaginal bleeding like a period    After 28 weeks, you should feel 10 distinct fetal movements within a 2 hour period.    It is recommended that you drink 1/2 a gallon of water each day.  Tea, Soda and Juice are  in addition to this.    Labor and Delivery Phone number: [phone]    If you need to be seen on Labor and delivery between the hours of 6pm and 7am, please enter through the Emergency room entrance.

## 2024-03-19 ENCOUNTER — PATIENT MESSAGE (OUTPATIENT)
Dept: MATERNAL FETAL MEDICINE | Facility: CLINIC | Age: 34
End: 2024-03-19

## 2024-03-19 ENCOUNTER — PROCEDURE VISIT (OUTPATIENT)
Dept: MATERNAL FETAL MEDICINE | Facility: CLINIC | Age: 34
End: 2024-03-19
Payer: MEDICAID

## 2024-03-19 DIAGNOSIS — O24.414 INSULIN CONTROLLED GESTATIONAL DIABETES MELLITUS (GDM) IN THIRD TRIMESTER: ICD-10-CM

## 2024-03-19 PROCEDURE — 76819 FETAL BIOPHYS PROFIL W/O NST: CPT | Mod: PBBFAC,PN | Performed by: STUDENT IN AN ORGANIZED HEALTH CARE EDUCATION/TRAINING PROGRAM

## 2024-03-20 ENCOUNTER — PATIENT MESSAGE (OUTPATIENT)
Dept: MATERNAL FETAL MEDICINE | Facility: CLINIC | Age: 34
End: 2024-03-20
Payer: MEDICAID

## 2024-03-21 ENCOUNTER — HOSPITAL ENCOUNTER (OUTPATIENT)
Facility: HOSPITAL | Age: 34
Discharge: HOME | End: 2024-03-21
Attending: SPECIALIST | Admitting: SPECIALIST
Payer: MEDICAID

## 2024-03-21 VITALS — SYSTOLIC BLOOD PRESSURE: 123 MMHG | DIASTOLIC BLOOD PRESSURE: 66 MMHG | HEART RATE: 100 BPM

## 2024-03-21 DIAGNOSIS — I10 HYPERTENSION: ICD-10-CM

## 2024-03-21 DIAGNOSIS — O24.419 GESTATIONAL DIABETES: ICD-10-CM

## 2024-03-21 DIAGNOSIS — O24.419 GESTATIONAL DIABETES: Primary | ICD-10-CM

## 2024-03-21 PROCEDURE — 59025 FETAL NON-STRESS TEST: CPT

## 2024-03-21 NOTE — DISCHARGE INSTRUCTIONS
Keep your scheduled appointment with your provider.    Call your Doctor if you have any of the following:  Temperature above 100 degrees  Nausea, vomiting and/or diarrhea  Severe headache, dizziness, or blurred vision  Notable increase in swelling of hands or feet  Notable swelling of face and lips  Difficulty, pain or burning with urination  Foul smelling vaginal discharge  Decreased fetal movement    Come to the hospital if you have any of the following symptoms:  Your water breaks  More than 4-6 contractions in 1 hour for 2 or more hours  Vaginal bleeding like a period    After 28 weeks, you should feel 10 distinct fetal movements within a 2 hour period.    It is recommended that you drink 1/2 a gallon of water each day.  Tea, Soda and Juice are  in addition to this.    Labor and Delivery Phone number: [phone]    If you need to be seen on Labor and delivery between the hours of 6pm and 7am, please enter through the Emergency room entrance.

## 2024-03-24 ENCOUNTER — HOSPITAL ENCOUNTER (OUTPATIENT)
Facility: HOSPITAL | Age: 34
Discharge: HOME | End: 2024-03-24
Attending: OBSTETRICS & GYNECOLOGY | Admitting: SPECIALIST
Payer: MEDICAID

## 2024-03-24 VITALS
HEIGHT: 65 IN | DIASTOLIC BLOOD PRESSURE: 58 MMHG | SYSTOLIC BLOOD PRESSURE: 122 MMHG | BODY MASS INDEX: 48.82 KG/M2 | HEART RATE: 95 BPM | WEIGHT: 293 LBS | RESPIRATION RATE: 17 BRPM | TEMPERATURE: 98 F | OXYGEN SATURATION: 98 %

## 2024-03-24 DIAGNOSIS — M54.9 BACK PAIN: ICD-10-CM

## 2024-03-24 LAB
BILIRUB UR QL STRIP: NEGATIVE
CLARITY UR: CLEAR
COLOR UR: YELLOW
GLUCOSE UR QL STRIP: NEGATIVE
HGB UR QL STRIP: NEGATIVE
KETONES UR QL STRIP: NEGATIVE
LEUKOCYTE ESTERASE UR QL STRIP: NEGATIVE
NITRITE UR QL STRIP: NEGATIVE
PH UR STRIP: 7 [PH] (ref 5–8)
PROT UR QL STRIP: NEGATIVE
SP GR UR STRIP: 1.02 (ref 1–1.03)
URN SPEC COLLECT METH UR: NORMAL
UROBILINOGEN UR STRIP-ACNC: NEGATIVE EU/DL

## 2024-03-24 PROCEDURE — 81003 URINALYSIS AUTO W/O SCOPE: CPT | Performed by: OBSTETRICS & GYNECOLOGY

## 2024-03-24 PROCEDURE — 99211 OFF/OP EST MAY X REQ PHY/QHP: CPT

## 2024-03-24 PROCEDURE — 25000003 PHARM REV CODE 250: Performed by: OBSTETRICS & GYNECOLOGY

## 2024-03-24 RX ORDER — ONDANSETRON 4 MG/1
4 TABLET, ORALLY DISINTEGRATING ORAL ONCE
Status: COMPLETED | OUTPATIENT
Start: 2024-03-24 | End: 2024-03-24

## 2024-03-24 RX ADMIN — ONDANSETRON 4 MG: 4 TABLET, ORALLY DISINTEGRATING ORAL at 04:03

## 2024-03-24 NOTE — DISCHARGE INSTRUCTIONS
Keep your scheduled appointment with your provider.    Call your Doctor if you have any of the following:  Temperature above 100 degrees  Nausea, vomiting and/or diarrhea  Severe headache, dizziness, or blurred vision  Notable increase in swelling of hands or feet  Notable swelling of face and lips  Difficulty, pain or burning with urination  Foul smelling vaginal discharge  Decreased fetal movement    Come to the hospital if you have any of the following symptoms:  Your water breaks  More than 4-6 contractions in 1 hour for 2 or more hours  Vaginal bleeding like a period    After 28 weeks, you should feel 10 distinct fetal movements within a 2 hour period.    It is recommended that you drink 1/2 a gallon of water each day.  Tea, Soda and Juice are  in addition to this.    Labor and Delivery Phone number: [phone]    If you need to be seen on Labor and delivery between the hours of 6pm and 7am, please enter through the Emergency room entrance.

## 2024-03-24 NOTE — NURSING
[facility]  Department of Obstetrics and Gynecology  Labor & Delivery Triage Assessment    PATIENT NAME: Jasper Guerrero  MRN: [id number]  TODAY'S DATE: 2024    CHIEF COMPLAINT: Lower back pain and pelvic pain.      OB History    Para Term  AB Living   3 2 2 0 0 2   SAB IAB Ectopic Multiple Live Births   0 0 0 0 2      # Outcome Date GA Lbr Berto/2nd Weight Sex Delivery Anes PTL Lv   3 Current            2 Term 2018 40w0d  3.771 kg (8 lb 5 oz) F Vag-Spont  N ABBY   1 Term 2015    M Vag-Spont   ABBY     Past Medical History:   Diagnosis Date    Agoraphobia     Carpal tunnel syndrome     Depression     Diabetes mellitus, type 2     DVT (deep venous thrombosis)     2019 had blood clot in right arm    Fibromyalgia     Migraine headache     Nerve injury 2016    Lingual Nerve Injury    Obese     Pain management     PTSD (post-traumatic stress disorder)     Trigeminal neuralgia      Past Surgical History:   Procedure Laterality Date    CARPAL TUNNEL RELEASE Right 2018    Dr Lozada     CARPAL TUNNEL RELEASE Left 2020    Procedure: RELEASE, CARPAL TUNNEL;  Surgeon: Brendon Lozada Jr., MD;  Location: [facility];  Service: Orthopedics;  Laterality: Left;    CHOLECYSTECTOMY N/A 10/31/2022    MOUTH SURGERY      NERVE GRAFT  2016    WISDOM TOOTH EXTRACTION           VITAL SIGNS - ABNORMAL VITALS INCLUDE TEMP >100.4,RR <12 or >26, SUSTAINED MATERNAL PULSE <60 or >120     VITAL SIGNS (Most Recent)       VITAL SIGNS     normal  HEADACHE    no     VOMITING    no  VISUAL DISTURBANCES  no  EPIGASTRIC PAIN        no  PROTEINURIA 2+ or MORE             no   EDEMA FACE/EXTREMITIES            no    FETAL MOVEMENT     FETAL MOVEMENT: Active  FETAL HEART RATE BASELINE =  130  normal  FETAL HEART RATE VARIABILITY:  Moderate  FETAL HEART RATE ACCELERATIONS FOR GESTATIONAL AGE: present  FETAL HEART RATE DECELERATIONS: none    ABDOMINAL PAIN/CRAMPING/CONTRACTIONS     Patient is not complaining of  abdominal pain/cramping/contractions.    RUPTURE OF MEMBRANES OR LEAKING OF AMNIOTIC FLUID     Patient denies ROM or leaking of amniotic fluid.    VAGINAL BLEEDING     Patient denies vaginal bleeding.    VAGINAL EXAM     DILATION:  0  STATION:  -3  EFFACEMENT:  0  PRESENTATION:  transverse        PAIN PRESENT ON ARRIVAL     ONSET:   A week ago  LOCATION:  Lower back and pelvic pain  PAIN SCALE (0-10):  3  DESCRIPTION: sharp    Interventions     Antiemetics given.      PATIENT DISPOSITION     Discharged Home      Dr. Mabry notified at 1800 of the above assessment.    Becca Arora RN  [facility]  03/24/2024

## 2024-03-25 ENCOUNTER — PROCEDURE VISIT (OUTPATIENT)
Dept: MATERNAL FETAL MEDICINE | Facility: CLINIC | Age: 34
End: 2024-03-25
Payer: MEDICAID

## 2024-03-25 ENCOUNTER — PATIENT MESSAGE (OUTPATIENT)
Dept: MATERNAL FETAL MEDICINE | Facility: CLINIC | Age: 34
End: 2024-03-25

## 2024-03-25 ENCOUNTER — OFFICE VISIT (OUTPATIENT)
Dept: MATERNAL FETAL MEDICINE | Facility: CLINIC | Age: 34
End: 2024-03-25
Payer: MEDICAID

## 2024-03-25 VITALS
WEIGHT: 293 LBS | DIASTOLIC BLOOD PRESSURE: 63 MMHG | SYSTOLIC BLOOD PRESSURE: 134 MMHG | HEART RATE: 83 BPM | BODY MASS INDEX: 51.73 KG/M2

## 2024-03-25 DIAGNOSIS — O99.212 OBESITY AFFECTING PREGNANCY IN SECOND TRIMESTER, UNSPECIFIED OBESITY TYPE: ICD-10-CM

## 2024-03-25 DIAGNOSIS — Z36.89 ENCOUNTER FOR ULTRASOUND TO ASSESS FETAL GROWTH: ICD-10-CM

## 2024-03-25 DIAGNOSIS — Z79.891 CHRONIC PRESCRIPTION OPIATE USE: ICD-10-CM

## 2024-03-25 DIAGNOSIS — O24.112 PRE-EXISTING TYPE 2 DIABETES MELLITUS, IN PREGNANCY, SECOND TRIMESTER: Primary | ICD-10-CM

## 2024-03-25 DIAGNOSIS — O10.919 CHRONIC HYPERTENSION AFFECTING PREGNANCY: ICD-10-CM

## 2024-03-25 DIAGNOSIS — O24.414 INSULIN CONTROLLED GESTATIONAL DIABETES MELLITUS (GDM) IN THIRD TRIMESTER: ICD-10-CM

## 2024-03-25 PROCEDURE — 3008F BODY MASS INDEX DOCD: CPT | Mod: CPTII,,, | Performed by: STUDENT IN AN ORGANIZED HEALTH CARE EDUCATION/TRAINING PROGRAM

## 2024-03-25 PROCEDURE — 99214 OFFICE O/P EST MOD 30 MIN: CPT | Mod: S$PBB,TH,, | Performed by: STUDENT IN AN ORGANIZED HEALTH CARE EDUCATION/TRAINING PROGRAM

## 2024-03-25 PROCEDURE — 3078F DIAST BP <80 MM HG: CPT | Mod: CPTII,,, | Performed by: STUDENT IN AN ORGANIZED HEALTH CARE EDUCATION/TRAINING PROGRAM

## 2024-03-25 PROCEDURE — 76816 OB US FOLLOW-UP PER FETUS: CPT | Mod: PBBFAC,PN | Performed by: STUDENT IN AN ORGANIZED HEALTH CARE EDUCATION/TRAINING PROGRAM

## 2024-03-25 PROCEDURE — 3075F SYST BP GE 130 - 139MM HG: CPT | Mod: CPTII,,, | Performed by: STUDENT IN AN ORGANIZED HEALTH CARE EDUCATION/TRAINING PROGRAM

## 2024-03-25 PROCEDURE — 99999 PR PBB SHADOW E&M-EST. PATIENT-LVL II: CPT | Mod: PBBFAC,,, | Performed by: STUDENT IN AN ORGANIZED HEALTH CARE EDUCATION/TRAINING PROGRAM

## 2024-03-25 PROCEDURE — 99212 OFFICE O/P EST SF 10 MIN: CPT | Mod: PBBFAC,TH,PN,25 | Performed by: STUDENT IN AN ORGANIZED HEALTH CARE EDUCATION/TRAINING PROGRAM

## 2024-03-25 PROCEDURE — 76819 FETAL BIOPHYS PROFIL W/O NST: CPT | Mod: 26,S$PBB,, | Performed by: STUDENT IN AN ORGANIZED HEALTH CARE EDUCATION/TRAINING PROGRAM

## 2024-03-25 NOTE — ASSESSMENT & PLAN NOTE
Previously counseled    Continue Metoprolol 50 mg daily  -Recommend treatment to a goal blood pressure < 140/90

## 2024-03-25 NOTE — ASSESSMENT & PLAN NOTE
Continues to take Tylenol 1000 BID for Trigeminal Neuralgia after weaning percocet( see prior notes)  Previously seeing a Neurologist who moved. She was then referred to pain management however insurance does not cover.    Recommendations  Neuro referral pending

## 2024-03-25 NOTE — PROGRESS NOTES
"Maternal Fetal Medicine Follow up  SUBJECTIVE:     Jasper Guerrero is a 33 y.o.  female with IUP at 34w1d who is seen for MFM follow up for management of:    Problem   Pre-Existing Type 2 Diabetes Mellitus, in Pregnancy, Second Trimester   Chronic Hypertension Affecting Pregnancy   Chronic Opiate Use   Obesity Affecting Pregnancy in Second Trimester     Previous notes reviewed.   No changes to medical, surgical, family, social, or obstetric history.      Current Outpatient Medications   Medication Instructions    acetaminophen (TYLENOL) 1,000 mg, Oral, Daily    aspirin (ECOTRIN) 81 mg, Oral, Daily    BD INEZ 2ND GEN PEN NEEDLE 32 gauge x 5/32" Ndle USE AS DIRECTED WITH NPH PEN EVERY DAY    blood-glucose sensor (DEXCOM G7 SENSOR) Mariely Change sensor every 10 days    busPIRone (BUSPAR) 15 mg, Oral, Daily    butalbital-acetaminophen-caffeine -40 mg (FIORICET, ESGIC) -40 mg per tablet 1 tablet, Oral, Every 6 hours PRN    citalopram (CELEXA) 40 mg, Oral, Daily    diphenhydrAMINE (BENADRYL) 25 mg, Oral, Every 6 hours PRN    diphenhydrAMINE-acetaminophen (TYLENOL PM)  mg Tab 2 tablets, Oral, Nightly PRN    hydrOXYzine pamoate (VISTARIL) 25 MG Cap SMARTSI Capsule(s) By Mouth Every 12 Hours PRN    insulin aspart U-100 (NOVOLOG) 100 unit/mL (3 mL) InPn pen Inject 6 Units into the skin daily with breakfast AND 8 Units daily with dinner or evening meal.    insulin NPH isoph U-100 human (HUMULIN N NPH INSULIN KWIKPEN) 100 unit/mL (3 mL) InPn Inject 10 Units into the skin before breakfast AND 20 Units every evening.    insulin syringe-needle U-100 0.5 mL 31 gauge x 5/16" Syrg 1 Syringe, Misc.(Non-Drug; Combo Route), 3 times daily PRN    metFORMIN (GLUCOPHAGE) 1,000 mg, Oral, 2 times daily with meals    metoprolol succinate (TOPROL-XL) 50 mg, Oral, Daily    ondansetron (ZOFRAN) 4 mg, Oral, Every 6 hours PRN    prenatal vit no.124/iron/folic (PRENATAL VITAMIN ORAL) Oral, Daily     Review of patient's " "allergies indicates:   Allergen Reactions    Benadryl [diphenhydramine hcl]      Paralysis on right side body     Codeine      Paralysis right body    Flexeril [cyclobenzaprine] Other (See Comments)     Numbness on right side of body    Nsaids (non-steroidal anti-inflammatory drug) Diarrhea and Nausea And Vomiting     Ulcers     Penicillins Anaphylaxis and Hives    Droperidol Anxiety and Other (See Comments)    Magnesium      "made me feel horrible"     Prochlorperazine Anxiety and Palpitations     Care team members:  MD Rodolfo - Primary OB  OBJECTIVE:   Blood Pressure: /63   Pulse 83   Wt (!) 141 kg (310 lb 13.6 oz)   LMP 2023 (Approximate)   BMI 51.73 kg/m²   Ultrasound performed. See viewpoint for full ultrasound report.  Fetal size is AGA with the EFW plotting at the 18% and the AC plotting at the 36%.   The EFW is 2177 g.  A limited repeat fetal anatomic survey shows no abnormalities of the structures that were adequately imaged.    The BPP score is reassuring at 8/8, and the AFV is normal.       ASSESSMENT/PLAN:     33 y.o.  female with IUP at 34w1d presents for [facility] follow up.    Pre-existing type 2 diabetes mellitus, in pregnancy, second trimester  Jasper Cadden presents for a blood glucose assessment. Logs as above. Fetal echo completed- difficult however normal scan  See prior note by Dr. Toure for full counseling and recommendations    Blood Glucose Logs :  [id number] elevated, range 85-97  2hr PP breakfast: 2/7 elevated, range 102-150  2hr PP lunch: 4/7 elevated, range   2hr PP dinner: 4/7 elevated, range 107-139  Previous Regimen: NPH /; Aspart 15/x/8, Metformin 1000 BID  New Regimen as of today: NPH , Aspart 15/x/10, Metformin 1000 BID    Age of Dx: 31  Comorbidities: MO, HTN  Prepregnancy regimen: Metformin 500 BID, Patient is insulin naive   Baseline HgA1c: 6.9% 2023. Per patient, most recent A1c as of  was 5.4%.   Blood glucose log review:  BG log " reviewed - as above    Recommendations:  - ADA diet  - Pregnancy blood glucose checking regimen: Fasting + 2 hour postprandial (after meals) three times daily. Discussed importance of tracking blood sugars and sending in glucose logs in.   - Target blood glucose levels: <95 fasting, <120 postprandial  - Labor considerations: During labor, it is important to monitor maternal blood glucose levels. Fingerstick blood sugars are obtained hourly until stable and then every other hour through latent labor, increasing to hourly during active labor. The goal is  mg/dl. An insulin drip is a reasonable option for suboptimally controlled blood sugars.       Recommendations: see initial consult note for full recommendations  Prenatal testing  Twice weekly BPP/ NST + AFV starting at 32 weeks. Weekly BPPs with MFM. NST/MARIELA with primary OB  Delivery timin 0/7 to 38 and 6/7 weeks if under good control without comorbidities  37 0/7 to 37 and 6/7 weeks if longstanding diabetes or poorly controlled, polyhydramnios, EFW>90th percentile, or BMI >= 40 CURRENTLY due to BMI  36 0/7 to 37 and 6/7 weeks if vascular complications, prior stillbirth or other complicating conditions.  Recommend consideration of earlier delivery if IUGR, HTN, or other complications  Recommend offering  for delivery is EFW is 4500g or more near the time of delivery        Chronic hypertension affecting pregnancy  Previously counseled    Continue Metoprolol 50 mg daily  -Recommend treatment to a goal blood pressure < 140/90      Chronic Opiate Use  Continues to take Tylenol 1000 BID for Trigeminal Neuralgia after weaning percocet( see prior notes)  Previously seeing a Neurologist who moved. She was then referred to pain management however insurance does not cover.    Recommendations  Neuro referral pending        Obesity affecting pregnancy in second trimester  Previously counseled      Neisha Haider  Maternal-Fetal Medicine    Electronically  Signed by Neisha Haider March 25, 2024

## 2024-03-25 NOTE — ASSESSMENT & PLAN NOTE
Jasper Guerrero presents for a blood glucose assessment. Logs as above. Fetal echo completed- difficult however normal scan  See prior note by Dr. Toure for full counseling and recommendations    Blood Glucose Logs :  [id number] elevated, range 85-97  2hr PP breakfast: 2/7 elevated, range 102-150  2hr PP lunch: 4/7 elevated, range   2hr PP dinner: 4/7 elevated, range 107-139  Previous Regimen: NPH ; Aspart 15/x/8, Metformin 1000 BID  New Regimen as of today: NPH , Aspart 15/x/10, Metformin 1000 BID    Age of Dx: 31  Comorbidities: MO, HTN  Prepregnancy regimen: Metformin 500 BID, Patient is insulin naive   Baseline HgA1c: 6.9% 2023. Per patient, most recent A1c as of  was 5.4%.   Blood glucose log review:  BG log reviewed - as above    Recommendations:  - ADA diet  - Pregnancy blood glucose checking regimen: Fasting + 2 hour postprandial (after meals) three times daily. Discussed importance of tracking blood sugars and sending in glucose logs in.   - Target blood glucose levels: <95 fasting, <120 postprandial  - Labor considerations: During labor, it is important to monitor maternal blood glucose levels. Fingerstick blood sugars are obtained hourly until stable and then every other hour through latent labor, increasing to hourly during active labor. The goal is  mg/dl. An insulin drip is a reasonable option for suboptimally controlled blood sugars.       Recommendations: see initial consult note for full recommendations  Prenatal testing  Twice weekly BPP/ NST + AFV starting at 32 weeks. Weekly BPPs with MFM. NST/MARIELA with primary OB  Delivery timin 0/7 to 38 and 6/7 weeks if under good control without comorbidities  37 0/7 to 37 and 6/7 weeks if longstanding diabetes or poorly controlled, polyhydramnios, EFW>90th percentile, or BMI >= 40 CURRENTLY due to BMI  36 0/7 to 37 and 6/7 weeks if vascular complications, prior stillbirth or other complicating conditions.  Recommend  consideration of earlier delivery if IUGR, HTN, or other complications  Recommend offering  for delivery is EFW is 4500g or more near the time of delivery

## 2024-03-26 ENCOUNTER — HOSPITAL ENCOUNTER (OUTPATIENT)
Facility: HOSPITAL | Age: 34
Discharge: HOME | End: 2024-03-26
Attending: SPECIALIST | Admitting: SPECIALIST
Payer: MEDICAID

## 2024-03-26 VITALS — DIASTOLIC BLOOD PRESSURE: 57 MMHG | RESPIRATION RATE: 19 BRPM | SYSTOLIC BLOOD PRESSURE: 110 MMHG | HEART RATE: 90 BPM

## 2024-03-26 DIAGNOSIS — I10 HYPERTENSION: ICD-10-CM

## 2024-03-26 DIAGNOSIS — O24.419 GESTATIONAL DIABETES: ICD-10-CM

## 2024-03-26 LAB — PRENATAL STREP B CULTURE: POSITIVE

## 2024-03-26 PROCEDURE — 59025 FETAL NON-STRESS TEST: CPT

## 2024-03-26 NOTE — DISCHARGE INSTRUCTIONS
Keep your scheduled appointment with your provider.    Call your Doctor if you have any of the following:  Temperature above 100 degrees  Nausea, vomiting and/or diarrhea  Severe headache, dizziness, or blurred vision  Notable increase in swelling of hands or feet  Notable swelling of face and lips  Difficulty, pain or burning with urination  Foul smelling vaginal discharge  Decreased fetal movement    Come to the hospital if you have any of the following symptoms:  Your water breaks  More than 4-6 contractions in 1 hour for 2 or more hours  Vaginal bleeding like a period    After 28 weeks, you should feel 10 distinct fetal movements within a 2 hour period.    It is recommended that you drink 1/2 a gallon of water each day.  Tea, Soda and Juice are  in addition to this.    Labor and Delivery Phone number: [phone]    If you need to be seen on Labor and delivery between the hours of 6pm and 7am, please enter through the Emergency room entrance.

## 2024-03-27 ENCOUNTER — PATIENT MESSAGE (OUTPATIENT)
Dept: MATERNAL FETAL MEDICINE | Facility: CLINIC | Age: 34
End: 2024-03-27
Payer: MEDICAID

## 2024-03-27 ENCOUNTER — HOSPITAL ENCOUNTER (OUTPATIENT)
Facility: HOSPITAL | Age: 34
Discharge: HOME | End: 2024-03-27
Attending: SPECIALIST | Admitting: SPECIALIST
Payer: MEDICAID

## 2024-03-27 VITALS — HEART RATE: 92 BPM | DIASTOLIC BLOOD PRESSURE: 58 MMHG | SYSTOLIC BLOOD PRESSURE: 120 MMHG | RESPIRATION RATE: 18 BRPM

## 2024-03-27 DIAGNOSIS — O42.90 AMNIOTIC FLUID LEAKING: ICD-10-CM

## 2024-03-27 LAB
CTP QC/QA: YES
RUPTURE OF MEMBRANE: NEGATIVE

## 2024-03-27 PROCEDURE — 72100002 HC LABOR CARE, 1ST 8 HOURS

## 2024-03-27 PROCEDURE — 59025 FETAL NON-STRESS TEST: CPT

## 2024-03-27 NOTE — DISCHARGE INSTRUCTIONS
Keep your scheduled appointment with your provider.    Call your Doctor if you have any of the following:  Temperature above 100 degrees  Nausea, vomiting and/or diarrhea  Severe headache, dizziness, or blurred vision  Notable increase in swelling of hands or feet  Notable swelling of face and lips  Difficulty, pain or burning with urination  Foul smelling vaginal discharge  Decreased fetal movement    Come to the hospital if you have any of the following symptoms:  Your water breaks  More than 4-6 contractions in 1 hour for 2 or more hours  Vaginal bleeding like a period    After 28 weeks, you should feel 10 distinct fetal movements within a 2 hour period.    It is recommended that you drink 1/2 a gallon of water each day.  Tea, Soda and Juice are  in addition to this.    Labor and Delivery Phone number: [phone]    If you need to be seen on Labor and delivery between the hours of 6pm and 7am, please enter through the Emergency room entrance.

## 2024-03-27 NOTE — NURSING
1223-presents to labor and delivery with c/o leaking amniotic fluid since this am 0800.   1243-ROM + negative   1252-reactive NST. Dr. Reynolds reviewed. Pt d/c home

## 2024-03-27 NOTE — NURSING
[facility]  Department of Obstetrics and Gynecology  Labor & Delivery Triage Assessment    PATIENT NAME: Jasper Guerrero  MRN: [id number]  TODAY'S DATE: 2024    CHIEF COMPLAINT: leaking fluid      OB History    Para Term  AB Living   3 2 2 0 0 2   SAB IAB Ectopic Multiple Live Births   0 0 0 0 2      # Outcome Date GA Lbr Berto/2nd Weight Sex Delivery Anes PTL Lv   3 Current            2 Term 2018 40w0d  3.771 kg (8 lb 5 oz) F Vag-Spont  N ABBY   1 Term 2015    M Vag-Spont   ABBY     Past Medical History:   Diagnosis Date    Agoraphobia     Carpal tunnel syndrome     Depression     Diabetes mellitus, type 2     DVT (deep venous thrombosis)     2019 had blood clot in right arm    Fibromyalgia     Migraine headache     Nerve injury 2016    Lingual Nerve Injury    Obese     Pain management     PTSD (post-traumatic stress disorder)     Trigeminal neuralgia      Past Surgical History:   Procedure Laterality Date    CARPAL TUNNEL RELEASE Right 2018    Dr Lozada     CARPAL TUNNEL RELEASE Left 2020    Procedure: RELEASE, CARPAL TUNNEL;  Surgeon: Brendon Lozada Jr., MD;  Location: [facility];  Service: Orthopedics;  Laterality: Left;    CHOLECYSTECTOMY N/A 10/31/2022    MOUTH SURGERY      NERVE GRAFT      WISDOM TOOTH EXTRACTION           VITAL SIGNS - ABNORMAL VITALS INCLUDE TEMP >100.4,RR <12 or >26, SUSTAINED MATERNAL PULSE <60 or >120     VITAL SIGNS (Most Recent)  Pulse: 92 (24 1228)  Resp: 18 (24 1228)  BP: (!) 120/58 (24 1228)    VITAL SIGNS     normal  HEADACHE    no     VOMITING    no  VISUAL DISTURBANCES  no  EPIGASTRIC PAIN        no  PROTEINURIA 2+ or MORE             no   EDEMA FACE/EXTREMITIES            no    FETAL MOVEMENT     FETAL MOVEMENT: Active  FETAL HEART RATE BASELINE =  145  normal  FETAL HEART RATE VARIABILITY:  Moderate  FETAL HEART RATE ACCELERATIONS FOR GESTATIONAL AGE: present  FETAL HEART RATE DECELERATIONS: none    ABDOMINAL  PAIN/CRAMPING/CONTRACTIONS     Patient is not complaining of abdominal pain/cramping/contractions.    RUPTURE OF MEMBRANES OR LEAKING OF AMNIOTIC FLUID     Rom + negative     VAGINAL BLEEDING     Patient denies vaginal bleeding.    VAGINAL EXAM     VAGINAL EXAM DEFERRED DUE TO:  Not in Labor    PAIN PRESENT ON ARRIVAL     ONSET:   0/10    Interventions     EFM  ROM plus (negative results )      PATIENT DISPOSITION     D/c home       Dr. Reynolds  notified at 1253 of the above assessment.    Nancy Cobb RN  [facility]  03/27/2024

## 2024-03-28 ENCOUNTER — PATIENT MESSAGE (OUTPATIENT)
Dept: ADMINISTRATIVE | Facility: HOSPITAL | Age: 34
End: 2024-03-28
Payer: MEDICAID

## 2024-03-29 ENCOUNTER — HOSPITAL ENCOUNTER (OUTPATIENT)
Facility: HOSPITAL | Age: 34
Discharge: HOME | End: 2024-03-29
Attending: SPECIALIST | Admitting: SPECIALIST
Payer: MEDICAID

## 2024-03-29 VITALS — SYSTOLIC BLOOD PRESSURE: 108 MMHG | RESPIRATION RATE: 20 BRPM | DIASTOLIC BLOOD PRESSURE: 57 MMHG | HEART RATE: 96 BPM

## 2024-03-29 DIAGNOSIS — O24.419 GESTATIONAL DIABETES: ICD-10-CM

## 2024-03-29 DIAGNOSIS — I10 HYPERTENSION: ICD-10-CM

## 2024-03-29 PROCEDURE — 59025 FETAL NON-STRESS TEST: CPT

## 2024-03-29 NOTE — PROGRESS NOTES
Contacted Dr. Clark to inform him patient complaining of headache. PT states she took 1000mg of Tylenol and 2 Fioricet 30 mins ago at home without relief. Pt glucose currently 189. MD directs patient to ED if no improvement with headache, and follow up with dietician for better glucose control. Pt states understanding of education and direction to return to ED if headache symptoms do not improve.     Anisha Leach RN

## 2024-03-29 NOTE — DISCHARGE INSTRUCTIONS
Keep your scheduled appointment with your provider.    Call your Doctor if you have any of the following:  Temperature above 100 degrees  Nausea, vomiting and/or diarrhea  Severe headache, dizziness, or blurred vision  Notable increase in swelling of hands or feet  Notable swelling of face and lips  Difficulty, pain or burning with urination  Foul smelling vaginal discharge  Decreased fetal movement    Come to the hospital if you have any of the following symptoms:  Your water breaks  More than 4-6 contractions in 1 hour for 2 or more hours  Vaginal bleeding like a period    After 28 weeks, you should feel 10 distinct fetal movements within a 2 hour period.    It is recommended that you drink 1/2 a gallon of water each day.  Tea, Soda and Juice are  in addition to this.    Labor and Delivery Phone number: [phone]    If you need to be seen on Labor and delivery between the hours of 6pm and 7am, please enter through the Emergency room entrance.

## 2024-04-01 ENCOUNTER — HOSPITAL ENCOUNTER (OUTPATIENT)
Facility: HOSPITAL | Age: 34
Discharge: HOME | End: 2024-04-01
Attending: SPECIALIST | Admitting: SPECIALIST
Payer: MEDICAID

## 2024-04-01 VITALS — RESPIRATION RATE: 19 BRPM | SYSTOLIC BLOOD PRESSURE: 104 MMHG | HEART RATE: 92 BPM | DIASTOLIC BLOOD PRESSURE: 52 MMHG

## 2024-04-01 DIAGNOSIS — O24.419 GESTATIONAL DIABETES: ICD-10-CM

## 2024-04-01 PROCEDURE — 59025 FETAL NON-STRESS TEST: CPT

## 2024-04-01 NOTE — DISCHARGE INSTRUCTIONS
Keep your scheduled appointment with your provider.    Call your Doctor if you have any of the following:  Temperature above 100 degrees  Nausea, vomiting and/or diarrhea  Severe headache, dizziness, or blurred vision  Notable increase in swelling of hands or feet  Notable swelling of face and lips  Difficulty, pain or burning with urination  Foul smelling vaginal discharge  Decreased fetal movement    Come to the hospital if you have any of the following symptoms:  Your water breaks  More than 4-6 contractions in 1 hour for 2 or more hours  Vaginal bleeding like a period    After 28 weeks, you should feel 10 distinct fetal movements within a 2 hour period.    It is recommended that you drink 1/2 a gallon of water each day.  Tea, Soda and Juice are  in addition to this.    Labor and Delivery Phone number: [phone]    If you need to be seen on Labor and delivery between the hours of 6pm and 7am, please enter through the Emergency room entrance.

## 2024-04-02 ENCOUNTER — HOSPITAL ENCOUNTER (OUTPATIENT)
Facility: HOSPITAL | Age: 34
Discharge: HOME | End: 2024-04-02
Attending: SPECIALIST | Admitting: SPECIALIST
Payer: MEDICAID

## 2024-04-02 VITALS
DIASTOLIC BLOOD PRESSURE: 57 MMHG | HEART RATE: 95 BPM | SYSTOLIC BLOOD PRESSURE: 113 MMHG | OXYGEN SATURATION: 97 % | TEMPERATURE: 99 F | RESPIRATION RATE: 18 BRPM

## 2024-04-02 DIAGNOSIS — O24.419 GDM (GESTATIONAL DIABETES MELLITUS): ICD-10-CM

## 2024-04-02 DIAGNOSIS — O16.9 HYPERTENSION AFFECTING PREGNANCY: Primary | ICD-10-CM

## 2024-04-02 DIAGNOSIS — O36.8190 DECREASED FETAL MOVEMENT: ICD-10-CM

## 2024-04-02 PROCEDURE — 99211 OFF/OP EST MAY X REQ PHY/QHP: CPT

## 2024-04-02 NOTE — DISCHARGE INSTRUCTIONS
Keep your scheduled appointment with your provider.    Call your Doctor if you have any of the following:  Temperature above 100 degrees  Nausea, vomiting and/or diarrhea  Severe headache, dizziness, or blurred vision  Notable increase in swelling of hands or feet  Notable swelling of face and lips  Difficulty, pain or burning with urination  Foul smelling vaginal discharge  Decreased fetal movement    Come to the hospital if you have any of the following symptoms:  Your water breaks  More than 4-6 contractions in 1 hour for 2 or more hours  Vaginal bleeding like a period    After 28 weeks, you should feel 10 distinct fetal movements within a 2 hour period.    It is recommended that you drink 1/2 a gallon of water each day.  Tea, Soda and Juice are  in addition to this.    Labor and Delivery Phone number: [phone]    If you need to be seen on Labor and delivery between the hours of 6pm and 7am, please enter through the Emergency room entrance.          Pregnancy Belt, Rest, Warm Bath, Tylenol 1G Q6HR

## 2024-04-04 ENCOUNTER — PATIENT MESSAGE (OUTPATIENT)
Dept: MATERNAL FETAL MEDICINE | Facility: CLINIC | Age: 34
End: 2024-04-04
Payer: MEDICAID

## 2024-04-04 ENCOUNTER — HOSPITAL ENCOUNTER (OUTPATIENT)
Facility: HOSPITAL | Age: 34
Discharge: HOME | End: 2024-04-04
Attending: SPECIALIST | Admitting: SPECIALIST
Payer: MEDICAID

## 2024-04-04 VITALS — SYSTOLIC BLOOD PRESSURE: 119 MMHG | DIASTOLIC BLOOD PRESSURE: 59 MMHG | HEART RATE: 86 BPM

## 2024-04-04 DIAGNOSIS — O24.419 GESTATIONAL DIABETES: ICD-10-CM

## 2024-04-04 PROCEDURE — 59025 FETAL NON-STRESS TEST: CPT

## 2024-04-04 NOTE — DISCHARGE INSTRUCTIONS
Keep your scheduled appointment with your provider.    Call your Doctor if you have any of the following:  Temperature above 100 degrees  Nausea, vomiting and/or diarrhea  Severe headache, dizziness, or blurred vision  Notable increase in swelling of hands or feet  Notable swelling of face and lips  Difficulty, pain or burning with urination  Foul smelling vaginal discharge  Decreased fetal movement    Come to the hospital if you have any of the following symptoms:  Your water breaks  More than 4-6 contractions in 1 hour for 2 or more hours  Vaginal bleeding like a period    After 28 weeks, you should feel 10 distinct fetal movements within a 2 hour period.    It is recommended that you drink 1/2 a gallon of water each day.  Tea, Soda and Juice are  in addition to this.    Labor and Delivery Phone number: [phone]    If you need to be seen on Labor and delivery between the hours of 6pm and 7am, please enter through the Emergency room entrance.

## 2024-04-05 ENCOUNTER — PATIENT MESSAGE (OUTPATIENT)
Dept: MATERNAL FETAL MEDICINE | Facility: CLINIC | Age: 34
End: 2024-04-05

## 2024-04-05 ENCOUNTER — PROCEDURE VISIT (OUTPATIENT)
Dept: MATERNAL FETAL MEDICINE | Facility: CLINIC | Age: 34
End: 2024-04-05
Payer: MEDICAID

## 2024-04-05 DIAGNOSIS — O24.414 INSULIN CONTROLLED GESTATIONAL DIABETES MELLITUS (GDM) IN THIRD TRIMESTER: ICD-10-CM

## 2024-04-05 PROCEDURE — 76819 FETAL BIOPHYS PROFIL W/O NST: CPT | Mod: PBBFAC,PN | Performed by: OBSTETRICS & GYNECOLOGY

## 2024-04-08 ENCOUNTER — PATIENT MESSAGE (OUTPATIENT)
Dept: MATERNAL FETAL MEDICINE | Facility: CLINIC | Age: 34
End: 2024-04-08

## 2024-04-08 ENCOUNTER — HOSPITAL ENCOUNTER (OUTPATIENT)
Facility: HOSPITAL | Age: 34
Discharge: HOME | End: 2024-04-08
Attending: SPECIALIST | Admitting: SPECIALIST
Payer: MEDICAID

## 2024-04-08 ENCOUNTER — TELEPHONE (OUTPATIENT)
Dept: MATERNAL FETAL MEDICINE | Facility: CLINIC | Age: 34
End: 2024-04-08

## 2024-04-08 ENCOUNTER — PROCEDURE VISIT (OUTPATIENT)
Dept: MATERNAL FETAL MEDICINE | Facility: CLINIC | Age: 34
End: 2024-04-08
Attending: FAMILY MEDICINE
Payer: MEDICAID

## 2024-04-08 VITALS — SYSTOLIC BLOOD PRESSURE: 105 MMHG | DIASTOLIC BLOOD PRESSURE: 54 MMHG | HEART RATE: 96 BPM

## 2024-04-08 DIAGNOSIS — O24.414 INSULIN CONTROLLED GESTATIONAL DIABETES MELLITUS (GDM) IN THIRD TRIMESTER: ICD-10-CM

## 2024-04-08 DIAGNOSIS — O24.414 INSULIN CONTROLLED GESTATIONAL DIABETES MELLITUS (GDM) IN THIRD TRIMESTER: Primary | ICD-10-CM

## 2024-04-08 DIAGNOSIS — O99.212 OBESITY AFFECTING PREGNANCY IN SECOND TRIMESTER, UNSPECIFIED OBESITY TYPE: ICD-10-CM

## 2024-04-08 DIAGNOSIS — O10.919 CHRONIC HYPERTENSION AFFECTING PREGNANCY: ICD-10-CM

## 2024-04-08 DIAGNOSIS — O24.419 GESTATIONAL DIABETES: ICD-10-CM

## 2024-04-08 PROCEDURE — 25000003 PHARM REV CODE 250: Performed by: SPECIALIST

## 2024-04-08 PROCEDURE — 76819 FETAL BIOPHYS PROFIL W/O NST: CPT | Mod: PBBFAC | Performed by: STUDENT IN AN ORGANIZED HEALTH CARE EDUCATION/TRAINING PROGRAM

## 2024-04-08 PROCEDURE — 59025 FETAL NON-STRESS TEST: CPT

## 2024-04-08 RX ORDER — ONDANSETRON 4 MG/1
4 TABLET, ORALLY DISINTEGRATING ORAL ONCE
Status: COMPLETED | OUTPATIENT
Start: 2024-04-08 | End: 2024-04-08

## 2024-04-08 RX ADMIN — ONDANSETRON 4 MG: 4 TABLET, ORALLY DISINTEGRATING ORAL at 02:04

## 2024-04-08 NOTE — DISCHARGE INSTRUCTIONS
Keep your scheduled appointment with your provider.    Call your Doctor if you have any of the following:  Temperature above 100 degrees  Nausea, vomiting and/or diarrhea  Severe headache, dizziness, or blurred vision  Notable increase in swelling of hands or feet  Notable swelling of face and lips  Difficulty, pain or burning with urination  Foul smelling vaginal discharge  Decreased fetal movement    Come to the hospital if you have any of the following symptoms:  Your water breaks  More than 4-6 contractions in 1 hour for 2 or more hours  Vaginal bleeding like a period    After 28 weeks, you should feel 10 distinct fetal movements within a 2 hour period.    It is recommended that you drink 1/2 a gallon of water each day.  Tea, Soda and Juice are  in addition to this.    Labor and Delivery Phone number: [phone]    If you need to be seen on Labor and delivery between the hours of 6pm and 7am, please enter through the Emergency room entrance.

## 2024-04-08 NOTE — TELEPHONE ENCOUNTER
Spoke with Pharmacist regarding patient not having enough insulin to last this month.    Pharmacist confirmed order was placed for fewer doses than needed on both NPH and Aspart.  She will change in her system for 90 day supply and notify patient.    No new order needed

## 2024-04-09 NOTE — PROGRESS NOTES
Pt BS log received for review by [facility]. Dr Haider reviews. Pt insulin adjustment made:  NPH 26/22, Aspart 18/x/10 . Pt verbalizes understanding. Next [facility] appt is Monday, April 15, 2024.

## 2024-04-10 ENCOUNTER — PATIENT MESSAGE (OUTPATIENT)
Dept: MATERNAL FETAL MEDICINE | Facility: CLINIC | Age: 34
End: 2024-04-10
Payer: MEDICAID

## 2024-04-11 ENCOUNTER — HOSPITAL ENCOUNTER (OUTPATIENT)
Facility: HOSPITAL | Age: 34
Discharge: HOME | End: 2024-04-11
Attending: SPECIALIST | Admitting: SPECIALIST
Payer: MEDICAID

## 2024-04-11 ENCOUNTER — PATIENT MESSAGE (OUTPATIENT)
Dept: MATERNAL FETAL MEDICINE | Facility: CLINIC | Age: 34
End: 2024-04-11
Payer: MEDICAID

## 2024-04-11 VITALS — SYSTOLIC BLOOD PRESSURE: 104 MMHG | DIASTOLIC BLOOD PRESSURE: 52 MMHG | HEART RATE: 104 BPM

## 2024-04-11 DIAGNOSIS — O24.419 GESTATIONAL DIABETES: ICD-10-CM

## 2024-04-11 PROCEDURE — 59025 FETAL NON-STRESS TEST: CPT

## 2024-04-11 NOTE — DISCHARGE INSTRUCTIONS
Keep your scheduled appointment with your provider.    Call your Doctor if you have any of the following:  Temperature above 100 degrees  Nausea, vomiting and/or diarrhea  Severe headache, dizziness, or blurred vision  Notable increase in swelling of hands or feet  Notable swelling of face and lips  Difficulty, pain or burning with urination  Foul smelling vaginal discharge  Decreased fetal movement    Come to the hospital if you have any of the following symptoms:  Your water breaks  More than 4-6 contractions in 1 hour for 2 or more hours  Vaginal bleeding like a period    After 28 weeks, you should feel 10 distinct fetal movements within a 2 hour period.    It is recommended that you drink 1/2 a gallon of water each day.  Tea, Soda and Juice are  in addition to this.    Labor and Delivery Phone number: [phone]    If you need to be seen on Labor and delivery between the hours of 6pm and 7am, please enter through the Emergency room entrance.    .

## 2024-04-12 ENCOUNTER — PATIENT MESSAGE (OUTPATIENT)
Dept: MATERNAL FETAL MEDICINE | Facility: CLINIC | Age: 34
End: 2024-04-12
Payer: MEDICAID

## 2024-04-15 ENCOUNTER — PROCEDURE VISIT (OUTPATIENT)
Dept: MATERNAL FETAL MEDICINE | Facility: CLINIC | Age: 34
End: 2024-04-15
Attending: FAMILY MEDICINE
Payer: MEDICAID

## 2024-04-15 ENCOUNTER — PATIENT MESSAGE (OUTPATIENT)
Dept: MATERNAL FETAL MEDICINE | Facility: CLINIC | Age: 34
End: 2024-04-15

## 2024-04-15 DIAGNOSIS — O24.414 INSULIN CONTROLLED GESTATIONAL DIABETES MELLITUS (GDM) IN THIRD TRIMESTER: ICD-10-CM

## 2024-04-15 DIAGNOSIS — O99.212 OBESITY AFFECTING PREGNANCY IN SECOND TRIMESTER, UNSPECIFIED OBESITY TYPE: ICD-10-CM

## 2024-04-15 DIAGNOSIS — O10.919 CHRONIC HYPERTENSION AFFECTING PREGNANCY: ICD-10-CM

## 2024-04-15 PROCEDURE — 76819 FETAL BIOPHYS PROFIL W/O NST: CPT | Mod: PBBFAC,PN | Performed by: STUDENT IN AN ORGANIZED HEALTH CARE EDUCATION/TRAINING PROGRAM

## 2024-04-16 ENCOUNTER — HOSPITAL ENCOUNTER (INPATIENT)
Facility: HOSPITAL | Age: 34
LOS: 2 days | Discharge: HOME | End: 2024-04-18
Attending: SPECIALIST | Admitting: SPECIALIST
Payer: MEDICAID

## 2024-04-16 DIAGNOSIS — Z34.90 ENCOUNTER FOR PLANNED INDUCTION OF LABOR: Primary | ICD-10-CM

## 2024-04-16 DIAGNOSIS — F41.9 ANXIETY: ICD-10-CM

## 2024-04-16 DIAGNOSIS — Z34.90 ENCOUNTER FOR INDUCTION OF LABOR: ICD-10-CM

## 2024-04-16 DIAGNOSIS — O16.9 HYPERTENSION AFFECTING PREGNANCY: ICD-10-CM

## 2024-04-16 DIAGNOSIS — O24.419 GDM (GESTATIONAL DIABETES MELLITUS): ICD-10-CM

## 2024-04-16 DIAGNOSIS — Z34.90 PREGNANCY: ICD-10-CM

## 2024-04-16 LAB
ALBUMIN SERPL BCP-MCNC: 3.8 G/DL (ref 3.5–5.2)
ALP SERPL-CCNC: 145 U/L (ref 55–135)
ALT SERPL W/O P-5'-P-CCNC: 53 U/L (ref 10–44)
AMPHET+METHAMPHET UR QL: NEGATIVE
ANION GAP SERPL CALC-SCNC: 10 MMOL/L (ref 8–16)
AST SERPL-CCNC: 38 U/L (ref 10–40)
BACTERIA #/AREA URNS HPF: ABNORMAL /HPF
BARBITURATES UR QL SCN>200 NG/ML: ABNORMAL
BASOPHILS # BLD AUTO: 0.02 K/UL (ref 0–0.2)
BASOPHILS NFR BLD: 0.2 % (ref 0–1.9)
BENZODIAZ UR QL SCN>200 NG/ML: NEGATIVE
BILIRUB SERPL-MCNC: 0.3 MG/DL (ref 0.1–1)
BILIRUB UR QL STRIP: NEGATIVE
BUN SERPL-MCNC: 11 MG/DL (ref 6–20)
BUPRENORPHINE UR QL: NEGATIVE
BZE UR QL SCN: NEGATIVE
CALCIUM SERPL-MCNC: 8.8 MG/DL (ref 8.7–10.5)
CANNABINOIDS UR QL SCN: NEGATIVE
CHLORIDE SERPL-SCNC: 107 MMOL/L (ref 95–110)
CLARITY UR: ABNORMAL
CO2 SERPL-SCNC: 21 MMOL/L (ref 23–29)
COLOR UR: YELLOW
CREAT SERPL-MCNC: 0.8 MG/DL (ref 0.5–1.4)
CREAT UR-MCNC: 284.4 MG/DL (ref 15–325)
DIFFERENTIAL METHOD BLD: ABNORMAL
EOSINOPHIL # BLD AUTO: 0.1 K/UL (ref 0–0.5)
EOSINOPHIL NFR BLD: 0.6 % (ref 0–8)
ERYTHROCYTE [DISTWIDTH] IN BLOOD BY AUTOMATED COUNT: 13.6 % (ref 11.5–14.5)
EST. GFR  (NO RACE VARIABLE): >60 ML/MIN/1.73 M^2
FENTANYL UR QL SCN: NORMAL
GLUCOSE SERPL-MCNC: 84 MG/DL (ref 70–110)
GLUCOSE UR QL STRIP: NEGATIVE
HCT VFR BLD AUTO: 36.3 % (ref 37–48.5)
HGB BLD-MCNC: 11.9 G/DL (ref 12–16)
HGB UR QL STRIP: NEGATIVE
HYALINE CASTS #/AREA URNS LPF: 0 /LPF
IMM GRANULOCYTES # BLD AUTO: 0.04 K/UL (ref 0–0.04)
IMM GRANULOCYTES NFR BLD AUTO: 0.3 % (ref 0–0.5)
KETONES UR QL STRIP: ABNORMAL
LEUKOCYTE ESTERASE UR QL STRIP: ABNORMAL
LYMPHOCYTES # BLD AUTO: 3.3 K/UL (ref 1–4.8)
LYMPHOCYTES NFR BLD: 26.4 % (ref 18–48)
MCH RBC QN AUTO: 28.3 PG (ref 27–31)
MCHC RBC AUTO-ENTMCNC: 32.8 G/DL (ref 32–36)
MCV RBC AUTO: 86 FL (ref 82–98)
MICROSCOPIC COMMENT: ABNORMAL
MONOCYTES # BLD AUTO: 0.8 K/UL (ref 0.3–1)
MONOCYTES NFR BLD: 6.5 % (ref 4–15)
NEUTROPHILS # BLD AUTO: 8.1 K/UL (ref 1.8–7.7)
NEUTROPHILS NFR BLD: 66 % (ref 38–73)
NITRITE UR QL STRIP: NEGATIVE
NRBC BLD-RTO: 0 /100 WBC
OPIATES UR QL SCN: NEGATIVE
PCP UR QL SCN>25 NG/ML: NEGATIVE
PH UR STRIP: 6 [PH] (ref 5–8)
PLATELET # BLD AUTO: 289 K/UL (ref 150–450)
PMV BLD AUTO: 10.3 FL (ref 9.2–12.9)
POTASSIUM SERPL-SCNC: 3.8 MMOL/L (ref 3.5–5.1)
PROT SERPL-MCNC: 6.4 G/DL (ref 6–8.4)
PROT UR QL STRIP: ABNORMAL
RBC # BLD AUTO: 4.21 M/UL (ref 4–5.4)
RBC #/AREA URNS HPF: 1 /HPF (ref 0–4)
SODIUM SERPL-SCNC: 138 MMOL/L (ref 136–145)
SP GR UR STRIP: >1.03 (ref 1–1.03)
SQUAMOUS #/AREA URNS HPF: 22 /HPF
TOXICOLOGY INFORMATION: ABNORMAL
URN SPEC COLLECT METH UR: ABNORMAL
UROBILINOGEN UR STRIP-ACNC: ABNORMAL EU/DL
WBC # BLD AUTO: 12.33 K/UL (ref 3.9–12.7)
WBC #/AREA URNS HPF: 8 /HPF (ref 0–5)

## 2024-04-16 PROCEDURE — 63600175 PHARM REV CODE 636 W HCPCS: Performed by: SPECIALIST

## 2024-04-16 PROCEDURE — 86593 SYPHILIS TEST NON-TREP QUANT: CPT | Performed by: SPECIALIST

## 2024-04-16 PROCEDURE — 25000003 PHARM REV CODE 250: Performed by: SPECIALIST

## 2024-04-16 PROCEDURE — 80348 DRUG SCREENING BUPRENORPHINE: CPT | Performed by: SPECIALIST

## 2024-04-16 PROCEDURE — 80307 DRUG TEST PRSMV CHEM ANLYZR: CPT | Performed by: SPECIALIST

## 2024-04-16 PROCEDURE — 12000002 HC ACUTE/MED SURGE SEMI-PRIVATE ROOM

## 2024-04-16 PROCEDURE — 80053 COMPREHEN METABOLIC PANEL: CPT | Performed by: SPECIALIST

## 2024-04-16 PROCEDURE — 80354 DRUG SCREENING FENTANYL: CPT | Performed by: SPECIALIST

## 2024-04-16 PROCEDURE — 85025 COMPLETE CBC W/AUTO DIFF WBC: CPT | Performed by: SPECIALIST

## 2024-04-16 PROCEDURE — 86901 BLOOD TYPING SEROLOGIC RH(D): CPT | Performed by: SPECIALIST

## 2024-04-16 PROCEDURE — 81001 URINALYSIS AUTO W/SCOPE: CPT | Performed by: SPECIALIST

## 2024-04-16 RX ORDER — CEPHALEXIN 500 MG/1
500 CAPSULE ORAL EVERY 12 HOURS
COMMUNITY
End: 2024-05-13

## 2024-04-16 RX ORDER — METHYLERGONOVINE MALEATE 0.2 MG/ML
200 INJECTION INTRAVENOUS ONCE AS NEEDED
Status: DISCONTINUED | OUTPATIENT
Start: 2024-04-16 | End: 2024-04-17

## 2024-04-16 RX ORDER — HYDROMORPHONE HYDROCHLORIDE 1 MG/ML
1 INJECTION, SOLUTION INTRAMUSCULAR; INTRAVENOUS; SUBCUTANEOUS EVERY 4 HOURS PRN
Status: DISCONTINUED | OUTPATIENT
Start: 2024-04-16 | End: 2024-04-17

## 2024-04-16 RX ORDER — CLINDAMYCIN PHOSPHATE 900 MG/50ML
900 INJECTION, SOLUTION INTRAVENOUS
Status: DISCONTINUED | OUTPATIENT
Start: 2024-04-16 | End: 2024-04-17

## 2024-04-16 RX ORDER — MISOPROSTOL 100 MCG
25 TABLET ORAL EVERY 4 HOURS PRN
Status: DISCONTINUED | OUTPATIENT
Start: 2024-04-16 | End: 2024-04-16

## 2024-04-16 RX ORDER — MISOPROSTOL 100 MCG
25 TABLET ORAL EVERY 4 HOURS PRN
Status: DISCONTINUED | OUTPATIENT
Start: 2024-04-16 | End: 2024-04-17

## 2024-04-16 RX ORDER — TERBUTALINE SULFATE 1 MG/ML
0.25 INJECTION SUBCUTANEOUS
Status: DISCONTINUED | OUTPATIENT
Start: 2024-04-16 | End: 2024-04-17

## 2024-04-16 RX ORDER — INSULIN ASPART 100 [IU]/ML
18 INJECTION, SUSPENSION SUBCUTANEOUS 2 TIMES DAILY
Status: ON HOLD | COMMUNITY
End: 2024-04-18

## 2024-04-16 RX ORDER — SODIUM CHLORIDE, SODIUM LACTATE, POTASSIUM CHLORIDE, CALCIUM CHLORIDE 600; 310; 30; 20 MG/100ML; MG/100ML; MG/100ML; MG/100ML
INJECTION, SOLUTION INTRAVENOUS CONTINUOUS
Status: DISCONTINUED | OUTPATIENT
Start: 2024-04-16 | End: 2024-04-17

## 2024-04-16 RX ORDER — TRANEXAMIC ACID 10 MG/ML
1000 INJECTION, SOLUTION INTRAVENOUS EVERY 30 MIN PRN
Status: DISCONTINUED | OUTPATIENT
Start: 2024-04-16 | End: 2024-04-17

## 2024-04-16 RX ORDER — CALCIUM CARBONATE 200(500)MG
500 TABLET,CHEWABLE ORAL 3 TIMES DAILY PRN
Status: DISCONTINUED | OUTPATIENT
Start: 2024-04-16 | End: 2024-04-17

## 2024-04-16 RX ORDER — OXYTOCIN/RINGER'S LACTATE 30/500 ML
95 PLASTIC BAG, INJECTION (ML) INTRAVENOUS ONCE AS NEEDED
Status: DISCONTINUED | OUTPATIENT
Start: 2024-04-16 | End: 2024-04-17

## 2024-04-16 RX ORDER — MISOPROSTOL 200 UG/1
800 TABLET ORAL ONCE AS NEEDED
Status: DISCONTINUED | OUTPATIENT
Start: 2024-04-16 | End: 2024-04-17

## 2024-04-16 RX ORDER — ONDANSETRON 4 MG/1
8 TABLET, ORALLY DISINTEGRATING ORAL EVERY 8 HOURS PRN
Status: DISCONTINUED | OUTPATIENT
Start: 2024-04-16 | End: 2024-04-17

## 2024-04-16 RX ORDER — OXYTOCIN/RINGER'S LACTATE 30/500 ML
334 PLASTIC BAG, INJECTION (ML) INTRAVENOUS ONCE AS NEEDED
Status: DISCONTINUED | OUTPATIENT
Start: 2024-04-16 | End: 2024-04-17

## 2024-04-16 RX ORDER — CARBOPROST TROMETHAMINE 250 UG/ML
250 INJECTION, SOLUTION INTRAMUSCULAR
Status: DISCONTINUED | OUTPATIENT
Start: 2024-04-16 | End: 2024-04-17

## 2024-04-16 RX ADMIN — CLINDAMYCIN PHOSPHATE 900 MG: 900 INJECTION, SOLUTION INTRAVENOUS at 11:04

## 2024-04-16 RX ADMIN — SODIUM CHLORIDE, POTASSIUM CHLORIDE, SODIUM LACTATE AND CALCIUM CHLORIDE 1000 ML: 600; 310; 30; 20 INJECTION, SOLUTION INTRAVENOUS at 10:04

## 2024-04-16 RX ADMIN — SODIUM CHLORIDE, POTASSIUM CHLORIDE, SODIUM LACTATE AND CALCIUM CHLORIDE: 600; 310; 30; 20 INJECTION, SOLUTION INTRAVENOUS at 11:04

## 2024-04-16 RX ADMIN — MISOPROSTOL 25 MCG: 100 TABLET ORAL at 11:04

## 2024-04-17 ENCOUNTER — PATIENT MESSAGE (OUTPATIENT)
Dept: MATERNAL FETAL MEDICINE | Facility: CLINIC | Age: 34
End: 2024-04-17
Payer: MEDICAID

## 2024-04-17 ENCOUNTER — ANESTHESIA EVENT (OUTPATIENT)
Dept: OBSTETRICS AND GYNECOLOGY | Facility: HOSPITAL | Age: 34
End: 2024-04-17
Payer: MEDICAID

## 2024-04-17 ENCOUNTER — ANESTHESIA (OUTPATIENT)
Dept: OBSTETRICS AND GYNECOLOGY | Facility: HOSPITAL | Age: 34
End: 2024-04-17
Payer: MEDICAID

## 2024-04-17 PROBLEM — Z34.90 ENCOUNTER FOR INDUCTION OF LABOR: Status: ACTIVE | Noted: 2024-04-17

## 2024-04-17 LAB
ABO + RH BLD: NORMAL
BLD GP AB SCN CELLS X3 SERPL QL: NORMAL
TREPONEMA PALLIDUM IGG+IGM AB [PRESENCE] IN SERUM OR PLASMA BY IMMUNOASSAY: NONREACTIVE

## 2024-04-17 PROCEDURE — 25000003 PHARM REV CODE 250: Performed by: ANESTHESIOLOGY

## 2024-04-17 PROCEDURE — 36415 COLL VENOUS BLD VENIPUNCTURE: CPT | Performed by: SPECIALIST

## 2024-04-17 PROCEDURE — C1751 CATH, INF, PER/CENT/MIDLINE: HCPCS | Performed by: ANESTHESIOLOGY

## 2024-04-17 PROCEDURE — 12000002 HC ACUTE/MED SURGE SEMI-PRIVATE ROOM

## 2024-04-17 PROCEDURE — 25000003 PHARM REV CODE 250: Performed by: SPECIALIST

## 2024-04-17 PROCEDURE — 63600175 PHARM REV CODE 636 W HCPCS: Performed by: SPECIALIST

## 2024-04-17 PROCEDURE — 3E0DXGC INTRODUCTION OF OTHER THERAPEUTIC SUBSTANCE INTO MOUTH AND PHARYNX, EXTERNAL APPROACH: ICD-10-PCS | Performed by: SPECIALIST

## 2024-04-17 PROCEDURE — 27200710 HC EPIDURAL INFUSION PUMP SET: Performed by: ANESTHESIOLOGY

## 2024-04-17 PROCEDURE — 63600175 PHARM REV CODE 636 W HCPCS: Performed by: ANESTHESIOLOGY

## 2024-04-17 PROCEDURE — 72200005 HC VAGINAL DELIVERY LEVEL II

## 2024-04-17 PROCEDURE — 62326 NJX INTERLAMINAR LMBR/SAC: CPT | Performed by: ANESTHESIOLOGY

## 2024-04-17 PROCEDURE — 63600175 PHARM REV CODE 636 W HCPCS: Mod: JZ,JG | Performed by: ANESTHESIOLOGY

## 2024-04-17 PROCEDURE — 59409 OBSTETRICAL CARE: CPT | Mod: AA,,, | Performed by: ANESTHESIOLOGY

## 2024-04-17 PROCEDURE — 10907ZC DRAINAGE OF AMNIOTIC FLUID, THERAPEUTIC FROM PRODUCTS OF CONCEPTION, VIA NATURAL OR ARTIFICIAL OPENING: ICD-10-PCS | Performed by: SPECIALIST

## 2024-04-17 PROCEDURE — 72100002 HC LABOR CARE, 1ST 8 HOURS

## 2024-04-17 PROCEDURE — 51702 INSERT TEMP BLADDER CATH: CPT

## 2024-04-17 RX ORDER — FENTANYL/BUPIVACAINE/NS/PF 2MCG/ML-.1
14 PLASTIC BAG, INJECTION (ML) INJECTION CONTINUOUS
Status: DISCONTINUED | OUTPATIENT
Start: 2024-04-17 | End: 2024-04-17

## 2024-04-17 RX ORDER — CITALOPRAM 20 MG/1
40 TABLET, FILM COATED ORAL DAILY
Status: DISCONTINUED | OUTPATIENT
Start: 2024-04-18 | End: 2024-04-18 | Stop reason: HOSPADM

## 2024-04-17 RX ORDER — BUPIVACAINE HYDROCHLORIDE 5 MG/ML
INJECTION, SOLUTION EPIDURAL; INTRACAUDAL
Status: DISCONTINUED | OUTPATIENT
Start: 2024-04-17 | End: 2024-04-17

## 2024-04-17 RX ORDER — METHYLERGONOVINE MALEATE 0.2 MG/ML
200 INJECTION INTRAVENOUS ONCE AS NEEDED
Status: DISCONTINUED | OUTPATIENT
Start: 2024-04-17 | End: 2024-04-18 | Stop reason: HOSPADM

## 2024-04-17 RX ORDER — ONDANSETRON 4 MG/1
8 TABLET, ORALLY DISINTEGRATING ORAL EVERY 8 HOURS PRN
Status: DISCONTINUED | OUTPATIENT
Start: 2024-04-17 | End: 2024-04-18 | Stop reason: HOSPADM

## 2024-04-17 RX ORDER — SIMETHICONE 80 MG
1 TABLET,CHEWABLE ORAL EVERY 6 HOURS PRN
Status: DISCONTINUED | OUTPATIENT
Start: 2024-04-17 | End: 2024-04-18 | Stop reason: HOSPADM

## 2024-04-17 RX ORDER — HYDROCORTISONE 25 MG/G
CREAM TOPICAL 3 TIMES DAILY PRN
Status: DISCONTINUED | OUTPATIENT
Start: 2024-04-17 | End: 2024-04-18 | Stop reason: HOSPADM

## 2024-04-17 RX ORDER — PRENATAL WITH FERROUS FUM AND FOLIC ACID 3080; 920; 120; 400; 22; 1.84; 3; 20; 10; 1; 12; 200; 27; 25; 2 [IU]/1; [IU]/1; MG/1; [IU]/1; MG/1; MG/1; MG/1; MG/1; MG/1; MG/1; UG/1; MG/1; MG/1; MG/1; MG/1
1 TABLET ORAL DAILY
Status: DISCONTINUED | OUTPATIENT
Start: 2024-04-18 | End: 2024-04-18 | Stop reason: HOSPADM

## 2024-04-17 RX ORDER — OXYCODONE AND ACETAMINOPHEN 5; 325 MG/1; MG/1
1 TABLET ORAL EVERY 4 HOURS PRN
Status: DISCONTINUED | OUTPATIENT
Start: 2024-04-17 | End: 2024-04-18 | Stop reason: HOSPADM

## 2024-04-17 RX ORDER — OXYTOCIN 10 [USP'U]/ML
10 INJECTION, SOLUTION INTRAMUSCULAR; INTRAVENOUS ONCE AS NEEDED
Status: DISCONTINUED | OUTPATIENT
Start: 2024-04-17 | End: 2024-04-18 | Stop reason: HOSPADM

## 2024-04-17 RX ORDER — AMOXICILLIN 250 MG
1 CAPSULE ORAL 2 TIMES DAILY PRN
Status: DISCONTINUED | OUTPATIENT
Start: 2024-04-17 | End: 2024-04-18 | Stop reason: HOSPADM

## 2024-04-17 RX ORDER — MISOPROSTOL 200 UG/1
800 TABLET ORAL ONCE AS NEEDED
Status: DISCONTINUED | OUTPATIENT
Start: 2024-04-17 | End: 2024-04-18 | Stop reason: HOSPADM

## 2024-04-17 RX ORDER — DOCUSATE SODIUM 100 MG/1
200 CAPSULE, LIQUID FILLED ORAL 2 TIMES DAILY PRN
Status: DISCONTINUED | OUTPATIENT
Start: 2024-04-17 | End: 2024-04-18 | Stop reason: HOSPADM

## 2024-04-17 RX ORDER — OXYCODONE AND ACETAMINOPHEN 10; 325 MG/1; MG/1
1 TABLET ORAL EVERY 4 HOURS PRN
Status: DISCONTINUED | OUTPATIENT
Start: 2024-04-17 | End: 2024-04-18 | Stop reason: HOSPADM

## 2024-04-17 RX ORDER — ROPIVACAINE HYDROCHLORIDE 2 MG/ML
INJECTION, SOLUTION EPIDURAL; INFILTRATION
Status: COMPLETED | OUTPATIENT
Start: 2024-04-17 | End: 2024-04-17

## 2024-04-17 RX ORDER — ROPIVACAINE HYDROCHLORIDE 2 MG/ML
20 INJECTION, SOLUTION EPIDURAL; INFILTRATION ONCE AS NEEDED
Status: DISCONTINUED | OUTPATIENT
Start: 2024-04-17 | End: 2024-04-17

## 2024-04-17 RX ORDER — ONDANSETRON HYDROCHLORIDE 2 MG/ML
4 INJECTION, SOLUTION INTRAVENOUS EVERY 4 HOURS PRN
Status: DISCONTINUED | OUTPATIENT
Start: 2024-04-17 | End: 2024-04-17

## 2024-04-17 RX ORDER — ONDANSETRON HYDROCHLORIDE 2 MG/ML
4 INJECTION, SOLUTION INTRAVENOUS ONCE AS NEEDED
Status: DISCONTINUED | OUTPATIENT
Start: 2024-04-17 | End: 2024-04-17

## 2024-04-17 RX ORDER — CARBOPROST TROMETHAMINE 250 UG/ML
250 INJECTION, SOLUTION INTRAMUSCULAR
Status: DISCONTINUED | OUTPATIENT
Start: 2024-04-17 | End: 2024-04-18 | Stop reason: HOSPADM

## 2024-04-17 RX ORDER — SODIUM CHLORIDE 0.9 % (FLUSH) 0.9 %
3 SYRINGE (ML) INJECTION EVERY 8 HOURS
Status: DISCONTINUED | OUTPATIENT
Start: 2024-04-17 | End: 2024-04-18

## 2024-04-17 RX ORDER — FENTANYL/BUPIVACAINE/NS/PF 2MCG/ML-.1
PLASTIC BAG, INJECTION (ML) INJECTION CONTINUOUS
Status: DISCONTINUED | OUTPATIENT
Start: 2024-04-17 | End: 2024-04-17

## 2024-04-17 RX ORDER — NALOXONE HCL 0.4 MG/ML
0.4 VIAL (ML) INJECTION SEE ADMIN INSTRUCTIONS
Status: DISCONTINUED | OUTPATIENT
Start: 2024-04-17 | End: 2024-04-17

## 2024-04-17 RX ORDER — EPHEDRINE SULFATE 50 MG/ML
5 INJECTION, SOLUTION INTRAVENOUS ONCE AS NEEDED
Status: COMPLETED | OUTPATIENT
Start: 2024-04-17 | End: 2024-04-17

## 2024-04-17 RX ORDER — BUSPIRONE HYDROCHLORIDE 5 MG/1
15 TABLET ORAL NIGHTLY
Status: DISCONTINUED | OUTPATIENT
Start: 2024-04-17 | End: 2024-04-18 | Stop reason: HOSPADM

## 2024-04-17 RX ORDER — EPHEDRINE SULFATE 50 MG/ML
INJECTION, SOLUTION INTRAVENOUS
Status: COMPLETED
Start: 2024-04-17 | End: 2024-04-17

## 2024-04-17 RX ORDER — OXYTOCIN/RINGER'S LACTATE 30/500 ML
95 PLASTIC BAG, INJECTION (ML) INTRAVENOUS ONCE AS NEEDED
Status: DISCONTINUED | OUTPATIENT
Start: 2024-04-17 | End: 2024-04-18 | Stop reason: HOSPADM

## 2024-04-17 RX ORDER — OXYTOCIN/RINGER'S LACTATE 30/500 ML
0-30 PLASTIC BAG, INJECTION (ML) INTRAVENOUS CONTINUOUS
Status: DISCONTINUED | OUTPATIENT
Start: 2024-04-17 | End: 2024-04-17

## 2024-04-17 RX ORDER — OXYTOCIN/RINGER'S LACTATE 30/500 ML
334 PLASTIC BAG, INJECTION (ML) INTRAVENOUS ONCE AS NEEDED
Status: DISCONTINUED | OUTPATIENT
Start: 2024-04-17 | End: 2024-04-18 | Stop reason: HOSPADM

## 2024-04-17 RX ORDER — METOPROLOL SUCCINATE 25 MG/1
50 TABLET, EXTENDED RELEASE ORAL DAILY
Status: DISCONTINUED | OUTPATIENT
Start: 2024-04-17 | End: 2024-04-18 | Stop reason: HOSPADM

## 2024-04-17 RX ORDER — ACETAMINOPHEN 325 MG/1
650 TABLET ORAL EVERY 6 HOURS SCHEDULED
Status: DISCONTINUED | OUTPATIENT
Start: 2024-04-18 | End: 2024-04-18 | Stop reason: HOSPADM

## 2024-04-17 RX ORDER — DIPHENOXYLATE HYDROCHLORIDE AND ATROPINE SULFATE 2.5; .025 MG/1; MG/1
2 TABLET ORAL EVERY 6 HOURS PRN
Status: DISCONTINUED | OUTPATIENT
Start: 2024-04-17 | End: 2024-04-18 | Stop reason: HOSPADM

## 2024-04-17 RX ORDER — TRANEXAMIC ACID 10 MG/ML
1000 INJECTION, SOLUTION INTRAVENOUS EVERY 30 MIN PRN
Status: DISCONTINUED | OUTPATIENT
Start: 2024-04-17 | End: 2024-04-18 | Stop reason: HOSPADM

## 2024-04-17 RX ORDER — OXYTOCIN/RINGER'S LACTATE 30/500 ML
95 PLASTIC BAG, INJECTION (ML) INTRAVENOUS ONCE
Status: DISCONTINUED | OUTPATIENT
Start: 2024-04-17 | End: 2024-04-18 | Stop reason: HOSPADM

## 2024-04-17 RX ADMIN — SODIUM CHLORIDE, POTASSIUM CHLORIDE, SODIUM LACTATE AND CALCIUM CHLORIDE: 600; 310; 30; 20 INJECTION, SOLUTION INTRAVENOUS at 10:04

## 2024-04-17 RX ADMIN — Medication 12 ML/HR: at 05:04

## 2024-04-17 RX ADMIN — Medication 2 MILLI-UNITS/MIN: at 06:04

## 2024-04-17 RX ADMIN — OXYCODONE AND ACETAMINOPHEN 1 TABLET: 325; 5 TABLET ORAL at 07:04

## 2024-04-17 RX ADMIN — OXYCODONE AND ACETAMINOPHEN 1 TABLET: 325; 5 TABLET ORAL at 03:04

## 2024-04-17 RX ADMIN — ONDANSETRON 4 MG: 2 INJECTION INTRAMUSCULAR; INTRAVENOUS at 02:04

## 2024-04-17 RX ADMIN — ROPIVACAINE HYDROCHLORIDE 10 ML: 2 INJECTION, SOLUTION EPIDURAL; INFILTRATION; PERINEURAL at 05:04

## 2024-04-17 RX ADMIN — BUPIVACAINE HYDROCHLORIDE 4 ML: 5 INJECTION, SOLUTION EPIDURAL; INTRACAUDAL; PERINEURAL at 11:04

## 2024-04-17 RX ADMIN — HYDROMORPHONE HYDROCHLORIDE 1 MG: 1 INJECTION, SOLUTION INTRAMUSCULAR; INTRAVENOUS; SUBCUTANEOUS at 02:04

## 2024-04-17 RX ADMIN — METOPROLOL SUCCINATE 50 MG: 25 TABLET, EXTENDED RELEASE ORAL at 10:04

## 2024-04-17 RX ADMIN — HUMAN INSULIN 11 UNITS: 100 INJECTION, SUSPENSION SUBCUTANEOUS at 10:04

## 2024-04-17 RX ADMIN — OXYCODONE HYDROCHLORIDE AND ACETAMINOPHEN 1 TABLET: 10; 325 TABLET ORAL at 11:04

## 2024-04-17 RX ADMIN — HUMAN INSULIN 13 UNITS: 100 INJECTION, SUSPENSION SUBCUTANEOUS at 06:04

## 2024-04-17 RX ADMIN — CLINDAMYCIN PHOSPHATE 900 MG: 900 INJECTION, SOLUTION INTRAVENOUS at 07:04

## 2024-04-17 RX ADMIN — SODIUM CHLORIDE, POTASSIUM CHLORIDE, SODIUM LACTATE AND CALCIUM CHLORIDE: 600; 310; 30; 20 INJECTION, SOLUTION INTRAVENOUS at 05:04

## 2024-04-17 NOTE — HOSPITAL COURSE
33-year-old  3 para 2 at 37 weeks 2 days gestational age with hypertension and diabetes-insulin controlled admitted for induction of labor

## 2024-04-17 NOTE — NURSING
Patient prefers to use dexicom for glucose checks q 4 hours during early labor and q 2 hours during active labor.    2236 glucose from CMP : 84    0245 glucose from dexicom: 93    0600 glucose from dexicom: 98

## 2024-04-17 NOTE — ANESTHESIA PROCEDURE NOTES
Epidural    Patient location during procedure: OB   Reason for block: primary anesthetic   Reason for block: labor analgesia requested by patient and obstetrician  Diagnosis: Intrauterine pregnancy   Start time: 4/17/2024 5:15 AM  Timeout: 4/17/2024 5:15 AM  End time: 4/17/2024 5:25 AM    Staffing  Performing Provider: Sal Zacarias Jr., MD  Authorizing Provider: Sal Zacarias Jr., MD    Staffing  Performed by: Sal Zacarias Jr., MD  Authorized by: Sal Zacarias Jr., MD        Preanesthetic Checklist  Completed: patient identified, IV checked, site marked, risks and benefits discussed, surgical consent, monitors and equipment checked, pre-op evaluation, timeout performed, anesthesia consent given, hand hygiene performed and patient being monitored  Preparation  Patient position: sitting  Prep: Betadine and ChloraPrep  Patient monitoring: ECG and Blood Pressure  Reason for block: primary anesthetic   Epidural  Skin Anesthetic: lidocaine 1%  Skin Wheal: 3 mL  Administration type: continuous  Approach: midline  Interspace: L3-4    Injection technique: UZIEL air  Needle and Epidural Catheter  Needle type: Tuohy   Needle gauge: 17  Needle length: 3.5 inches  Catheter type: springwound and multi-orifice  Catheter size: 19 G  Insertion Attempts: 2  Test dose: 3 mL of lidocaine 1.5% with Epi 1-to-200,000  Additional Documentation: incremental injection, negative aspiration for heme and CSF, no paresthesia on injection, no significant pain on injection, no significant complaints from patient and no signs/symptoms of IV or SA injection  Needle localization: anatomical landmarks  Assessment  Upper dermatomal levels - Left: T6  Right: T6   Dermatomal levels determined by pinch or prick  Ease of block: easy  Patient's tolerance of the procedure: no complaints and comfortable throughout block No inadvertent dural puncture with Tuohy.  Dural puncture not performed with spinal  needle    Medications:    Medications: ropivacaine (NAROPIN) solution 0.2% - Epidural   10 mL - 4/17/2024 5:23:00 AM

## 2024-04-17 NOTE — PROGRESS NOTES
[facility]  Obstetrics  Labor Progress Note    Patient Name: Jasper Guerrero  MRN: [id number]  Admission Date: 2024  Hospital Length of Stay: 1 days  Attending Physician: Mike Reynolds MD  Primary Care Provider: Crow Kennedy MD    Subjective:     Principal Problem:Encounter for induction of labor    Hospital Course:  33-year-old  3 para 2 at 37 weeks 2 days gestational age with hypertension and diabetes-insulin controlled admitted for induction of labor    Interval History:  Jasper is a 33 y.o.  at 37w3d. She is doing well.  Comfortable with epidural in place    Objective:     Vital Signs (Most Recent):  Temp: 97.9 °F (36.6 °C) (24 0705)  Pulse: 83 (24 1034)  Resp: 18 (24 1034)  BP: 138/68 (24 1034)  SpO2: 99 % (24 0904) Vital Signs (24h Range):  Temp:  [97.7 °F (36.5 °C)-98.2 °F (36.8 °C)] 97.9 °F (36.6 °C)  Pulse:  [66-94] 83  Resp:  [16-20] 18  SpO2:  [94 %-100 %] 99 %  BP: (105-151)/(52-72) 138/68     Weight: (!) 143.8 kg (317 lb)  Body mass index is 52.75 kg/m².    FHT:  Baseline 120s with good variability , FSE placed by nurse earlier secondary to inability to continuously monitor  TOCO:  Not recording well, placed IUPC    Cervical Exam:  Dilation:  5  Effacement:  75%  Station: -2  Presentation: Vertex     Significant Labs:  Lab Results   Component Value Date    GROUPTRH O POS 2024    HEPBSAG Negative 2023    STREPBCULT Positive 2024       CBC:   Recent Labs   Lab 24  2236   WBC 12.33   RBC 4.21   HGB 11.9*   HCT 36.3*      MCV 86   MCH 28.3   MCHC 32.8     I have personallly reviewed all pertinent lab results from the last 24 hours.    Physical Exam    Review of Systems  Assessment/Plan:     33 y.o. female  at 37w3d for:    * Encounter for induction of labor  IUP at 37 weeks and 3 days gestational age   Induction of labor-AROM-clear   Epidural in place  Morbid obesity   Type 2 diabetes-on insulin  control  Hypertension-on metoprolol for control  GBS positive-on penicillin  Placed IUPC-will adjust Pitocin as needed          Saul Reynolds MD  Obstetrics  [facility]

## 2024-04-17 NOTE — ASSESSMENT & PLAN NOTE
IUP at 37 weeks and 3 days gestational age   Induction of labor-AROM-clear   Epidural in place  Morbid obesity   Type 2 diabetes-on insulin control  Hypertension-on metoprolol for control  GBS positive-on penicillin  Placed IUPC-will adjust Pitocin as needed

## 2024-04-17 NOTE — L&D DELIVERY NOTE
[facility]  Vaginal Delivery   Operative Note    SUMMARY     Normal spontaneous vaginal delivery of live infant, was placed on mothers abdomen for skin to skin and bulb suctioning performed.  Infant delivered position OA over intact perineum.  No shoulder dystocia.  Nuchal cord: No.    Spontaneous delivery of placenta and IV pitocin given noting good uterine tone.  Uterine sweep reveals no retained products of conception.  No lacerations noted.  Patient tolerated delivery well. Sponge needle and lap counted correctly x2.    Indications: Encounter for induction of labor  Pregnancy complicated by:   Patient Active Problem List   Diagnosis    Fibromyalgia    BMI 40.0-44.9, adult    Trigeminal neuropathy    Neuropathic pain    Depression    TMJ (temporomandibular joint disorder)    Chronically on opiate therapy    Obesity affecting pregnancy in second trimester    Medication exposure during first trimester of pregnancy    Intractable chronic migraine without aura and without status migrainosus    Intractable headache    Anxiety    Carpal tunnel syndrome, left    Pre-existing type 2 diabetes mellitus, in pregnancy, second trimester    Chronic hypertension affecting pregnancy    Chronic Opiate Use    Encounter for induction of labor     Admitting GA: 37w3d    Viable female infant with Apgar scores 8/8, weight pending     cc    Delivery Information for Girl Jasper Guerrero    Birth information:  YOB: 2022   Time of birth: 2:15 PM   Sex: female   Head Delivery Date/Time:     Delivery type:    Gestational Age: 37w3d        Delivery Providers    Delivering clinician:            Measurements    Weight:   Length:          Apgars    Living status:   Apgar Component Scores:  1 min.:  5 min.:  10 min.:  15 min.:  20 min.:    Skin color:         Heart rate:         Reflex irritability:         Muscle tone:         Respiratory effort:         Total:                                   Interventions/Resuscitation           Cord    No data filed       Placenta    Placenta delivery date/time:   Placenta removal:            Labor Events:       labor:       Labor Onset Date/Time:         Dilation Complete Date/Time:         Start Pushing Date/Time:         Start Pushing Date/Time:       Rupture Date/Time: 24  0434         Rupture type: ARM (Artificial Rupture)         Fluid Amount:       Fluid Color: Clear               steroids:       Antibiotics given for GBS:       Induction:       Indications for induction:        Augmentation:       Indications for augmentation:       Labor complications:       Additional complications:          Cervical ripening:                     Delivery:      Episiotomy:       Indication for Episiotomy:       Perineal Lacerations:   Repaired:      Periurethral Laceration:   Repaired:     Labial Laceration:   Repaired:     Sulcus Laceration:   Repaired:     Vaginal Laceration:   Repaired:     Cervical Laceration:   Repaired:     Repair suture:       Repair # of packets:       Last Value - EBL - Nursing (mL):       Sum - EBL - Nursing (mL): 0     Last Value - EBL - Anesthesia (mL):      Calculated QBL (mL):       Running total QBL (mL):       Vaginal Sweep Performed:       Surgicount Correct:         Other providers:            Details (if applicable):  Trial of Labor      Categorization:      Priority:     Indications for :     Incision Type:       Additional  information:  Forceps:    Vacuum:    Breech:    Observed anomalies    Other (Comments):

## 2024-04-17 NOTE — SUBJECTIVE & OBJECTIVE
Interval History:  Jasper is a 33 y.o.  at 37w3d. She is doing well.  Comfortable with epidural in place    Objective:     Vital Signs (Most Recent):  Temp: 97.9 °F (36.6 °C) (24 0705)  Pulse: 83 (24 1034)  Resp: 18 (24 1034)  BP: 138/68 (24 1034)  SpO2: 99 % (24 0904) Vital Signs (24h Range):  Temp:  [97.7 °F (36.5 °C)-98.2 °F (36.8 °C)] 97.9 °F (36.6 °C)  Pulse:  [66-94] 83  Resp:  [16-20] 18  SpO2:  [94 %-100 %] 99 %  BP: (105-151)/(52-72) 138/68     Weight: (!) 143.8 kg (317 lb)  Body mass index is 52.75 kg/m².    FHT:  Baseline 120s with good variability , FSE placed by nurse earlier secondary to inability to continuously monitor  TOCO:  Not recording well, placed IUPC    Cervical Exam:  Dilation:  5  Effacement:  75%  Station: -2  Presentation: Vertex     Significant Labs:  Lab Results   Component Value Date    GROUPTRH O POS 2024    HEPBSAG Negative 2023    STREPBCULT Positive 2024       CBC:   Recent Labs   Lab 24  2236   WBC 12.33   RBC 4.21   HGB 11.9*   HCT 36.3*      MCV 86   MCH 28.3   MCHC 32.8     I have personallly reviewed all pertinent lab results from the last 24 hours.    Physical Exam    Review of Systems

## 2024-04-17 NOTE — PLAN OF CARE
Problem:  Fall Injury Risk  Goal: Absence of Fall, Infant Drop and Related Injury  Outcome: Ongoing, Progressing     Problem: Adult Inpatient Plan of Care  Goal: Plan of Care Review  Outcome: Ongoing, Progressing  Goal: Patient-Specific Goal (Individualized)  Outcome: Ongoing, Progressing  Goal: Absence of Hospital-Acquired Illness or Injury  Outcome: Ongoing, Progressing  Goal: Optimal Comfort and Wellbeing  Outcome: Ongoing, Progressing  Goal: Readiness for Transition of Care  Outcome: Ongoing, Progressing     Problem: Bariatric Environmental Safety  Goal: Safety Maintained with Care  Outcome: Ongoing, Progressing     Problem: Diabetes Comorbidity  Goal: Blood Glucose Level Within Targeted Range  Outcome: Ongoing, Progressing     Problem: Infection  Goal: Absence of Infection Signs and Symptoms  Outcome: Ongoing, Progressing     Problem: Bleeding (Labor)  Goal: Hemostasis  Outcome: Ongoing, Progressing     Problem: Change in Fetal Wellbeing (Labor)  Goal: Stable Fetal Wellbeing  Outcome: Ongoing, Progressing     Problem: Delayed Labor Progression (Labor)  Goal: Effective Progression to Delivery  Outcome: Ongoing, Progressing     Problem: Infection (Labor)  Goal: Absence of Infection Signs and Symptoms  Outcome: Ongoing, Progressing     Problem: Labor Pain (Labor)  Goal: Acceptable Pain Control  Outcome: Ongoing, Progressing     Problem: Uterine Tachysystole (Labor)  Goal: Normal Uterine Contraction Pattern  Outcome: Ongoing, Progressing

## 2024-04-17 NOTE — PROGRESS NOTES
[facility]  Obstetrics  Labor Progress Note    Patient Name: Jasper Guerrero  MRN: [id number]  Admission Date: 2024  Hospital Length of Stay: 1 days  Attending Physician: Mike Reynolds MD  Primary Care Provider: Crow Kennedy MD    Subjective:     Principal Problem:Encounter for induction of labor    Hospital Course:  33-year-old  3 para 2 at 37 weeks 2 days gestational age with hypertension and diabetes-insulin controlled admitted for induction of labor    Interval History:  Jasper is a 33 y.o.  at 37w3d. She is doing well.  Patient states active fetal movement, feeling regular contractions, desiring epidural    Objective:     Vital Signs (Most Recent):  Temp: 97.9 °F (36.6 °C) (24 0245)  Pulse: 75 (24 033)  Resp: 18 (24)  BP: (!) 113/52 (24)  SpO2: 100 % (24) Vital Signs (24h Range):  Temp:  [97.7 °F (36.5 °C)-97.9 °F (36.6 °C)] 97.9 °F (36.6 °C)  Pulse:  [75-92] 75  Resp:  [16-18] 18  SpO2:  [94 %-100 %] 100 %  BP: (107-133)/(52-65) 113/52     Weight: (!) 143.8 kg (317 lb)  Body mass index is 52.75 kg/m².    FHT:  Baseline 120s with variability, difficulty maintaining a continuous tracing.  Attempt at placing FSE unsuccessful secondary to high fetal position    TOCO:  Q 2-5 minutes    Cervical Exam:  Dilation:  2.5  Effacement:  60%  Station: -3  Presentation: Vertex     Significant Labs:  Lab Results   Component Value Date    GROUPTRH O POS 2024    HEPBSAG Negative 2023    STREPBCULT Positive 2024       CBC:   Recent Labs   Lab 24  2236   WBC 12.33   RBC 4.21   HGB 11.9*   HCT 36.3*      MCV 86   MCH 28.3   MCHC 32.8     I have personallly reviewed all pertinent lab results from the last 24 hours.    Physical Exam  General-no distress resting comfortably   Abdomen/uterus-morbidly obese, gravid   Extremities-no calf tenderness  Review of Systems  Assessment/Plan:     33 y.o. female  at 37w3d  for:    * Encounter for induction of labor  IUP at 37 weeks and 3 days gestational age   Induction of labor-AROM-clear   Desires epidural   Morbid obesity   Type 2 diabetes-on insulin control  Hypertension-on metoprolol for control  GBS positive-on penicillin          Saul Reynolds MD  Obstetrics  [facility]

## 2024-04-17 NOTE — PLAN OF CARE
Problem:  Fall Injury Risk  Goal: Absence of Fall, Infant Drop and Related Injury  Reactivated     Problem: Adult Inpatient Plan of Care  Goal: Plan of Care Review  Reactivated  Goal: Patient-Specific Goal (Individualized)  Reactivated  Goal: Absence of Hospital-Acquired Illness or Injury  Reactivated  Goal: Optimal Comfort and Wellbeing  Reactivated  Goal: Readiness for Transition of Care  Reactivated     Problem: Bariatric Environmental Safety  Goal: Safety Maintained with Care  Reactivated     Problem: Diabetes Comorbidity  Goal: Blood Glucose Level Within Targeted Range  Reactivated     Problem: Infection  Goal: Absence of Infection Signs and Symptoms  Reactivated     Problem: Bleeding (Labor)  Goal: Hemostasis  Reactivated     Problem: Change in Fetal Wellbeing (Labor)  Goal: Stable Fetal Wellbeing  Reactivated     Problem: Delayed Labor Progression (Labor)  Goal: Effective Progression to Delivery  Reactivated     Problem: Infection (Labor)  Goal: Absence of Infection Signs and Symptoms  Reactivated     Problem: Labor Pain (Labor)  Goal: Acceptable Pain Control  Reactivated     Problem: Uterine Tachysystole (Labor)  Goal: Normal Uterine Contraction Pattern  Reactivated

## 2024-04-17 NOTE — ANESTHESIA PREPROCEDURE EVALUATION
04/17/2024  Jasper Guerrero is a 33 y.o., female.      Pre-op Assessment    I have reviewed the Patient Summary Reports.     I have reviewed the Nursing Notes. I have reviewed the NPO Status.   I have reviewed the Medications.     Review of Systems  Anesthesia Hx:  No problems with previous Anesthesia   Neg history of prior surgery.          Denies Family Hx of Anesthesia complications.    Denies Personal Hx of Anesthesia complications.                    Social:  Non-Smoker, No Alcohol Use       Hematology/Oncology:  Hematology Normal   Oncology Normal                                   EENT/Dental:  EENT/Dental Normal           Cardiovascular:     Hypertension                                        Pulmonary:  Pulmonary Normal                       Renal/:  Renal/ Normal                 Hepatic/GI:  Hepatic/GI Normal                 Musculoskeletal:     Fibromyalgia            Neurological:      Headaches (Migranes)     Trigeminal neuralgia                            Endocrine:  Diabetes, type 2         Obesity / BMI > 30  Dermatological:  Skin Normal    Psych:  Psychiatric history: PTSD. anxiety depression                Physical Exam  General: Well nourished and Alert    Airway:  Mallampati: II   Mouth Opening: Normal  TM Distance: Normal  Tongue: Normal  Neck ROM: Normal ROM    Dental:  Intact    Chest/Lungs:  Clear to auscultation, Normal Respiratory Rate    Heart:  Rate: Normal  Rhythm: Regular Rhythm  Sounds: Normal        Anesthesia Plan  Type of Anesthesia, risks & benefits discussed:    Anesthesia Type: Epidural  Intra-op Monitoring Plan: Standard ASA Monitors  Informed Consent: Informed consent signed with the Patient and all parties understand the risks and agree with anesthesia plan.  All questions answered.   ASA Score: 3    Ready For Surgery From Anesthesia Perspective.     .

## 2024-04-17 NOTE — NURSING
OBGYN LABS ENTERED INTO RESULTS CONSOLE      1st Trimester Labs Entered Into Results Console     [x] AOBRH   [x] Rubella Immune   [x] RPR   [x] HBsAg   [x] HIV   [x] Chlamydia   [x] Gonorrhea   [] Cell-Free DNA   [x] Hep-C   [] Varicella    2nd Trimester Labs Entered Into Results Console     []OB Glucose Screen      3rd Trimester Labs Entered Into Results Console      [x] GBS   [] RPR    Drug Screen Entered Into Results Console     [] Benzodiazes   [] Methadone   [] Cocaine (Metab)   [] Opiate   [] Amphetamine   [] Marijuana   [] Creatinine   [] Amphetamines-Beaker   [] Barbituates-Beaker   [] Benzodiazepine-Beaker   [] Cannabinoids-Beaker   [] Cocaine-Beaker   [] Fentanyl-Beaker   [] MDMA-Beaker   [] Opiates-Beaker   [] Phencyclidine-Beaker        Results Entered by Idris Banuelos RN    Results Verified for Manual Entry Accuracy by Sahara Catalan RN

## 2024-04-17 NOTE — ASSESSMENT & PLAN NOTE
IUP at 37 weeks and 3 days gestational age   Induction of labor-AROM-clear   Desires epidural   Morbid obesity   Type 2 diabetes-on insulin control  Hypertension-on metoprolol for control  GBS positive-on penicillin

## 2024-04-17 NOTE — SUBJECTIVE & OBJECTIVE
Interval History:  Jasper is a 33 y.o.  at 37w3d. She is doing well.  Patient states active fetal movement, feeling regular contractions, desiring epidural    Objective:     Vital Signs (Most Recent):  Temp: 97.9 °F (36.6 °C) (24 0245)  Pulse: 75 (24 033)  Resp: 18 (24)  BP: (!) 113/52 (24)  SpO2: 100 % (24) Vital Signs (24h Range):  Temp:  [97.7 °F (36.5 °C)-97.9 °F (36.6 °C)] 97.9 °F (36.6 °C)  Pulse:  [75-92] 75  Resp:  [16-18] 18  SpO2:  [94 %-100 %] 100 %  BP: (107-133)/(52-65) 113/52     Weight: (!) 143.8 kg (317 lb)  Body mass index is 52.75 kg/m².    FHT:  Baseline 120s with variability, difficulty maintaining a continuous tracing.  Attempt at placing FSE unsuccessful secondary to high fetal position    TOCO:  Q 2-5 minutes    Cervical Exam:  Dilation:  2.5  Effacement:  60%  Station: -3  Presentation: Vertex     Significant Labs:  Lab Results   Component Value Date    GROUPTRH O POS 2024    HEPBSAG Negative 2023    STREPBCULT Positive 2024       CBC:   Recent Labs   Lab 24  2236   WBC 12.33   RBC 4.21   HGB 11.9*   HCT 36.3*      MCV 86   MCH 28.3   MCHC 32.8     I have personallly reviewed all pertinent lab results from the last 24 hours.    Physical Exam  General-no distress resting comfortably   Abdomen/uterus-morbidly obese, gravid   Extremities-no calf tenderness  Review of Systems

## 2024-04-17 NOTE — NURSING
Nurses Note -- 4 Eyes      4/17/2024   1:05 AM      Skin assessed during: Admit      [x] No Altered Skin Integrity Present    [x]Prevention Measures Documented      [] Yes- Altered Skin Integrity Present or Discovered   [] LDA Added if Not in Epic (Describe Wound)   [] New Altered Skin Integrity was Present on Admit and Documented in LDA   [] Wound Image Taken    Wound Care Consulted? No    Attending Nurse:  Idris Zuñiga RN/Staff Member:   Sahara Catalan RN

## 2024-04-18 ENCOUNTER — PATIENT MESSAGE (OUTPATIENT)
Dept: MATERNAL FETAL MEDICINE | Facility: CLINIC | Age: 34
End: 2024-04-18
Payer: MEDICAID

## 2024-04-18 VITALS
BODY MASS INDEX: 48.82 KG/M2 | DIASTOLIC BLOOD PRESSURE: 81 MMHG | TEMPERATURE: 97 F | HEART RATE: 79 BPM | OXYGEN SATURATION: 99 % | SYSTOLIC BLOOD PRESSURE: 125 MMHG | WEIGHT: 293 LBS | RESPIRATION RATE: 18 BRPM | HEIGHT: 65 IN

## 2024-04-18 LAB
BASOPHILS # BLD AUTO: 0.03 K/UL (ref 0–0.2)
BASOPHILS NFR BLD: 0.3 % (ref 0–1.9)
DIFFERENTIAL METHOD BLD: ABNORMAL
EOSINOPHIL # BLD AUTO: 0.1 K/UL (ref 0–0.5)
EOSINOPHIL NFR BLD: 1.3 % (ref 0–8)
ERYTHROCYTE [DISTWIDTH] IN BLOOD BY AUTOMATED COUNT: 13.6 % (ref 11.5–14.5)
HCT VFR BLD AUTO: 35.3 % (ref 37–48.5)
HGB BLD-MCNC: 11.4 G/DL (ref 12–16)
IMM GRANULOCYTES # BLD AUTO: 0.03 K/UL (ref 0–0.04)
IMM GRANULOCYTES NFR BLD AUTO: 0.3 % (ref 0–0.5)
LYMPHOCYTES # BLD AUTO: 3.2 K/UL (ref 1–4.8)
LYMPHOCYTES NFR BLD: 30.9 % (ref 18–48)
MCH RBC QN AUTO: 28.4 PG (ref 27–31)
MCHC RBC AUTO-ENTMCNC: 32.3 G/DL (ref 32–36)
MCV RBC AUTO: 88 FL (ref 82–98)
MONOCYTES # BLD AUTO: 0.7 K/UL (ref 0.3–1)
MONOCYTES NFR BLD: 7 % (ref 4–15)
NEUTROPHILS # BLD AUTO: 6.3 K/UL (ref 1.8–7.7)
NEUTROPHILS NFR BLD: 60.2 % (ref 38–73)
NRBC BLD-RTO: 0 /100 WBC
PLATELET # BLD AUTO: 261 K/UL (ref 150–450)
PMV BLD AUTO: 9.9 FL (ref 9.2–12.9)
RBC # BLD AUTO: 4.02 M/UL (ref 4–5.4)
WBC # BLD AUTO: 10.38 K/UL (ref 3.9–12.7)

## 2024-04-18 PROCEDURE — 63600175 PHARM REV CODE 636 W HCPCS: Performed by: SPECIALIST

## 2024-04-18 PROCEDURE — 25000003 PHARM REV CODE 250: Performed by: SPECIALIST

## 2024-04-18 PROCEDURE — 85025 COMPLETE CBC W/AUTO DIFF WBC: CPT | Performed by: SPECIALIST

## 2024-04-18 PROCEDURE — 3E0234Z INTRODUCTION OF SERUM, TOXOID AND VACCINE INTO MUSCLE, PERCUTANEOUS APPROACH: ICD-10-PCS | Performed by: SPECIALIST

## 2024-04-18 PROCEDURE — 90715 TDAP VACCINE 7 YRS/> IM: CPT | Performed by: SPECIALIST

## 2024-04-18 PROCEDURE — 90471 IMMUNIZATION ADMIN: CPT | Performed by: SPECIALIST

## 2024-04-18 PROCEDURE — 36415 COLL VENOUS BLD VENIPUNCTURE: CPT | Performed by: SPECIALIST

## 2024-04-18 RX ORDER — OXYCODONE AND ACETAMINOPHEN 10; 325 MG/1; MG/1
1 TABLET ORAL EVERY 4 HOURS PRN
Qty: 25 TABLET | Refills: 0 | OUTPATIENT
Start: 2024-04-18 | End: 2024-04-29

## 2024-04-18 RX ADMIN — HUMAN INSULIN 13 UNITS: 100 INJECTION, SUSPENSION SUBCUTANEOUS at 06:04

## 2024-04-18 RX ADMIN — METOPROLOL SUCCINATE 50 MG: 25 TABLET, EXTENDED RELEASE ORAL at 08:04

## 2024-04-18 RX ADMIN — CLOSTRIDIUM TETANI TOXOID ANTIGEN (FORMALDEHYDE INACTIVATED), CORYNEBACTERIUM DIPHTHERIAE TOXOID ANTIGEN (FORMALDEHYDE INACTIVATED), BORDETELLA PERTUSSIS TOXOID ANTIGEN (GLUTARALDEHYDE INACTIVATED), BORDETELLA PERTUSSIS FILAMENTOUS HEMAGGLUTININ ANTIGEN (FORMALDEHYDE INACTIVATED), BORDETELLA PERTUSSIS PERTACTIN ANTIGEN, AND BORDETELLA PERTUSSIS FIMBRIAE 2/3 ANTIGEN 0.5 ML: 5; 2; 2.5; 5; 3; 5 INJECTION, SUSPENSION INTRAMUSCULAR at 02:04

## 2024-04-18 RX ADMIN — DOCUSATE SODIUM 200 MG: 100 CAPSULE, LIQUID FILLED ORAL at 08:04

## 2024-04-18 RX ADMIN — OXYCODONE HYDROCHLORIDE AND ACETAMINOPHEN 1 TABLET: 10; 325 TABLET ORAL at 09:04

## 2024-04-18 RX ADMIN — PRENATAL VIT W/ FE FUMARATE-FA TAB 27-0.8 MG 1 TABLET: 27-0.8 TAB at 08:04

## 2024-04-18 RX ADMIN — OXYCODONE HYDROCHLORIDE AND ACETAMINOPHEN 1 TABLET: 10; 325 TABLET ORAL at 03:04

## 2024-04-18 NOTE — DISCHARGE SUMMARY
[facility]  Obstetrics  Discharge Summary      Patient Name: Jasper Guerrero  MRN: [id number]  Admission Date: 2024  Hospital Length of Stay: 2 days  Discharge Date and Time:  2024 12:33 PM  Attending Physician: Mike Reynolds MD   Discharging Provider: Saul Reynolds MD   Primary Care Provider: Crow Kennedy MD    HPI: No notes on file        * No surgery found *     Hospital Course:   33-year-old  3 para 2 at 37 weeks 2 days gestational age with hypertension and diabetes-insulin controlled admitted for induction of labor   Patient underwent spontaneous vaginal delivery, please see delivery note for details.  By postpartum day 1. Patient is requesting discharge to home.  Patient states pain well controlled, bleeding minimal, ambulating urinating without difficulty and passing flatus.  Glucoses been with fair control using just long-acting insulin,patient will adjust as needed.  Physical exam on discharge significant for an abdomen which is soft nontender, uterus firm below the umbilicus, lochia minimal, extremities without calf tenderness      Final Active Diagnoses:    Diagnosis Date Noted POA    PRINCIPAL PROBLEM:  Encounter for induction of labor [Z34.90] 2024 Not Applicable      Problems Resolved During this Admission:        Significant Diagnostic Studies: Labs: CBC   Recent Labs   Lab 24  2236 24  0410   WBC 12.33 10.38   HGB 11.9* 11.4*   HCT 36.3* 35.3*    261     Lab Results   Component Value Date    GROUPTRH O POS 2024         Feeding Method: both breast and bottle    Immunizations       Date Immunization Status Dose Route/Site Given by    10/08/14 0000 Influenza - Quadrivalent - PF *Preferred* (6 months and older) Given       24 MMR Incomplete 0.5 mL Subcutaneous/     24 Tdap Incomplete 0.5 mL Intramuscular/             Delivery:    Episiotomy: None   Lacerations: None   Repair suture: None   Repair # of  "packets:     Blood loss (ml):       Birth information:  YOB: 2022   Time of birth: 2:15 PM   Sex: female   Delivery type: Vaginal, Spontaneous   Gestational Age: 37w3d     Measurements    Weight: 2910 g  Weight (lbs): 6 lb 6.7 oz  Length: 47 cm  Length (in): 18.5"  Head circumference: 34 cm  Chest circumference: 31 cm  Abdominal girth: 31 cm         Delivery Clinician: Delivery Providers    Delivering clinician: Mike Reynolds MD   Provider Role    Anisha Leach RN Registered Nurse    Vane Garces Surgical Tech    Coty Gold RN Registered Nurse    Chely Reeder RN Registered Nurse             Additional  information:  Forceps:    Vacuum:    Breech:    Observed anomalies      Living?:     Apgars    Living status: Living  Apgar Component Scores:  1 min.:  5 min.:  10 min.:  15 min.:  20 min.:    Skin color:  0  0       Heart rate:  2  2       Reflex irritability:  2  2       Muscle tone:  2  2       Respiratory effort:  2  2       Total:  8  8       Apgars assigned by: CHELY GOLD RN         Placenta: Delivered:       appearance  Pending Diagnostic Studies:       None            Discharged Condition: good    Disposition: Home or Self Care    Follow Up:   Follow-up Information       Mike Reynolds MD Follow up in 6 week(s).    Specialty: Obstetrics and Gynecology  Contact information:  [address]  [facility] [id number] [phone]                           Patient Instructions:      Diet Adult Regular     Pelvic Rest     Activity as tolerated     Medications:  Current Discharge Medication List        START taking these medications    Details   !! insulin NPH isoph U-100 human 100 unit/mL (3 mL) InPn Inject 13 Units into the skin before breakfast.  Qty: 3.9 mL, Refills: 11      oxyCODONE-acetaminophen (PERCOCET)  mg per tablet Take 1 tablet by mouth every 4 (four) hours as needed.  Qty: 25 tablet, Refills: 0    " Comments: Quantity prescribed more than 7 day supply? No       !! - Potential duplicate medications found. Please discuss with provider.        CONTINUE these medications which have NOT CHANGED    Details   blood-glucose sensor (DEXCOM G7 SENSOR) Mariely Change sensor every 10 days  Qty: 3 each, Refills: 10    Comments: 1 kit.  Associated Diagnoses: Pre-existing type 2 diabetes mellitus, in pregnancy, second trimester      busPIRone (BUSPAR) 15 MG tablet Take 1 tablet (15 mg total) by mouth once daily.  Qty: 90 tablet, Refills: 1    Associated Diagnoses: Anxiety      cephALEXin (KEFLEX) 500 MG capsule Take 500 mg by mouth every 12 (twelve) hours.      citalopram (CELEXA) 40 MG tablet Take 1 tablet (40 mg total) by mouth once daily.  Qty: 90 tablet, Refills: 0      !! insulin NPH isoph U-100 human (HUMULIN N NPH INSULIN KWIKPEN) 100 unit/mL (3 mL) InPn Inject 10 Units into the skin before breakfast AND 20 Units every evening.  Qty: 27 mL, Refills: 3    Associated Diagnoses: Diabetes mellitus complicating pregnancy, unspecified trimester      metFORMIN (GLUCOPHAGE) 1000 MG tablet Take 1 tablet (1,000 mg total) by mouth 2 (two) times daily with meals.  Qty: 180 tablet, Refills: 3      metoprolol succinate (TOPROL-XL) 50 MG 24 hr tablet Take 1 tablet (50 mg total) by mouth once daily.  Qty: 30 tablet, Refills: 11    Comments: .  Associated Diagnoses: Primary hypertension      prenatal vit no.124/iron/folic (PRENATAL VITAMIN ORAL) Take by mouth once daily.       !! - Potential duplicate medications found. Please discuss with provider.        STOP taking these medications       acetaminophen (TYLENOL) 500 MG tablet Comments:   Reason for Stopping:         aspirin (ECOTRIN) 81 MG EC tablet Comments:   Reason for Stopping:         butalbital-acetaminophen-caffeine -40 mg (FIORICET, ESGIC) -40 mg per tablet Comments:   Reason for Stopping:         diphenhydrAMINE-acetaminophen (TYLENOL PM)  mg Tab Comments:  "  Reason for Stopping:         insulin aspart protamine-insulin aspart (NOVOLOG 70/30) 100 unit/mL (70-30) InPn pen Comments:   Reason for Stopping:         medical supply, miscellaneous (MISCELLANEOUS MEDICAL SUPPLY MISC) Comments:   Reason for Stopping:         ondansetron (ZOFRAN) 4 MG tablet Comments:   Reason for Stopping:         BD INEZ 2ND GEN PEN NEEDLE 32 gauge x 5/32" Ndle Comments:   Reason for Stopping:         diphenhydrAMINE (BENADRYL) 25 mg capsule Comments:   Reason for Stopping:         hydrOXYzine pamoate (VISTARIL) 25 MG Cap Comments:   Reason for Stopping:         insulin aspart U-100 (NOVOLOG) 100 unit/mL (3 mL) InPn pen Comments:   Reason for Stopping:         insulin syringe-needle U-100 0.5 mL 31 gauge x 5/16" Syrg Comments:   Reason for Stopping:               Saul Reynolds MD  Obstetrics  [facility]    "

## 2024-04-18 NOTE — ANESTHESIA POSTPROCEDURE EVALUATION
Anesthesia Post Evaluation    Patient: Jasper Guerrero    Procedure(s) Performed: * No procedures listed *    Final Anesthesia Type: epidural      Patient location during evaluation: floor  Patient participation: Yes- Able to Participate  Level of consciousness: awake and alert and oriented  Post-procedure vital signs: reviewed and stable  Pain management: adequate  Airway patency: patent    PONV status at discharge: No PONV  Anesthetic complications: no      Cardiovascular status: blood pressure returned to baseline and hemodynamically stable  Respiratory status: unassisted, spontaneous ventilation and room air  Hydration status: euvolemic  Follow-up not needed.              Vitals Value Taken Time   /85 04/18/24 0720   Temp 36.4 °C (97.6 °F) 04/18/24 0720   Pulse 80 04/18/24 0720   Resp 16 04/18/24 0949   SpO2 99 % 04/18/24 0720     Postpartum day #1 status post vaginal delivery with epidural analgesia. This morning patient is resting comfortably in bed, she is alert and oriented and without complaints. Patient denies headache, back pain, leg pain weakness or numbness.  Patient does report minimal soreness needle placement site. Patient was advised that it is normal to have discomfort at the needle placement site and that this should resolve over a period of approximately 2 weeks.  The patient was further advised that should her pain persist or worsen she could always contact [facility] to reach the anesthesiologist on-call, or let her obstetrician know for any further follow-up if needed.  Epidural site examined and no bleeding or discharge noted, minimal bruising at placement site. No apparent anesthetic related complications. Please reconsult if needed.        No case tracking events are documented in the log.      Pain/Zena Score: Pain Rating Prior to Med Admin: 8 (4/18/2024  9:49 AM)  Pain Rating Post Med Admin: 0 (4/18/2024 12:15 AM)

## 2024-04-18 NOTE — PLAN OF CARE
04/18/24 1304   Final Note   Assessment Type Final Discharge Note   Anticipated Discharge Disposition Home   What phone number can be called within the next 1-3 days to see how you are doing after discharge? [phone]   Post-Acute Status   Discharge Delays None known at this time     Discharge orders and chart reviewed with no further post-acute discharge needs identified at this time.  At this time, patient is cleared for discharge from Case Management standpoint.

## 2024-04-18 NOTE — PLAN OF CARE
[facility]  Discharge Assessment    Primary Care Provider: Crow Kennedy MD   OB Screen completed and no needs identified at this time.  White board in room updated with contact information, and mother was encouraged to contact office if further needs arise.    OB Screen (most recent)       OB Screen - 24 0843          OB SCREEN    Assessment Type Discharge Planning Assessment     Source of Information health record     Received Prenatal Care Yes     Any indications/suspicions for None     Is this a teen pregnancy No     Is the baby in NICU No     Indication for adoption/Safe Haven No     Indication for DME/post-acute needs No     HIV (+) No     Any congenital  disorders No     Fetal demise/ death No

## 2024-04-18 NOTE — DISCHARGE INSTRUCTIONS
Pelvic rest for 6 weeks (no sex, tampons, douching, nothing in the vagina)    You can experience vaginal bleeding on and off for up to 6 weeks, it will gradually get lighter and the color will change from bright red to a brownish discharge towards the end.    Activity:  NO strenuous activities or exercising.  Do not /lift anything over 15 pounds.   Do not do heavy housework or cleaning.    NO driving for 3 days.  You may take short car trips but do not drive.    You may shower and/or soak in a bathtub, both are acceptable.  Use a mild soap, no heavy perfumes or fragrances to avoid irritation.     If constipation develops:  You may take Colace (stool softener), Milk of Magnesia, Dulcolax or Miralax.  All of these medications are sold over the counter.      Care of Episiotomy:  Local agents such as Tucks pads & Dermoplast spray.  You may also use a Sitz bath: sitting in a tub of warm water for 15 minutes 2-3 times per day will help relieve the discomfort.      Pain Relief:  You may take Motrin for mild pain & uterine cramping.      Emotional Changes:  You may experience baby blues after delivery.  You may feel let down, anxious and cry easily.  This is normal.  These feelings can begin 2-3 days after delivery and usually disappear in about a week or two.  Prolonged sadness may indicate postpartum depression.      Call your doctor for any of the following:  Difficulty breathing, problems with any of your medications, inability to eat.    Foul smelling vaginal discharge.  Temperature above 100.4.    Heavy vaginal bleeding.  All women bleed different after delivery and each delivery is different.  Heavy bleeding consists of saturating a meliton pad in a 1 hour time period.  Passing clots are normal, if you pass a blood clot larger than the size of a golf ball call your doctor's office.   If you experience pain in your legs/calves, if one leg increases in size and becomes swollen or becomes hot to touch or  discolored.   Crying or periods of sadness beyond 2 weeks.      If you are breast feeding:  Wash your breasts with mild soap and warm water.  You should wear a supportive bra.  You should continue to take a prenatal vitamin for 6 weeks or until breastfeeding is discontinued.  If nipples are sore, apply a few drops of breast milk after nursing and let air dry or you can use Lanolin cream.   If breasts are engorged, apply warmth and express milk.    If you are not breastfeeding:  Wear a tight bra and do not stimulate your breasts.  Avoid handling your breasts and do not express milk.  You may apply ice packs or cabbage leaves to relieve discomfort from engorgement.  If your breasts become warm to touch, reddened or lumps develop call your doctor.

## 2024-04-19 ENCOUNTER — PATIENT MESSAGE (OUTPATIENT)
Dept: MATERNAL FETAL MEDICINE | Facility: CLINIC | Age: 34
End: 2024-04-19
Payer: MEDICAID

## 2024-04-29 ENCOUNTER — HOSPITAL ENCOUNTER (EMERGENCY)
Facility: HOSPITAL | Age: 34
Discharge: HOME | End: 2024-04-29
Attending: EMERGENCY MEDICINE
Payer: MEDICAID

## 2024-04-29 VITALS
TEMPERATURE: 98 F | RESPIRATION RATE: 18 BRPM | SYSTOLIC BLOOD PRESSURE: 144 MMHG | BODY MASS INDEX: 48.82 KG/M2 | OXYGEN SATURATION: 98 % | HEART RATE: 77 BPM | DIASTOLIC BLOOD PRESSURE: 76 MMHG | HEIGHT: 65 IN | WEIGHT: 293 LBS

## 2024-04-29 DIAGNOSIS — N71.9 ENDOMETRITIS: Primary | ICD-10-CM

## 2024-04-29 LAB
ALBUMIN SERPL BCP-MCNC: 3.7 G/DL (ref 3.5–5.2)
ALP SERPL-CCNC: 71 U/L (ref 55–135)
ALT SERPL W/O P-5'-P-CCNC: 16 U/L (ref 10–44)
ANION GAP SERPL CALC-SCNC: 6 MMOL/L (ref 8–16)
AST SERPL-CCNC: 11 U/L (ref 10–40)
BASOPHILS # BLD AUTO: 0.03 K/UL (ref 0–0.2)
BASOPHILS NFR BLD: 0.3 % (ref 0–1.9)
BILIRUB SERPL-MCNC: 0.3 MG/DL (ref 0.1–1)
BILIRUB UR QL STRIP: NEGATIVE
BUN SERPL-MCNC: 13 MG/DL (ref 6–20)
CALCIUM SERPL-MCNC: 8.9 MG/DL (ref 8.7–10.5)
CHLORIDE SERPL-SCNC: 106 MMOL/L (ref 95–110)
CLARITY UR: CLEAR
CO2 SERPL-SCNC: 26 MMOL/L (ref 23–29)
COLOR UR: YELLOW
CREAT SERPL-MCNC: 0.8 MG/DL (ref 0.5–1.4)
DIFFERENTIAL METHOD BLD: ABNORMAL
EOSINOPHIL # BLD AUTO: 0.1 K/UL (ref 0–0.5)
EOSINOPHIL NFR BLD: 0.5 % (ref 0–8)
ERYTHROCYTE [DISTWIDTH] IN BLOOD BY AUTOMATED COUNT: 13 % (ref 11.5–14.5)
EST. GFR  (NO RACE VARIABLE): >60 ML/MIN/1.73 M^2
GLUCOSE SERPL-MCNC: 126 MG/DL (ref 70–110)
GLUCOSE UR QL STRIP: NEGATIVE
HCT VFR BLD AUTO: 40.5 % (ref 37–48.5)
HGB BLD-MCNC: 13.1 G/DL (ref 12–16)
HGB UR QL STRIP: ABNORMAL
IMM GRANULOCYTES # BLD AUTO: 0.03 K/UL (ref 0–0.04)
IMM GRANULOCYTES NFR BLD AUTO: 0.3 % (ref 0–0.5)
KETONES UR QL STRIP: NEGATIVE
LEUKOCYTE ESTERASE UR QL STRIP: ABNORMAL
LIPASE SERPL-CCNC: 36 U/L (ref 4–60)
LYMPHOCYTES # BLD AUTO: 2.4 K/UL (ref 1–4.8)
LYMPHOCYTES NFR BLD: 22.2 % (ref 18–48)
MCH RBC QN AUTO: 28 PG (ref 27–31)
MCHC RBC AUTO-ENTMCNC: 32.3 G/DL (ref 32–36)
MCV RBC AUTO: 87 FL (ref 82–98)
MICROSCOPIC COMMENT: ABNORMAL
MONOCYTES # BLD AUTO: 0.5 K/UL (ref 0.3–1)
MONOCYTES NFR BLD: 4.1 % (ref 4–15)
NEUTROPHILS # BLD AUTO: 7.9 K/UL (ref 1.8–7.7)
NEUTROPHILS NFR BLD: 72.6 % (ref 38–73)
NITRITE UR QL STRIP: NEGATIVE
NRBC BLD-RTO: 0 /100 WBC
PH UR STRIP: 6 [PH] (ref 5–8)
PLATELET # BLD AUTO: 342 K/UL (ref 150–450)
PMV BLD AUTO: 9.5 FL (ref 9.2–12.9)
POTASSIUM SERPL-SCNC: 4.1 MMOL/L (ref 3.5–5.1)
PROT SERPL-MCNC: 6.8 G/DL (ref 6–8.4)
PROT UR QL STRIP: ABNORMAL
RBC # BLD AUTO: 4.68 M/UL (ref 4–5.4)
RBC #/AREA URNS HPF: 80 /HPF (ref 0–4)
SODIUM SERPL-SCNC: 138 MMOL/L (ref 136–145)
SP GR UR STRIP: 1.02 (ref 1–1.03)
SQUAMOUS #/AREA URNS HPF: 2 /HPF
UNIDENT CRYS URNS QL MICRO: 1
URN SPEC COLLECT METH UR: ABNORMAL
UROBILINOGEN UR STRIP-ACNC: NEGATIVE EU/DL
WBC # BLD AUTO: 10.93 K/UL (ref 3.9–12.7)
WBC #/AREA URNS HPF: 21 /HPF (ref 0–5)

## 2024-04-29 PROCEDURE — 87086 URINE CULTURE/COLONY COUNT: CPT | Performed by: PHYSICIAN ASSISTANT

## 2024-04-29 PROCEDURE — 96374 THER/PROPH/DIAG INJ IV PUSH: CPT | Mod: 59

## 2024-04-29 PROCEDURE — 99285 EMERGENCY DEPT VISIT HI MDM: CPT | Mod: 25

## 2024-04-29 PROCEDURE — 63600175 PHARM REV CODE 636 W HCPCS: Performed by: EMERGENCY MEDICINE

## 2024-04-29 PROCEDURE — 81001 URINALYSIS AUTO W/SCOPE: CPT | Performed by: PHYSICIAN ASSISTANT

## 2024-04-29 PROCEDURE — 80053 COMPREHEN METABOLIC PANEL: CPT | Performed by: PHYSICIAN ASSISTANT

## 2024-04-29 PROCEDURE — 25000003 PHARM REV CODE 250: Performed by: EMERGENCY MEDICINE

## 2024-04-29 PROCEDURE — 85025 COMPLETE CBC W/AUTO DIFF WBC: CPT | Performed by: PHYSICIAN ASSISTANT

## 2024-04-29 PROCEDURE — 25500020 PHARM REV CODE 255: Performed by: EMERGENCY MEDICINE

## 2024-04-29 PROCEDURE — 83690 ASSAY OF LIPASE: CPT | Performed by: PHYSICIAN ASSISTANT

## 2024-04-29 RX ORDER — OXYCODONE AND ACETAMINOPHEN 7.5; 325 MG/1; MG/1
1 TABLET ORAL EVERY 4 HOURS PRN
Status: DISCONTINUED | OUTPATIENT
Start: 2024-04-29 | End: 2024-04-29 | Stop reason: HOSPADM

## 2024-04-29 RX ORDER — DOXYCYCLINE 100 MG/1
100 CAPSULE ORAL 2 TIMES DAILY
Qty: 20 CAPSULE | Refills: 0 | Status: SHIPPED | OUTPATIENT
Start: 2024-04-29 | End: 2024-05-09

## 2024-04-29 RX ORDER — OXYCODONE AND ACETAMINOPHEN 5; 325 MG/1; MG/1
1 TABLET ORAL EVERY 6 HOURS PRN
Qty: 12 TABLET | Refills: 0 | Status: SHIPPED | OUTPATIENT
Start: 2024-04-29 | End: 2024-05-13

## 2024-04-29 RX ORDER — MORPHINE SULFATE 4 MG/ML
4 INJECTION, SOLUTION INTRAMUSCULAR; INTRAVENOUS
Status: COMPLETED | OUTPATIENT
Start: 2024-04-29 | End: 2024-04-29

## 2024-04-29 RX ORDER — METRONIDAZOLE 250 MG/1
500 TABLET ORAL
Status: COMPLETED | OUTPATIENT
Start: 2024-04-29 | End: 2024-04-29

## 2024-04-29 RX ORDER — METRONIDAZOLE 500 MG/1
500 TABLET ORAL 3 TIMES DAILY
Qty: 21 TABLET | Refills: 0 | Status: SHIPPED | OUTPATIENT
Start: 2024-04-29 | End: 2024-05-06

## 2024-04-29 RX ORDER — DOXYCYCLINE 100 MG/1
100 CAPSULE ORAL EVERY 12 HOURS
Status: DISCONTINUED | OUTPATIENT
Start: 2024-04-29 | End: 2024-04-29 | Stop reason: HOSPADM

## 2024-04-29 RX ADMIN — IOHEXOL 100 ML: 350 INJECTION, SOLUTION INTRAVENOUS at 03:04

## 2024-04-29 RX ADMIN — DOXYCYCLINE HYCLATE 100 MG: 100 CAPSULE ORAL at 06:04

## 2024-04-29 RX ADMIN — METRONIDAZOLE 500 MG: 250 TABLET ORAL at 06:04

## 2024-04-29 RX ADMIN — MORPHINE SULFATE 4 MG: 4 INJECTION, SOLUTION INTRAMUSCULAR; INTRAVENOUS at 07:04

## 2024-04-29 RX ADMIN — OXYCODONE HYDROCHLORIDE AND ACETAMINOPHEN 1 TABLET: 7.5; 325 TABLET ORAL at 05:04

## 2024-04-29 NOTE — ED PROVIDER NOTES
"Encounter Date: 2024       History     Chief Complaint   Patient presents with    Abdominal Pain     Vaginal birth 12 days ago, c/o RLQ abd pain that radiates down legs and to back     33-year-old female presents emergency department past medical history includes hypertension, diabetes.  Patient is status post  12 days ago.  Patient reports that she sees Dr. Reynolds she reports that she is not bleeding heavily she reports an uneventful pregnancy or delivery.  The patient is not breastfeeding.  Patient reports that she woke up this morning and was doubled over in pain she states that her right lower abdomen was hurting her in it radiated to her lower back and down her right leg.  At the time of my exam patient was reporting now the left lower part of her abdomen hurts.        Review of patient's allergies indicates:   Allergen Reactions    Benadryl [diphenhydramine hcl]      Paralysis on right side body     Codeine      Paralysis right body    Flexeril [cyclobenzaprine] Other (See Comments)     Numbness on right side of body    Nsaids (non-steroidal anti-inflammatory drug) Diarrhea and Nausea And Vomiting     Ulcers     Penicillins Anaphylaxis and Hives    Droperidol Anxiety and Other (See Comments)    Magnesium      "made me feel horrible"     Prochlorperazine Anxiety and Palpitations     Past Medical History:   Diagnosis Date    Agoraphobia     Carpal tunnel syndrome     Depression     Diabetes mellitus, type 2     DVT (deep venous thrombosis)     2019 had blood clot in right arm    Fibromyalgia     Hypertension     Migraine headache     Nerve injury 2016    Lingual Nerve Injury    Obese     Pain management     PTSD (post-traumatic stress disorder)     Trigeminal neuralgia      Past Surgical History:   Procedure Laterality Date    CARPAL TUNNEL RELEASE Right 2018    Dr Lozada     CARPAL TUNNEL RELEASE Left 2020    Procedure: RELEASE, CARPAL TUNNEL;  Surgeon: Brendon Lozada Jr., MD;  Location: " [facility] OR;  Service: Orthopedics;  Laterality: Left;    CHOLECYSTECTOMY N/A 10/31/2022    MOUTH SURGERY      NERVE GRAFT  2016    WISDOM TOOTH EXTRACTION       Family History   Problem Relation Name Age of Onset    No Known Problems Mother      No Known Problems Father      Cancer Maternal Aunt          endometrial    No Known Problems Maternal Uncle      No Known Problems Paternal Aunt      No Known Problems Paternal Uncle      No Known Problems Maternal Grandmother      No Known Problems Maternal Grandfather      No Known Problems Paternal Grandmother      No Known Problems Paternal Grandfather      No Known Problems Daughter      No Known Problems Son       Social History     Tobacco Use    Smoking status: Never    Smokeless tobacco: Never   Substance Use Topics    Alcohol use: No    Drug use: No     Review of Systems   Constitutional:  Negative for chills and fever.   HENT: Negative.     Respiratory: Negative.     Gastrointestinal:  Positive for abdominal pain. Negative for diarrhea, nausea and vomiting.   Genitourinary:  Positive for pelvic pain and vaginal bleeding. Negative for vaginal discharge.   Musculoskeletal: Negative.    Allergic/Immunologic: Negative.    Neurological: Negative.    Hematological: Negative.    Psychiatric/Behavioral: Negative.         Physical Exam     Initial Vitals [04/29/24 0935]   BP Pulse Resp Temp SpO2   (!) 170/95 84 20 97.5 °F (36.4 °C) 97 %      MAP       --         Physical Exam    Nursing note and vitals reviewed.  Constitutional: She appears well-developed and well-nourished.   HENT:   Head: Normocephalic and atraumatic.   Eyes: EOM are normal. Pupils are equal, round, and reactive to light.   Neck: Neck supple.   Normal range of motion.  Cardiovascular:  Normal rate, regular rhythm, normal heart sounds and intact distal pulses.           Pulmonary/Chest: Breath sounds normal.   Abdominal: Abdomen is soft. Bowel sounds are normal.   Musculoskeletal:         General: Normal  range of motion.      Cervical back: Normal range of motion and neck supple.     Neurological: She is alert and oriented to person, place, and time. She has normal strength.   Skin: Skin is warm.   Psychiatric: She has a normal mood and affect.         ED Course   Procedures  Labs Reviewed   CBC W/ AUTO DIFFERENTIAL - Abnormal; Notable for the following components:       Result Value    Gran # (ANC) 7.9 (*)     All other components within normal limits   COMPREHENSIVE METABOLIC PANEL - Abnormal; Notable for the following components:    Glucose 126 (*)     Anion Gap 6 (*)     All other components within normal limits   URINALYSIS, REFLEX TO URINE CULTURE - Abnormal; Notable for the following components:    Protein, UA Trace (*)     Occult Blood UA 3+ (*)     Leukocytes, UA 3+ (*)     All other components within normal limits    Narrative:     Specimen Source->Urine   URINALYSIS MICROSCOPIC - Abnormal; Notable for the following components:    RBC, UA 80 (*)     WBC, UA 21 (*)     All other components within normal limits    Narrative:     Specimen Source->Urine   CULTURE, URINE   LIPASE          Imaging Results              US Pelvis Complete Non OB (Final result)  Result time 04/29/24 18:54:08      Final result by Sanjay Hou IV, MD (04/29/24 18:54:08)                   Impression:      1. Complex echogenicity within the endometrial cavity suggestive of purulent material or blood products.  Please clinically correlate.  2. Hyperechoic myometrium which could be inflammatory or relate to adenomyosis.  Please clinically correlate      Electronically signed by: Sanjay Hou MD  Date:    04/29/2024  Time:    18:54               Narrative:    EXAMINATION:  US PELVIS COMPLETE NON OB    CLINICAL HISTORY:  pain;    TECHNIQUE:  Transabdominal only ultrasound images are available.  This is suboptimal for evaluating the female organs without transvaginal imaging.    COMPARISON:  None    FINDINGS:  Complex fluid appears to  be present within the endometrium diffusely with a thickness of 12 mm.  This suggest blood products.  This could also relate to endometrial inflammation or infection.  Please clinically correlate.  A normal hyperechoic endometrium is not displayed.  The myometrium demonstrates some increased echogenicity diffusely as can be seen with adenomyosis under the proper clinical scenario or infection.  Please clinically correlate.    The right ovary is thought to be visualized at 3.0 x 2.0 by 2.4 cm for an approximate volume of 7.5 cc.  Arterial and venous blood flow is thought to be demonstrated.    The left ovary is visualized at 3.8 x 1.5 x 2.3 cm for an approximate volume of 6.9 cc.  Arterial and venous blood flow is demonstrated.    No obvious free pelvic fluid is noted                                       CT Abdomen Pelvis With IV Contrast NO Oral Contrast (Final result)  Result time 04/29/24 15:41:46      Final result by Maury Gallo DO (04/29/24 15:41:46)                   Impression:      No acute intra-abdominal abnormality.      Electronically signed by: Maury Gallo  Date:    04/29/2024  Time:    15:41               Narrative:      CMS MANDATED QUALITY DATA - CT RADIATION - 436    All CT scans at this facility utilize dose modulation, iterative reconstruction, and/or weight based dosing when appropriate to reduce radiation dose to as low as reasonably achievable.    EXAMINATION:  CT ABDOMEN PELVIS WITH IV CONTRAST    CLINICAL HISTORY:  RLQ abdominal pain (Age >= 14y);    TECHNIQUE:  CT abdomen and pelvis with 100 mL Omnipaque 350.    COMPARISON:  CT abdomen pelvis 07/17/2022    FINDINGS:  Lung bases are clear.  Heart size is normal.    Liver is normal size.  No hepatic lesions identified.  The gallbladder is not identified.  Common bile duct is within normal limits.  The pancreas, spleen and adrenal glands are unremarkable.  Kidneys are normal size.  There is no hydronephrosis or nephrolithiasis.  The  ureters are normal caliber without obstructions.  The bladder is mildly fluid filled the uterus and adnexal structures are unremarkable.  No free fluid in the pelvis.    The large and small bowel are normal caliber.  The appendix is normal.  There is no bowel wall thickening or inflammatory changes.    The abdominal aorta is normal caliber.  There are no enlarged intra-abdominal lymph nodes.  There is no mesenteric fat stranding or free fluid.  The ventral abdominal wall is unremarkable.    No acute osseous abnormality.                                       Medications   oxyCODONE-acetaminophen 7.5-325 mg per tablet 1 tablet (1 tablet Oral Given 4/29/24 1710)   doxycycline capsule 100 mg (100 mg Oral Given 4/29/24 1802)   iohexoL (OMNIPAQUE 350) injection 100 mL (100 mLs Intravenous Given 4/29/24 1523)   metroNIDAZOLE tablet 500 mg (500 mg Oral Given 4/29/24 1802)   morphine injection 4 mg (4 mg Intravenous Given 4/29/24 1918)     Medical Decision Making  33-year-old female recently had a baby 12 days ago having pelvic pain and mild discharge at this time.   patient said bleeding actually is much better compared to previous pregnancies.  Patient's pain managed.  Patient does have presentation consistent with endometritis.  Started on antibiotic.  Otherwise patient nontoxic and hemodynamically stable and given presentation will treat with outpatient antibiotic and advised to follow-up with OBGYN.  No indication for D and C at this time as patient states this is not usually any worse than previous pregnancies     Amount and/or Complexity of Data Reviewed  Labs: ordered. Decision-making details documented in ED Course.  Radiology: ordered. Decision-making details documented in ED Course.    Risk  Prescription drug management.               ED Course as of 04/29/24 1927 Mon Apr 29, 2024   9511 Patient report given to DR Brewer  [MP]      ED Course User Index  [MP] Fauiza Cuevas, NATALIAP                            Clinical Impression:  Final diagnoses:  [N71.9] Endometritis (Primary)          ED Disposition Condition    Discharge Stable          ED Prescriptions       Medication Sig Dispense Start Date End Date Auth. Provider    doxycycline (VIBRAMYCIN) 100 MG Cap Take 1 capsule (100 mg total) by mouth 2 (two) times daily. for 10 days 20 capsule 4/29/2024 5/9/2024 Brandin Brewer MD    metroNIDAZOLE (FLAGYL) 500 MG tablet Take 1 tablet (500 mg total) by mouth 3 (three) times daily. for 7 days 21 tablet 4/29/2024 5/6/2024 Brandin Brewer MD    oxyCODONE-acetaminophen (PERCOCET) 5-325 mg per tablet Take 1 tablet by mouth every 6 (six) hours as needed. 12 tablet 4/29/2024 -- Brandin Brewer MD          Follow-up Information       Follow up With Specialties Details Why Contact Info    Enoch Gerardo, NP Family Medicine In 2 days  [address] [facility]  [address] [place] [id number]  [phone]               Brandin Brewer MD  04/29/24 0333

## 2024-04-29 NOTE — FIRST PROVIDER EVALUATION
" Emergency Department TeleTriage Encounter Note      CHIEF COMPLAINT    Chief Complaint   Patient presents with    Abdominal Pain     Vaginal birth 12 days ago, c/o RLQ abd pain that radiates down legs and to back       VITAL SIGNS   Initial Vitals [04/29/24 0935]   BP Pulse Resp Temp SpO2   (!) 170/95 84 20 97.5 °F (36.4 °C) 97 %      MAP       --            ALLERGIES    Review of patient's allergies indicates:   Allergen Reactions    Benadryl [diphenhydramine hcl]      Paralysis on right side body     Codeine      Paralysis right body    Flexeril [cyclobenzaprine] Other (See Comments)     Numbness on right side of body    Nsaids (non-steroidal anti-inflammatory drug) Diarrhea and Nausea And Vomiting     Ulcers     Penicillins Anaphylaxis and Hives    Droperidol Anxiety and Other (See Comments)    Magnesium      "made me feel horrible"     Prochlorperazine Anxiety and Palpitations       PROVIDER TRIAGE NOTE  Patient presents with RLQ pain, back pain, pain radiating down the RLE. She is 12 days s/p vaginal delivery. Denies increased bleeding or urinary symptoms.       ORDERS  Labs Reviewed - No data to display    ED Orders (720h ago, onward)      None              Virtual Visit Note: The provider triage portion of this emergency department evaluation and documentation was performed via [employer], a HIPAA-compliant telemedicine application, in concert with a tele-presenter in the room. A face to face patient evaluation with one of my colleagues will occur once the patient is placed in an emergency department room.      DISCLAIMER: This note was prepared with M*[employer] voice recognition transcription software. Garbled syntax, mangled pronouns, and other bizarre constructions may be attributed to that software system.    "

## 2024-04-30 LAB
BACTERIA UR CULT: NORMAL
BACTERIA UR CULT: NORMAL

## 2024-04-30 NOTE — DISCHARGE INSTRUCTIONS
Follow-up with your OBGYN in 1-2 days.  Rest.  Return to emergency department for worsening symptoms or any problems

## 2024-05-02 ENCOUNTER — HOSPITAL ENCOUNTER (EMERGENCY)
Facility: HOSPITAL | Age: 34
Discharge: HOME | End: 2024-05-02
Attending: EMERGENCY MEDICINE
Payer: MEDICAID

## 2024-05-02 VITALS
WEIGHT: 293 LBS | SYSTOLIC BLOOD PRESSURE: 156 MMHG | TEMPERATURE: 98 F | OXYGEN SATURATION: 96 % | HEIGHT: 65 IN | RESPIRATION RATE: 16 BRPM | BODY MASS INDEX: 48.82 KG/M2 | DIASTOLIC BLOOD PRESSURE: 70 MMHG | HEART RATE: 85 BPM

## 2024-05-02 DIAGNOSIS — N39.0 URINARY TRACT INFECTION WITHOUT HEMATURIA, SITE UNSPECIFIED: ICD-10-CM

## 2024-05-02 DIAGNOSIS — Z87.42 HISTORY OF ENDOMETRITIS: ICD-10-CM

## 2024-05-02 DIAGNOSIS — R19.7 DIARRHEA, UNSPECIFIED TYPE: Primary | ICD-10-CM

## 2024-05-02 LAB
ALBUMIN SERPL BCP-MCNC: 3.2 G/DL (ref 3.5–5.2)
ALP SERPL-CCNC: 71 U/L (ref 55–135)
ALT SERPL W/O P-5'-P-CCNC: 14 U/L (ref 10–44)
ANION GAP SERPL CALC-SCNC: 12 MMOL/L (ref 8–16)
AST SERPL-CCNC: 14 U/L (ref 10–40)
BASOPHILS # BLD AUTO: 0.05 K/UL (ref 0–0.2)
BASOPHILS NFR BLD: 0.4 % (ref 0–1.9)
BILIRUB SERPL-MCNC: 0.2 MG/DL (ref 0.1–1)
BILIRUB UR QL STRIP: NEGATIVE
BUN SERPL-MCNC: 12 MG/DL (ref 6–20)
CALCIUM SERPL-MCNC: 9.4 MG/DL (ref 8.7–10.5)
CHLORIDE SERPL-SCNC: 105 MMOL/L (ref 95–110)
CLARITY UR: CLEAR
CO2 SERPL-SCNC: 23 MMOL/L (ref 23–29)
COLOR UR: YELLOW
CREAT SERPL-MCNC: 1 MG/DL (ref 0.5–1.4)
DIFFERENTIAL METHOD BLD: ABNORMAL
EOSINOPHIL # BLD AUTO: 0.2 K/UL (ref 0–0.5)
EOSINOPHIL NFR BLD: 1.3 % (ref 0–8)
ERYTHROCYTE [DISTWIDTH] IN BLOOD BY AUTOMATED COUNT: 12.9 % (ref 11.5–14.5)
EST. GFR  (NO RACE VARIABLE): >60 ML/MIN/1.73 M^2
GLUCOSE SERPL-MCNC: 109 MG/DL (ref 70–110)
GLUCOSE UR QL STRIP: NEGATIVE
HCT VFR BLD AUTO: 39.9 % (ref 37–48.5)
HGB BLD-MCNC: 13.2 G/DL (ref 12–16)
HGB UR QL STRIP: ABNORMAL
IMM GRANULOCYTES # BLD AUTO: 0.04 K/UL (ref 0–0.04)
IMM GRANULOCYTES NFR BLD AUTO: 0.3 % (ref 0–0.5)
KETONES UR QL STRIP: NEGATIVE
LEUKOCYTE ESTERASE UR QL STRIP: ABNORMAL
LIPASE SERPL-CCNC: 38 U/L (ref 4–60)
LYMPHOCYTES # BLD AUTO: 2.6 K/UL (ref 1–4.8)
LYMPHOCYTES NFR BLD: 22.8 % (ref 18–48)
MCH RBC QN AUTO: 28.1 PG (ref 27–31)
MCHC RBC AUTO-ENTMCNC: 33.1 G/DL (ref 32–36)
MCV RBC AUTO: 85 FL (ref 82–98)
MICROSCOPIC COMMENT: ABNORMAL
MONOCYTES # BLD AUTO: 0.6 K/UL (ref 0.3–1)
MONOCYTES NFR BLD: 5 % (ref 4–15)
NEUTROPHILS # BLD AUTO: 8.1 K/UL (ref 1.8–7.7)
NEUTROPHILS NFR BLD: 70.2 % (ref 38–73)
NITRITE UR QL STRIP: NEGATIVE
NRBC BLD-RTO: 0 /100 WBC
PH UR STRIP: 7 [PH] (ref 5–8)
PLATELET # BLD AUTO: 347 K/UL (ref 150–450)
PMV BLD AUTO: 9.6 FL (ref 9.2–12.9)
POTASSIUM SERPL-SCNC: 4 MMOL/L (ref 3.5–5.1)
PROCALCITONIN SERPL IA-MCNC: <0.02 NG/ML (ref 0–0.5)
PROT SERPL-MCNC: 6.8 G/DL (ref 6–8.4)
PROT UR QL STRIP: ABNORMAL
RBC # BLD AUTO: 4.69 M/UL (ref 4–5.4)
RBC #/AREA URNS HPF: >100 /HPF (ref 0–4)
SODIUM SERPL-SCNC: 140 MMOL/L (ref 136–145)
SP GR UR STRIP: 1.02 (ref 1–1.03)
SQUAMOUS #/AREA URNS HPF: 2 /HPF
URN SPEC COLLECT METH UR: ABNORMAL
UROBILINOGEN UR STRIP-ACNC: NEGATIVE EU/DL
WBC # BLD AUTO: 11.51 K/UL (ref 3.9–12.7)
WBC #/AREA URNS HPF: 33 /HPF (ref 0–5)

## 2024-05-02 PROCEDURE — 99284 EMERGENCY DEPT VISIT MOD MDM: CPT | Mod: 25

## 2024-05-02 PROCEDURE — 63600175 PHARM REV CODE 636 W HCPCS: Performed by: EMERGENCY MEDICINE

## 2024-05-02 PROCEDURE — 85025 COMPLETE CBC W/AUTO DIFF WBC: CPT | Performed by: EMERGENCY MEDICINE

## 2024-05-02 PROCEDURE — 36415 COLL VENOUS BLD VENIPUNCTURE: CPT | Performed by: EMERGENCY MEDICINE

## 2024-05-02 PROCEDURE — 83690 ASSAY OF LIPASE: CPT | Performed by: EMERGENCY MEDICINE

## 2024-05-02 PROCEDURE — 25000003 PHARM REV CODE 250: Performed by: EMERGENCY MEDICINE

## 2024-05-02 PROCEDURE — 96374 THER/PROPH/DIAG INJ IV PUSH: CPT

## 2024-05-02 PROCEDURE — 96375 TX/PRO/DX INJ NEW DRUG ADDON: CPT

## 2024-05-02 PROCEDURE — 87086 URINE CULTURE/COLONY COUNT: CPT | Performed by: EMERGENCY MEDICINE

## 2024-05-02 PROCEDURE — 81000 URINALYSIS NONAUTO W/SCOPE: CPT | Performed by: EMERGENCY MEDICINE

## 2024-05-02 PROCEDURE — 80053 COMPREHEN METABOLIC PANEL: CPT | Performed by: EMERGENCY MEDICINE

## 2024-05-02 PROCEDURE — 84145 PROCALCITONIN (PCT): CPT | Performed by: EMERGENCY MEDICINE

## 2024-05-02 PROCEDURE — 96361 HYDRATE IV INFUSION ADD-ON: CPT

## 2024-05-02 RX ORDER — OXYCODONE AND ACETAMINOPHEN 5; 325 MG/1; MG/1
1 TABLET ORAL EVERY 6 HOURS PRN
Qty: 12 TABLET | Refills: 0 | Status: SHIPPED | OUTPATIENT
Start: 2024-05-02 | End: 2024-05-13

## 2024-05-02 RX ORDER — NITROFURANTOIN 25; 75 MG/1; MG/1
100 CAPSULE ORAL 2 TIMES DAILY
Qty: 10 CAPSULE | Refills: 0 | Status: SHIPPED | OUTPATIENT
Start: 2024-05-02 | End: 2024-05-07

## 2024-05-02 RX ORDER — ONDANSETRON HYDROCHLORIDE 2 MG/ML
4 INJECTION, SOLUTION INTRAVENOUS
Status: COMPLETED | OUTPATIENT
Start: 2024-05-02 | End: 2024-05-02

## 2024-05-02 RX ORDER — OXYCODONE AND ACETAMINOPHEN 10; 325 MG/1; MG/1
1 TABLET ORAL ONCE
Status: COMPLETED | OUTPATIENT
Start: 2024-05-02 | End: 2024-05-02

## 2024-05-02 RX ORDER — HYDROMORPHONE HYDROCHLORIDE 1 MG/ML
0.5 INJECTION, SOLUTION INTRAMUSCULAR; INTRAVENOUS; SUBCUTANEOUS
Status: COMPLETED | OUTPATIENT
Start: 2024-05-02 | End: 2024-05-02

## 2024-05-02 RX ADMIN — SODIUM CHLORIDE, POTASSIUM CHLORIDE, SODIUM LACTATE AND CALCIUM CHLORIDE 1000 ML: 600; 310; 30; 20 INJECTION, SOLUTION INTRAVENOUS at 03:05

## 2024-05-02 RX ADMIN — OXYCODONE HYDROCHLORIDE AND ACETAMINOPHEN 1 TABLET: 10; 325 TABLET ORAL at 05:05

## 2024-05-02 RX ADMIN — HYDROMORPHONE HYDROCHLORIDE 0.5 MG: 0.5 INJECTION, SOLUTION INTRAMUSCULAR; INTRAVENOUS; SUBCUTANEOUS at 03:05

## 2024-05-02 RX ADMIN — ONDANSETRON 4 MG: 2 INJECTION INTRAMUSCULAR; INTRAVENOUS at 03:05

## 2024-05-02 NOTE — ED PROVIDER NOTES
"Encounter Date: 2024       History     Chief Complaint   Patient presents with    Abdominal Pain     With diarrhea; currently being treated for uterine infection, had baby 2 weeks ago      33yo status post  postpartum day 15.  Followed by Dr. Reynolds, with past medical history including obesity, fibromyalgia, non-insulin-dependent diabetes PTSD, hypertension.  Patient reports uneventful pregnancy or delivery, not breastfeeding.  Patient seen evaluated in the emergency department on  at [facility] after presenting with abdominal pain and mild cramping.  Patient underwent CT abdomen and ultrasound pelvis and was found to have possible retained products versus thickened endometrium.  Patient was treated for endometritis.  Placed on doxycycline, Flagyl, Ultram for pain.  Patient told to follow up with her primary care provider and Dr. Reynolds.  She re presents back to the emergency department with persistent diffuse abdominal pain and also with diarrhea.  Patient states has had 4 loose watery diarrheal stools that she denies melena or blood noted in his stools.  She has been taking Colace as well for constipation and has currently continued to take it throughout her recent illness.  She is scheduled see Dr. Reynolds tomorrow.  She denies fever, no vaginal discharge or vaginal bleeding.  Denies any other constitutional symptoms.        Review of patient's allergies indicates:   Allergen Reactions    Benadryl [diphenhydramine hcl]      Paralysis on right side body     Codeine      Paralysis right body    Flexeril [cyclobenzaprine] Other (See Comments)     Numbness on right side of body    Nsaids (non-steroidal anti-inflammatory drug) Diarrhea and Nausea And Vomiting     Ulcers     Penicillins Anaphylaxis and Hives    Droperidol Anxiety and Other (See Comments)    Magnesium      "made me feel horrible"     Prochlorperazine Anxiety and Palpitations     Past Medical History:   Diagnosis Date    Agoraphobia     " Carpal tunnel syndrome     Depression     Diabetes mellitus, type 2     DVT (deep venous thrombosis)     2019 had blood clot in right arm    Fibromyalgia     Hypertension     Migraine headache     Nerve injury 08/2016    Lingual Nerve Injury    Obese     Pain management     PTSD (post-traumatic stress disorder)     Trigeminal neuralgia      Past Surgical History:   Procedure Laterality Date    CARPAL TUNNEL RELEASE Right 12/27/2018    Dr Lozada     CARPAL TUNNEL RELEASE Left 01/28/2020    Procedure: RELEASE, CARPAL TUNNEL;  Surgeon: Brendon Lozada Jr., MD;  Location: [facility] at [facility];  Service: Orthopedics;  Laterality: Left;    CHOLECYSTECTOMY N/A 10/31/2022    MOUTH SURGERY      NERVE GRAFT  2016    WISDOM TOOTH EXTRACTION       Family History   Problem Relation Name Age of Onset    No Known Problems Mother      No Known Problems Father      Cancer Maternal Aunt          endometrial    No Known Problems Maternal Uncle      No Known Problems Paternal Aunt      No Known Problems Paternal Uncle      No Known Problems Maternal Grandmother      No Known Problems Maternal Grandfather      No Known Problems Paternal Grandmother      No Known Problems Paternal Grandfather      No Known Problems Daughter      No Known Problems Son       Social History     Tobacco Use    Smoking status: Never    Smokeless tobacco: Never   Substance Use Topics    Alcohol use: No    Drug use: No     Review of Systems   Constitutional:  Negative for fever.   HENT:  Negative for sore throat.    Respiratory:  Negative for shortness of breath.    Cardiovascular:  Negative for chest pain.   Gastrointestinal:  Positive for abdominal pain and diarrhea. Negative for abdominal distention, nausea, rectal pain and vomiting.   Genitourinary:  Positive for vaginal discharge (Scant). Negative for dysuria and vaginal bleeding.   Musculoskeletal:  Negative for back pain.   Skin:  Negative for rash.   Neurological:  Negative for weakness.   Hematological:  Does not  bruise/bleed easily.       Physical Exam     Initial Vitals [05/02/24 1352]   BP Pulse Resp Temp SpO2   139/86 89 20 98.3 °F (36.8 °C) 97 %      MAP       --         Physical Exam    Nursing note and vitals reviewed.  Constitutional: She appears well-developed and well-nourished.   HENT:   Head: Normocephalic and atraumatic.   Nose: Nose normal.   Mouth/Throat: Oropharynx is clear and moist.   Eyes: Conjunctivae and EOM are normal. Pupils are equal, round, and reactive to light.   Neck: Neck supple. No thyromegaly present. No tracheal deviation present.   Normal range of motion.  Cardiovascular:  Normal rate, regular rhythm, normal heart sounds and intact distal pulses.     Exam reveals no gallop and no friction rub.       No murmur heard.  Pulmonary/Chest: No stridor. No respiratory distress.   Course bilateral breath sounds no adventitious sounds   Abdominal: Abdomen is soft. Bowel sounds are normal. She exhibits no distension and no mass. There is no abdominal tenderness. There is no rebound and no guarding.   Musculoskeletal:         General: No edema. Normal range of motion.      Cervical back: Normal range of motion and neck supple.     Lymphadenopathy:     She has no cervical adenopathy.   Neurological: She is alert and oriented to person, place, and time. She has normal strength and normal reflexes. She displays normal reflexes. No cranial nerve deficit or sensory deficit. GCS score is 15. GCS eye subscore is 4. GCS verbal subscore is 5. GCS motor subscore is 6.   Skin: Skin is warm and dry. Capillary refill takes less than 2 seconds.   Psychiatric: She has a normal mood and affect.         ED Course   Procedures  Labs Reviewed   CBC W/ AUTO DIFFERENTIAL - Abnormal; Notable for the following components:       Result Value    Gran # (ANC) 8.1 (*)     All other components within normal limits   COMPREHENSIVE METABOLIC PANEL - Abnormal; Notable for the following components:    Albumin 3.2 (*)     All other  components within normal limits   URINALYSIS, REFLEX TO URINE CULTURE - Abnormal; Notable for the following components:    Protein, UA Trace (*)     Occult Blood UA 3+ (*)     Leukocytes, UA 3+ (*)     All other components within normal limits    Narrative:     Specimen Source->Urine   URINALYSIS MICROSCOPIC - Abnormal; Notable for the following components:    RBC, UA >100 (*)     WBC, UA 33 (*)     All other components within normal limits    Narrative:     Specimen Source->Urine   CULTURE, URINE   LIPASE   PROCALCITONIN          Imaging Results    None          Medications   oxyCODONE-acetaminophen  mg per tablet 1 tablet (has no administration in time range)   lactated ringers bolus 1,000 mL (0 mLs Intravenous Stopped 24 1644)   HYDROmorphone injection 0.5 mg (0.5 mg Intravenous Given 24 1543)   ondansetron injection 4 mg (4 mg Intravenous Given 24 1543)     Medical Decision Making  Amount and/or Complexity of Data Reviewed  Labs: ordered.    Risk  Prescription drug management.                          Medical Decision Making:   Initial Assessment:   33yo status post  postpartum day 15.  Followed by Dr. Reynolds, with past medical history including obesity, fibromyalgia, non-insulin-dependent diabetes PTSD, hypertension.  Patient reports uneventful pregnancy or delivery, not breastfeeding.  Patient seen evaluated in the emergency department on  at [facility] after presenting with abdominal pain and mild cramping.  Patient underwent CT abdomen and ultrasound pelvis and was found to have possible retained products versus thickened endometrium.  Patient was treated for endometritis.  Placed on doxycycline, Flagyl, Ultram for pain.  Patient told to follow up with her primary care provider and Dr. Reynolds.  She re presents back to the emergency department with persistent diffuse abdominal pain and also with diarrhea.  Patient states has had 4 loose watery diarrheal stools that she denies melena  or blood noted in his stools.  She has been taking Colace as well for constipation and has currently continued to take it throughout her recent illness.  She is scheduled see Dr. Reynolds tomorrow.  She denies fever, no vaginal discharge or vaginal bleeding.  Denies any other constitutional symptoms.      Differential Diagnosis:   Colitis, endometritis, vaginitis, retained products of conception,  Clinical Tests:   Lab Tests: Ordered and Reviewed  Radiological Study: Ordered and Reviewed  ED Management:  Case discussed with , currently at this time states that patient most likely will not need to have repeat ultrasound or CT testing.  Should continue on medications as prescribed including doxycycline and Flagyl.  Patient was advised to discontinue Colace.  Also at the advice gyn no pelvic was performed during this examination.  Patient's abdomen was benign.  She is afebrile.  Did undergo IV hydration emergency department.  Patient stated that she ran out of Percocet therefore she will be given repeat prescription.  She is to follow up with Dr. Reynlods as scheduled in a.m..  She is return to emergency department problems persist worsens or additional concerns.             Clinical Impression:  Final diagnoses:  [R19.7] Diarrhea, unspecified type (Primary)  [Z87.42] History of endometritis  [N39.0] Urinary tract infection without hematuria, site unspecified                 Marcin Chester MD  05/02/24 1522       Marcin Chester MD  05/02/24 8371

## 2024-05-05 LAB — BACTERIA UR CULT: NO GROWTH

## 2024-05-08 ENCOUNTER — PATIENT MESSAGE (OUTPATIENT)
Dept: MATERNAL FETAL MEDICINE | Facility: CLINIC | Age: 34
End: 2024-05-08
Payer: MEDICAID

## 2024-05-13 ENCOUNTER — OFFICE VISIT (OUTPATIENT)
Dept: FAMILY MEDICINE | Facility: CLINIC | Age: 34
End: 2024-05-13
Payer: MEDICAID

## 2024-05-13 VITALS — OXYGEN SATURATION: 98 % | WEIGHT: 293 LBS | HEIGHT: 65 IN | HEART RATE: 96 BPM | BODY MASS INDEX: 48.82 KG/M2

## 2024-05-13 DIAGNOSIS — E11.9 TYPE 2 DIABETES MELLITUS WITHOUT COMPLICATION, WITHOUT LONG-TERM CURRENT USE OF INSULIN: Primary | ICD-10-CM

## 2024-05-13 DIAGNOSIS — Z13.31 POSITIVE DEPRESSION SCREENING: ICD-10-CM

## 2024-05-13 DIAGNOSIS — G43.909 MIGRAINE WITHOUT STATUS MIGRAINOSUS, NOT INTRACTABLE, UNSPECIFIED MIGRAINE TYPE: ICD-10-CM

## 2024-05-13 DIAGNOSIS — G89.29 CHRONIC BILATERAL LOW BACK PAIN WITH BILATERAL SCIATICA: ICD-10-CM

## 2024-05-13 DIAGNOSIS — M79.671 RIGHT FOOT PAIN: ICD-10-CM

## 2024-05-13 DIAGNOSIS — E78.49 OTHER HYPERLIPIDEMIA: ICD-10-CM

## 2024-05-13 DIAGNOSIS — M54.42 CHRONIC BILATERAL LOW BACK PAIN WITH BILATERAL SCIATICA: ICD-10-CM

## 2024-05-13 DIAGNOSIS — M54.41 CHRONIC BILATERAL LOW BACK PAIN WITH BILATERAL SCIATICA: ICD-10-CM

## 2024-05-13 LAB — HBA1C MFR BLD: 5.5 %

## 2024-05-13 PROCEDURE — 1111F DSCHRG MED/CURRENT MED MERGE: CPT | Mod: CPTII,S$GLB,, | Performed by: NURSE PRACTITIONER

## 2024-05-13 PROCEDURE — 1159F MED LIST DOCD IN RCRD: CPT | Mod: CPTII,S$GLB,, | Performed by: NURSE PRACTITIONER

## 2024-05-13 PROCEDURE — 1160F RVW MEDS BY RX/DR IN RCRD: CPT | Mod: CPTII,S$GLB,, | Performed by: NURSE PRACTITIONER

## 2024-05-13 PROCEDURE — 83036 HEMOGLOBIN GLYCOSYLATED A1C: CPT | Mod: QW,,, | Performed by: NURSE PRACTITIONER

## 2024-05-13 PROCEDURE — 99214 OFFICE O/P EST MOD 30 MIN: CPT | Mod: 25,S$GLB,, | Performed by: NURSE PRACTITIONER

## 2024-05-13 PROCEDURE — 3044F HG A1C LEVEL LT 7.0%: CPT | Mod: CPTII,S$GLB,, | Performed by: NURSE PRACTITIONER

## 2024-05-13 PROCEDURE — 3008F BODY MASS INDEX DOCD: CPT | Mod: CPTII,S$GLB,, | Performed by: NURSE PRACTITIONER

## 2024-05-13 RX ORDER — METFORMIN HYDROCHLORIDE 500 MG/1
500 TABLET, EXTENDED RELEASE ORAL DAILY
COMMUNITY
Start: 2024-03-11

## 2024-05-13 RX ORDER — CITALOPRAM 40 MG/1
40 TABLET, FILM COATED ORAL DAILY
COMMUNITY
End: 2024-06-17

## 2024-05-13 RX ORDER — CLONAZEPAM 1 MG/1
1 TABLET ORAL
COMMUNITY
Start: 2024-05-03

## 2024-05-13 RX ORDER — ONDANSETRON HYDROCHLORIDE 4 MG/5ML
4 SOLUTION ORAL
COMMUNITY
End: 2024-05-28

## 2024-05-13 RX ORDER — ACETAMINOPHEN 500 MG
500 TABLET ORAL EVERY 6 HOURS PRN
COMMUNITY

## 2024-05-13 RX ORDER — BUSPIRONE HYDROCHLORIDE 30 MG/1
30 TABLET ORAL 3 TIMES DAILY
COMMUNITY

## 2024-05-13 NOTE — PROGRESS NOTES
SUBJECTIVE:      Patient ID: Jasper Guerrero is a 33 y.o. female.    Chief Complaint: Annual Exam    Patient is here today to establish care. She recently delivered a baby girl with Dr Reynolds last month. She has a history of diabetes mellitus, chronic low back pain, trigeminal neuralgia, migraine headaches, anxiety/depression(followed by psyc) 5.5    Diabetes  She presents for her follow-up diabetic visit. She has type 2 diabetes mellitus. Her disease course has been improving. Hypoglycemia symptoms include headaches (chronic). Pertinent negatives for hypoglycemia include no confusion, dizziness, nervousness/anxiousness, pallor or speech difficulty. Pertinent negatives for diabetes include no chest pain, no fatigue, no polydipsia, no polyuria and no weakness. There are no hypoglycemic complications. Symptoms are stable. Diabetic complications include peripheral neuropathy. Risk factors for coronary artery disease include obesity, sedentary lifestyle and hypertension. Current diabetic treatment includes oral agent (monotherapy). She is compliant with treatment most of the time. She is following a generally healthy diet. Meal planning includes avoidance of concentrated sweets. An ACE inhibitor/angiotensin II receptor blocker is not being taken. She does not see a podiatrist.Eye exam is not current.   Hypertension  This is a chronic problem. The current episode started more than 1 year ago. The problem is unchanged. The problem is controlled. Associated symptoms include headaches (chronic) and palpitations. Pertinent negatives include no chest pain or shortness of breath. There are no associated agents to hypertension. Risk factors for coronary artery disease include diabetes mellitus, obesity and sedentary lifestyle. Past treatments include beta blockers. The current treatment provides significant improvement. Compliance problems include exercise and diet.    Back Pain  This is a chronic problem. The current  episode started more than 1 year ago. The problem occurs constantly. The problem has been gradually worsening since onset. The pain is present in the lumbar spine. The quality of the pain is described as aching. The pain radiates to the left thigh and right thigh. The pain is moderate. The pain is The same all the time. Associated symptoms include headaches (chronic). Pertinent negatives include no abdominal pain, bladder incontinence, bowel incontinence, chest pain, dysuria or weakness. Risk factors include lack of exercise and obesity. She has tried analgesics and heat for the symptoms. The treatment provided mild relief.       Past Surgical History:   Procedure Laterality Date    CARPAL TUNNEL RELEASE Right 12/27/2018    Dr Lozada     CARPAL TUNNEL RELEASE Left 01/28/2020    Procedure: RELEASE, CARPAL TUNNEL;  Surgeon: Brendon Lozada Jr., MD;  Location: [facility];  Service: Orthopedics;  Laterality: Left;    CHOLECYSTECTOMY N/A 10/31/2022    MOUTH SURGERY      NERVE GRAFT  2016    WISDOM TOOTH EXTRACTION       Family History   Problem Relation Name Age of Onset    Depression Mother      Diabetes Mother      No Known Problems Father      No Known Problems Daughter      No Known Problems Son      Cancer Maternal Aunt          endometrial    No Known Problems Maternal Uncle      No Known Problems Paternal Aunt      No Known Problems Paternal Uncle      No Known Problems Maternal Grandmother      No Known Problems Maternal Grandfather      No Known Problems Paternal Grandmother      No Known Problems Paternal Grandfather        Social History     Socioeconomic History    Marital status:    Tobacco Use    Smoking status: Never    Smokeless tobacco: Never   Substance and Sexual Activity    Alcohol use: Yes     Comment: occasional    Drug use: No    Sexual activity: Yes     Partners: Male     Social Determinants of Health     Financial Resource Strain: Low Risk  (5/7/2024)    Overall Financial Resource Strain  (CARDIA)     Difficulty of Paying Living Expenses: Not hard at all   Food Insecurity: No Food Insecurity (5/7/2024)    Hunger Vital Sign     Worried About Running Out of Food in the Last Year: Never true     Ran Out of Food in the Last Year: Never true   Transportation Needs: No Transportation Needs (5/7/2024)    PRAPARE - Transportation     Lack of Transportation (Medical): No     Lack of Transportation (Non-Medical): No   Physical Activity: Inactive (5/7/2024)    Exercise Vital Sign     Days of Exercise per Week: 0 days     Minutes of Exercise per Session: 0 min   Stress: Stress Concern Present (5/7/2024)    [employer] - Occupational Stress Questionnaire     Feeling of Stress : Rather much   Housing Stability: Low Risk  (10/22/2023)    Housing Stability Vital Sign     Unable to Pay for Housing in the Last Year: No     Number of Places Lived in the Last Year: 1     Unstable Housing in the Last Year: No     Current Outpatient Medications   Medication Sig Dispense Refill    acetaminophen (TYLENOL) 500 MG tablet Take 500 mg by mouth every 6 (six) hours as needed for Pain. 3168-2805 daily      busPIRone (BUSPAR) 30 MG Tab 30 mg 3 (three) times daily.      CELEXA 40 mg tablet Take 40 mg by mouth once daily.      citalopram (CELEXA) 40 MG tablet Take 1 tablet (40 mg total) by mouth once daily. 90 tablet 0    clonazePAM (KLONOPIN) 1 MG tablet 1 mg.      metFORMIN (GLUCOPHAGE-XR) 500 MG ER 24hr tablet Take 500 mg by mouth once daily.      metoprolol succinate (TOPROL-XL) 50 MG 24 hr tablet Take 1 tablet (50 mg total) by mouth once daily. 30 tablet 11    ondansetron (ZOFRAN) 4 mg/5 mL solution 4 mg.       No current facility-administered medications for this visit.     Review of patient's allergies indicates:   Allergen Reactions    Benadryl [diphenhydramine hcl]      Paralysis on right side body     Codeine      Paralysis right body    Flexeril [cyclobenzaprine] Other (See Comments)      "Numbness on right side of body    Nsaids (non-steroidal anti-inflammatory drug) Diarrhea and Nausea And Vomiting     Ulcers     Penicillins Anaphylaxis and Hives    Droperidol Anxiety and Other (See Comments)    Magnesium      "made me feel horrible"     Prochlorperazine Anxiety and Palpitations      Past Medical History:   Diagnosis Date    Agoraphobia     Carpal tunnel syndrome     Depression     Diabetes mellitus, type 2     DVT (deep venous thrombosis)     2019 had blood clot in right arm    Fibromyalgia     Hypertension     Migraine headache     Nerve injury 08/2016    Lingual Nerve Injury    Obese     Pain management     PTSD (post-traumatic stress disorder)     Trigeminal neuralgia      Past Surgical History:   Procedure Laterality Date    CARPAL TUNNEL RELEASE Right 12/27/2018    Dr Lozada     CARPAL TUNNEL RELEASE Left 01/28/2020    Procedure: RELEASE, CARPAL TUNNEL;  Surgeon: Brendon Lozada Jr., MD;  Location: [facility];  Service: Orthopedics;  Laterality: Left;    CHOLECYSTECTOMY N/A 10/31/2022    MOUTH SURGERY      NERVE GRAFT  2016    WISDOM TOOTH EXTRACTION         Review of Systems   Constitutional:  Negative for activity change, appetite change, chills, diaphoresis, fatigue and unexpected weight change.   HENT:  Negative for ear discharge, hearing loss, trouble swallowing and voice change.    Eyes:  Negative for photophobia, pain, discharge and visual disturbance.   Respiratory:  Negative for cough, chest tightness, shortness of breath, wheezing and stridor.    Cardiovascular:  Positive for palpitations. Negative for chest pain and leg swelling.   Gastrointestinal:  Negative for abdominal pain, blood in stool, bowel incontinence, constipation and vomiting.   Endocrine: Negative for cold intolerance, heat intolerance, polydipsia and polyuria.   Genitourinary:  Negative for bladder incontinence, difficulty urinating, dysuria, flank pain, hematuria and menstrual problem.   Musculoskeletal:  Positive for " "back pain. Negative for arthralgias, joint swelling and neck stiffness.   Skin:  Negative for pallor.   Neurological:  Positive for headaches (chronic). Negative for dizziness, speech difficulty, weakness and light-headedness.   Hematological:  Does not bruise/bleed easily.   Psychiatric/Behavioral:  Positive for dysphoric mood. Negative for confusion, self-injury, sleep disturbance and suicidal ideas. The patient is not nervous/anxious.       OBJECTIVE:      Vitals:    05/13/24 1409   BP: (P) 120/68   Pulse: 96   SpO2: 98%   Weight: (!) 139.7 kg (308 lb)   Height: 5' 5" (1.651 m)     Physical Exam  Vitals and nursing note reviewed.   Constitutional:       General: She is not in acute distress.     Appearance: She is well-developed.   HENT:      Head: Normocephalic and atraumatic.      Right Ear: Tympanic membrane normal.      Left Ear: Tympanic membrane normal.      Nose: Nose normal.      Mouth/Throat:      Pharynx: Uvula midline.   Eyes:      General: Lids are normal.      Conjunctiva/sclera: Conjunctivae normal.      Pupils: Pupils are equal, round, and reactive to light.      Right eye: Pupil is round and reactive.      Left eye: Pupil is round and reactive.   Neck:      Thyroid: No thyromegaly.      Vascular: No JVD.      Trachea: Trachea normal.   Cardiovascular:      Rate and Rhythm: Normal rate and regular rhythm.      Pulses: Normal pulses.      Heart sounds: Normal heart sounds. No murmur heard.  Pulmonary:      Effort: Pulmonary effort is normal. No tachypnea or respiratory distress.      Breath sounds: Normal breath sounds. No wheezing, rhonchi or rales.   Abdominal:      General: Bowel sounds are normal.      Palpations: Abdomen is soft.      Tenderness: There is no abdominal tenderness.   Musculoskeletal:      Cervical back: Normal range of motion and neck supple.      Lumbar back: Tenderness present. Decreased range of motion.      Right lower leg: No edema.      Left lower leg: No edema. "   Lymphadenopathy:      Cervical: No cervical adenopathy.   Skin:     General: Skin is warm and dry.      Findings: No rash.   Neurological:      Mental Status: She is alert and oriented to person, place, and time.   Psychiatric:         Mood and Affect: Mood normal.         Speech: Speech normal.         Behavior: Behavior normal. Behavior is cooperative.         Thought Content: Thought content normal.        Office Visit on 05/13/2024   Component Date Value Ref Range Status    Hemoglobin A1C, POC 05/13/2024 5.5  % Final   Admission on 05/02/2024, Discharged on 05/02/2024   Component Date Value Ref Range Status    WBC 05/02/2024 11.51  3.90 - 12.70 K/uL Final    RBC 05/02/2024 4.69  4.00 - 5.40 M/uL Final    Hemoglobin 05/02/2024 13.2  12.0 - 16.0 g/dL Final    Hematocrit 05/02/2024 39.9  37.0 - 48.5 % Final    MCV 05/02/2024 85  82 - 98 fL Final    MCH 05/02/2024 28.1  27.0 - 31.0 pg Final    MCHC 05/02/2024 33.1  32.0 - 36.0 g/dL Final    RDW 05/02/2024 12.9  11.5 - 14.5 % Final    Platelets 05/02/2024 347  150 - 450 K/uL Final    MPV 05/02/2024 9.6  9.2 - 12.9 fL Final    Immature Granulocytes 05/02/2024 0.3  0.0 - 0.5 % Final    Gran # (ANC) 05/02/2024 8.1 (H)  1.8 - 7.7 K/uL Final    Immature Grans (Abs) 05/02/2024 0.04  0.00 - 0.04 K/uL Final    Comment: Mild elevation in immature granulocytes is non specific and   can be seen in a variety of conditions including stress response,   acute inflammation, trauma and pregnancy. Correlation with other   laboratory and clinical findings is essential.      Lymph # 05/02/2024 2.6  1.0 - 4.8 K/uL Final    Mono # 05/02/2024 0.6  0.3 - 1.0 K/uL Final    Eos # 05/02/2024 0.2  0.0 - 0.5 K/uL Final    Baso # 05/02/2024 0.05  0.00 - 0.20 K/uL Final    nRBC 05/02/2024 0  0 /100 WBC Final    Gran % 05/02/2024 70.2  38.0 - 73.0 % Final    Lymph % 05/02/2024 22.8  18.0 - 48.0 % Final    Mono % 05/02/2024 5.0  4.0 - 15.0 % Final    Eosinophil % 05/02/2024 1.3  0.0 - 8.0 % Final     Basophil % 05/02/2024 0.4  0.0 - 1.9 % Final    Differential Method 05/02/2024 Automated   Final    Sodium 05/02/2024 140  136 - 145 mmol/L Final    Potassium 05/02/2024 4.0  3.5 - 5.1 mmol/L Final    Chloride 05/02/2024 105  95 - 110 mmol/L Final    CO2 05/02/2024 23  23 - 29 mmol/L Final    Glucose 05/02/2024 109  70 - 110 mg/dL Final    BUN 05/02/2024 12  6 - 20 mg/dL Final    Creatinine 05/02/2024 1.0  0.5 - 1.4 mg/dL Final    Calcium 05/02/2024 9.4  8.7 - 10.5 mg/dL Final    Total Protein 05/02/2024 6.8  6.0 - 8.4 g/dL Final    Albumin 05/02/2024 3.2 (L)  3.5 - 5.2 g/dL Final    Total Bilirubin 05/02/2024 0.2  0.1 - 1.0 mg/dL Final    Comment: For infants and newborns, interpretation of results should be based  on gestational age, weight and in agreement with clinical  observations.    Premature Infant recommended reference ranges:  Up to 24 hours.............<8.0 mg/dL  Up to 48 hours............<12.0 mg/dL  3-5 days..................<15.0 mg/dL  6-29 days.................<15.0 mg/dL      Alkaline Phosphatase 05/02/2024 71  55 - 135 U/L Final    AST 05/02/2024 14  10 - 40 U/L Final    ALT 05/02/2024 14  10 - 44 U/L Final    eGFR 05/02/2024 >60  >60 mL/min/1.73 m^2 Final    Anion Gap 05/02/2024 12  8 - 16 mmol/L Final    Procalcitonin 05/02/2024 <0.02  0.00 - 0.50 ng/mL Final    Comment: The Procalcitonin test is intended to aid in the risk assessment of   critically ill patients on their first day of ICU admission for   progression   to severe sepsis and septic shock.    A result of <0.50 ng/mL is associated with a low risk of severe   sepsis   and/or septic shock.  This does not exclude localized infection.    A result between 0.50 ng/mL and 2.0 ng/mL should be interpreted with   consideration of the patient's history and is recommended to retest   within 6   to 24 hours.        A result of >2.0 ng/mL is associated with a high risk of severe   sepsis   and/or septic shock.    Procalcitonin may not be  accurate among patients with   localized  infection, recent trauma or major surgery, immunosuppressed   state,  invasive fungal infection, renal dysfunction. Decisions   regarding  initiation or continuation of antibiotic therapy should not be   based  solely on procalcitonin levels.      Lipase 05/02/2024 38  4 - 60 U/L Final    Specimen UA 05/02/2024 Urine, Clean Catch   Final    Color, UA 05/02/2024 Yellow  Yellow, Straw, Cynthia Final    Appearance, UA 05/02/2024 Clear  Clear Final    pH, UA 05/02/2024 7.0  5.0 - 8.0 Final    Specific Gravity, UA 05/02/2024 1.020  1.005 - 1.030 Final    Protein, UA 05/02/2024 Trace (A)  Negative Final    Comment: Recommend a 24 hour urine protein or a urine   protein/creatinine ratio if globulin induced proteinuria is  clinically suspected.      Glucose, UA 05/02/2024 Negative  Negative Final    Ketones, UA 05/02/2024 Negative  Negative Final    Bilirubin (UA) 05/02/2024 Negative  Negative Final    Occult Blood UA 05/02/2024 3+ (A)  Negative Final    Nitrite, UA 05/02/2024 Negative  Negative Final    Urobilinogen, UA 05/02/2024 Negative  <2.0 EU/dL Final    Leukocytes, UA 05/02/2024 3+ (A)  Negative Final    RBC, UA 05/02/2024 >100 (H)  0 - 4 /hpf Final    WBC, UA 05/02/2024 33 (H)  0 - 5 /hpf Final    Squam Epithel, UA 05/02/2024 2  /hpf Final    Microscopic Comment 05/02/2024 SEE COMMENT   Final    Comment: Other formed elements not mentioned in the report are not   present in the microscopic examination.       Urine Culture, Routine 05/02/2024 No growth   Final   Admission on 04/29/2024, Discharged on 04/29/2024   Component Date Value Ref Range Status    WBC 04/29/2024 10.93  3.90 - 12.70 K/uL Final    RBC 04/29/2024 4.68  4.00 - 5.40 M/uL Final    Hemoglobin 04/29/2024 13.1  12.0 - 16.0 g/dL Final    Hematocrit 04/29/2024 40.5  37.0 - 48.5 % Final    MCV 04/29/2024 87  82 - 98 fL Final    MCH 04/29/2024 28.0  27.0 - 31.0 pg Final    MCHC 04/29/2024 32.3  32.0 - 36.0 g/dL Final     RDW 04/29/2024 13.0  11.5 - 14.5 % Final    Platelets 04/29/2024 342  150 - 450 K/uL Final    MPV 04/29/2024 9.5  9.2 - 12.9 fL Final    Immature Granulocytes 04/29/2024 0.3  0.0 - 0.5 % Final    Gran # (ANC) 04/29/2024 7.9 (H)  1.8 - 7.7 K/uL Final    Immature Grans (Abs) 04/29/2024 0.03  0.00 - 0.04 K/uL Final    Comment: Mild elevation in immature granulocytes is non specific and   can be seen in a variety of conditions including stress response,   acute inflammation, trauma and pregnancy. Correlation with other   laboratory and clinical findings is essential.      Lymph # 04/29/2024 2.4  1.0 - 4.8 K/uL Final    Mono # 04/29/2024 0.5  0.3 - 1.0 K/uL Final    Eos # 04/29/2024 0.1  0.0 - 0.5 K/uL Final    Baso # 04/29/2024 0.03  0.00 - 0.20 K/uL Final    nRBC 04/29/2024 0  0 /100 WBC Final    Gran % 04/29/2024 72.6  38.0 - 73.0 % Final    Lymph % 04/29/2024 22.2  18.0 - 48.0 % Final    Mono % 04/29/2024 4.1  4.0 - 15.0 % Final    Eosinophil % 04/29/2024 0.5  0.0 - 8.0 % Final    Basophil % 04/29/2024 0.3  0.0 - 1.9 % Final    Differential Method 04/29/2024 Automated   Final    Sodium 04/29/2024 138  136 - 145 mmol/L Final    Potassium 04/29/2024 4.1  3.5 - 5.1 mmol/L Final    Chloride 04/29/2024 106  95 - 110 mmol/L Final    CO2 04/29/2024 26  23 - 29 mmol/L Final    Glucose 04/29/2024 126 (H)  70 - 110 mg/dL Final    BUN 04/29/2024 13  6 - 20 mg/dL Final    Creatinine 04/29/2024 0.8  0.5 - 1.4 mg/dL Final    Calcium 04/29/2024 8.9  8.7 - 10.5 mg/dL Final    Total Protein 04/29/2024 6.8  6.0 - 8.4 g/dL Final    Albumin 04/29/2024 3.7  3.5 - 5.2 g/dL Final    Total Bilirubin 04/29/2024 0.3  0.1 - 1.0 mg/dL Final    Comment: For infants and newborns, interpretation of results should be based  on gestational age, weight and in agreement with clinical  observations.    Premature Infant recommended reference ranges:  Up to 24 hours.............<8.0 mg/dL  Up to 48 hours............<12.0 mg/dL  3-5  days..................<15.0 mg/dL  6-29 days.................<15.0 mg/dL      Alkaline Phosphatase 04/29/2024 71  55 - 135 U/L Final    AST 04/29/2024 11  10 - 40 U/L Final    ALT 04/29/2024 16  10 - 44 U/L Final    eGFR 04/29/2024 >60.0  >60 mL/min/1.73 m^2 Final    Anion Gap 04/29/2024 6 (L)  8 - 16 mmol/L Final    Lipase 04/29/2024 36  4 - 60 U/L Final    Specimen UA 04/29/2024 Urine, Clean Catch   Final    Color, UA 04/29/2024 Yellow  Yellow, Straw, Cynthia Final    Appearance, UA 04/29/2024 Clear  Clear Final    pH, UA 04/29/2024 6.0  5.0 - 8.0 Final    Specific Gravity, UA 04/29/2024 1.025  1.005 - 1.030 Final    Protein, UA 04/29/2024 Trace (A)  Negative Final    Comment: Recommend a 24 hour urine protein or a urine   protein/creatinine ratio if globulin induced proteinuria is  clinically suspected.      Glucose, UA 04/29/2024 Negative  Negative Final    Ketones, UA 04/29/2024 Negative  Negative Final    Bilirubin (UA) 04/29/2024 Negative  Negative Final    Occult Blood UA 04/29/2024 3+ (A)  Negative Final    Nitrite, UA 04/29/2024 Negative  Negative Final    Urobilinogen, UA 04/29/2024 Negative  Negative EU/dL Final    Leukocytes, UA 04/29/2024 3+ (A)  Negative Final    RBC, UA 04/29/2024 80 (H)  0 - 4 /hpf Final    WBC, UA 04/29/2024 21 (H)  0 - 5 /hpf Final    Squam Epithel, UA 04/29/2024 2  /hpf Final    Unclass Flor UA 04/29/2024 1  None-Moderate Final    Microscopic Comment 04/29/2024 SEE COMMENT   Final    Comment: Other formed elements not mentioned in the report are not   present in the microscopic examination.       Urine Culture, Routine 04/29/2024 Multiple organisms isolated. None in predominance.  Repeat if   Final    Urine Culture, Routine 04/29/2024 clinically necessary.   Final   Admission on 04/16/2024, Discharged on 04/18/2024   Component Date Value Ref Range Status    Strep B Culture 03/26/2024 Positive   Final    HIV 1/2 Ag/Ab 08/31/2023 Negative   Final    Specimen UA 04/16/2024 Urine,  Clean Catch   Final    Color, UA 04/16/2024 Yellow  Yellow, Straw, Cynthia Final    Appearance, UA 04/16/2024 Hazy (A)  Clear Final    pH, UA 04/16/2024 6.0  5.0 - 8.0 Final    Specific Gravity, UA 04/16/2024 >1.030 (A)  1.005 - 1.030 Final    Protein, UA 04/16/2024 1+ (A)  Negative Final    Comment: Recommend a 24 hour urine protein or a urine   protein/creatinine ratio if globulin induced proteinuria is  clinically suspected.      Glucose, UA 04/16/2024 Negative  Negative Final    Ketones, UA 04/16/2024 Trace (A)  Negative Final    Bilirubin (UA) 04/16/2024 Negative  Negative Final    Occult Blood UA 04/16/2024 Negative  Negative Final    Nitrite, UA 04/16/2024 Negative  Negative Final    Urobilinogen, UA 04/16/2024 2.0-3.0 (A)  Negative EU/dL Final    Leukocytes, UA 04/16/2024 3+ (A)  Negative Final    WBC 04/16/2024 12.33  3.90 - 12.70 K/uL Final    RBC 04/16/2024 4.21  4.00 - 5.40 M/uL Final    Hemoglobin 04/16/2024 11.9 (L)  12.0 - 16.0 g/dL Final    Hematocrit 04/16/2024 36.3 (L)  37.0 - 48.5 % Final    MCV 04/16/2024 86  82 - 98 fL Final    MCH 04/16/2024 28.3  27.0 - 31.0 pg Final    MCHC 04/16/2024 32.8  32.0 - 36.0 g/dL Final    RDW 04/16/2024 13.6  11.5 - 14.5 % Final    Platelets 04/16/2024 289  150 - 450 K/uL Final    MPV 04/16/2024 10.3  9.2 - 12.9 fL Final    Immature Granulocytes 04/16/2024 0.3  0.0 - 0.5 % Final    Gran # (ANC) 04/16/2024 8.1 (H)  1.8 - 7.7 K/uL Final    Immature Grans (Abs) 04/16/2024 0.04  0.00 - 0.04 K/uL Final    Comment: Mild elevation in immature granulocytes is non specific and   can be seen in a variety of conditions including stress response,   acute inflammation, trauma and pregnancy. Correlation with other   laboratory and clinical findings is essential.      Lymph # 04/16/2024 3.3  1.0 - 4.8 K/uL Final    Mono # 04/16/2024 0.8  0.3 - 1.0 K/uL Final    Eos # 04/16/2024 0.1  0.0 - 0.5 K/uL Final    Baso # 04/16/2024 0.02  0.00 - 0.20 K/uL Final    nRBC 04/16/2024 0  0 /100  WBC Final    Gran % 04/16/2024 66.0  38.0 - 73.0 % Final    Lymph % 04/16/2024 26.4  18.0 - 48.0 % Final    Mono % 04/16/2024 6.5  4.0 - 15.0 % Final    Eosinophil % 04/16/2024 0.6  0.0 - 8.0 % Final    Basophil % 04/16/2024 0.2  0.0 - 1.9 % Final    Differential Method 04/16/2024 Automated   Final    Group & Rh 04/16/2024 O POS   Final    Indirect Rayray 04/16/2024 NEG   Final    Treponema Pallidum Antibodies (IgG* 04/16/2024 Nonreactive  Nonreactive Final    Sodium 04/16/2024 138  136 - 145 mmol/L Final    Potassium 04/16/2024 3.8  3.5 - 5.1 mmol/L Final    Chloride 04/16/2024 107  95 - 110 mmol/L Final    CO2 04/16/2024 21 (L)  23 - 29 mmol/L Final    Glucose 04/16/2024 84  70 - 110 mg/dL Final    BUN 04/16/2024 11  6 - 20 mg/dL Final    Creatinine 04/16/2024 0.8  0.5 - 1.4 mg/dL Final    Calcium 04/16/2024 8.8  8.7 - 10.5 mg/dL Final    Total Protein 04/16/2024 6.4  6.0 - 8.4 g/dL Final    Albumin 04/16/2024 3.8  3.5 - 5.2 g/dL Final    Total Bilirubin 04/16/2024 0.3  0.1 - 1.0 mg/dL Final    Comment: For infants and newborns, interpretation of results should be based  on gestational age, weight and in agreement with clinical  observations.    Premature Infant recommended reference ranges:  Up to 24 hours.............<8.0 mg/dL  Up to 48 hours............<12.0 mg/dL  3-5 days..................<15.0 mg/dL  6-29 days.................<15.0 mg/dL      Alkaline Phosphatase 04/16/2024 145 (H)  55 - 135 U/L Final    AST 04/16/2024 38  10 - 40 U/L Final    ALT 04/16/2024 53 (H)  10 - 44 U/L Final    eGFR 04/16/2024 >60.0  >60 mL/min/1.73 m^2 Final    Anion Gap 04/16/2024 10  8 - 16 mmol/L Final    Benzodiazepines 04/16/2024 Negative  Negative Final    Cocaine (Metab.) 04/16/2024 Negative  Negative Final    Opiate Scrn, Ur 04/16/2024 Negative  Negative Final    Barbiturate Screen, Ur 04/16/2024 Presumptive Positive (A)  Negative Final    Amphetamine Screen, Ur 04/16/2024 Negative  Negative Final    THC 04/16/2024  Negative  Negative Final    Phencyclidine 04/16/2024 Negative  Negative Final    Creatinine, Urine 04/16/2024 284.4  15.0 - 325.0 mg/dL Final    Comment: The random urine reference ranges provided were established   for 24 hour urine collections.  No reference ranges exist for  random urine specimens.  Correlate clinically.      Toxicology Information 04/16/2024 SEE COMMENT   Final    Comment: This screen includes the following classes of drugs at the   listed cut-off:    Benzodiazepines                  200 ng/ml  Cocaine metabolite               300 ng/ml  Opiates                          300 ng/ml  Barbiturates                     200 ng/ml  Amphetamines                    1000 ng/ml  Marijuana metabs (THC)            50 ng/ml  Phencyclidine (PCP)               25 ng/ml    High concentrations of Methylenedioxymethamphetamine (MDMA aka  Ectasy) and other structurally similar compounds may cross-   react with the Amphetamine/Methamphetamine screening   immunoassay giving a false positive result.    Note: This exception list includes only more common   interferants in toxicology screen testing.  Because of many   cross-reactantspositive results on toxicology drug screens   should be confirmed whenever results do not correlate with   clinical presentation.    This report is intended for use in clinical monitoring and  management of patients. It is not intended for use in   em                           ployment related drug testing.    Because of any cross-reactants, positive results on toxicology  drug screens should be confirmed whenever results do not  correlate with clinical presentation.    Presumptive positive results are unconfirmed and may be used   only for medical purposes.      BUPRENORPHINE 04/16/2024 Negative   Final    Comment: Buprenorphine urine screening threshold: 10 ng/mL.    This report is intended for use in clinical monitoring and management   of   patients only.       Creatinine, Urine  04/16/2024 284.4  15.0 - 325.0 mg/dL Final    Comment: The random urine reference ranges provided were established   for 24 hour urine collections.  No reference ranges exist for  random urine specimens.  Correlate clinically.      Fentanyl, Urine 04/16/2024 Negative @[facility]  Negative Final    Creatinine, Urine 04/16/2024 284.4  15.0 - 325.0 mg/dL Final    Comment: The random urine reference ranges provided were established   for 24 hour urine collections.  No reference ranges exist for  random urine specimens.  Correlate clinically.      RBC, UA 04/16/2024 1  0 - 4 /hpf Final    WBC, UA 04/16/2024 8 (H)  0 - 5 /hpf Final    Bacteria 04/16/2024 Rare  None-Occ /hpf Final    Squam Epithel, UA 04/16/2024 22  /hpf Final    Hyaline Casts, UA 04/16/2024 0  0-1/lpf /lpf Final    Microscopic Comment 04/16/2024 SEE COMMENT   Final    Comment: Other formed elements not mentioned in the report are not   present in the microscopic examination.       WBC 04/18/2024 10.38  3.90 - 12.70 K/uL Final    RBC 04/18/2024 4.02  4.00 - 5.40 M/uL Final    Hemoglobin 04/18/2024 11.4 (L)  12.0 - 16.0 g/dL Final    Hematocrit 04/18/2024 35.3 (L)  37.0 - 48.5 % Final    MCV 04/18/2024 88  82 - 98 fL Final    MCH 04/18/2024 28.4  27.0 - 31.0 pg Final    MCHC 04/18/2024 32.3  32.0 - 36.0 g/dL Final    RDW 04/18/2024 13.6  11.5 - 14.5 % Final    Platelets 04/18/2024 261  150 - 450 K/uL Final    MPV 04/18/2024 9.9  9.2 - 12.9 fL Final    Immature Granulocytes 04/18/2024 0.3  0.0 - 0.5 % Final    Gran # (ANC) 04/18/2024 6.3  1.8 - 7.7 K/uL Final    Immature Grans (Abs) 04/18/2024 0.03  0.00 - 0.04 K/uL Final    Comment: Mild elevation in immature granulocytes is non specific and   can be seen in a variety of conditions including stress response,   acute inflammation, trauma and pregnancy. Correlation with other   laboratory and clinical findings is essential.      Lymph # 04/18/2024 3.2  1.0 - 4.8 K/uL Final    Mono # 04/18/2024 0.7  0.3 - 1.0 K/uL  Final    Eos # 04/18/2024 0.1  0.0 - 0.5 K/uL Final    Baso # 04/18/2024 0.03  0.00 - 0.20 K/uL Final    nRBC 04/18/2024 0  0 /100 WBC Final    Gran % 04/18/2024 60.2  38.0 - 73.0 % Final    Lymph % 04/18/2024 30.9  18.0 - 48.0 % Final    Mono % 04/18/2024 7.0  4.0 - 15.0 % Final    Eosinophil % 04/18/2024 1.3  0.0 - 8.0 % Final    Basophil % 04/18/2024 0.3  0.0 - 1.9 % Final    Differential Method 04/18/2024 Automated   Final   ]    Last visit note, most recent available labs, and health maintenance reviewed    Assessment:       1. Type 2 diabetes mellitus without complication, without long-term current use of insulin    2. Positive depression screening    3. Other hyperlipidemia    4. Chronic bilateral low back pain with bilateral sciatica    5. Right foot pain    6. Migraine without status migrainosus, not intractable, unspecified migraine type        Plan:       Type 2 diabetes mellitus without complication, without long-term current use of insulin  -     Foot Exam Performed  -     Ambulatory referral/consult to Ophthalmology; Future; Expected date: 05/20/2024  -     Hemoglobin A1C, POCT                            5.5  -     Lipid Panel; Future; Expected date: 05/27/2024  -     Foot Exam Performed  -     TSH; Future; Expected date: 06/24/2024  -     Microalbumin/Creatinine Ratio, Urine; Future; Expected date: 05/13/2024  Previously on ozempic but off during pregnancy and gained weight back would like to try mounjaro when it is back in stock    Positive depression screening  Comments:  I have reviewed the positive depression score which warrants active treatment with psychotherapy and/or medications.  Cont follow up with psyc    Other hyperlipidemia  -     Lipid Panel; Future; Expected date: 05/27/2024  -     TSH; Future; Expected date: 06/24/2024    Chronic bilateral low back pain with bilateral sciatica  -     Ambulatory referral/consult to Pain Clinic; Future; Expected date: 05/13/2024    Right foot pain  -      Ambulatory referral/consult to Podiatry; Future; Expected date: 05/20/2024    Migraine without status migrainosus, not intractable, unspecified migraine type  Stable on current meds          Follow up in about 4 months (around 9/13/2024) for DM.      5/13/2024 CHRISTOPHE Chao, FNP

## 2024-05-14 ENCOUNTER — PATIENT MESSAGE (OUTPATIENT)
Dept: FAMILY MEDICINE | Facility: CLINIC | Age: 34
End: 2024-05-14
Payer: MEDICAID

## 2024-05-14 DIAGNOSIS — L91.8 SKIN TAG: Primary | ICD-10-CM

## 2024-05-15 ENCOUNTER — PATIENT MESSAGE (OUTPATIENT)
Dept: FAMILY MEDICINE | Facility: CLINIC | Age: 34
End: 2024-05-15
Payer: MEDICAID

## 2024-05-15 DIAGNOSIS — M79.671 RIGHT FOOT PAIN: Primary | ICD-10-CM

## 2024-05-16 ENCOUNTER — HOSPITAL ENCOUNTER (EMERGENCY)
Facility: HOSPITAL | Age: 34
Discharge: HOME | End: 2024-05-16
Attending: EMERGENCY MEDICINE
Payer: MEDICAID

## 2024-05-16 VITALS
WEIGHT: 293 LBS | HEIGHT: 65 IN | OXYGEN SATURATION: 98 % | BODY MASS INDEX: 48.82 KG/M2 | SYSTOLIC BLOOD PRESSURE: 130 MMHG | HEART RATE: 86 BPM | TEMPERATURE: 98 F | DIASTOLIC BLOOD PRESSURE: 77 MMHG | RESPIRATION RATE: 18 BRPM

## 2024-05-16 DIAGNOSIS — N30.90 CYSTITIS: Primary | ICD-10-CM

## 2024-05-16 DIAGNOSIS — R10.2 PELVIC PAIN: ICD-10-CM

## 2024-05-16 LAB
ALBUMIN SERPL BCP-MCNC: 3.5 G/DL (ref 3.5–5.2)
ALP SERPL-CCNC: 58 U/L (ref 55–135)
ALT SERPL W/O P-5'-P-CCNC: 15 U/L (ref 10–44)
ANION GAP SERPL CALC-SCNC: 12 MMOL/L (ref 8–16)
APAP SERPL-MCNC: <3 UG/ML (ref 10–20)
AST SERPL-CCNC: 14 U/L (ref 10–40)
BACTERIA #/AREA URNS HPF: ABNORMAL /HPF
BASOPHILS # BLD AUTO: 0.04 K/UL (ref 0–0.2)
BASOPHILS NFR BLD: 0.4 % (ref 0–1.9)
BILIRUB SERPL-MCNC: 0.2 MG/DL (ref 0.1–1)
BILIRUB UR QL STRIP: NEGATIVE
BUN SERPL-MCNC: 13 MG/DL (ref 6–20)
CALCIUM SERPL-MCNC: 9.5 MG/DL (ref 8.7–10.5)
CHLORIDE SERPL-SCNC: 101 MMOL/L (ref 95–110)
CLARITY UR: ABNORMAL
CO2 SERPL-SCNC: 25 MMOL/L (ref 23–29)
COLOR UR: YELLOW
CREAT SERPL-MCNC: 1 MG/DL (ref 0.5–1.4)
DIFFERENTIAL METHOD BLD: NORMAL
EOSINOPHIL # BLD AUTO: 0.2 K/UL (ref 0–0.5)
EOSINOPHIL NFR BLD: 1.7 % (ref 0–8)
ERYTHROCYTE [DISTWIDTH] IN BLOOD BY AUTOMATED COUNT: 12.9 % (ref 11.5–14.5)
EST. GFR  (NO RACE VARIABLE): >60 ML/MIN/1.73 M^2
GLUCOSE SERPL-MCNC: 135 MG/DL (ref 70–110)
GLUCOSE UR QL STRIP: NEGATIVE
HCT VFR BLD AUTO: 39.9 % (ref 37–48.5)
HGB BLD-MCNC: 12.9 G/DL (ref 12–16)
HGB UR QL STRIP: ABNORMAL
IMM GRANULOCYTES # BLD AUTO: 0.02 K/UL (ref 0–0.04)
IMM GRANULOCYTES NFR BLD AUTO: 0.2 % (ref 0–0.5)
KETONES UR QL STRIP: NEGATIVE
LEUKOCYTE ESTERASE UR QL STRIP: ABNORMAL
LYMPHOCYTES # BLD AUTO: 4.1 K/UL (ref 1–4.8)
LYMPHOCYTES NFR BLD: 37.8 % (ref 18–48)
MCH RBC QN AUTO: 28.4 PG (ref 27–31)
MCHC RBC AUTO-ENTMCNC: 32.3 G/DL (ref 32–36)
MCV RBC AUTO: 88 FL (ref 82–98)
MICROSCOPIC COMMENT: ABNORMAL
MONOCYTES # BLD AUTO: 0.7 K/UL (ref 0.3–1)
MONOCYTES NFR BLD: 6.3 % (ref 4–15)
NEUTROPHILS # BLD AUTO: 5.8 K/UL (ref 1.8–7.7)
NEUTROPHILS NFR BLD: 53.6 % (ref 38–73)
NITRITE UR QL STRIP: NEGATIVE
NRBC BLD-RTO: 0 /100 WBC
PH UR STRIP: 6 [PH] (ref 5–8)
PLATELET # BLD AUTO: 316 K/UL (ref 150–450)
PMV BLD AUTO: 9.3 FL (ref 9.2–12.9)
POTASSIUM SERPL-SCNC: 4.2 MMOL/L (ref 3.5–5.1)
PROT SERPL-MCNC: 7.2 G/DL (ref 6–8.4)
PROT UR QL STRIP: ABNORMAL
RBC # BLD AUTO: 4.54 M/UL (ref 4–5.4)
RBC #/AREA URNS HPF: 4 /HPF (ref 0–4)
SODIUM SERPL-SCNC: 138 MMOL/L (ref 136–145)
SP GR UR STRIP: 1.02 (ref 1–1.03)
SQUAMOUS #/AREA URNS HPF: 10 /HPF
URN SPEC COLLECT METH UR: ABNORMAL
UROBILINOGEN UR STRIP-ACNC: NEGATIVE EU/DL
WBC # BLD AUTO: 10.86 K/UL (ref 3.9–12.7)
WBC #/AREA URNS HPF: 8 /HPF (ref 0–5)

## 2024-05-16 PROCEDURE — 80053 COMPREHEN METABOLIC PANEL: CPT | Performed by: EMERGENCY MEDICINE

## 2024-05-16 PROCEDURE — 25000003 PHARM REV CODE 250: Performed by: EMERGENCY MEDICINE

## 2024-05-16 PROCEDURE — 80143 DRUG ASSAY ACETAMINOPHEN: CPT | Performed by: EMERGENCY MEDICINE

## 2024-05-16 PROCEDURE — 99284 EMERGENCY DEPT VISIT MOD MDM: CPT

## 2024-05-16 PROCEDURE — 85025 COMPLETE CBC W/AUTO DIFF WBC: CPT | Performed by: EMERGENCY MEDICINE

## 2024-05-16 PROCEDURE — 36415 COLL VENOUS BLD VENIPUNCTURE: CPT | Performed by: EMERGENCY MEDICINE

## 2024-05-16 PROCEDURE — 81000 URINALYSIS NONAUTO W/SCOPE: CPT | Mod: 59 | Performed by: EMERGENCY MEDICINE

## 2024-05-16 RX ORDER — OXYBUTYNIN CHLORIDE 5 MG/1
5 TABLET ORAL
Status: COMPLETED | OUTPATIENT
Start: 2024-05-16 | End: 2024-05-16

## 2024-05-16 RX ORDER — SULFAMETHOXAZOLE AND TRIMETHOPRIM 800; 160 MG/1; MG/1
1 TABLET ORAL
Status: COMPLETED | OUTPATIENT
Start: 2024-05-16 | End: 2024-05-16

## 2024-05-16 RX ORDER — PHENAZOPYRIDINE HYDROCHLORIDE 100 MG/1
200 TABLET, FILM COATED ORAL
Status: COMPLETED | OUTPATIENT
Start: 2024-05-16 | End: 2024-05-16

## 2024-05-16 RX ORDER — PHENAZOPYRIDINE HYDROCHLORIDE 200 MG/1
200 TABLET, FILM COATED ORAL 3 TIMES DAILY PRN
Qty: 9 TABLET | Refills: 0 | Status: SHIPPED | OUTPATIENT
Start: 2024-05-16 | End: 2024-05-26

## 2024-05-16 RX ORDER — ACETAMINOPHEN 500 MG
1000 TABLET ORAL
Status: DISCONTINUED | OUTPATIENT
Start: 2024-05-16 | End: 2024-05-17 | Stop reason: HOSPADM

## 2024-05-16 RX ORDER — SULFAMETHOXAZOLE AND TRIMETHOPRIM 800; 160 MG/1; MG/1
1 TABLET ORAL 2 TIMES DAILY
Qty: 14 TABLET | Refills: 0 | Status: SHIPPED | OUTPATIENT
Start: 2024-05-16 | End: 2024-05-23

## 2024-05-16 RX ADMIN — OXYBUTYNIN CHLORIDE 5 MG: 5 TABLET ORAL at 11:05

## 2024-05-16 RX ADMIN — PHENAZOPYRIDINE HYDROCHLORIDE 200 MG: 100 TABLET ORAL at 11:05

## 2024-05-16 RX ADMIN — SULFAMETHOXAZOLE AND TRIMETHOPRIM 1 TABLET: 800; 160 TABLET ORAL at 11:05

## 2024-05-17 NOTE — ED PROVIDER NOTES
"Encounter Date: 5/16/2024       History     Chief Complaint   Patient presents with    Abdominal Pain     1 month postpartum.. diagnosed with uterine infection.. finished antibiotics.  Currently having lower abdominal pain that radiates to the back.      Chief complaint: Abdominal pain    HPI: 33-year-old female one-month status post vaginal delivery presents with a 2 week history of persistent lower abdominal pain.  She was seen here on 04/29/2024 for same and had an a unremarkable workup with a normal CT of the abdomen and pelvis.  She denies any dysuria urinary frequency.  She has had no fever, nausea, vomiting or diarrhea.  She recently developed vaginal bleeding.  She has a history of frequent opiate usage for trigeminal neuralgia and migraine headaches.  Past medical history is also significant for diabetes, depression, agoraphobia, PTSD and hypertension.      Review of patient's allergies indicates:   Allergen Reactions    Benadryl [diphenhydramine hcl]      Paralysis on right side body     Codeine      Paralysis right body    Flexeril [cyclobenzaprine] Other (See Comments)     Numbness on right side of body    Nsaids (non-steroidal anti-inflammatory drug) Diarrhea and Nausea And Vomiting     Ulcers     Penicillins Anaphylaxis and Hives    Droperidol Anxiety and Other (See Comments)    Magnesium      "made me feel horrible"     Prochlorperazine Anxiety and Palpitations     Past Medical History:   Diagnosis Date    Agoraphobia     Carpal tunnel syndrome     Depression     Diabetes mellitus, type 2     DVT (deep venous thrombosis)     2019 had blood clot in right arm    Fibromyalgia     Hypertension     Migraine headache     Nerve injury 08/2016    Lingual Nerve Injury    Obese     Pain management     PTSD (post-traumatic stress disorder)     Trigeminal neuralgia      Past Surgical History:   Procedure Laterality Date    CARPAL TUNNEL RELEASE Right 12/27/2018    Dr Lozada     CARPAL TUNNEL RELEASE Left " 01/28/2020    Procedure: RELEASE, CARPAL TUNNEL;  Surgeon: Brendon Lozada Jr., MD;  Location: [facility] OR;  Service: Orthopedics;  Laterality: Left;    CHOLECYSTECTOMY N/A 10/31/2022    MOUTH SURGERY      NERVE GRAFT  2016    WISDOM TOOTH EXTRACTION       Family History   Problem Relation Name Age of Onset    Depression Mother      Diabetes Mother      No Known Problems Father      No Known Problems Daughter      No Known Problems Son      Cancer Maternal Aunt          endometrial    No Known Problems Maternal Uncle      No Known Problems Paternal Aunt      No Known Problems Paternal Uncle      No Known Problems Maternal Grandmother      No Known Problems Maternal Grandfather      No Known Problems Paternal Grandmother      No Known Problems Paternal Grandfather       Social History     Tobacco Use    Smoking status: Never    Smokeless tobacco: Never   Substance Use Topics    Alcohol use: Yes     Comment: occasional    Drug use: No     Review of Systems   Constitutional:  Negative for fever.   Eyes:  Negative for visual disturbance.   Respiratory:  Negative for apnea and shortness of breath.    Cardiovascular:  Negative for chest pain and palpitations.   Gastrointestinal:  Positive for abdominal pain. Negative for abdominal distention, constipation, diarrhea, nausea and vomiting.   Genitourinary:  Negative for difficulty urinating.   Musculoskeletal:  Negative for neck pain.   Skin:  Negative for pallor and rash.   Neurological:  Negative for headaches.   Hematological:  Does not bruise/bleed easily.   Psychiatric/Behavioral:  Negative for agitation.        Physical Exam     Initial Vitals [05/16/24 2129]   BP Pulse Resp Temp SpO2   130/77 86 18 98.2 °F (36.8 °C) 98 %      MAP       --         Physical Exam    Nursing note and vitals reviewed.  Constitutional: She appears well-developed and well-nourished.   HENT:   Head: Normocephalic and atraumatic.   Eyes: Conjunctivae are normal.   Neck: Neck supple.   Normal range  of motion.  Cardiovascular:  Normal rate, regular rhythm and normal heart sounds.     Exam reveals no gallop and no friction rub.       No murmur heard.  Pulmonary/Chest: Effort normal and breath sounds normal. No respiratory distress. She has no wheezes. She has no rhonchi. She has no rales.   Abdominal: Abdomen is soft. She exhibits no distension and no mass. There is no abdominal tenderness. There is no rebound and no guarding.   Genitourinary:    Genitourinary Comments: Left CVA tenderness     Musculoskeletal:         General: Normal range of motion.      Cervical back: Normal range of motion and neck supple.     Neurological: She is alert and oriented to person, place, and time.   Skin: Skin is warm and dry. No erythema.   Psychiatric: She has a normal mood and affect.         ED Course   Procedures  Labs Reviewed   URINALYSIS, REFLEX TO URINE CULTURE - Abnormal; Notable for the following components:       Result Value    Appearance, UA Hazy (*)     Protein, UA Trace (*)     Occult Blood UA 3+ (*)     Leukocytes, UA 2+ (*)     All other components within normal limits    Narrative:     Specimen Source->Urine   COMPREHENSIVE METABOLIC PANEL - Abnormal; Notable for the following components:    Glucose 135 (*)     All other components within normal limits   ACETAMINOPHEN LEVEL - Abnormal; Notable for the following components:    Acetaminophen (Tylenol), Serum <3.0 (*)     All other components within normal limits   URINALYSIS MICROSCOPIC - Abnormal; Notable for the following components:    WBC, UA 8 (*)     Bacteria Moderate (*)     All other components within normal limits    Narrative:     Specimen Source->Urine   CBC W/ AUTO DIFFERENTIAL          Imaging Results    None          Medications   acetaminophen tablet 1,000 mg (1,000 mg Oral Not Given 5/16/24 2215)   sulfamethoxazole-trimethoprim 800-160mg per tablet 1 tablet (1 tablet Oral Given 5/16/24 2316)   phenazopyridine tablet 200 mg (200 mg Oral Given  5/16/24 2316)   oxybutynin tablet 5 mg (5 mg Oral Given 5/16/24 2316)     Medical Decision Making  33-year-old female presents with a 2 week history of persistent lower abdominal pain.  Physical exam is remarkable for the absence of any significant tenderness.  She underwent an extensive workup on 04/29/2024 that included a CT scan with no gross abnormality seen.  I recommended a repeat scan which the patient refuses.  She has no significant leukocytosis.  She does have mild pyuria and bacteriuria with cystitis possible.  She became angry which she did not receive opiates.  She reports that she is allergic to NSAIDs and reports that she has taken the maximum amount of acetaminophen allowed today.  Acetaminophen level is collected and is less than 3 suggesting that she has not taken a significant amount of acetaminophen today.  Prior to discharge she once again requested opiate prescription; however, I can find no compelling reason to prescribe opiates.  She is encouraged to follow up with gyn for further evaluation of her ongoing pelvic pain.  I do not suspect appendicitis, diverticulitis, bowel obstruction or endometritis there is no evidence of ovarian torsion or TOA.    Amount and/or Complexity of Data Reviewed  Labs: ordered.    Risk  OTC drugs.  Prescription drug management.                                      Clinical Impression:  Final diagnoses:  [N30.90] Cystitis (Primary)  [R10.2] Pelvic pain          ED Disposition Condition    Discharge Stable          ED Prescriptions       Medication Sig Dispense Start Date End Date Auth. Provider    sulfamethoxazole-trimethoprim 800-160mg (BACTRIM DS) 800-160 mg Tab Take 1 tablet by mouth 2 (two) times daily. for 7 days 14 tablet 5/16/2024 5/23/2024 Scott Parr III, MD    phenazopyridine (PYRIDIUM) 200 MG tablet Take 1 tablet (200 mg total) by mouth 3 (three) times daily as needed for Pain. 9 tablet 5/16/2024 5/26/2024 Scott Parr III, MD           Follow-up Information       Follow up With Specialties Details Why Contact Info    Mike Reynolds MD Obstetrics and Gynecology In 3 days  [address] [facility]  [place] [facility] [address]  [phone]               Scott Parr III, MD  05/17/24 0020       Scott Parr III, MD  05/17/24 0021

## 2024-05-24 ENCOUNTER — PATIENT MESSAGE (OUTPATIENT)
Dept: FAMILY MEDICINE | Facility: CLINIC | Age: 34
End: 2024-05-24
Payer: MEDICAID

## 2024-05-24 DIAGNOSIS — M54.42 CHRONIC BILATERAL LOW BACK PAIN WITH BILATERAL SCIATICA: Primary | ICD-10-CM

## 2024-05-24 DIAGNOSIS — M54.41 CHRONIC BILATERAL LOW BACK PAIN WITH BILATERAL SCIATICA: Primary | ICD-10-CM

## 2024-05-24 DIAGNOSIS — G89.29 CHRONIC BILATERAL LOW BACK PAIN WITH BILATERAL SCIATICA: Primary | ICD-10-CM

## 2024-05-27 ENCOUNTER — PATIENT MESSAGE (OUTPATIENT)
Dept: FAMILY MEDICINE | Facility: CLINIC | Age: 34
End: 2024-05-27
Payer: MEDICAID

## 2024-05-27 DIAGNOSIS — R11.0 NAUSEA: Primary | ICD-10-CM

## 2024-05-28 RX ORDER — ONDANSETRON 4 MG/1
4 TABLET, FILM COATED ORAL 2 TIMES DAILY
Qty: 20 TABLET | Refills: 0 | Status: SHIPPED | OUTPATIENT
Start: 2024-05-28

## 2024-06-04 ENCOUNTER — PATIENT MESSAGE (OUTPATIENT)
Dept: ORTHOPEDICS | Facility: CLINIC | Age: 34
End: 2024-06-04

## 2024-06-04 ENCOUNTER — PATIENT MESSAGE (OUTPATIENT)
Dept: FAMILY MEDICINE | Facility: CLINIC | Age: 34
End: 2024-06-04
Payer: MEDICAID

## 2024-06-04 DIAGNOSIS — M54.41 CHRONIC BILATERAL LOW BACK PAIN WITH BILATERAL SCIATICA: Primary | ICD-10-CM

## 2024-06-04 DIAGNOSIS — G89.29 CHRONIC BILATERAL LOW BACK PAIN WITH BILATERAL SCIATICA: Primary | ICD-10-CM

## 2024-06-04 DIAGNOSIS — M54.2 NECK PAIN: ICD-10-CM

## 2024-06-04 DIAGNOSIS — M54.42 CHRONIC BILATERAL LOW BACK PAIN WITH BILATERAL SCIATICA: Primary | ICD-10-CM

## 2024-06-07 ENCOUNTER — PATIENT MESSAGE (OUTPATIENT)
Dept: FAMILY MEDICINE | Facility: CLINIC | Age: 34
End: 2024-06-07
Payer: MEDICAID

## 2024-06-07 DIAGNOSIS — M54.42 CHRONIC BILATERAL LOW BACK PAIN WITH BILATERAL SCIATICA: Primary | ICD-10-CM

## 2024-06-07 DIAGNOSIS — G89.29 CHRONIC BILATERAL LOW BACK PAIN WITH BILATERAL SCIATICA: Primary | ICD-10-CM

## 2024-06-07 DIAGNOSIS — M54.41 CHRONIC BILATERAL LOW BACK PAIN WITH BILATERAL SCIATICA: Primary | ICD-10-CM

## 2024-06-07 NOTE — TELEPHONE ENCOUNTER
She will need to address that with Dr Maher or clif clay when she sees him. If she runs out before then I can send in a few to get her to the visit

## 2024-06-09 ENCOUNTER — PATIENT MESSAGE (OUTPATIENT)
Dept: FAMILY MEDICINE | Facility: CLINIC | Age: 34
End: 2024-06-09
Payer: MEDICAID

## 2024-06-09 RX ORDER — ORPHENADRINE CITRATE 100 MG/1
100 TABLET, EXTENDED RELEASE ORAL 2 TIMES DAILY
Qty: 14 TABLET | Refills: 0 | Status: SHIPPED | OUTPATIENT
Start: 2024-06-09 | End: 2024-06-17

## 2024-06-12 ENCOUNTER — PATIENT MESSAGE (OUTPATIENT)
Dept: FAMILY MEDICINE | Facility: CLINIC | Age: 34
End: 2024-06-12
Payer: MEDICAID

## 2024-06-12 DIAGNOSIS — E11.9 TYPE 2 DIABETES MELLITUS WITHOUT COMPLICATION, WITHOUT LONG-TERM CURRENT USE OF INSULIN: Primary | ICD-10-CM

## 2024-06-12 RX ORDER — TIRZEPATIDE 2.5 MG/.5ML
2.5 INJECTION, SOLUTION SUBCUTANEOUS
Qty: 4 PEN | Refills: 0 | Status: SHIPPED | OUTPATIENT
Start: 2024-06-12 | End: 2024-06-24

## 2024-06-17 ENCOUNTER — HOSPITAL ENCOUNTER (OUTPATIENT)
Dept: RADIOLOGY | Facility: HOSPITAL | Age: 34
Discharge: HOME | End: 2024-06-17
Attending: ORTHOPAEDIC SURGERY
Payer: MEDICAID

## 2024-06-17 ENCOUNTER — PATIENT MESSAGE (OUTPATIENT)
Dept: ORTHOPEDICS | Facility: CLINIC | Age: 34
End: 2024-06-17
Payer: MEDICAID

## 2024-06-17 ENCOUNTER — TELEPHONE (OUTPATIENT)
Dept: ORTHOPEDICS | Facility: CLINIC | Age: 34
End: 2024-06-17
Payer: MEDICAID

## 2024-06-17 ENCOUNTER — OFFICE VISIT (OUTPATIENT)
Dept: ORTHOPEDICS | Facility: CLINIC | Age: 34
End: 2024-06-17
Payer: MEDICAID

## 2024-06-17 VITALS — BODY MASS INDEX: 48.82 KG/M2 | HEIGHT: 65 IN | WEIGHT: 293 LBS

## 2024-06-17 DIAGNOSIS — M47.816 FACET ARTHRITIS OF LUMBAR REGION: Primary | ICD-10-CM

## 2024-06-17 DIAGNOSIS — M51.36 LUMBAR DEGENERATIVE DISC DISEASE: ICD-10-CM

## 2024-06-17 DIAGNOSIS — M47.812 FACET ARTHRITIS OF CERVICAL REGION: ICD-10-CM

## 2024-06-17 DIAGNOSIS — M54.16 LUMBAR RADICULOPATHY: ICD-10-CM

## 2024-06-17 DIAGNOSIS — M54.12 CERVICAL RADICULOPATHY: ICD-10-CM

## 2024-06-17 PROCEDURE — 99213 OFFICE O/P EST LOW 20 MIN: CPT | Mod: S$PBB,,, | Performed by: ORTHOPAEDIC SURGERY

## 2024-06-17 PROCEDURE — 72170 X-RAY EXAM OF PELVIS: CPT | Mod: 26,,, | Performed by: RADIOLOGY

## 2024-06-17 PROCEDURE — 72170 X-RAY EXAM OF PELVIS: CPT | Mod: TC,PN

## 2024-06-17 PROCEDURE — 1160F RVW MEDS BY RX/DR IN RCRD: CPT | Mod: CPTII,,, | Performed by: ORTHOPAEDIC SURGERY

## 2024-06-17 PROCEDURE — 72040 X-RAY EXAM NECK SPINE 2-3 VW: CPT | Mod: TC,PN

## 2024-06-17 PROCEDURE — 99999 PR PBB SHADOW E&M-EST. PATIENT-LVL V: CPT | Mod: PBBFAC,,, | Performed by: ORTHOPAEDIC SURGERY

## 2024-06-17 PROCEDURE — 72100 X-RAY EXAM L-S SPINE 2/3 VWS: CPT | Mod: 26,,, | Performed by: RADIOLOGY

## 2024-06-17 PROCEDURE — 1159F MED LIST DOCD IN RCRD: CPT | Mod: CPTII,,, | Performed by: ORTHOPAEDIC SURGERY

## 2024-06-17 PROCEDURE — 72100 X-RAY EXAM L-S SPINE 2/3 VWS: CPT | Mod: TC,PN

## 2024-06-17 PROCEDURE — 72040 X-RAY EXAM NECK SPINE 2-3 VW: CPT | Mod: 26,,, | Performed by: RADIOLOGY

## 2024-06-17 PROCEDURE — 3008F BODY MASS INDEX DOCD: CPT | Mod: CPTII,,, | Performed by: ORTHOPAEDIC SURGERY

## 2024-06-17 PROCEDURE — 3044F HG A1C LEVEL LT 7.0%: CPT | Mod: CPTII,,, | Performed by: ORTHOPAEDIC SURGERY

## 2024-06-17 PROCEDURE — 99215 OFFICE O/P EST HI 40 MIN: CPT | Mod: PBBFAC,25,PN | Performed by: ORTHOPAEDIC SURGERY

## 2024-06-17 RX ORDER — VENLAFAXINE HYDROCHLORIDE 75 MG/1
75 CAPSULE, EXTENDED RELEASE ORAL
COMMUNITY
Start: 2024-06-15

## 2024-06-17 RX ORDER — ARIPIPRAZOLE 15 MG/1
15 TABLET ORAL
COMMUNITY
Start: 2024-06-14

## 2024-06-17 RX ORDER — TRAMADOL HYDROCHLORIDE 50 MG/1
50 TABLET ORAL EVERY 8 HOURS PRN
Qty: 21 TABLET | Refills: 0 | Status: SHIPPED | OUTPATIENT
Start: 2024-06-17

## 2024-06-17 RX ORDER — ORPHENADRINE CITRATE 100 MG/1
100 TABLET, EXTENDED RELEASE ORAL 2 TIMES DAILY
Qty: 60 TABLET | Refills: 0 | Status: SHIPPED | OUTPATIENT
Start: 2024-06-17 | End: 2024-07-17

## 2024-06-17 RX ORDER — FLUVOXAMINE MALEATE 100 MG/1
TABLET, COATED ORAL
COMMUNITY

## 2024-06-17 NOTE — PROGRESS NOTES
Subjective:       Patient ID: Jasper Guerrero is a 33 y.o. female.    Chief Complaint: Pain of the Lumbar Spine (Patient is here with complaints of chronic Lumbar pain, states she is 2 months post partum, 2 days after giving birth her pain increased, Did get an Epidural for delivery, has pain that radiates down her legs, Right is worse than left. States she does have groin. ) and Pain of the Neck (Has neck pain that radiates from the base of her skull down her neck into her shoulders, denies numbness & tingling)      History of Present Illness    Prior to meeting with the patient I reviewed the medical chart in [facility]. This included reviewing the previous progress notes from our office, review of the patient's last appointment with their primary care provider, review of any visits to the emergency room, and review of any pain management appointments or procedures.   Chronic complaints of neck pain radiating to the right arm and back pain down the right leg no bowel or bladder incontinence has recently had a baby and pains have worsened.    Current Medications  Current Outpatient Medications   Medication Sig Dispense Refill    acetaminophen (TYLENOL) 500 MG tablet Take 500 mg by mouth every 6 (six) hours as needed for Pain. 3620-3734 daily      ARIPiprazole (ABILIFY) 15 MG Tab Take 15 mg by mouth.      busPIRone (BUSPAR) 30 MG Tab 30 mg 3 (three) times daily.      clonazePAM (KLONOPIN) 1 MG tablet 1 mg.      fluvoxaMINE (LUVOX) 100 MG tablet 1 tablet at bedtime Orally every night for 30 days      metFORMIN (GLUCOPHAGE-XR) 500 MG ER 24hr tablet Take 500 mg by mouth once daily.      metoprolol succinate (TOPROL-XL) 50 MG 24 hr tablet Take 1 tablet (50 mg total) by mouth once daily. 30 tablet 11    ondansetron (ZOFRAN) 4 MG tablet Take 1 tablet (4 mg total) by mouth 2 (two) times daily. 20 tablet 0    tirzepatide (MOUNJARO) 2.5 mg/0.5 mL PnIj Inject 2.5 mg into the skin every 7 days. 4 Pen 0    venlafaxine (EFFEXOR-XR)  "75 MG 24 hr capsule Take 75 mg by mouth.      orphenadrine (NORFLEX) 100 mg tablet Take 1 tablet (100 mg total) by mouth 2 (two) times daily. 60 tablet 0    traMADoL (ULTRAM) 50 mg tablet Take 1 tablet (50 mg total) by mouth every 8 (eight) hours as needed for Pain. 21 tablet 0     No current facility-administered medications for this visit.       Allergies  Review of patient's allergies indicates:   Allergen Reactions    Benadryl [diphenhydramine hcl]      Paralysis on right side body     Codeine      Paralysis right body    Flexeril [cyclobenzaprine] Other (See Comments)     Numbness on right side of body    Nsaids (non-steroidal anti-inflammatory drug) Diarrhea and Nausea And Vomiting     Ulcers     Penicillins Anaphylaxis and Hives    Droperidol Anxiety and Other (See Comments)    Magnesium      "made me feel horrible"     Prochlorperazine Anxiety and Palpitations       Past Medical History  Past Medical History:   Diagnosis Date    Agoraphobia     Carpal tunnel syndrome     Depression     Diabetes mellitus, type 2     DVT (deep venous thrombosis)     2019 had blood clot in right arm    Fibromyalgia     Hypertension     Migraine headache     Nerve injury 08/2016    Lingual Nerve Injury    Obese     Pain management     PTSD (post-traumatic stress disorder)     Trigeminal neuralgia        Surgical History  Past Surgical History:   Procedure Laterality Date    CARPAL TUNNEL RELEASE Right 12/27/2018    Dr Lozada     CARPAL TUNNEL RELEASE Left 01/28/2020    Procedure: RELEASE, CARPAL TUNNEL;  Surgeon: Brendon Lozada Jr., MD;  Location: [facility];  Service: Orthopedics;  Laterality: Left;    CHOLECYSTECTOMY N/A 10/31/2022    MOUTH SURGERY      NERVE GRAFT  2016    WISDOM TOOTH EXTRACTION         Family History:   Family History   Problem Relation Name Age of Onset    Depression Mother      Diabetes Mother      No Known Problems Father      No Known Problems Daughter      No Known Problems Son      Cancer Maternal Aunt   "        endometrial    No Known Problems Maternal Uncle      No Known Problems Paternal Aunt      No Known Problems Paternal Uncle      No Known Problems Maternal Grandmother      No Known Problems Maternal Grandfather      No Known Problems Paternal Grandmother      No Known Problems Paternal Grandfather         Social History:   Social History     Socioeconomic History    Marital status:    Tobacco Use    Smoking status: Never    Smokeless tobacco: Never   Substance and Sexual Activity    Alcohol use: Yes     Comment: occasional    Drug use: No    Sexual activity: Yes     Partners: Male     Social Determinants of Health     Financial Resource Strain: Low Risk  (6/2/2024)    Received from [facility]    Overall Financial Resource Strain (CARDIA)     Difficulty of Paying Living Expenses: Not hard at all   Food Insecurity: No Food Insecurity (6/2/2024)    Received from [facility]    Hunger Vital Sign     Worried About Running Out of Food in the Last Year: Never true     Ran Out of Food in the Last Year: Never true   Transportation Needs: No Transportation Needs (6/2/2024)    Received from [facility]    PRAPARE - Transportation     Lack of Transportation (Medical): No     Lack of Transportation (Non-Medical): No   Physical Activity: Inactive (6/2/2024)    Received from [facility]    Exercise Vital Sign     Days of Exercise per Week: 0 days     Minutes of Exercise per Session: 0 min   Stress: Stress Concern Present (6/2/2024)    Received from [facility]    [employer] [place] [employer] - Occupational Stress Questionnaire     Feeling of Stress : Rather much   Housing Stability: Low Risk  (10/22/2023)    Housing Stability Vital Sign     Unable to Pay for Housing in the Last Year: No     Number of Places Lived in the Last Year: 1     Unstable Housing in the Last Year: No       Hospitalization/Major Diagnostic Procedure:     Review of Systems     General/Constitutional:  Chills denies. Fatigue denies.  Fever denies. Weight gain denies. Weight loss denies.    Respiratory:  Shortness of breath denies.    Cardiovascular:  Chest pain denies.    Gastrointestinal:  Constipation denies. Diarrhea denies. Nausea denies. Vomiting denies.     Hematology:  Easy bruising denies. Prolonged bleeding denies.     Genitourinary:  Frequent urination denies. Pain in lower back denies. Painful urination denies.     Musculoskeletal:  See HPI for details    Skin:  Rash denies.    Neurologic:  Dizziness denies. Gait abnormalities denies. Seizures denies. Tingling/Numbess denies.    Psychiatric:  Anxiety denies. Depressed mood denies.     Objective:   Vital Signs: There were no vitals filed for this visit.     Physical Exam      General Examination:     Constitutional: The patient is alert and oriented to lace person and time. Mood is pleasant.     Head/Face: Normal facial features normal eyebrows    Eyes: Normal extraocular motion bilaterally    Lungs: Respirations are equal and unlabored    Gait is coordinated.    Cardiovascular: There are no swelling or varicosities present.    Lymphatic: Negative for adenopathy    Skin: Normal    Neurological: Level of consciousness normal. Oriented to place person and time and situation    Psychiatric: Oriented to time place person and situation    Moderate tenderness both trapezius muscles no spasm Spurling's maneuver causes neck pain motor exam normal both upper extremities lumbar exam tenderness at the lumbosacral junction both posterior iliac spines no spasm range of motion limited straight leg raising causes back pain only  XRAY Report/ Interpretation:  AP and lateral x-rays of the cervical spine were performed today. Indications neck pain. Findings:  Straightening of normal cervical lordosis otherwise negative  AP pelvis x-ray was taken today. Indications low back pain and/or hip pain. Findings AP pelvis x-ray appears to be normal with no evidence of fractures or significant degenerative  disease  AP and lateral x-rays of lumbar spine will performed today. Indications low back pain. Findings:  AP and lateral x-rays appear to be normal  Assessment:       1. Facet arthritis of lumbar region    2. Lumbar degenerative disc disease    3. Lumbar radiculopathy    4. Facet arthritis of cervical region    5. Cervical radiculopathy        Plan:       Jasper was seen today for pain and pain.    Diagnoses and all orders for this visit:    Facet arthritis of lumbar region  -     X-Ray Lumbar Spine Ap And Lateral  -     X-Ray Pelvis Routine AP    Lumbar degenerative disc disease  -     X-Ray Lumbar Spine Ap And Lateral  -     X-Ray Pelvis Routine AP  -     Cancel: Ambulatory referral/consult to Physical/Occupational Therapy; Future  -     Ambulatory referral/consult to Physical/Occupational Therapy; Future    Lumbar radiculopathy  -     Ambulatory referral/consult to Orthopedics  -     Cancel: Ambulatory referral/consult to Physical/Occupational Therapy; Future  -     Ambulatory referral/consult to Physical/Occupational Therapy; Future    Facet arthritis of cervical region  -     X-Ray Cervical Spine AP And Lateral    Cervical radiculopathy  -     Cancel: Ambulatory referral/consult to Physical/Occupational Therapy; Future  -     Ambulatory referral/consult to Physical/Occupational Therapy; Future         Follow up for 2 month f/u - lumbar & cervical pain, PT f/u.    This is to attest that the medical assistant, Crow Cisneros served in the capacity as a scribe for this patient's encounter.  This is also verify that I have reviewed the patient's history and  formulated the treatment plan for this patient.  I have  evaluated this patient  and formulated a treatment plan for this patient visit.  The treatment plan and medical decision-making is outlined below plan is to refer patient to outpatient physical therapy and if unsuccessful after satisfactory course of physical therapy we will order MRI studies of the  affected areas  We had a discussion today about medications for pain she requested a narcotic analgesic and I advised that I would not prescribe it we will order tramadol return 6 weeks  Treatment options were discussed with regards to the nature of the medical condition. Conservative pain intervention and surgical options were discussed in detail. The probability of success of each separate treatment option was discussed. The patient expressed a clear understanding of the treatment options. With regards to surgery, the procedure risk, benefits, complications, and outcomes were discussed. No guarantees were given with regards to surgical outcome.   The risk of complications, morbidity, and mortality of patient management decisions have been made at the time of this visit. These are associated with the patient's problems, diagnostic procedures and treatment options. This includes the possible management options selected and those considered but not selected by the patient after shared medical decision making we discussed with the patient.     This note was created using Dragon voice recognition software that occasionally misinterpreted phrases or words.

## 2024-06-17 NOTE — PROGRESS NOTES
Subjective:       Patient ID: Jasper Guerrero is a 33 y.o. female.    Chief Complaint: Pain of the Lumbar Spine (Patient is here with complaints of chronic Lumbar pain, states she is 2 months post partum, 2 days after giving birth her pain increased, Did get an Epidural for delivery, has pain that radiates down her legs, Right is worse than left. States she does have groin. ) and Pain of the Neck (Has neck pain that radiates from the base of her skull down her neck into her shoulders, denies numbness & tingling)      History of Present Illness    Prior to meeting with the patient I reviewed the medical chart in [facility]. This included reviewing the previous progress notes from our office, review of the patient's last appointment with their primary care provider, review of any visits to the emergency room, and review of any pain management appointments or procedures.   ***    Current Medications  Current Outpatient Medications   Medication Sig Dispense Refill    acetaminophen (TYLENOL) 500 MG tablet Take 500 mg by mouth every 6 (six) hours as needed for Pain. 7725-5964 daily      ARIPiprazole (ABILIFY) 15 MG Tab Take 15 mg by mouth.      busPIRone (BUSPAR) 30 MG Tab 30 mg 3 (three) times daily.      clonazePAM (KLONOPIN) 1 MG tablet 1 mg.      fluvoxaMINE (LUVOX) 100 MG tablet 1 tablet at bedtime Orally every night for 30 days      metFORMIN (GLUCOPHAGE-XR) 500 MG ER 24hr tablet Take 500 mg by mouth once daily.      metoprolol succinate (TOPROL-XL) 50 MG 24 hr tablet Take 1 tablet (50 mg total) by mouth once daily. 30 tablet 11    ondansetron (ZOFRAN) 4 MG tablet Take 1 tablet (4 mg total) by mouth 2 (two) times daily. 20 tablet 0    orphenadrine (NORFLEX) 100 mg tablet Take 1 tablet (100 mg total) by mouth 2 (two) times daily. for 7 days 14 tablet 0    tirzepatide (MOUNJARO) 2.5 mg/0.5 mL PnIj Inject 2.5 mg into the skin every 7 days. 4 Pen 0    venlafaxine (EFFEXOR-XR) 75 MG 24 hr capsule Take 75 mg by mouth.    "    No current facility-administered medications for this visit.       Allergies  Review of patient's allergies indicates:   Allergen Reactions    Benadryl [diphenhydramine hcl]      Paralysis on right side body     Codeine      Paralysis right body    Flexeril [cyclobenzaprine] Other (See Comments)     Numbness on right side of body    Nsaids (non-steroidal anti-inflammatory drug) Diarrhea and Nausea And Vomiting     Ulcers     Penicillins Anaphylaxis and Hives    Droperidol Anxiety and Other (See Comments)    Magnesium      "made me feel horrible"     Prochlorperazine Anxiety and Palpitations       Past Medical History  Past Medical History:   Diagnosis Date    Agoraphobia     Carpal tunnel syndrome     Depression     Diabetes mellitus, type 2     DVT (deep venous thrombosis)     2019 had blood clot in right arm    Fibromyalgia     Hypertension     Migraine headache     Nerve injury 08/2016    Lingual Nerve Injury    Obese     Pain management     PTSD (post-traumatic stress disorder)     Trigeminal neuralgia        Surgical History  Past Surgical History:   Procedure Laterality Date    CARPAL TUNNEL RELEASE Right 12/27/2018    Dr Lozada     CARPAL TUNNEL RELEASE Left 01/28/2020    Procedure: RELEASE, CARPAL TUNNEL;  Surgeon: Brendon Lozada Jr., MD;  [facility];  Service: Orthopedics;  Laterality: Left;    CHOLECYSTECTOMY N/A 10/31/2022    MOUTH SURGERY      NERVE GRAFT  2016    WISDOM TOOTH EXTRACTION         Family History:   Family History   Problem Relation Name Age of Onset    Depression Mother      Diabetes Mother      No Known Problems Father      No Known Problems Daughter      No Known Problems Son      Cancer Maternal Aunt          endometrial    No Known Problems Maternal Uncle      No Known Problems Paternal Aunt      No Known Problems Paternal Uncle      No Known Problems Maternal Grandmother      No Known Problems Maternal Grandfather      No Known Problems Paternal Grandmother      No Known " Problems Paternal Grandfather         Social History:   Social History     Socioeconomic History    Marital status:    Tobacco Use    Smoking status: Never    Smokeless tobacco: Never   Substance and Sexual Activity    Alcohol use: Yes     Comment: occasional    Drug use: No    Sexual activity: Yes     Partners: Male     Social Determinants of Health     Financial Resource Strain: Low Risk  (6/2/2024)    Received from [facility]    Overall Financial Resource Strain (CARDIA)     Difficulty of Paying Living Expenses: Not hard at all   Food Insecurity: No Food Insecurity (6/2/2024)    Received from [facility]    Hunger Vital Sign     Worried About Running Out of Food in the Last Year: Never true     Ran Out of Food in the Last Year: Never true   Transportation Needs: No Transportation Needs (6/2/2024)    Received from [facility]    PRAPARE - Transportation     Lack of Transportation (Medical): No     Lack of Transportation (Non-Medical): No   Physical Activity: Inactive (6/2/2024)    Received from [facility]    Exercise Vital Sign     Days of Exercise per Week: 0 days     Minutes of Exercise per Session: 0 min   Stress: Stress Concern Present (6/2/2024)    Received from [facility]    [place] [place] [employer] - Occupational Stress Questionnaire     Feeling of Stress : Rather much   Housing Stability: Low Risk  (10/22/2023)    Housing Stability Vital Sign     Unable to Pay for Housing in the Last Year: No     Number of Places Lived in the Last Year: 1     Unstable Housing in the Last Year: No       Hospitalization/Major Diagnostic Procedure:     Review of Systems     General/Constitutional:  Chills denies. Fatigue denies. Fever denies. Weight gain denies. Weight loss denies.    Respiratory:  Shortness of breath denies.    Cardiovascular:  Chest pain denies.    Gastrointestinal:  Constipation denies. Diarrhea denies. Nausea denies. Vomiting denies.     Hematology:  Easy bruising denies.  Prolonged bleeding denies.     Genitourinary:  Frequent urination denies. Pain in lower back denies. Painful urination denies.     Musculoskeletal:  See HPI for details    Skin:  Rash denies.    Neurologic:  Dizziness denies. Gait abnormalities denies. Seizures denies. Tingling/Numbess denies.    Psychiatric:  Anxiety denies. Depressed mood denies.     Objective:   Vital Signs: There were no vitals filed for this visit.     Physical Exam      General Examination:     Constitutional: The patient is alert and oriented to lace person and time. Mood is pleasant.     Head/Face: Normal facial features normal eyebrows    Eyes: Normal extraocular motion bilaterally    Lungs: Respirations are equal and unlabored    Gait is coordinated.    Cardiovascular: There are no swelling or varicosities present.    Lymphatic: Negative for adenopathy    Skin: Normal    Neurological: Level of consciousness normal. Oriented to place person and time and situation    Psychiatric: Oriented to time place person and situation    ***  XRAY Report/ Interpretation:***      Assessment:       1. Chronic bilateral low back pain without sciatica    2. Back pain, unspecified back location, unspecified back pain laterality, unspecified chronicity    3. Chronic bilateral low back pain with bilateral sciatica        Plan:       Jasper was seen today for pain and pain.    Diagnoses and all orders for this visit:    Chronic bilateral low back pain without sciatica  -     X-Ray Lumbar Spine Ap And Lateral  -     X-Ray Pelvis Routine AP    Back pain, unspecified back location, unspecified back pain laterality, unspecified chronicity  -     X-Ray Lumbar Spine Ap And Lateral  -     X-Ray Pelvis Routine AP    Chronic bilateral low back pain with bilateral sciatica  -     Ambulatory referral/consult to Orthopedics         No follow-ups on file.    ***  Treatment options were discussed with regards to the nature of the medical condition. Conservative pain  intervention and surgical options were discussed in detail. The probability of success of each separate treatment option was discussed. The patient expressed a clear understanding of the treatment options. With regards to surgery, the procedure risk, benefits, complications, and outcomes were discussed. No guarantees were given with regards to surgical outcome.   The risk of complications, morbidity, and mortality of patient management decisions have been made at the time of this visit. These are associated with the patient's problems, diagnostic procedures and treatment options. This includes the possible management options selected and those considered but not selected by the patient after shared medical decision making we discussed with the patient.     This note was created using Dragon voice recognition software that occasionally misinterpreted phrases or words.

## 2024-06-17 NOTE — PROGRESS NOTES
Subjective:       Patient ID: Jasper Guerrero is a 33 y.o. female.    Chief Complaint: Pain of the Lumbar Spine (Patient is here with complaints of chronic Lumbar pain, states she is 2 months post partum, 2 days after giving birth her pain increased, Did get an Epidural for delivery, has pain that radiates down her legs, Right is worse than left. States she does have groin. ) and Pain of the Neck (Has neck pain that radiates from the base of her skull down her neck into her shoulders, denies numbness & tingling)      History of Present Illness    Prior to meeting with the patient I reviewed the medical chart in [facility]. This included reviewing the previous progress notes from our office, review of the patient's last appointment with their primary care provider, review of any visits to the emergency room, and review of any pain management appointments or procedures.   Jasper comes in today with a chief complaint of neck pain and low back pain.  She reports she has had low back pain for about 15 years.  It has been exacerbated by 3 pregnancies.  Her most recent delivery was about 2 months ago.  They have all been vaginal deliveries.  She does not recollect any specific injury or trauma.  She has not had any type of interventional pain management procedures.  She reports it radiates into bilateral lower extremities to about the level of the knee.  No bowel or bladder symptoms.  Her neck pain is strictly limited to her cervical spine.  No radicular symptoms.    Current Medications  Current Outpatient Medications   Medication Sig Dispense Refill    acetaminophen (TYLENOL) 500 MG tablet Take 500 mg by mouth every 6 (six) hours as needed for Pain. 8609-6099 daily      ARIPiprazole (ABILIFY) 15 MG Tab Take 15 mg by mouth.      busPIRone (BUSPAR) 30 MG Tab 30 mg 3 (three) times daily.      clonazePAM (KLONOPIN) 1 MG tablet 1 mg.      fluvoxaMINE (LUVOX) 100 MG tablet 1 tablet at bedtime Orally every night for 30 days       "metFORMIN (GLUCOPHAGE-XR) 500 MG ER 24hr tablet Take 500 mg by mouth once daily.      metoprolol succinate (TOPROL-XL) 50 MG 24 hr tablet Take 1 tablet (50 mg total) by mouth once daily. 30 tablet 11    ondansetron (ZOFRAN) 4 MG tablet Take 1 tablet (4 mg total) by mouth 2 (two) times daily. 20 tablet 0    orphenadrine (NORFLEX) 100 mg tablet Take 1 tablet (100 mg total) by mouth 2 (two) times daily. for 7 days 14 tablet 0    tirzepatide (MOUNJARO) 2.5 mg/0.5 mL PnIj Inject 2.5 mg into the skin every 7 days. 4 Pen 0    venlafaxine (EFFEXOR-XR) 75 MG 24 hr capsule Take 75 mg by mouth.       No current facility-administered medications for this visit.       Allergies  Review of patient's allergies indicates:   Allergen Reactions    Benadryl [diphenhydramine hcl]      Paralysis on right side body     Codeine      Paralysis right body    Flexeril [cyclobenzaprine] Other (See Comments)     Numbness on right side of body    Nsaids (non-steroidal anti-inflammatory drug) Diarrhea and Nausea And Vomiting     Ulcers     Penicillins Anaphylaxis and Hives    Droperidol Anxiety and Other (See Comments)    Magnesium      "made me feel horrible"     Prochlorperazine Anxiety and Palpitations       Past Medical History  Past Medical History:   Diagnosis Date    Agoraphobia     Carpal tunnel syndrome     Depression     Diabetes mellitus, type 2     DVT (deep venous thrombosis)     2019 had blood clot in right arm    Fibromyalgia     Hypertension     Migraine headache     Nerve injury 08/2016    Lingual Nerve Injury    Obese     Pain management     PTSD (post-traumatic stress disorder)     Trigeminal neuralgia        Surgical History  Past Surgical History:   Procedure Laterality Date    CARPAL TUNNEL RELEASE Right 12/27/2018    Dr Lozada     CARPAL TUNNEL RELEASE Left 01/28/2020    Procedure: RELEASE, CARPAL TUNNEL;  Surgeon: Brendon Lozada Jr., MD;  Location: [facility];  Service: Orthopedics;  Laterality: Left;    CHOLECYSTECTOMY N/A " 10/31/2022    MOUTH SURGERY      NERVE GRAFT  2016    WISDOM TOOTH EXTRACTION         Family History:   Family History   Problem Relation Name Age of Onset    Depression Mother      Diabetes Mother      No Known Problems Father      No Known Problems Daughter      No Known Problems Son      Cancer Maternal Aunt          endometrial    No Known Problems Maternal Uncle      No Known Problems Paternal Aunt      No Known Problems Paternal Uncle      No Known Problems Maternal Grandmother      No Known Problems Maternal Grandfather      No Known Problems Paternal Grandmother      No Known Problems Paternal Grandfather         Social History:   Social History     Socioeconomic History    Marital status:    Tobacco Use    Smoking status: Never    Smokeless tobacco: Never   Substance and Sexual Activity    Alcohol use: Yes     Comment: occasional    Drug use: No    Sexual activity: Yes     Partners: Male     Social Determinants of Health     Financial Resource Strain: Low Risk  (6/2/2024)    Received from [facility]    Overall Financial Resource Strain (CARDIA)     Difficulty of Paying Living Expenses: Not hard at all   Food Insecurity: No Food Insecurity (6/2/2024)    Received from [facility]    Hunger Vital Sign     Worried About Running Out of Food in the Last Year: Never true     Ran Out of Food in the Last Year: Never true   Transportation Needs: No Transportation Needs (6/2/2024)    Received from [facility]    PRAPARE - Transportation     Lack of Transportation (Medical): No     Lack of Transportation (Non-Medical): No   Physical Activity: Inactive (6/2/2024)    Received from [facility]    Exercise Vital Sign     Days of Exercise per Week: 0 days     Minutes of Exercise per Session: 0 min   Stress: Stress Concern Present (6/2/2024)    Received from [facility]    [employer] [place] [employer] - Occupational Stress Questionnaire     Feeling of Stress : Rather much   Housing Stability: Low Risk   (10/22/2023)    Housing Stability Vital Sign     Unable to Pay for Housing in the Last Year: No     Number of Places Lived in the Last Year: 1     Unstable Housing in the Last Year: No       Hospitalization/Major Diagnostic Procedure:     Review of Systems     General/Constitutional:  Chills denies. Fatigue denies. Fever denies. Weight gain denies. Weight loss denies.    Respiratory:  Shortness of breath denies.    Cardiovascular:  Chest pain denies.    Gastrointestinal:  Constipation denies. Diarrhea denies. Nausea denies. Vomiting denies.     Hematology:  Easy bruising denies. Prolonged bleeding denies.     Genitourinary:  Frequent urination denies. Pain in lower back denies. Painful urination denies.     Musculoskeletal:  See HPI for details    Skin:  Rash denies.    Neurologic:  Dizziness denies. Gait abnormalities denies. Seizures denies. Tingling/Numbess denies.    Psychiatric:  Anxiety denies. Depressed mood denies.     Objective:   Vital Signs: There were no vitals filed for this visit.     Physical Exam      General Examination:     Constitutional: The patient is alert and oriented to lace person and time. Mood is pleasant.     Head/Face: Normal facial features normal eyebrows    Eyes: Normal extraocular motion bilaterally    Lungs: Respirations are equal and unlabored    Gait is coordinated.    Cardiovascular: There are no swelling or varicosities present.    Lymphatic: Negative for adenopathy    Skin: Normal    Neurological: Level of consciousness normal. Oriented to place person and time and situation    Psychiatric: Oriented to time place person and situation    Lumbar exam: Skin throughout the back clean dry and intact.  No erythema or ecchymosis.  No signs or symptoms of infection.  She is neurovascularly intact throughout bilateral lower extremities.  She stands erect.  She can weightbear as tolerated on bilateral lower extremities.  She has a nonantalgic gait.  Nonantalgic sit to stand.    Cervical  exam: Skin throughout the neck is clean dry and intact.  There is no erythema or ecchymosis.  There are no signs or symptoms of infection.  She is neurovascularly intact throughout bilateral upper extremities.    XRAY Report/ Interpretation:  Two views taken of the cervical spine today: AP and lateral views.  No acute fractures or dislocations seen.  That has loss of the normal cervical lordosis.  She is mild degenerative changes at C5-6.      Two views taken of the lumbar spine today: AP and lateral views.  No acute fractures or dislocations seen.  She has mild degenerative changes at L5-S1.    Assessment:       1. Facet arthritis of lumbar region    2. Lumbar degenerative disc disease    3. Lumbar radiculopathy    4. Facet arthritis of cervical region    5. Cervical radiculopathy        Plan:       Jasper was seen today for pain and pain.    Diagnoses and all orders for this visit:    Facet arthritis of lumbar region  -     X-Ray Lumbar Spine Ap And Lateral  -     X-Ray Pelvis Routine AP    Lumbar degenerative disc disease  -     X-Ray Lumbar Spine Ap And Lateral  -     X-Ray Pelvis Routine AP  -     Cancel: Ambulatory referral/consult to Physical/Occupational Therapy; Future  -     Ambulatory referral/consult to Physical/Occupational Therapy; Future    Lumbar radiculopathy  -     Ambulatory referral/consult to Orthopedics  -     Cancel: Ambulatory referral/consult to Physical/Occupational Therapy; Future  -     Ambulatory referral/consult to Physical/Occupational Therapy; Future    Facet arthritis of cervical region  -     X-Ray Cervical Spine AP And Lateral    Cervical radiculopathy  -     Cancel: Ambulatory referral/consult to Physical/Occupational Therapy; Future  -     Ambulatory referral/consult to Physical/Occupational Therapy; Future         Follow up for 2 month f/u - lumbar & cervical pain, PT f/u.    We had a long discussion with her today about the treatment plan.  We will have her try to obtain images  and report of her lumbar MRI which was done at [facility] stand-alone ER.  This may be more beneficial in getting answers in the allowing us to refer to pain management for interventional procedures more rapidly.  However, in the interim, we will get her started with formal PT working on her neck and back.  We will have her scheduled for follow-up in 6 weeks to see how she responds.  I did give her a prescription for Norflex and tramadol.  We did have a long discussion about narcotics and proper prescribing protocols that we like to follow for narcotics.  If she gets the results of her lumbar imaging prior to 6 week follow-up, she should come in with those to allow him to review and we can possibly alter the treatment regimen if appropriate.    Treatment options were discussed with regards to the nature of the medical condition. Conservative pain intervention and surgical options were discussed in detail. The probability of success of each separate treatment option was discussed. The patient expressed a clear understanding of the treatment options. With regards to surgery, the procedure risk, benefits, complications, and outcomes were discussed. No guarantees were given with regards to surgical outcome.   The risk of complications, morbidity, and mortality of patient management decisions have been made at the time of this visit. These are associated with the patient's problems, diagnostic procedures and treatment options. This includes the possible management options selected and those considered but not selected by the patient after shared medical decision making we discussed with the patient.     This note was created using Dragon voice recognition software that occasionally misinterpreted phrases or words.

## 2024-06-17 NOTE — TELEPHONE ENCOUNTER
----- Message from Lauren Abadie, LPN sent at 6/17/2024  3:25 PM CDT -----    ----- Message -----  From: Emi Sargent  Sent: 6/17/2024   3:06 PM CDT  To: Lauren Abadie, LPN    MRI was done of lower back and she is requesting MRI referral for Thoracic and Cervical, she just was here for a visit

## 2024-06-19 ENCOUNTER — PATIENT MESSAGE (OUTPATIENT)
Dept: FAMILY MEDICINE | Facility: CLINIC | Age: 34
End: 2024-06-19
Payer: MEDICAID

## 2024-06-19 DIAGNOSIS — R11.0 NAUSEA: ICD-10-CM

## 2024-06-24 ENCOUNTER — PATIENT MESSAGE (OUTPATIENT)
Dept: FAMILY MEDICINE | Facility: CLINIC | Age: 34
End: 2024-06-24
Payer: MEDICAID

## 2024-06-24 DIAGNOSIS — R35.0 URINARY FREQUENCY: Primary | ICD-10-CM

## 2024-06-24 DIAGNOSIS — E11.9 TYPE 2 DIABETES MELLITUS WITHOUT COMPLICATION, WITHOUT LONG-TERM CURRENT USE OF INSULIN: ICD-10-CM

## 2024-06-24 RX ORDER — ONDANSETRON 4 MG/1
4 TABLET, FILM COATED ORAL 2 TIMES DAILY
Qty: 20 TABLET | Refills: 0 | Status: SHIPPED | OUTPATIENT
Start: 2024-06-24 | End: 2024-07-12 | Stop reason: SDUPTHER

## 2024-06-24 RX ORDER — TIRZEPATIDE 5 MG/.5ML
5 INJECTION, SOLUTION SUBCUTANEOUS
Qty: 4 PEN | Refills: 0 | Status: SHIPPED | OUTPATIENT
Start: 2024-06-24

## 2024-06-24 NOTE — TELEPHONE ENCOUNTER
Pt notified mounjaro was sent to her pharmacy and a urine culture was ordered to [facility] lab for her to have done.

## 2024-06-26 ENCOUNTER — PATIENT MESSAGE (OUTPATIENT)
Dept: FAMILY MEDICINE | Facility: CLINIC | Age: 34
End: 2024-06-26
Payer: MEDICAID

## 2024-06-26 DIAGNOSIS — M25.562 ACUTE PAIN OF LEFT KNEE: Primary | ICD-10-CM

## 2024-06-29 ENCOUNTER — PATIENT MESSAGE (OUTPATIENT)
Dept: FAMILY MEDICINE | Facility: CLINIC | Age: 34
End: 2024-06-29
Payer: MEDICAID

## 2024-07-01 ENCOUNTER — PATIENT MESSAGE (OUTPATIENT)
Dept: ORTHOPEDICS | Facility: CLINIC | Age: 34
End: 2024-07-01
Payer: MEDICAID

## 2024-07-01 DIAGNOSIS — M51.36 LUMBAR DEGENERATIVE DISC DISEASE: ICD-10-CM

## 2024-07-01 DIAGNOSIS — M47.816 FACET ARTHRITIS OF LUMBAR REGION: Primary | ICD-10-CM

## 2024-07-02 RX ORDER — TIZANIDINE 4 MG/1
4 TABLET ORAL NIGHTLY PRN
Qty: 30 TABLET | Refills: 0 | Status: SHIPPED | OUTPATIENT
Start: 2024-07-02 | End: 2024-08-01

## 2024-07-05 ENCOUNTER — PATIENT MESSAGE (OUTPATIENT)
Dept: ORTHOPEDICS | Facility: CLINIC | Age: 34
End: 2024-07-05
Payer: MEDICAID

## 2024-07-08 DIAGNOSIS — M47.816 FACET ARTHRITIS OF LUMBAR REGION: ICD-10-CM

## 2024-07-08 DIAGNOSIS — M51.36 LUMBAR DEGENERATIVE DISC DISEASE: ICD-10-CM

## 2024-07-08 DIAGNOSIS — M47.812 FACET ARTHRITIS OF CERVICAL REGION: ICD-10-CM

## 2024-07-08 RX ORDER — TRAMADOL HYDROCHLORIDE 50 MG/1
50 TABLET ORAL EVERY 8 HOURS PRN
Qty: 21 TABLET | Refills: 0 | Status: SHIPPED | OUTPATIENT
Start: 2024-07-08

## 2024-07-10 ENCOUNTER — PATIENT MESSAGE (OUTPATIENT)
Dept: FAMILY MEDICINE | Facility: CLINIC | Age: 34
End: 2024-07-10
Payer: MEDICAID

## 2024-07-10 DIAGNOSIS — R11.0 NAUSEA: ICD-10-CM

## 2024-07-12 ENCOUNTER — PATIENT MESSAGE (OUTPATIENT)
Dept: FAMILY MEDICINE | Facility: CLINIC | Age: 34
End: 2024-07-12
Payer: MEDICAID

## 2024-07-12 RX ORDER — ONDANSETRON 4 MG/1
4 TABLET, FILM COATED ORAL EVERY 12 HOURS PRN
Qty: 60 TABLET | Refills: 0 | Status: SHIPPED | OUTPATIENT
Start: 2024-07-12

## 2024-07-17 LAB
HUMAN PAPILLOMAVIRUS (HPV): NORMAL
PAP RECOMMENDATION EXT: NORMAL
PAP SMEAR: NORMAL

## 2024-07-20 DIAGNOSIS — M51.36 LUMBAR DEGENERATIVE DISC DISEASE: ICD-10-CM

## 2024-07-20 DIAGNOSIS — M47.812 FACET ARTHRITIS OF CERVICAL REGION: ICD-10-CM

## 2024-07-20 DIAGNOSIS — M47.816 FACET ARTHRITIS OF LUMBAR REGION: ICD-10-CM

## 2024-07-21 ENCOUNTER — PATIENT MESSAGE (OUTPATIENT)
Dept: FAMILY MEDICINE | Facility: CLINIC | Age: 34
End: 2024-07-21
Payer: MEDICAID

## 2024-07-21 DIAGNOSIS — E11.9 TYPE 2 DIABETES MELLITUS WITHOUT COMPLICATION, WITHOUT LONG-TERM CURRENT USE OF INSULIN: Primary | ICD-10-CM

## 2024-07-22 RX ORDER — TRAMADOL HYDROCHLORIDE 50 MG/1
50 TABLET ORAL EVERY 8 HOURS PRN
Qty: 21 TABLET | Refills: 0 | Status: SHIPPED | OUTPATIENT
Start: 2024-07-22

## 2024-07-25 ENCOUNTER — PATIENT MESSAGE (OUTPATIENT)
Dept: FAMILY MEDICINE | Facility: CLINIC | Age: 34
End: 2024-07-25
Payer: MEDICAID

## 2024-07-25 DIAGNOSIS — I10 PRIMARY HYPERTENSION: ICD-10-CM

## 2024-07-25 RX ORDER — METOPROLOL SUCCINATE 50 MG/1
50 TABLET, EXTENDED RELEASE ORAL DAILY
Qty: 30 TABLET | Refills: 2 | Status: SHIPPED | OUTPATIENT
Start: 2024-07-25 | End: 2025-07-25

## 2024-07-28 DIAGNOSIS — M47.816 FACET ARTHRITIS OF LUMBAR REGION: ICD-10-CM

## 2024-07-28 DIAGNOSIS — M51.36 LUMBAR DEGENERATIVE DISC DISEASE: ICD-10-CM

## 2024-07-29 RX ORDER — TIZANIDINE 4 MG/1
4 TABLET ORAL NIGHTLY PRN
Qty: 30 TABLET | Refills: 0 | Status: SHIPPED | OUTPATIENT
Start: 2024-07-29 | End: 2024-08-28

## 2024-08-01 ENCOUNTER — PATIENT OUTREACH (OUTPATIENT)
Dept: ADMINISTRATIVE | Facility: HOSPITAL | Age: 34
End: 2024-08-01
Payer: MEDICAID

## 2024-08-01 NOTE — PROGRESS NOTES
Population Health Chart Review & Patient Outreach Details      Additional [facility] Notes:               Updates Requested / Reviewed:      Updated Care Coordination Note, Care Everywhere, , External Sources: [facility] and [facility], and Immunizations Reconciliation Completed or Queried: [facility] Topics Overdue:      [facility] Score: 3     Urine Screening  Eye Exam  Lipid Panel                       Health Maintenance Topic(s) Outreach Outcomes & Actions Taken:    Cervical Cancer Screening - Outreach Outcomes & Actions Taken  : External Records Uploaded & Care Team Updated if Applicable    Eye Exam - Outreach Outcomes & Actions Taken  : Reminder pt portal message sent.

## 2024-08-06 DIAGNOSIS — M51.36 LUMBAR DEGENERATIVE DISC DISEASE: ICD-10-CM

## 2024-08-06 DIAGNOSIS — M47.816 FACET ARTHRITIS OF LUMBAR REGION: ICD-10-CM

## 2024-08-06 DIAGNOSIS — M47.812 FACET ARTHRITIS OF CERVICAL REGION: ICD-10-CM

## 2024-08-06 RX ORDER — TRAMADOL HYDROCHLORIDE 50 MG/1
50 TABLET ORAL EVERY 8 HOURS PRN
Qty: 21 TABLET | Refills: 0 | Status: CANCELLED | OUTPATIENT
Start: 2024-08-06

## 2024-08-14 ENCOUNTER — HOSPITAL ENCOUNTER (EMERGENCY)
Facility: HOSPITAL | Age: 34
Discharge: HOME | End: 2024-08-14
Attending: EMERGENCY MEDICINE
Payer: MEDICAID

## 2024-08-14 VITALS
BODY MASS INDEX: 52.75 KG/M2 | OXYGEN SATURATION: 96 % | RESPIRATION RATE: 16 BRPM | HEART RATE: 92 BPM | TEMPERATURE: 98 F | WEIGHT: 293 LBS | DIASTOLIC BLOOD PRESSURE: 94 MMHG | SYSTOLIC BLOOD PRESSURE: 147 MMHG

## 2024-08-14 DIAGNOSIS — M54.41 ACUTE MIDLINE LOW BACK PAIN WITH RIGHT-SIDED SCIATICA: Primary | ICD-10-CM

## 2024-08-14 DIAGNOSIS — N30.01 ACUTE CYSTITIS WITH HEMATURIA: ICD-10-CM

## 2024-08-14 DIAGNOSIS — N12 PYELONEPHRITIS: ICD-10-CM

## 2024-08-14 LAB
ALBUMIN SERPL BCP-MCNC: 4 G/DL (ref 3.5–5.2)
ALP SERPL-CCNC: 44 U/L (ref 55–135)
ALT SERPL W/O P-5'-P-CCNC: 12 U/L (ref 10–44)
ANION GAP SERPL CALC-SCNC: 14 MMOL/L (ref 8–16)
AST SERPL-CCNC: 18 U/L (ref 10–40)
B-HCG UR QL: NEGATIVE
BACTERIA #/AREA URNS HPF: ABNORMAL /HPF
BASOPHILS # BLD AUTO: 0.02 K/UL (ref 0–0.2)
BASOPHILS NFR BLD: 0.2 % (ref 0–1.9)
BILIRUB SERPL-MCNC: 0.3 MG/DL (ref 0.1–1)
BILIRUB UR QL STRIP: NEGATIVE
BUN SERPL-MCNC: 12 MG/DL (ref 6–20)
CALCIUM SERPL-MCNC: 9 MG/DL (ref 8.7–10.5)
CHLORIDE SERPL-SCNC: 108 MMOL/L (ref 95–110)
CLARITY UR: ABNORMAL
CO2 SERPL-SCNC: 20 MMOL/L (ref 23–29)
COLOR UR: ABNORMAL
CREAT SERPL-MCNC: 0.9 MG/DL (ref 0.5–1.4)
DIFFERENTIAL METHOD BLD: NORMAL
EOSINOPHIL # BLD AUTO: 0.1 K/UL (ref 0–0.5)
EOSINOPHIL NFR BLD: 0.7 % (ref 0–8)
ERYTHROCYTE [DISTWIDTH] IN BLOOD BY AUTOMATED COUNT: 13.6 % (ref 11.5–14.5)
EST. GFR  (NO RACE VARIABLE): >60 ML/MIN/1.73 M^2
GLUCOSE SERPL-MCNC: 117 MG/DL (ref 70–110)
GLUCOSE UR QL STRIP: NEGATIVE
HCT VFR BLD AUTO: 40.4 % (ref 37–48.5)
HGB BLD-MCNC: 13.1 G/DL (ref 12–16)
HGB UR QL STRIP: ABNORMAL
HYALINE CASTS #/AREA URNS LPF: 0 /LPF
IMM GRANULOCYTES # BLD AUTO: 0.02 K/UL (ref 0–0.04)
IMM GRANULOCYTES NFR BLD AUTO: 0.2 % (ref 0–0.5)
KETONES UR QL STRIP: ABNORMAL
LEUKOCYTE ESTERASE UR QL STRIP: ABNORMAL
LIPASE SERPL-CCNC: 59 U/L (ref 4–60)
LYMPHOCYTES # BLD AUTO: 2.9 K/UL (ref 1–4.8)
LYMPHOCYTES NFR BLD: 31.2 % (ref 18–48)
MCH RBC QN AUTO: 28.7 PG (ref 27–31)
MCHC RBC AUTO-ENTMCNC: 32.4 G/DL (ref 32–36)
MCV RBC AUTO: 88 FL (ref 82–98)
MICROSCOPIC COMMENT: ABNORMAL
MONOCYTES # BLD AUTO: 0.5 K/UL (ref 0.3–1)
MONOCYTES NFR BLD: 5.1 % (ref 4–15)
NEUTROPHILS # BLD AUTO: 5.9 K/UL (ref 1.8–7.7)
NEUTROPHILS NFR BLD: 62.6 % (ref 38–73)
NITRITE UR QL STRIP: NEGATIVE
NRBC BLD-RTO: 0 /100 WBC
PH UR STRIP: 6 [PH] (ref 5–8)
PLATELET # BLD AUTO: 333 K/UL (ref 150–450)
PMV BLD AUTO: 9.5 FL (ref 9.2–12.9)
POTASSIUM SERPL-SCNC: 3.7 MMOL/L (ref 3.5–5.1)
PROT SERPL-MCNC: 7.2 G/DL (ref 6–8.4)
PROT UR QL STRIP: ABNORMAL
RBC # BLD AUTO: 4.57 M/UL (ref 4–5.4)
RBC #/AREA URNS HPF: >100 /HPF (ref 0–4)
SODIUM SERPL-SCNC: 142 MMOL/L (ref 136–145)
SP GR UR STRIP: >1.03 (ref 1–1.03)
SQUAMOUS #/AREA URNS HPF: 3 /HPF
URN SPEC COLLECT METH UR: ABNORMAL
UROBILINOGEN UR STRIP-ACNC: NEGATIVE EU/DL
WBC # BLD AUTO: 9.43 K/UL (ref 3.9–12.7)
WBC #/AREA URNS HPF: >100 /HPF (ref 0–5)
WBC CLUMPS URNS QL MICRO: ABNORMAL

## 2024-08-14 PROCEDURE — 81025 URINE PREGNANCY TEST: CPT | Performed by: EMERGENCY MEDICINE

## 2024-08-14 PROCEDURE — 83690 ASSAY OF LIPASE: CPT | Performed by: NURSE PRACTITIONER

## 2024-08-14 PROCEDURE — 81001 URINALYSIS AUTO W/SCOPE: CPT | Performed by: NURSE PRACTITIONER

## 2024-08-14 PROCEDURE — 25500020 PHARM REV CODE 255: Performed by: EMERGENCY MEDICINE

## 2024-08-14 PROCEDURE — 96374 THER/PROPH/DIAG INJ IV PUSH: CPT

## 2024-08-14 PROCEDURE — 36415 COLL VENOUS BLD VENIPUNCTURE: CPT | Performed by: NURSE PRACTITIONER

## 2024-08-14 PROCEDURE — 80053 COMPREHEN METABOLIC PANEL: CPT | Performed by: NURSE PRACTITIONER

## 2024-08-14 PROCEDURE — 96375 TX/PRO/DX INJ NEW DRUG ADDON: CPT

## 2024-08-14 PROCEDURE — 85025 COMPLETE CBC W/AUTO DIFF WBC: CPT | Performed by: NURSE PRACTITIONER

## 2024-08-14 PROCEDURE — 63600175 PHARM REV CODE 636 W HCPCS: Performed by: EMERGENCY MEDICINE

## 2024-08-14 PROCEDURE — 25000003 PHARM REV CODE 250: Performed by: EMERGENCY MEDICINE

## 2024-08-14 PROCEDURE — 87086 URINE CULTURE/COLONY COUNT: CPT | Performed by: NURSE PRACTITIONER

## 2024-08-14 PROCEDURE — 99285 EMERGENCY DEPT VISIT HI MDM: CPT | Mod: 25

## 2024-08-14 RX ORDER — ONDANSETRON HYDROCHLORIDE 2 MG/ML
4 INJECTION, SOLUTION INTRAVENOUS
Status: COMPLETED | OUTPATIENT
Start: 2024-08-14 | End: 2024-08-14

## 2024-08-14 RX ORDER — SULFAMETHOXAZOLE AND TRIMETHOPRIM 800; 160 MG/1; MG/1
1 TABLET ORAL 2 TIMES DAILY
Qty: 14 TABLET | Refills: 0 | Status: SHIPPED | OUTPATIENT
Start: 2024-08-14 | End: 2024-08-21

## 2024-08-14 RX ORDER — CIPROFLOXACIN 500 MG/1
500 TABLET ORAL
Status: COMPLETED | OUTPATIENT
Start: 2024-08-14 | End: 2024-08-14

## 2024-08-14 RX ORDER — OXYCODONE AND ACETAMINOPHEN 5; 325 MG/1; MG/1
1 TABLET ORAL EVERY 6 HOURS PRN
Qty: 11 TABLET | Refills: 0 | Status: SHIPPED | OUTPATIENT
Start: 2024-08-14

## 2024-08-14 RX ORDER — HYDROMORPHONE HYDROCHLORIDE 1 MG/ML
1 INJECTION, SOLUTION INTRAMUSCULAR; INTRAVENOUS; SUBCUTANEOUS
Status: COMPLETED | OUTPATIENT
Start: 2024-08-14 | End: 2024-08-14

## 2024-08-14 RX ORDER — MORPHINE SULFATE 4 MG/ML
4 INJECTION, SOLUTION INTRAMUSCULAR; INTRAVENOUS
Status: COMPLETED | OUTPATIENT
Start: 2024-08-14 | End: 2024-08-14

## 2024-08-14 RX ORDER — CIPROFLOXACIN 2 MG/ML
400 INJECTION, SOLUTION INTRAVENOUS
Status: DISCONTINUED | OUTPATIENT
Start: 2024-08-14 | End: 2024-08-14

## 2024-08-14 RX ADMIN — ONDANSETRON 4 MG: 2 INJECTION INTRAMUSCULAR; INTRAVENOUS at 07:08

## 2024-08-14 RX ADMIN — MORPHINE SULFATE 4 MG: 4 INJECTION, SOLUTION INTRAMUSCULAR; INTRAVENOUS at 07:08

## 2024-08-14 RX ADMIN — CIPROFLOXACIN 500 MG: 500 TABLET ORAL at 11:08

## 2024-08-14 RX ADMIN — IOHEXOL 100 ML: 350 INJECTION, SOLUTION INTRAVENOUS at 10:08

## 2024-08-14 RX ADMIN — HYDROMORPHONE HYDROCHLORIDE 1 MG: 1 INJECTION, SOLUTION INTRAMUSCULAR; INTRAVENOUS; SUBCUTANEOUS at 08:08

## 2024-08-14 NOTE — FIRST PROVIDER EVALUATION
" Emergency Department TeleTriage Encounter Note      CHIEF COMPLAINT    Chief Complaint   Patient presents with    Back Pain    Vaginal Bleeding    Abdominal Pain     Groin pain         VITAL SIGNS   Initial Vitals [08/14/24 1702]   BP Pulse Resp Temp SpO2   (!) 167/105 100 18 98 °F (36.7 °C) 99 %      MAP       --            ALLERGIES    Review of patient's allergies indicates:   Allergen Reactions    Benadryl [diphenhydramine hcl]      Paralysis on right side body     Codeine      Paralysis right body    Flexeril [cyclobenzaprine] Other (See Comments)     Numbness on right side of body    Nsaids (non-steroidal anti-inflammatory drug) Diarrhea and Nausea And Vomiting     Ulcers     Penicillins Anaphylaxis and Hives    Droperidol Anxiety and Other (See Comments)    Magnesium      "made me feel horrible"     Prochlorperazine Anxiety and Palpitations       PROVIDER TRIAGE NOTE  TeleTriage Note: Jasper Guerrero, a nontoxic/well appearing, 33 y.o. female, presented to the ED with c/o 4 months post partum and is now having back pain that shoots to her chest and groin. Recently stopped birth control 4 days ago and is now having excessive vaginal bleeding.     All ED beds are full at present; patient notified of this status.  Patient seen and medically screened by Nurse Practitioner via teletriage. Orders initiated at triage to expedite care.  Patient is stable to return to the waiting room and will be placed in an ED bed when available.  Care will be transferred to an alternate provider when patient has been placed in an Exam Room from the [facility] for physical exam, additional orders, and disposition.  5:20 PM Bronwyn Birmingham DNP, FNP-C        ORDERS  Labs Reviewed   CBC W/ AUTO DIFFERENTIAL   COMPREHENSIVE METABOLIC PANEL   URINALYSIS, REFLEX TO URINE CULTURE   POCT URINE PREGNANCY       ED Orders (720h ago, onward)      Start Ordered     Status Ordering Provider    08/14/24 1722 08/14/24 1721  Insert peripheral IV  " Continuous         Ordered HAO, BRYANT DOWArianne    08/14/24 1722 08/14/24 1721  CBC auto differential  STAT         Ordered HAO, BRYANT DOWArianne    08/14/24 1722 08/14/24 1721  Comprehensive metabolic panel  STAT         Ordered HAO, BRYANT DOWArianne    08/14/24 1722 08/14/24 1721  Urinalysis, Reflex to Urine Culture Urine, Clean Catch  STAT         Ordered HAO, BRYANT DOWArianne    08/14/24 1722 08/14/24 1721  POCT urine pregnancy  Once         Ordered HAO BRYANT DOWArianne              Virtual Visit Note: The provider triage portion of this emergency department evaluation and documentation was performed via [employer], a HIPAA-compliant telemedicine application, in concert with a tele-presenter in the room. A face to face patient evaluation with one of my colleagues will occur once the patient is placed in an emergency department room.      DISCLAIMER: This note was prepared with [employer] voice recognition transcription software. Garbled syntax, mangled pronouns, and other bizarre constructions may be attributed to that software system.

## 2024-08-15 NOTE — ED PROVIDER NOTES
"Encounter Date: 8/14/2024       History     Chief Complaint   Patient presents with    Back Pain    Vaginal Bleeding    Abdominal Pain     Groin pain       [age] female with past medical history significant of depression, diabetes, migraine, hypertension, fibromyalgia, PTSD presents secondary to lower back pain.  Patient states he was seen recently for similar complaint but her pain has not been well controlled so she decided to come back to emergency room for further assessment.  She describes as a sharp pain that radiates on both sides associated with vaginal bleeding, headaches and dizziness.  She denies any fevers, chills, sweats, chest pain shortness of breath associated.  Patient is otherwise stable and has no other complaints.      Review of patient's allergies indicates:   Allergen Reactions    Benadryl [diphenhydramine hcl]      Paralysis on right side body     Codeine      Paralysis right body    Flexeril [cyclobenzaprine] Other (See Comments)     Numbness on right side of body    Nsaids (non-steroidal anti-inflammatory drug) Diarrhea and Nausea And Vomiting     Ulcers     Penicillins Anaphylaxis and Hives    Droperidol Anxiety and Other (See Comments)    Magnesium      "made me feel horrible"     Prochlorperazine Anxiety and Palpitations     Past Medical History:   Diagnosis Date    Agoraphobia     Carpal tunnel syndrome     Depression     Diabetes mellitus, type 2     DVT (deep venous thrombosis)     2019 had blood clot in right arm    Fibromyalgia     Hypertension     Migraine headache     Nerve injury 08/2016    Lingual Nerve Injury    Obese     Pain management     PTSD (post-traumatic stress disorder)     Trigeminal neuralgia      Past Surgical History:   Procedure Laterality Date    CARPAL TUNNEL RELEASE Right 12/27/2018    Dr Lozada     CARPAL TUNNEL RELEASE Left 01/28/2020    Procedure: RELEASE, CARPAL TUNNEL;  Surgeon: Brendon Lozada Jr., MD;  Location: [facility];  Service: Orthopedics;  " Laterality: Left;    CHOLECYSTECTOMY N/A 10/31/2022    MOUTH SURGERY      NERVE GRAFT  2016    WISDOM TOOTH EXTRACTION       Family History   Problem Relation Name Age of Onset    Depression Mother      Diabetes Mother      No Known Problems Father      No Known Problems Daughter      No Known Problems Son      Cancer Maternal Aunt          endometrial    No Known Problems Maternal Uncle      No Known Problems Paternal Aunt      No Known Problems Paternal Uncle      No Known Problems Maternal Grandmother      No Known Problems Maternal Grandfather      No Known Problems Paternal Grandmother      No Known Problems Paternal Grandfather       Social History     Tobacco Use    Smoking status: Never    Smokeless tobacco: Never   Substance Use Topics    Alcohol use: Yes     Comment: occasional    Drug use: No     Review of Systems    Physical Exam     Initial Vitals [08/14/24 1702]   BP Pulse Resp Temp SpO2   (!) 167/105 100 18 98 °F (36.7 °C) 99 %      MAP       --         Physical Exam    ED Course   Procedures  Labs Reviewed   URINALYSIS, REFLEX TO URINE CULTURE - Abnormal       Result Value    Specimen UA Urine, Clean Catch      Color, UA Orange (*)     Appearance, UA Cloudy (*)     pH, UA 6.0      Specific Gravity, UA >1.030 (*)     Protein, UA 1+ (*)     Glucose, UA Negative      Ketones, UA Trace (*)     Bilirubin (UA) Negative      Occult Blood UA 3+ (*)     Nitrite, UA Negative      Urobilinogen, UA Negative      Leukocytes, UA 1+ (*)     Narrative:     Specimen Source->Urine   URINALYSIS MICROSCOPIC - Abnormal    RBC, UA >100 (*)     WBC, UA >100 (*)     WBC Clumps, UA Many (*)     Bacteria Occasional      Squam Epithel, UA 3      Hyaline Casts, UA 0      Microscopic Comment SEE COMMENT      Narrative:     Specimen Source->Urine   COMPREHENSIVE METABOLIC PANEL - Abnormal    Sodium 142      Potassium 3.7      Chloride 108      CO2 20 (*)     Glucose 117 (*)     BUN 12      Creatinine 0.9      Calcium 9.0       Total Protein 7.2      Albumin 4.0      Total Bilirubin 0.3      Alkaline Phosphatase 44 (*)     AST 18      ALT 12      eGFR >60.0      Anion Gap 14     CULTURE, URINE   CBC W/ AUTO DIFFERENTIAL   COMPREHENSIVE METABOLIC PANEL   LIPASE   LIPASE    Lipase 59     PREGNANCY TEST, URINE RAPID   PREGNANCY TEST, URINE RAPID    Preg Test, Ur Negative     CBC W/ AUTO DIFFERENTIAL    WBC 9.43      RBC 4.57      Hemoglobin 13.1      Hematocrit 40.4      MCV 88      MCH 28.7      MCHC 32.4      RDW 13.6      Platelets 333      MPV 9.5      Immature Granulocytes 0.2      Gran # (ANC) 5.9      Immature Grans (Abs) 0.02      Lymph # 2.9      Mono # 0.5      Eos # 0.1      Baso # 0.02      nRBC 0      Gran % 62.6      Lymph % 31.2      Mono % 5.1      Eosinophil % 0.7      Basophil % 0.2      Differential Method Automated     POCT URINE PREGNANCY          Imaging Results              CT Abdomen Pelvis With IV Contrast NO Oral Contrast (Final result)  Result time 08/14/24 23:25:48      Final result by Reno Enciso MD (08/14/24 23:25:48)                   Impression:      No acute findings.      Electronically signed by: Reno Enciso  Date:    08/14/2024  Time:    23:25               Narrative:    EXAMINATION:  CT ABDOMEN PELVIS WITH IV CONTRAST    CLINICAL HISTORY:  Abdominal pain, acute, nonlocalized;    TECHNIQUE:  Low dose axial images, sagittal and coronal reformations were obtained from the lung bases to the pubic symphysis following the IV administration of 100 mL of Omnipaque 350 .  Oral contrast was not administered.    COMPARISON:  04/29/2024    FINDINGS:  Abdomen:    - Lower thorax:Unremarkable.    - Lung bases: No infiltrates and no nodules.    - Liver: No focal mass.    - Gallbladder: Cholecystectomy.    - Bile Ducts: No evidence of intra or extra hepatic biliary ductal dilation.    - Spleen: Negative.    - Kidneys: No mass or hydronephrosis.    - Adrenals: Unremarkable.    - Pancreas: No mass or  peripancreatic fat stranding.    - Retroperitoneum:  No significant adenopathy.    - Vascular: No abdominal aortic aneurysm.    - Abdominal wall:  Unremarkable.    Pelvis:    No pelvic mass, adenopathy, or free fluid.    Bowel/Mesentery:    No evidence of bowel obstruction or inflammation.    Bones:  No acute osseous abnormality and no suspicious lytic or blastic lesion.                                       Medications   ciprofloxacin HCl tablet 500 mg (has no administration in time range)   morphine injection 4 mg (4 mg Intravenous Given 8/14/24 1915)   ondansetron injection 4 mg (4 mg Intravenous Given 8/14/24 1916)   HYDROmorphone injection 1 mg (1 mg Intravenous Given 8/14/24 2041)   iohexoL (OMNIPAQUE 350) injection 100 mL (100 mLs Intravenous Given 8/14/24 2209)     Medical Decision Making  [age] year female initial assessment in mild distress secondary to back pain.  Patient is alert and oriented x3, neurologically neurovascular intact no focal deficits.  She is nontoxic-appearing and vitals stable at this time.    Differential diagnosis: UTI, pyelonephritis, nephrolithiasis, ovarian torsion, ectopic pregnancy    Amount and/or Complexity of Data Reviewed  Labs: ordered. Decision-making details documented in ED Course.  Radiology: ordered. Decision-making details documented in ED Course.    Risk  Prescription drug management.  Risk Details: Patient has been reassessed noted to have no acute changes in her condition.  CT is negative for any acute pathologic findings.  Patient does have urinary tract infection in his symptomatic receive a tenderness likely due to pyelonephritis.  She will continue take antibiotics and pain meds on outpatient basis and follow up with primary care physician as needed.  Patient has remained stable while in the ED and discharged home stable condition with follow-up as discussed.  Ms. Guerrero is aware of the plan and in agreement with discharge.    Patient's plan and diagnosis was  discussed. All questions were answered and patient was comfortable with the plan. This patient was personally seen and personally examined by me and I personally performed the services described in this documentation.   Complexity of the visit is established by the note or I have spent at least the amount of time discussing findings, exam and/or radiographs or imaging studies.     MD uses EPIC and voice recognition software prone to occasional and minor errors that may persist in the medical record.                                        Clinical Impression:  Final diagnoses:  [M54.41] Acute midline low back pain with right-sided sciatica (Primary)  [N12] Pyelonephritis  [N30.01] Acute cystitis with hematuria          ED Disposition Condition    Discharge Stable          ED Prescriptions       Medication Sig Dispense Start Date End Date Auth. Provider    oxyCODONE-acetaminophen (PERCOCET) 5-325 mg per tablet Take 1 tablet by mouth every 6 (six) hours as needed for Pain. 11 tablet 8/14/2024 -- Crow Anaya MD    sulfamethoxazole-trimethoprim 800-160mg (BACTRIM DS) 800-160 mg Tab Take 1 tablet by mouth 2 (two) times daily. for 7 days 14 tablet 8/14/2024 8/21/2024 Crow Anaya MD          Follow-up Information       Follow up With Specialties Details Why Contact Info Additional Information    [facility] - Emergency Dept Emergency Medicine  As needed, If symptoms worsen [address] [address]  [phone] 1st floor    Enoch Gerardo NP Family Medicine Schedule an appointment as soon as possible for a visit  As needed, If symptoms worsen [address]  [address]  [place] [address]  [phone]                Crow Anaya MD  08/14/24 6471

## 2024-08-16 LAB
BACTERIA UR CULT: NORMAL
BACTERIA UR CULT: NORMAL

## 2024-08-22 ENCOUNTER — PATIENT MESSAGE (OUTPATIENT)
Dept: FAMILY MEDICINE | Facility: CLINIC | Age: 34
End: 2024-08-22
Payer: MEDICAID

## 2024-08-22 DIAGNOSIS — E11.9 TYPE 2 DIABETES MELLITUS WITHOUT COMPLICATION, WITHOUT LONG-TERM CURRENT USE OF INSULIN: ICD-10-CM

## 2024-08-25 ENCOUNTER — OFFICE VISIT (OUTPATIENT)
Dept: URGENT CARE | Facility: CLINIC | Age: 34
End: 2024-08-25
Payer: MEDICAID

## 2024-08-25 ENCOUNTER — PATIENT MESSAGE (OUTPATIENT)
Dept: ORTHOPEDICS | Facility: CLINIC | Age: 34
End: 2024-08-25
Payer: MEDICAID

## 2024-08-25 VITALS
OXYGEN SATURATION: 99 % | DIASTOLIC BLOOD PRESSURE: 78 MMHG | BODY MASS INDEX: 48.82 KG/M2 | RESPIRATION RATE: 16 BRPM | SYSTOLIC BLOOD PRESSURE: 111 MMHG | TEMPERATURE: 98 F | HEIGHT: 65 IN | HEART RATE: 107 BPM | WEIGHT: 293 LBS

## 2024-08-25 DIAGNOSIS — Z79.891 CHRONIC PRESCRIPTION OPIATE USE: ICD-10-CM

## 2024-08-25 DIAGNOSIS — M54.42 CHRONIC LEFT-SIDED LOW BACK PAIN WITH LEFT-SIDED SCIATICA: Primary | ICD-10-CM

## 2024-08-25 DIAGNOSIS — G89.29 CHRONIC LEFT-SIDED LOW BACK PAIN WITH LEFT-SIDED SCIATICA: Primary | ICD-10-CM

## 2024-08-25 PROCEDURE — 99213 OFFICE O/P EST LOW 20 MIN: CPT | Mod: S$GLB,,,

## 2024-08-25 RX ORDER — ALPRAZOLAM 1 MG/1
TABLET ORAL
COMMUNITY

## 2024-08-25 RX ORDER — ARIPIPRAZOLE 20 MG/1
TABLET ORAL
COMMUNITY

## 2024-08-25 RX ORDER — DICYCLOMINE HYDROCHLORIDE 20 MG/1
20 TABLET ORAL EVERY 8 HOURS PRN
COMMUNITY
Start: 2024-07-18

## 2024-08-25 RX ORDER — KETOROLAC TROMETHAMINE 30 MG/ML
30 INJECTION, SOLUTION INTRAMUSCULAR; INTRAVENOUS
Status: COMPLETED | OUTPATIENT
Start: 2024-08-25 | End: 2024-08-25

## 2024-08-25 RX ORDER — ORPHENADRINE CITRATE 100 MG/1
TABLET, EXTENDED RELEASE ORAL
COMMUNITY

## 2024-08-25 RX ADMIN — KETOROLAC TROMETHAMINE 30 MG: 30 INJECTION, SOLUTION INTRAMUSCULAR; INTRAVENOUS at 04:08

## 2024-08-25 NOTE — PROGRESS NOTES
"Subjective:      Patient ID: Jasper Guerrero is a 33 y.o. female.    Vitals:  height is 5' 5" (1.651 m) and weight is 143.8 kg (317 lb) (abnormal). Her temperature is 97.9 °F (36.6 °C). Her blood pressure is 111/78 and her pulse is 107. Her respiration is 16 and oxygen saturation is 99%.     Chief Complaint: Back Pain    Pt c/o lower back pain that radiates up her back and occasionally will radiate down her leg. Pt states the pain started almost 4 months ago shortly after giving birth and has worsened over time. Pt has been to the ER, her primary and OB. Pt states the ER hasn't done an x-ray or ct on her back and her primary said she has to do 6 weeks of PT before she can get an MRI.     Back Pain  Pertinent negatives include no fever.       Constitution: Positive for activity change. Negative for chills, fatigue and fever.   HENT: Negative.     Cardiovascular: Negative.    Respiratory: Negative.     Musculoskeletal:  Positive for pain, back pain, muscle ache and history of spine disorder.   Psychiatric/Behavioral: Negative.        Objective:     Physical Exam   Constitutional: She is oriented to person, place, and time. She appears well-developed. She is cooperative. No distress.   HENT:   Head: Normocephalic and atraumatic.   Nose: Nose normal.   Mouth/Throat: Oropharynx is clear and moist and mucous membranes are normal.   Eyes: Conjunctivae and lids are normal.   Neck: Trachea normal and phonation normal. Neck supple.   Cardiovascular: Normal rate.   Pulmonary/Chest: Effort normal. No respiratory distress.   Abdominal: Normal appearance. Soft.   Musculoskeletal:         General: Tenderness present. No swelling or signs of injury.      Lumbar back: She exhibits decreased range of motion and spasm. She exhibits no swelling.        Back:    Neurological: She is alert and oriented to person, place, and time. She has normal strength and normal reflexes. She displays no weakness.   Skin: Skin is warm, dry, intact " and not diaphoretic.   Psychiatric: Her speech is normal and behavior is normal. Mood, judgment and thought content normal.   Nursing note and vitals reviewed.      Assessment:     1. Chronic left-sided low back pain with left-sided sciatica    2. Chronic Opiate Use        Plan:       Chronic left-sided low back pain with left-sided sciatica  -     Ambulatory referral/consult to Pain Clinic  -     ketorolac injection 30 mg    Chronic Opiate Use        Patient has not allergy to NSAIDs but states it aggravates her IBS.  She states she has had a Toradol shot in the past and does not think they gave her any negative reactions.     Referred patient to pain management for management of chronic back pain that worsened after delivery of 3rd child 4 months ago.     Discussed medication with patient who acknowledges understanding and is agreeable to POC. Follow up with primary care. Increase fluid intake. Red flags for ER discussed.

## 2024-08-26 ENCOUNTER — LAB VISIT (OUTPATIENT)
Dept: LAB | Facility: HOSPITAL | Age: 34
End: 2024-08-26
Attending: NURSE PRACTITIONER
Payer: MEDICAID

## 2024-08-26 ENCOUNTER — PATIENT MESSAGE (OUTPATIENT)
Dept: ORTHOPEDICS | Facility: CLINIC | Age: 34
End: 2024-08-26
Payer: MEDICAID

## 2024-08-26 DIAGNOSIS — M54.16 LUMBAR RADICULOPATHY: Primary | ICD-10-CM

## 2024-08-26 DIAGNOSIS — M51.36 LUMBAR DEGENERATIVE DISC DISEASE: ICD-10-CM

## 2024-08-26 DIAGNOSIS — R35.0 URINARY FREQUENCY: ICD-10-CM

## 2024-08-26 DIAGNOSIS — E78.49 OTHER HYPERLIPIDEMIA: ICD-10-CM

## 2024-08-26 DIAGNOSIS — E11.9 TYPE 2 DIABETES MELLITUS WITHOUT COMPLICATION, WITHOUT LONG-TERM CURRENT USE OF INSULIN: ICD-10-CM

## 2024-08-26 LAB
ALBUMIN/CREAT UR: 18.3 UG/MG (ref 0–30)
BILIRUB UR QL STRIP: NEGATIVE
CHOLEST SERPL-MCNC: 219 MG/DL (ref 120–199)
CHOLEST/HDLC SERPL: 5.5 {RATIO} (ref 2–5)
CLARITY UR: CLEAR
COLOR UR: YELLOW
CREAT UR-MCNC: 102.7 MG/DL (ref 15–325)
GLUCOSE UR QL STRIP: NEGATIVE
HDLC SERPL-MCNC: 40 MG/DL (ref 40–75)
HDLC SERPL: 18.3 % (ref 20–50)
HGB UR QL STRIP: NEGATIVE
KETONES UR QL STRIP: NEGATIVE
LDLC SERPL CALC-MCNC: 108.6 MG/DL (ref 63–159)
LEUKOCYTE ESTERASE UR QL STRIP: NEGATIVE
MICROALBUMIN UR DL<=1MG/L-MCNC: 18.8 UG/ML
NITRITE UR QL STRIP: NEGATIVE
NONHDLC SERPL-MCNC: 179 MG/DL
PH UR STRIP: 6 [PH] (ref 5–8)
PROT UR QL STRIP: NEGATIVE
SP GR UR STRIP: 1.01 (ref 1–1.03)
T4 FREE SERPL-MCNC: 1.24 NG/DL (ref 0.71–1.51)
TRIGL SERPL-MCNC: 352 MG/DL (ref 30–150)
TSH SERPL DL<=0.005 MIU/L-ACNC: 0.02 UIU/ML (ref 0.34–5.6)
URN SPEC COLLECT METH UR: NORMAL
UROBILINOGEN UR STRIP-ACNC: NEGATIVE EU/DL

## 2024-08-26 PROCEDURE — 81003 URINALYSIS AUTO W/O SCOPE: CPT | Performed by: NURSE PRACTITIONER

## 2024-08-26 PROCEDURE — 84439 ASSAY OF FREE THYROXINE: CPT | Performed by: NURSE PRACTITIONER

## 2024-08-26 PROCEDURE — 36415 COLL VENOUS BLD VENIPUNCTURE: CPT | Performed by: NURSE PRACTITIONER

## 2024-08-26 PROCEDURE — 82043 UR ALBUMIN QUANTITATIVE: CPT | Performed by: NURSE PRACTITIONER

## 2024-08-26 PROCEDURE — 82570 ASSAY OF URINE CREATININE: CPT | Performed by: NURSE PRACTITIONER

## 2024-08-26 PROCEDURE — 80061 LIPID PANEL: CPT | Performed by: NURSE PRACTITIONER

## 2024-08-26 PROCEDURE — 84443 ASSAY THYROID STIM HORMONE: CPT | Performed by: NURSE PRACTITIONER

## 2024-08-27 ENCOUNTER — PATIENT MESSAGE (OUTPATIENT)
Dept: FAMILY MEDICINE | Facility: CLINIC | Age: 34
End: 2024-08-27
Payer: MEDICAID

## 2024-08-29 ENCOUNTER — OFFICE VISIT (OUTPATIENT)
Dept: FAMILY MEDICINE | Facility: CLINIC | Age: 34
End: 2024-08-29
Payer: MEDICAID

## 2024-08-29 VITALS
WEIGHT: 293 LBS | BODY MASS INDEX: 48.82 KG/M2 | DIASTOLIC BLOOD PRESSURE: 72 MMHG | OXYGEN SATURATION: 97 % | HEART RATE: 105 BPM | HEIGHT: 65 IN | SYSTOLIC BLOOD PRESSURE: 100 MMHG

## 2024-08-29 DIAGNOSIS — E78.5 HYPERLIPIDEMIA, UNSPECIFIED HYPERLIPIDEMIA TYPE: ICD-10-CM

## 2024-08-29 DIAGNOSIS — E11.9 TYPE 2 DIABETES MELLITUS WITHOUT COMPLICATION, WITHOUT LONG-TERM CURRENT USE OF INSULIN: Primary | ICD-10-CM

## 2024-08-29 DIAGNOSIS — R79.89 LOW TSH LEVEL: ICD-10-CM

## 2024-08-29 DIAGNOSIS — I10 PRIMARY HYPERTENSION: ICD-10-CM

## 2024-08-29 LAB — HBA1C MFR BLD: 5.5 %

## 2024-08-29 RX ORDER — METOPROLOL SUCCINATE 50 MG/1
50 TABLET, EXTENDED RELEASE ORAL DAILY
Qty: 90 TABLET | Refills: 1 | Status: SHIPPED | OUTPATIENT
Start: 2024-08-29

## 2024-08-29 RX ORDER — ATORVASTATIN CALCIUM 10 MG/1
10 TABLET, FILM COATED ORAL DAILY
Qty: 90 TABLET | Refills: 0 | Status: SHIPPED | OUTPATIENT
Start: 2024-08-29

## 2024-08-29 RX ORDER — METFORMIN HYDROCHLORIDE 500 MG/1
500 TABLET, EXTENDED RELEASE ORAL DAILY
Qty: 90 TABLET | Refills: 1 | Status: SHIPPED | OUTPATIENT
Start: 2024-08-29

## 2024-08-29 NOTE — PROGRESS NOTES
SUBJECTIVE:      Patient ID: Jasper Guerrero is a 33 y.o. female.    Chief Complaint: Diabetes    Patient is here today to f/u on dm and htn. She was started on mounjaro at her previous ov. Tolerating well, She is losing weight. Following with Dr Maher for back pain     Diabetes  She presents for her follow-up diabetic visit. She has type 2 diabetes mellitus. Her disease course has been improving. Pertinent negatives for hypoglycemia include no confusion, dizziness, headaches, nervousness/anxiousness, pallor or speech difficulty. Pertinent negatives for diabetes include no chest pain and no weakness. There are no hypoglycemic complications. Symptoms are stable. Risk factors for coronary artery disease include obesity, sedentary lifestyle and hypertension. Current diabetic treatment includes oral agent (monotherapy) (GLP1). She is compliant with treatment most of the time. She is following a generally healthy diet. Meal planning includes avoidance of concentrated sweets. An ACE inhibitor/angiotensin II receptor blocker is not being taken. She does not see a podiatrist.Eye exam is not current.   Hypertension  This is a chronic problem. The current episode started more than 1 year ago. The problem is unchanged. The problem is controlled. Pertinent negatives include no chest pain, headaches, palpitations or shortness of breath. There are no associated agents to hypertension. Risk factors for coronary artery disease include diabetes mellitus, obesity and sedentary lifestyle. Past treatments include beta blockers. The current treatment provides significant improvement. Compliance problems include exercise and diet.        Past Surgical History:   Procedure Laterality Date    CARPAL TUNNEL RELEASE Right 12/27/2018    Dr Lozada     CARPAL TUNNEL RELEASE Left 01/28/2020    Procedure: RELEASE, CARPAL TUNNEL;  Surgeon: Brendon Lozada Jr., MD;  Location: [facility];  Service: Orthopedics;  Laterality: Left;     CHOLECYSTECTOMY N/A 10/31/2022    MOUTH SURGERY      NERVE GRAFT  2016    WISDOM TOOTH EXTRACTION       Family History   Problem Relation Name Age of Onset    Depression Mother      Diabetes Mother      No Known Problems Father      No Known Problems Daughter      No Known Problems Son      Cancer Maternal Aunt          endometrial    No Known Problems Maternal Uncle      No Known Problems Paternal Aunt      No Known Problems Paternal Uncle      No Known Problems Maternal Grandmother      No Known Problems Maternal Grandfather      No Known Problems Paternal Grandmother      No Known Problems Paternal Grandfather        Social History     Socioeconomic History    Marital status:    Tobacco Use    Smoking status: Never    Smokeless tobacco: Never   Substance and Sexual Activity    Alcohol use: Yes     Comment: occasional    Drug use: No    Sexual activity: Yes     Partners: Male     Social Determinants of Health     Financial Resource Strain: Low Risk  (6/2/2024)    Received from [facility]    Overall Financial Resource Strain (CARDIA)     Difficulty of Paying Living Expenses: Not hard at all   Food Insecurity: No Food Insecurity (6/2/2024)    Received from [facility]    Hunger Vital Sign     Worried About Running Out of Food in the Last Year: Never true     Ran Out of Food in the Last Year: Never true   Transportation Needs: No Transportation Needs (6/2/2024)    Received from [facility]    PRAPARE - Transportation     Lack of Transportation (Medical): No     Lack of Transportation (Non-Medical): No   Physical Activity: Inactive (6/2/2024)    Received from [facility]    Exercise Vital Sign     Days of Exercise per Week: 0 days     Minutes of Exercise per Session: 0 min   Stress: Stress Concern Present (6/2/2024)    Received from [facility]    [employer] [place] [employer] - Occupational Stress Questionnaire     Feeling of Stress : Rather much   Housing Stability: Low Risk  (10/22/2023)     "Housing Stability Vital Sign     Unable to Pay for Housing in the Last Year: No     Number of Places Lived in the Last Year: 1     Unstable Housing in the Last Year: No     Current Outpatient Medications   Medication Sig Dispense Refill    ABILIFY 20 mg Tab       acetaminophen (TYLENOL) 500 MG tablet Take 500 mg by mouth every 6 (six) hours as needed for Pain. [id number] daily      busPIRone (BUSPAR) 30 MG Tab 30 mg 3 (three) times daily.      dicyclomine (BENTYL) 20 mg tablet Take 20 mg by mouth every 8 (eight) hours as needed.      fluvoxaMINE (LUVOX) 100 MG tablet 200 mg nightly.      ondansetron (ZOFRAN) 4 MG tablet Take 1 tablet (4 mg total) by mouth every 12 (twelve) hours as needed for Nausea. 60 tablet 0    orphenadrine (NORFLEX) 100 mg tablet       tiZANidine (ZANAFLEX) 4 MG tablet Take 1 tablet (4 mg total) by mouth nightly as needed (SPASM). 30 tablet 0    venlafaxine (EFFEXOR-XR) 75 MG 24 hr capsule Take 75 mg by mouth.      XANAX 1 mg tablet       atorvastatin (LIPITOR) 10 MG tablet Take 1 tablet (10 mg total) by mouth once daily. 90 tablet 0    metFORMIN (GLUCOPHAGE-XR) 500 MG ER 24hr tablet Take 1 tablet (500 mg total) by mouth once daily. 90 tablet 1    metoprolol succinate (TOPROL-XL) 50 MG 24 hr tablet Take 1 tablet (50 mg total) by mouth once daily. 90 tablet 1    tirzepatide 7.5 mg/0.5 mL PnIj Inject 7.5 mg into the skin every 7 days. 12 Pen 1     No current facility-administered medications for this visit.     Review of patient's allergies indicates:   Allergen Reactions    Benadryl [diphenhydramine hcl]      Paralysis on right side body     Codeine      Paralysis right body    Flexeril [cyclobenzaprine] Other (See Comments)     Numbness on right side of body    Nsaids (non-steroidal anti-inflammatory drug) Diarrhea and Nausea And Vomiting     Ulcers     Penicillins Anaphylaxis and Hives    Droperidol Anxiety and Other (See Comments)    Magnesium      "made me feel horrible"     Prochlorperazine " Anxiety and Palpitations      Past Medical History:   Diagnosis Date    Agoraphobia     Carpal tunnel syndrome     Depression     Diabetes mellitus, type 2     DVT (deep venous thrombosis)     2019 had blood clot in right arm    Fibromyalgia     Hypertension     Migraine headache     Nerve injury 08/2016    Lingual Nerve Injury    Obese     Pain management     PTSD (post-traumatic stress disorder)     Trigeminal neuralgia      Past Surgical History:   Procedure Laterality Date    CARPAL TUNNEL RELEASE Right 12/27/2018    Dr Lozada     CARPAL TUNNEL RELEASE Left 01/28/2020    Procedure: RELEASE, CARPAL TUNNEL;  Surgeon: Brendon Lozada Jr., MD;  Location: [facility];  Service: Orthopedics;  Laterality: Left;    CHOLECYSTECTOMY N/A 10/31/2022    MOUTH SURGERY      NERVE GRAFT  2016    WISDOM TOOTH EXTRACTION         Review of Systems   Constitutional:  Negative for appetite change, chills, diaphoresis and unexpected weight change.   HENT:  Negative for ear discharge, hearing loss, rhinorrhea, trouble swallowing and voice change.    Eyes:  Negative for photophobia and pain.   Respiratory:  Negative for chest tightness, shortness of breath, wheezing and stridor.    Cardiovascular:  Negative for chest pain and palpitations.   Gastrointestinal:  Negative for blood in stool and vomiting.   Endocrine: Negative for cold intolerance and heat intolerance.   Genitourinary:  Negative for difficulty urinating and flank pain.   Musculoskeletal:  Positive for back pain. Negative for gait problem, joint swelling and neck stiffness.   Skin:  Negative for pallor.   Neurological:  Negative for dizziness, speech difficulty, weakness, light-headedness and headaches.   Hematological:  Does not bruise/bleed easily.   Psychiatric/Behavioral:  Negative for confusion, dysphoric mood, self-injury, sleep disturbance and suicidal ideas. The patient is not nervous/anxious.       OBJECTIVE:      Vitals:    08/29/24 1256   BP: 100/72   Pulse: 105  "  SpO2: 97%   Weight: (!) 142.4 kg (314 lb)   Height: 5' 5" (1.651 m)     Physical Exam  Vitals and nursing note reviewed.   Constitutional:       General: She is not in acute distress.     Appearance: She is well-developed.   HENT:      Head: Normocephalic and atraumatic.      Right Ear: Tympanic membrane normal.      Left Ear: Tympanic membrane normal.      Nose: Nose normal.      Mouth/Throat:      Pharynx: Uvula midline.   Eyes:      General: Lids are normal.      Conjunctiva/sclera: Conjunctivae normal.      Pupils: Pupils are equal, round, and reactive to light.      Right eye: Pupil is round and reactive.      Left eye: Pupil is round and reactive.   Neck:      Thyroid: No thyromegaly.      Vascular: No JVD.      Trachea: Trachea normal.   Cardiovascular:      Rate and Rhythm: Regular rhythm.      Pulses: Normal pulses.      Heart sounds: Normal heart sounds. No murmur heard.  Pulmonary:      Effort: Pulmonary effort is normal. No tachypnea or respiratory distress.      Breath sounds: Normal breath sounds. No wheezing, rhonchi or rales.   Abdominal:      General: Bowel sounds are normal.      Palpations: Abdomen is soft.      Tenderness: There is no abdominal tenderness.   Musculoskeletal:         General: Normal range of motion.      Cervical back: Normal range of motion and neck supple.      Right lower leg: No edema.      Left lower leg: No edema.   Lymphadenopathy:      Cervical: No cervical adenopathy.   Skin:     General: Skin is warm and dry.      Findings: No rash.   Neurological:      Mental Status: She is alert and oriented to person, place, and time.   Psychiatric:         Mood and Affect: Mood normal.         Speech: Speech normal.         Behavior: Behavior normal. Behavior is cooperative.         Thought Content: Thought content normal.         Judgment: Judgment normal.        Last visit note, most recent available labs, and health maintenance reviewed    Office Visit on 08/29/2024   Component " Date Value Ref Range Status    Hemoglobin A1C, POC 08/29/2024 5.5  % Final   Lab Visit on 08/26/2024   Component Date Value Ref Range Status    Cholesterol 08/26/2024 219 (H)  120 - 199 mg/dL Final    Comment: The National Cholesterol Education Program (NCEP) has set the  following guidelines (reference ranges) for Cholesterol:  Optimal.....................<200 mg/dL  Borderline High.............200-239 mg/dL  High........................> or = 240 mg/dL      Triglycerides 08/26/2024 352 (H)  30 - 150 mg/dL Final    Comment: The National Cholesterol Education Program (NCEP) has set the  following guidelines (reference values) for triglycerides:  Normal......................<150 mg/dL  Borderline High.............150-199 mg/dL  High........................200-499 mg/dL      HDL 08/26/2024 40  40 - 75 mg/dL Final    Comment: The National Cholesterol Education Program (NCEP) has set the  following guidelines (reference values) for HDL Cholesterol:  Low...............<40 mg/dL  Optimal...........>60 mg/dL      LDL Cholesterol 08/26/2024 108.6  63.0 - 159.0 mg/dL Final    Comment: The National Cholesterol Education Program (NCEP) has set the  following guidelines (reference values) for LDL Cholesterol:  Optimal.......................<130 mg/dL  Borderline High...............130-159 mg/dL  High..........................160-189 mg/dL  Very High.....................>190 mg/dL      HDL/Cholesterol Ratio 08/26/2024 18.3 (L)  20.0 - 50.0 % Final    Total Cholesterol/HDL Ratio 08/26/2024 5.5 (H)  2.0 - 5.0 Final    Non-HDL Cholesterol 08/26/2024 179  mg/dL Final    Comment: Risk category and Non-HDL cholesterol goals:  Coronary heart disease (CHD)or equivalent (10-year risk of CHD >20%):  Non-HDL cholesterol goal     <130 mg/dL  Two or more CHD risk factors and 10-year risk of CHD <= 20%:  Non-HDL cholesterol goal     <160 mg/dL  0 to 1 CHD risk factor:  Non-HDL cholesterol goal     <190 mg/dL      TSH 08/26/2024 0.023 (L)   0.340 - 5.600 uIU/mL Final    Microalbumin, Urine 08/26/2024 18.8  <19.9 ug/mL Final    Creatinine, Urine 08/26/2024 102.7  15.0 - 325.0 mg/dL Final    Comment: The random urine reference ranges provided were established   for 24 hour urine collections.  No reference ranges exist for  random urine specimens.  Correlate clinically.      Microalb/Creat Ratio 08/26/2024 18.3  0.0 - 30.0 ug/mg Final    Specimen UA 08/26/2024 Urine, Clean Catch   Final    Color, UA 08/26/2024 Yellow  Yellow, Straw, Cynthia Final    Appearance, UA 08/26/2024 Clear  Clear Final    pH, UA 08/26/2024 6.0  5.0 - 8.0 Final    Specific Gravity, UA 08/26/2024 1.015  1.005 - 1.030 Final    Protein, UA 08/26/2024 Negative  Negative Final    Comment: Recommend a 24 hour urine protein or a urine   protein/creatinine ratio if globulin induced proteinuria is  clinically suspected.      Glucose, UA 08/26/2024 Negative  Negative Final    Ketones, UA 08/26/2024 Negative  Negative Final    Bilirubin (UA) 08/26/2024 Negative  Negative Final    Occult Blood UA 08/26/2024 Negative  Negative Final    Nitrite, UA 08/26/2024 Negative  Negative Final    Urobilinogen, UA 08/26/2024 Negative  Negative EU/dL Final    Leukocytes, UA 08/26/2024 Negative  Negative Final    Free T4 08/26/2024 1.24  0.71 - 1.51 ng/dL Final   Admission on 08/14/2024, Discharged on 08/14/2024   Component Date Value Ref Range Status    Specimen UA 08/14/2024 Urine, Clean Catch   Final    Color, UA 08/14/2024 Orange (A)  Yellow, Straw, Cynthia Final    Appearance, UA 08/14/2024 Cloudy (A)  Clear Final    pH, UA 08/14/2024 6.0  5.0 - 8.0 Final    Specific Gravity, UA 08/14/2024 >1.030 (A)  1.005 - 1.030 Final    Protein, UA 08/14/2024 1+ (A)  Negative Final    Comment: Recommend a 24 hour urine protein or a urine   protein/creatinine ratio if globulin induced proteinuria is  clinically suspected.      Glucose, UA 08/14/2024 Negative  Negative Final    Ketones, UA 08/14/2024 Trace (A)  Negative  Final    Bilirubin (UA) 08/14/2024 Negative  Negative Final    Occult Blood UA 08/14/2024 3+ (A)  Negative Final    Nitrite, UA 08/14/2024 Negative  Negative Final    Urobilinogen, UA 08/14/2024 Negative  Negative EU/dL Final    Leukocytes, UA 08/14/2024 1+ (A)  Negative Final    Lipase 08/14/2024 59  4 - 60 U/L Final    RBC, UA 08/14/2024 >100 (H)  0 - 4 /hpf Final    WBC, UA 08/14/2024 >100 (H)  0 - 5 /hpf Final    WBC Clumps, UA 08/14/2024 Many (A)  None-Rare Final    Bacteria 08/14/2024 Occasional  None-Occ /hpf Final    Squam Epithel, UA 08/14/2024 3  /hpf Final    Hyaline Casts, UA 08/14/2024 0  0-1/lpf /lpf Final    Microscopic Comment 08/14/2024 SEE COMMENT   Final    Comment: Other formed elements not mentioned in the report are not   present in the microscopic examination.       Urine Culture, Routine 08/14/2024 Multiple organisms isolated. None in predominance.  Repeat if   Final    Urine Culture, Routine 08/14/2024 clinically necessary.   Final    Preg Test, Ur 08/14/2024 Negative   Final    Sodium 08/14/2024 142  136 - 145 mmol/L Final    Potassium 08/14/2024 3.7  3.5 - 5.1 mmol/L Final    Chloride 08/14/2024 108  95 - 110 mmol/L Final    CO2 08/14/2024 20 (L)  23 - 29 mmol/L Final    Glucose 08/14/2024 117 (H)  70 - 110 mg/dL Final    BUN 08/14/2024 12  6 - 20 mg/dL Final    Creatinine 08/14/2024 0.9  0.5 - 1.4 mg/dL Final    Calcium 08/14/2024 9.0  8.7 - 10.5 mg/dL Final    Total Protein 08/14/2024 7.2  6.0 - 8.4 g/dL Final    Albumin 08/14/2024 4.0  3.5 - 5.2 g/dL Final    Total Bilirubin 08/14/2024 0.3  0.1 - 1.0 mg/dL Final    Comment: For infants and newborns, interpretation of results should be based  on gestational age, weight and in agreement with clinical  observations.    Premature Infant recommended reference ranges:  Up to 24 hours.............<8.0 mg/dL  Up to 48 hours............<12.0 mg/dL  3-5 days..................<15.0 mg/dL  6-29 days.................<15.0 mg/dL      Alkaline  Phosphatase 08/14/2024 44 (L)  55 - 135 U/L Final    AST 08/14/2024 18  10 - 40 U/L Final    ALT 08/14/2024 12  10 - 44 U/L Final    eGFR 08/14/2024 >60.0  >60 mL/min/1.73 m^2 Final    Anion Gap 08/14/2024 14  8 - 16 mmol/L Final    WBC 08/14/2024 9.43  3.90 - 12.70 K/uL Final    RBC 08/14/2024 4.57  4.00 - 5.40 M/uL Final    Hemoglobin 08/14/2024 13.1  12.0 - 16.0 g/dL Final    Hematocrit 08/14/2024 40.4  37.0 - 48.5 % Final    MCV 08/14/2024 88  82 - 98 fL Final    MCH 08/14/2024 28.7  27.0 - 31.0 pg Final    MCHC 08/14/2024 32.4  32.0 - 36.0 g/dL Final    RDW 08/14/2024 13.6  11.5 - 14.5 % Final    Platelets 08/14/2024 333  150 - 450 K/uL Final    MPV 08/14/2024 9.5  9.2 - 12.9 fL Final    Immature Granulocytes 08/14/2024 0.2  0.0 - 0.5 % Final    Gran # (ANC) 08/14/2024 5.9  1.8 - 7.7 K/uL Final    Immature Grans (Abs) 08/14/2024 0.02  0.00 - 0.04 K/uL Final    Comment: Mild elevation in immature granulocytes is non specific and   can be seen in a variety of conditions including stress response,   acute inflammation, trauma and pregnancy. Correlation with other   laboratory and clinical findings is essential.      Lymph # 08/14/2024 2.9  1.0 - 4.8 K/uL Final    Mono # 08/14/2024 0.5  0.3 - 1.0 K/uL Final    Eos # 08/14/2024 0.1  0.0 - 0.5 K/uL Final    Baso # 08/14/2024 0.02  0.00 - 0.20 K/uL Final    nRBC 08/14/2024 0  0 /100 WBC Final    Gran % 08/14/2024 62.6  38.0 - 73.0 % Final    Lymph % 08/14/2024 31.2  18.0 - 48.0 % Final    Mono % 08/14/2024 5.1  4.0 - 15.0 % Final    Eosinophil % 08/14/2024 0.7  0.0 - 8.0 % Final    Basophil % 08/14/2024 0.2  0.0 - 1.9 % Final    Differential Method 08/14/2024 Automated   Final   Patient Outreach on 08/01/2024   Component Date Value Ref Range Status    PAP Recommendation External 07/17/2024 Pap in 5 years   Final    Pap 07/17/2024 Negative for intraephithelial lesion or malignancy  Negative for intraephithelial lesion or malignancy, Other Final    HPV DNA 07/17/2024  None Detected  None Detected Final   ]  Assessment:       1. Type 2 diabetes mellitus without complication, without long-term current use of insulin    2. Primary hypertension    3. Low TSH level    4. Hyperlipidemia, unspecified hyperlipidemia type        Plan:       Type 2 diabetes mellitus without complication, without long-term current use of insulin  -     POCT HEMOGLOBIN A1C              5.5  -     metFORMIN (GLUCOPHAGE-XR) 500 MG ER 24hr tablet; Take 1 tablet (500 mg total) by mouth once daily.  Dispense: 90 tablet; Refill: 1  -     tirzepatide 7.5 mg/0.5 mL PnIj; Inject 7.5 mg into the skin every 7 days.  Dispense: 12 Pen; Refill: 1  -     atorvastatin (LIPITOR) 10 MG tablet; Take 1 tablet (10 mg total) by mouth once daily.  Dispense: 90 tablet; Refill: 0  -     Hemoglobin A1C; Future; Expected date: 09/12/2024    Primary hypertension  -     metoprolol succinate (TOPROL-XL) 50 MG 24 hr tablet; Take 1 tablet (50 mg total) by mouth once daily.  Dispense: 90 tablet; Refill: 1    Low TSH level  -      Soft Tissue Head Neck; Future; Expected date: 08/29/2024  -     TSH; Future; Expected date: 10/10/2024  -     T4, Free; Future; Expected date: 08/29/2024    Hyperlipidemia, unspecified hyperlipidemia type  - start    atorvastatin (LIPITOR) 10 MG tablet; Take 1 tablet (10 mg total) by mouth once daily.  Dispense: 90 tablet; Refill: 0  -     Lipid Panel; Future; Expected date: 09/12/2024  -     Comprehensive Metabolic Panel; Future; Expected date: 09/12/2024        Follow up in about 5 months (around 1/29/2025) for dm.      8/29/2024 Enoch Gerardo, CHRISTOPHE, FNP

## 2024-08-30 ENCOUNTER — PATIENT MESSAGE (OUTPATIENT)
Dept: FAMILY MEDICINE | Facility: CLINIC | Age: 34
End: 2024-08-30
Payer: MEDICAID

## 2024-08-30 ENCOUNTER — PATIENT MESSAGE (OUTPATIENT)
Dept: ORTHOPEDICS | Facility: CLINIC | Age: 34
End: 2024-08-30
Payer: MEDICAID

## 2024-08-30 DIAGNOSIS — G89.29 CHRONIC THORACIC BACK PAIN, UNSPECIFIED BACK PAIN LATERALITY: ICD-10-CM

## 2024-08-30 DIAGNOSIS — M54.42 CHRONIC BILATERAL LOW BACK PAIN WITH BILATERAL SCIATICA: Primary | ICD-10-CM

## 2024-08-30 DIAGNOSIS — G89.29 CHRONIC BILATERAL LOW BACK PAIN WITH BILATERAL SCIATICA: Primary | ICD-10-CM

## 2024-08-30 DIAGNOSIS — M54.41 CHRONIC BILATERAL LOW BACK PAIN WITH BILATERAL SCIATICA: Primary | ICD-10-CM

## 2024-08-30 DIAGNOSIS — M54.6 CHRONIC THORACIC BACK PAIN, UNSPECIFIED BACK PAIN LATERALITY: ICD-10-CM

## 2024-08-30 DIAGNOSIS — M54.2 NECK PAIN: ICD-10-CM

## 2024-09-04 ENCOUNTER — PATIENT MESSAGE (OUTPATIENT)
Dept: FAMILY MEDICINE | Facility: CLINIC | Age: 34
End: 2024-09-04
Payer: MEDICAID

## 2024-09-04 DIAGNOSIS — M54.6 CHRONIC THORACIC BACK PAIN, UNSPECIFIED BACK PAIN LATERALITY: Primary | ICD-10-CM

## 2024-09-04 DIAGNOSIS — G89.29 CHRONIC THORACIC BACK PAIN, UNSPECIFIED BACK PAIN LATERALITY: Primary | ICD-10-CM

## 2024-09-04 RX ORDER — TIZANIDINE 4 MG/1
4 TABLET ORAL EVERY 8 HOURS
Qty: 30 TABLET | Refills: 0 | Status: SHIPPED | OUTPATIENT
Start: 2024-09-04 | End: 2024-09-14

## 2024-09-09 ENCOUNTER — OFFICE VISIT (OUTPATIENT)
Dept: ORTHOPEDICS | Facility: CLINIC | Age: 34
End: 2024-09-09
Payer: MEDICAID

## 2024-09-09 VITALS — WEIGHT: 293 LBS | BODY MASS INDEX: 48.82 KG/M2 | HEIGHT: 65 IN

## 2024-09-09 DIAGNOSIS — M51.36 LUMBAR DEGENERATIVE DISC DISEASE: ICD-10-CM

## 2024-09-09 DIAGNOSIS — M54.6 THORACIC BACK PAIN, UNSPECIFIED BACK PAIN LATERALITY, UNSPECIFIED CHRONICITY: Primary | ICD-10-CM

## 2024-09-09 DIAGNOSIS — M47.812 FACET ARTHRITIS OF CERVICAL REGION: ICD-10-CM

## 2024-09-09 DIAGNOSIS — M47.816 FACET ARTHRITIS OF LUMBAR REGION: ICD-10-CM

## 2024-09-09 PROCEDURE — 1159F MED LIST DOCD IN RCRD: CPT | Mod: CPTII,,, | Performed by: ORTHOPAEDIC SURGERY

## 2024-09-09 PROCEDURE — 3066F NEPHROPATHY DOC TX: CPT | Mod: CPTII,,, | Performed by: ORTHOPAEDIC SURGERY

## 2024-09-09 PROCEDURE — 99213 OFFICE O/P EST LOW 20 MIN: CPT | Mod: S$PBB,,, | Performed by: ORTHOPAEDIC SURGERY

## 2024-09-09 PROCEDURE — 99213 OFFICE O/P EST LOW 20 MIN: CPT | Mod: PBBFAC,PN | Performed by: ORTHOPAEDIC SURGERY

## 2024-09-09 PROCEDURE — 99999 PR PBB SHADOW E&M-EST. PATIENT-LVL III: CPT | Mod: PBBFAC,,, | Performed by: ORTHOPAEDIC SURGERY

## 2024-09-09 PROCEDURE — 3044F HG A1C LEVEL LT 7.0%: CPT | Mod: CPTII,,, | Performed by: ORTHOPAEDIC SURGERY

## 2024-09-09 PROCEDURE — 3061F NEG MICROALBUMINURIA REV: CPT | Mod: CPTII,,, | Performed by: ORTHOPAEDIC SURGERY

## 2024-09-09 PROCEDURE — 1160F RVW MEDS BY RX/DR IN RCRD: CPT | Mod: CPTII,,, | Performed by: ORTHOPAEDIC SURGERY

## 2024-09-09 PROCEDURE — 3008F BODY MASS INDEX DOCD: CPT | Mod: CPTII,,, | Performed by: ORTHOPAEDIC SURGERY

## 2024-09-09 RX ORDER — DICLOFENAC POTASSIUM 50 MG/1
50 TABLET, FILM COATED ORAL 2 TIMES DAILY
Qty: 60 TABLET | Refills: 0 | Status: SHIPPED | OUTPATIENT
Start: 2024-09-09 | End: 2024-09-09 | Stop reason: ALTCHOICE

## 2024-09-09 RX ORDER — METOCLOPRAMIDE 10 MG/1
10 TABLET ORAL 4 TIMES DAILY
COMMUNITY
Start: 2024-06-26

## 2024-09-09 RX ORDER — VENLAFAXINE HYDROCHLORIDE 150 MG/1
CAPSULE, EXTENDED RELEASE ORAL
COMMUNITY

## 2024-09-09 RX ORDER — TIZANIDINE 4 MG/1
4 TABLET ORAL EVERY 8 HOURS
Qty: 60 TABLET | Refills: 0 | Status: SHIPPED | OUTPATIENT
Start: 2024-09-09 | End: 2024-09-29

## 2024-09-09 NOTE — PROGRESS NOTES
Subjective:       Patient ID: Jasper Guerrero is a 33 y.o. female.    Chief Complaint: Pain of the Lumbar Spine (Patient is here for a lumbar, cervical and thoracic pain for about 4 months. Has constant shooting pain that radiates throughout whole back ), Pain of the Neck, and Pain of the Thoracic Spine      History of Present Illness    Prior to meeting with the patient I reviewed the medical chart in [facility]. This included reviewing the previous progress notes from our office, review of the patient's last appointment with their primary care provider, review of any visits to the emergency room, and review of any pain management appointments or procedures.   For follow-up chronic cervical thoracic and lumbar pain.  The greatest pain presently is in the thoracic area.  When last seen we recommended a course of physical therapy but she has family obligations that prevent her from going to physical therapy.  The pain is been present for greater than 15 years.  She has no radicular complaints to the upper or lower extremities.    Current Medications  Current Outpatient Medications   Medication Sig Dispense Refill    ABILIFY 20 mg Tab       acetaminophen (TYLENOL) 500 MG tablet Take 500 mg by mouth every 6 (six) hours as needed for Pain. 6279-0704 daily      atorvastatin (LIPITOR) 10 MG tablet Take 1 tablet (10 mg total) by mouth once daily. 90 tablet 0    busPIRone (BUSPAR) 30 MG Tab 30 mg 3 (three) times daily.      dicyclomine (BENTYL) 20 mg tablet Take 20 mg by mouth every 8 (eight) hours as needed.      fluvoxaMINE (LUVOX) 100 MG tablet 200 mg nightly.      metFORMIN (GLUCOPHAGE-XR) 500 MG ER 24hr tablet Take 1 tablet (500 mg total) by mouth once daily. 90 tablet 1    metoclopramide HCl (REGLAN) 10 MG tablet Take 10 mg by mouth 4 (four) times daily.      metoprolol succinate (TOPROL-XL) 50 MG 24 hr tablet Take 1 tablet (50 mg total) by mouth once daily. 90 tablet 1    ondansetron (ZOFRAN) 4 MG tablet Take 1 tablet  "(4 mg total) by mouth every 12 (twelve) hours as needed for Nausea. 60 tablet 0    tirzepatide 7.5 mg/0.5 mL PnIj Inject 7.5 mg into the skin every 7 days. 12 Pen 1    tiZANidine (ZANAFLEX) 4 MG tablet Take 1 tablet (4 mg total) by mouth every 8 (eight) hours. for 10 days 30 tablet 0    venlafaxine (EFFEXOR-XR) 150 MG Cp24 1 capsule with food Orally Once a day for 30 days      XANAX 1 mg tablet        No current facility-administered medications for this visit.       Allergies  Review of patient's allergies indicates:   Allergen Reactions    Benadryl [diphenhydramine hcl]      Paralysis on right side body     Codeine      Paralysis right body    Flexeril [cyclobenzaprine] Other (See Comments)     Numbness on right side of body    Nsaids (non-steroidal anti-inflammatory drug) Diarrhea and Nausea And Vomiting     Ulcers     Penicillins Anaphylaxis and Hives    Droperidol Anxiety and Other (See Comments)    Magnesium      "made me feel horrible"     Prochlorperazine Anxiety and Palpitations       Past Medical History  Past Medical History:   Diagnosis Date    Agoraphobia     Carpal tunnel syndrome     Depression     Diabetes mellitus, type 2     DVT (deep venous thrombosis)     2019 had blood clot in right arm    Fibromyalgia     Hypertension     Migraine headache     Nerve injury 08/2016    Lingual Nerve Injury    Obese     Pain management     PTSD (post-traumatic stress disorder)     Trigeminal neuralgia        Surgical History  Past Surgical History:   Procedure Laterality Date    CARPAL TUNNEL RELEASE Right 12/27/2018    Dr Lozada     CARPAL TUNNEL RELEASE Left 01/28/2020    Procedure: RELEASE, CARPAL TUNNEL;  Surgeon: Brendon Lozada Jr., MD;  Location: [facility] beds at the [facility];  Service: Orthopedics;  Laterality: Left;    CHOLECYSTECTOMY N/A 10/31/2022    MOUTH SURGERY      NERVE GRAFT  2016    WISDOM TOOTH EXTRACTION         Family History:   Family History   Problem Relation Name Age of Onset    Depression Mother      Diabetes " Mother      No Known Problems Father      No Known Problems Daughter      No Known Problems Son      Cancer Maternal Aunt          endometrial    No Known Problems Maternal Uncle      No Known Problems Paternal Aunt      No Known Problems Paternal Uncle      No Known Problems Maternal Grandmother      No Known Problems Maternal Grandfather      No Known Problems Paternal Grandmother      No Known Problems Paternal Grandfather         Social History:   Social History     Socioeconomic History    Marital status:    Tobacco Use    Smoking status: Never    Smokeless tobacco: Never   Substance and Sexual Activity    Alcohol use: Yes     Comment: occasional    Drug use: No    Sexual activity: Yes     Partners: Male     Social Determinants of Health     Financial Resource Strain: Low Risk  (6/2/2024)    Received from [place]    Overall Financial Resource Strain (CARDIA)     Difficulty of Paying Living Expenses: Not hard at all   Food Insecurity: No Food Insecurity (6/2/2024)    Received from [place]    Hunger Vital Sign     Worried About Running Out of Food in the Last Year: Never true     Ran Out of Food in the Last Year: Never true   Transportation Needs: No Transportation Needs (6/2/2024)    Received from [place]    PRAPARE - Transportation     Lack of Transportation (Medical): No     Lack of Transportation (Non-Medical): No   Physical Activity: Inactive (6/2/2024)    Received from [place]    Exercise Vital Sign     Days of Exercise per Week: 0 days     Minutes of Exercise per Session: 0 min   Stress: Stress Concern Present (6/2/2024)    Received from [place]    [place] [place] [employer] - Occupational Stress Questionnaire     Feeling of Stress : Rather much   Housing Stability: Low Risk  (10/22/2023)    Housing Stability Vital Sign     Unable to Pay for Housing in the Last Year: No     Number of Places Lived in the Last Year: 1     Unstable Housing in the Last Year: No        Hospitalization/Major Diagnostic Procedure:     Review of Systems     General/Constitutional:  Chills denies. Fatigue denies. Fever denies. Weight gain denies. Weight loss denies.    Respiratory:  Shortness of breath denies.    Cardiovascular:  Chest pain denies.    Gastrointestinal:  Constipation denies. Diarrhea denies. Nausea denies. Vomiting denies.     Hematology:  Easy bruising denies. Prolonged bleeding denies.     Genitourinary:  Frequent urination denies. Pain in lower back denies. Painful urination denies.     Musculoskeletal:  See HPI for details    Skin:  Rash denies.    Neurologic:  Dizziness denies. Gait abnormalities denies. Seizures denies. Tingling/Numbess denies.    Psychiatric:  Anxiety denies. Depressed mood denies.     Objective:   Vital Signs: There were no vitals filed for this visit.     Physical Exam      General Examination:     Constitutional: The patient is alert and oriented to lace person and time. Mood is pleasant.     Head/Face: Normal facial features normal eyebrows    Eyes: Normal extraocular motion bilaterally    Lungs: Respirations are equal and unlabored    Gait is coordinated.    Cardiovascular: There are no swelling or varicosities present.    Lymphatic: Negative for adenopathy    Skin: Normal    Neurological: Level of consciousness normal. Oriented to place person and time and situation    Psychiatric: Oriented to time place person and situation    Morbidly obese female moderate restriction of motion thoracic and lumbar areas diffuse tenderness midline and paraspinous muscles from the cervical thoracic junction to the thoracolumbar junction and upper lumbar region no spasm operative neurovascular normal both upper and lower extremities.  XRAY Report/ Interpretation:  No new x-rays today      Assessment:       1. Thoracic back pain, unspecified back pain laterality, unspecified chronicity    2. Lumbar degenerative disc disease    3. Facet arthritis of lumbar region    4.  Facet arthritis of cervical region    5. Adult BMI 50.0-59.9 kg/sq m        Plan:       Jasper was seen today for pain, pain and pain.    Diagnoses and all orders for this visit:    Thoracic back pain, unspecified back pain laterality, unspecified chronicity    Lumbar degenerative disc disease    Facet arthritis of lumbar region  -     Ambulatory referral/consult to Physical/Occupational Therapy; Future    Facet arthritis of cervical region  -     Ambulatory referral/consult to Physical/Occupational Therapy; Future    Adult BMI 50.0-59.9 kg/sq m         Follow up for 6-8 week f/u - cervical & lumbar pain, PT f/u.  Prescription for tizanidine today patient has alleged allergies anti-inflammatory medicines and codeine.  I reviewed the  profile on this patient and she has received oxycodone on several instances from the emergency room and I will not order an opiate medication for chronic pain  Patient is chronic thoracic and lumbar pain she has not attended physical therapy as was suggested.  This is a chronic condition.  I have explained to her that she must undergo physical therapy before we can give the opinion that conservative measures have failed and that an MRI is warranted.  She now understands and reportedly will try to attend a 6 week course of physical therapy.  Patient inquired about pain medication.  I will not order any opiate medications for long-term chronic pain.  Prescription for diclofenac ordered.  If this is unsuccessful in helping the pain I will not order any of narcotic analgesics until we make a definitive diagnosis.      Treatment options were discussed with regards to the nature of the medical condition. Conservative pain intervention and surgical options were discussed in detail. The probability of success of each separate treatment option was discussed. The patient expressed a clear understanding of the treatment options. With regards to surgery, the procedure risk, benefits,  complications, and outcomes were discussed. No guarantees were given with regards to surgical outcome.   The risk of complications, morbidity, and mortality of patient management decisions have been made at the time of this visit. These are associated with the patient's problems, diagnostic procedures and treatment options. This includes the possible management options selected and those considered but not selected by the patient after shared medical decision making we discussed with the patient.     This note was created using Dragon voice recognition software that occasionally misinterpreted phrases or words.

## 2024-09-20 ENCOUNTER — PATIENT MESSAGE (OUTPATIENT)
Dept: FAMILY MEDICINE | Facility: CLINIC | Age: 34
End: 2024-09-20
Payer: MEDICAID

## 2024-09-20 DIAGNOSIS — R11.0 NAUSEA: ICD-10-CM

## 2024-09-24 RX ORDER — ONDANSETRON 4 MG/1
4 TABLET, FILM COATED ORAL EVERY 12 HOURS PRN
Qty: 60 TABLET | Refills: 0 | Status: SHIPPED | OUTPATIENT
Start: 2024-09-24

## 2024-09-27 ENCOUNTER — TELEPHONE (OUTPATIENT)
Dept: ORTHOPEDICS | Facility: CLINIC | Age: 34
End: 2024-09-27
Payer: MEDICAID

## 2024-09-27 NOTE — TELEPHONE ENCOUNTER
----- Message from Joi Segura sent at 9/26/2024  4:03 PM CDT -----  Regarding: NP PHYSICAL THERAPY TREY Kennedy Morning/Afternoon,    The physical therapy department received your order to get the attached patient scheduled for an evaluation. We have reached out to the patient to schedule her/him for an evaluation; however, we have been unsuccessful in reaching the patient.      We wanted you to be aware that your patient has not started therapy. If you speak with them again about starting therapy please have them reach out to us at [phone] to get scheduled?.      Sincerely,

## 2024-10-01 ENCOUNTER — PATIENT MESSAGE (OUTPATIENT)
Dept: ADMINISTRATIVE | Facility: HOSPITAL | Age: 34
End: 2024-10-01
Payer: MEDICAID

## 2024-10-07 ENCOUNTER — PATIENT MESSAGE (OUTPATIENT)
Dept: FAMILY MEDICINE | Facility: CLINIC | Age: 34
End: 2024-10-07
Payer: MEDICAID

## 2024-10-07 DIAGNOSIS — E11.9 TYPE 2 DIABETES MELLITUS WITHOUT COMPLICATION, WITHOUT LONG-TERM CURRENT USE OF INSULIN: ICD-10-CM

## 2024-10-14 ENCOUNTER — PATIENT MESSAGE (OUTPATIENT)
Dept: FAMILY MEDICINE | Facility: CLINIC | Age: 34
End: 2024-10-14
Payer: MEDICAID

## 2024-10-14 DIAGNOSIS — E11.9 TYPE 2 DIABETES MELLITUS WITHOUT COMPLICATION, WITHOUT LONG-TERM CURRENT USE OF INSULIN: Primary | ICD-10-CM

## 2024-10-15 ENCOUNTER — PATIENT MESSAGE (OUTPATIENT)
Dept: FAMILY MEDICINE | Facility: CLINIC | Age: 34
End: 2024-10-15
Payer: MEDICAID

## 2024-10-23 ENCOUNTER — PATIENT MESSAGE (OUTPATIENT)
Dept: FAMILY MEDICINE | Facility: CLINIC | Age: 34
End: 2024-10-23
Payer: MEDICAID

## 2024-10-23 DIAGNOSIS — E11.9 TYPE 2 DIABETES MELLITUS WITHOUT COMPLICATION, WITHOUT LONG-TERM CURRENT USE OF INSULIN: Primary | ICD-10-CM

## 2024-10-23 RX ORDER — METFORMIN HYDROCHLORIDE 500 MG/1
500 TABLET ORAL
Qty: 90 TABLET | Refills: 0 | Status: SHIPPED | OUTPATIENT
Start: 2024-10-23

## 2024-10-25 ENCOUNTER — PATIENT MESSAGE (OUTPATIENT)
Dept: ORTHOPEDICS | Facility: CLINIC | Age: 34
End: 2024-10-25
Payer: MEDICAID

## 2024-10-28 DIAGNOSIS — M54.50 CHRONIC BILATERAL LOW BACK PAIN WITHOUT SCIATICA: Primary | ICD-10-CM

## 2024-10-28 DIAGNOSIS — G89.29 CHRONIC BILATERAL LOW BACK PAIN WITHOUT SCIATICA: Primary | ICD-10-CM

## 2024-10-28 RX ORDER — TIZANIDINE 4 MG/1
4 TABLET ORAL EVERY 8 HOURS PRN
Qty: 90 TABLET | Refills: 0 | Status: SHIPPED | OUTPATIENT
Start: 2024-10-28 | End: 2024-11-27

## 2024-11-03 ENCOUNTER — PATIENT MESSAGE (OUTPATIENT)
Dept: ORTHOPEDICS | Facility: CLINIC | Age: 34
End: 2024-11-03
Payer: MEDICAID

## 2024-11-17 ENCOUNTER — PATIENT MESSAGE (OUTPATIENT)
Dept: FAMILY MEDICINE | Facility: CLINIC | Age: 34
End: 2024-11-17
Payer: MEDICAID

## 2024-11-18 NOTE — TELEPHONE ENCOUNTER
----- Message from Caroline Abdalla sent at 2/12/2020  3:11 PM CST -----  Contact: Pt  Pt states she is concerned about the incision > in size during the pm hours. Pt call back # [phone]  
Called again- still no answer and unable to LM  
Called again-no answer and mailbox now full so unable to LM. Pt never showed up to have us re-steri strip and dress her incision  
Called-no answer and unable to LM, mailbox full  
Pt never showed up  
Patient/Mother

## 2024-11-18 NOTE — TELEPHONE ENCOUNTER
We can order a urine sample to the lab if she can go. I prefer not to send an antibiotic without knowing what I am treating. BV and UTI are 2 totally different antibiotics. She may want to wait and contact her GYN tomorrow if she feels he will just send meds

## 2024-11-26 ENCOUNTER — PATIENT MESSAGE (OUTPATIENT)
Dept: FAMILY MEDICINE | Facility: CLINIC | Age: 34
End: 2024-11-26
Payer: MEDICAID

## 2024-11-26 DIAGNOSIS — R11.0 NAUSEA: ICD-10-CM

## 2024-11-26 RX ORDER — ONDANSETRON 4 MG/1
4 TABLET, FILM COATED ORAL EVERY 12 HOURS PRN
Qty: 60 TABLET | Refills: 0 | Status: SHIPPED | OUTPATIENT
Start: 2024-11-26

## 2024-12-03 ENCOUNTER — PATIENT MESSAGE (OUTPATIENT)
Dept: ORTHOPEDICS | Facility: CLINIC | Age: 34
End: 2024-12-03
Payer: MEDICAID

## 2024-12-03 DIAGNOSIS — M47.816 FACET ARTHRITIS OF LUMBAR REGION: ICD-10-CM

## 2024-12-03 DIAGNOSIS — M51.362 DEGENERATION OF INTERVERTEBRAL DISC OF LUMBAR REGION WITH DISCOGENIC BACK PAIN AND LOWER EXTREMITY PAIN: Primary | ICD-10-CM

## 2024-12-03 DIAGNOSIS — M54.16 LUMBAR RADICULOPATHY: ICD-10-CM

## 2024-12-04 DIAGNOSIS — M51.362 DEGENERATION OF INTERVERTEBRAL DISC OF LUMBAR REGION WITH DISCOGENIC BACK PAIN AND LOWER EXTREMITY PAIN: Primary | ICD-10-CM

## 2024-12-04 DIAGNOSIS — M47.816 FACET ARTHRITIS OF LUMBAR REGION: ICD-10-CM

## 2024-12-04 RX ORDER — TIZANIDINE 4 MG/1
4 TABLET ORAL EVERY 8 HOURS
Qty: 90 TABLET | Refills: 0 | OUTPATIENT
Start: 2024-12-04 | End: 2025-01-03

## 2024-12-04 RX ORDER — TIZANIDINE 4 MG/1
4 TABLET ORAL EVERY 8 HOURS PRN
Qty: 90 TABLET | Refills: 2 | Status: SHIPPED | OUTPATIENT
Start: 2024-12-04 | End: 2025-03-04

## 2024-12-10 DIAGNOSIS — E11.9 TYPE 2 DIABETES MELLITUS WITHOUT COMPLICATION, WITHOUT LONG-TERM CURRENT USE OF INSULIN: ICD-10-CM

## 2024-12-10 DIAGNOSIS — E78.5 HYPERLIPIDEMIA, UNSPECIFIED HYPERLIPIDEMIA TYPE: ICD-10-CM

## 2024-12-10 RX ORDER — ATORVASTATIN CALCIUM 10 MG/1
10 TABLET, FILM COATED ORAL DAILY
Qty: 90 TABLET | Refills: 0 | Status: SHIPPED | OUTPATIENT
Start: 2024-12-10

## 2024-12-19 ENCOUNTER — PATIENT MESSAGE (OUTPATIENT)
Dept: FAMILY MEDICINE | Facility: CLINIC | Age: 34
End: 2024-12-19
Payer: MEDICAID

## 2024-12-19 DIAGNOSIS — E11.9 TYPE 2 DIABETES MELLITUS WITHOUT COMPLICATION, WITHOUT LONG-TERM CURRENT USE OF INSULIN: ICD-10-CM

## 2025-01-05 PROBLEM — M47.812 FACET ARTHRITIS OF CERVICAL REGION: Status: ACTIVE | Noted: 2025-01-05

## 2025-01-05 PROBLEM — M47.816 FACET ARTHRITIS OF LUMBAR REGION: Status: ACTIVE | Noted: 2025-01-05

## 2025-01-05 PROBLEM — M54.12 CERVICAL RADICULOPATHY: Status: ACTIVE | Noted: 2025-01-05

## 2025-01-06 ENCOUNTER — CLINICAL SUPPORT (OUTPATIENT)
Dept: REHABILITATION | Facility: HOSPITAL | Age: 35
End: 2025-01-06
Payer: MEDICAID

## 2025-01-06 DIAGNOSIS — M47.812 FACET ARTHRITIS OF CERVICAL REGION: ICD-10-CM

## 2025-01-06 DIAGNOSIS — M47.816 FACET ARTHRITIS OF LUMBAR REGION: Primary | ICD-10-CM

## 2025-01-06 DIAGNOSIS — M54.12 CERVICAL RADICULOPATHY: ICD-10-CM

## 2025-01-06 PROCEDURE — 97110 THERAPEUTIC EXERCISES: CPT | Mod: PN | Performed by: PHYSICAL THERAPIST

## 2025-01-06 PROCEDURE — 97161 PT EVAL LOW COMPLEX 20 MIN: CPT | Mod: PN | Performed by: PHYSICAL THERAPIST

## 2025-01-07 NOTE — PLAN OF CARE
"[facility]   Physical Therapy Initial Evaluation     Name: Jasper Guerrero  Clinic Number: [id number]    Therapy Diagnosis:   Encounter Diagnoses   Name Primary?    Facet arthritis of lumbar region Yes    Facet arthritis of cervical region     Cervical radiculopathy         Physician: Brendon Maher MD    Physician Orders: PT Eval and Treat   Medical Diagnosis from Referral: Facet arthritis of lumbar region, Facet arthritis of cervical region & Cervical radiculopathy   Evaluation Date: 1/6/2025  Authorization Period Expiration: 12/30/2025  Plan of Care Expiration: 3/7/2025  Progress Note Due: 2/6/2025  Visit # / Visits authorized: 1/ 1  (POC: 1/12)   FOTO: 1/3    Precautions: Standard   Insurance: Payor: MEDICAID / Plan: [place] / Product Type: Managed Medicaid /     Time In: 900  Time Out: 1000  Total Appointment Time (timed & untimed codes): 60 minutes      SUBJECTIVE   Date of onset: Chroninc    History of current condition - Jasper reports: a chronic history of bilateral lumbar pain which became exacerbated following the birth of her third child 9 months ago. She reports pain with sitting, standing, walking and with sit to stand tranfers. She denies any lower extremity numbness or paresthesia but intermittently has some lower extremity pain.     Falls: 0    Imaging, X-rays:   Cervical- "The vertebral levels below the C5 level are not well assessed on the lateral view secondary to the overlying shoulders. No fractures or bony destructive changes identified. No osseous malalignment. No significant disc height loss. No abnormal prevertebral soft tissue thickening."   Lumbar- "Mild broad rotatory dextrocurvature of the upper lumbar spine. Sagittal alignment is within normal limits. Vertebral body heights are preserved. No fractures or bony destructive changes. Mild degenerative disc height loss at L5-S1. "    Prior Therapy: none  Social History:  lives with their " "family  Occupation: Homemaker  Prior Level of Function: Independent  Current Level of Function: Decreased ability to perform ADL and limited tolerance to standing and walking.    Pain:  Current 3/10, worst 9/10, best 3/10   Location: bilateral lumbar    Description: Sharp  Aggravating Factors: Sitting, Standing, Walking, and Getting out of bed/chair  Easing Factors: pain medication    Patients goals: Decrease pain and return to previous level of activity     Medical History:   Past Medical History:   Diagnosis Date    Agoraphobia     Carpal tunnel syndrome     Depression     Diabetes mellitus, type 2     DVT (deep venous thrombosis)     2019 had blood clot in right arm    Fibromyalgia     Hypertension     Migraine headache     Nerve injury 08/2016    Lingual Nerve Injury    Obese     Pain management     PTSD (post-traumatic stress disorder)     Trigeminal neuralgia        Surgical History:   Jasper Guerrero  has a past surgical history that includes Mouth surgery; [place] tooth extraction; Carpal tunnel release (Right, 12/27/2018); Carpal tunnel release (Left, 01/28/2020); Cholecystectomy (N/A, 10/31/2022); and Nerve graft (2016).    Medications:   Jasper has a current medication list which includes the following prescription(s): abilify, acetaminophen, atorvastatin, buspirone, dicyclomine, fluvoxamine, metformin, metoclopramide hcl, metoprolol succinate, ondansetron, tirzepatide, tizanidine, venlafaxine, and xanax.    Allergies:   Review of patient's allergies indicates:   Allergen Reactions    Benadryl [diphenhydramine hcl]      Paralysis on right side body     Codeine      Paralysis right body    Flexeril [cyclobenzaprine] Other (See Comments)     Numbness on right side of body    Nsaids (non-steroidal anti-inflammatory drug) Diarrhea and Nausea And Vomiting     Ulcers     Penicillins Anaphylaxis and Hives    Droperidol Anxiety and Other (See Comments)    Magnesium      "made me feel horrible"     " Prochlorperazine Anxiety and Palpitations          OBJECTIVE     Posture: Decreased lumbar lordosis and forward head in standing   Palpation: Moderate point tenderness noted with palpation of the lumbar paraspinals  Sensation: Intact    Range of Motion/Strength:        Lumbar AROM: ROM-   Response to repeated movements   Flexion 38 degrees - end range pain  no worse   Extension 8 degrees - end range pain  worse   Right side bending 6 degrees   Left side bending 8 degrees     L/E MMT Left Right   Hip Flexion 4/5. 4/5.   Hip Extension 4/5. 4/5.   Hip Abduction 4/5. 4/5.   Hip Adduction 4/5. 4/5.   Hip IR 4/5. 4/5.   Hip ER 4/5. 4/5.   Knee Flexion 4+/5. 4+/5.   Knee Extension 4+/5. 4+/5.   Ankle DF 4+/5. 4+/5.   Ankle PF 4+/5. 4+/5.   Ankle Inversion 4+/5. 4+/5.   Ankle Eversion 4+/5. 4+/5.   Abdominals 3/5. 3/5.       Flexibility Left Right   Hamstrings 52 degrees 54   Achilles 2 degrees 2 degrees       Special Tests Left Right   SLR negative. negative.   KAREEM negative. negative.   Piriformis positive. positive.   Slump negative. negative.   Bowstring negative. negative.       Gait Without AD   Analysis The patient ambulates with bilateral LE externally rotated in stance phase       Limitation/Restriction for FOTO  Survey    Therapist reviewed FOTO scores for Jasper Guerrero on 1/6/2025.   FOTO documents entered into [email or web address] - see Media section.    Intake Score: 38%         TREATMENT     Total Treatment time (time-based codes) separate from Evaluation: 30 minutes      Jasper received the treatments listed below:      THERAPEUTIC EXERCISES to develop strength, ROM, and flexibility for 30 minutes including :     Seated Hamstring stretch 3 x 30 sec B  Seated piriformis stretch 3 x 30 sec B  Seated Gastroc-soleus stretch 3 x 30 sec B  Seated SKTC 3 x 10  Seated lumbar flexion 3 x 10  Seated sciatic nerve glides 2 x 10  Seated adductor squeeze 3 x 10  Seated hip abduction with Green theraband 3 x 10  Seated hip external  rotation with ball 3 x 10  Seated hip IE with Green theraband 3 x 10    PATIENT EDUCATION AND HOME EXERCISES     Education provided:   - Importance of regular physical activity    Written Home Exercises Provided: yes. Exercises were reviewed and Jasper was able to demonstrate them prior to the end of the session.  Jasper demonstrated good  understanding of the education provided. See EMR under Patient Instructions for exercises provided during therapy sessions.    ASSESSMENT     Jasper is a 34 y.o. female referred to outpatient Physical Therapy with a medical diagnosis of Facet arthritis of lumbar region, Facet arthritis of cervical region & Cervical radiculopathy. Patient presents with :    Bilateral lumbar pain  Decreased lower extremity flexibility  Decreased lower extremity strength  Decreased core strength  Decreased ability to perform ADL and limited tolerance to standing and walking.      Patient prognosis is Fair.   Patientt will benefit from skilled outpatient Physical Therapy to address the deficits stated above and in the chart below, provide patient /family education, and to maximize patientt's level of independence.     Plan of care discussed with patient: Yes  Patient's spiritual, cultural and educational needs considered and patient is agreeable to the plan of care and goals as stated below:     Anticipated Barriers for therapy: none    Medical Necessity is demonstrated by the following  History  Co-morbidities and personal factors that may impact the plan of care [x] LOW: no personal factors / co-morbidities  [] MODERATE: 1-2 personal factors / co-morbidities  [] HIGH: 3+ personal factors / co-morbidities    Moderate / High Support Documentation:      Examination  Body Structures and Functions, activity limitations and participation restrictions that may impact the plan of care [x] LOW: addressing 1-2 elements  [] MODERATE: 3+ elements  [] HIGH: 4+ elements (please support below)    Moderate / High  Support Documentation:      Clinical Presentation [x] LOW: stable  [] MODERATE: Evolving  [] HIGH: Unstable     Decision Making/ Complexity Score: low       Goals:  SHORT TERM GOALS:  3 weeks  Progress Date met   The patient will begin a written HEP [] Met  [] Not Met  [] Progressing     Increase hamstring flexibility to 65* [] Met  [] Not Met  [] Progressing     Decrease soft tissue tenderness to mild [] Met  [] Not Met  [] Progressing     Increase lower extremity strength to 4+/5 [] Met  [] Not Met  [] Progressing        LONG TERM GOALS: 6 weeks  Progress Date met   The patient will be independent with his HEP for maintenance [] Met  [] Not Met  [] Progressing     Increase hamstring flexibility to 75* [] Met  [] Not Met  [] Progressing     Increase FOTO score to 49% [] Met  [] Not Met  [] Progressing     Increase Le strength to 5/5 [] Met  [] Not Met  [] Progressing     The patient will be able to ascend/descend 20 step/stairs without onset of symptoms.   [] Met  [] Not Met  [] Progressing         PLAN   Plan of care Certification: 1/6/2025 to 3/7/2025.    Outpatient Physical Therapy 1-2 times weekly for 6 weeks to include the following interventions: Manual Therapy, Neuromuscular Re-ed, Patient Education, Therapeutic Activities, and Therapeutic Exercise.     Homero Mckeon, MS, PT, ATC, Cert.DN      I CERTIFY THE NEED FOR THESE SERVICES FURNISHED UNDER THIS PLAN OF TREATMENT AND WHILE UNDER MY CARE   Physician's comments:     Physician's Signature: ___________________________________________________

## 2025-01-15 ENCOUNTER — PATIENT MESSAGE (OUTPATIENT)
Dept: FAMILY MEDICINE | Facility: CLINIC | Age: 35
End: 2025-01-15
Payer: MEDICAID

## 2025-01-15 DIAGNOSIS — E11.9 TYPE 2 DIABETES MELLITUS WITHOUT COMPLICATION, WITHOUT LONG-TERM CURRENT USE OF INSULIN: ICD-10-CM

## 2025-01-16 RX ORDER — METFORMIN HYDROCHLORIDE 500 MG/1
500 TABLET ORAL
Qty: 90 TABLET | Refills: 0 | Status: SHIPPED | OUTPATIENT
Start: 2025-01-16

## 2025-01-19 ENCOUNTER — PATIENT MESSAGE (OUTPATIENT)
Dept: FAMILY MEDICINE | Facility: CLINIC | Age: 35
End: 2025-01-19
Payer: MEDICAID

## 2025-01-21 ENCOUNTER — PATIENT MESSAGE (OUTPATIENT)
Dept: ORTHOPEDICS | Facility: CLINIC | Age: 35
End: 2025-01-21
Payer: MEDICAID

## 2025-01-27 ENCOUNTER — PATIENT MESSAGE (OUTPATIENT)
Dept: FAMILY MEDICINE | Facility: CLINIC | Age: 35
End: 2025-01-27

## 2025-01-28 NOTE — TELEPHONE ENCOUNTER
Looks like [facility] on 1/17 and she has refills on it

## 2025-01-30 ENCOUNTER — PATIENT MESSAGE (OUTPATIENT)
Dept: FAMILY MEDICINE | Facility: CLINIC | Age: 35
End: 2025-01-30
Payer: MEDICAID

## 2025-02-14 ENCOUNTER — HOSPITAL ENCOUNTER (INPATIENT)
Facility: HOSPITAL | Age: 35
LOS: 4 days | Discharge: HOME | DRG: 305 | End: 2025-02-18
Attending: EMERGENCY MEDICINE | Admitting: INTERNAL MEDICINE
Payer: MEDICAID

## 2025-02-14 DIAGNOSIS — R06.02 SOB (SHORTNESS OF BREATH): ICD-10-CM

## 2025-02-14 DIAGNOSIS — N17.9 AKI (ACUTE KIDNEY INJURY): ICD-10-CM

## 2025-02-14 DIAGNOSIS — I16.1 HYPERTENSIVE EMERGENCY: ICD-10-CM

## 2025-02-14 DIAGNOSIS — I16.0 HYPERTENSIVE URGENCY: ICD-10-CM

## 2025-02-14 DIAGNOSIS — R00.0 SINUS TACHYCARDIA: ICD-10-CM

## 2025-02-14 DIAGNOSIS — R00.0 TACHYCARDIA: Primary | ICD-10-CM

## 2025-02-14 DIAGNOSIS — M79.2 NEUROPATHIC PAIN: ICD-10-CM

## 2025-02-14 DIAGNOSIS — R07.9 CHEST PAIN: ICD-10-CM

## 2025-02-14 DIAGNOSIS — M79.7 FIBROMYALGIA: ICD-10-CM

## 2025-02-14 DIAGNOSIS — R06.02 SHORTNESS OF BREATH: ICD-10-CM

## 2025-02-14 LAB
ALBUMIN SERPL BCP-MCNC: 4.5 G/DL (ref 3.5–5.2)
ALP SERPL-CCNC: 77 U/L (ref 40–150)
ALT SERPL W/O P-5'-P-CCNC: 43 U/L (ref 10–44)
ANION GAP SERPL CALC-SCNC: 23 MMOL/L (ref 8–16)
AST SERPL-CCNC: 30 U/L (ref 10–40)
B-HCG UR QL: NEGATIVE
BACTERIA #/AREA URNS HPF: ABNORMAL /HPF
BASOPHILS # BLD AUTO: 0.06 K/UL (ref 0–0.2)
BASOPHILS NFR BLD: 0.2 % (ref 0–1.9)
BILIRUB SERPL-MCNC: 0.6 MG/DL (ref 0.1–1)
BILIRUB UR QL STRIP: NEGATIVE
BNP SERPL-MCNC: 35 PG/ML (ref 0–99)
BUN SERPL-MCNC: 15 MG/DL (ref 6–20)
CALCIUM SERPL-MCNC: 9.9 MG/DL (ref 8.7–10.5)
CHLORIDE SERPL-SCNC: 104 MMOL/L (ref 95–110)
CLARITY UR: CLEAR
CO2 SERPL-SCNC: 14 MMOL/L (ref 23–29)
COLOR UR: YELLOW
CREAT SERPL-MCNC: 1.6 MG/DL (ref 0.5–1.4)
CTP QC/QA: YES
DIFFERENTIAL METHOD BLD: ABNORMAL
EOSINOPHIL # BLD AUTO: 0 K/UL (ref 0–0.5)
EOSINOPHIL NFR BLD: 0 % (ref 0–8)
ERYTHROCYTE [DISTWIDTH] IN BLOOD BY AUTOMATED COUNT: 13.3 % (ref 11.5–14.5)
EST. GFR  (NO RACE VARIABLE): 44 ML/MIN/1.73 M^2
GLUCOSE SERPL-MCNC: 294 MG/DL (ref 70–110)
GLUCOSE UR QL STRIP: ABNORMAL
HCT VFR BLD AUTO: 47.3 % (ref 37–48.5)
HGB BLD-MCNC: 15.7 G/DL (ref 12–16)
HGB UR QL STRIP: NEGATIVE
HYALINE CASTS #/AREA URNS LPF: 18 /LPF
IMM GRANULOCYTES # BLD AUTO: 0.25 K/UL (ref 0–0.04)
IMM GRANULOCYTES NFR BLD AUTO: 0.9 % (ref 0–0.5)
KETONES UR QL STRIP: ABNORMAL
LEUKOCYTE ESTERASE UR QL STRIP: NEGATIVE
LYMPHOCYTES # BLD AUTO: 2.4 K/UL (ref 1–4.8)
LYMPHOCYTES NFR BLD: 8.6 % (ref 18–48)
MCH RBC QN AUTO: 29.8 PG (ref 27–31)
MCHC RBC AUTO-ENTMCNC: 33.2 G/DL (ref 32–36)
MCV RBC AUTO: 90 FL (ref 82–98)
MICROSCOPIC COMMENT: ABNORMAL
MONOCYTES # BLD AUTO: 0.9 K/UL (ref 0.3–1)
MONOCYTES NFR BLD: 3.1 % (ref 4–15)
NEUTROPHILS # BLD AUTO: 24.5 K/UL (ref 1.8–7.7)
NEUTROPHILS NFR BLD: 87.2 % (ref 38–73)
NITRITE UR QL STRIP: NEGATIVE
NRBC BLD-RTO: 0 /100 WBC
PH UR STRIP: 6 [PH] (ref 5–8)
PLATELET # BLD AUTO: 478 K/UL (ref 150–450)
PMV BLD AUTO: 9.5 FL (ref 9.2–12.9)
POCT GLUCOSE: 246 MG/DL (ref 70–110)
POTASSIUM SERPL-SCNC: 3.9 MMOL/L (ref 3.5–5.1)
PROT SERPL-MCNC: 9 G/DL (ref 6–8.4)
PROT UR QL STRIP: ABNORMAL
RBC # BLD AUTO: 5.27 M/UL (ref 4–5.4)
RBC #/AREA URNS HPF: 2 /HPF (ref 0–4)
SODIUM SERPL-SCNC: 141 MMOL/L (ref 136–145)
SP GR UR STRIP: 1.02 (ref 1–1.03)
SQUAMOUS #/AREA URNS HPF: 8 /HPF
TROPONIN I SERPL DL<=0.01 NG/ML-MCNC: 0.06 NG/ML (ref 0–0.03)
URN SPEC COLLECT METH UR: ABNORMAL
UROBILINOGEN UR STRIP-ACNC: NEGATIVE EU/DL
WBC # BLD AUTO: 28.1 K/UL (ref 3.9–12.7)
WBC #/AREA URNS HPF: 3 /HPF (ref 0–5)
YEAST URNS QL MICRO: ABNORMAL

## 2025-02-14 PROCEDURE — 99291 CRITICAL CARE FIRST HOUR: CPT

## 2025-02-14 PROCEDURE — 82962 GLUCOSE BLOOD TEST: CPT

## 2025-02-14 PROCEDURE — 63600175 PHARM REV CODE 636 W HCPCS: Performed by: EMERGENCY MEDICINE

## 2025-02-14 PROCEDURE — 96375 TX/PRO/DX INJ NEW DRUG ADDON: CPT

## 2025-02-14 PROCEDURE — 84484 ASSAY OF TROPONIN QUANT: CPT | Performed by: EMERGENCY MEDICINE

## 2025-02-14 PROCEDURE — 81025 URINE PREGNANCY TEST: CPT | Performed by: EMERGENCY MEDICINE

## 2025-02-14 PROCEDURE — 25000003 PHARM REV CODE 250: Performed by: EMERGENCY MEDICINE

## 2025-02-14 PROCEDURE — 94760 N-INVAS EAR/PLS OXIMETRY 1: CPT

## 2025-02-14 PROCEDURE — 11000001 HC ACUTE MED/SURG PRIVATE ROOM

## 2025-02-14 PROCEDURE — 80053 COMPREHEN METABOLIC PANEL: CPT | Performed by: EMERGENCY MEDICINE

## 2025-02-14 PROCEDURE — 83880 ASSAY OF NATRIURETIC PEPTIDE: CPT | Performed by: EMERGENCY MEDICINE

## 2025-02-14 PROCEDURE — 36415 COLL VENOUS BLD VENIPUNCTURE: CPT | Performed by: EMERGENCY MEDICINE

## 2025-02-14 PROCEDURE — 80307 DRUG TEST PRSMV CHEM ANLYZR: CPT | Performed by: EMERGENCY MEDICINE

## 2025-02-14 PROCEDURE — 93005 ELECTROCARDIOGRAM TRACING: CPT

## 2025-02-14 PROCEDURE — 93010 ELECTROCARDIOGRAM REPORT: CPT | Mod: ,,, | Performed by: INTERNAL MEDICINE

## 2025-02-14 PROCEDURE — 96376 TX/PRO/DX INJ SAME DRUG ADON: CPT

## 2025-02-14 PROCEDURE — 25500020 PHARM REV CODE 255

## 2025-02-14 PROCEDURE — 81000 URINALYSIS NONAUTO W/SCOPE: CPT | Performed by: EMERGENCY MEDICINE

## 2025-02-14 PROCEDURE — 96365 THER/PROPH/DIAG IV INF INIT: CPT

## 2025-02-14 PROCEDURE — 85025 COMPLETE CBC W/AUTO DIFF WBC: CPT | Performed by: EMERGENCY MEDICINE

## 2025-02-14 PROCEDURE — 96361 HYDRATE IV INFUSION ADD-ON: CPT

## 2025-02-14 RX ORDER — METOPROLOL TARTRATE 1 MG/ML
5 INJECTION, SOLUTION INTRAVENOUS
Status: COMPLETED | OUTPATIENT
Start: 2025-02-14 | End: 2025-02-14

## 2025-02-14 RX ORDER — ONDANSETRON HYDROCHLORIDE 2 MG/ML
4 INJECTION, SOLUTION INTRAVENOUS
Status: COMPLETED | OUTPATIENT
Start: 2025-02-14 | End: 2025-02-14

## 2025-02-14 RX ORDER — SODIUM CHLORIDE 9 MG/ML
1000 INJECTION, SOLUTION INTRAVENOUS
Status: COMPLETED | OUTPATIENT
Start: 2025-02-14 | End: 2025-02-14

## 2025-02-14 RX ORDER — LORAZEPAM 2 MG/ML
1 INJECTION INTRAMUSCULAR
Status: COMPLETED | OUTPATIENT
Start: 2025-02-15 | End: 2025-02-14

## 2025-02-14 RX ADMIN — ONDANSETRON 4 MG: 2 INJECTION INTRAMUSCULAR; INTRAVENOUS at 08:02

## 2025-02-14 RX ADMIN — SODIUM CHLORIDE 1000 ML: 0.9 INJECTION, SOLUTION INTRAVENOUS at 10:02

## 2025-02-14 RX ADMIN — METOPROLOL TARTRATE 5 MG: 1 INJECTION, SOLUTION INTRAVENOUS at 09:02

## 2025-02-14 RX ADMIN — METOPROLOL TARTRATE 5 MG: 1 INJECTION, SOLUTION INTRAVENOUS at 11:02

## 2025-02-14 RX ADMIN — SODIUM CHLORIDE 1000 ML: 9 INJECTION, SOLUTION INTRAVENOUS at 08:02

## 2025-02-14 RX ADMIN — PROMETHAZINE HYDROCHLORIDE 12.5 MG: 25 INJECTION INTRAMUSCULAR; INTRAVENOUS at 10:02

## 2025-02-14 RX ADMIN — METOPROLOL TARTRATE 5 MG: 1 INJECTION, SOLUTION INTRAVENOUS at 10:02

## 2025-02-14 RX ADMIN — LORAZEPAM 1 MG: 2 INJECTION INTRAMUSCULAR; INTRAVENOUS at 11:02

## 2025-02-14 RX ADMIN — IOHEXOL 100 ML: 350 INJECTION, SOLUTION INTRAVENOUS at 09:02

## 2025-02-15 ENCOUNTER — CLINICAL SUPPORT (OUTPATIENT)
Dept: CARDIOLOGY | Facility: HOSPITAL | Age: 35
DRG: 305 | End: 2025-02-15
Payer: MEDICAID

## 2025-02-15 VITALS — BODY MASS INDEX: 48.82 KG/M2 | WEIGHT: 293 LBS | HEIGHT: 65 IN

## 2025-02-15 PROBLEM — N17.9 AKI (ACUTE KIDNEY INJURY): Status: ACTIVE | Noted: 2025-02-15

## 2025-02-15 PROBLEM — E87.29 METABOLIC ACIDOSIS, INCREASED ANION GAP (IAG): Status: ACTIVE | Noted: 2025-02-15

## 2025-02-15 PROBLEM — R11.2 NAUSEA VOMITING AND DIARRHEA: Status: ACTIVE | Noted: 2025-02-15

## 2025-02-15 PROBLEM — E11.9 DMII (DIABETES MELLITUS, TYPE 2): Status: ACTIVE | Noted: 2025-02-15

## 2025-02-15 PROBLEM — R00.0 TACHYCARDIA: Status: ACTIVE | Noted: 2025-02-15

## 2025-02-15 PROBLEM — R19.7 NAUSEA VOMITING AND DIARRHEA: Status: ACTIVE | Noted: 2025-02-15

## 2025-02-15 PROBLEM — I16.0 HYPERTENSIVE URGENCY: Status: ACTIVE | Noted: 2025-02-15

## 2025-02-15 LAB
ALBUMIN SERPL BCP-MCNC: 4.1 G/DL (ref 3.5–5.2)
ALLENS TEST: ABNORMAL
ALP SERPL-CCNC: 53 U/L (ref 55–135)
ALT SERPL W/O P-5'-P-CCNC: 24 U/L (ref 10–44)
AMPHET+METHAMPHET UR QL: NEGATIVE
ANION GAP SERPL CALC-SCNC: 9 MMOL/L (ref 8–16)
AORTIC ROOT ANNULUS: 3.5 CM
AORTIC VALVE CUSP SEPERATION: 2.1 CM
APICAL FOUR CHAMBER EJECTION FRACTION: 65 %
APICAL TWO CHAMBER EJECTION FRACTION: 55 %
AST SERPL-CCNC: 19 U/L (ref 10–40)
AV INDEX (PROSTH): 1.05
AV MEAN GRADIENT: 5 MMHG
AV PEAK GRADIENT: 9 MMHG
AV VALVE AREA BY VELOCITY RATIO: 2.9 CM²
AV VALVE AREA: 3.3 CM²
AV VELOCITY RATIO: 0.93
B-OH-BUTYR BLD STRIP-SCNC: 0.2 MMOL/L (ref 0–0.5)
BARBITURATES UR QL SCN>200 NG/ML: NEGATIVE
BASOPHILS # BLD AUTO: 0.03 K/UL (ref 0–0.2)
BASOPHILS NFR BLD: 0.2 % (ref 0–1.9)
BENZODIAZ UR QL SCN>200 NG/ML: ABNORMAL
BILIRUB SERPL-MCNC: 0.5 MG/DL (ref 0.1–1)
BSA FOR ECHO PROCEDURE: 2.5 M2
BUN SERPL-MCNC: 15 MG/DL (ref 6–20)
BZE UR QL SCN: NEGATIVE
CALCIUM SERPL-MCNC: 8.5 MG/DL (ref 8.7–10.5)
CANNABINOIDS UR QL SCN: ABNORMAL
CHLORIDE SERPL-SCNC: 107 MMOL/L (ref 95–110)
CO2 SERPL-SCNC: 23 MMOL/L (ref 23–29)
CREAT SERPL-MCNC: 1 MG/DL (ref 0.5–1.4)
CREAT UR-MCNC: 150.3 MG/DL (ref 15–325)
CRP SERPL-MCNC: 2.7 MG/DL
CV ECHO LV RWT: 0.78 CM
D DIMER PPP IA.FEU-MCNC: 0.48 MG/L FEU (ref 0–0.49)
DELSYS: ABNORMAL
DIFFERENTIAL METHOD BLD: ABNORMAL
DOP CALC AO PEAK VEL: 1.5 M/S
DOP CALC AO VTI: 20.5 CM
DOP CALC LVOT AREA: 3.1 CM2
DOP CALC LVOT DIAMETER: 2 CM
DOP CALC LVOT PEAK VEL: 1.4 M/S
DOP CALC LVOT STROKE VOLUME: 67.5 CM3
DOP CALC MV VTI: 31.3 CM
DOP CALCLVOT PEAK VEL VTI: 21.5 CM
E WAVE DECELERATION TIME: 153 MSEC
E/A RATIO: 1.12
E/E' RATIO: 10 M/S
ECHO LV POSTERIOR WALL: 1.4 CM (ref 0.6–1.1)
EOSINOPHIL # BLD AUTO: 0 K/UL (ref 0–0.5)
EOSINOPHIL NFR BLD: 0 % (ref 0–8)
ERYTHROCYTE [DISTWIDTH] IN BLOOD BY AUTOMATED COUNT: 14.1 % (ref 11.5–14.5)
ERYTHROCYTE [SEDIMENTATION RATE] IN BLOOD BY WESTERGREN METHOD: 13 MM/HR (ref 0–20)
EST. GFR  (NO RACE VARIABLE): >60 ML/MIN/1.73 M^2
ESTIMATED AVG GLUCOSE: 148 MG/DL (ref 68–131)
FRACTIONAL SHORTENING: 33.3 % (ref 28–44)
GLUCOSE SERPL-MCNC: 173 MG/DL (ref 70–110)
HBA1C MFR BLD: 6.8 % (ref 4.5–6.2)
HCO3 UR-SCNC: 15.1 MMOL/L (ref 24–28)
HCT VFR BLD AUTO: 42.1 % (ref 37–48.5)
HGB BLD-MCNC: 13.6 G/DL (ref 12–16)
IMM GRANULOCYTES # BLD AUTO: 0.14 K/UL (ref 0–0.04)
IMM GRANULOCYTES NFR BLD AUTO: 0.7 % (ref 0–0.5)
INTERVENTRICULAR SEPTUM: 1.2 CM (ref 0.6–1.1)
LACTATE SERPL-SCNC: 2.1 MMOL/L (ref 0.5–1.9)
LACTATE SERPL-SCNC: 3.5 MMOL/L (ref 0.5–1.9)
LACTATE SERPL-SCNC: 5.6 MMOL/L (ref 0.5–2.2)
LEFT INTERNAL DIMENSION IN SYSTOLE: 2.4 CM (ref 2.1–4)
LEFT VENTRICLE DIASTOLIC VOLUME INDEX: 23.01 ML/M2
LEFT VENTRICLE DIASTOLIC VOLUME: 54.07 ML
LEFT VENTRICLE END DIASTOLIC VOLUME APICAL 2 CHAMBER: 76.6 ML
LEFT VENTRICLE END DIASTOLIC VOLUME APICAL 4 CHAMBER: 117 ML
LEFT VENTRICLE MASS INDEX: 68.1 G/M2
LEFT VENTRICLE SYSTOLIC VOLUME INDEX: 8.6 ML/M2
LEFT VENTRICLE SYSTOLIC VOLUME: 20.16 ML
LEFT VENTRICULAR INTERNAL DIMENSION IN DIASTOLE: 3.6 CM (ref 3.5–6)
LEFT VENTRICULAR MASS: 160.1 G
LIPASE SERPL-CCNC: 15 U/L (ref 4–60)
LV LATERAL E/E' RATIO: 8.7 M/S
LV SEPTAL E/E' RATIO: 13 M/S
LVED V (TEICH): 54.07 ML
LVES V (TEICH): 20.16 ML
LVOT MG: 4 MMHG
LVOT MV: 0.96 CM/S
LYMPHOCYTES # BLD AUTO: 3.2 K/UL (ref 1–4.8)
LYMPHOCYTES NFR BLD: 16.3 % (ref 18–48)
MAGNESIUM SERPL-MCNC: 1.6 MG/DL (ref 1.6–2.6)
MCH RBC QN AUTO: 29.5 PG (ref 27–31)
MCHC RBC AUTO-ENTMCNC: 32.3 G/DL (ref 32–36)
MCV RBC AUTO: 91 FL (ref 82–98)
METHADONE UR QL SCN>300 NG/ML: NEGATIVE
MODE: ABNORMAL
MONOCYTES # BLD AUTO: 1.9 K/UL (ref 0.3–1)
MONOCYTES NFR BLD: 9.9 % (ref 4–15)
MRSA SCREEN BY PCR: NOT DETECTED
MV MEAN GRADIENT: 3 MMHG
MV PEAK A VEL: 0.93 M/S
MV PEAK E VEL: 1.04 M/S
MV PEAK GRADIENT: 7 MMHG
MV STENOSIS PRESSURE HALF TIME: 75 MS
MV VALVE AREA BY CONTINUITY EQUATION: 2.16 CM2
MV VALVE AREA P 1/2 METHOD: 2.93 CM2
NEUTROPHILS # BLD AUTO: 14.2 K/UL (ref 1.8–7.7)
NEUTROPHILS NFR BLD: 72.9 % (ref 38–73)
NRBC BLD-RTO: 0 /100 WBC
OHS LV EJECTION FRACTION SIMPSONS BIPLANE MOD: 60 %
OPIATES UR QL SCN: NEGATIVE
PCO2 BLDA: 27.7 MMHG (ref 35–45)
PCP UR QL SCN>25 NG/ML: NEGATIVE
PH SMN: 7.34 [PH] (ref 7.35–7.45)
PLATELET # BLD AUTO: 354 K/UL (ref 150–450)
PMV BLD AUTO: 9.3 FL (ref 9.2–12.9)
PO2 BLDA: 75 MMHG (ref 50–70)
POC BE: -11 MMOL/L
POC SATURATED O2: 94 % (ref 95–100)
POC TCO2: 16 MMOL/L (ref 23–27)
POCT GLUCOSE: 166 MG/DL (ref 70–110)
POCT GLUCOSE: 170 MG/DL (ref 70–110)
POTASSIUM SERPL-SCNC: 3.9 MMOL/L (ref 3.5–5.1)
PROCALCITONIN SERPL IA-MCNC: 0.11 NG/ML (ref 0–0.5)
PROT SERPL-MCNC: 7.1 G/DL (ref 6–8.4)
PV MV: 0.88 M/S
PV PEAK GRADIENT: 6 MMHG
PV PEAK VELOCITY: 1.25 M/S
RBC # BLD AUTO: 4.61 M/UL (ref 4–5.4)
SAMPLE: ABNORMAL
SITE: ABNORMAL
SODIUM SERPL-SCNC: 139 MMOL/L (ref 136–145)
SP02: 97
T4 FREE SERPL-MCNC: 0.79 NG/DL (ref 0.71–1.51)
TDI LATERAL: 0.12 M/S
TDI SEPTAL: 0.08 M/S
TDI: 0.1 M/S
TOXICOLOGY INFORMATION: ABNORMAL
TRICUSPID ANNULAR PLANE SYSTOLIC EXCURSION: 3.1 CM
TROPONIN I SERPL HS-MCNC: 105.8 PG/ML (ref 0–14.9)
TROPONIN I SERPL HS-MCNC: 68.2 PG/ML (ref 0–14.9)
TSH SERPL DL<=0.005 MIU/L-ACNC: 0.88 UIU/ML (ref 0.34–5.6)
WBC # BLD AUTO: 19.49 K/UL (ref 3.9–12.7)
Z-SCORE OF LEFT VENTRICULAR DIMENSION IN END DIASTOLE: -10.04
Z-SCORE OF LEFT VENTRICULAR DIMENSION IN END SYSTOLE: -7.12

## 2025-02-15 PROCEDURE — 84439 ASSAY OF FREE THYROXINE: CPT | Performed by: INTERNAL MEDICINE

## 2025-02-15 PROCEDURE — 25000003 PHARM REV CODE 250: Performed by: INTERNAL MEDICINE

## 2025-02-15 PROCEDURE — 36416 COLLJ CAPILLARY BLOOD SPEC: CPT

## 2025-02-15 PROCEDURE — 85379 FIBRIN DEGRADATION QUANT: CPT | Performed by: INTERNAL MEDICINE

## 2025-02-15 PROCEDURE — 82803 BLOOD GASES ANY COMBINATION: CPT

## 2025-02-15 PROCEDURE — 93306 TTE W/DOPPLER COMPLETE: CPT

## 2025-02-15 PROCEDURE — 84443 ASSAY THYROID STIM HORMONE: CPT | Performed by: INTERNAL MEDICINE

## 2025-02-15 PROCEDURE — 25000003 PHARM REV CODE 250

## 2025-02-15 PROCEDURE — 63600175 PHARM REV CODE 636 W HCPCS: Performed by: EMERGENCY MEDICINE

## 2025-02-15 PROCEDURE — 99900035 HC TECH TIME PER 15 MIN (STAT)

## 2025-02-15 PROCEDURE — 82010 KETONE BODYS QUAN: CPT | Performed by: EMERGENCY MEDICINE

## 2025-02-15 PROCEDURE — 83605 ASSAY OF LACTIC ACID: CPT | Mod: 91 | Performed by: INTERNAL MEDICINE

## 2025-02-15 PROCEDURE — 20000000 HC ICU ROOM

## 2025-02-15 PROCEDURE — 93010 ELECTROCARDIOGRAM REPORT: CPT | Mod: ,,, | Performed by: GENERAL PRACTICE

## 2025-02-15 PROCEDURE — 83690 ASSAY OF LIPASE: CPT | Performed by: EMERGENCY MEDICINE

## 2025-02-15 PROCEDURE — 36415 COLL VENOUS BLD VENIPUNCTURE: CPT | Performed by: INTERNAL MEDICINE

## 2025-02-15 PROCEDURE — 85025 COMPLETE CBC W/AUTO DIFF WBC: CPT | Performed by: INTERNAL MEDICINE

## 2025-02-15 PROCEDURE — 80053 COMPREHEN METABOLIC PANEL: CPT | Performed by: INTERNAL MEDICINE

## 2025-02-15 PROCEDURE — 63600175 PHARM REV CODE 636 W HCPCS

## 2025-02-15 PROCEDURE — 99900031 HC PATIENT EDUCATION (STAT)

## 2025-02-15 PROCEDURE — 63600175 PHARM REV CODE 636 W HCPCS: Performed by: INTERNAL MEDICINE

## 2025-02-15 PROCEDURE — 87040 BLOOD CULTURE FOR BACTERIA: CPT | Mod: 59 | Performed by: EMERGENCY MEDICINE

## 2025-02-15 PROCEDURE — 94761 N-INVAS EAR/PLS OXIMETRY MLT: CPT | Mod: XB

## 2025-02-15 PROCEDURE — 96376 TX/PRO/DX INJ SAME DRUG ADON: CPT

## 2025-02-15 PROCEDURE — 96367 TX/PROPH/DG ADDL SEQ IV INF: CPT

## 2025-02-15 PROCEDURE — 84484 ASSAY OF TROPONIN QUANT: CPT | Performed by: INTERNAL MEDICINE

## 2025-02-15 PROCEDURE — 96366 THER/PROPH/DIAG IV INF ADDON: CPT

## 2025-02-15 PROCEDURE — 36415 COLL VENOUS BLD VENIPUNCTURE: CPT

## 2025-02-15 PROCEDURE — 83735 ASSAY OF MAGNESIUM: CPT | Performed by: INTERNAL MEDICINE

## 2025-02-15 PROCEDURE — 36415 COLL VENOUS BLD VENIPUNCTURE: CPT | Performed by: EMERGENCY MEDICINE

## 2025-02-15 PROCEDURE — 93005 ELECTROCARDIOGRAM TRACING: CPT

## 2025-02-15 PROCEDURE — 84145 PROCALCITONIN (PCT): CPT | Performed by: INTERNAL MEDICINE

## 2025-02-15 PROCEDURE — 84484 ASSAY OF TROPONIN QUANT: CPT | Mod: 91

## 2025-02-15 PROCEDURE — 83036 HEMOGLOBIN GLYCOSYLATED A1C: CPT | Performed by: INTERNAL MEDICINE

## 2025-02-15 PROCEDURE — 85651 RBC SED RATE NONAUTOMATED: CPT | Performed by: INTERNAL MEDICINE

## 2025-02-15 PROCEDURE — 86140 C-REACTIVE PROTEIN: CPT | Performed by: INTERNAL MEDICINE

## 2025-02-15 PROCEDURE — 83605 ASSAY OF LACTIC ACID: CPT | Mod: 91

## 2025-02-15 PROCEDURE — 83605 ASSAY OF LACTIC ACID: CPT | Performed by: EMERGENCY MEDICINE

## 2025-02-15 PROCEDURE — 94799 UNLISTED PULMONARY SVC/PX: CPT

## 2025-02-15 PROCEDURE — 87641 MR-STAPH DNA AMP PROBE: CPT

## 2025-02-15 RX ORDER — SODIUM CHLORIDE, SODIUM LACTATE, POTASSIUM CHLORIDE, CALCIUM CHLORIDE 600; 310; 30; 20 MG/100ML; MG/100ML; MG/100ML; MG/100ML
INJECTION, SOLUTION INTRAVENOUS CONTINUOUS
Status: ACTIVE | OUTPATIENT
Start: 2025-02-15 | End: 2025-02-15

## 2025-02-15 RX ORDER — SODIUM CHLORIDE, SODIUM LACTATE, POTASSIUM CHLORIDE, CALCIUM CHLORIDE 600; 310; 30; 20 MG/100ML; MG/100ML; MG/100ML; MG/100ML
INJECTION, SOLUTION INTRAVENOUS CONTINUOUS
Status: DISCONTINUED | OUTPATIENT
Start: 2025-02-15 | End: 2025-02-16

## 2025-02-15 RX ORDER — METOPROLOL TARTRATE 1 MG/ML
5 INJECTION, SOLUTION INTRAVENOUS EVERY 6 HOURS PRN
Status: DISCONTINUED | OUTPATIENT
Start: 2025-02-15 | End: 2025-02-18 | Stop reason: HOSPADM

## 2025-02-15 RX ORDER — METOPROLOL SUCCINATE 25 MG/1
25 TABLET, EXTENDED RELEASE ORAL ONCE
Status: COMPLETED | OUTPATIENT
Start: 2025-02-15 | End: 2025-02-15

## 2025-02-15 RX ORDER — SODIUM,POTASSIUM PHOSPHATES 280-250MG
2 POWDER IN PACKET (EA) ORAL
Status: DISCONTINUED | OUTPATIENT
Start: 2025-02-15 | End: 2025-02-18 | Stop reason: HOSPADM

## 2025-02-15 RX ORDER — LORAZEPAM 0.5 MG/1
0.5 TABLET ORAL EVERY 6 HOURS PRN
Status: DISCONTINUED | OUTPATIENT
Start: 2025-02-15 | End: 2025-02-18 | Stop reason: HOSPADM

## 2025-02-15 RX ORDER — HYDROMORPHONE HYDROCHLORIDE 2 MG/1
2 TABLET ORAL EVERY 6 HOURS PRN
Status: DISCONTINUED | OUTPATIENT
Start: 2025-02-15 | End: 2025-02-17

## 2025-02-15 RX ORDER — HYDROMORPHONE HYDROCHLORIDE 1 MG/ML
1 INJECTION, SOLUTION INTRAMUSCULAR; INTRAVENOUS; SUBCUTANEOUS ONCE
Status: COMPLETED | OUTPATIENT
Start: 2025-02-15 | End: 2025-02-15

## 2025-02-15 RX ORDER — SODIUM CHLORIDE 0.9 % (FLUSH) 0.9 %
3 SYRINGE (ML) INJECTION EVERY 12 HOURS PRN
Status: DISCONTINUED | OUTPATIENT
Start: 2025-02-15 | End: 2025-02-18 | Stop reason: HOSPADM

## 2025-02-15 RX ORDER — IBUPROFEN 200 MG
16 TABLET ORAL
Status: DISCONTINUED | OUTPATIENT
Start: 2025-02-15 | End: 2025-02-18 | Stop reason: HOSPADM

## 2025-02-15 RX ORDER — NALOXONE HCL 0.4 MG/ML
0.02 VIAL (ML) INJECTION
Status: DISCONTINUED | OUTPATIENT
Start: 2025-02-15 | End: 2025-02-18 | Stop reason: HOSPADM

## 2025-02-15 RX ORDER — IBUPROFEN 200 MG
24 TABLET ORAL
Status: DISCONTINUED | OUTPATIENT
Start: 2025-02-15 | End: 2025-02-18 | Stop reason: HOSPADM

## 2025-02-15 RX ORDER — BUSPIRONE HYDROCHLORIDE 5 MG/1
30 TABLET ORAL 3 TIMES DAILY
Status: DISCONTINUED | OUTPATIENT
Start: 2025-02-15 | End: 2025-02-18 | Stop reason: HOSPADM

## 2025-02-15 RX ORDER — INSULIN GLARGINE 100 [IU]/ML
35 INJECTION, SOLUTION SUBCUTANEOUS DAILY
Status: DISCONTINUED | OUTPATIENT
Start: 2025-02-15 | End: 2025-02-18 | Stop reason: HOSPADM

## 2025-02-15 RX ORDER — HYDROMORPHONE HYDROCHLORIDE 2 MG/1
4 TABLET ORAL EVERY 6 HOURS PRN
Refills: 0 | Status: DISCONTINUED | OUTPATIENT
Start: 2025-02-15 | End: 2025-02-15

## 2025-02-15 RX ORDER — ASPIRIN 81 MG/1
162 TABLET ORAL DAILY
Status: DISCONTINUED | OUTPATIENT
Start: 2025-02-15 | End: 2025-02-18 | Stop reason: HOSPADM

## 2025-02-15 RX ORDER — MUPIROCIN 20 MG/G
OINTMENT TOPICAL 2 TIMES DAILY
Status: DISCONTINUED | OUTPATIENT
Start: 2025-02-15 | End: 2025-02-18 | Stop reason: HOSPADM

## 2025-02-15 RX ORDER — LIDOCAINE 50 MG/G
2 PATCH TOPICAL
Status: DISCONTINUED | OUTPATIENT
Start: 2025-02-15 | End: 2025-02-18 | Stop reason: HOSPADM

## 2025-02-15 RX ORDER — TALC
6 POWDER (GRAM) TOPICAL NIGHTLY PRN
Status: DISCONTINUED | OUTPATIENT
Start: 2025-02-15 | End: 2025-02-18 | Stop reason: HOSPADM

## 2025-02-15 RX ORDER — INSULIN ASPART 100 [IU]/ML
0-10 INJECTION, SOLUTION INTRAVENOUS; SUBCUTANEOUS
Status: DISCONTINUED | OUTPATIENT
Start: 2025-02-15 | End: 2025-02-18 | Stop reason: HOSPADM

## 2025-02-15 RX ORDER — GLUCAGON 1 MG
1 KIT INJECTION
Status: DISCONTINUED | OUTPATIENT
Start: 2025-02-15 | End: 2025-02-18 | Stop reason: HOSPADM

## 2025-02-15 RX ORDER — VENLAFAXINE HYDROCHLORIDE 150 MG/1
150 CAPSULE, EXTENDED RELEASE ORAL DAILY
Status: DISCONTINUED | OUTPATIENT
Start: 2025-02-15 | End: 2025-02-15

## 2025-02-15 RX ORDER — DIAZEPAM 10 MG/2ML
7.5 INJECTION INTRAMUSCULAR ONCE
Status: COMPLETED | OUTPATIENT
Start: 2025-02-15 | End: 2025-02-15

## 2025-02-15 RX ORDER — METOPROLOL SUCCINATE 50 MG/1
50 TABLET, EXTENDED RELEASE ORAL DAILY
Status: DISCONTINUED | OUTPATIENT
Start: 2025-02-15 | End: 2025-02-15

## 2025-02-15 RX ORDER — ACETAMINOPHEN 325 MG/1
650 TABLET ORAL EVERY 4 HOURS PRN
Status: DISCONTINUED | OUTPATIENT
Start: 2025-02-15 | End: 2025-02-18 | Stop reason: HOSPADM

## 2025-02-15 RX ORDER — CEFTAZIDIME 1 G/1
1 INJECTION, POWDER, FOR SOLUTION INTRAMUSCULAR; INTRAVENOUS
Status: DISCONTINUED | OUTPATIENT
Start: 2025-02-15 | End: 2025-02-17

## 2025-02-15 RX ORDER — LORAZEPAM 2 MG/ML
1 INJECTION INTRAMUSCULAR
Status: COMPLETED | OUTPATIENT
Start: 2025-02-15 | End: 2025-02-15

## 2025-02-15 RX ORDER — DIAZEPAM 10 MG/2ML
5 INJECTION INTRAMUSCULAR EVERY 4 HOURS PRN
Status: DISCONTINUED | OUTPATIENT
Start: 2025-02-15 | End: 2025-02-15

## 2025-02-15 RX ORDER — ACETAMINOPHEN 325 MG/1
650 TABLET ORAL EVERY 8 HOURS PRN
Status: DISCONTINUED | OUTPATIENT
Start: 2025-02-15 | End: 2025-02-18 | Stop reason: HOSPADM

## 2025-02-15 RX ORDER — AMOXICILLIN 250 MG
1 CAPSULE ORAL 2 TIMES DAILY
Status: DISCONTINUED | OUTPATIENT
Start: 2025-02-15 | End: 2025-02-18 | Stop reason: HOSPADM

## 2025-02-15 RX ORDER — FAMOTIDINE 10 MG/ML
20 INJECTION INTRAVENOUS 2 TIMES DAILY
Status: DISCONTINUED | OUTPATIENT
Start: 2025-02-15 | End: 2025-02-17

## 2025-02-15 RX ORDER — SODIUM CHLORIDE, SODIUM LACTATE, POTASSIUM CHLORIDE, CALCIUM CHLORIDE 600; 310; 30; 20 MG/100ML; MG/100ML; MG/100ML; MG/100ML
INJECTION, SOLUTION INTRAVENOUS CONTINUOUS
Status: DISCONTINUED | OUTPATIENT
Start: 2025-02-15 | End: 2025-02-15

## 2025-02-15 RX ORDER — METRONIDAZOLE 500 MG/100ML
500 INJECTION, SOLUTION INTRAVENOUS
Status: DISCONTINUED | OUTPATIENT
Start: 2025-02-15 | End: 2025-02-17

## 2025-02-15 RX ORDER — ONDANSETRON HYDROCHLORIDE 2 MG/ML
4 INJECTION, SOLUTION INTRAVENOUS EVERY 6 HOURS PRN
Status: DISCONTINUED | OUTPATIENT
Start: 2025-02-15 | End: 2025-02-18 | Stop reason: HOSPADM

## 2025-02-15 RX ORDER — OXYCODONE AND ACETAMINOPHEN 5; 325 MG/1; MG/1
1 TABLET ORAL EVERY 6 HOURS PRN
Status: DISCONTINUED | OUTPATIENT
Start: 2025-02-15 | End: 2025-02-17

## 2025-02-15 RX ADMIN — FAMOTIDINE 20 MG: 10 INJECTION, SOLUTION INTRAVENOUS at 08:02

## 2025-02-15 RX ADMIN — LORAZEPAM 0.5 MG: 0.5 TABLET ORAL at 10:02

## 2025-02-15 RX ADMIN — METOROPROLOL TARTRATE 5 MG: 5 INJECTION, SOLUTION INTRAVENOUS at 05:02

## 2025-02-15 RX ADMIN — MUPIROCIN 1 G: 20 OINTMENT TOPICAL at 08:02

## 2025-02-15 RX ADMIN — HYDROMORPHONE HYDROCHLORIDE 2 MG: 2 TABLET ORAL at 08:02

## 2025-02-15 RX ADMIN — SODIUM CHLORIDE 2000 MG: 9 INJECTION, SOLUTION INTRAVENOUS at 02:02

## 2025-02-15 RX ADMIN — HYDROMORPHONE HYDROCHLORIDE 2 MG: 2 TABLET ORAL at 01:02

## 2025-02-15 RX ADMIN — ACETAMINOPHEN 650 MG: 325 TABLET ORAL at 11:02

## 2025-02-15 RX ADMIN — ONDANSETRON 4 MG: 2 INJECTION INTRAMUSCULAR; INTRAVENOUS at 09:02

## 2025-02-15 RX ADMIN — FAMOTIDINE 20 MG: 10 INJECTION, SOLUTION INTRAVENOUS at 06:02

## 2025-02-15 RX ADMIN — OXYCODONE HYDROCHLORIDE AND ACETAMINOPHEN 1 TABLET: 5; 325 TABLET ORAL at 11:02

## 2025-02-15 RX ADMIN — INSULIN GLARGINE 35 UNITS: 100 INJECTION, SOLUTION SUBCUTANEOUS at 07:02

## 2025-02-15 RX ADMIN — ASPIRIN 162 MG: 81 TABLET, COATED ORAL at 08:02

## 2025-02-15 RX ADMIN — METRONIDAZOLE 500 MG: 500 INJECTION, SOLUTION INTRAVENOUS at 04:02

## 2025-02-15 RX ADMIN — VENLAFAXINE HYDROCHLORIDE 225 MG: 150 CAPSULE, EXTENDED RELEASE ORAL at 01:02

## 2025-02-15 RX ADMIN — SODIUM CHLORIDE, POTASSIUM CHLORIDE, SODIUM LACTATE AND CALCIUM CHLORIDE: 600; 310; 30; 20 INJECTION, SOLUTION INTRAVENOUS at 07:02

## 2025-02-15 RX ADMIN — LORAZEPAM 0.5 MG: 0.5 TABLET ORAL at 02:02

## 2025-02-15 RX ADMIN — CEFTAZIDIME 1 G: 1 INJECTION, POWDER, FOR SOLUTION INTRAMUSCULAR; INTRAVENOUS at 08:02

## 2025-02-15 RX ADMIN — SODIUM CHLORIDE, POTASSIUM CHLORIDE, SODIUM LACTATE AND CALCIUM CHLORIDE: 600; 310; 30; 20 INJECTION, SOLUTION INTRAVENOUS at 06:02

## 2025-02-15 RX ADMIN — ONDANSETRON 4 MG: 2 INJECTION INTRAMUSCULAR; INTRAVENOUS at 04:02

## 2025-02-15 RX ADMIN — DIAZEPAM 5 MG: 5 INJECTION, SOLUTION INTRAMUSCULAR; INTRAVENOUS at 06:02

## 2025-02-15 RX ADMIN — LORAZEPAM 1 MG: 2 INJECTION INTRAMUSCULAR at 01:02

## 2025-02-15 RX ADMIN — METOPROLOL SUCCINATE 50 MG: 50 TABLET, FILM COATED, EXTENDED RELEASE ORAL at 08:02

## 2025-02-15 RX ADMIN — METOROPROLOL TARTRATE 5 MG: 5 INJECTION, SOLUTION INTRAVENOUS at 10:02

## 2025-02-15 RX ADMIN — LABETALOL HYDROCHLORIDE 0.5 MG/MIN: 5 INJECTION, SOLUTION INTRAVENOUS at 12:02

## 2025-02-15 RX ADMIN — LIDOCAINE 5% 2 PATCH: 700 PATCH TOPICAL at 11:02

## 2025-02-15 RX ADMIN — HYDROMORPHONE HYDROCHLORIDE 1 MG: 1 INJECTION, SOLUTION INTRAMUSCULAR; INTRAVENOUS; SUBCUTANEOUS at 07:02

## 2025-02-15 RX ADMIN — DIAZEPAM 7.5 MG: 5 INJECTION, SOLUTION INTRAMUSCULAR; INTRAVENOUS at 07:02

## 2025-02-15 RX ADMIN — BUSPIRONE HYDROCHLORIDE 30 MG: 5 TABLET ORAL at 08:02

## 2025-02-15 RX ADMIN — CEFTAZIDIME 1 G: 1 INJECTION, POWDER, FOR SOLUTION INTRAMUSCULAR; INTRAVENOUS at 01:02

## 2025-02-15 RX ADMIN — INSULIN ASPART 2 UNITS: 100 INJECTION, SOLUTION INTRAVENOUS; SUBCUTANEOUS at 02:02

## 2025-02-15 RX ADMIN — OXYCODONE HYDROCHLORIDE AND ACETAMINOPHEN 1 TABLET: 5; 325 TABLET ORAL at 06:02

## 2025-02-15 RX ADMIN — METOPROLOL SUCCINATE 25 MG: 25 TABLET, FILM COATED, EXTENDED RELEASE ORAL at 06:02

## 2025-02-15 RX ADMIN — BUSPIRONE HYDROCHLORIDE 30 MG: 5 TABLET ORAL at 02:02

## 2025-02-15 RX ADMIN — INSULIN ASPART 2 UNITS: 100 INJECTION, SOLUTION INTRAVENOUS; SUBCUTANEOUS at 07:02

## 2025-02-15 NOTE — ASSESSMENT & PLAN NOTE
She has metabolic acidosis WITH a GAP     But beta hydroxybutyrate is normal     Which indicates she is NOT IN DKA     Yet    Could even be a starvation ketosis and the MARILU contributes to the GAP by increased organic acids building up   Poor po intake also from nausea vomiting  And diarrhea contributes to fluid losses       PLAN    - IVFs   cc hour x next 10 hours     - control the sugars  PRN insulin SS Q2 for now     - check A1C    - avoid ARB ACE I drugs   No NSAIDs      I stopped ALL  HTN Drugs and the drip

## 2025-02-15 NOTE — PROGRESS NOTES
"Pharmacokinetic Initial Assessment: IV Vancomycin    Assessment/Plan:    Initiate intravenous vancomycin with loading dose of 2000 mg once followed by a maintenance dose of vancomycin 2000 mg IV every 12 hours  Desired empiric serum trough concentration is 15 to 20 mcg/mL  Draw vancomycin trough level 60 min prior to third dose on 02/16/25 at approximately 13:00.  Pharmacy will continue to follow and monitor vancomycin.      Please contact pharmacy at extension [phone] with any questions regarding this assessment.     Thank you for the consult,   Marixa Nice       Patient brief summary:  Jasper Guerrero is a 34 y.o. female initiated on antimicrobial therapy with IV Vancomycin for treatment of suspected sepsis    Drug Allergies:   Review of patient's allergies indicates:   Allergen Reactions    Benadryl [diphenhydramine hcl]      Paralysis on right side body     Codeine      Paralysis right body    Flexeril [cyclobenzaprine] Other (See Comments)     Numbness on right side of body    Nsaids (non-steroidal anti-inflammatory drug) Diarrhea and Nausea And Vomiting     Ulcers     Penicillins Anaphylaxis and Hives    Droperidol Anxiety and Other (See Comments)    Magnesium      "made me feel horrible"     Prochlorperazine Anxiety and Palpitations       Actual Body Weight:   136.7 kg    Renal Function:   Estimated Creatinine Clearance: 113.3 mL/min (based on SCr of 1 mg/dL).,     Dialysis Method (if applicable):  N/A    CBC (last 72 hours):  Recent Labs   Lab Result Units 02/14/25  1942 02/15/25  1029   WBC K/uL 28.10* 19.49*   Hemoglobin g/dL 15.7 13.6   Hemoglobin A1C %  --  6.8*   Hematocrit % 47.3 42.1   Platelets K/uL 478* 354   Gran % % 87.2* 72.9   Lymph % % 8.6* 16.3*   Mono % % 3.1* 9.9   Eosinophil % % 0.0 0.0   Basophil % % 0.2 0.2   Differential Method  Automated Automated       Metabolic Panel (last 72 hours):  Recent Labs   Lab Result Units 02/14/25  1942 02/14/25  2115 02/15/25  1029   Sodium mmol/L 141  --  " "139   Potassium mmol/L 3.9  --  3.9   Chloride mmol/L 104  --  107   CO2 mmol/L 14*  --  23   Glucose mg/dL 294*  --  173*   Glucose, UA   --  4+*  --    BUN mg/dL 15  --  15   Creatinine mg/dL 1.6*  --  1.0   Creatinine, Urine mg/dL  --  150.3  --    Albumin g/dL 4.5  --  4.1   Total Bilirubin mg/dL 0.6  --  0.5   Alkaline Phosphatase U/L 77  --  53*   AST U/L 30  --  19   ALT U/L 43  --  24   Magnesium mg/dL  --   --  1.6       Drug levels (last 3 results):  No results for input(s): "VANCOMYCINRA", "VANCORANDOM", "VANCOMYCINPE", "VANCOPEAK", "VANCOMYCINTR", "VANCOTROUGH" in the last 72 hours.    Microbiologic Results:  Microbiology Results (last 7 days)       Procedure Component Value Units Date/Time    Stool culture [[id number]]     Order Status: No result Specimen: Stool     MRSA Screen by PCR [[id number]]     Order Status: No result Specimen: Nasal Swab     Blood culture #1 **CANNOT BE ORDERED STAT** [[id number]] Collected: 02/15/25 0023    Order Status: Sent Specimen: Blood from Peripheral, Hand, Right Updated: 02/15/25 1141    Blood culture #2 **CANNOT BE ORDERED STAT** [[id number]] Collected: 02/15/25 0039    Order Status: Sent Specimen: Blood from Peripheral, Forearm, Left Updated: 02/15/25 1141            "

## 2025-02-15 NOTE — RESPIRATORY THERAPY
02/15/25 0732   Patient Assessment/Suction   Level of Consciousness (AVPU) alert   Respiratory Effort Normal;Unlabored   Expansion/Accessory Muscles/Retractions no use of accessory muscles;no retractions;expansion symmetric   Rhythm/Pattern, Respiratory unlabored;pattern regular;depth regular;no shortness of breath reported   PRE-TX-O2   Device (Oxygen Therapy) room air   SpO2 98 %   Pulse Oximetry Type Continuous   $ Pulse Oximetry - Multiple Charge Pulse Oximetry - Multiple   Pulse (!) 131   Resp 20

## 2025-02-15 NOTE — ASSESSMENT & PLAN NOTE
Its SINUS TACH    I think this is all being driven by     - anxiety   - Nausea and Vomiting strain and stress   - back pain     And possibly some benzo withdrawal because she is on xanax which is notorious for rebound anxiety especially if she vomited it up     Right now  in our ICU     BP was 100 range so I stopped labetalol drip    She is in no distress but is anxious still it seems     She got IV ativan twice at ER and she says she feels better      But back hurts she told me , requesting pain meds       PLAN      - Give IV Valium 7.5 mg Once NOW   - Dilaudid 1 mg IV once NOW with it    - running LR IVFs 200 cc hour for the metabolic acidosis with gap and hyperglycemia     - DC labetalol drip     - pepcid IV BID start now    - control nausea with PRN zofran 8 mg IV Q4     And phenergan if that doesn't work Q6 PRN IV           I bet HR and BP will be fine once back pain and anxiety are fixed    And even volume depletion dehydration can worsen tachycardia and HTN by triggering RAAS and Sympathetic nervous system

## 2025-02-15 NOTE — SUBJECTIVE & OBJECTIVE
"Past Medical History:   Diagnosis Date    Agoraphobia     Carpal tunnel syndrome     Depression     Diabetes mellitus, type 2     DVT (deep venous thrombosis)     2019 had blood clot in right arm    Fibromyalgia     Hypertension     Migraine headache     Nerve injury 08/2016    Lingual Nerve Injury    Obese     Pain management     PTSD (post-traumatic stress disorder)     Trigeminal neuralgia        Past Surgical History:   Procedure Laterality Date    CARPAL TUNNEL RELEASE Right 12/27/2018    Dr Lozada     CARPAL TUNNEL RELEASE Left 01/28/2020    Procedure: RELEASE, CARPAL TUNNEL;  Surgeon: Brendon Lozada Jr., MD;  Location: [facility];  Service: Orthopedics;  Laterality: Left;    CHOLECYSTECTOMY N/A 10/31/2022    MOUTH SURGERY      NERVE GRAFT  2016    WISDOM TOOTH EXTRACTION         Review of patient's allergies indicates:   Allergen Reactions    Benadryl [diphenhydramine hcl]      Paralysis on right side body     Codeine      Paralysis right body    Flexeril [cyclobenzaprine] Other (See Comments)     Numbness on right side of body    Nsaids (non-steroidal anti-inflammatory drug) Diarrhea and Nausea And Vomiting     Ulcers     Penicillins Anaphylaxis and Hives    Droperidol Anxiety and Other (See Comments)    Magnesium      "made me feel horrible"     Prochlorperazine Anxiety and Palpitations       No current facility-administered medications on file prior to encounter.     Current Outpatient Medications on File Prior to Encounter   Medication Sig    ABILIFY 20 mg Tab     acetaminophen (TYLENOL) 500 MG tablet Take 500 mg by mouth every 6 (six) hours as needed for Pain. 5316-1778 daily    atorvastatin (LIPITOR) 10 MG tablet Take 1 tablet (10 mg total) by mouth once daily.    busPIRone (BUSPAR) 30 MG Tab 30 mg 3 (three) times daily.    dicyclomine (BENTYL) 20 mg tablet Take 20 mg by mouth every 8 (eight) hours as needed.    fluvoxaMINE (LUVOX) 100 MG tablet 200 mg nightly.    metFORMIN (GLUCOPHAGE) 500 MG tablet Take 1 " tablet (500 mg total) by mouth daily with breakfast.    metoclopramide HCl (REGLAN) 10 MG tablet Take 10 mg by mouth 4 (four) times daily.    metoprolol succinate (TOPROL-XL) 50 MG 24 hr tablet Take 1 tablet (50 mg total) by mouth once daily.    ondansetron (ZOFRAN) 4 MG tablet Take 1 tablet (4 mg total) by mouth every 12 (twelve) hours as needed for Nausea.    tirzepatide 10 mg/0.5 mL PnIj Inject 10 mg into the skin every 7 days.    tiZANidine (ZANAFLEX) 4 MG tablet Take 1 tablet (4 mg total) by mouth every 8 (eight) hours as needed (SPASM).    venlafaxine (EFFEXOR-XR) 150 MG Cp24 1 capsule with food Orally Once a day for 30 days    XANAX 1 mg tablet      Family History       Problem Relation (Age of Onset)    Cancer Maternal Aunt    Depression Mother    Diabetes Mother    No Known Problems Father, Daughter, Son, Maternal Uncle, Paternal Aunt, Paternal Uncle, Maternal Grandmother, Maternal Grandfather, Paternal Grandmother, Paternal Grandfather          Tobacco Use    Smoking status: Never    Smokeless tobacco: Never   Substance and Sexual Activity    Alcohol use: Yes     Comment: occasional    Drug use: No    Sexual activity: Yes     Partners: Male     Review of Systems   All other systems reviewed and are negative.    Objective:     Vital Signs (Most Recent):  Temp: 97.6 °F (36.4 °C) (02/15/25 0425)  Pulse: (!) 127 (02/15/25 0557)  Resp: (!) 29 (02/15/25 0530)  BP: (!) 149/67 (02/15/25 0530)  SpO2: (!) 94 % (02/15/25 0530) Vital Signs (24h Range):  Temp:  [97.6 °F (36.4 °C)-98.1 °F (36.7 °C)] 97.6 °F (36.4 °C)  Pulse:  [122-164] 127  Resp:  [25-34] 29  SpO2:  [94 %-100 %] 94 %  BP: (112-246)/() 149/67     Weight: (!) 136.7 kg (301 lb 5.9 oz)  Body mass index is 50.15 kg/m².     Physical Exam  Vitals and nursing note reviewed.   Constitutional:       Appearance: Normal appearance.   HENT:      Head: Normocephalic.      Nose: Nose normal.      Mouth/Throat:      Mouth: Mucous membranes are moist.   Eyes:       "Extraocular Movements: Extraocular movements intact.      Pupils: Pupils are equal, round, and reactive to light.   Cardiovascular:      Rate and Rhythm: Normal rate and regular rhythm.      Pulses: Normal pulses.      Heart sounds: Normal heart sounds.   Pulmonary:      Effort: Pulmonary effort is normal.      Breath sounds: Normal breath sounds.   Abdominal:      General: Abdomen is flat. Bowel sounds are normal.      Palpations: Abdomen is soft.   Musculoskeletal:         General: Normal range of motion.      Cervical back: Normal range of motion.   Skin:     General: Skin is warm.      Capillary Refill: Capillary refill takes less than 2 seconds.   Neurological:      General: No focal deficit present.      Mental Status: She is alert and oriented to person, place, and time.   Psychiatric:         Mood and Affect: Mood normal.         Behavior: Behavior normal.              CRANIAL NERVES     CN III, IV, VI   Pupils are equal, round, and reactive to light.       Significant Labs: All pertinent labs within the past 24 hours have been reviewed.  CBC:   Recent Labs   Lab 02/14/25 1942   WBC 28.10*   HGB 15.7   HCT 47.3   *     CMP:   Recent Labs   Lab 02/14/25 1942      K 3.9      CO2 14*   *   BUN 15   CREATININE 1.6*   CALCIUM 9.9   PROT 9.0*   ALBUMIN 4.5   BILITOT 0.6   ALKPHOS 77   AST 30   ALT 43   ANIONGAP 23*     Cardiac Markers:   Recent Labs   Lab 02/14/25 1942   BNP 35     Coagulation: No results for input(s): "PT", "INR", "APTT" in the last 48 hours.  Lactic Acid:   Recent Labs   Lab 02/15/25  0039   LACTATE 5.6*     Lipase:   Recent Labs   Lab 02/15/25  0039   LIPASE 15     Lipid Panel: No results for input(s): "CHOL", "HDL", "LDLCALC", "TRIG", "CHOLHDL" in the last 48 hours.  Magnesium: No results for input(s): "MG" in the last 48 hours.  Respiratory Culture: No results for input(s): "GSRESP", "RESPIRATORYC" in the last 48 hours.  Troponin:   Recent Labs   Lab " 02/14/25  1942   TROPONINI 0.058*     TSH:   Recent Labs   Lab 08/26/24  0901   TSH 0.023*     Urine Studies:   Recent Labs   Lab 02/14/25  2115   COLORU Yellow   APPEARANCEUA Clear   PHUR 6.0   SPECGRAV 1.025   PROTEINUA 1+*   GLUCUA 4+*   KETONESU 2+*   BILIRUBINUA Negative   OCCULTUA Negative   NITRITE Negative   UROBILINOGEN Negative   LEUKOCYTESUR Negative   RBCUA 2   WBCUA 3   BACTERIA Rare   SQUAMEPITHEL 8   HYALINECASTS 18*       Significant Imaging: I have reviewed all pertinent imaging results/findings within the past 24 hours.  I have reviewed and interpreted all pertinent imaging results/findings within the past 24 hours.

## 2025-02-15 NOTE — HOSPITAL COURSE
34-year-old female with PMH of diabetes, depression, PTSD, HTN, IBS presented with nausea and vomiting, SOB, which she describes as an anxiety attack. Her  is out of town, she is taking care of her 3 children and she could not care for herself. She was admitted to ICU for hypertensive urgency requiring labetalol drip. Blood pressure improved and labetalol drip discontinued. Also had increased anion gap metabolic acidosis likely secondary to lactic acidosis, continued on IV fluids. UA negative, chest x-ray no pneumonia, stool culture ordered with diarrhea. Blood cultures pending. Start broad-spectrum antibiotics for possible sepsis. Deescalate as needed. Leukocytosis improving. MARILU improved with IV fluids.Sinus tachycardia in 120s, likely dehydration vs sepsis, vs anxiety. Restarted her home anxiety medications Effexor 225 mg days and buspirone 30 mg t.i.d. (confirmed dose at bedside) patient would like to hold off on tele psych consult this time. D-dimer and TSH pending.  2D echocardiogram ordered and pending.

## 2025-02-15 NOTE — PLAN OF CARE
[facility]  Initial Discharge Assessment       Primary Care Provider: Enoch Gerardo NP    Admission Diagnosis: Tachycardia [R00.0]    Admission Date: 2/14/2025  Expected Discharge Date: 2/18/2025  SW met with patient at bedside to complete assessment. Patient does not have HH, is not on HD, uses no DME and does not take Coumadin. Patient will return home to live with her  when she is discharged. No needs were identified at this time.    Transition of Care Barriers: None    Payor: MEDICAID / Plan: [place] [employer] / Product Type: Managed Medicaid /     Extended Emergency Contact Information  Primary Emergency Contact: Sanjay uGerrero  Address: [address] [place] [address] [facility]  Mobile Phone: [phone]  Relation: Spouse  Preferred language: English  Secondary Emergency Contact: Katie Sargent  Mobile Phone: [phone]  Relation: Mother    Discharge Plan A: Home with family  Discharge Plan B: Home with family      [id number] - [place], [place] - [address] AT [facility] [address]  [address]  [facility] [address]  Phone: [phone] Fax: [phone]      Initial Assessment (most recent)       Adult Discharge Assessment - 02/15/25 1552          Discharge Assessment    Assessment Type Discharge Planning Assessment     Confirmed/corrected address, phone number and insurance Yes     Confirmed Demographics Correct on Facesheet     Source of Information patient     When was your last doctors appointment? --   three months ago    Communicated BRITTANY with patient/caregiver Yes     Reason For Admission Hypertensive Emergency     People in Home spouse     Facility Arrived From: transfer from [facility]     Do you expect to return to your current living situation? Yes     Do you have help at home or someone to help you manage your care at home? Yes     Who are your caregiver(s) and their phone number(s)? Sanjay  Yolanda/spouse [phone]     Prior to hospitilization cognitive status: Alert/Oriented;No Deficits     Current cognitive status: Alert/Oriented;No Deficits     Walking or Climbing Stairs Difficulty no     Dressing/Bathing Difficulty no     Equipment Currently Used at Home none     Readmission within 30 days? No     Patient currently being followed by outpatient case management? No     Do you currently have service(s) that help you manage your care at home? No     Do you take prescription medications? Yes     Do you have prescription coverage? Yes     Coverage [facility]/Medicaid     Do you have any problems affording any of your prescribed medications? No     Is the patient taking medications as prescribed? yes     Who is going to help you get home at discharge? Sanjay Guerrero/spouse [phone]     How do you get to doctors appointments? car, drives self     Are you on dialysis? No     Do you take coumadin? No     Discharge Plan A Home with family     Discharge Plan B Home with family     DME Needed Upon Discharge  none     Discharge Plan discussed with: Patient     Transition of Care Barriers None

## 2025-02-15 NOTE — RESPIRATORY THERAPY
02/15/25 0915   Patient Assessment/Suction   Level of Consciousness (AVPU) alert   Respiratory Effort Normal;Unlabored   Expansion/Accessory Muscles/Retractions no use of accessory muscles;no retractions;expansion symmetric   Rhythm/Pattern, Respiratory unlabored;pattern regular;depth regular;no shortness of breath reported   Cough Frequency no cough   PRE-TX-O2   Device (Oxygen Therapy) room air   SpO2 (!) 93 %   Pulse Oximetry Type Continuous   $ Pulse Oximetry - Multiple Charge Pulse Oximetry - Multiple   Pulse (!) 129   Resp (!) 31

## 2025-02-15 NOTE — CARE UPDATE
Patient is seen and examined this morning.  Reviewed history and physical from Dr Scott.  Agree with the assessment and plan.      34-year-old female with PMH of diabetes, depression, PTSD, HTN, IBS presented with nausea and vomiting, SOB, which she describes as an anxiety attack.  Her  is out of town, she is taking care of her 3 children and she could not care for herself.    She was admitted to ICU for hypertensive urgency requiring labetalol drip.  Blood pressure improved and labetalol drip discontinued.      Also had increased anion gap metabolic acidosis likely secondary to lactic acidosis, continued on IV fluids.  UA negative, chest x-ray no pneumonia, stool culture ordered with diarrhea.  Blood cultures pending.  Start broad-spectrum antibiotics for possible sepsis.  Deescalate as needed.  Leukocytosis improving.    MARILU improved with IV fluids.    Sinus tachycardia in 120s, likely dehydration vs sepsis, vs anxiety and pain.  Restarted her home anxiety medications Effexor 225 mg days and buspirone 30 mg t.i.d. (confirmed dose at bedside) patient would like to hold off on tele psych consult this time.  - D-dimer and TSH added on  - 2D echocardiogram to rule out any pleural effusion.  - Her home metoprolol increased to 75 mg, PRN IV Lopressor ordered  - Pain control  - Cardiology consult if persistent tachycardia.

## 2025-02-15 NOTE — HPI
Sent over to us from [facility] ER     34 WF with Past medical history is also significant for diabetes, depression, agoraphobia, PTSD and hypertension.     Patient presents emergency department reported shortness of breath onset of symptoms this morning with associated nausea vomiting throughout the day today states when unable to keep anything down today she denies any recent changes in her medications she denies any antecedent illness is no reported diarrhea no fever chills no cough no chest pain no abdominal pain at arrival emergency department patient is tachycardic she advised nursing that she took BuSpar and Xanax today without improvement in her anxiety symptoms her chart shows history of DVT but in his upon review of the chart it as a superficial thrombophlebitis of the right arm she does have a history of diabetes hypertension     Normally she is nauseated from IBS she says but never vomits so it was odd she did this time . Then after vomiting became very SOB she says. No coughing or choking.    Her anxiety was becoming severe and its bad on a good day she says  But lately its worse because he  is out of town .    She took her buspar and xanax but thinks she vomited them up       She also said her back pain is bad. And since her baby was  born in April last year 2024 , she has had back pain all over her back.  From neck to low back and nobody has found a reason but they gave her Tizanidine to use but not helping anymore she says.           She is NOT on insulin    She tells me she does NOT use marijuana   but tox screen positive for it     Non drinker  No drugs                 In the ER at [facility]  her BP was 211/ 130s     and HR was 140s sinus     They tried IV Lopressor and then did a Labetalol drip    For that reason wanted to send her over to us to our ICU       Labs were mostly normal     Mild bump in troponin    BNP normal range     Lactic acid was up 5.6    And she had an MARILU     Has normal kidneys at  baseline     Tox screen only positive for THC     CT neg  CTA chest neg for PE     EKG normal     UA clean no uti

## 2025-02-15 NOTE — ASSESSMENT & PLAN NOTE
MARILU is likely due to pre-renal azotemia due to intravascular volume depletion. Baseline creatinine is  under 1  . Most recent creatinine and eGFR are listed below.  Recent Labs     02/14/25 1942   CREATININE 1.6*   EGFRNORACEVR 44*      Plan    - IVFs  cc hour x 10 hours now   - no ACE  I or ARB drugs   No NSAIDs    - monitor urine output     - avoid dropping BP too much like we did the IV labetalol drip     211 systolic in ER and was down to 100 in our ICU     NO PRN IV BP HTN DRUGS

## 2025-02-15 NOTE — ASSESSMENT & PLAN NOTE
We can check GI PCR panel     Norovirus maybe .  Its going around lately     Plan     - GI PCR    - IVFs     - IV Pepcid BID     - check mag and K     - imodium po PRN 2 mg    - control anxiety     - zofran 8 mg IV and phenergan IV PRN     Diet as tolerated     She's asking for regular food now her moms bringing her

## 2025-02-15 NOTE — ASSESSMENT & PLAN NOTE
Only on Oral meds  No insulin    Check A1c    Use insulin for now to get sugars down     Q2 PRN Slide scale     And Lantus 35 units Q am to start   start now     Reg diet as tolerated       Running IVFs  x 2 liters now

## 2025-02-15 NOTE — NURSING
0430 Notified Dr Scott of pt arrival, current vitals and abnormal labs (co2, gap). MD capps'd labetalol and is on way to see pt. No other new orders received at this time.      Labetalol gtt stopped. RPIV dressing changed. PIVS flushed and capped.    Safety intact: call light within reach, bed alarm set, personal items within reach, side rails upx2. Room free of clutter.

## 2025-02-15 NOTE — ED PROVIDER NOTES
"Encounter Date: 2/14/2025       History     Chief Complaint   Patient presents with    Shortness of Breath     Starting today with n/v.      Anxiety     Hx of anxiety attacks, pt reported taking xanax and buspar at 1000 today with no relief      Patient presents emergency department reported shortness of breath onset of symptoms this morning with associated nausea vomiting throughout the day today states when unable to keep anything down today she denies any recent changes in her medications she denies any antecedent illness is no reported diarrhea no fever chills no cough no chest pain no abdominal pain at arrival emergency department patient is tachycardic she advised nursing that she took BuSpar and Xanax today without improvement in her anxiety symptoms her chart shows history of DVT but in his upon review of the chart it as a superficial thrombophlebitis of the right arm she does have a history of diabetes hypertension        Review of patient's allergies indicates:   Allergen Reactions    Benadryl [diphenhydramine hcl]      Paralysis on right side body     Codeine      Paralysis right body    Flexeril [cyclobenzaprine] Other (See Comments)     Numbness on right side of body    Nsaids (non-steroidal anti-inflammatory drug) Diarrhea and Nausea And Vomiting     Ulcers     Penicillins Anaphylaxis and Hives    Droperidol Anxiety and Other (See Comments)    Magnesium      "made me feel horrible"     Prochlorperazine Anxiety and Palpitations     Past Medical History:   Diagnosis Date    Agoraphobia     Carpal tunnel syndrome     Depression     Diabetes mellitus, type 2     DVT (deep venous thrombosis)     2019 had blood clot in right arm    Fibromyalgia     Hypertension     Migraine headache     Nerve injury 08/2016    Lingual Nerve Injury    Obese     Pain management     PTSD (post-traumatic stress disorder)     Trigeminal neuralgia      Past Surgical History:   Procedure Laterality Date    CARPAL TUNNEL RELEASE " Right 12/27/2018    Dr Lozada     CARPAL TUNNEL RELEASE Left 01/28/2020    Procedure: RELEASE, CARPAL TUNNEL;  Surgeon: Brendon Lozada Jr., MD;  Location: [facility];  Service: Orthopedics;  Laterality: Left;    CHOLECYSTECTOMY N/A 10/31/2022    MOUTH SURGERY      NERVE GRAFT  2016    WISDOM TOOTH EXTRACTION       Family History   Problem Relation Name Age of Onset    Depression Mother      Diabetes Mother      No Known Problems Father      No Known Problems Daughter      No Known Problems Son      Cancer Maternal Aunt          endometrial    No Known Problems Maternal Uncle      No Known Problems Paternal Aunt      No Known Problems Paternal Uncle      No Known Problems Maternal Grandmother      No Known Problems Maternal Grandfather      No Known Problems Paternal Grandmother      No Known Problems Paternal Grandfather       Social History     Tobacco Use    Smoking status: Never    Smokeless tobacco: Never   Substance Use Topics    Alcohol use: Yes     Comment: occasional    Drug use: No     Review of Systems   Constitutional:  Negative for chills and fever.   Respiratory:  Positive for shortness of breath. Negative for cough and wheezing.    Cardiovascular:  Negative for chest pain.   Gastrointestinal:  Positive for nausea and vomiting. Negative for abdominal pain.   Genitourinary:  Negative for dysuria, flank pain and hematuria.   Musculoskeletal:  Negative for back pain.   Psychiatric/Behavioral:  The patient is nervous/anxious.    All other systems reviewed and are negative.      Physical Exam     Initial Vitals   BP Pulse Resp Temp SpO2   02/14/25 1925 02/14/25 1925 02/14/25 1925 02/14/25 2100 02/14/25 1925   (!) 210/107 (!) 155 (!) 26 98.1 °F (36.7 °C) 98 %      MAP       --                Physical Exam    Constitutional: She appears well-developed and well-nourished. No distress.   HENT:   Head: Normocephalic and atraumatic.   Right Ear: External ear normal.   Left Ear: External ear normal. Mouth/Throat:  Oropharynx is clear and moist.   Eyes: Conjunctivae and EOM are normal. Pupils are equal, round, and reactive to light.   Neck: Neck supple.   Normal range of motion.  Cardiovascular:  Regular rhythm, normal heart sounds and intact distal pulses.           Tachycardic   Pulmonary/Chest: Breath sounds normal. No respiratory distress.   Abdominal: Abdomen is soft. Bowel sounds are normal. There is no abdominal tenderness.   Musculoskeletal:         General: No edema. Normal range of motion.      Cervical back: Normal range of motion and neck supple.     Neurological: She is alert and oriented to person, place, and time. She has normal strength. GCS score is 15. GCS eye subscore is 4. GCS verbal subscore is 5. GCS motor subscore is 6.   Skin: Skin is warm and dry. Capillary refill takes less than 2 seconds. No rash noted.   Psychiatric: She has a normal mood and affect. Her behavior is normal.         ED Course   Procedures  Labs Reviewed   CBC W/ AUTO DIFFERENTIAL - Abnormal       Result Value    WBC 28.10 (*)     RBC 5.27      Hemoglobin 15.7      Hematocrit 47.3      MCV 90      MCH 29.8      MCHC 33.2      RDW 13.3      Platelets 478 (*)     MPV 9.5      Immature Granulocytes 0.9 (*)     Gran # (ANC) 24.5 (*)     Immature Grans (Abs) 0.25 (*)     Lymph # 2.4      Mono # 0.9      Eos # 0.0      Baso # 0.06      nRBC 0      Gran % 87.2 (*)     Lymph % 8.6 (*)     Mono % 3.1 (*)     Eosinophil % 0.0      Basophil % 0.2      Differential Method Automated     COMPREHENSIVE METABOLIC PANEL - Abnormal    Sodium 141      Potassium 3.9      Chloride 104      CO2 14 (*)     Glucose 294 (*)     BUN 15      Creatinine 1.6 (*)     Calcium 9.9      Total Protein 9.0 (*)     Albumin 4.5      Total Bilirubin 0.6      Alkaline Phosphatase 77      AST 30      ALT 43      eGFR 44 (*)     Anion Gap 23 (*)    TROPONIN I - Abnormal    Troponin I 0.058 (*)     Narrative:     Trop    critical result(s) called and verbal readback obtained  from   Dave Javier RN.  by TH1 02/14/2025 22:12   URINALYSIS, REFLEX TO URINE CULTURE - Abnormal    Specimen UA Urine, Catheterized      Color, UA Yellow      Appearance, UA Clear      pH, UA 6.0      Specific Gravity, UA 1.025      Protein, UA 1+ (*)     Glucose, UA 4+ (*)     Ketones, UA 2+ (*)     Bilirubin (UA) Negative      Occult Blood UA Negative      Nitrite, UA Negative      Urobilinogen, UA Negative      Leukocytes, UA Negative      Narrative:     Specimen Source->Urine   URINALYSIS MICROSCOPIC - Abnormal    RBC, UA 2      WBC, UA 3      Bacteria Rare      Yeast, UA None      Squam Epithel, UA 8      Hyaline Casts, UA 18 (*)     Microscopic Comment SEE COMMENT      Narrative:     Specimen Source->Urine   LACTIC ACID, PLASMA - Abnormal    Lactate (Lactic Acid) 5.6 (*)     Narrative:     Lactic Acid    critical result(s) called and verbal readback obtained   from Francine Rider RN.  by TH1 02/15/2025 01:36   POCT GLUCOSE - Abnormal    POCT Glucose 246 (*)    ISTAT PROCEDURE - Abnormal    POC PH 7.345 (*)     POC PCO2 27.7 (*)     POC PO2 75 (*)     POC HCO3 15.1 (*)     POC BE -11 (*)     POC SATURATED O2 94      POC TCO2 16 (*)     Sample CAPILLARY      Site RF      Allens Test Pass      DelSys Room Air      Mode SPONT      Sp02 97     CULTURE, BLOOD   CULTURE, BLOOD   B-TYPE NATRIURETIC PEPTIDE    BNP 35     LIPASE    Lipase 15     BETA - HYDROXYBUTYRATE, SERUM    Beta-Hydroxybutyrate 0.2     DRUG SCREEN PANEL, URINE EMERGENCY   POCT URINE PREGNANCY    POC Preg Test, Ur Negative       Acceptable Yes            Imaging Results              CT Abdomen Pelvis With IV Contrast NO Oral Contrast (Final result)  Result time 02/14/25 23:33:23      Final result by Bárbara Burgos MD (02/14/25 23:33:23)                   Impression:      No acute abnormality.      Electronically signed by: Bárbara Burgos  Date:    02/14/2025  Time:    23:33               Narrative:    EXAMINATION:  CT  ABDOMEN PELVIS WITH IV CONTRAST    CLINICAL HISTORY:  Nausea/vomiting;    TECHNIQUE:  Low dose axial images, sagittal and coronal reformations were obtained from the lung bases to the pubic symphysis following the IV administration of 100 mL of Omnipaque 350 .  Oral contrast was not administered.    COMPARISON:  08/14/2024 CT abdomen pelvis.    FINDINGS:  Abdomen:    - Lower thorax:No cardiomegaly or pericardial effusion    - Lung bases: No infiltrates and no nodules.    - Liver: Liver appears homogeneous and nonenlarged.    - Gallbladder: Cholecystectomy    - Bile Ducts: No evidence of intra or extra hepatic biliary ductal dilation.    - Spleen: Unremarkable.    - Kidneys: Renal cortices enhance symmetrically and homogeneously.  There is no nephrolithiasis or hydronephrosis.    - Adrenals: Unremarkable.    - Pancreas: No mass or peripancreatic fat stranding.    - Retroperitoneum:  No significant adenopathy.    - Vascular: No abdominal aortic aneurysm.    - Abdominal wall:  Unremarkable.    Pelvis:    Bladder is unremarkable as imaged.  Uterus and adnexa are unremarkable.  There is no pelvic mass, adenopathy, or free fluid.    Bowel/Mesentery:    No evidence of bowel obstruction or inflammation.  Appendix is normal.  Small bowel and stomach are unremarkable.    Bones:  No acute osseous abnormality and no suspicious lytic or blastic lesion.                                       CTA Chest Non-Coronary (PE Studies) (Final result)  Result time 02/14/25 23:07:24      Final result by Bárbara Burgos MD (02/14/25 23:07:24)                   Impression:      No evidence of pulmonary thromboemboli or right ventricular strain.    Trace pericardial effusion.      Electronically signed by: Bárbara Burgos  Date:    02/14/2025  Time:    23:07               Narrative:    EXAMINATION:  CTA CHEST NON CORONARY (PE STUDIES)    CLINICAL HISTORY:  Pulmonary embolism (PE) suspected, high prob;    TECHNIQUE:  Low dose axial images,  sagittal and coronal reformations were obtained from the thoracic inlet to the lung bases following the IV administration of 100 mL of Omnipaque 350.  MIP imaging was performed.    COMPARISON:  None    FINDINGS:  Pulmonary vasculature: There is no evidence of pulmonary thromboemboli to the level of the distal segmental branches.  Pulmonary artery caliber is normal and pulmonary arteries distribute normally.  There are four pulmonary veins.    Carine/Mediastinum: No pathologic tera enlargement.    Airways: Patent.    Lungs/Pleura: Clear lungs. No pleural effusion or thickening.    Aorta: Left-sided aortic arch.  No aneurysm and no significant atherosclerosis    Heart: Heart is not significantly enlarged.  There is a trace pericardial effusion.    Base of Neck: No significant abnormality.    Thoracic soft tissues: Unremarkable.    Esophagus: Unremarkable.    Upper Abdomen: No abnormality of the partially imaged upper abdomen.    Bones: No acute fracture. No suspicious lytic or sclerotic lesions.                                       X-Ray Chest AP Portable (Final result)  Result time 02/14/25 21:19:36      Final result by Bárbara Burgos MD (02/14/25 21:19:36)                   Impression:      No acute abnormality.      Electronically signed by: Bárbara Burgos  Date:    02/14/2025  Time:    21:19               Narrative:    EXAMINATION:  XR CHEST AP PORTABLE    CLINICAL HISTORY:  CHF;    TECHNIQUE:  Single frontal view of the chest was performed.    COMPARISON:  07/21/2019, 02/19/2018 chest x-ray    FINDINGS:  Cardiac silhouette is stable and nonenlarged.  Lungs appear clear.  There is no pleural effusion or pneumothorax.  Osseous structures are intact                                       Medications   labetaloL (NORMODYNE,TRANDATE) 500 mg in 100 mL infusion (conc: 5 mg/mL) (1 mg/min Intravenous Rate/Dose Change 2/15/25 0220)   sodium chloride 0.9% bolus 1,000 mL 1,000 mL (0 mLs Intravenous Stopped 2/14/25  2112)   ondansetron injection 4 mg (4 mg Intravenous Given 2/14/25 2014)   metoprolol injection 5 mg (5 mg Intravenous Given 2/14/25 2119)   iohexoL (OMNIPAQUE 350) 350 mg iodine/mL injection (100 mLs Intravenous Given 2/14/25 2147)   metoprolol injection 5 mg (5 mg Intravenous Given 2/14/25 2212)   promethazine (PHENERGAN) 12.5 mg in 0.9% NaCl 50 mL IVPB (0 mg Intravenous Stopped 2/14/25 2245)   0.9% NaCl infusion (1,000 mLs Intravenous New Bag 2/14/25 2230)   metoprolol injection 5 mg (5 mg Intravenous Given 2/14/25 2306)   LORazepam injection 1 mg (1 mg Intravenous Given 2/14/25 2352)   LORazepam injection 1 mg (1 mg Intravenous Given 2/15/25 0102)     Medical Decision Making  Patient is markedly hypertensive and tachycardic arrival emergency department EKG shows sinus tachycardia she received IV fluid resuscitation IV Lopressor with little change in his blood pressure and heart rate CTA shows no evidence of pulmonary embolism no evidence of pulmonary disease CT of the abdomen pelvis with contrast showed no acute intra-abdominal abnormalities laboratory evaluation reviewed patient placed on labetalol drip with some improvement in his blood pressure and heart rate I have added blood cultures and lactate I have discussed patient's findings with Dr. Scott will admit patient to intensive care unit at [facility]    Amount and/or Complexity of Data Reviewed  Labs: ordered. Decision-making details documented in ED Course.  Radiology: ordered. Decision-making details documented in ED Course.  ECG/medicine tests: ordered. Decision-making details documented in ED Course.    Risk  Prescription drug management.  Decision regarding hospitalization.              Attending Attestation:         Attending Critical Care:   Critical Care Times:   Direct Patient Care (initial evaluation, reassessments, and time considering the case)................................................................14 minutes.   Additional History  from reviewing old medical records or taking additional history from the family, EMS, PCP, etc.......................4 minutes.   Ordering, Reviewing, and Interpreting Diagnostic Studies...............................................................................................................6 minutes.   Documentation..................................................................................................................................................................................5 minutes.   Consultation with other Physicians. .................................................................................................................................................7 minutes.   ==============================================================  Total Critical Care Time - exclusive of procedural time: 36 minutes.  ==============================================================          ED Course as of 02/15/25 0223   Sat Feb 15, 2025   0048 WBC(!): 28.10 [AT]   0048 Troponin I(!!): 0.058 [AT]   0048 BNP: 35 [AT]   0048 CO2(!): 14 [AT]   0048 Glucose(!): 294 [AT]   0048 Creatinine(!): 1.6 [AT]      ED Course User Index  [AT] Juve Fung MD                           Clinical Impression:  Final diagnoses:  [R06.02] SOB (shortness of breath)  [R06.02] Shortness of breath  [R00.0] Tachycardia (Primary)  [I16.1] Hypertensive emergency          ED Disposition Condition    Admit Stable                Juve Fung MD  02/15/25 0223

## 2025-02-15 NOTE — RESPIRATORY THERAPY
Latest Reference Range & Units 02/15/25 01:04   POC PH 7.35 - 7.45  7.345 (L)   POC PCO2 35 - 45 mmHg 27.7 (LL)   POC PO2 50 - 70 mmHg 75 (H)   POC HCO3 24 - 28 mmol/L 15.1 (L)   POC SATURATED O2 95 - 100 % 94   Sample  CAPILLARY   POC TCO2 23 - 27 mmol/L 16 (L)   POC BE -2 to 2 mmol/L -11 (L)   [facility]  Room Air   Site  RF   Mode  SPONT   (LL): Data is critically low  (L): Data is abnormally low  (H): Data is abnormally high    Cap gas

## 2025-02-15 NOTE — ED NOTES
Ambulated to restroom without assist. Requested ice water, despite reported being nauseous. Very limited amount provided. Did report diarrhea with urination. No vomiting since arrival.

## 2025-02-15 NOTE — ED NOTES
Wheelchair assist to restroom, reports DIARRHEA and on menstrual cycle, was unable to collect urine specimen. ER MD notified.

## 2025-02-15 NOTE — CARE UPDATE
02/15/25 0434   Patient Assessment/Suction   Level of Consciousness (AVPU) alert   Respiratory Effort Unlabored   Expansion/Accessory Muscles/Retractions expansion symmetric   All Lung Fields Breath Sounds diminished   Rhythm/Pattern, Respiratory depth regular;pattern regular   Cough Frequency infrequent   Cough Type good;nonproductive   PRE-TX-O2   Device (Oxygen Therapy) room air   SpO2 96 %   Pulse Oximetry Type Continuous   $ Pulse Oximetry - Multiple Charge Pulse Oximetry - Multiple   Pulse (!) 122   Resp (!) 30   Education   $ Education Oxygen;15 min

## 2025-02-15 NOTE — H&P
[facility] Medicine  History & Physical    Patient Name: Jasper Guerrero  MRN: [id number]  Patient Class: IP- Inpatient  Admission Date: 2/14/2025  Attending Physician: Frederick Scott MD  Primary Care Provider: Enoch Gerardo NP         Patient information was obtained from patient and ER records.     Subjective:     Principal Problem:Hypertensive urgency    Chief Complaint:   Chief Complaint   Patient presents with    Shortness of Breath     Starting today with n/v.      Anxiety     Hx of anxiety attacks, pt reported taking xanax and buspar at 1000 today with no relief         HPI: Sent over to us from [facility]     34 WF with Past medical history is also significant for diabetes, depression, agoraphobia, PTSD and hypertension.     Patient presents emergency department reported shortness of breath onset of symptoms this morning with associated nausea vomiting throughout the day today states when unable to keep anything down today she denies any recent changes in her medications she denies any antecedent illness is no reported diarrhea no fever chills no cough no chest pain no abdominal pain at arrival emergency department patient is tachycardic she advised nursing that she took BuSpar and Xanax today without improvement in her anxiety symptoms her chart shows history of DVT but in his upon review of the chart it as a superficial thrombophlebitis of the right arm she does have a history of diabetes hypertension     Normally she is nauseated from IBS she says but never vomits so it was odd she did this time . Then after vomiting became very SOB she says. No coughing or choking.    Her anxiety was becoming severe and its bad on a good day she says  But lately its worse because he  is out of town .    She took her buspar and xanax but thinks she vomited them up       She also said her back pain is bad. And since her baby was  born in April last year 2024 , she has had back pain all over  her back.  From neck to low back and nobody has found a reason but they gave her Tizanidine to use but not helping anymore she says.           She is NOT on insulin    She tells me she does NOT use marijuana   but tox screen positive for it     Non drinker  No drugs                 In the ER at [facility]  her BP was 211/ 130s     and HR was 140s sinus     They tried IV Lopressor and then did a Labetalol drip    For that reason wanted to send her over to us to our ICU       Labs were mostly normal     Mild bump in troponin    BNP normal range     Lactic acid was up 5.6    And she had an MARILU     Has normal kidneys at baseline     Tox screen only positive for THC     CT neg  CTA chest neg for PE     EKG normal     UA clean no uti         Past Medical History:   Diagnosis Date    Agoraphobia     Carpal tunnel syndrome     Depression     Diabetes mellitus, type 2     DVT (deep venous thrombosis)     2019 had blood clot in right arm    Fibromyalgia     Hypertension     Migraine headache     Nerve injury 08/2016    Lingual Nerve Injury    Obese     Pain management     PTSD (post-traumatic stress disorder)     Trigeminal neuralgia        Past Surgical History:   Procedure Laterality Date    CARPAL TUNNEL RELEASE Right 12/27/2018    Dr Lozada     CARPAL TUNNEL RELEASE Left 01/28/2020    Procedure: RELEASE, CARPAL TUNNEL;  Surgeon: Brendon Lozada Jr., MD;  Location: [facility];  Service: Orthopedics;  Laterality: Left;    CHOLECYSTECTOMY N/A 10/31/2022    MOUTH SURGERY      NERVE GRAFT  2016    WISDOM TOOTH EXTRACTION         Review of patient's allergies indicates:   Allergen Reactions    Benadryl [diphenhydramine hcl]      Paralysis on right side body     Codeine      Paralysis right body    Flexeril [cyclobenzaprine] Other (See Comments)     Numbness on right side of body    Nsaids (non-steroidal anti-inflammatory drug) Diarrhea and Nausea And Vomiting     Ulcers     Penicillins Anaphylaxis and Hives    Droperidol Anxiety and Other  "(See Comments)    Magnesium      "made me feel horrible"     Prochlorperazine Anxiety and Palpitations       No current facility-administered medications on file prior to encounter.     Current Outpatient Medications on File Prior to Encounter   Medication Sig    ABILIFY 20 mg Tab     acetaminophen (TYLENOL) 500 MG tablet Take 500 mg by mouth every 6 (six) hours as needed for Pain. 0414-5465 daily    atorvastatin (LIPITOR) 10 MG tablet Take 1 tablet (10 mg total) by mouth once daily.    busPIRone (BUSPAR) 30 MG Tab 30 mg 3 (three) times daily.    dicyclomine (BENTYL) 20 mg tablet Take 20 mg by mouth every 8 (eight) hours as needed.    fluvoxaMINE (LUVOX) 100 MG tablet 200 mg nightly.    metFORMIN (GLUCOPHAGE) 500 MG tablet Take 1 tablet (500 mg total) by mouth daily with breakfast.    metoclopramide HCl (REGLAN) 10 MG tablet Take 10 mg by mouth 4 (four) times daily.    metoprolol succinate (TOPROL-XL) 50 MG 24 hr tablet Take 1 tablet (50 mg total) by mouth once daily.    ondansetron (ZOFRAN) 4 MG tablet Take 1 tablet (4 mg total) by mouth every 12 (twelve) hours as needed for Nausea.    tirzepatide 10 mg/0.5 mL PnIj Inject 10 mg into the skin every 7 days.    tiZANidine (ZANAFLEX) 4 MG tablet Take 1 tablet (4 mg total) by mouth every 8 (eight) hours as needed (SPASM).    venlafaxine (EFFEXOR-XR) 150 MG Cp24 1 capsule with food Orally Once a day for 30 days    XANAX 1 mg tablet      Family History       Problem Relation (Age of Onset)    Cancer Maternal Aunt    Depression Mother    Diabetes Mother    No Known Problems Father, Daughter, Son, Maternal Uncle, Paternal Aunt, Paternal Uncle, Maternal Grandmother, Maternal Grandfather, Paternal Grandmother, Paternal Grandfather          Tobacco Use    Smoking status: Never    Smokeless tobacco: Never   Substance and Sexual Activity    Alcohol use: Yes     Comment: occasional    Drug use: No    Sexual activity: Yes     Partners: Male     Review of Systems   All other " systems reviewed and are negative.    Objective:     Vital Signs (Most Recent):  Temp: 97.6 °F (36.4 °C) (02/15/25 0425)  Pulse: (!) 127 (02/15/25 0557)  Resp: (!) 29 (02/15/25 0530)  BP: (!) 149/67 (02/15/25 0530)  SpO2: (!) 94 % (02/15/25 0530) Vital Signs (24h Range):  Temp:  [97.6 °F (36.4 °C)-98.1 °F (36.7 °C)] 97.6 °F (36.4 °C)  Pulse:  [122-164] 127  Resp:  [25-34] 29  SpO2:  [94 %-100 %] 94 %  BP: (112-246)/() 149/67     Weight: (!) 136.7 kg (301 lb 5.9 oz)  Body mass index is 50.15 kg/m².     Physical Exam  Vitals and nursing note reviewed.   Constitutional:       Appearance: Normal appearance.   HENT:      Head: Normocephalic.      Nose: Nose normal.      Mouth/Throat:      Mouth: Mucous membranes are moist.   Eyes:      Extraocular Movements: Extraocular movements intact.      Pupils: Pupils are equal, round, and reactive to light.   Cardiovascular:      Rate and Rhythm: Normal rate and regular rhythm.      Pulses: Normal pulses.      Heart sounds: Normal heart sounds.   Pulmonary:      Effort: Pulmonary effort is normal.      Breath sounds: Normal breath sounds.   Abdominal:      General: Abdomen is flat. Bowel sounds are normal.      Palpations: Abdomen is soft.   Musculoskeletal:         General: Normal range of motion.      Cervical back: Normal range of motion.   Skin:     General: Skin is warm.      Capillary Refill: Capillary refill takes less than 2 seconds.   Neurological:      General: No focal deficit present.      Mental Status: She is alert and oriented to person, place, and time.   Psychiatric:         Mood and Affect: Mood normal.         Behavior: Behavior normal.              CRANIAL NERVES     CN III, IV, VI   Pupils are equal, round, and reactive to light.       Significant Labs: All pertinent labs within the past 24 hours have been reviewed.  CBC:   Recent Labs   Lab 02/14/25 1942   WBC 28.10*   HGB 15.7   HCT 47.3   *     CMP:   Recent Labs   Lab 02/14/25 1942     "  K 3.9      CO2 14*   *   BUN 15   CREATININE 1.6*   CALCIUM 9.9   PROT 9.0*   ALBUMIN 4.5   BILITOT 0.6   ALKPHOS 77   AST 30   ALT 43   ANIONGAP 23*     Cardiac Markers:   Recent Labs   Lab 02/14/25 1942   BNP 35     Coagulation: No results for input(s): "PT", "INR", "APTT" in the last 48 hours.  Lactic Acid:   Recent Labs   Lab 02/15/25  0039   LACTATE 5.6*     Lipase:   Recent Labs   Lab 02/15/25  0039   LIPASE 15     Lipid Panel: No results for input(s): "CHOL", "HDL", "LDLCALC", "TRIG", "CHOLHDL" in the last 48 hours.  Magnesium: No results for input(s): "MG" in the last 48 hours.  Respiratory Culture: No results for input(s): "GSRESP", "RESPIRATORYC" in the last 48 hours.  Troponin:   Recent Labs   Lab 02/14/25 1942   TROPONINI 0.058*     TSH:   Recent Labs   Lab 08/26/24  0901   TSH 0.023*     Urine Studies:   Recent Labs   Lab 02/14/25 2115   COLORU Yellow   APPEARANCEUA Clear   PHUR 6.0   SPECGRAV 1.025   PROTEINUA 1+*   GLUCUA 4+*   KETONESU 2+*   BILIRUBINUA Negative   OCCULTUA Negative   NITRITE Negative   UROBILINOGEN Negative   LEUKOCYTESUR Negative   RBCUA 2   WBCUA 3   BACTERIA Rare   SQUAMEPITHEL 8   HYALINECASTS 18*       Significant Imaging: I have reviewed all pertinent imaging results/findings within the past 24 hours.  I have reviewed and interpreted all pertinent imaging results/findings within the past 24 hours.  Assessment/Plan:     * Hypertensive urgency  Patient has a current diagnosis of Hypertensive emergency with end organ damage evidenced by acute kidney injury which is controlled.  Latest blood pressure and vitals reviewed-   Temp:  [97.6 °F (36.4 °C)-98.1 °F (36.7 °C)]   Pulse:  [122-164]   Resp:  [25-34]   BP: (112-246)/()   SpO2:  [94 %-100 %] .   Patient currently off IV antihypertensives.   Home meds for hypertension were reviewed and noted below.   Hypertension Medications              metoprolol succinate (TOPROL-XL) 50 MG 24 hr tablet Take 1 tablet (50 " mg total) by mouth once daily.            Medication adjustment for hospital antihypertensives is as follows- DC Labetalol drip    See my note above on Tachycardia problem     We need to control her PAIN and Her ANXIETY FIRST       Also hydrate and fix her possibly early DKA   hyperglycemia , metabolic acidosis     The MARILU may just be from volume depletion not HTN event     Will aim for controlled BP reduction by medications noted above. Monitor and mitigate end organ damage as indicated.    Metabolic acidosis, increased anion gap (IAG)  She has metabolic acidosis WITH a GAP     But beta hydroxybutyrate is normal     Which indicates she is NOT IN DKA     Yet    Could even be a starvation ketosis and the MARILU contributes to the GAP by increased organic acids building up   Poor po intake also from nausea vomiting  And diarrhea contributes to fluid losses       PLAN    - IVFs   cc hour x next 10 hours     - control the sugars  PRN insulin SS Q2 for now     - check A1C    - avoid ARB ACE I drugs   No NSAIDs      I stopped ALL  HTN Drugs and the drip       MARILU (acute kidney injury)  MARILU is likely due to pre-renal azotemia due to intravascular volume depletion. Baseline creatinine is  under 1  . Most recent creatinine and eGFR are listed below.  Recent Labs     02/14/25 1942   CREATININE 1.6*   EGFRNORACEVR 44*      Plan    - IVFs  cc hour x 10 hours now   - no ACE  I or ARB drugs   No NSAIDs    - monitor urine output     - avoid dropping BP too much like we did the IV labetalol drip     211 systolic in ER and was down to 100 in our ICU     NO PRN IV BP HTN DRUGS     DMII (diabetes mellitus, type 2)  Only on Oral meds  No insulin    Check A1c    Use insulin for now to get sugars down     Q2 PRN Slide scale     And Lantus 35 units Q am to start   start now     Reg diet as tolerated       Running IVFs  x 2 liters now     Nausea vomiting and diarrhea    We can check GI PCR panel     Norovirus maybe .  Its going  around lately     Plan     - GI PCR    - IVFs     - IV Pepcid BID     - check mag and K     - imodium po PRN 2 mg    - control anxiety     - zofran 8 mg IV and phenergan IV PRN     Diet as tolerated     She's asking for regular food now her moms bringing her     Tachycardia    Its SINUS TACH    I think this is all being driven by     - anxiety   - Nausea and Vomiting strain and stress   - back pain     And possibly some benzo withdrawal because she is on xanax which is notorious for rebound anxiety especially if she vomited it up     Right now  in our ICU     BP was 100 range so I stopped labetalol drip    She is in no distress but is anxious still it seems     She got IV ativan twice at ER and she says she feels better      But back hurts she told me , requesting pain meds       PLAN      - Give IV Valium 7.5 mg Once NOW   - Dilaudid 1 mg IV once NOW with it    - running LR IVFs 200 cc hour for the metabolic acidosis with gap and hyperglycemia     - DC labetalol drip     - pepcid IV BID start now    - control nausea with PRN zofran 8 mg IV Q4     And phenergan if that doesn't work Q6 PRN IV           I bet HR and BP will be fine once back pain and anxiety are fixed    And even volume depletion dehydration can worsen tachycardia and HTN by triggering RAAS and Sympathetic nervous system       VTE Risk Mitigation (From admission, onward)           Ordered     IP VTE HIGH RISK PATIENT  Once         02/15/25 0528     Place sequential compression device  Until discontinued         02/15/25 0528     Reason for No Pharmacological VTE Prophylaxis  Once        Question:  Reasons:  Answer:  Patient is Ambulatory    02/15/25 0528                  Critical care time spent on the evaluation and treatment of severe organ dysfunction, review of pertinent labs and imaging studies, discussions with consulting providers and discussions with patient/family: 65 minutes.                  Frederick Scott MD  Department of Hospital  Medicine  [facility]

## 2025-02-15 NOTE — ASSESSMENT & PLAN NOTE
Patient has a current diagnosis of Hypertensive emergency with end organ damage evidenced by acute kidney injury which is controlled.  Latest blood pressure and vitals reviewed-   Temp:  [97.6 °F (36.4 °C)-98.1 °F (36.7 °C)]   Pulse:  [122-164]   Resp:  [25-34]   BP: (112-246)/()   SpO2:  [94 %-100 %] .   Patient currently off IV antihypertensives.   Home meds for hypertension were reviewed and noted below.   Hypertension Medications              metoprolol succinate (TOPROL-XL) 50 MG 24 hr tablet Take 1 tablet (50 mg total) by mouth once daily.            Medication adjustment for hospital antihypertensives is as follows- DC Labetalol drip    See my note above on Tachycardia problem     We need to control her PAIN and Her ANXIETY FIRST       Also hydrate and fix her possibly early DKA   hyperglycemia , metabolic acidosis     The MARILU may just be from volume depletion not HTN event     Will aim for controlled BP reduction by medications noted above. Monitor and mitigate end organ damage as indicated.   previous_has_had_previous_treatment

## 2025-02-16 LAB
ALBUMIN SERPL BCP-MCNC: 3.9 G/DL (ref 3.5–5.2)
ALP SERPL-CCNC: 44 U/L (ref 55–135)
ALT SERPL W/O P-5'-P-CCNC: 21 U/L (ref 10–44)
ANION GAP SERPL CALC-SCNC: 7 MMOL/L (ref 8–16)
AST SERPL-CCNC: 20 U/L (ref 10–40)
BASOPHILS # BLD AUTO: 0.03 K/UL (ref 0–0.2)
BASOPHILS NFR BLD: 0.1 % (ref 0–1.9)
BILIRUB SERPL-MCNC: 0.6 MG/DL (ref 0.1–1)
BUN SERPL-MCNC: 10 MG/DL (ref 6–20)
CALCIUM SERPL-MCNC: 8.5 MG/DL (ref 8.7–10.5)
CHLORIDE SERPL-SCNC: 109 MMOL/L (ref 95–110)
CO2 SERPL-SCNC: 25 MMOL/L (ref 23–29)
CREAT SERPL-MCNC: 0.8 MG/DL (ref 0.5–1.4)
DIFFERENTIAL METHOD BLD: ABNORMAL
EOSINOPHIL # BLD AUTO: 0 K/UL (ref 0–0.5)
EOSINOPHIL NFR BLD: 0 % (ref 0–8)
ERYTHROCYTE [DISTWIDTH] IN BLOOD BY AUTOMATED COUNT: 14.3 % (ref 11.5–14.5)
EST. GFR  (NO RACE VARIABLE): >60 ML/MIN/1.73 M^2
GLUCOSE SERPL-MCNC: 133 MG/DL (ref 70–110)
HCT VFR BLD AUTO: 38.1 % (ref 37–48.5)
HGB BLD-MCNC: 12.5 G/DL (ref 12–16)
IMM GRANULOCYTES # BLD AUTO: 0.12 K/UL (ref 0–0.04)
IMM GRANULOCYTES NFR BLD AUTO: 0.6 % (ref 0–0.5)
LACTATE SERPL-SCNC: 0.7 MMOL/L (ref 0.5–1.9)
LACTATE SERPL-SCNC: 2.1 MMOL/L (ref 0.5–1.9)
LYMPHOCYTES # BLD AUTO: 3.8 K/UL (ref 1–4.8)
LYMPHOCYTES NFR BLD: 18.4 % (ref 18–48)
MAGNESIUM SERPL-MCNC: 1.7 MG/DL (ref 1.6–2.6)
MCH RBC QN AUTO: 30 PG (ref 27–31)
MCHC RBC AUTO-ENTMCNC: 32.8 G/DL (ref 32–36)
MCV RBC AUTO: 91 FL (ref 82–98)
MONOCYTES # BLD AUTO: 1.9 K/UL (ref 0.3–1)
MONOCYTES NFR BLD: 9.1 % (ref 4–15)
NEUTROPHILS # BLD AUTO: 14.7 K/UL (ref 1.8–7.7)
NEUTROPHILS NFR BLD: 71.8 % (ref 38–73)
NRBC BLD-RTO: 0 /100 WBC
OHS QRS DURATION: 74 MS
OHS QRS DURATION: 82 MS
OHS QTC CALCULATION: 431 MS
OHS QTC CALCULATION: 438 MS
PLATELET # BLD AUTO: 272 K/UL (ref 150–450)
PMV BLD AUTO: 9 FL (ref 9.2–12.9)
POCT GLUCOSE: 112 MG/DL (ref 70–110)
POCT GLUCOSE: 121 MG/DL (ref 70–110)
POCT GLUCOSE: 124 MG/DL (ref 70–110)
POCT GLUCOSE: 143 MG/DL (ref 70–110)
POTASSIUM SERPL-SCNC: 3.8 MMOL/L (ref 3.5–5.1)
PROT SERPL-MCNC: 6.5 G/DL (ref 6–8.4)
RBC # BLD AUTO: 4.17 M/UL (ref 4–5.4)
SODIUM SERPL-SCNC: 141 MMOL/L (ref 136–145)
T4 FREE SERPL-MCNC: 0.67 NG/DL (ref 0.71–1.51)
TROPONIN I SERPL HS-MCNC: 37.1 PG/ML (ref 0–14.9)
TROPONIN I SERPL HS-MCNC: 48.1 PG/ML (ref 0–14.9)
VANCOMYCIN TROUGH SERPL-MCNC: 12.3 UG/ML
WBC # BLD AUTO: 20.47 K/UL (ref 3.9–12.7)

## 2025-02-16 PROCEDURE — 85025 COMPLETE CBC W/AUTO DIFF WBC: CPT | Performed by: INTERNAL MEDICINE

## 2025-02-16 PROCEDURE — 63600175 PHARM REV CODE 636 W HCPCS

## 2025-02-16 PROCEDURE — 84484 ASSAY OF TROPONIN QUANT: CPT | Performed by: INTERNAL MEDICINE

## 2025-02-16 PROCEDURE — 94761 N-INVAS EAR/PLS OXIMETRY MLT: CPT

## 2025-02-16 PROCEDURE — 86038 ANTINUCLEAR ANTIBODIES: CPT | Performed by: INTERNAL MEDICINE

## 2025-02-16 PROCEDURE — 99900031 HC PATIENT EDUCATION (STAT)

## 2025-02-16 PROCEDURE — 93010 ELECTROCARDIOGRAM REPORT: CPT | Mod: ,,, | Performed by: INTERNAL MEDICINE

## 2025-02-16 PROCEDURE — 36415 COLL VENOUS BLD VENIPUNCTURE: CPT | Performed by: INTERNAL MEDICINE

## 2025-02-16 PROCEDURE — 25000003 PHARM REV CODE 250: Performed by: INTERNAL MEDICINE

## 2025-02-16 PROCEDURE — 20000000 HC ICU ROOM

## 2025-02-16 PROCEDURE — 80202 ASSAY OF VANCOMYCIN: CPT

## 2025-02-16 PROCEDURE — 63600175 PHARM REV CODE 636 W HCPCS: Performed by: INTERNAL MEDICINE

## 2025-02-16 PROCEDURE — 83735 ASSAY OF MAGNESIUM: CPT | Performed by: INTERNAL MEDICINE

## 2025-02-16 PROCEDURE — 93005 ELECTROCARDIOGRAM TRACING: CPT

## 2025-02-16 PROCEDURE — 80053 COMPREHEN METABOLIC PANEL: CPT | Performed by: INTERNAL MEDICINE

## 2025-02-16 PROCEDURE — 83605 ASSAY OF LACTIC ACID: CPT | Mod: 91 | Performed by: INTERNAL MEDICINE

## 2025-02-16 PROCEDURE — 84439 ASSAY OF FREE THYROXINE: CPT | Performed by: INTERNAL MEDICINE

## 2025-02-16 PROCEDURE — 25000003 PHARM REV CODE 250

## 2025-02-16 PROCEDURE — 99223 1ST HOSP IP/OBS HIGH 75: CPT | Mod: ,,, | Performed by: INTERNAL MEDICINE

## 2025-02-16 RX ORDER — NICARDIPINE HYDROCHLORIDE 0.2 MG/ML
0-15 INJECTION INTRAVENOUS CONTINUOUS
Status: DISCONTINUED | OUTPATIENT
Start: 2025-02-16 | End: 2025-02-18 | Stop reason: HOSPADM

## 2025-02-16 RX ORDER — LISINOPRIL 10 MG/1
10 TABLET ORAL DAILY
Status: DISCONTINUED | OUTPATIENT
Start: 2025-02-16 | End: 2025-02-17

## 2025-02-16 RX ORDER — LABETALOL HYDROCHLORIDE 5 MG/ML
5 INJECTION, SOLUTION INTRAVENOUS EVERY 6 HOURS PRN
Status: DISCONTINUED | OUTPATIENT
Start: 2025-02-16 | End: 2025-02-18 | Stop reason: HOSPADM

## 2025-02-16 RX ORDER — HYDRALAZINE HYDROCHLORIDE 20 MG/ML
10 INJECTION INTRAMUSCULAR; INTRAVENOUS EVERY 6 HOURS PRN
Status: DISCONTINUED | OUTPATIENT
Start: 2025-02-16 | End: 2025-02-18 | Stop reason: HOSPADM

## 2025-02-16 RX ADMIN — FAMOTIDINE 20 MG: 10 INJECTION, SOLUTION INTRAVENOUS at 08:02

## 2025-02-16 RX ADMIN — METOPROLOL SUCCINATE 75 MG: 50 TABLET, FILM COATED, EXTENDED RELEASE ORAL at 04:02

## 2025-02-16 RX ADMIN — HYDROMORPHONE HYDROCHLORIDE 2 MG: 2 TABLET ORAL at 03:02

## 2025-02-16 RX ADMIN — CEFTAZIDIME 1 G: 1 INJECTION, POWDER, FOR SOLUTION INTRAMUSCULAR; INTRAVENOUS at 05:02

## 2025-02-16 RX ADMIN — METRONIDAZOLE 500 MG: 500 INJECTION, SOLUTION INTRAVENOUS at 05:02

## 2025-02-16 RX ADMIN — LIDOCAINE 5% 2 PATCH: 700 PATCH TOPICAL at 12:02

## 2025-02-16 RX ADMIN — MUPIROCIN 1 G: 20 OINTMENT TOPICAL at 08:02

## 2025-02-16 RX ADMIN — CEFTAZIDIME 1 G: 1 INJECTION, POWDER, FOR SOLUTION INTRAMUSCULAR; INTRAVENOUS at 12:02

## 2025-02-16 RX ADMIN — METRONIDAZOLE 500 MG: 500 INJECTION, SOLUTION INTRAVENOUS at 01:02

## 2025-02-16 RX ADMIN — BUSPIRONE HYDROCHLORIDE 30 MG: 5 TABLET ORAL at 02:02

## 2025-02-16 RX ADMIN — INSULIN GLARGINE 35 UNITS: 100 INJECTION, SOLUTION SUBCUTANEOUS at 08:02

## 2025-02-16 RX ADMIN — SODIUM CHLORIDE, POTASSIUM CHLORIDE, SODIUM LACTATE AND CALCIUM CHLORIDE: 600; 310; 30; 20 INJECTION, SOLUTION INTRAVENOUS at 02:02

## 2025-02-16 RX ADMIN — METOROPROLOL TARTRATE 5 MG: 5 INJECTION, SOLUTION INTRAVENOUS at 05:02

## 2025-02-16 RX ADMIN — HYDROMORPHONE HYDROCHLORIDE 2 MG: 2 TABLET ORAL at 10:02

## 2025-02-16 RX ADMIN — OXYCODONE HYDROCHLORIDE AND ACETAMINOPHEN 1 TABLET: 5; 325 TABLET ORAL at 07:02

## 2025-02-16 RX ADMIN — ASPIRIN 162 MG: 81 TABLET, COATED ORAL at 08:02

## 2025-02-16 RX ADMIN — BUSPIRONE HYDROCHLORIDE 30 MG: 5 TABLET ORAL at 08:02

## 2025-02-16 RX ADMIN — VENLAFAXINE HYDROCHLORIDE 225 MG: 150 CAPSULE, EXTENDED RELEASE ORAL at 08:02

## 2025-02-16 RX ADMIN — ONDANSETRON 4 MG: 2 INJECTION INTRAMUSCULAR; INTRAVENOUS at 03:02

## 2025-02-16 RX ADMIN — LABETALOL HYDROCHLORIDE 5 MG: 5 INJECTION, SOLUTION INTRAVENOUS at 03:02

## 2025-02-16 RX ADMIN — METOROPROLOL TARTRATE 5 MG: 5 INJECTION, SOLUTION INTRAVENOUS at 06:02

## 2025-02-16 RX ADMIN — OXYCODONE HYDROCHLORIDE AND ACETAMINOPHEN 1 TABLET: 5; 325 TABLET ORAL at 02:02

## 2025-02-16 RX ADMIN — HYDRALAZINE HYDROCHLORIDE 10 MG: 20 INJECTION, SOLUTION INTRAMUSCULAR; INTRAVENOUS at 04:02

## 2025-02-16 RX ADMIN — METOPROLOL SUCCINATE 75 MG: 50 TABLET, FILM COATED, EXTENDED RELEASE ORAL at 08:02

## 2025-02-16 RX ADMIN — LABETALOL HYDROCHLORIDE 5 MG: 5 INJECTION, SOLUTION INTRAVENOUS at 07:02

## 2025-02-16 RX ADMIN — METRONIDAZOLE 500 MG: 500 INJECTION, SOLUTION INTRAVENOUS at 07:02

## 2025-02-16 RX ADMIN — OXYCODONE HYDROCHLORIDE AND ACETAMINOPHEN 1 TABLET: 5; 325 TABLET ORAL at 08:02

## 2025-02-16 RX ADMIN — LORAZEPAM 0.5 MG: 0.5 TABLET ORAL at 10:02

## 2025-02-16 RX ADMIN — HYDROMORPHONE HYDROCHLORIDE 2 MG: 2 TABLET ORAL at 06:02

## 2025-02-16 RX ADMIN — LISINOPRIL 10 MG: 10 TABLET ORAL at 06:02

## 2025-02-16 RX ADMIN — CEFTAZIDIME 1 G: 1 INJECTION, POWDER, FOR SOLUTION INTRAMUSCULAR; INTRAVENOUS at 08:02

## 2025-02-16 RX ADMIN — LORAZEPAM 0.5 MG: 0.5 TABLET ORAL at 06:02

## 2025-02-16 RX ADMIN — VANCOMYCIN HYDROCHLORIDE 2000 MG: 1 INJECTION, POWDER, LYOPHILIZED, FOR SOLUTION INTRAVENOUS at 02:02

## 2025-02-16 RX ADMIN — NICARDIPINE HYDROCHLORIDE 5 MG/HR: 0.2 INJECTION, SOLUTION INTRAVENOUS at 07:02

## 2025-02-16 NOTE — RESPIRATORY THERAPY
02/16/25 0805   Patient Assessment/Suction   Level of Consciousness (AVPU) alert   Respiratory Effort Normal;Unlabored   Expansion/Accessory Muscles/Retractions no use of accessory muscles;no retractions;expansion symmetric   Rhythm/Pattern, Respiratory unlabored;pattern regular;depth regular;no shortness of breath reported   PRE-TX-O2   Device (Oxygen Therapy) room air   SpO2 95 %   Pulse Oximetry Type Continuous   $ Pulse Oximetry - Multiple Charge Pulse Oximetry - Multiple   Pulse (!) 117

## 2025-02-16 NOTE — CONSULTS
[facility]  Department of Cardiology  Consult Note      PATIENT NAME: Jasper Guerrero    MRN: [id number]  TODAY'S DATE: 02/16/2025  ADMIT DATE: 2/14/2025                          CONSULT REQUESTED BY: Buck Mackay MD    SUBJECTIVE     PRINCIPAL PROBLEM: Hypertensive urgency    HPI:    Ms. Guerrero is a 34 year old female who presented to the Er with nausea and vomiting as well as shortness of breath and feeling like she was having an anxiety attack. She was noted to be Sinus tachycardiac in the 140s yesterday. She reports her baseline HR at home is in the 110s-120s chronically. She is ST in the 100-110s today. She reports feeling flushed and has had elevated WBCs as well. Echocardiogram done and reviewed. No complaints of chest pain. Breathing is now stable.       REASON FOR CONSULT:  From Hospitalist note:        Patient is seen and examined this morning.  Reviewed history and physical from Dr Scott.  Agree with the assessment and plan.      34-year-old female with PMH of diabetes, depression, PTSD, HTN, IBS presented with nausea and vomiting, SOB, which she describes as an anxiety attack.  Her  is out of town, she is taking care of her 3 children and she could not care for herself.     She was admitted to ICU for hypertensive urgency requiring labetalol drip.  Blood pressure improved and labetalol drip discontinued.       Also had increased anion gap metabolic acidosis likely secondary to lactic acidosis, continued on IV fluids.  UA negative, chest x-ray no pneumonia, stool culture ordered with diarrhea.  Blood cultures pending.  Start broad-spectrum antibiotics for possible sepsis.  Deescalate as needed.  Leukocytosis improving.     MARILU improved with IV fluids.     Sinus tachycardia in 120s, likely dehydration vs sepsis, vs anxiety and pain.  Restarted her home anxiety medications Effexor 225 mg days and buspirone 30 mg t.i.d. (confirmed dose at bedside) patient would like to hold off on tele  "psych consult this time.  - D-dimer and TSH added on  - 2D echocardiogram to rule out any pleural effusion.  - Her home metoprolol increased to 75 mg, PRN IV Lopressor ordered  - Pain control  - Cardiology consult if persistent tachycardia.            Review of patient's allergies indicates:   Allergen Reactions    Benadryl [diphenhydramine hcl]      Paralysis on right side body     Codeine      Paralysis right body    Flexeril [cyclobenzaprine] Other (See Comments)     Numbness on right side of body    Nsaids (non-steroidal anti-inflammatory drug) Diarrhea and Nausea And Vomiting     Ulcers     Penicillins Anaphylaxis and Hives    Droperidol Anxiety and Other (See Comments)    Magnesium      "made me feel horrible"     Prochlorperazine Anxiety and Palpitations       Past Medical History:   Diagnosis Date    Agoraphobia     Carpal tunnel syndrome     Depression     Diabetes mellitus, type 2     DVT (deep venous thrombosis)     2019 had blood clot in right arm    Fibromyalgia     Hypertension     Migraine headache     Nerve injury 08/2016    Lingual Nerve Injury    Obese     Pain management     PTSD (post-traumatic stress disorder)     Trigeminal neuralgia      Past Surgical History:   Procedure Laterality Date    CARPAL TUNNEL RELEASE Right 12/27/2018    Dr Lozada     CARPAL TUNNEL RELEASE Left 01/28/2020    Procedure: RELEASE, CARPAL TUNNEL;  Surgeon: Brendon Lozada Jr., MD;  Location: [facility];  Service: Orthopedics;  Laterality: Left;    CHOLECYSTECTOMY N/A 10/31/2022    MOUTH SURGERY      NERVE GRAFT  2016    WISDOM TOOTH EXTRACTION       Social History[1]     REVIEW OF SYSTEMS  Per HPI    OBJECTIVE     VITAL SIGNS (Most Recent)  Temp: 98 °F (36.7 °C) (02/16/25 0301)  Pulse: (!) 122 (02/16/25 0601)  Resp: 20 (02/16/25 0725)  BP: (!) 148/78 (02/16/25 0601)  SpO2: 97 % (02/16/25 0401)    VENTILATION STATUS  Resp: 20 (02/16/25 0725)  SpO2: 97 % (02/16/25 0401)           I & O (Last 24H):  Intake/Output Summary (Last " 24 hours) at 2/16/2025 0854  Last data filed at 2/16/2025 0601  Gross per 24 hour   Intake 1379.27 ml   Output 0 ml   Net 1379.27 ml       WEIGHTS  Wt Readings from Last 1 Encounters:   02/15/25 0425 (!) 136.7 kg (301 lb 5.9 oz)   02/14/25 1925 135.6 kg (299 lb)       PHYSICAL EXAM  CONSTITUTIONAL: No fever, no chills  HEENT: Normocephalic, atraumatic,pupils reactive to light                 NECK:  No JVD no carotid bruit  CVS: S1S2+, RRR  LUNGS: Clear  ABDOMEN: Soft, NT, BS+  EXTREMITIES: No cyanosis, edema  : No moreno catheter  NEURO: AAO X 3  PSY: Normal affect      HOME MEDICATIONS:Medications Ordered Prior to Encounter[2]    SCHEDULED MEDS:   aspirin  162 mg Oral Daily    busPIRone  30 mg Oral TID    cefTAZidime IV (PEDS and ADULTS)  1 g Intravenous Q8H    famotidine (PF)  20 mg Intravenous BID    insulin glargine U-100  35 Units Subcutaneous Daily    LIDOcaine  2 patch Transdermal Q24H    metoprolol succinate  75 mg Oral Daily    metroNIDAZOLE IV (PEDS and ADULTS)  500 mg Intravenous Q8H    mupirocin   Nasal BID    senna-docusate 8.6-50 mg  1 tablet Oral BID    vancomycin (VANCOCIN) IV (PEDS and ADULTS)  2,000 mg Intravenous Q12H    venlafaxine  225 mg Oral Daily       CONTINUOUS INFUSIONS:   lactated ringers   Intravenous Continuous 100 mL/hr at 02/15/25 1938 New Bag at 02/15/25 1938       PRN MEDS:  Current Facility-Administered Medications:     acetaminophen, 650 mg, Oral, Q8H PRN    acetaminophen, 650 mg, Oral, Q4H PRN    dextrose 50%, 12.5 g, Intravenous, PRN    dextrose 50%, 12.5 g, Intravenous, PRN    dextrose 50%, 12.5 g, Intravenous, PRN    dextrose 50%, 25 g, Intravenous, PRN    dextrose 50%, 25 g, Intravenous, PRN    dextrose 50%, 25 g, Intravenous, PRN    glucagon (human recombinant), 1 mg, Intramuscular, PRN    glucagon (human recombinant), 1 mg, Intramuscular, PRN    glucagon (human recombinant), 1 mg, Intramuscular, PRN    glucose, 16 g, Oral, PRN    glucose, 16 g, Oral, PRN    glucose, 16 g,  "Oral, PRN    glucose, 24 g, Oral, PRN    glucose, 24 g, Oral, PRN    glucose, 24 g, Oral, PRN    HYDROmorphone, 2 mg, Oral, Q6H PRN    insulin aspart U-100, 0-10 Units, Subcutaneous, Q2H PRN    labetalol, 5 mg, Intravenous, Q6H PRN    LORazepam, 0.5 mg, Oral, Q6H PRN    melatonin, 6 mg, Oral, Nightly PRN    metoprolol, 5 mg, Intravenous, Q6H PRN    naloxone, 0.02 mg, Intravenous, PRN    ondansetron, 4 mg, Intravenous, Q6H PRN    oxyCODONE-acetaminophen, 1 tablet, Oral, Q6H PRN    potassium bicarbonate, 35 mEq, Oral, PRN    potassium bicarbonate, 50 mEq, Oral, PRN    potassium bicarbonate, 60 mEq, Oral, PRN    potassium, sodium phosphates, 2 packet, Oral, PRN    potassium, sodium phosphates, 2 packet, Oral, PRN    potassium, sodium phosphates, 2 packet, Oral, PRN    sodium chloride 0.9%, 3 mL, Intravenous, Q12H PRN    Pharmacy to dose Vancomycin consult, , , Once **AND** vancomycin - pharmacy to dose, , Intravenous, pharmacy to manage frequency    LABS AND DIAGNOSTICS     CBC LAST 3 DAYS  Recent Labs   Lab 02/14/25  1942 02/15/25  1029 02/16/25  0320   WBC 28.10* 19.49* 20.47*   RBC 5.27 4.61 4.17   HGB 15.7 13.6 12.5   HCT 47.3 42.1 38.1   MCV 90 91 91   MCH 29.8 29.5 30.0   MCHC 33.2 32.3 32.8   RDW 13.3 14.1 14.3   * 354 272   MPV 9.5 9.3 9.0*   GRAN 87.2*  24.5* 72.9  14.2* 71.8  14.7*   LYMPH 8.6*  2.4 16.3*  3.2 18.4  3.8   MONO 3.1*  0.9 9.9  1.9* 9.1  1.9*   BASO 0.06 0.03 0.03   NRBC 0 0 0       COAGULATION LAST 3 DAYS  No results for input(s): "LABPT", "INR", "APTT" in the last 168 hours.    CHEMISTRY LAST 3 DAYS  Recent Labs   Lab 02/14/25  1942 02/15/25  0104 02/15/25  1029 02/16/25  0320     --  139 141   K 3.9  --  3.9 3.8     --  107 109   CO2 14*  --  23 25   ANIONGAP 23*  --  9 7*   BUN 15  --  15 10   CREATININE 1.6*  --  1.0 0.8   *  --  173* 133*   CALCIUM 9.9  --  8.5* 8.5*   PH  --  7.345*  --   --    MG  --   --  1.6 1.7   ALBUMIN 4.5  --  4.1 3.9   PROT 9.0*  " --  7.1 6.5   ALKPHOS 77  --  53* 44*   ALT 43  --  24 21   AST 30  --  19 20   BILITOT 0.6  --  0.5 0.6       CARDIAC PROFILE LAST 3 DAYS  Recent Labs   Lab 02/14/25  1942 02/15/25  1029 02/15/25  2258   BNP 35  --   --    TROPONINI 0.058*  --   --    TROPONINIHS  --  105.8* 68.2*       ENDOCRINE LAST 3 DAYS  Recent Labs   Lab 02/15/25  1029   TSH 0.877   PROCAL 0.106       LAST ARTERIAL BLOOD GAS  ABG  Recent Labs   Lab 02/15/25  0104   PH 7.345*   PO2 75*   PCO2 27.7*   HCO3 15.1*   BE -11*       LAST 7 DAYS MICROBIOLOGY   Microbiology Results (last 7 days)       Procedure Component Value Units Date/Time    MRSA Screen by PCR [[id number]] Collected: 02/15/25 1450    Order Status: Completed Specimen: Nasal Swab Updated: 02/15/25 1925     MRSA SCREEN BY PCR Not Detected    Blood culture #1 **CANNOT BE ORDERED STAT** [[id number]] Collected: 02/15/25 0023    Order Status: Completed Specimen: Blood from Peripheral, Hand, Right Updated: 02/15/25 1758     Blood Culture, Routine No Growth to date    Blood culture #2 **CANNOT BE ORDERED STAT** [[id number]] Collected: 02/15/25 0039    Order Status: Completed Specimen: Blood from Peripheral, Forearm, Left Updated: 02/15/25 1758     Blood Culture, Routine No Growth to date    Stool culture [[id number]]     Order Status: No result Specimen: Stool             MOST RECENT IMAGING  Echo    Left Ventricle: The left ventricle is normal in size. Mildly increased   wall thickness. There is normal systolic function with a visually   estimated ejection fraction of 60 - 65%.    Right Ventricle: Normal right ventricular cavity size. Systolic   function is normal.  X-Ray Chest AP Portable  Narrative: CLINICAL HISTORY:  ([id number] y/o  (1990) F    SOB;    TECHNIQUE:  (A#[id number], exam time 2/15/2025 6:53)    XR CHEST AP PORTABLE [id number]    COMPARISON:  Radiograph from 02/14/2025.    FINDINGS:  The lungs are clear except for some vascular crowding bilaterally  likely related to a  suboptimal inspiration. Costophrenic angles are seen without effusion. No pneumothorax is identified. The heart is normal in size. The mediastinum is within normal limits. Osseous structures appear within normal limits. There is a prominent gastric air bubble..  Impression: No acute cardiac or pulmonary process.    Electronically signed by: Sanjay Ferraro  Date:    02/15/2025  Time:    06:57      ECHOCARDIOGRAM RESULTS (last 5)  Results for orders placed during the hospital encounter of 02/14/25    Echo    Interpretation Summary    Left Ventricle: The left ventricle is normal in size. Mildly increased wall thickness. There is normal systolic function with a visually estimated ejection fraction of 60 - 65%.    Right Ventricle: Normal right ventricular cavity size. Systolic function is normal.      Results for orders placed in visit on 01/19/24    Echo doppler fetal complete      CURRENT/PREVIOUS VISIT EKG  Results for orders placed or performed during the hospital encounter of 08/28/22   EKG 12-lead    Collection Time: 08/28/22  7:20 PM    Narrative    Test Reason : R10.9,    Vent. Rate : 100 BPM     Atrial Rate : 100 BPM     P-R Int : 162 ms          QRS Dur : 082 ms      QT Int : 326 ms       P-R-T Axes : 069 071 064 degrees     QTc Int : 420 ms    Normal sinus rhythm  Normal ECG  When compared with ECG of 17-JUL-2022 19:01,  No significant change was found  Confirmed by Reagan Dumont MD ([id number]) on 9/4/2022 3:47:37 PM    Referred By: LETITIA   SELF           Confirmed By:Reagan Dumont MD           ASSESSMENT/PLAN:     Active Hospital Problems    Diagnosis    *Hypertensive urgency    Tachycardia    Nausea vomiting and diarrhea    DMII (diabetes mellitus, type 2)    MARILU (acute kidney injury)    Metabolic acidosis, increased anion gap (IAG)       ASSESSMENT & PLAN:     HTN urgency  Tachycardia  N/V/D  Marilu  Increased anion gap      RECOMMENDATIONS:    Patient was on labetalol drip for HTN urgency which appears  off now. She has been started on metoprolol succinate which was increased to 75 mg po daily. Recommend uptitration of beta blockers as needed/able for HR and BP stabilization.   HR improved today, but she remains Hypertensive. Workup in process per primary team.   Echocardiogram is benign and EF is normal.   Cardiology will be available PRN. Please reconsult for any cardiac specific needs that have not been addressed.         Sophy Loredo NP  Date of Service: 02/16/2025  8:54 AM         [1]   Social History  Tobacco Use    Smoking status: Never    Smokeless tobacco: Never   Substance Use Topics    Alcohol use: Yes     Comment: occasional    Drug use: No   [2]   No current facility-administered medications on file prior to encounter.     Current Outpatient Medications on File Prior to Encounter   Medication Sig Dispense Refill    ABILIFY 20 mg Tab       acetaminophen (TYLENOL) 500 MG tablet Take 500 mg by mouth every 6 (six) hours as needed for Pain. [id number] daily      atorvastatin (LIPITOR) 10 MG tablet Take 1 tablet (10 mg total) by mouth once daily. 90 tablet 0    busPIRone (BUSPAR) 30 MG Tab 30 mg 3 (three) times daily.      dicyclomine (BENTYL) 20 mg tablet Take 20 mg by mouth every 8 (eight) hours as needed.      fluvoxaMINE (LUVOX) 100 MG tablet 200 mg nightly.      metFORMIN (GLUCOPHAGE) 500 MG tablet Take 1 tablet (500 mg total) by mouth daily with breakfast. 90 tablet 0    metoclopramide HCl (REGLAN) 10 MG tablet Take 10 mg by mouth 4 (four) times daily.      metoprolol succinate (TOPROL-XL) 50 MG 24 hr tablet Take 1 tablet (50 mg total) by mouth once daily. 90 tablet 1    ondansetron (ZOFRAN) 4 MG tablet Take 1 tablet (4 mg total) by mouth every 12 (twelve) hours as needed for Nausea. 60 tablet 0    tirzepatide 10 mg/0.5 mL PnIj Inject 10 mg into the skin every 7 days. 4 Pen 0    tiZANidine (ZANAFLEX) 4 MG tablet Take 1 tablet (4 mg total) by mouth every 8 (eight) hours as needed (SPASM). 60 tablet 0     venlafaxine (EFFEXOR-XR) 150 MG Cp24 1 capsule with food Orally Once a day for 30 days      XANAX 1 mg tablet

## 2025-02-16 NOTE — CARE UPDATE
02/15/25 2027   Patient Assessment/Suction   Level of Consciousness (AVPU) alert   Respiratory Effort Unlabored   Expansion/Accessory Muscles/Retractions expansion symmetric   All Lung Fields Breath Sounds clear   Rhythm/Pattern, Respiratory depth regular;pattern regular   Cough Frequency infrequent   Cough Type good;nonproductive   PRE-TX-O2   Device (Oxygen Therapy) room air   SpO2 99 %   Pulse Oximetry Type Continuous   $ Pulse Oximetry - Multiple Charge Pulse Oximetry - Multiple   Pulse (!) 122   Resp (!) 32   Education   $ Education Oxygen;15 min

## 2025-02-16 NOTE — PROGRESS NOTES
[facility]  Progress Note    Patient Name: Jasper Guerrero  MRN: [id number]  Patient Class: IP- Inpatient   Admission Date: 2/14/2025  Length of Stay: 1 days  Attending Physician: Buck Mackay MD  Primary Care Provider: Enoch Gerardo NP        Subjective     Principal Problem:Hypertensive urgency        HPI:  Sent over to us from [facility]     34 WF with Past medical history is also significant for diabetes, depression, agoraphobia, PTSD and hypertension.     Patient presents emergency department reported shortness of breath onset of symptoms this morning with associated nausea vomiting throughout the day today states when unable to keep anything down today she denies any recent changes in her medications she denies any antecedent illness is no reported diarrhea no fever chills no cough no chest pain no abdominal pain at arrival emergency department patient is tachycardic she advised nursing that she took BuSpar and Xanax today without improvement in her anxiety symptoms her chart shows history of DVT but in his upon review of the chart it as a superficial thrombophlebitis of the right arm she does have a history of diabetes hypertension     Normally she is nauseated from IBS she says but never vomits so it was odd she did this time . Then after vomiting became very SOB she says. No coughing or choking.    Her anxiety was becoming severe and its bad on a good day she says  But lately its worse because he  is out of town .    She took her buspar and xanax but thinks she vomited them up       She also said her back pain is bad. And since her baby was  born in April last year 2024 , she has had back pain all over her back.  From neck to low back and nobody has found a reason but they gave her Tizanidine to use but not helping anymore she says.           She is NOT on insulin    She tells me she does NOT use marijuana   but tox screen positive for it     Non drinker  No drugs                  In the ER at [facility]  her BP was 211/ 130s     and HR was 140s sinus     They tried IV Lopressor and then did a Labetalol drip    For that reason wanted to send her over to us to our ICU       Labs were mostly normal     Mild bump in troponin    BNP normal range     Lactic acid was up 5.6    And she had an MARILU     Has normal kidneys at baseline     Tox screen only positive for THC     CT neg  CTA chest neg for PE     EKG normal     UA clean no uti         Overview/Hospital Course:  34-year-old female with PMH of diabetes, depression, PTSD, HTN, IBS presented with nausea and vomiting, SOB, which she describes as an anxiety attack. Her  is out of town, she is taking care of her 3 children and she could not care for herself. She was admitted to ICU for hypertensive urgency requiring labetalol drip. Blood pressure improved and labetalol drip discontinued. Also had increased anion gap metabolic acidosis likely secondary to lactic acidosis, continued on IV fluids. UA negative, chest x-ray no pneumonia, stool culture ordered with diarrhea. Blood cultures pending. Start broad-spectrum antibiotics for possible sepsis. Deescalate as needed. Leukocytosis improving. MARILU improved with IV fluids.Sinus tachycardia in 120s, likely dehydration vs sepsis, vs anxiety. Restarted her home anxiety medications Effexor 225 mg days and buspirone 30 mg t.i.d. (confirmed dose at bedside) patient would like to hold off on tele psych consult this time. D-dimer and TSH pending.  2D echocardiogram ordered and pending.    Interval History: Patient seen and examined. Patient appears anxious and wanting to make sure narcotic prescription is given to her at the time of discharge for her backache. Denies any chest pain or SOB. Patient is flushed but denies any palpitations. Afbrile.     Review of Systems   All other systems reviewed and are negative.    Objective:     Vital Signs (Most Recent):  Temp: 98 °F (36.7 °C) (02/16/25  0301)  Pulse: (!) 122 (02/16/25 0601)  Resp: 20 (02/16/25 0725)  BP: (!) 148/78 (02/16/25 0601)  SpO2: 97 % (02/16/25 0401) Vital Signs (24h Range):  Temp:  [97.8 °F (36.6 °C)-98.3 °F (36.8 °C)] 98 °F (36.7 °C)  Pulse:  [111-145] 122  Resp:  [12-32] 20  SpO2:  [93 %-100 %] 97 %  BP: (111-195)/() 148/78     Weight: (!) 136.7 kg (301 lb 5.9 oz)  Body mass index is 50.15 kg/m².    Intake/Output Summary (Last 24 hours) at 2/16/2025 0841  Last data filed at 2/16/2025 0601  Gross per 24 hour   Intake 1379.27 ml   Output 0 ml   Net 1379.27 ml         Physical Exam  Vitals and nursing note reviewed.   Constitutional:       Appearance: Normal appearance.   HENT:      Head: Normocephalic.      Nose: Nose normal.      Mouth/Throat:      Mouth: Mucous membranes are moist.   Eyes:      Extraocular Movements: Extraocular movements intact.      Pupils: Pupils are equal, round, and reactive to light.   Cardiovascular:      Rate and Rhythm: Normal rate and regular rhythm.      Pulses: Normal pulses.      Heart sounds: Normal heart sounds.   Pulmonary:      Effort: Pulmonary effort is normal.      Breath sounds: Normal breath sounds.   Abdominal:      General: Abdomen is flat. Bowel sounds are normal.      Palpations: Abdomen is soft.   Musculoskeletal:         General: Normal range of motion.      Cervical back: Normal range of motion.   Skin:     General: Skin is warm.      Capillary Refill: Capillary refill takes less than 2 seconds.   Neurological:      General: No focal deficit present.      Mental Status: She is alert and oriented to person, place, and time.   Psychiatric:         Mood and Affect: Mood normal.         Behavior: Behavior normal.           Significant Labs: All pertinent labs within the past 24 hours have been reviewed.  CBC:   Recent Labs   Lab 02/14/25  1942 02/15/25  1029 02/16/25  0320   WBC 28.10* 19.49* 20.47*   HGB 15.7 13.6 12.5   HCT 47.3 42.1 38.1   * 354 272     CMP:   Recent Labs   Lab  "02/14/25  1942 02/15/25  1029 02/16/25  0320    139 141   K 3.9 3.9 3.8    107 109   CO2 14* 23 25   * 173* 133*   BUN 15 15 10   CREATININE 1.6* 1.0 0.8   CALCIUM 9.9 8.5* 8.5*   PROT 9.0* 7.1 6.5   ALBUMIN 4.5 4.1 3.9   BILITOT 0.6 0.5 0.6   ALKPHOS 77 53* 44*   AST 30 19 20   ALT 43 24 21   ANIONGAP 23* 9 7*     Coagulation: No results for input(s): "PT", "INR", "APTT" in the last 48 hours.  Lipid Panel: No results for input(s): "CHOL", "HDL", "LDLCALC", "TRIG", "CHOLHDL" in the last 48 hours.  Troponin:   Recent Labs   Lab 02/14/25  1942 02/15/25  1029 02/15/25  2258   TROPONINI 0.058*  --   --    TROPONINIHS  --  105.8* 68.2*     TSH:   Recent Labs   Lab 02/15/25  1029   TSH 0.877     Urine Studies:   Recent Labs   Lab 02/14/25  2115   COLORU Yellow   APPEARANCEUA Clear   PHUR 6.0   SPECGRAV 1.025   PROTEINUA 1+*   GLUCUA 4+*   KETONESU 2+*   BILIRUBINUA Negative   OCCULTUA Negative   NITRITE Negative   UROBILINOGEN Negative   LEUKOCYTESUR Negative   RBCUA 2   WBCUA 3   BACTERIA Rare   SQUAMEPITHEL 8   HYALINECASTS 18*     Microbiology Results (last 7 days)       Procedure Component Value Units Date/Time    MRSA Screen by PCR [[id number]] Collected: 02/15/25 1450    Order Status: Completed Specimen: Nasal Swab Updated: 02/15/25 1925     MRSA SCREEN BY PCR Not Detected    Blood culture #1 **CANNOT BE ORDERED STAT** [[id number]] Collected: 02/15/25 0023    Order Status: Completed Specimen: Blood from Peripheral, Hand, Right Updated: 02/15/25 1758     Blood Culture, Routine No Growth to date    Blood culture #2 **CANNOT BE ORDERED STAT** [[id number]] Collected: 02/15/25 0039    Order Status: Completed Specimen: Blood from Peripheral, Forearm, Left Updated: 02/15/25 1758     Blood Culture, Routine No Growth to date    Stool culture [[id number]]     Order Status: No result Specimen: Stool           Significant Imaging:   ECHO:    Left Ventricle: The left ventricle is normal in size. Mildly " increased wall thickness. There is normal systolic function with a visually estimated ejection fraction of 60 - 65%.    Right Ventricle: Normal right ventricular cavity size. Systolic function is normal.    CXR:  No acute abnormality.     CTA Chest:  No evidence of pulmonary thromboemboli or right ventricular strain.  Trace pericardial effusion.    CT abdomen and Pelvis with IV contrast:  No acute abnormality.     CXR: No acute cardiac or pulmonary process.     Assessment and Plan     * Hypertensive urgency  Patient has a current diagnosis of Hypertensive emergency with end organ damage evidenced by acute kidney injury which is controlled.  Latest blood pressure and vitals reviewed-   Temp:  [97.8 °F (36.6 °C)-98.3 °F (36.8 °C)]   Pulse:  [111-145]   Resp:  [12-32]   BP: (111-195)/()   SpO2:  [93 %-100 %] .   Patient currently off IV antihypertensives.   Home meds for hypertension were reviewed and noted below.   Hypertension Medications              metoprolol succinate (TOPROL-XL) 75 MG 24 hr tablet Take 1 tablet (50 mg total) by mouth once daily.            Metabolic acidosis, increased anion gap (IAG)  Repeat Lactate. AG is normalized. Continue IVF.     MARILU (acute kidney injury)  Much better. MARILU is likely due to pre-renal azotemia due to intravascular volume depletion. Baseline creatinine is  under 1  . Most recent creatinine and eGFR are listed below.  Recent Labs     02/14/25  1942 02/15/25  1029 02/16/25  0320   CREATININE 1.6* 1.0 0.8   EGFRNORACEVR 44* >60.0 >60.0         DMII (diabetes mellitus, type 2)  Check blood glucose level q AC/HS.  Use Novolog Insulin Sliding Scale as needed.   Continue American Diabetic Association 1800 Kcal diet.      Nausea vomiting and diarrhea  Supportive care, antiemetics as needed. Continue IVFs.    Tachycardia  Continue telemonitoring.  Serial cardiac enzymes, follow 2 D ECHO and cardiologist recommendations.  Check 24 hrs urine study for VMA, Metanephrine and 5  Hydroxy Indol Acetetic acid.  Check PORFIRIO.  Continue PO Metoprolol.    Discussed with bedside nurse.   VTE Risk Mitigation (From admission, onward)           Ordered     IP VTE HIGH RISK PATIENT  Once         02/15/25 0528     Place sequential compression device  Until discontinued         02/15/25 0528     Reason for No Pharmacological VTE Prophylaxis  Once        Question:  Reasons:  Answer:  Patient is Ambulatory    02/15/25 0528                    Discharge Planning   BRITTANY: 2/18/2025     Code Status: Full Code   Medical Readiness for Discharge Date:   Discharge Plan A: Home with family            Critical care time spent on the evaluation and treatment of severe organ dysfunction, review of pertinent labs and imaging studies, discussions with consulting providers and discussions with patient/family: 35 minutes.            Buck Mackay MD  Department of Hospital Medicine   [facility]

## 2025-02-16 NOTE — ASSESSMENT & PLAN NOTE
Continue telemonitoring.  Serial cardiac enzymes, follow 2 D ECHO and cardiologist recommendations.  Check 24 hrs urine study for VMA, Metanephrine and 5 Hydroxy Indol Acetetic acid.  Check PORFIRIO.  Continue PO Metoprolol.

## 2025-02-16 NOTE — PROGRESS NOTES
"Pharmacokinetic Assessment Follow Up: IV Vancomycin    Vancomycin serum concentration assessment(s):    The trough level was drawn correctly and can be used to guide therapy at this time. The measurement is below the desired definitive target range of 15 to 20 mcg/mL.    Vancomycin Regimen Plan:    Continue regimen to Vancomycin 2000 mg IV every 12 hours with next serum trough concentration measured at 1300 prior to 3rd dose on 2/17    Drug levels (last 3 results):  Recent Labs   Lab Result Units 02/16/25  1239   Vancomycin-Trough ug/mL 12.3       Pharmacy will continue to follow and monitor vancomycin.    Please contact pharmacy at extension [phone] for questions regarding this assessment.    Thank you for the consult,   Eliot Hernandez       Patient brief summary:  Jasper Guerrero is a 34 y.o. female initiated on antimicrobial therapy with IV Vancomycin for treatment of sepsis    The patient's current regimen is vancomycin 2000 mg Q12H    Drug Allergies:   Review of patient's allergies indicates:   Allergen Reactions    Benadryl [diphenhydramine hcl]      Paralysis on right side body     Codeine      Paralysis right body    Flexeril [cyclobenzaprine] Other (See Comments)     Numbness on right side of body    Nsaids (non-steroidal anti-inflammatory drug) Diarrhea and Nausea And Vomiting     Ulcers     Penicillins Anaphylaxis and Hives    Droperidol Anxiety and Other (See Comments)    Magnesium      "made me feel horrible"     Prochlorperazine Anxiety and Palpitations       Actual Body Weight:   136.7 kg    Renal Function:   Estimated Creatinine Clearance: 141.7 mL/min (based on SCr of 0.8 mg/dL).,     Dialysis Method (if applicable):  N/A    CBC (last 72 hours):  Recent Labs   Lab Result Units 02/14/25  1942 02/15/25  1029 02/16/25  0320   WBC K/uL 28.10* 19.49* 20.47*   Hemoglobin g/dL 15.7 13.6 12.5   Hemoglobin A1C %  --  6.8*  --    Hematocrit % 47.3 42.1 38.1   Platelets K/uL 478* 354 272   Gran % % 87.2* 72.9 " 71.8   Lymph % % 8.6* 16.3* 18.4   Mono % % 3.1* 9.9 9.1   Eosinophil % % 0.0 0.0 0.0   Basophil % % 0.2 0.2 0.1   Differential Method  Automated Automated Automated       Metabolic Panel (last 72 hours):  Recent Labs   Lab Result Units 02/14/25  1942 02/14/25  2115 02/15/25  1029 02/16/25  0320   Sodium mmol/L 141  --  139 141   Potassium mmol/L 3.9  --  3.9 3.8   Chloride mmol/L 104  --  107 109   CO2 mmol/L 14*  --  23 25   Glucose mg/dL 294*  --  173* 133*   Glucose, UA   --  4+*  --   --    BUN mg/dL 15  --  15 10   Creatinine mg/dL 1.6*  --  1.0 0.8   Creatinine, Urine mg/dL  --  150.3  --   --    Albumin g/dL 4.5  --  4.1 3.9   Total Bilirubin mg/dL 0.6  --  0.5 0.6   Alkaline Phosphatase U/L 77  --  53* 44*   AST U/L 30  --  19 20   ALT U/L 43  --  24 21   Magnesium mg/dL  --   --  1.6 1.7       Vancomycin Administrations:  vancomycin given in the last 96 hours                     vancomycin (VANCOCIN) 2,000 mg in 0.9% NaCl 500 mL IVPB (mg) 2,000 mg New Bag 02/16/25 1418     2,000 mg New Bag  0259    vancomycin (VANCOCIN) 2,000 mg in 0.9% NaCl 500 mL IVPB (mg) 2,000 mg New Bag 02/15/25 1400                    Microbiologic Results:  Microbiology Results (last 7 days)       Procedure Component Value Units Date/Time    Blood culture #1 **CANNOT BE ORDERED STAT** [[id number]] Collected: 02/15/25 0023    Order Status: Completed Specimen: Blood from Peripheral, Hand, Right Updated: 02/16/25 1232     Blood Culture, Routine No Growth to date      No Growth to date    Blood culture #2 **CANNOT BE ORDERED STAT** [[id number]] Collected: 02/15/25 0039    Order Status: Completed Specimen: Blood from Peripheral, Forearm, Left Updated: 02/16/25 1232     Blood Culture, Routine No Growth to date      No Growth to date    MRSA Screen by PCR [[id number]] Collected: 02/15/25 1450    Order Status: Completed Specimen: Nasal Swab Updated: 02/15/25 1925     MRSA SCREEN BY PCR Not Detected    Stool culture [[id number]]     Order  Status: No result Specimen: Stool

## 2025-02-16 NOTE — ASSESSMENT & PLAN NOTE
Much better. MARILU is likely due to pre-renal azotemia due to intravascular volume depletion. Baseline creatinine is  under 1  . Most recent creatinine and eGFR are listed below.  Recent Labs     02/14/25  1942 02/15/25  1029 02/16/25  0320   CREATININE 1.6* 1.0 0.8   EGFRNORACEVR 44* >60.0 >60.0

## 2025-02-16 NOTE — ASSESSMENT & PLAN NOTE
Patient has a current diagnosis of Hypertensive emergency with end organ damage evidenced by acute kidney injury which is controlled.  Latest blood pressure and vitals reviewed-   Temp:  [97.8 °F (36.6 °C)-98.3 °F (36.8 °C)]   Pulse:  [111-145]   Resp:  [12-32]   BP: (111-195)/()   SpO2:  [93 %-100 %] .   Patient currently off IV antihypertensives.   Home meds for hypertension were reviewed and noted below.   Hypertension Medications              metoprolol succinate (TOPROL-XL) 75 MG 24 hr tablet Take 1 tablet (50 mg total) by mouth once daily.

## 2025-02-16 NOTE — SUBJECTIVE & OBJECTIVE
Interval History: Patient seen and examined. Patient appears anxious and wanting to make sure narcotic prescription is given to her at the time of discharge for her backache. Denies any chest pain or SOB. Patient is flushed but denies any palpitations. Afbrile.     Review of Systems   All other systems reviewed and are negative.    Objective:     Vital Signs (Most Recent):  Temp: 98 °F (36.7 °C) (02/16/25 0301)  Pulse: (!) 122 (02/16/25 0601)  Resp: 20 (02/16/25 0725)  BP: (!) 148/78 (02/16/25 0601)  SpO2: 97 % (02/16/25 0401) Vital Signs (24h Range):  Temp:  [97.8 °F (36.6 °C)-98.3 °F (36.8 °C)] 98 °F (36.7 °C)  Pulse:  [111-145] 122  Resp:  [12-32] 20  SpO2:  [93 %-100 %] 97 %  BP: (111-195)/() 148/78     Weight: (!) 136.7 kg (301 lb 5.9 oz)  Body mass index is 50.15 kg/m².    Intake/Output Summary (Last 24 hours) at 2/16/2025 0841  Last data filed at 2/16/2025 0601  Gross per 24 hour   Intake 1379.27 ml   Output 0 ml   Net 1379.27 ml         Physical Exam  Vitals and nursing note reviewed.   Constitutional:       Appearance: Normal appearance.   HENT:      Head: Normocephalic.      Nose: Nose normal.      Mouth/Throat:      Mouth: Mucous membranes are moist.   Eyes:      Extraocular Movements: Extraocular movements intact.      Pupils: Pupils are equal, round, and reactive to light.   Cardiovascular:      Rate and Rhythm: Normal rate and regular rhythm.      Pulses: Normal pulses.      Heart sounds: Normal heart sounds.   Pulmonary:      Effort: Pulmonary effort is normal.      Breath sounds: Normal breath sounds.   Abdominal:      General: Abdomen is flat. Bowel sounds are normal.      Palpations: Abdomen is soft.   Musculoskeletal:         General: Normal range of motion.      Cervical back: Normal range of motion.   Skin:     General: Skin is warm.      Capillary Refill: Capillary refill takes less than 2 seconds.   Neurological:      General: No focal deficit present.      Mental Status: She is alert  "and oriented to person, place, and time.   Psychiatric:         Mood and Affect: Mood normal.         Behavior: Behavior normal.           Significant Labs: All pertinent labs within the past 24 hours have been reviewed.  CBC:   Recent Labs   Lab 02/14/25  1942 02/15/25  1029 02/16/25  0320   WBC 28.10* 19.49* 20.47*   HGB 15.7 13.6 12.5   HCT 47.3 42.1 38.1   * 354 272     CMP:   Recent Labs   Lab 02/14/25  1942 02/15/25  1029 02/16/25  0320    139 141   K 3.9 3.9 3.8    107 109   CO2 14* 23 25   * 173* 133*   BUN 15 15 10   CREATININE 1.6* 1.0 0.8   CALCIUM 9.9 8.5* 8.5*   PROT 9.0* 7.1 6.5   ALBUMIN 4.5 4.1 3.9   BILITOT 0.6 0.5 0.6   ALKPHOS 77 53* 44*   AST 30 19 20   ALT 43 24 21   ANIONGAP 23* 9 7*     Coagulation: No results for input(s): "PT", "INR", "APTT" in the last 48 hours.  Lipid Panel: No results for input(s): "CHOL", "HDL", "LDLCALC", "TRIG", "CHOLHDL" in the last 48 hours.  Troponin:   Recent Labs   Lab 02/14/25  1942 02/15/25  1029 02/15/25  2258   TROPONINI 0.058*  --   --    TROPONINIHS  --  105.8* 68.2*     TSH:   Recent Labs   Lab 02/15/25  1029   TSH 0.877     Urine Studies:   Recent Labs   Lab 02/14/25 2115   COLORU Yellow   APPEARANCEUA Clear   PHUR 6.0   SPECGRAV 1.025   PROTEINUA 1+*   GLUCUA 4+*   KETONESU 2+*   BILIRUBINUA Negative   OCCULTUA Negative   NITRITE Negative   UROBILINOGEN Negative   LEUKOCYTESUR Negative   RBCUA 2   WBCUA 3   BACTERIA Rare   SQUAMEPITHEL 8   HYALINECASTS 18*     Microbiology Results (last 7 days)       Procedure Component Value Units Date/Time    MRSA Screen by PCR [[id number]] Collected: 02/15/25 1450    Order Status: Completed Specimen: Nasal Swab Updated: 02/15/25 1925     MRSA SCREEN BY PCR Not Detected    Blood culture #1 **CANNOT BE ORDERED STAT** [[id number]] Collected: 02/15/25 0023    Order Status: Completed Specimen: Blood from Peripheral, Hand, Right Updated: 02/15/25 1758     Blood Culture, Routine No Growth to date    " Blood culture #2 **CANNOT BE ORDERED STAT** [[id number]] Collected: 02/15/25 0039    Order Status: Completed Specimen: Blood from Peripheral, Forearm, Left Updated: 02/15/25 1758     Blood Culture, Routine No Growth to date    Stool culture [[id number]]     Order Status: No result Specimen: Stool           Significant Imaging:   ECHO:    Left Ventricle: The left ventricle is normal in size. Mildly increased wall thickness. There is normal systolic function with a visually estimated ejection fraction of 60 - 65%.    Right Ventricle: Normal right ventricular cavity size. Systolic function is normal.    CXR:  No acute abnormality.     CTA Chest:  No evidence of pulmonary thromboemboli or right ventricular strain.  Trace pericardial effusion.    CT abdomen and Pelvis with IV contrast:  No acute abnormality.     CXR: No acute cardiac or pulmonary process.

## 2025-02-17 ENCOUNTER — PATIENT MESSAGE (OUTPATIENT)
Dept: ADMINISTRATIVE | Facility: HOSPITAL | Age: 35
End: 2025-02-17
Payer: MEDICAID

## 2025-02-17 PROBLEM — F41.1 GENERALIZED ANXIETY DISORDER: Status: ACTIVE | Noted: 2025-02-17

## 2025-02-17 LAB
ALBUMIN SERPL BCP-MCNC: 3.9 G/DL (ref 3.5–5.2)
ALP SERPL-CCNC: 45 U/L (ref 55–135)
ALT SERPL W/O P-5'-P-CCNC: 20 U/L (ref 10–44)
ANION GAP SERPL CALC-SCNC: 8 MMOL/L (ref 8–16)
AST SERPL-CCNC: 20 U/L (ref 10–40)
BASOPHILS # BLD AUTO: 0.05 K/UL (ref 0–0.2)
BASOPHILS NFR BLD: 0.3 % (ref 0–1.9)
BILIRUB SERPL-MCNC: 0.5 MG/DL (ref 0.1–1)
BUN SERPL-MCNC: 5 MG/DL (ref 6–20)
CALCIUM SERPL-MCNC: 8.6 MG/DL (ref 8.7–10.5)
CHLORIDE SERPL-SCNC: 103 MMOL/L (ref 95–110)
CO2 SERPL-SCNC: 30 MMOL/L (ref 23–29)
CREAT SERPL-MCNC: 0.7 MG/DL (ref 0.5–1.4)
DIFFERENTIAL METHOD BLD: ABNORMAL
DOPAMINE 24H UR-MRATE: 4850
EOSINOPHIL # BLD AUTO: 0.2 K/UL (ref 0–0.5)
EOSINOPHIL NFR BLD: 1.1 % (ref 0–8)
ERYTHROCYTE [DISTWIDTH] IN BLOOD BY AUTOMATED COUNT: 14.3 % (ref 11.5–14.5)
EST. GFR  (NO RACE VARIABLE): >60 ML/MIN/1.73 M^2
GLUCOSE SERPL-MCNC: 129 MG/DL (ref 70–110)
HCT VFR BLD AUTO: 37.4 % (ref 37–48.5)
HGB BLD-MCNC: 12.3 G/DL (ref 12–16)
IMM GRANULOCYTES # BLD AUTO: 0.07 K/UL (ref 0–0.04)
IMM GRANULOCYTES NFR BLD AUTO: 0.4 % (ref 0–0.5)
LYMPHOCYTES # BLD AUTO: 4.8 K/UL (ref 1–4.8)
LYMPHOCYTES NFR BLD: 29.5 % (ref 18–48)
MAGNESIUM SERPL-MCNC: 1.8 MG/DL (ref 1.6–2.6)
MCH RBC QN AUTO: 30.2 PG (ref 27–31)
MCHC RBC AUTO-ENTMCNC: 32.9 G/DL (ref 32–36)
MCV RBC AUTO: 92 FL (ref 82–98)
MONOCYTES # BLD AUTO: 1.1 K/UL (ref 0.3–1)
MONOCYTES NFR BLD: 7 % (ref 4–15)
NEUTROPHILS # BLD AUTO: 10 K/UL (ref 1.8–7.7)
NEUTROPHILS NFR BLD: 61.7 % (ref 38–73)
NRBC BLD-RTO: 0 /100 WBC
PLATELET # BLD AUTO: 270 K/UL (ref 150–450)
PMV BLD AUTO: 9.1 FL (ref 9.2–12.9)
POCT GLUCOSE: 111 MG/DL (ref 70–110)
POCT GLUCOSE: 125 MG/DL (ref 70–110)
POCT GLUCOSE: 99 MG/DL (ref 70–110)
POTASSIUM SERPL-SCNC: 3.3 MMOL/L (ref 3.5–5.1)
PROT SERPL-MCNC: 6.7 G/DL (ref 6–8.4)
RBC # BLD AUTO: 4.07 M/UL (ref 4–5.4)
SODIUM SERPL-SCNC: 141 MMOL/L (ref 136–145)
WBC # BLD AUTO: 16.19 K/UL (ref 3.9–12.7)

## 2025-02-17 PROCEDURE — 83497 ASSAY OF 5-HIAA: CPT | Performed by: INTERNAL MEDICINE

## 2025-02-17 PROCEDURE — 25000003 PHARM REV CODE 250

## 2025-02-17 PROCEDURE — 94761 N-INVAS EAR/PLS OXIMETRY MLT: CPT

## 2025-02-17 PROCEDURE — 83735 ASSAY OF MAGNESIUM: CPT | Performed by: INTERNAL MEDICINE

## 2025-02-17 PROCEDURE — 25000003 PHARM REV CODE 250: Performed by: STUDENT IN AN ORGANIZED HEALTH CARE EDUCATION/TRAINING PROGRAM

## 2025-02-17 PROCEDURE — 85025 COMPLETE CBC W/AUTO DIFF WBC: CPT | Performed by: INTERNAL MEDICINE

## 2025-02-17 PROCEDURE — 99233 SBSQ HOSP IP/OBS HIGH 50: CPT | Mod: ,,, | Performed by: INTERNAL MEDICINE

## 2025-02-17 PROCEDURE — 36415 COLL VENOUS BLD VENIPUNCTURE: CPT | Performed by: INTERNAL MEDICINE

## 2025-02-17 PROCEDURE — 20000000 HC ICU ROOM

## 2025-02-17 PROCEDURE — 80053 COMPREHEN METABOLIC PANEL: CPT | Performed by: INTERNAL MEDICINE

## 2025-02-17 PROCEDURE — 25000003 PHARM REV CODE 250: Performed by: INTERNAL MEDICINE

## 2025-02-17 PROCEDURE — 63600175 PHARM REV CODE 636 W HCPCS: Mod: TB,JZ | Performed by: STUDENT IN AN ORGANIZED HEALTH CARE EDUCATION/TRAINING PROGRAM

## 2025-02-17 PROCEDURE — 63600175 PHARM REV CODE 636 W HCPCS

## 2025-02-17 PROCEDURE — 63600175 PHARM REV CODE 636 W HCPCS: Performed by: INTERNAL MEDICINE

## 2025-02-17 PROCEDURE — 84585 ASSAY OF URINE VMA: CPT | Performed by: INTERNAL MEDICINE

## 2025-02-17 RX ORDER — OXYCODONE AND ACETAMINOPHEN 5; 325 MG/1; MG/1
1 TABLET ORAL EVERY 4 HOURS PRN
Refills: 0 | Status: DISCONTINUED | OUTPATIENT
Start: 2025-02-17 | End: 2025-02-18 | Stop reason: HOSPADM

## 2025-02-17 RX ORDER — FAMOTIDINE 20 MG/1
20 TABLET, FILM COATED ORAL 2 TIMES DAILY
Status: DISCONTINUED | OUTPATIENT
Start: 2025-02-17 | End: 2025-02-18 | Stop reason: HOSPADM

## 2025-02-17 RX ORDER — LISINOPRIL 20 MG/1
20 TABLET ORAL DAILY
Status: DISCONTINUED | OUTPATIENT
Start: 2025-02-17 | End: 2025-02-18 | Stop reason: HOSPADM

## 2025-02-17 RX ORDER — METOPROLOL SUCCINATE 50 MG/1
100 TABLET, EXTENDED RELEASE ORAL 2 TIMES DAILY
Status: DISCONTINUED | OUTPATIENT
Start: 2025-02-17 | End: 2025-02-18 | Stop reason: HOSPADM

## 2025-02-17 RX ORDER — HYDROMORPHONE HYDROCHLORIDE 1 MG/ML
0.5 INJECTION, SOLUTION INTRAMUSCULAR; INTRAVENOUS; SUBCUTANEOUS EVERY 4 HOURS PRN
Status: DISCONTINUED | OUTPATIENT
Start: 2025-02-17 | End: 2025-02-18 | Stop reason: HOSPADM

## 2025-02-17 RX ADMIN — VENLAFAXINE HYDROCHLORIDE 225 MG: 150 CAPSULE, EXTENDED RELEASE ORAL at 09:02

## 2025-02-17 RX ADMIN — OXYCODONE HYDROCHLORIDE AND ACETAMINOPHEN 1 TABLET: 5; 325 TABLET ORAL at 03:02

## 2025-02-17 RX ADMIN — METOPROLOL SUCCINATE 100 MG: 50 TABLET, FILM COATED, EXTENDED RELEASE ORAL at 09:02

## 2025-02-17 RX ADMIN — METOPROLOL SUCCINATE 100 MG: 50 TABLET, FILM COATED, EXTENDED RELEASE ORAL at 08:02

## 2025-02-17 RX ADMIN — VANCOMYCIN HYDROCHLORIDE 2000 MG: 1 INJECTION, POWDER, LYOPHILIZED, FOR SOLUTION INTRAVENOUS at 04:02

## 2025-02-17 RX ADMIN — LIDOCAINE 5% 2 PATCH: 700 PATCH TOPICAL at 12:02

## 2025-02-17 RX ADMIN — CEFTAZIDIME 1 G: 1 INJECTION, POWDER, FOR SOLUTION INTRAMUSCULAR; INTRAVENOUS at 06:02

## 2025-02-17 RX ADMIN — OXYCODONE HYDROCHLORIDE AND ACETAMINOPHEN 1 TABLET: 5; 325 TABLET ORAL at 09:02

## 2025-02-17 RX ADMIN — MUPIROCIN 1 G: 20 OINTMENT TOPICAL at 08:02

## 2025-02-17 RX ADMIN — LISINOPRIL 20 MG: 20 TABLET ORAL at 09:02

## 2025-02-17 RX ADMIN — HYDROMORPHONE HYDROCHLORIDE 2 MG: 2 TABLET ORAL at 07:02

## 2025-02-17 RX ADMIN — SENNOSIDES AND DOCUSATE SODIUM 1 TABLET: 50; 8.6 TABLET ORAL at 08:02

## 2025-02-17 RX ADMIN — INSULIN GLARGINE 35 UNITS: 100 INJECTION, SOLUTION SUBCUTANEOUS at 09:02

## 2025-02-17 RX ADMIN — POTASSIUM BICARBONATE 35 MEQ: 391 TABLET, EFFERVESCENT ORAL at 01:02

## 2025-02-17 RX ADMIN — FAMOTIDINE 20 MG: 20 TABLET, FILM COATED ORAL at 08:02

## 2025-02-17 RX ADMIN — BUSPIRONE HYDROCHLORIDE 30 MG: 5 TABLET ORAL at 08:02

## 2025-02-17 RX ADMIN — FAMOTIDINE 20 MG: 10 INJECTION, SOLUTION INTRAVENOUS at 09:02

## 2025-02-17 RX ADMIN — METRONIDAZOLE 500 MG: 500 INJECTION, SOLUTION INTRAVENOUS at 02:02

## 2025-02-17 RX ADMIN — BUSPIRONE HYDROCHLORIDE 30 MG: 5 TABLET ORAL at 09:02

## 2025-02-17 RX ADMIN — POTASSIUM BICARBONATE 35 MEQ: 391 TABLET, EFFERVESCENT ORAL at 09:02

## 2025-02-17 RX ADMIN — HYDROMORPHONE HYDROCHLORIDE 0.5 MG: 1 INJECTION, SOLUTION INTRAMUSCULAR; INTRAVENOUS; SUBCUTANEOUS at 08:02

## 2025-02-17 RX ADMIN — MUPIROCIN 1 G: 20 OINTMENT TOPICAL at 09:02

## 2025-02-17 RX ADMIN — BUSPIRONE HYDROCHLORIDE 30 MG: 5 TABLET ORAL at 03:02

## 2025-02-17 RX ADMIN — LORAZEPAM 0.5 MG: 0.5 TABLET ORAL at 03:02

## 2025-02-17 RX ADMIN — ASPIRIN 162 MG: 81 TABLET, COATED ORAL at 09:02

## 2025-02-17 RX ADMIN — NICARDIPINE HYDROCHLORIDE 2.5 MG/HR: 0.2 INJECTION, SOLUTION INTRAVENOUS at 03:02

## 2025-02-17 RX ADMIN — LORAZEPAM 0.5 MG: 0.5 TABLET ORAL at 09:02

## 2025-02-17 NOTE — SUBJECTIVE & OBJECTIVE
Interval History: Patient seen and examined.  Patient appears less anxious but remains flushed mostly in head neck and upper shoulder area.  Patient denies any palpitations.  Patient remains hypertensive and tachycardic.  Requiring use of intravenous Cardene infusion.  Now oral beta-blocker therapy and ACE-inhibitor therapy has been increased.  Renal artery Doppler ultrasound negative for any stenosis.  Cardiology team closely following.  24 hour urine collection for pheochromocytoma workup pending.  Reports stable lower back pain.    Review of Systems   All other systems reviewed and are negative.    Objective:     Vital Signs (Most Recent):  Temp: 98.2 °F (36.8 °C) (02/17/25 0401)  Pulse: 101 (02/17/25 0601)  Resp: 18 (02/17/25 0706)  BP: (!) 166/102 (02/17/25 0601)  SpO2: 97 % (02/17/25 0601) Vital Signs (24h Range):  Temp:  [98.1 °F (36.7 °C)-98.4 °F (36.9 °C)] 98.2 °F (36.8 °C)  Pulse:  [] 101  Resp:  [16-22] 18  SpO2:  [88 %-100 %] 97 %  BP: (135-211)/() 166/102     Weight: (!) 136.7 kg (301 lb 5.9 oz)  Body mass index is 50.15 kg/m².    Intake/Output Summary (Last 24 hours) at 2/17/2025 0750  Last data filed at 2/17/2025 0601  Gross per 24 hour   Intake 3642.29 ml   Output 2825 ml   Net 817.29 ml         Physical Exam  Vitals and nursing note reviewed.   Constitutional:       Appearance: Normal appearance.      Comments: Flushed and anxious   HENT:      Head: Normocephalic.      Nose: Nose normal.      Mouth/Throat:      Mouth: Mucous membranes are moist.   Eyes:      Extraocular Movements: Extraocular movements intact.      Pupils: Pupils are equal, round, and reactive to light.   Cardiovascular:      Rate and Rhythm: Regular rhythm. Tachycardia present.      Pulses: Normal pulses.      Heart sounds: Normal heart sounds.   Pulmonary:      Effort: Pulmonary effort is normal.      Breath sounds: Normal breath sounds.   Abdominal:      General: Abdomen is flat. Bowel sounds are normal.       "Palpations: Abdomen is soft.   Musculoskeletal:         General: Normal range of motion.      Cervical back: Normal range of motion.   Skin:     General: Skin is warm.      Capillary Refill: Capillary refill takes less than 2 seconds.   Neurological:      General: No focal deficit present.      Mental Status: She is alert and oriented to person, place, and time.   Psychiatric:         Mood and Affect: Mood normal.         Behavior: Behavior normal.             Significant Labs: All pertinent labs within the past 24 hours have been reviewed.  CBC:   Recent Labs   Lab 02/15/25  1029 02/16/25  0320 02/17/25  0412   WBC 19.49* 20.47* 16.19*   HGB 13.6 12.5 12.3   HCT 42.1 38.1 37.4    272 270     CMP:   Recent Labs   Lab 02/15/25  1029 02/16/25  0320 02/17/25  0412    141 141   K 3.9 3.8 3.3*    109 103   CO2 23 25 30*   * 133* 129*   BUN 15 10 5*   CREATININE 1.0 0.8 0.7   CALCIUM 8.5* 8.5* 8.6*   PROT 7.1 6.5 6.7   ALBUMIN 4.1 3.9 3.9   BILITOT 0.5 0.6 0.5   ALKPHOS 53* 44* 45*   AST 19 20 20   ALT 24 21 20   ANIONGAP 9 7* 8     Coagulation: No results for input(s): "PT", "INR", "APTT" in the last 48 hours.  Lipid Panel: No results for input(s): "CHOL", "HDL", "LDLCALC", "TRIG", "CHOLHDL" in the last 48 hours.  Troponin:   Recent Labs   Lab 02/15/25  2258 02/16/25  0925 02/16/25  1239   TROPONINIHS 68.2* 48.1* 37.1*     TSH:   Recent Labs   Lab 02/15/25  1029   TSH 0.877     Urine Studies:   No results for input(s): "COLORU", "APPEARANCEUA", "PHUR", "SPECGRAV", "PROTEINUA", "GLUCUA", "KETONESU", "BILIRUBINUA", "OCCULTUA", "NITRITE", "UROBILINOGEN", "LEUKOCYTESUR", "RBCUA", "WBCUA", "BACTERIA", "SQUAMEPITHEL", "HYALINECASTS" in the last 48 hours.    Invalid input(s): "WRIGHTSUR"    Microbiology Results (last 7 days)       Procedure Component Value Units Date/Time    Blood culture #1 **CANNOT BE ORDERED STAT** [[id number]] Collected: 02/15/25 0023    Order Status: Completed Specimen: Blood from " Peripheral, Hand, Right Updated: 02/16/25 1232     Blood Culture, Routine No Growth to date      No Growth to date    Blood culture #2 **CANNOT BE ORDERED STAT** [[id number]] Collected: 02/15/25 0039    Order Status: Completed Specimen: Blood from Peripheral, Forearm, Left Updated: 02/16/25 1232     Blood Culture, Routine No Growth to date      No Growth to date    MRSA Screen by PCR [[id number]] Collected: 02/15/25 1450    Order Status: Completed Specimen: Nasal Swab Updated: 02/15/25 1925     MRSA SCREEN BY PCR Not Detected    Stool culture [[id number]]     Order Status: No result Specimen: Stool           Significant Imaging:   ECHO:    Left Ventricle: The left ventricle is normal in size. Mildly increased wall thickness. There is normal systolic function with a visually estimated ejection fraction of 60 - 65%.    Right Ventricle: Normal right ventricular cavity size. Systolic function is normal.    CXR:  No acute abnormality.     CTA Chest:  No evidence of pulmonary thromboemboli or right ventricular strain.  Trace pericardial effusion.    CT abdomen and Pelvis with IV contrast:  No acute abnormality.     CXR: No acute cardiac or pulmonary process.     Renal Artery Doppler scan:  1. Unremarkable complete retroperitoneal sonogram.  2. No convincing spectral Doppler findings of a hemodynamically significant renal artery stenosis.  3.  Liver parenchymal findings suggesting steatosis.

## 2025-02-17 NOTE — ASSESSMENT & PLAN NOTE
Resolved. MARILU is likely due to pre-renal azotemia due to intravascular volume depletion. Baseline creatinine is  under 1  . Most recent creatinine and eGFR are listed below.  Recent Labs     02/15/25  1029 02/16/25  0320 02/17/25  0412   CREATININE 1.0 0.8 0.7   EGFRNORACEVR >60.0 >60.0 >60.0   Discontinued IV fluid hydration.

## 2025-02-17 NOTE — ASSESSMENT & PLAN NOTE
Continue telemonitoring.  Ruled out for ACS.  Follow 24 hrs urine study for VMA, Metanephrine and 5 Hydroxy Indol Acetetic acid.  Follow PORFIRIO.  Increase metoprolol to 100 mg b.i.d.

## 2025-02-17 NOTE — PROGRESS NOTES
Pharmacy Consult Discontinuation: Vancomycin    Jasper Guerrero [id number] is a 34 y.o. female was consulted for vancomycin pharmacotherapy management by pharmacy.    Pharmacy consult for vancomycin dosing is no longer required.  Vancomycin was discontinued 02/17/2025.    Thank you for allowing us to participate in this patient's care. Should you have any questions or concerns please feel free to contact the pharmacy department at [phone].    aJmison Fraser, WayneD

## 2025-02-17 NOTE — PROGRESS NOTES
[facility]  Department of Cardiology  Consult Note      PATIENT NAME: Jasper Guerrero    MRN: [id number]  TODAY'S DATE: 02/17/2025  ADMIT DATE: 2/14/2025                          CONSULT REQUESTED BY: Buck Mackay MD    SUBJECTIVE     PRINCIPAL PROBLEM: Hypertensive urgency    INTERVAL HISTORY    2/17/25    Resting in bed. NAD.  Remains hypertensive.  On metoprolol succinate 100 mg BID, lisinopril 10 mg daily.  Currently off cardene gtt.  K 3.3, repleted this am.    HPI:    Ms. Guerrero is a 34 year old female who presented to the Er with nausea and vomiting as well as shortness of breath and feeling like she was having an anxiety attack. She was noted to be Sinus tachycardiac in the 140s yesterday. She reports her baseline HR at home is in the 110s-120s chronically. She is ST in the 100-110s today. She reports feeling flushed and has had elevated WBCs as well. Echocardiogram done and reviewed. No complaints of chest pain. Breathing is now stable.       REASON FOR CONSULT:  From Hospitalist note:        Patient is seen and examined this morning.  Reviewed history and physical from Dr Scott.  Agree with the assessment and plan.      34-year-old female with PMH of diabetes, depression, PTSD, HTN, IBS presented with nausea and vomiting, SOB, which she describes as an anxiety attack.  Her  is out of town, she is taking care of her 3 children and she could not care for herself.     She was admitted to ICU for hypertensive urgency requiring labetalol drip.  Blood pressure improved and labetalol drip discontinued.       Also had increased anion gap metabolic acidosis likely secondary to lactic acidosis, continued on IV fluids.  UA negative, chest x-ray no pneumonia, stool culture ordered with diarrhea.  Blood cultures pending.  Start broad-spectrum antibiotics for possible sepsis.  Deescalate as needed.  Leukocytosis improving.     MARILU improved with IV fluids.     Sinus tachycardia in 120s, likely  "dehydration vs sepsis, vs anxiety and pain.  Restarted her home anxiety medications Effexor 225 mg days and buspirone 30 mg t.i.d. (confirmed dose at bedside) patient would like to hold off on tele psych consult this time.  - D-dimer and TSH added on  - 2D echocardiogram to rule out any pleural effusion.  - Her home metoprolol increased to 75 mg, PRN IV Lopressor ordered  - Pain control  - Cardiology consult if persistent tachycardia.            Review of patient's allergies indicates:   Allergen Reactions    Benadryl [diphenhydramine hcl]      Paralysis on right side body     Codeine      Paralysis right body    Flexeril [cyclobenzaprine] Other (See Comments)     Numbness on right side of body    Nsaids (non-steroidal anti-inflammatory drug) Diarrhea and Nausea And Vomiting     Ulcers     Penicillins Anaphylaxis and Hives    Droperidol Anxiety and Other (See Comments)    Magnesium      "made me feel horrible"     Prochlorperazine Anxiety and Palpitations       Past Medical History:   Diagnosis Date    Agoraphobia     Carpal tunnel syndrome     Depression     Diabetes mellitus, type 2     DVT (deep venous thrombosis)     2019 had blood clot in right arm    Fibromyalgia     Hypertension     Migraine headache     Nerve injury 08/2016    Lingual Nerve Injury    Obese     Pain management     PTSD (post-traumatic stress disorder)     Trigeminal neuralgia      Past Surgical History:   Procedure Laterality Date    CARPAL TUNNEL RELEASE Right 12/27/2018    Dr Lozada     CARPAL TUNNEL RELEASE Left 01/28/2020    Procedure: RELEASE, CARPAL TUNNEL;  Surgeon: Brendon Lozada Jr., MD;  Location: [facility];  Service: Orthopedics;  Laterality: Left;    CHOLECYSTECTOMY N/A 10/31/2022    MOUTH SURGERY      NERVE GRAFT  2016    WISDOM TOOTH EXTRACTION       Social History[1]     REVIEW OF SYSTEMS  Per HPI    OBJECTIVE     VITAL SIGNS (Most Recent)  Temp: 97.9 °F (36.6 °C) (02/17/25 0705)  Pulse: 103 (02/17/25 0800)  Resp: (!) 22 (02/17/25 " 0918)  BP: (!) 165/100 (02/17/25 0800)  SpO2: (!) 93 % (02/17/25 0800)    VENTILATION STATUS  Resp: (!) 22 (02/17/25 0918)  SpO2: (!) 93 % (02/17/25 0800)           I & O (Last 24H):  Intake/Output Summary (Last 24 hours) at 2/17/2025 1120  Last data filed at 2/17/2025 0601  Gross per 24 hour   Intake 3642.29 ml   Output 2825 ml   Net 817.29 ml       WEIGHTS  Wt Readings from Last 1 Encounters:   02/15/25 0425 (!) 136.7 kg (301 lb 5.9 oz)   02/14/25 1925 135.6 kg (299 lb)       PHYSICAL EXAM  CONSTITUTIONAL: No fever, no chills  HEENT: Normocephalic, atraumatic,pupils reactive to light                 NECK:  No JVD no carotid bruit  CVS: S1S2+, RRR  LUNGS: Clear  ABDOMEN: Soft, NT, BS+  EXTREMITIES: No cyanosis, edema  : No moreno catheter  NEURO: AAO X 3  PSY: Normal affect      HOME MEDICATIONS:Medications Ordered Prior to Encounter[2]    SCHEDULED MEDS:   aspirin  162 mg Oral Daily    busPIRone  30 mg Oral TID    famotidine (PF)  20 mg Intravenous BID    insulin glargine U-100  35 Units Subcutaneous Daily    LIDOcaine  2 patch Transdermal Q24H    lisinopriL  20 mg Oral Daily    metoprolol succinate  100 mg Oral BID    mupirocin   Nasal BID    senna-docusate 8.6-50 mg  1 tablet Oral BID    venlafaxine  225 mg Oral Daily       CONTINUOUS INFUSIONS:   nicardipine  0-15 mg/hr Intravenous Continuous   Stopped at 02/17/25 0430       PRN MEDS:  Current Facility-Administered Medications:     acetaminophen, 650 mg, Oral, Q8H PRN    acetaminophen, 650 mg, Oral, Q4H PRN    dextrose 50%, 12.5 g, Intravenous, PRN    dextrose 50%, 12.5 g, Intravenous, PRN    dextrose 50%, 12.5 g, Intravenous, PRN    dextrose 50%, 25 g, Intravenous, PRN    dextrose 50%, 25 g, Intravenous, PRN    dextrose 50%, 25 g, Intravenous, PRN    glucagon (human recombinant), 1 mg, Intramuscular, PRN    glucagon (human recombinant), 1 mg, Intramuscular, PRN    glucagon (human recombinant), 1 mg, Intramuscular, PRN    glucose, 16 g, Oral, PRN    glucose,  "16 g, Oral, PRN    glucose, 16 g, Oral, PRN    glucose, 24 g, Oral, PRN    glucose, 24 g, Oral, PRN    glucose, 24 g, Oral, PRN    hydrALAZINE, 10 mg, Intravenous, Q6H PRN    HYDROmorphone, 2 mg, Oral, Q6H PRN    insulin aspart U-100, 0-10 Units, Subcutaneous, Q2H PRN    labetalol, 5 mg, Intravenous, Q6H PRN    LORazepam, 0.5 mg, Oral, Q6H PRN    melatonin, 6 mg, Oral, Nightly PRN    metoprolol, 5 mg, Intravenous, Q6H PRN    naloxone, 0.02 mg, Intravenous, PRN    ondansetron, 4 mg, Intravenous, Q6H PRN    oxyCODONE-acetaminophen, 1 tablet, Oral, Q6H PRN    potassium bicarbonate, 35 mEq, Oral, PRN    potassium bicarbonate, 50 mEq, Oral, PRN    potassium bicarbonate, 60 mEq, Oral, PRN    potassium, sodium phosphates, 2 packet, Oral, PRN    potassium, sodium phosphates, 2 packet, Oral, PRN    potassium, sodium phosphates, 2 packet, Oral, PRN    sodium chloride 0.9%, 3 mL, Intravenous, Q12H PRN    LABS AND DIAGNOSTICS     CBC LAST 3 DAYS  Recent Labs   Lab 02/15/25  1029 02/16/25  0320 02/17/25  0412   WBC 19.49* 20.47* 16.19*   RBC 4.61 4.17 4.07   HGB 13.6 12.5 12.3   HCT 42.1 38.1 37.4   MCV 91 91 92   MCH 29.5 30.0 30.2   MCHC 32.3 32.8 32.9   RDW 14.1 14.3 14.3    272 270   MPV 9.3 9.0* 9.1*   GRAN 72.9  14.2* 71.8  14.7* 61.7  10.0*   LYMPH 16.3*  3.2 18.4  3.8 29.5  4.8   MONO 9.9  1.9* 9.1  1.9* 7.0  1.1*   BASO 0.03 0.03 0.05   NRBC 0 0 0       COAGULATION LAST 3 DAYS  No results for input(s): "LABPT", "INR", "APTT" in the last 168 hours.    CHEMISTRY LAST 3 DAYS  Recent Labs   Lab 02/15/25  0104 02/15/25  1029 02/16/25  0320 02/17/25  0412   NA  --  139 141 141   K  --  3.9 3.8 3.3*   CL  --  107 109 103   CO2  --  23 25 30*   ANIONGAP  --  9 7* 8   BUN  --  15 10 5*   CREATININE  --  1.0 0.8 0.7   GLU  --  173* 133* 129*   CALCIUM  --  8.5* 8.5* 8.6*   PH 7.345*  --   --   --    MG  --  1.6 1.7 1.8   ALBUMIN  --  4.1 3.9 3.9   PROT  --  7.1 6.5 6.7   ALKPHOS  --  53* 44* 45*   ALT  --  24 21 " 20   AST  --  19 20 20   BILITOT  --  0.5 0.6 0.5       CARDIAC PROFILE LAST 3 DAYS  Recent Labs   Lab 02/14/25  1942 02/15/25  1029 02/15/25  2258 02/16/25  0925 02/16/25  1239   BNP 35  --   --   --   --    TROPONINI 0.058*  --   --   --   --    TROPONINIHS  --    < > 68.2* 48.1* 37.1*    < > = values in this interval not displayed.       ENDOCRINE LAST 3 DAYS  Recent Labs   Lab 02/15/25  1029   TSH 0.877   PROCAL 0.106       LAST ARTERIAL BLOOD GAS  ABG  Recent Labs   Lab 02/15/25  0104   PH 7.345*   PO2 75*   PCO2 27.7*   HCO3 15.1*   BE -11*       LAST 7 DAYS MICROBIOLOGY   Microbiology Results (last 7 days)       Procedure Component Value Units Date/Time    Blood culture #1 **CANNOT BE ORDERED STAT** [[id number]] Collected: 02/15/25 0023    Order Status: Completed Specimen: Blood from Peripheral, Hand, Right Updated: 02/16/25 1232     Blood Culture, Routine No Growth to date      No Growth to date    Blood culture #2 **CANNOT BE ORDERED STAT** [[id number]] Collected: 02/15/25 0039    Order Status: Completed Specimen: Blood from Peripheral, Forearm, Left Updated: 02/16/25 1232     Blood Culture, Routine No Growth to date      No Growth to date    MRSA Screen by PCR [[id number]] Collected: 02/15/25 1450    Order Status: Completed Specimen: Nasal Swab Updated: 02/15/25 1925     MRSA SCREEN BY PCR Not Detected    Stool culture [[id number]]     Order Status: No result Specimen: Stool             MOST RECENT IMAGING  US Renal Artery Stenosis Hyperten (xpd)  Narrative: CLINICAL HISTORY:  ([id number] y/o  (1990) F    Hypertensive Urgency;    TECHNIQUE:  (A#[id number], exam time 2/16/2025 13:42)    US RENAL ARTERY STENOSIS HYPERTEN (XPD) [id number]    A complete ultrasound examination of the retroperitoneum and a color flow and spectral Doppler studies of the renal vasculature were performed.    COMPARISON:  CT from 02/14/2025    FINDINGS:  Please note this examination is technically suboptimal due to patient  related factors  including body habitus as well as overlying bowel gas.    Right KIDNEY: Normal in size. Normal in echotexture.  There is no evidence of renal cysts stones large enough to cause acoustic shadowing contour-deforming mass or hydronephrosis.    Renal size: 11.9 x 6.2 x 5.5 cm    Left KIDNEY: Normal in size. Normal in echotexture.  There is no evidence of renal cysts stones large enough to cause acoustic shadowing contour-deforming mass or hydronephrosis.    Renal size: 12.7 x 5.9 x 5.2 cm    Urinary bladder: Normally distended. No stones  wall thickening  or focal mass.  Both ureteral jets are seen.    Additional findings:    -the AORTA and IVC are within normal limits where visualized.    -increased background liver parenchymal echotexture with decreased visualization of the periportal triads in keeping with steatosis.    VASCULAR examination:    Abdominal aorta (PSV in the region of the main renal arteries): 46 cm/s    Right renal vascular exam: The resistive indices are within normal limits. The peak systolic velocities (PSV) in the main renal artery are within normal range. The right main renal artery to aorta peak systolic velocity ratio is normal. The right renal vein is patent.    Resistive index (upper, mid, lower): 0.59, 0.64, 0.64    Main renal artery PSV (origin, mid, hilar): 91 cm/s, 56 cm/s, and 102 cm/s    RAR/Ao PSV ratio: 2.2    Renal vein: Patent.    Left renal vascular exam: The resistive indices are within normal limits. The peak systolic velocities (PSV) in the main renal artery are within normal range. The left main renal artery to aorta peak systolic velocity ratio is normal. The left renal vein is patent.    Resistive index (upper, mid, lower): 0.57, 0.71, and 0.62    Main renal artery PSV (origin, mid, hilar): 65 cm/s, 78 cm/s, and 101 cm/s    RAR/Ao PSV ratio: 2.2    Renal vein: Patent  Impression: 1. Unremarkable complete retroperitoneal sonogram.    2. No convincing spectral  Doppler findings of a hemodynamically significant renal artery stenosis.    3.  Liver parenchymal findings suggesting steatosis.    Electronically signed by: Sanjay Ferraro  Date:    02/16/2025  Time:    13:46      ECHOCARDIOGRAM RESULTS (last 5)  Results for orders placed during the hospital encounter of 02/14/25    Echo    Interpretation Summary    Left Ventricle: The left ventricle is normal in size. Mildly increased wall thickness. There is normal systolic function with a visually estimated ejection fraction of 60 - 65%.    Right Ventricle: Normal right ventricular cavity size. Systolic function is normal.      Results for orders placed in visit on 01/19/24    Echo doppler fetal complete      CURRENT/PREVIOUS VISIT EKG  Results for orders placed or performed during the hospital encounter of 02/14/25   EKG 12-lead    Collection Time: 02/16/25  6:19 AM   Result Value Ref Range    QRS Duration 82 ms    [facility] QTC Calculation 438 ms    Narrative    Test Reason : R00.0,    Vent. Rate : 120 BPM     Atrial Rate : 120 BPM     P-R Int : 146 ms          QRS Dur :  82 ms      QT Int : 310 ms       P-R-T Axes :  65  58  46 degrees    QTcB Int : 438 ms    Sinus tachycardia  Possible Left atrial enlargement  Borderline Abnormal ECG    Confirmed by Stacy Hurtado ([id number]) on 2/16/2025 6:25:14 PM    Referred By: AAAREFERRAL SELF           Confirmed By: Stacy Hurtado           ASSESSMENT/PLAN:     Active Hospital Problems    Diagnosis    *Hypertensive urgency    Generalized anxiety disorder    Tachycardia    Nausea vomiting and diarrhea    DMII (diabetes mellitus, type 2)    MARILU (acute kidney injury)    Metabolic acidosis, increased anion gap (IAG)       ASSESSMENT & PLAN:     HTN urgency  Tachycardia  N/V/D  Marilu, resolved  Increased anion gap      RECOMMENDATIONS:    Remains hypertensive.  Increase lisinopril 20 mg daily.  Continue metoprolol succinate 100 mg BID  US for MAAME.   Echocardiogram is benign and EF is  normal.   Further management of hypertension per medicine team.  Can increase lisinopril or add amlodipine.   Cardiology will be available PRN. Please reconsult for any cardiac specific needs that have not been addressed.         Kourtney Diaz NP  Cardiology  Date of Service: 02/17/2025          [1]   Social History  Tobacco Use    Smoking status: Never    Smokeless tobacco: Never   Substance Use Topics    Alcohol use: Yes     Comment: occasional    Drug use: No   [2]   No current facility-administered medications on file prior to encounter.     Current Outpatient Medications on File Prior to Encounter   Medication Sig Dispense Refill    ABILIFY 20 mg Tab       acetaminophen (TYLENOL) 500 MG tablet Take 500 mg by mouth every 6 (six) hours as needed for Pain. [id number] daily      atorvastatin (LIPITOR) 10 MG tablet Take 1 tablet (10 mg total) by mouth once daily. 90 tablet 0    busPIRone (BUSPAR) 30 MG Tab 30 mg 3 (three) times daily.      dicyclomine (BENTYL) 20 mg tablet Take 20 mg by mouth every 8 (eight) hours as needed.      fluvoxaMINE (LUVOX) 100 MG tablet 200 mg nightly.      metFORMIN (GLUCOPHAGE) 500 MG tablet Take 1 tablet (500 mg total) by mouth daily with breakfast. 90 tablet 0    metoclopramide HCl (REGLAN) 10 MG tablet Take 10 mg by mouth 4 (four) times daily.      metoprolol succinate (TOPROL-XL) 50 MG 24 hr tablet Take 1 tablet (50 mg total) by mouth once daily. 90 tablet 1    ondansetron (ZOFRAN) 4 MG tablet Take 1 tablet (4 mg total) by mouth every 12 (twelve) hours as needed for Nausea. 60 tablet 0    tirzepatide 10 mg/0.5 mL PnIj Inject 10 mg into the skin every 7 days. 4 Pen 0    tiZANidine (ZANAFLEX) 4 MG tablet Take 1 tablet (4 mg total) by mouth every 8 (eight) hours as needed (SPASM). 60 tablet 0    venlafaxine (EFFEXOR-XR) 150 MG Cp24 1 capsule with food Orally Once a day for 30 days      XANAX 1 mg tablet

## 2025-02-17 NOTE — PROGRESS NOTES
[facility] Medicine  Progress Note    Patient Name: Jasper Guerrero  MRN: [id number]  Patient Class: IP- Inpatient   Admission Date: 2/14/2025  Length of Stay: 2 days  Attending Physician: Buck Mackay MD  Primary Care Provider: Enoch Gerardo NP        Subjective     Principal Problem:Hypertensive urgency        HPI:  Sent over to us from [facility]     34 WF with Past medical history is also significant for diabetes, depression, agoraphobia, PTSD and hypertension.     Patient presents emergency department reported shortness of breath onset of symptoms this morning with associated nausea vomiting throughout the day today states when unable to keep anything down today she denies any recent changes in her medications she denies any antecedent illness is no reported diarrhea no fever chills no cough no chest pain no abdominal pain at arrival emergency department patient is tachycardic she advised nursing that she took BuSpar and Xanax today without improvement in her anxiety symptoms her chart shows history of DVT but in his upon review of the chart it as a superficial thrombophlebitis of the right arm she does have a history of diabetes hypertension     Normally she is nauseated from IBS she says but never vomits so it was odd she did this time . Then after vomiting became very SOB she says. No coughing or choking.    Her anxiety was becoming severe and its bad on a good day she says  But lately its worse because he  is out of town .    She took her buspar and xanax but thinks she vomited them up       She also said her back pain is bad. And since her baby was  born in April last year 2024 , she has had back pain all over her back.  From neck to low back and nobody has found a reason but they gave her Tizanidine to use but not helping anymore she says.           She is NOT on insulin    She tells me she does NOT use marijuana   but tox screen positive for it     Non drinker  No drugs                  In the ER at [facility]  her BP was 211/ 130s     and HR was 140s sinus     They tried IV Lopressor and then did a Labetalol drip    For that reason wanted to send her over to us to our ICU       Labs were mostly normal     Mild bump in troponin    BNP normal range     Lactic acid was up 5.6    And she had an MARILU     Has normal kidneys at baseline     Tox screen only positive for THC     CT neg  CTA chest neg for PE     EKG normal     UA clean no uti         Overview/Hospital Course:  34-year-old female with PMH of diabetes, depression, PTSD, HTN, IBS presented with nausea and vomiting, SOB, which she describes as an anxiety attack. Her  is out of town, she is taking care of her 3 children and she could not care for herself. She was admitted to ICU for hypertensive urgency requiring labetalol drip. Blood pressure improved and labetalol drip discontinued. Also had increased anion gap metabolic acidosis likely secondary to lactic acidosis, continued on IV fluids. UA negative, chest x-ray no pneumonia, stool culture ordered with diarrhea. Blood cultures pending. Start broad-spectrum antibiotics for possible sepsis. Deescalate as needed. Leukocytosis improving. MARILU improved with IV fluids.Sinus tachycardia in 120s, likely dehydration vs sepsis, vs anxiety. Restarted her home anxiety medications Effexor 225 mg days and buspirone 30 mg t.i.d. (confirmed dose at bedside) patient would like to hold off on tele psych consult this time. D-dimer and TSH pending.  2D echocardiogram ordered and pending.    Interval History: Patient seen and examined. Patient appears anxious and wanting to make sure narcotic prescription is given to her at the time of discharge for her backache. Denies any chest pain or SOB. Patient is flushed but denies any palpitations. Afbrile.     Review of Systems   All other systems reviewed and are negative.    Objective:     Vital Signs (Most Recent):  Temp: 98.2 °F (36.8 °C) (02/17/25  0401)  Pulse: 101 (02/17/25 0601)  Resp: 18 (02/17/25 0706)  BP: (!) 166/102 (02/17/25 0601)  SpO2: 97 % (02/17/25 0601) Vital Signs (24h Range):  Temp:  [98.1 °F (36.7 °C)-98.4 °F (36.9 °C)] 98.2 °F (36.8 °C)  Pulse:  [] 101  Resp:  [16-22] 18  SpO2:  [88 %-100 %] 97 %  BP: (135-211)/() 166/102     Weight: (!) 136.7 kg (301 lb 5.9 oz)  Body mass index is 50.15 kg/m².    Intake/Output Summary (Last 24 hours) at 2/17/2025 0750  Last data filed at 2/17/2025 0601  Gross per 24 hour   Intake 3642.29 ml   Output 2825 ml   Net 817.29 ml         Physical Exam  Vitals and nursing note reviewed.   Constitutional:       Appearance: Normal appearance.   HENT:      Head: Normocephalic.      Nose: Nose normal.      Mouth/Throat:      Mouth: Mucous membranes are moist.   Eyes:      Extraocular Movements: Extraocular movements intact.      Pupils: Pupils are equal, round, and reactive to light.   Cardiovascular:      Rate and Rhythm: Normal rate and regular rhythm.      Pulses: Normal pulses.      Heart sounds: Normal heart sounds.   Pulmonary:      Effort: Pulmonary effort is normal.      Breath sounds: Normal breath sounds.   Abdominal:      General: Abdomen is flat. Bowel sounds are normal.      Palpations: Abdomen is soft.   Musculoskeletal:         General: Normal range of motion.      Cervical back: Normal range of motion.   Skin:     General: Skin is warm.      Capillary Refill: Capillary refill takes less than 2 seconds.   Neurological:      General: No focal deficit present.      Mental Status: She is alert and oriented to person, place, and time.   Psychiatric:         Mood and Affect: Mood normal.         Behavior: Behavior normal.             Significant Labs: All pertinent labs within the past 24 hours have been reviewed.  CBC:   Recent Labs   Lab 02/15/25  1029 02/16/25  0320 02/17/25  0412   WBC 19.49* 20.47* 16.19*   HGB 13.6 12.5 12.3   HCT 42.1 38.1 37.4    272 270     CMP:   Recent Labs   Lab  "02/15/25  1029 02/16/25  0320 02/17/25  0412    141 141   K 3.9 3.8 3.3*    109 103   CO2 23 25 30*   * 133* 129*   BUN 15 10 5*   CREATININE 1.0 0.8 0.7   CALCIUM 8.5* 8.5* 8.6*   PROT 7.1 6.5 6.7   ALBUMIN 4.1 3.9 3.9   BILITOT 0.5 0.6 0.5   ALKPHOS 53* 44* 45*   AST 19 20 20   ALT 24 21 20   ANIONGAP 9 7* 8     Coagulation: No results for input(s): "PT", "INR", "APTT" in the last 48 hours.  Lipid Panel: No results for input(s): "CHOL", "HDL", "LDLCALC", "TRIG", "CHOLHDL" in the last 48 hours.  Troponin:   Recent Labs   Lab 02/15/25  2258 02/16/25  0925 02/16/25  1239   TROPONINIHS 68.2* 48.1* 37.1*     TSH:   Recent Labs   Lab 02/15/25  1029   TSH 0.877     Urine Studies:   No results for input(s): "COLORU", "APPEARANCEUA", "PHUR", "SPECGRAV", "PROTEINUA", "GLUCUA", "KETONESU", "BILIRUBINUA", "OCCULTUA", "NITRITE", "UROBILINOGEN", "LEUKOCYTESUR", "RBCUA", "WBCUA", "BACTERIA", "SQUAMEPITHEL", "HYALINECASTS" in the last 48 hours.    Invalid input(s): "WRIGHTSUR"    Microbiology Results (last 7 days)       Procedure Component Value Units Date/Time    Blood culture #1 **CANNOT BE ORDERED STAT** [[id number]] Collected: 02/15/25 0023    Order Status: Completed Specimen: Blood from Peripheral, Hand, Right Updated: 02/16/25 1232     Blood Culture, Routine No Growth to date      No Growth to date    Blood culture #2 **CANNOT BE ORDERED STAT** [[id number]] Collected: 02/15/25 0039    Order Status: Completed Specimen: Blood from Peripheral, Forearm, Left Updated: 02/16/25 1232     Blood Culture, Routine No Growth to date      No Growth to date    MRSA Screen by PCR [[id number]] Collected: 02/15/25 1450    Order Status: Completed Specimen: Nasal Swab Updated: 02/15/25 1925     MRSA SCREEN BY PCR Not Detected    Stool culture [[id number]]     Order Status: No result Specimen: Stool           Significant Imaging:   ECHO:    Left Ventricle: The left ventricle is normal in size. Mildly increased wall " thickness. There is normal systolic function with a visually estimated ejection fraction of 60 - 65%.    Right Ventricle: Normal right ventricular cavity size. Systolic function is normal.    CXR:  No acute abnormality.     CTA Chest:  No evidence of pulmonary thromboemboli or right ventricular strain.  Trace pericardial effusion.    CT abdomen and Pelvis with IV contrast:  No acute abnormality.     CXR: No acute cardiac or pulmonary process.     Renal Artery Doppler scan:  1. Unremarkable complete retroperitoneal sonogram.  2. No convincing spectral Doppler findings of a hemodynamically significant renal artery stenosis.  3.  Liver parenchymal findings suggesting steatosis.    Assessment and Plan     * Hypertensive urgency  Patient has a current diagnosis of Hypertensive emergency with end organ damage evidenced by acute kidney injury which is controlled.  Latest blood pressure and vitals reviewed-   Temp:  [97.9 °F (36.6 °C)-98.4 °F (36.9 °C)]   Pulse:  []   Resp:  [16-22]   BP: (135-211)/()   SpO2:  [88 %-100 %] .   Patient currently off IV antihypertensives.   Home meds for hypertension were reviewed and noted below.   Increase Toprol  mg b.i.d.  Increase lisinopril 20 mg daily.  Renal artery Doppler ultrasound results reviewed.  Follow pheochromocytoma workup.    Generalized anxiety disorder  Patient admits uncontrollable anxiety.  Agreeable to discuss with psychiatrist.  Ideally if we can get patient slowly of from Effexor and use alternate anxiolytics to assist with uncontrolled hypertension and tachycardia-will defer to psychiatrist.      Metabolic acidosis, increased anion gap (IAG)  Resolved.    MARILU (acute kidney injury)  Resolved. MARILU is likely due to pre-renal azotemia due to intravascular volume depletion. Baseline creatinine is  under 1  . Most recent creatinine and eGFR are listed below.  Recent Labs     02/15/25  1029 02/16/25  0320 02/17/25  0412   CREATININE 1.0 0.8 0.7    EGFRNORACEVR >60.0 >60.0 >60.0   Discontinued IV fluid hydration.      DMII (diabetes mellitus, type 2)  Check blood glucose level q AC/HS.  Use Novolog Insulin Sliding Scale as needed.   Continue American Diabetic Association 1800 Kcal diet.      Nausea vomiting and diarrhea  Supportive care, antiemetics as needed. Continue IVFs.    Tachycardia  Continue telemonitoring.  Ruled out for ACS.  Follow 24 hrs urine study for VMA, Metanephrine and 5 Hydroxy Indol Acetetic acid.  Follow PORFIRIO.  Increase metoprolol to 100 mg b.i.d.      VTE Risk Mitigation (From admission, onward)           Ordered     IP VTE HIGH RISK PATIENT  Once         02/15/25 0528     Place sequential compression device  Until discontinued         02/15/25 0528     Reason for No Pharmacological VTE Prophylaxis  Once        Question:  Reasons:  Answer:  Patient is Ambulatory    02/15/25 0528                    Discharge Planning   BRITTANY: 2/18/2025     Code Status: Full Code   Medical Readiness for Discharge Date:   Discharge Plan A: Home with family            Critical care time spent on the evaluation and treatment of severe organ dysfunction, review of pertinent labs and imaging studies, discussions with consulting providers and discussions with patient/family: 35 minutes.            Buck Mackay MD  Department of Hospital Medicine   [facility]

## 2025-02-17 NOTE — ASSESSMENT & PLAN NOTE
Patient has a current diagnosis of Hypertensive emergency with end organ damage evidenced by acute kidney injury which is controlled.  Latest blood pressure and vitals reviewed-   Temp:  [97.9 °F (36.6 °C)-98.4 °F (36.9 °C)]   Pulse:  []   Resp:  [16-22]   BP: (135-211)/()   SpO2:  [88 %-100 %] .   Patient currently off IV antihypertensives.   Home meds for hypertension were reviewed and noted below.   Increase Toprol  mg b.i.d.  Increase lisinopril 20 mg daily.  Renal artery Doppler ultrasound results reviewed.  Follow pheochromocytoma workup.

## 2025-02-17 NOTE — NURSING
Notified MD Codie of pt high BP. Pt asymptomatic at this time. Orders given. Will continue to monitor.

## 2025-02-17 NOTE — ASSESSMENT & PLAN NOTE
Patient admits uncontrollable anxiety.  Agreeable to discuss with psychiatrist.  Ideally if we can get patient slowly of from Effexor and use alternate anxiolytics to assist with uncontrolled hypertension and tachycardia-will defer to psychiatrist.

## 2025-02-17 NOTE — CARE UPDATE
02/16/25 2014   Patient Assessment/Suction   Level of Consciousness (AVPU) alert   Respiratory Effort Unlabored   Expansion/Accessory Muscles/Retractions expansion symmetric   All Lung Fields Breath Sounds clear   Rhythm/Pattern, Respiratory depth regular;pattern regular   Cough Frequency infrequent   Cough Type good;nonproductive   PRE-TX-O2   Device (Oxygen Therapy) room air   Pulse Oximetry Type Continuous   $ Pulse Oximetry - Multiple Charge Pulse Oximetry - Multiple   Education   $ Education Oxygen;15 min

## 2025-02-17 NOTE — PLAN OF CARE
Problem: Adult Inpatient Plan of Care  Goal: Plan of Care Review  Outcome: Progressing  Goal: Patient-Specific Goal (Individualized)  Outcome: Progressing  Goal: Absence of Hospital-Acquired Illness or Injury  Outcome: Progressing  Goal: Optimal Comfort and Wellbeing  Outcome: Progressing  Goal: Readiness for Transition of Care  Outcome: Progressing     Problem: Bariatric Environmental Safety  Goal: Safety Maintained with Care  Outcome: Progressing     Problem: Diabetes Comorbidity  Goal: Blood Glucose Level Within Targeted Range  Outcome: Progressing     Problem: Fall Injury Risk  Goal: Absence of Fall and Fall-Related Injury  Outcome: Progressing     Problem: Acute Kidney Injury/Impairment  Goal: Fluid and Electrolyte Balance  Outcome: Progressing  Goal: Improved Oral Intake  Outcome: Progressing  Goal: Effective Renal Function  Outcome: Progressing

## 2025-02-18 ENCOUNTER — PATIENT OUTREACH (OUTPATIENT)
Dept: FAMILY MEDICINE | Facility: CLINIC | Age: 35
End: 2025-02-18
Payer: MEDICAID

## 2025-02-18 ENCOUNTER — TELEPHONE (OUTPATIENT)
Dept: CARDIOLOGY | Facility: CLINIC | Age: 35
End: 2025-02-18
Payer: MEDICAID

## 2025-02-18 ENCOUNTER — TELEPHONE (OUTPATIENT)
Dept: FAMILY MEDICINE | Facility: CLINIC | Age: 35
End: 2025-02-18
Payer: MEDICAID

## 2025-02-18 VITALS
TEMPERATURE: 98 F | HEIGHT: 65 IN | SYSTOLIC BLOOD PRESSURE: 148 MMHG | DIASTOLIC BLOOD PRESSURE: 82 MMHG | OXYGEN SATURATION: 96 % | HEART RATE: 103 BPM | BODY MASS INDEX: 48.82 KG/M2 | WEIGHT: 293 LBS | RESPIRATION RATE: 22 BRPM

## 2025-02-18 LAB
ALBUMIN SERPL BCP-MCNC: 3.9 G/DL (ref 3.5–5.2)
ALP SERPL-CCNC: 45 U/L (ref 55–135)
ALT SERPL W/O P-5'-P-CCNC: 20 U/L (ref 10–44)
ANA NOTE: NORMAL
ANA SPECKLED TITR SER: NORMAL {TITER}
ANA TITR SER IF: POSITIVE {TITER}
ANION GAP SERPL CALC-SCNC: 9 MMOL/L (ref 8–16)
AST SERPL-CCNC: 20 U/L (ref 10–40)
BASOPHILS # BLD AUTO: 0.04 K/UL (ref 0–0.2)
BASOPHILS NFR BLD: 0.3 % (ref 0–1.9)
BILIRUB SERPL-MCNC: 0.5 MG/DL (ref 0.1–1)
BUN SERPL-MCNC: 8 MG/DL (ref 6–20)
CALCIUM SERPL-MCNC: 9 MG/DL (ref 8.7–10.5)
CHLORIDE SERPL-SCNC: 102 MMOL/L (ref 95–110)
CO2 SERPL-SCNC: 29 MMOL/L (ref 23–29)
CREAT SERPL-MCNC: 0.8 MG/DL (ref 0.5–1.4)
DIFFERENTIAL METHOD BLD: ABNORMAL
EOSINOPHIL # BLD AUTO: 0.2 K/UL (ref 0–0.5)
EOSINOPHIL NFR BLD: 1.5 % (ref 0–8)
ERYTHROCYTE [DISTWIDTH] IN BLOOD BY AUTOMATED COUNT: 14 % (ref 11.5–14.5)
EST. GFR  (NO RACE VARIABLE): >60 ML/MIN/1.73 M^2
GLUCOSE SERPL-MCNC: 110 MG/DL (ref 70–110)
HCT VFR BLD AUTO: 39 % (ref 37–48.5)
HGB BLD-MCNC: 12.7 G/DL (ref 12–16)
IMM GRANULOCYTES # BLD AUTO: 0.06 K/UL (ref 0–0.04)
IMM GRANULOCYTES NFR BLD AUTO: 0.4 % (ref 0–0.5)
LYMPHOCYTES # BLD AUTO: 5.1 K/UL (ref 1–4.8)
LYMPHOCYTES NFR BLD: 33.7 % (ref 18–48)
MAGNESIUM SERPL-MCNC: 1.9 MG/DL (ref 1.6–2.6)
MCH RBC QN AUTO: 29.9 PG (ref 27–31)
MCHC RBC AUTO-ENTMCNC: 32.6 G/DL (ref 32–36)
MCV RBC AUTO: 92 FL (ref 82–98)
MONOCYTES # BLD AUTO: 1.1 K/UL (ref 0.3–1)
MONOCYTES NFR BLD: 7.3 % (ref 4–15)
NEUTROPHILS # BLD AUTO: 8.6 K/UL (ref 1.8–7.7)
NEUTROPHILS NFR BLD: 56.8 % (ref 38–73)
NRBC BLD-RTO: 0 /100 WBC
PLATELET # BLD AUTO: 282 K/UL (ref 150–450)
PMV BLD AUTO: 9.1 FL (ref 9.2–12.9)
POCT GLUCOSE: 109 MG/DL (ref 70–110)
POTASSIUM SERPL-SCNC: 3.4 MMOL/L (ref 3.5–5.1)
PROT SERPL-MCNC: 6.7 G/DL (ref 6–8.4)
RBC # BLD AUTO: 4.25 M/UL (ref 4–5.4)
SODIUM SERPL-SCNC: 140 MMOL/L (ref 136–145)
WBC # BLD AUTO: 15.12 K/UL (ref 3.9–12.7)

## 2025-02-18 PROCEDURE — 36415 COLL VENOUS BLD VENIPUNCTURE: CPT | Performed by: INTERNAL MEDICINE

## 2025-02-18 PROCEDURE — 63600175 PHARM REV CODE 636 W HCPCS

## 2025-02-18 PROCEDURE — 25000003 PHARM REV CODE 250: Performed by: STUDENT IN AN ORGANIZED HEALTH CARE EDUCATION/TRAINING PROGRAM

## 2025-02-18 PROCEDURE — 63600175 PHARM REV CODE 636 W HCPCS: Mod: TB,JZ | Performed by: STUDENT IN AN ORGANIZED HEALTH CARE EDUCATION/TRAINING PROGRAM

## 2025-02-18 PROCEDURE — 80053 COMPREHEN METABOLIC PANEL: CPT | Performed by: INTERNAL MEDICINE

## 2025-02-18 PROCEDURE — 25000003 PHARM REV CODE 250: Performed by: INTERNAL MEDICINE

## 2025-02-18 PROCEDURE — 83735 ASSAY OF MAGNESIUM: CPT | Performed by: INTERNAL MEDICINE

## 2025-02-18 PROCEDURE — 25000003 PHARM REV CODE 250

## 2025-02-18 PROCEDURE — 85025 COMPLETE CBC W/AUTO DIFF WBC: CPT | Performed by: INTERNAL MEDICINE

## 2025-02-18 PROCEDURE — 94761 N-INVAS EAR/PLS OXIMETRY MLT: CPT

## 2025-02-18 RX ORDER — INSULIN GLARGINE 100 [IU]/ML
20 INJECTION, SOLUTION SUBCUTANEOUS DAILY
Qty: 15 ML | Refills: 0 | Status: SHIPPED | OUTPATIENT
Start: 2025-02-19 | End: 2026-02-19

## 2025-02-18 RX ORDER — OXYCODONE AND ACETAMINOPHEN 10; 325 MG/1; MG/1
1 TABLET ORAL EVERY 6 HOURS PRN
Qty: 10 TABLET | Refills: 0 | Status: SHIPPED | OUTPATIENT
Start: 2025-02-18 | End: 2025-02-26

## 2025-02-18 RX ORDER — LISINOPRIL 20 MG/1
20 TABLET ORAL DAILY
Qty: 30 TABLET | Refills: 0 | Status: SHIPPED | OUTPATIENT
Start: 2025-02-19 | End: 2026-02-19

## 2025-02-18 RX ORDER — OXYCODONE AND ACETAMINOPHEN 5; 325 MG/1; MG/1
1 TABLET ORAL EVERY 6 HOURS PRN
Qty: 10 TABLET | Refills: 0 | Status: SHIPPED | OUTPATIENT
Start: 2025-02-18 | End: 2025-02-18 | Stop reason: HOSPADM

## 2025-02-18 RX ORDER — METOPROLOL SUCCINATE 100 MG/1
100 TABLET, EXTENDED RELEASE ORAL 2 TIMES DAILY
Qty: 60 TABLET | Refills: 0 | Status: SHIPPED | OUTPATIENT
Start: 2025-02-18 | End: 2026-02-18

## 2025-02-18 RX ADMIN — HYDROMORPHONE HYDROCHLORIDE 0.5 MG: 1 INJECTION, SOLUTION INTRAMUSCULAR; INTRAVENOUS; SUBCUTANEOUS at 07:02

## 2025-02-18 RX ADMIN — FAMOTIDINE 20 MG: 20 TABLET, FILM COATED ORAL at 08:02

## 2025-02-18 RX ADMIN — BUSPIRONE HYDROCHLORIDE 30 MG: 5 TABLET ORAL at 08:02

## 2025-02-18 RX ADMIN — POTASSIUM BICARBONATE 35 MEQ: 391 TABLET, EFFERVESCENT ORAL at 04:02

## 2025-02-18 RX ADMIN — VENLAFAXINE HYDROCHLORIDE 225 MG: 150 CAPSULE, EXTENDED RELEASE ORAL at 08:02

## 2025-02-18 RX ADMIN — LABETALOL HYDROCHLORIDE 5 MG: 5 INJECTION, SOLUTION INTRAVENOUS at 05:02

## 2025-02-18 RX ADMIN — MUPIROCIN 1 G: 20 OINTMENT TOPICAL at 08:02

## 2025-02-18 RX ADMIN — POTASSIUM BICARBONATE 35 MEQ: 391 TABLET, EFFERVESCENT ORAL at 08:02

## 2025-02-18 RX ADMIN — SENNOSIDES AND DOCUSATE SODIUM 1 TABLET: 50; 8.6 TABLET ORAL at 08:02

## 2025-02-18 RX ADMIN — INSULIN GLARGINE 35 UNITS: 100 INJECTION, SOLUTION SUBCUTANEOUS at 08:02

## 2025-02-18 RX ADMIN — LISINOPRIL 20 MG: 20 TABLET ORAL at 08:02

## 2025-02-18 RX ADMIN — ASPIRIN 162 MG: 81 TABLET, COATED ORAL at 08:02

## 2025-02-18 RX ADMIN — METOPROLOL SUCCINATE 100 MG: 50 TABLET, FILM COATED, EXTENDED RELEASE ORAL at 08:02

## 2025-02-18 RX ADMIN — HYDROMORPHONE HYDROCHLORIDE 0.5 MG: 1 INJECTION, SOLUTION INTRAMUSCULAR; INTRAVENOUS; SUBCUTANEOUS at 02:02

## 2025-02-18 RX ADMIN — OXYCODONE HYDROCHLORIDE AND ACETAMINOPHEN 1 TABLET: 5; 325 TABLET ORAL at 10:02

## 2025-02-18 NOTE — DISCHARGE INSTRUCTIONS
Keep daily blood pressure log and heart rate log.    Keep pre meal blood glucose records for your primary care physician.    Follow-up with primary care physician to discuss pending results of 24 hour urine study for catecholamines, 5 hydroxyindoleacetic acid and metanephrines, VMA.    Please discuss with your psychiatrist possible alternate therapy and weaning of Effexor treatment.

## 2025-02-18 NOTE — PROGRESS NOTES
Discharge Information     Discharge Date:  02/18/2025     Primary Discharge Diagnosis:  Hypertension urgency      Discharge Summary:  Reviewed      Medication & Order Review     Were medication changes made or new medications added?   Yes    If so, has the patient filled the prescriptions?  No     Was Home Health ordered? No    If so, has Home Health contacted patient and/or initiated services?  No    Name of Home Health Agency? N/A    Durable Medical Equipment ordered?  No     If so, has the DME provider contacted patient and delivered equipment?  N/A    Follow Up               Any problems since discharge? No    How is the patient feeling since returning home?  Pt is feeling weak since discharge    Have you set up recommended follow up appointments?  (cardiology, surgery, etc.)    Schedule Hospital Follow-up appointment within 7-14 days (preferably 7).  Pt has a hospital follow up with Enoch Gerardo on 02/26/2025    Notes:  patient is waiting on medication to be filled at the pharmacy and will go and pick it up today.            Shasha Garsia

## 2025-02-18 NOTE — CONSULTS
[facility]  Adult Nutrition  Education Short Note      Nutrition Education    Previous education: yes. Patient states she was DM2 prior to pregnancy rather than gestational.     Diet at home: Regular    Written information provided and explained on  Survival Skills  diabetic diet: 2000 calorie.    Discussed with: patient and spouse    Educational Need? yes    Barriers: none identified    Interventions: Carbohydrate modified diet    Patient and/or family comprehend instructions: yes    Outcome: Needs review. Encourage patient to seek referral to outpatient RD / diabetes educator.     Thanks for the consult!    Stephanie Perdomo RD 02/18/2025 10:44 AM

## 2025-02-18 NOTE — PLAN OF CARE
CM present for SIBR and pt requesting to discharge prior to psychiatric consult. States that she will be able to get an appointment with her outpt psychiatrist some day this week. Reqeusting referral to pain management and amb referral sent for this. Unable to obtain appointment for PCP within recommended timeframe and unable to schedule this PCP pt with discharge clinic. Message sent to PCP clinic via in [employer] requesting f/u be called to pt and notifying of need to follow for results from 24 hour urine. In basket message sent to cardiology as well requesting f/u be called to pt. Pt reports spouse is going to transport her home.  Discharge orders and chart reviewed with no further post-acute discharge needs identified at this time.  At this time, patient is cleared for discharge from Case Management standpoint pending delivery of meds from pharmacy.        02/18/25 0956   Final Note   Assessment Type Final Discharge Note   Anticipated Discharge Disposition Home   What phone number can be called within the next 1-3 days to see how you are doing after discharge? [phone]   Hospital Resources/Appts/Education Provided Appointment suggestion unavailable   Post-Acute Status   Post-Acute Authorization Other   Other Status No Post-Acute Service Needs   Discharge Delays None known at this time

## 2025-02-18 NOTE — TELEPHONE ENCOUNTER
----- Message from  Willow sent at 2/18/2025  9:52 AM CST -----  Regarding: Hospital follow up  Please schedule pt hospital follow up and call her with appointment. Discharge 2/18/25. Thanks

## 2025-02-18 NOTE — RESPIRATORY THERAPY
02/18/25 0802   Patient Assessment/Suction   Level of Consciousness (AVPU) alert   Respiratory Effort Normal;Unlabored   Expansion/Accessory Muscles/Retractions no use of accessory muscles;no retractions;expansion symmetric   Rhythm/Pattern, Respiratory unlabored;pattern regular;depth regular;no shortness of breath reported   Cough Frequency no cough   PRE-TX-O2   Device (Oxygen Therapy) room air   SpO2 (!) 87 %   Pulse Oximetry Type Continuous   $ Pulse Oximetry - Multiple Charge Pulse Oximetry - Multiple   Pulse 95   Resp 18

## 2025-02-18 NOTE — TELEPHONE ENCOUNTER
----- Message from  Willow sent at 2/18/2025  9:24 AM CST -----  Regarding: TCC  Patient discharging 2/18/25 following admission for hypertensive urgency. Pending results of 24 hour urine-please review. Please schedule pt for follow up within 7 days and call her with appointment. Thanks

## 2025-02-18 NOTE — DISCHARGE SUMMARY
[facility]  Discharge Summary      Patient Name: Jasper Guerrero  MRN: [id number]  ELE: [id number]  Patient Class: IP- Inpatient  Admission Date: 2/14/2025  Hospital Length of Stay: 3 days  Discharge Date and Time:  02/18/2025 9:37 AM  Attending Physician: Buck Mackay MD   Discharging Provider: Buck Mackay MD  Primary Care Provider: Enoch Gerardo NP    Primary Care Team: Networked reference to record PCT     HPI:   Sent over to us from [place] [address] [place] with Past medical history is also significant for diabetes, depression, agoraphobia, PTSD and hypertension.     Patient presents emergency department reported shortness of breath onset of symptoms this morning with associated nausea vomiting throughout the day today states when unable to keep anything down today she denies any recent changes in her medications she denies any antecedent illness is no reported diarrhea no fever chills no cough no chest pain no abdominal pain at arrival emergency department patient is tachycardic she advised nursing that she took BuSpar and Xanax today without improvement in her anxiety symptoms her chart shows history of DVT but in his upon review of the chart it as a superficial thrombophlebitis of the right arm she does have a history of diabetes hypertension     Normally she is nauseated from IBS she says but never vomits so it was odd she did this time . Then after vomiting became very SOB she says. No coughing or choking.    Her anxiety was becoming severe and its bad on a good day she says  But lately its worse because he  is out of town .    She took her buspar and xanax but thinks she vomited them up       She also said her back pain is bad. And since her baby was  born in April last year 2024 , she has had back pain all over her back.  From neck to low back and nobody has found a reason but they gave her Tizanidine to use but not helping anymore she says.           She is NOT on  insulin    She tells me she does NOT use marijuana   but tox screen positive for it     Non drinker  No drugs                 In the ER at [facility]  her BP was 211/ 130s     and HR was 140s sinus     They tried IV Lopressor and then did a Labetalol drip    For that reason wanted to send her over to us to our ICU       Labs were mostly normal     Mild bump in troponin    BNP normal range     Lactic acid was up 5.6    And she had an MARILU     Has normal kidneys at baseline     Tox screen only positive for THC     CT neg  CTA chest neg for PE     EKG normal     UA clean no uti         * No surgery found *      Hospital Course:   34-year-old female with PMH of diabetes, depression, PTSD, HTN, IBS presented with nausea and vomiting, SOB, which she described as an anxiety attack. Her  was out of town, she was taking care of her 3 children and she could not care for herself. She was admitted to ICU for hypertensive urgency requiring labetalol drip. Blood pressure improved and labetalol drip discontinued. Also had increased anion gap metabolic acidosis likely secondary to lactic acidosis, continued on IV fluids. UA negative, chest x-ray no pneumonia, stool culture ordered with diarrhea. Blood cultures pending. Start broad-spectrum antibiotics for possible sepsis. Deescalate as needed. Leukocytosis improving. MARILU improved with IV fluids.Sinus tachycardia in 120s, likely dehydration vs sepsis, vs anxiety. Restarted her home anxiety medications Effexor 225 mg days and buspirone 30 mg t.i.d. (confirmed dose at bedside).  Patient's blood pressure and heart rate remain elevated requiring use of intravenous Cardene infusion.  Assistance from cardiologist obtained.  Patient's blood pressure and heart rate improved with antihypertensive medications including metoprolol and lisinopril.  During hospital stay patient's blood glucose readings were closely monitored.  Patient has been initiated on long-acting insulin.  Insulin teaching  performed.  Patient offered to be seen by psychiatrist for better control of anxiety disorder and possible alternate therapy in place of Effexor which was thought to be contributing towards increased heart rate and blood pressure.  Patient does not want to wait for psychiatrist and would like to follow-up with her psychiatrist as outpatient.  24 hour urine studies have been collected but results for pheochromocytoma and carcinoid workup are still pending.  Patient encouraged to follow-up with primary care physician to discuss pending results.  Patient instructed to maintain daily blood pressure, heart rate and blood glucose log.  Patient voiced understanding.  Percocet 10 mg 10 tablets prescription given to the patient with outpatient referral to pain management doctor and diabetic education nurse arranged.  Discharge plan of care reviewed with the patient in detail who voiced understanding.  Patient also instructed to avoid nonsteroidal anti-inflammatory medications and future and will have a surveillance BMP checked next week.       Goals of Care Treatment Preferences:  Code Status: Full Code         Consults:   Consults (From admission, onward)          Status Ordering Provider     Inpatient consult to Diabetes educator  Once        Provider:  (Not yet assigned)    TIFAFNIE Diaz     Inpatient consult to Cardiology  Once        Provider:  James Matamoros MD    Completed KIKI CAO R            * Hypertensive urgency  Patient has a current diagnosis of Hypertensive emergency with end organ damage evidenced by acute kidney injury which is controlled.  Latest blood pressure and vitals reviewed-   Temp:  [97.9 °F (36.6 °C)-98.4 °F (36.9 °C)]   Pulse:  []   Resp:  [16-22]   BP: (135-211)/()   SpO2:  [88 %-100 %] .   Patient currently off IV antihypertensives.   Home meds for hypertension were reviewed and noted below.   Increase Toprol  mg b.i.d.  Increase lisinopril 20 mg  daily.  Renal artery Doppler ultrasound results reviewed.  Follow pheochromocytoma workup.      Sepsis ruled out - microbiology results negative to date.  Antibiotic therapy discontinued.    Generalized anxiety disorder  Patient admits uncontrollable anxiety.  Agreeable to discuss with psychiatrist.  Ideally if we can get patient slowly of from Effexor and use alternate anxiolytics to assist with uncontrolled hypertension and tachycardia-will defer to psychiatrist.      Metabolic acidosis, increased anion gap (IAG)  Resolved.    MARILU (acute kidney injury)  Resolved. MARILU is likely due to pre-renal azotemia due to intravascular volume depletion. Baseline creatinine is  under 1  . Most recent creatinine and eGFR are listed below.  Recent Labs     02/15/25  1029 02/16/25  0320 02/17/25  0412   CREATININE 1.0 0.8 0.7   EGFRNORACEVR >60.0 >60.0 >60.0   Discontinued IV fluid hydration.      DMII (diabetes mellitus, type 2)  Check blood glucose level q AC/HS.  Use Novolog Insulin Sliding Scale as needed.   Continue American Diabetic Association 1800 Kcal diet.      Nausea vomiting and diarrhea  Supportive care, antiemetics as needed. Continue IVFs.    Tachycardia  Continue telemonitoring.  Ruled out for ACS.  Follow 24 hrs urine study for VMA, Metanephrine and 5 Hydroxy Indol Acetetic acid.  Follow PORFIRIO.  Increase metoprolol to 100 mg b.i.d.    ECHO:    Left Ventricle: The left ventricle is normal in size. Mildly increased wall thickness. There is normal systolic function with a visually estimated ejection fraction of 60 - 65%.    Right Ventricle: Normal right ventricular cavity size. Systolic function is normal.     CXR:  No acute abnormality.      CTA Chest:  No evidence of pulmonary thromboemboli or right ventricular strain.  Trace pericardial effusion.     CT abdomen and Pelvis with IV contrast:  No acute abnormality.      CXR: No acute cardiac or pulmonary process.      Renal Artery Doppler scan:  1. Unremarkable  complete retroperitoneal sonogram.  2. No convincing spectral Doppler findings of a hemodynamically significant renal artery stenosis.  3.  Liver parenchymal findings suggesting steatosis.    Final Active Diagnoses:    Diagnosis Date Noted POA    PRINCIPAL PROBLEM:  Hypertensive urgency [I16.0] 02/15/2025 Yes    Generalized anxiety disorder [F41.1] 02/17/2025 Yes    Tachycardia [R00.0] 02/15/2025 Yes    Nausea vomiting and diarrhea [R11.2, R19.7] 02/15/2025 Yes    DMII (diabetes mellitus, type 2) [E11.9] 02/15/2025 Yes    MARILU (acute kidney injury) [N17.9] 02/15/2025 Yes    Metabolic acidosis, increased anion gap (IAG) [E87.29] 02/15/2025 Yes      Problems Resolved During this Admission:       Discharged Condition: good    Disposition: Home or Self Care    Follow Up:   Follow-up Information       Enoch Gerardo NP Follow up in 1 week(s).    Specialty: Family Medicine  Contact information:  [address] [facility]  [address]  [place] [address]  [phone]               James Matamoros MD Follow up in 2 week(s).    Specialties: Interventional Cardiology, Cardiology  Contact information:  [address] [facility]  [address]  [place] [address]  [phone]                           Patient Instructions:      Ambulatory referral/consult to Pain Clinic   Standing Status: Future   Referral Priority: Routine Referral Type: Consultation   Referral Reason: Specialty Services Required   Requested Specialty: Pain Medicine   Number of Visits Requested: 1     Ambulatory referral/consult to Diabetes Education   Standing Status: Future   Referral Priority: Routine Referral Type: Consultation   Referral Reason: Specialty Services Required   Requested Specialty: Diabetes   Number of Visits Requested: 1 Expiration Date: 02/18/26     Ambulatory referral/consult to Pain Clinic   Standing Status: Future   Referral Priority: Routine Referral Type: Consultation   Referral Reason: Specialty Services Required   Requested Specialty: Pain Medicine   Number of  Visits Requested: 1     Diet Cardiac     Diet diabetic     Notify your health care provider if you experience any of the following:  persistent nausea and vomiting or diarrhea     Notify your health care provider if you experience any of the following:  temperature >100.4     Notify your health care provider if you experience any of the following:  severe uncontrolled pain     Notify your health care provider if you experience any of the following:  redness, tenderness, or signs of infection (pain, swelling, redness, odor or green/yellow discharge around incision site)     Notify your health care provider if you experience any of the following:  difficulty breathing or increased cough     Notify your health care provider if you experience any of the following:  severe persistent headache     Notify your health care provider if you experience any of the following:  worsening rash     Notify your health care provider if you experience any of the following:  persistent dizziness, light-headedness, or visual disturbances     Notify your health care provider if you experience any of the following:  increased confusion or weakness     Activity as tolerated   Order Comments: Fall precautions       Significant Diagnostic Studies: Labs: CMP   Recent Labs   Lab 02/17/25  0412 02/18/25  0214    140   K 3.3* 3.4*    102   CO2 30* 29   * 110   BUN 5* 8   CREATININE 0.7 0.8   CALCIUM 8.6* 9.0   PROT 6.7 6.7   ALBUMIN 3.9 3.9   BILITOT 0.5 0.5   ALKPHOS 45* 45*   AST 20 20   ALT 20 20   ANIONGAP 8 9   , CBC   Recent Labs   Lab 02/17/25  0412 02/18/25  0214   WBC 16.19* 15.12*   HGB 12.3 12.7   HCT 37.4 39.0    282   , INR   Lab Results   Component Value Date    INR 1.1 07/17/2022   , Troponin   Recent Labs   Lab 02/14/25  1942   TROPONINI 0.058*   , and A1C:   Recent Labs   Lab 02/15/25  1029   HGBA1C 6.8*     Microbiology: Blood Culture   Lab Results   Component Value Date    LABBLOO No Growth to date  02/15/2025    LABBLOO No Growth to date 02/15/2025    LABBLOO No Growth to date 02/15/2025    and Urine Culture    Lab Results   Component Value Date    LABURIN  08/14/2024     Multiple organisms isolated. None in predominance.  Repeat if    LABURIN clinically necessary. 08/14/2024       Pending Diagnostic Studies:       None           Medications:  Reconciled Home Medications:      Medication List        START taking these medications      insulin glargine U-100 (Lantus) 100 unit/mL (3 mL) Inpn pen  Inject 20 Units into the skin once daily.  Start taking on: February 19, 2025     lisinopriL 20 MG tablet  Commonly known as: PRINIVIL,ZESTRIL  Take 1 tablet (20 mg total) by mouth once daily.  Start taking on: February 19, 2025     oxyCODONE-acetaminophen  mg per tablet  Commonly known as: PERCOCET  Take 1 tablet by mouth every 6 (six) hours as needed for Pain.            CHANGE how you take these medications      * metoprolol succinate 50 MG 24 hr tablet  Commonly known as: TOPROL-XL  Take 1 tablet (50 mg total) by mouth once daily.  What changed: Another medication with the same name was added. Make sure you understand how and when to take each.     * metoprolol succinate 100 MG 24 hr tablet  Commonly known as: TOPROL-XL  Take 1 tablet (100 mg total) by mouth 2 (two) times daily.  What changed: You were already taking a medication with the same name, and this prescription was added. Make sure you understand how and when to take each.           * This list has 2 medication(s) that are the same as other medications prescribed for you. Read the directions carefully, and ask your doctor or other care provider to review them with you.                CONTINUE taking these medications      ABILIFY 20 mg Tab  Generic drug: ARIPiprazole     acetaminophen 500 MG tablet  Commonly known as: TYLENOL  Take 500 mg by mouth every 6 (six) hours as needed for Pain. 8181-7531 daily     atorvastatin 10 MG tablet  Commonly known  as: LIPITOR  Take 1 tablet (10 mg total) by mouth once daily.     busPIRone 30 MG Tab  Commonly known as: BUSPAR  30 mg 3 (three) times daily.     dicyclomine 20 mg tablet  Commonly known as: BENTYL  Take 20 mg by mouth every 8 (eight) hours as needed.     fluvoxaMINE 100 MG tablet  Commonly known as: LUVOX  200 mg nightly.     metFORMIN 500 MG tablet  Commonly known as: GLUCOPHAGE  Take 1 tablet (500 mg total) by mouth daily with breakfast.     metoclopramide HCl 10 MG tablet  Commonly known as: REGLAN  Take 10 mg by mouth 4 (four) times daily.     ondansetron 4 MG tablet  Commonly known as: ZOFRAN  Take 1 tablet (4 mg total) by mouth every 12 (twelve) hours as needed for Nausea.     tirzepatide 10 mg/0.5 mL Pnij  Inject 10 mg into the skin every 7 days.     tiZANidine 4 MG tablet  Commonly known as: ZANAFLEX  Take 1 tablet (4 mg total) by mouth every 8 (eight) hours as needed (SPASM).     venlafaxine 150 MG Cp24  Commonly known as: EFFEXOR-XR  1 capsule with food Orally Once a day for 30 days     XANAX 1 mg tablet  Generic drug: ALPRAZolam              Indwelling Lines/Drains at time of discharge:   Lines/Drains/Airways       None                   Time spent on the discharge of patient: 35 minutes      Buck Mackay MD  Department of Hospital Medicine  [facility]

## 2025-02-19 LAB
OHS QRS DURATION: 82 MS
OHS QTC CALCULATION: 420 MS

## 2025-02-20 LAB
BACTERIA BLD CULT: NORMAL
BACTERIA BLD CULT: NORMAL

## 2025-02-21 LAB
5OH-INDOLEACETATE 24H UR-MCNC: 1.4 MG/L
5OH-INDOLEACETATE 24H UR-MRATE: 6.8 MG/24 HR (ref 0–14.9)
TOTAL VOLUME: 4850

## 2025-02-26 ENCOUNTER — TELEPHONE (OUTPATIENT)
Dept: FAMILY MEDICINE | Facility: CLINIC | Age: 35
End: 2025-02-26

## 2025-02-26 ENCOUNTER — OFFICE VISIT (OUTPATIENT)
Dept: FAMILY MEDICINE | Facility: CLINIC | Age: 35
End: 2025-02-26
Payer: MEDICAID

## 2025-02-26 VITALS
SYSTOLIC BLOOD PRESSURE: 130 MMHG | DIASTOLIC BLOOD PRESSURE: 94 MMHG | HEIGHT: 65 IN | HEART RATE: 109 BPM | OXYGEN SATURATION: 96 % | WEIGHT: 293 LBS | BODY MASS INDEX: 48.82 KG/M2

## 2025-02-26 DIAGNOSIS — R00.0 TACHYCARDIA: ICD-10-CM

## 2025-02-26 DIAGNOSIS — I10 PRIMARY HYPERTENSION: ICD-10-CM

## 2025-02-26 DIAGNOSIS — Z09 HOSPITAL DISCHARGE FOLLOW-UP: Primary | ICD-10-CM

## 2025-02-26 DIAGNOSIS — E78.5 HYPERLIPIDEMIA, UNSPECIFIED HYPERLIPIDEMIA TYPE: ICD-10-CM

## 2025-02-26 DIAGNOSIS — N17.9 AKI (ACUTE KIDNEY INJURY): ICD-10-CM

## 2025-02-26 DIAGNOSIS — E11.9 TYPE 2 DIABETES MELLITUS WITHOUT COMPLICATION, WITHOUT LONG-TERM CURRENT USE OF INSULIN: ICD-10-CM

## 2025-02-26 RX ORDER — AMLODIPINE BESYLATE 5 MG/1
5 TABLET ORAL DAILY
Qty: 30 TABLET | Refills: 2 | Status: SHIPPED | OUTPATIENT
Start: 2025-02-26

## 2025-02-26 NOTE — PROGRESS NOTES
SUBJECTIVE:    Patient ID: Jasper Guerrero is a 34 y.o. female.    Chief Complaint: Hospital Follow Up    Patient is here today with her  as a hospital follow up. Per discharge summary:  34-year-old female with PMH of diabetes, depression, PTSD, HTN, IBS presented with nausea and vomiting, SOB, which she described as an anxiety attack. Her  was out of town, she was taking care of her 3 children and she could not care for herself. She was admitted to ICU for hypertensive urgency requiring labetalol drip. Blood pressure improved and labetalol drip discontinued. Also had increased anion gap metabolic acidosis likely secondary to lactic acidosis, continued on IV fluids. UA negative, chest x-ray no pneumonia, stool culture ordered with diarrhea. Blood cultures pending. Start broad-spectrum antibiotics for possible sepsis. Deescalate as needed. Leukocytosis improving. MARILU improved with IV fluids.Sinus tachycardia in 120s, likely dehydration vs sepsis, vs anxiety. Restarted her home anxiety medications Effexor 225 mg days and buspirone 30 mg t.i.d. (confirmed dose at bedside).  Patient's blood pressure and heart rate remain elevated requiring use of intravenous Cardene infusion.  Assistance from cardiologist obtained.  Patient's blood pressure and heart rate improved with antihypertensive medications including metoprolol and lisinopril.  During hospital stay patient's blood glucose readings were closely monitored.  Patient has been initiated on long-acting insulin.  Insulin teaching performed.  Patient offered to be seen by psychiatrist for better control of anxiety disorder and possible alternate therapy in place of Effexor which was thought to be contributing towards increased heart rate and blood pressure.  Patient does not want to wait for psychiatrist and would like to follow-up with her psychiatrist as outpatient.  24 hour urine studies have been collected but results for pheochromocytoma and  carcinoid workup are still pending.  Patient encouraged to follow-up with primary care physician to discuss pending results.  Patient instructed to maintain daily blood pressure, heart rate and blood glucose log.  Patient voiced understanding.  Percocet 10 mg 10 tablets prescription given to the patient with outpatient referral to pain management doctor and diabetic education nurse arranged.  Discharge plan of care reviewed with the patient in detail who voiced understanding.  Patient also instructed to avoid nonsteroidal anti-inflammatory medications and future and will have a surveillance BMP checked next week.  Patient states she is feeling better overall. Has seen her psychiatrist and meds have been changed due to possible serotonin syndrome response. She is not taking insulin at home as per discharge. She is back on mounjaro and metformin. Taking bp meds as prescribed but home bp has been elevated. No fever, no vomiting, no diarrhea         Admit Date: 02/14/2025  Discharge Date: 02/18/2025  Discharge Facility: Hospital      Family and/or Caretaker present at visit? Yes  Medication Reconciliation:  Medications changed/added/deleted.   New Prescriptions filled after discharge: yes  Discharge summary reviewed:  yes  Diagnostic tests reviewed/disposition: I have reviewed all completed as well as pending diagnostic tests at the time of discharge  Disease/illness education: yes  Follow up appointments scheduled:  yes              with Cardiology   Follow up labs/tests ordered:   no  Home Health ordered on discharge: Patient does not have home health established from hospital visit.  They do not need home health.  If needed, we will set up home health for the patient  Home Health company name:   Establishment or re-establishment of referral orders for community resources: No other necessary community resources  DME ordered at discharge:   no  How patient is feeling since discharge from the hospital?  better      Discussion with other health care providers: No discussion with other health care providers necessary  Patient follow up phone call documented on separate encounter.    No results displayed because visit has over 200 results.      Office Visit on 08/29/2024   Component Date Value Ref Range Status    Hemoglobin A1C, POC 08/29/2024 5.5  % Final       Past Medical History:   Diagnosis Date    Agoraphobia     Carpal tunnel syndrome     Depression     Diabetes mellitus, type 2     DVT (deep venous thrombosis)     2019 had blood clot in right arm    Fibromyalgia     Hypertension     Migraine headache     Nerve injury 08/2016    Lingual Nerve Injury    Obese     Pain management     PTSD (post-traumatic stress disorder)     Trigeminal neuralgia      Past Surgical History:   Procedure Laterality Date    CARPAL TUNNEL RELEASE Right 12/27/2018    Dr Lozada     CARPAL TUNNEL RELEASE Left 01/28/2020    Procedure: RELEASE, CARPAL TUNNEL;  Surgeon: Brendon Lozada Jr., MD;  Location: [facility];  Service: Orthopedics;  Laterality: Left;    CHOLECYSTECTOMY N/A 10/31/2022    MOUTH SURGERY      NERVE GRAFT  2016    WISDOM TOOTH EXTRACTION       Family History   Problem Relation Name Age of Onset    Depression Mother      Diabetes Mother      No Known Problems Father      No Known Problems Daughter      No Known Problems Son      Cancer Maternal Aunt          endometrial    No Known Problems Maternal Uncle      No Known Problems Paternal Aunt      No Known Problems Paternal Uncle      No Known Problems Maternal Grandmother      No Known Problems Maternal Grandfather      No Known Problems Paternal Grandmother      No Known Problems Paternal Grandfather         Marital Status:   Alcohol History:  reports current alcohol use.  Tobacco History:  reports that she has never smoked. She has never used smokeless tobacco.  Drug History:  reports no history of drug use.    Review of patient's allergies indicates:   Allergen Reactions     "Benadryl [diphenhydramine hcl]      Paralysis on right side body     Codeine      Paralysis right body    Flexeril [cyclobenzaprine] Other (See Comments)     Numbness on right side of body    Nsaids (non-steroidal anti-inflammatory drug) Diarrhea, Nausea And Vomiting and Other (See Comments)     Ulcers    Penicillins Anaphylaxis and Hives    Droperidol Anxiety and Other (See Comments)    Magnesium      "made me feel horrible"     Prochlorperazine Anxiety and Palpitations     Current Medications[1]    Review of Systems   Constitutional:  Positive for fatigue. Negative for activity change, appetite change, chills, diaphoresis, fever and unexpected weight change.   HENT:  Negative for ear discharge, hearing loss, rhinorrhea, trouble swallowing and voice change.    Eyes:  Negative for photophobia, pain, discharge and visual disturbance.   Respiratory:  Negative for chest tightness, shortness of breath, wheezing and stridor.    Cardiovascular:  Negative for chest pain and palpitations.   Gastrointestinal:  Negative for abdominal pain, blood in stool, constipation, diarrhea and vomiting.   Endocrine: Negative for cold intolerance, heat intolerance, polydipsia and polyuria.   Genitourinary:  Negative for difficulty urinating, dysuria, flank pain, hematuria and menstrual problem.   Musculoskeletal:  Positive for neck pain. Negative for arthralgias, joint swelling and neck stiffness.   Skin:  Negative for pallor.   Neurological:  Positive for weakness. Negative for dizziness, speech difficulty, light-headedness and headaches.   Hematological:  Does not bruise/bleed easily.   Psychiatric/Behavioral:  Negative for confusion, dysphoric mood, self-injury, sleep disturbance and suicidal ideas. The patient is nervous/anxious.         Objective:      Vitals:    02/26/25 1345   BP: (!) 130/94   Pulse: 109   SpO2: 96%   Weight: 135.6 kg (299 lb)   Height: 5' 5" (1.651 m)     Physical Exam  Vitals and nursing note reviewed. "   Constitutional:       General: She is not in acute distress.     Appearance: She is well-developed.   HENT:      Head: Normocephalic and atraumatic.      Right Ear: Tympanic membrane normal.      Left Ear: Tympanic membrane normal.      Nose: Nose normal.      Mouth/Throat:      Pharynx: Uvula midline.   Eyes:      General: Lids are normal.      Conjunctiva/sclera: Conjunctivae normal.      Pupils: Pupils are equal, round, and reactive to light.      Right eye: Pupil is round and reactive.      Left eye: Pupil is round and reactive.   Neck:      Thyroid: No thyromegaly.      Vascular: No JVD.      Trachea: Trachea normal.   Cardiovascular:      Rate and Rhythm: Normal rate and regular rhythm.      Pulses: Normal pulses.      Heart sounds: Normal heart sounds. No murmur heard.  Pulmonary:      Effort: Pulmonary effort is normal. No tachypnea or respiratory distress.      Breath sounds: Normal breath sounds. No wheezing, rhonchi or rales.   Abdominal:      General: Bowel sounds are normal.      Palpations: Abdomen is soft.      Tenderness: There is no abdominal tenderness.   Musculoskeletal:         General: Normal range of motion.      Cervical back: Normal range of motion and neck supple.      Right lower leg: No edema.      Left lower leg: No edema.   Lymphadenopathy:      Cervical: No cervical adenopathy.   Skin:     General: Skin is warm and dry.      Findings: No rash.   Neurological:      Mental Status: She is alert and oriented to person, place, and time.   Psychiatric:         Mood and Affect: Mood normal.         Speech: Speech normal.         Behavior: Behavior normal. Behavior is cooperative.         Thought Content: Thought content normal.         Judgment: Judgment normal.         Last visit note, most recent available labs, and health maintenance reviewed    Assessment:       1. Hospital discharge follow-up    2. Type 2 diabetes mellitus without complication, without long-term current use of insulin     3. Primary hypertension    4. Hyperlipidemia, unspecified hyperlipidemia type    5. Tachycardia    6. MARILU (acute kidney injury)         Plan:       Hospital discharge follow-up  -     CBC Auto Differential; Future; Expected date: 03/12/2025  -     Comprehensive Metabolic Panel; Future; Expected date: 03/12/2025  -     TSH; Future; Expected date: 04/09/2025  -     LACTIC ACID, PLASMA; Future; Expected date: 02/26/2025  Hospital records reviewed  Medications reconciled    Type 2 diabetes mellitus without complication, without long-term current use of insulin  -     tirzepatide 10 mg/0.5 mL PnIj; Inject 10 mg into the skin every 7 days.  Dispense: 4 Pen; Refill: 0  -     Comprehensive Metabolic Panel; Future; Expected date: 03/12/2025  -     LACTIC ACID, PLASMA; Future; Expected date: 02/26/2025    Primary hypertension  -    start  amLODIPine (NORVASC) 5 MG tablet; Take 1 tablet (5 mg total) by mouth once daily.  Dispense: 30 tablet; Refill: 2  -     CBC Auto Differential; Future; Expected date: 03/12/2025  -     Comprehensive Metabolic Panel; Future; Expected date: 03/12/2025  -     TSH; Future; Expected date: 04/09/2025  -     LACTIC ACID, PLASMA; Future; Expected date: 02/26/2025  Cont lisinopril  Cont metoprolol    Hyperlipidemia, unspecified hyperlipidemia type  -     CBC Auto Differential; Future; Expected date: 03/12/2025  -     Comprehensive Metabolic Panel; Future; Expected date: 03/12/2025  -     TSH; Future; Expected date: 04/09/2025  -     LACTIC ACID, PLASMA; Future; Expected date: 02/26/2025    Tachycardia  -     CBC Auto Differential; Future; Expected date: 03/12/2025  -     Comprehensive Metabolic Panel; Future; Expected date: 03/12/2025  -     TSH; Future; Expected date: 04/09/2025  -     LACTIC ACID, PLASMA; Future; Expected date: 02/26/2025  Discussed with pt discussing serotonin syndrome with psyc to possible adjust additional meds particularly buspar. Encouraged hydration. Repeat labs ordered. If  any chest pain or sob report to ER    MARILU (acute kidney injury)  -     CBC Auto Differential; Future; Expected date: 03/12/2025  -     Comprehensive Metabolic Panel; Future; Expected date: 03/12/2025  -     LACTIC ACID, PLASMA; Future; Expected date: 02/26/2025    Encouraged to hydrate will   Follow up in about 4 weeks (around 3/26/2025) for htn .                 [1]   Current Outpatient Medications:     ABILIFY 20 mg Tab, , Disp: , Rfl:     acetaminophen (TYLENOL) 500 MG tablet, Take 500 mg by mouth every 6 (six) hours as needed for Pain. [id number] daily, Disp: , Rfl:     atorvastatin (LIPITOR) 10 MG tablet, Take 1 tablet (10 mg total) by mouth once daily., Disp: 90 tablet, Rfl: 0    busPIRone (BUSPAR) 30 MG Tab, 30 mg 3 (three) times daily., Disp: , Rfl:     dicyclomine (BENTYL) 20 mg tablet, Take 20 mg by mouth every 8 (eight) hours as needed., Disp: , Rfl:     fluvoxaMINE (LUVOX) 100 MG tablet, 200 mg nightly., Disp: , Rfl:     lisinopriL (PRINIVIL,ZESTRIL) 20 MG tablet, Take 1 tablet (20 mg total) by mouth once daily., Disp: 30 tablet, Rfl: 0    metFORMIN (GLUCOPHAGE) 500 MG tablet, Take 1 tablet (500 mg total) by mouth daily with breakfast., Disp: 90 tablet, Rfl: 0    metoprolol succinate (TOPROL-XL) 100 MG 24 hr tablet, Take 1 tablet (100 mg total) by mouth 2 (two) times daily., Disp: 60 tablet, Rfl: 0    ondansetron (ZOFRAN) 4 MG tablet, Take 1 tablet (4 mg total) by mouth every 12 (twelve) hours as needed for Nausea., Disp: 60 tablet, Rfl: 0    tiZANidine (ZANAFLEX) 4 MG tablet, Take 1 tablet (4 mg total) by mouth every 8 (eight) hours as needed (SPASM)., Disp: 60 tablet, Rfl: 0    amLODIPine (NORVASC) 5 MG tablet, Take 1 tablet (5 mg total) by mouth once daily., Disp: 30 tablet, Rfl: 2    insulin glargine U-100, Lantus, 100 unit/mL (3 mL) SubQ InPn pen, Inject 20 Units into the skin once daily. (Patient not taking: Reported on 2/26/2025), Disp: 15 mL, Rfl: 0    tirzepatide 10 mg/0.5 mL PnIj, Inject 10 mg  into the skin every 7 days., Disp: 4 Pen, Rfl: 0

## 2025-02-26 NOTE — TELEPHONE ENCOUNTER
----- Message from Katie sent at 2/26/2025  2:38 PM CST -----  Pt needs refill on tizanidine [facility] [phone]

## 2025-02-27 ENCOUNTER — PATIENT MESSAGE (OUTPATIENT)
Dept: FAMILY MEDICINE | Facility: CLINIC | Age: 35
End: 2025-02-27
Payer: MEDICAID

## 2025-02-27 DIAGNOSIS — M51.362 DEGENERATION OF INTERVERTEBRAL DISC OF LUMBAR REGION WITH DISCOGENIC BACK PAIN AND LOWER EXTREMITY PAIN: ICD-10-CM

## 2025-02-27 DIAGNOSIS — M47.816 FACET ARTHRITIS OF LUMBAR REGION: ICD-10-CM

## 2025-02-27 RX ORDER — TIZANIDINE 4 MG/1
4 TABLET ORAL EVERY 8 HOURS PRN
Qty: 90 TABLET | Refills: 0 | Status: SHIPPED | OUTPATIENT
Start: 2025-02-27 | End: 2025-03-14 | Stop reason: SDUPTHER

## 2025-02-28 ENCOUNTER — PATIENT MESSAGE (OUTPATIENT)
Dept: FAMILY MEDICINE | Facility: CLINIC | Age: 35
End: 2025-02-28
Payer: MEDICAID

## 2025-02-28 DIAGNOSIS — E11.9 TYPE 2 DIABETES MELLITUS WITHOUT COMPLICATION, WITHOUT LONG-TERM CURRENT USE OF INSULIN: Primary | ICD-10-CM

## 2025-02-28 RX ORDER — INSULIN PUMP SYRINGE, 3 ML
EACH MISCELLANEOUS
Qty: 1 EACH | Refills: 0 | Status: SHIPPED | OUTPATIENT
Start: 2025-02-28 | End: 2026-02-28

## 2025-02-28 RX ORDER — LANCETS
EACH MISCELLANEOUS
Qty: 100 EACH | Refills: 0 | Status: SHIPPED | OUTPATIENT
Start: 2025-02-28

## 2025-03-05 ENCOUNTER — PATIENT MESSAGE (OUTPATIENT)
Dept: FAMILY MEDICINE | Facility: CLINIC | Age: 35
End: 2025-03-05
Payer: MEDICAID

## 2025-03-09 ENCOUNTER — PATIENT MESSAGE (OUTPATIENT)
Dept: FAMILY MEDICINE | Facility: CLINIC | Age: 35
End: 2025-03-09
Payer: MEDICAID

## 2025-03-09 DIAGNOSIS — K58.9 IRRITABLE BOWEL SYNDROME, UNSPECIFIED TYPE: ICD-10-CM

## 2025-03-09 DIAGNOSIS — R89.9 ABNORMAL LABORATORY TEST: Primary | ICD-10-CM

## 2025-03-10 ENCOUNTER — PATIENT MESSAGE (OUTPATIENT)
Dept: ADMINISTRATIVE | Facility: HOSPITAL | Age: 35
End: 2025-03-10
Payer: MEDICAID

## 2025-03-10 RX ORDER — DICYCLOMINE HYDROCHLORIDE 20 MG/1
20 TABLET ORAL EVERY 8 HOURS PRN
Qty: 90 TABLET | Refills: 0 | Status: SHIPPED | OUTPATIENT
Start: 2025-03-10

## 2025-03-11 ENCOUNTER — PATIENT MESSAGE (OUTPATIENT)
Dept: FAMILY MEDICINE | Facility: CLINIC | Age: 35
End: 2025-03-11
Payer: MEDICAID

## 2025-03-11 ENCOUNTER — HOSPITAL ENCOUNTER (INPATIENT)
Facility: HOSPITAL | Age: 35
LOS: 1 days | Discharge: HOME | DRG: 897 | End: 2025-03-13
Attending: STUDENT IN AN ORGANIZED HEALTH CARE EDUCATION/TRAINING PROGRAM | Admitting: FAMILY MEDICINE
Payer: MEDICAID

## 2025-03-11 DIAGNOSIS — E83.42 HYPOMAGNESEMIA: ICD-10-CM

## 2025-03-11 DIAGNOSIS — R11.2 INTRACTABLE NAUSEA AND VOMITING: ICD-10-CM

## 2025-03-11 DIAGNOSIS — I16.0 HYPERTENSIVE URGENCY: Primary | ICD-10-CM

## 2025-03-11 DIAGNOSIS — R52 INTRACTABLE PAIN: ICD-10-CM

## 2025-03-11 DIAGNOSIS — G89.4 CHRONIC PAIN SYNDROME: ICD-10-CM

## 2025-03-11 DIAGNOSIS — F11.220 OPIOID DEPENDENCE WITH UNCOMPLICATED INTOXICATION: ICD-10-CM

## 2025-03-11 DIAGNOSIS — R07.9 CHEST PAIN: ICD-10-CM

## 2025-03-11 PROBLEM — G89.29 CHRONIC PAIN: Status: ACTIVE | Noted: 2025-03-11

## 2025-03-11 PROBLEM — E11.9 TYPE 2 DIABETES MELLITUS: Status: ACTIVE | Noted: 2025-03-11

## 2025-03-11 PROBLEM — E66.01 MORBID OBESITY: Status: ACTIVE | Noted: 2025-03-11

## 2025-03-11 LAB
ALBUMIN SERPL BCP-MCNC: 4.7 G/DL (ref 3.5–5.2)
ALP SERPL-CCNC: 63 U/L (ref 55–135)
ALT SERPL W/O P-5'-P-CCNC: 17 U/L (ref 10–44)
AMPHET+METHAMPHET UR QL: NEGATIVE
ANION GAP SERPL CALC-SCNC: 15 MMOL/L (ref 8–16)
AST SERPL-CCNC: 17 U/L (ref 10–40)
B-HCG UR QL: NEGATIVE
BACTERIA #/AREA URNS HPF: ABNORMAL /HPF
BARBITURATES UR QL SCN>200 NG/ML: NEGATIVE
BASOPHILS # BLD AUTO: 0.05 K/UL (ref 0–0.2)
BASOPHILS NFR BLD: 0.3 % (ref 0–1.9)
BENZODIAZ UR QL SCN>200 NG/ML: ABNORMAL
BILIRUB SERPL-MCNC: 0.5 MG/DL (ref 0.1–1)
BILIRUB UR QL STRIP: NEGATIVE
BNP SERPL-MCNC: 60 PG/ML (ref 0–99)
BUN SERPL-MCNC: 9 MG/DL (ref 6–20)
BZE UR QL SCN: NEGATIVE
CALCIUM SERPL-MCNC: 10.1 MG/DL (ref 8.7–10.5)
CANNABINOIDS UR QL SCN: NEGATIVE
CHLORIDE SERPL-SCNC: 105 MMOL/L (ref 95–110)
CK SERPL-CCNC: 116 U/L (ref 20–180)
CLARITY UR: CLEAR
CO2 SERPL-SCNC: 20 MMOL/L (ref 23–29)
COLOR UR: YELLOW
CREAT SERPL-MCNC: 1.1 MG/DL (ref 0.5–1.4)
CREAT UR-MCNC: 261.7 MG/DL (ref 15–325)
CTP QC/QA: YES
D DIMER PPP IA.FEU-MCNC: 0.42 MG/L FEU (ref 0–0.49)
DIFFERENTIAL METHOD BLD: ABNORMAL
EOSINOPHIL # BLD AUTO: 0 K/UL (ref 0–0.5)
EOSINOPHIL NFR BLD: 0.1 % (ref 0–8)
ERYTHROCYTE [DISTWIDTH] IN BLOOD BY AUTOMATED COUNT: 13.4 % (ref 11.5–14.5)
EST. GFR  (NO RACE VARIABLE): >60 ML/MIN/1.73 M^2
GLUCOSE SERPL-MCNC: 194 MG/DL (ref 70–110)
GLUCOSE UR QL STRIP: ABNORMAL
HCT VFR BLD AUTO: 41.6 % (ref 37–48.5)
HGB BLD-MCNC: 14.1 G/DL (ref 12–16)
HGB UR QL STRIP: NEGATIVE
HYALINE CASTS #/AREA URNS LPF: 4 /LPF
IMM GRANULOCYTES # BLD AUTO: 0.11 K/UL (ref 0–0.04)
IMM GRANULOCYTES NFR BLD AUTO: 0.7 % (ref 0–0.5)
KETONES UR QL STRIP: ABNORMAL
LEUKOCYTE ESTERASE UR QL STRIP: ABNORMAL
LYMPHOCYTES # BLD AUTO: 1.9 K/UL (ref 1–4.8)
LYMPHOCYTES NFR BLD: 11.4 % (ref 18–48)
MAGNESIUM SERPL-MCNC: 1.5 MG/DL (ref 1.6–2.6)
MCH RBC QN AUTO: 30.1 PG (ref 27–31)
MCHC RBC AUTO-ENTMCNC: 33.9 G/DL (ref 32–36)
MCV RBC AUTO: 89 FL (ref 82–98)
MICROSCOPIC COMMENT: ABNORMAL
MONOCYTES # BLD AUTO: 0.7 K/UL (ref 0.3–1)
MONOCYTES NFR BLD: 4 % (ref 4–15)
NEUTROPHILS # BLD AUTO: 13.8 K/UL (ref 1.8–7.7)
NEUTROPHILS NFR BLD: 83.5 % (ref 38–73)
NITRITE UR QL STRIP: NEGATIVE
NRBC BLD-RTO: 0 /100 WBC
OHS QRS DURATION: 100 MS
OHS QTC CALCULATION: 449 MS
OPIATES UR QL SCN: NEGATIVE
PCP UR QL SCN>25 NG/ML: NEGATIVE
PH UR STRIP: 7 [PH] (ref 5–8)
PLATELET # BLD AUTO: 410 K/UL (ref 150–450)
PMV BLD AUTO: 9.2 FL (ref 9.2–12.9)
POCT GLUCOSE: 153 MG/DL (ref 70–110)
POCT GLUCOSE: 167 MG/DL (ref 70–110)
POTASSIUM SERPL-SCNC: 3.7 MMOL/L (ref 3.5–5.1)
PROT SERPL-MCNC: 7.9 G/DL (ref 6–8.4)
PROT UR QL STRIP: ABNORMAL
RBC # BLD AUTO: 4.68 M/UL (ref 4–5.4)
RBC #/AREA URNS HPF: 1 /HPF (ref 0–4)
SODIUM SERPL-SCNC: 140 MMOL/L (ref 136–145)
SP GR UR STRIP: 1.03 (ref 1–1.03)
SQUAMOUS #/AREA URNS HPF: 5 /HPF
TOXICOLOGY INFORMATION: ABNORMAL
TROPONIN I SERPL HS-MCNC: 7.9 PG/ML (ref 0–14.9)
TROPONIN I SERPL HS-MCNC: 8.6 PG/ML (ref 0–14.9)
URN SPEC COLLECT METH UR: ABNORMAL
UROBILINOGEN UR STRIP-ACNC: NEGATIVE EU/DL
WBC # BLD AUTO: 16.54 K/UL (ref 3.9–12.7)
WBC #/AREA URNS HPF: 4 /HPF (ref 0–5)
YEAST URNS QL MICRO: ABNORMAL

## 2025-03-11 PROCEDURE — 83880 ASSAY OF NATRIURETIC PEPTIDE: CPT | Performed by: STUDENT IN AN ORGANIZED HEALTH CARE EDUCATION/TRAINING PROGRAM

## 2025-03-11 PROCEDURE — 63600175 PHARM REV CODE 636 W HCPCS: Mod: TB,JZ | Performed by: INTERNAL MEDICINE

## 2025-03-11 PROCEDURE — 63600175 PHARM REV CODE 636 W HCPCS: Performed by: INTERNAL MEDICINE

## 2025-03-11 PROCEDURE — 80053 COMPREHEN METABOLIC PANEL: CPT | Performed by: STUDENT IN AN ORGANIZED HEALTH CARE EDUCATION/TRAINING PROGRAM

## 2025-03-11 PROCEDURE — 25000003 PHARM REV CODE 250: Performed by: INTERNAL MEDICINE

## 2025-03-11 PROCEDURE — 96372 THER/PROPH/DIAG INJ SC/IM: CPT | Performed by: INTERNAL MEDICINE

## 2025-03-11 PROCEDURE — G0378 HOSPITAL OBSERVATION PER HR: HCPCS

## 2025-03-11 PROCEDURE — 96376 TX/PRO/DX INJ SAME DRUG ADON: CPT

## 2025-03-11 PROCEDURE — 81001 URINALYSIS AUTO W/SCOPE: CPT | Performed by: STUDENT IN AN ORGANIZED HEALTH CARE EDUCATION/TRAINING PROGRAM

## 2025-03-11 PROCEDURE — 96375 TX/PRO/DX INJ NEW DRUG ADDON: CPT

## 2025-03-11 PROCEDURE — 93010 ELECTROCARDIOGRAM REPORT: CPT | Mod: ,,, | Performed by: INTERNAL MEDICINE

## 2025-03-11 PROCEDURE — 25000003 PHARM REV CODE 250: Performed by: STUDENT IN AN ORGANIZED HEALTH CARE EDUCATION/TRAINING PROGRAM

## 2025-03-11 PROCEDURE — 96366 THER/PROPH/DIAG IV INF ADDON: CPT

## 2025-03-11 PROCEDURE — 63600175 PHARM REV CODE 636 W HCPCS: Performed by: STUDENT IN AN ORGANIZED HEALTH CARE EDUCATION/TRAINING PROGRAM

## 2025-03-11 PROCEDURE — 84484 ASSAY OF TROPONIN QUANT: CPT | Mod: 91 | Performed by: STUDENT IN AN ORGANIZED HEALTH CARE EDUCATION/TRAINING PROGRAM

## 2025-03-11 PROCEDURE — 99291 CRITICAL CARE FIRST HOUR: CPT

## 2025-03-11 PROCEDURE — 85025 COMPLETE CBC W/AUTO DIFF WBC: CPT | Performed by: STUDENT IN AN ORGANIZED HEALTH CARE EDUCATION/TRAINING PROGRAM

## 2025-03-11 PROCEDURE — 93005 ELECTROCARDIOGRAM TRACING: CPT | Performed by: INTERNAL MEDICINE

## 2025-03-11 PROCEDURE — 81025 URINE PREGNANCY TEST: CPT | Performed by: STUDENT IN AN ORGANIZED HEALTH CARE EDUCATION/TRAINING PROGRAM

## 2025-03-11 PROCEDURE — 80307 DRUG TEST PRSMV CHEM ANLYZR: CPT | Performed by: STUDENT IN AN ORGANIZED HEALTH CARE EDUCATION/TRAINING PROGRAM

## 2025-03-11 PROCEDURE — 96367 TX/PROPH/DG ADDL SEQ IV INF: CPT

## 2025-03-11 PROCEDURE — 83735 ASSAY OF MAGNESIUM: CPT | Performed by: STUDENT IN AN ORGANIZED HEALTH CARE EDUCATION/TRAINING PROGRAM

## 2025-03-11 PROCEDURE — 82550 ASSAY OF CK (CPK): CPT | Performed by: STUDENT IN AN ORGANIZED HEALTH CARE EDUCATION/TRAINING PROGRAM

## 2025-03-11 PROCEDURE — 85379 FIBRIN DEGRADATION QUANT: CPT | Performed by: STUDENT IN AN ORGANIZED HEALTH CARE EDUCATION/TRAINING PROGRAM

## 2025-03-11 PROCEDURE — 96365 THER/PROPH/DIAG IV INF INIT: CPT

## 2025-03-11 RX ORDER — AMLODIPINE BESYLATE 5 MG/1
5 TABLET ORAL DAILY
Status: DISCONTINUED | OUTPATIENT
Start: 2025-03-11 | End: 2025-03-11

## 2025-03-11 RX ORDER — LANOLIN ALCOHOL/MO/W.PET/CERES
800 CREAM (GRAM) TOPICAL
Status: DISCONTINUED | OUTPATIENT
Start: 2025-03-11 | End: 2025-03-13 | Stop reason: HOSPADM

## 2025-03-11 RX ORDER — SODIUM,POTASSIUM PHOSPHATES 280-250MG
2 POWDER IN PACKET (EA) ORAL
Status: DISCONTINUED | OUTPATIENT
Start: 2025-03-11 | End: 2025-03-13 | Stop reason: HOSPADM

## 2025-03-11 RX ORDER — LABETALOL HYDROCHLORIDE 5 MG/ML
5 INJECTION, SOLUTION INTRAVENOUS
Status: COMPLETED | OUTPATIENT
Start: 2025-03-11 | End: 2025-03-11

## 2025-03-11 RX ORDER — ACETAMINOPHEN 325 MG/1
650 TABLET ORAL EVERY 4 HOURS PRN
Status: DISCONTINUED | OUTPATIENT
Start: 2025-03-11 | End: 2025-03-13 | Stop reason: HOSPADM

## 2025-03-11 RX ORDER — ONDANSETRON HYDROCHLORIDE 2 MG/ML
4 INJECTION, SOLUTION INTRAVENOUS EVERY 6 HOURS PRN
Status: DISCONTINUED | OUTPATIENT
Start: 2025-03-11 | End: 2025-03-13 | Stop reason: HOSPADM

## 2025-03-11 RX ORDER — TALC
6 POWDER (GRAM) TOPICAL NIGHTLY PRN
Status: DISCONTINUED | OUTPATIENT
Start: 2025-03-11 | End: 2025-03-13 | Stop reason: HOSPADM

## 2025-03-11 RX ORDER — HYDROMORPHONE HYDROCHLORIDE 1 MG/ML
1 INJECTION, SOLUTION INTRAMUSCULAR; INTRAVENOUS; SUBCUTANEOUS
Refills: 0 | Status: COMPLETED | OUTPATIENT
Start: 2025-03-11 | End: 2025-03-11

## 2025-03-11 RX ORDER — FAMOTIDINE 20 MG/1
20 TABLET, FILM COATED ORAL 2 TIMES DAILY
Status: DISCONTINUED | OUTPATIENT
Start: 2025-03-11 | End: 2025-03-13 | Stop reason: HOSPADM

## 2025-03-11 RX ORDER — TIZANIDINE 4 MG/1
4 TABLET ORAL EVERY 8 HOURS PRN
Status: DISCONTINUED | OUTPATIENT
Start: 2025-03-11 | End: 2025-03-11

## 2025-03-11 RX ORDER — GLUCAGON 1 MG
1 KIT INJECTION
Status: DISCONTINUED | OUTPATIENT
Start: 2025-03-11 | End: 2025-03-13 | Stop reason: HOSPADM

## 2025-03-11 RX ORDER — ATORVASTATIN CALCIUM 10 MG/1
10 TABLET, FILM COATED ORAL NIGHTLY
Status: DISCONTINUED | OUTPATIENT
Start: 2025-03-11 | End: 2025-03-13 | Stop reason: HOSPADM

## 2025-03-11 RX ORDER — OXYCODONE AND ACETAMINOPHEN 5; 325 MG/1; MG/1
1 TABLET ORAL EVERY 4 HOURS PRN
Refills: 0 | Status: DISCONTINUED | OUTPATIENT
Start: 2025-03-11 | End: 2025-03-12

## 2025-03-11 RX ORDER — HYDROMORPHONE HYDROCHLORIDE 1 MG/ML
0.5 INJECTION, SOLUTION INTRAMUSCULAR; INTRAVENOUS; SUBCUTANEOUS EVERY 4 HOURS PRN
Status: DISCONTINUED | OUTPATIENT
Start: 2025-03-11 | End: 2025-03-11

## 2025-03-11 RX ORDER — CITALOPRAM 20 MG/1
20 TABLET, FILM COATED ORAL DAILY
Status: DISCONTINUED | OUTPATIENT
Start: 2025-03-12 | End: 2025-03-13 | Stop reason: HOSPADM

## 2025-03-11 RX ORDER — CLONIDINE HYDROCHLORIDE 0.1 MG/1
0.1 TABLET ORAL 3 TIMES DAILY
Status: DISCONTINUED | OUTPATIENT
Start: 2025-03-11 | End: 2025-03-12

## 2025-03-11 RX ORDER — METOPROLOL SUCCINATE 50 MG/1
100 TABLET, EXTENDED RELEASE ORAL 2 TIMES DAILY
Status: DISCONTINUED | OUTPATIENT
Start: 2025-03-11 | End: 2025-03-11

## 2025-03-11 RX ORDER — ALUMINUM HYDROXIDE, MAGNESIUM HYDROXIDE, AND SIMETHICONE 1200; 120; 1200 MG/30ML; MG/30ML; MG/30ML
30 SUSPENSION ORAL 4 TIMES DAILY PRN
Status: DISCONTINUED | OUTPATIENT
Start: 2025-03-11 | End: 2025-03-13 | Stop reason: HOSPADM

## 2025-03-11 RX ORDER — METOPROLOL SUCCINATE 50 MG/1
100 TABLET, EXTENDED RELEASE ORAL DAILY
Status: DISCONTINUED | OUTPATIENT
Start: 2025-03-11 | End: 2025-03-11

## 2025-03-11 RX ORDER — LABETALOL HYDROCHLORIDE 5 MG/ML
10 INJECTION, SOLUTION INTRAVENOUS
Status: COMPLETED | OUTPATIENT
Start: 2025-03-11 | End: 2025-03-11

## 2025-03-11 RX ORDER — IBUPROFEN 200 MG
24 TABLET ORAL
Status: DISCONTINUED | OUTPATIENT
Start: 2025-03-11 | End: 2025-03-13 | Stop reason: HOSPADM

## 2025-03-11 RX ORDER — IBUPROFEN 200 MG
16 TABLET ORAL
Status: DISCONTINUED | OUTPATIENT
Start: 2025-03-11 | End: 2025-03-13 | Stop reason: HOSPADM

## 2025-03-11 RX ORDER — HYDROCODONE BITARTRATE AND ACETAMINOPHEN 5; 325 MG/1; MG/1
1 TABLET ORAL EVERY 6 HOURS PRN
Refills: 0 | Status: DISCONTINUED | OUTPATIENT
Start: 2025-03-11 | End: 2025-03-11

## 2025-03-11 RX ORDER — ENOXAPARIN SODIUM 100 MG/ML
40 INJECTION SUBCUTANEOUS EVERY 12 HOURS
Status: DISCONTINUED | OUTPATIENT
Start: 2025-03-11 | End: 2025-03-13 | Stop reason: HOSPADM

## 2025-03-11 RX ORDER — LORAZEPAM 1 MG/1
1 TABLET ORAL 3 TIMES DAILY PRN
Status: DISCONTINUED | OUTPATIENT
Start: 2025-03-11 | End: 2025-03-13 | Stop reason: HOSPADM

## 2025-03-11 RX ORDER — LORAZEPAM 1 MG/1
1 TABLET ORAL 3 TIMES DAILY PRN
COMMUNITY

## 2025-03-11 RX ORDER — FENTANYL CITRATE 50 UG/ML
50 INJECTION, SOLUTION INTRAMUSCULAR; INTRAVENOUS
Refills: 0 | Status: COMPLETED | OUTPATIENT
Start: 2025-03-11 | End: 2025-03-11

## 2025-03-11 RX ORDER — NALOXONE HCL 0.4 MG/ML
0.02 VIAL (ML) INJECTION
Status: DISCONTINUED | OUTPATIENT
Start: 2025-03-11 | End: 2025-03-13 | Stop reason: HOSPADM

## 2025-03-11 RX ORDER — ONDANSETRON HYDROCHLORIDE 2 MG/ML
4 INJECTION, SOLUTION INTRAVENOUS
Status: COMPLETED | OUTPATIENT
Start: 2025-03-11 | End: 2025-03-11

## 2025-03-11 RX ORDER — INSULIN ASPART 100 [IU]/ML
0-10 INJECTION, SOLUTION INTRAVENOUS; SUBCUTANEOUS
Status: DISCONTINUED | OUTPATIENT
Start: 2025-03-11 | End: 2025-03-13 | Stop reason: HOSPADM

## 2025-03-11 RX ORDER — CITALOPRAM 20 MG/1
20 TABLET, FILM COATED ORAL DAILY
COMMUNITY

## 2025-03-11 RX ORDER — LISINOPRIL 20 MG/1
20 TABLET ORAL DAILY
Status: DISCONTINUED | OUTPATIENT
Start: 2025-03-12 | End: 2025-03-11

## 2025-03-11 RX ORDER — ACETAMINOPHEN 325 MG/1
650 TABLET ORAL EVERY 8 HOURS PRN
Status: DISCONTINUED | OUTPATIENT
Start: 2025-03-11 | End: 2025-03-13 | Stop reason: HOSPADM

## 2025-03-11 RX ORDER — ENOXAPARIN SODIUM 100 MG/ML
40 INJECTION SUBCUTANEOUS EVERY 24 HOURS
Status: DISCONTINUED | OUTPATIENT
Start: 2025-03-11 | End: 2025-03-11

## 2025-03-11 RX ADMIN — HYDROMORPHONE HYDROCHLORIDE 1 MG: 1 INJECTION, SOLUTION INTRAMUSCULAR; INTRAVENOUS; SUBCUTANEOUS at 10:03

## 2025-03-11 RX ADMIN — INSULIN ASPART 1 UNITS: 100 INJECTION, SOLUTION INTRAVENOUS; SUBCUTANEOUS at 10:03

## 2025-03-11 RX ADMIN — HYDROMORPHONE HYDROCHLORIDE 0.5 MG: 1 INJECTION, SOLUTION INTRAMUSCULAR; INTRAVENOUS; SUBCUTANEOUS at 02:03

## 2025-03-11 RX ADMIN — TIZANIDINE 4 MG: 4 TABLET ORAL at 11:03

## 2025-03-11 RX ADMIN — ATORVASTATIN CALCIUM 10 MG: 10 TABLET, FILM COATED ORAL at 09:03

## 2025-03-11 RX ADMIN — ONDANSETRON 4 MG: 2 INJECTION INTRAMUSCULAR; INTRAVENOUS at 10:03

## 2025-03-11 RX ADMIN — LORAZEPAM 1 MG: 1 TABLET ORAL at 07:03

## 2025-03-11 RX ADMIN — ONDANSETRON 4 MG: 2 INJECTION INTRAMUSCULAR; INTRAVENOUS at 02:03

## 2025-03-11 RX ADMIN — FAMOTIDINE 20 MG: 20 TABLET, FILM COATED ORAL at 11:03

## 2025-03-11 RX ADMIN — ENOXAPARIN SODIUM 40 MG: 40 INJECTION SUBCUTANEOUS at 11:03

## 2025-03-11 RX ADMIN — LABETALOL HYDROCHLORIDE 10 MG: 5 INJECTION, SOLUTION INTRAVENOUS at 10:03

## 2025-03-11 RX ADMIN — FENTANYL CITRATE 50 MCG: 50 INJECTION, SOLUTION INTRAMUSCULAR; INTRAVENOUS at 10:03

## 2025-03-11 RX ADMIN — LABETALOL HYDROCHLORIDE 0.5 MG/MIN: 5 INJECTION, SOLUTION INTRAVENOUS at 12:03

## 2025-03-11 RX ADMIN — PROMETHAZINE HYDROCHLORIDE 25 MG: 25 INJECTION INTRAMUSCULAR; INTRAVENOUS at 11:03

## 2025-03-11 RX ADMIN — ENOXAPARIN SODIUM 40 MG: 40 INJECTION SUBCUTANEOUS at 09:03

## 2025-03-11 RX ADMIN — CLONIDINE HYDROCHLORIDE 0.1 MG: 0.1 TABLET ORAL at 07:03

## 2025-03-11 RX ADMIN — OXYCODONE HYDROCHLORIDE AND ACETAMINOPHEN 1 TABLET: 5; 325 TABLET ORAL at 10:03

## 2025-03-11 RX ADMIN — OXYCODONE HYDROCHLORIDE AND ACETAMINOPHEN 1 TABLET: 5; 325 TABLET ORAL at 06:03

## 2025-03-11 RX ADMIN — LABETALOL HYDROCHLORIDE 5 MG: 5 INJECTION, SOLUTION INTRAVENOUS at 10:03

## 2025-03-11 RX ADMIN — FAMOTIDINE 20 MG: 20 TABLET, FILM COATED ORAL at 09:03

## 2025-03-11 RX ADMIN — METOPROLOL SUCCINATE 100 MG: 50 TABLET, EXTENDED RELEASE ORAL at 11:03

## 2025-03-11 RX ADMIN — HYDROCODONE BITARTRATE AND ACETAMINOPHEN 1 TABLET: 5; 325 TABLET ORAL at 11:03

## 2025-03-11 RX ADMIN — AMLODIPINE BESYLATE 5 MG: 5 TABLET ORAL at 11:03

## 2025-03-11 RX ADMIN — Medication 800 MG: at 11:03

## 2025-03-11 NOTE — ED PROVIDER NOTES
"Encounter Date: 3/11/2025       History     Chief Complaint   Patient presents with    Hypertension     Pt admitted two weeks ago for htn and had all the same symptoms she is having today.  Red Face and arms SOB      34-year-old female presents for evaluation of uncontrolled blood pressure and inability to tolerate meds by mouth.  Associated flushing, chest pain.  No associated headache neurological deficits.  Patient was recently admitted to ICU for blood pressure control    The history is provided by the patient and medical records.     Review of patient's allergies indicates:   Allergen Reactions    Benadryl [diphenhydramine hcl]      Paralysis on right side body     Codeine      Paralysis right body    Flexeril [cyclobenzaprine] Other (See Comments)     Numbness on right side of body    Nsaids (non-steroidal anti-inflammatory drug) Diarrhea, Nausea And Vomiting and Other (See Comments)     Ulcers    Penicillins Anaphylaxis and Hives    Droperidol Anxiety and Other (See Comments)    Magnesium      "made me feel horrible"     Prochlorperazine Anxiety and Palpitations     Past Medical History:   Diagnosis Date    Agoraphobia     Carpal tunnel syndrome     Depression     Diabetes mellitus, type 2     DVT (deep venous thrombosis)     2019 had blood clot in right arm    Fibromyalgia     Hypertension     Migraine headache     Nerve injury 08/2016    Lingual Nerve Injury    Obese     Pain management     PTSD (post-traumatic stress disorder)     Trigeminal neuralgia      Past Surgical History:   Procedure Laterality Date    CARPAL TUNNEL RELEASE Right 12/27/2018    Dr Lozada     CARPAL TUNNEL RELEASE Left 01/28/2020    Procedure: RELEASE, CARPAL TUNNEL;  Surgeon: Brendon Lozada Jr., MD;  Location: [facility];  Service: Orthopedics;  Laterality: Left;    CHOLECYSTECTOMY N/A 10/31/2022    MOUTH SURGERY      NERVE GRAFT  2016    WISDOM TOOTH EXTRACTION       Family History   Problem Relation Name Age of Onset    Depression " Mother      Diabetes Mother      No Known Problems Father      No Known Problems Daughter      No Known Problems Son      Cancer Maternal Aunt          endometrial    No Known Problems Maternal Uncle      No Known Problems Paternal Aunt      No Known Problems Paternal Uncle      No Known Problems Maternal Grandmother      No Known Problems Maternal Grandfather      No Known Problems Paternal Grandmother      No Known Problems Paternal Grandfather       Social History[1]  Review of Systems   All other systems reviewed and are negative.      Physical Exam     Initial Vitals [03/11/25 0919]   BP Pulse Resp Temp SpO2   (!) 167/96 108 18 98.1 °F (36.7 °C) 100 %      MAP       --         Physical Exam    Nursing note and vitals reviewed.  Constitutional: She is not diaphoretic.   HENT:   Head: Normocephalic.   Eyes: No scleral icterus.   Cardiovascular:  Normal rate.           Pulses symmetrical upper extremities   Pulmonary/Chest: Effort normal. No stridor. No respiratory distress.   Abdominal: There is no guarding.     Neurological: She is alert.   Skin: No rash noted.   Patient appears flushed         ED Course   Critical Care    Date/Time: 3/11/2025 11:27 AM    Performed by: Ronal Camp Jr., DO  Authorized by: Ronal Camp Jr., DO  Direct patient critical care time: 10 minutes  Additional history critical care time: 10 minutes  Ordering / reviewing critical care time: 10 minutes  Documentation critical care time: 10 minutes  Total critical care time (exclusive of procedural time) : 40 minutes        Labs Reviewed   MAGNESIUM - Abnormal       Result Value    Magnesium 1.5 (*)    CBC W/ AUTO DIFFERENTIAL - Abnormal    WBC 16.54 (*)     RBC 4.68      Hemoglobin 14.1      Hematocrit 41.6      MCV 89      MCH 30.1      MCHC 33.9      RDW 13.4      Platelets 410      MPV 9.2      Immature Granulocytes 0.7 (*)     Gran # (ANC) 13.8 (*)     Immature Grans (Abs) 0.11 (*)     Lymph # 1.9      Mono # 0.7      Eos #  0.0      Baso # 0.05      nRBC 0      Gran % 83.5 (*)     Lymph % 11.4 (*)     Mono % 4.0      Eosinophil % 0.1      Basophil % 0.3      Differential Method Automated     COMPREHENSIVE METABOLIC PANEL - Abnormal    Sodium 140      Potassium 3.7      Chloride 105      CO2 20 (*)     Glucose 194 (*)     BUN 9      Creatinine 1.1      Calcium 10.1      Total Protein 7.9      Albumin 4.7      Total Bilirubin 0.5      Alkaline Phosphatase 63      AST 17      ALT 17      eGFR >60.0      Anion Gap 15     URINALYSIS, REFLEX TO URINE CULTURE - Abnormal    Specimen UA Urine, Clean Catch      Color, UA Yellow      Appearance, UA Clear      pH, UA 7.0      Specific Gravity, UA 1.030      Protein, UA 1+ (*)     Glucose, UA 3+ (*)     Ketones, UA 1+ (*)     Bilirubin (UA) Negative      Occult Blood UA Negative      Nitrite, UA Negative      Urobilinogen, UA Negative      Leukocytes, UA Trace (*)     Narrative:     Specimen Source->Urine   URINALYSIS MICROSCOPIC - Abnormal    RBC, UA 1      WBC, UA 4      Bacteria None      Yeast, UA None      Squam Epithel, UA 5      Hyaline Casts, UA 4 (*)     Microscopic Comment SEE COMMENT      Narrative:     Specimen Source->Urine   TROPONIN I HIGH SENSITIVITY    Troponin I High Sensitivity 7.9     B-TYPE NATRIURETIC PEPTIDE    BNP 60     CK         D DIMER, QUANTITATIVE    D-Dimer 0.42     TROPONIN I HIGH SENSITIVITY   POCT URINE PREGNANCY    POC Preg Test, Ur Negative       Acceptable Yes          ECG Results              EKG 12-lead (In process)        Collection Time Result Time QRS Duration OHS QTC Calculation    03/11/25 09:11:49 03/11/25 09:48:49 100 449                     In process by Interface, Lab In [facility] (03/11/25 09:48:55)                   Narrative:    Test Reason : R07.9,    Vent. Rate :  97 BPM     Atrial Rate :  97 BPM     P-R Int : 136 ms          QRS Dur : 100 ms      QT Int : 354 ms       P-R-T Axes :  66  77  71 degrees    QTcB Int : 449  ms    Sinus rhythm with marked sinus arrythmia  Otherwise normal ECG  When compared with ECG of 16-Feb-2025 06:19,  No significant change was found    Referred By: AAAREFERRAL SELF           Confirmed By:                                   Imaging Results              X-Ray Chest AP Portable (Final result)  Result time 03/11/25 10:37:32      Final result by Javier Nguyen MD (03/11/25 10:37:32)                   Impression:      No evidence of active cardiopulmonary disease.      Electronically signed by: Javier Nguyen  Date:    03/11/2025  Time:    10:37               Narrative:    EXAMINATION:  XR CHEST AP PORTABLE    CLINICAL HISTORY:  Chest Pain; hypertension    FINDINGS:  Portable chest radiograph at 10:33 hours compared to prior exams shows the cardiomediastinal silhouette and pulmonary vasculature are within normal limits.    The lungs are hypoexpanded, with no consolidation, large pleural effusion, or evidence of pulmonary edema. No pneumothorax. There are no significant osseous abnormalities.                                       Medications   labetaloL (NORMODYNE,TRANDATE) 500 mg in 100 mL infusion (conc: 5 mg/mL) (has no administration in time range)   amLODIPine tablet 5 mg (has no administration in time range)   atorvastatin tablet 10 mg (has no administration in time range)   lisinopriL tablet 20 mg (has no administration in time range)   LORazepam tablet 1 mg (has no administration in time range)   tiZANidine tablet 4 mg (has no administration in time range)   melatonin tablet 6 mg (has no administration in time range)   ondansetron injection 4 mg (has no administration in time range)   acetaminophen tablet 650 mg (has no administration in time range)   aluminum-magnesium hydroxide-simethicone 200-200-20 mg/5 mL suspension 30 mL (has no administration in time range)   acetaminophen tablet 650 mg (has no administration in time range)   HYDROcodone-acetaminophen 5-325 mg per tablet 1 tablet (has no  administration in time range)   naloxone 0.4 mg/mL injection 0.02 mg (has no administration in time range)   potassium bicarbonate disintegrating tablet 50 mEq (has no administration in time range)   potassium bicarbonate disintegrating tablet 35 mEq (has no administration in time range)   potassium bicarbonate disintegrating tablet 60 mEq (has no administration in time range)   magnesium oxide tablet 800 mg (has no administration in time range)   magnesium oxide tablet 800 mg (has no administration in time range)   potassium, sodium phosphates 280-160-250 mg packet 2 packet (has no administration in time range)   potassium, sodium phosphates 280-160-250 mg packet 2 packet (has no administration in time range)   potassium, sodium phosphates 280-160-250 mg packet 2 packet (has no administration in time range)   glucose chewable tablet 16 g (has no administration in time range)   glucose chewable tablet 24 g (has no administration in time range)   dextrose 50% injection 12.5 g (has no administration in time range)   dextrose 50% injection 25 g (has no administration in time range)   glucagon (human recombinant) injection 1 mg (has no administration in time range)   enoxaparin injection 40 mg (has no administration in time range)   insulin aspart U-100 pen 0-10 Units (has no administration in time range)   metoprolol succinate (TOPROL-XL) 24 hr tablet 100 mg (has no administration in time range)   famotidine tablet 20 mg (has no administration in time range)   fentaNYL 50 mcg/mL injection 50 mcg (50 mcg Intravenous Given 3/11/25 1010)   labetaloL injection 5 mg (5 mg Intravenous Given 3/11/25 1004)   ondansetron injection 4 mg (4 mg Intravenous Given 3/11/25 1005)   HYDROmorphone injection 1 mg (1 mg Intravenous Given 3/11/25 1039)   labetaloL injection 10 mg (10 mg Intravenous Given 3/11/25 1039)   promethazine (PHENERGAN) 25 mg in 0.9% NaCl 50 mL IVPB (25 mg Intravenous New Bag 3/11/25 1103)     Medical Decision  Making  34-year-old female presents with chest pain.  Associated hypertension.  Also acute exacerbation of chronic pain.  History of fibromyalgia.  Workup initiated, magnesium 1.5, cardiac markers within normal limits, D-dimer within normal limit do not suspect aortic dissection or D-dimer we will defer imaging at this time.  Patient administered multiple doses of IV narcotics IV antiemetics, IV antihypertensives and started on infusion drip labetalol which she was responsive to last time she was admitted to ICU.  Patient agreeable with admission for further supportive care.  Had preceding nausea and vomiting unable to control her oral blood pressure medications.  Spoke with hospitalist who will admit for further management evaluation.  Patient updated and agrees with plan    Amount and/or Complexity of Data Reviewed  Independent Historian: spouse  Labs: ordered. Decision-making details documented in ED Course.  Radiology: ordered and independent interpretation performed.     Details: No significant pleural effusion or cardiomegaly  ECG/medicine tests: ordered and independent interpretation performed. Decision-making details documented in ED Course.     Details: EKG rate 97, , , no STEMI  Discussion of management or test interpretation with external provider(s): Spoke with Hospitalist Dr. Balderas, Will admit for further management and evaluation      Risk  Prescription drug management.  Decision regarding hospitalization.               ED Course as of 03/11/25 1130   Tue Mar 11, 2025   1014 hCG Qualitative, Urine: Negative [KB]   1032 Blood pressure fully controlled with initial medication, patient reports ongoing pain we will administer additional Dilaudid per patient request. [KB]   1102 D-Dimer: 0.42 [KB]   1102 Troponin I High Sensitivity: 7.9 [KB]   1102 CPK: 116 [KB]   1102 BNP: 60 [KB]   1102 Magnesium (!): 1.5 [KB]   1102 Sodium: 140 [KB]   1102 Potassium: 3.7 [KB]   1102 Chloride: 105 [KB]   1102  CO2(!): 20 [KB]   1102 Glucose(!): 194 [KB]   1102 BUN: 9 [KB]   1102 Creatinine: 1.1 [KB]   1102 Calcium: 10.1 [KB]   1102 PROTEIN TOTAL: 7.9 [KB]   1102 Albumin: 4.7 [KB]   1102 BILIRUBIN TOTAL: 0.5 [KB]   1102 ALP: 63 [KB]   1102 AST: 17 [KB]   1102 ALT: 17 [KB]   1102 eGFR: >60.0 [KB]   1123 Spoke with Hospitalist Dr. Balderas, Will admit for further management and evaluation   [KB]   1126 Spoke with Hospitalist Dr. Balderas, Will admit for further management and evaluation   [KB]      ED Course User Index  [KB] Ronal Camp Jr., DO                           Clinical Impression:  Final diagnoses:  [R07.9] Chest pain  [I16.0] Hypertensive urgency (Primary)  [R52] Intractable pain  [R11.2] Intractable nausea and vomiting  [E83.42] Hypomagnesemia          ED Disposition Condition    Observation                     [1]   Social History  Tobacco Use    Smoking status: Never    Smokeless tobacco: Never   Substance Use Topics    Alcohol use: Yes     Comment: occasional    Drug use: No        Ronal Camp Jr., DO  03/11/25 1134

## 2025-03-11 NOTE — PROGRESS NOTES
Enoxaparin 40 mg every 24 hours  changed to enoxaparin 40 mg every 12 hours.  BMI = 49.76 kg / m2.

## 2025-03-11 NOTE — TELEPHONE ENCOUNTER
She follows with psyc and should be under their guidance for weaning meds, some of the symptoms could be from stopping meds. I have placed a referral to endo for eval of recent labs. How is her bp this morning?

## 2025-03-11 NOTE — Clinical Note
Diagnosis: Hypertensive urgency [898656]   Future Attending Provider: CRISTOBAL SINGLETON [[id number]]   Place in Observation: [facility] [9657]   Special Needs:: No Special Needs [1]

## 2025-03-11 NOTE — PLAN OF CARE
[facility] - Emergency Dept  Initial Discharge Assessment       Primary Care Provider: Enoch Gerardo NP    Admission Diagnosis: Hypertensive urgency [I16.0]    Admission Date: 3/11/2025  Expected Discharge Date:     SW met with patient bedside. SW verified demographics, insurance, supports, and PCP.  Patient reported living at home with spouse and children. Patient reported she drives self to appointments. SW assessed patient's needs. Patient is able to complete ADLs independently. Patient verified using no at home DME. Patient verified No HH, No Dialysis, No Blood Thinners, and No Oxygen.     Patient verified pharmacy of choice: [facility] on [facility] [address] [facility]  Patient confirmed spouse will be source of transportation at the time of discharge.     Transition of Care Barriers: None    Payor: MEDICAID / Plan: [place] [employer] / Product Type: Managed Medicaid /     Extended Emergency Contact Information  Primary Emergency Contact: Sanjay Guerrero  Address: [address] [place]           [place] [address] [facility]  Home Phone: [phone]  Mobile Phone: [phone]  Relation: Spouse  Preferred language: English  Secondary Emergency Contact: Katie Sargent  Mobile Phone: [phone]  Relation: Mother    Discharge Plan A: Home with family  Discharge Plan B: Home with family      [id number] - [place] - [address] AT [facility] [address]  [address] [place] [address]  Phone: [phone] Fax: [phone]      Initial Assessment (most recent)       Adult Discharge Assessment - 03/11/25 1556          Discharge Assessment    Assessment Type Discharge Planning Assessment     Confirmed/corrected address, phone number and insurance Yes     Confirmed Demographics Correct on Facesheet     Source of Information patient     When was your last doctors appointment? --   2 weeks ago    Communicated BRITTANY with patient/caregiver Date not available/Unable to  determine     Reason For Admission Hypertensive urgency     People in Home child(kaila), dependent;spouse     Do you expect to return to your current living situation? Yes     Do you have help at home or someone to help you manage your care at home? No     Prior to hospitilization cognitive status: Unable to Assess     Current cognitive status: Alert/Oriented     Walking or Climbing Stairs Difficulty no     Dressing/Bathing Difficulty no     Home Accessibility wheelchair accessible     Home Layout Able to live on 1st floor     Equipment Currently Used at Home none     Readmission within 30 days? Yes     Patient currently being followed by outpatient case management? No     Do you currently have service(s) that help you manage your care at home? No     Do you take prescription medications? Yes     Do you have prescription coverage? Yes     Coverage Medicaid     Do you have any problems affording any of your prescribed medications? No     Is the patient taking medications as prescribed? yes     Who is going to help you get home at discharge? Sanjay Guerrero, spouse     How do you get to doctors appointments? car, drives self     Are you on dialysis? No     Do you take coumadin? No     Discharge Plan A Home with family     Discharge Plan B Home with family     DME Needed Upon Discharge  none     Discharge Plan discussed with: Patient     Transition of Care Barriers None

## 2025-03-12 LAB
ALBUMIN SERPL BCP-MCNC: 4 G/DL (ref 3.5–5.2)
ALP SERPL-CCNC: 46 U/L (ref 55–135)
ALT SERPL W/O P-5'-P-CCNC: 12 U/L (ref 10–44)
ANION GAP SERPL CALC-SCNC: 8 MMOL/L (ref 8–16)
AST SERPL-CCNC: 10 U/L (ref 10–40)
BASOPHILS # BLD AUTO: 0.02 K/UL (ref 0–0.2)
BASOPHILS NFR BLD: 0.2 % (ref 0–1.9)
BILIRUB SERPL-MCNC: 0.5 MG/DL (ref 0.1–1)
BUN SERPL-MCNC: 9 MG/DL (ref 6–20)
CALCIUM SERPL-MCNC: 9 MG/DL (ref 8.7–10.5)
CHLORIDE SERPL-SCNC: 105 MMOL/L (ref 95–110)
CO2 SERPL-SCNC: 26 MMOL/L (ref 23–29)
CREAT SERPL-MCNC: 0.7 MG/DL (ref 0.5–1.4)
DIFFERENTIAL METHOD BLD: ABNORMAL
EOSINOPHIL # BLD AUTO: 0 K/UL (ref 0–0.5)
EOSINOPHIL NFR BLD: 0.2 % (ref 0–8)
ERYTHROCYTE [DISTWIDTH] IN BLOOD BY AUTOMATED COUNT: 13.8 % (ref 11.5–14.5)
EST. GFR  (NO RACE VARIABLE): >60 ML/MIN/1.73 M^2
ESTIMATED AVG GLUCOSE: 128 MG/DL (ref 68–131)
GLUCOSE SERPL-MCNC: 165 MG/DL (ref 70–110)
HBA1C MFR BLD: 6.1 % (ref 4.5–6.2)
HCT VFR BLD AUTO: 36.7 % (ref 37–48.5)
HGB BLD-MCNC: 11.9 G/DL (ref 12–16)
IMM GRANULOCYTES # BLD AUTO: 0.04 K/UL (ref 0–0.04)
IMM GRANULOCYTES NFR BLD AUTO: 0.3 % (ref 0–0.5)
LYMPHOCYTES # BLD AUTO: 3 K/UL (ref 1–4.8)
LYMPHOCYTES NFR BLD: 23.6 % (ref 18–48)
MAGNESIUM SERPL-MCNC: 1.7 MG/DL (ref 1.6–2.6)
MCH RBC QN AUTO: 29.5 PG (ref 27–31)
MCHC RBC AUTO-ENTMCNC: 32.4 G/DL (ref 32–36)
MCV RBC AUTO: 91 FL (ref 82–98)
MONOCYTES # BLD AUTO: 1.1 K/UL (ref 0.3–1)
MONOCYTES NFR BLD: 8.9 % (ref 4–15)
NEUTROPHILS # BLD AUTO: 8.5 K/UL (ref 1.8–7.7)
NEUTROPHILS NFR BLD: 66.8 % (ref 38–73)
NRBC BLD-RTO: 0 /100 WBC
PLATELET # BLD AUTO: 299 K/UL (ref 150–450)
PMV BLD AUTO: 9.1 FL (ref 9.2–12.9)
POCT GLUCOSE: 115 MG/DL (ref 70–110)
POCT GLUCOSE: 119 MG/DL (ref 70–110)
POCT GLUCOSE: 127 MG/DL (ref 70–110)
POCT GLUCOSE: 127 MG/DL (ref 70–110)
POTASSIUM SERPL-SCNC: 3.5 MMOL/L (ref 3.5–5.1)
PROT SERPL-MCNC: 6.7 G/DL (ref 6–8.4)
RBC # BLD AUTO: 4.03 M/UL (ref 4–5.4)
SODIUM SERPL-SCNC: 139 MMOL/L (ref 136–145)
WBC # BLD AUTO: 12.7 K/UL (ref 3.9–12.7)

## 2025-03-12 PROCEDURE — 63600175 PHARM REV CODE 636 W HCPCS: Performed by: INTERNAL MEDICINE

## 2025-03-12 PROCEDURE — 94761 N-INVAS EAR/PLS OXIMETRY MLT: CPT

## 2025-03-12 PROCEDURE — 80053 COMPREHEN METABOLIC PANEL: CPT | Performed by: INTERNAL MEDICINE

## 2025-03-12 PROCEDURE — 25000003 PHARM REV CODE 250: Performed by: FAMILY MEDICINE

## 2025-03-12 PROCEDURE — 85025 COMPLETE CBC W/AUTO DIFF WBC: CPT | Performed by: INTERNAL MEDICINE

## 2025-03-12 PROCEDURE — 63600175 PHARM REV CODE 636 W HCPCS: Performed by: STUDENT IN AN ORGANIZED HEALTH CARE EDUCATION/TRAINING PROGRAM

## 2025-03-12 PROCEDURE — 99900031 HC PATIENT EDUCATION (STAT)

## 2025-03-12 PROCEDURE — 83735 ASSAY OF MAGNESIUM: CPT | Performed by: INTERNAL MEDICINE

## 2025-03-12 PROCEDURE — 82962 GLUCOSE BLOOD TEST: CPT

## 2025-03-12 PROCEDURE — 83036 HEMOGLOBIN GLYCOSYLATED A1C: CPT | Performed by: INTERNAL MEDICINE

## 2025-03-12 PROCEDURE — 21000000 HC CCU ICU ROOM CHARGE

## 2025-03-12 PROCEDURE — 25000003 PHARM REV CODE 250: Performed by: INTERNAL MEDICINE

## 2025-03-12 PROCEDURE — 36415 COLL VENOUS BLD VENIPUNCTURE: CPT | Performed by: INTERNAL MEDICINE

## 2025-03-12 RX ORDER — METOPROLOL SUCCINATE 50 MG/1
100 TABLET, EXTENDED RELEASE ORAL 2 TIMES DAILY
Status: DISCONTINUED | OUTPATIENT
Start: 2025-03-12 | End: 2025-03-12

## 2025-03-12 RX ORDER — AMLODIPINE BESYLATE 5 MG/1
5 TABLET ORAL DAILY
Status: DISCONTINUED | OUTPATIENT
Start: 2025-03-13 | End: 2025-03-12

## 2025-03-12 RX ORDER — BUPRENORPHINE 2 MG/1
4 TABLET SUBLINGUAL DAILY
Status: DISCONTINUED | OUTPATIENT
Start: 2025-03-12 | End: 2025-03-13 | Stop reason: HOSPADM

## 2025-03-12 RX ORDER — METOPROLOL SUCCINATE 50 MG/1
100 TABLET, EXTENDED RELEASE ORAL 2 TIMES DAILY
Status: DISCONTINUED | OUTPATIENT
Start: 2025-03-12 | End: 2025-03-13 | Stop reason: HOSPADM

## 2025-03-12 RX ORDER — AMLODIPINE BESYLATE 5 MG/1
5 TABLET ORAL DAILY
Status: DISCONTINUED | OUTPATIENT
Start: 2025-03-12 | End: 2025-03-13 | Stop reason: HOSPADM

## 2025-03-12 RX ADMIN — LABETALOL HYDROCHLORIDE 0.5 MG/MIN: 5 INJECTION, SOLUTION INTRAVENOUS at 12:03

## 2025-03-12 RX ADMIN — FAMOTIDINE 20 MG: 20 TABLET, FILM COATED ORAL at 08:03

## 2025-03-12 RX ADMIN — POTASSIUM BICARBONATE 50 MEQ: 978 TABLET, EFFERVESCENT ORAL at 06:03

## 2025-03-12 RX ADMIN — ENOXAPARIN SODIUM 40 MG: 40 INJECTION SUBCUTANEOUS at 08:03

## 2025-03-12 RX ADMIN — OXYCODONE HYDROCHLORIDE AND ACETAMINOPHEN 1 TABLET: 5; 325 TABLET ORAL at 02:03

## 2025-03-12 RX ADMIN — BUPRENORPHINE 4 MG: 2 TABLET SUBLINGUAL at 12:03

## 2025-03-12 RX ADMIN — ATORVASTATIN CALCIUM 10 MG: 10 TABLET, FILM COATED ORAL at 08:03

## 2025-03-12 RX ADMIN — AMLODIPINE BESYLATE 5 MG: 5 TABLET ORAL at 04:03

## 2025-03-12 RX ADMIN — CITALOPRAM HYDROBROMIDE 20 MG: 20 TABLET ORAL at 08:03

## 2025-03-12 RX ADMIN — CLONIDINE HYDROCHLORIDE 0.1 MG: 0.1 TABLET ORAL at 08:03

## 2025-03-12 RX ADMIN — METOPROLOL SUCCINATE 100 MG: 50 TABLET, FILM COATED, EXTENDED RELEASE ORAL at 04:03

## 2025-03-12 RX ADMIN — Medication 800 MG: at 11:03

## 2025-03-12 RX ADMIN — OXYCODONE HYDROCHLORIDE AND ACETAMINOPHEN 1 TABLET: 5; 325 TABLET ORAL at 08:03

## 2025-03-12 RX ADMIN — Medication 800 MG: at 06:03

## 2025-03-12 NOTE — HPI
34 year old pt getting admitted with HTN urgency  Pt was admitted with same issue last month in this hospital  She was Discharged with BP medication and per pt she is compliant with medication intake  This time pt started having rash on face/tremors/Nausea vomiting episodes  Symptoms went worse, she came to ER and was found to have High BP  Last timer when she was admitted Psychiatry consult was placed but pt rejected it   Per records pt  has opioid dependence and today UDS is positive for benzos  (She takes   She was asking for pain medications upon discharge   Also said Percocet works better than Norco

## 2025-03-12 NOTE — HOSPITAL COURSE
34-year-old female with history of chronic pain came in with vomiting and hypertension and admitted for hypertensive urgency with concern for withdrawal. Patient reported that she stopped taking fluvoxamine, aripiprazole,buspirone and weaned herself off  these medications over a week or 2 ago and she has been on these medications for years. Patient was initially started on clonidine due to concern of withdrawal however was transitioned to buprenorphine after discussing with Dr. Chirinos addiction Psychiatry.  Patient's vitals were monitored closely and patient can be seen by Dr. Chirinos on Friday.Restarted home blood pressure meds except for lisinopril.  Patient will be following with Dr. Chirinos outpatient on 03/13

## 2025-03-12 NOTE — CONSULTS
Addiction Medicine  Inpatient Encounter    Date of Service: 2025     Name:  Jasper Guerrero   MRN:  [id number]   :  1990   Address:  [place] [address]   Phone:  [phone]      Subjective     Initial Addiction Medicine Inpatient Consult - 2025   HPI per primary service:  34 year old pt getting admitted with HTN urgency  Pt was admitted with same issue last month in this hospital  She was Discharged with BP medication and per pt she is compliant with medication intake  This time pt started having rash on face/tremors/Nausea vomiting episodes  Symptoms went worse, she came to ER and was found to have High BP  Last timer when she was admitted Psychiatry consult was placed but pt rejected it   Per records pt  has opioid dependence and today UDS is positive for benzos  (She takes benzos at the moment for anxiety)  She was asking for pain medications upon discharge   Also said Percocet works better than [place].    Addiction Medicine Consult (2025):  Chart reviewed. Patient seen and examined.  34-year-old female presenting to the emergency department with the vomiting, rash, and hypertension.  She also complains of chronic pain.  Her pain is in her neck and her low back.  She denies radiation of pain.  She denies any focal weakness.  Patient has been on opioids on and off for quite some time.  She states she has had back problems and neck problems since she was in high school.  She reports seeing chiropractic, PT, and Orthopedics.  She has taken muscle relaxer with some benefit.  Additionally the patient states she has anxiety and has been taking benzodiazepines for some time.  Currently she has taken Ativan.  She states she takes 1 mg daily; however, she is prescribed it 3 times daily according to the .  Patient states that Percocet has helped her the best for her pain management in the past.  She mentions Percocet multiple times to other providers.   does show  multiple prescriptions for opioids over the past couple of years.  Additionally she has multiple hospital/ED encounters where she is given opioids.  Patient denies abusing opioids.  She states she takes them for her pain.  Patient does admit to occasionally taking an Ativan to help her with the anxiety that she gets from her chronic pain.    Additionally the patient states that she was taking Abilify and fluoxetine and BuSpar.  She stopped them a couple of weeks ago and she wonders if some of her symptoms may be related to that discontinuation.    Patient also relates that she has posttraumatic stress disorder from a dental procedure where the surgeon cut her lingual nerve.  She had significant pain from that and that may be where she started getting exposed to frequent doses of opioids.    The patient denies any history of substance use disorders in her immediate family.    The patient participates well with history.  Discussed issues related to pain, addiction, and chemical dependency.  Presented the patient with multiple options.  Patient open to a trial of buprenorphine to help with her pain.  Simultaneously with the work on manage her pain in healthy ways while we taper down buprenorphine.  Discussed with the patient the benefit of decreasing her Ativan use.  Patient is open to tapering this to cessation as well.  We will see patient in the outpatient setting.  Patient appears motivated to make positive change.       Objective     The PDMP was checked during today's encounter and recent history is noted below:  Filled  Written  Drug  QTY  Days  Prescriber    02/24/2025 02/24/2025 Lorazepam 1 Mg Tablet 90.00 30 Mo Dorinda   02/18/2025 02/18/2025 Oxycodone-Acetaminophen  10.00 3 Aq Sul   01/31/2025 01/31/2025 Alprazolam 1 Mg Tablet 90.00 30 Mo Dorinda   01/04/2025 12/04/2024 Alprazolam 1 Mg Tablet 90.00 30 Mo Dorinda   12/04/2024 12/04/2024 Alprazolam 1 Mg Tablet 90.00 30 Mo Dorinda   11/04/2024 11/04/2024 Alprazolam 1 Mg  Tablet 90.00 30 Mo Dorinda   10/07/2024 10/07/2024 Alprazolam 1 Mg Tablet 90.00 30 Mo Dorinda   09/09/2024 09/09/2024 Alprazolam 1 Mg Tablet 60.00 30 Mo Dorinda   08/15/2024 08/15/2024 Oxycodone-Acetaminophen 5-325 11.00 2 Ry Jup   08/12/2024 08/09/2024 Alprazolam 1 Mg Tablet 60.00 30 Mo Dorinda   07/22/2024 07/22/2024 Tramadol Hcl 50 Mg Tablet 21.00 7 Ba Natanael   07/12/2024 07/12/2024 Alprazolam 1 Mg Tablet 60.00 30 Mo Dorinda   07/08/2024 07/08/2024 Tramadol Hcl 50 Mg Tablet 21.00 7 St Hon   06/17/2024 06/17/2024 Tramadol Hcl 50 Mg Tablet 21.00 7 St Hon   06/14/2024 06/14/2024 Clonazepam 1 Mg Tablet 60.00 30 Mo Dorinda   06/03/2024 06/03/2024 Clonazepam 1 Mg Tablet 45.00 30 Mo Dorinda   05/30/2024 05/30/2024 Oxycodone-Acetaminophen  10.00 3 Mi Chr   05/28/2024 05/28/2024 Oxycodone-Acetaminophen 5-325 10.00 3 Br Giu   05/24/2024 05/24/2024 Hydrocodone-Acetamin 5-325 Mg 10.00 3 Nida Bj   05/03/2024 05/03/2024 Clonazepam 1 Mg Tablet 30.00 30 Mo Dorinda   05/02/2024 05/02/2024 Oxycodone-Acetaminophen 5-325 12.00 3 Ro Mariah   05/01/2024 05/01/2024 Tramadol Hcl 50 Mg Tablet 10.00 3 Gr Gunnar   04/29/2024 04/29/2024 Oxycodone-Acetaminophen 5-325 12.00 3 Uj Mek   04/23/2024 04/23/2024 Oxycodone-Acetaminophen 5-325 20.00 3 Gr Gunnar   04/18/2024 04/18/2024 Oxycodone-Acetaminophen  25.00 5 Gr Gunnar   12/20/2023 12/20/2023 Vrgtcd-Anywyhsu-Hbvy -40 20.00 5 Gr Gunnar   08/28/2023 08/24/2023 Eszopiclone 2 Mg Tablet 30.00 30 Mo Dorinda   08/22/2023 08/16/2023 Oxycodone-Acetaminophen  90.00 30 Fi [facility]   07/31/2023 07/27/2023 Eszopiclone 2 Mg Tablet 30.00 30 Mo Dorinda   07/26/2023 07/26/2023 Hydrocodone-Acetamin  Mg 90.00 30 Fi [facility]   07/12/2023 06/29/2023 Oxycodone-Acetaminophen  45.00 15 Fi [facility]   07/10/2023 07/10/2023 Txxaop-Fnlvyvil-Lcfa -40 30.00 5 Ry Arn   07/03/2023 06/29/2023 Eszopiclone 2 Mg Tablet 30.00 30 Mo Dorinda   06/14/2023 06/14/2023 Oxycodone-Acetaminophen  90.00 30 Fi [facility]   06/14/2023 05/02/2023 Zolpidem Tart Er 12.5 Mg Tab  14.00 14 Mo Dorinda   06/07/2023 06/07/2023 Wpvzit-Yyvntrod-Uwms -40 30.00 5 Ry Arn   05/31/2023 05/02/2023 Zolpidem Tart Er 12.5 Mg Tab 14.00 14 Mo Dorinda   05/17/2023 05/02/2023 Zolpidem Tart Er 12.5 Mg Tab 14.00 14 Mo Dorinda   05/17/2023 05/17/2023 Oxycodone-Acetaminophen  90.00 30 Fi [facility]   05/03/2023 05/02/2023 Zolpidem Tart Er 12.5 Mg Tab 14.00 14 Mo Dorinda   05/01/2023 04/19/2023 Oxycodone-Acetaminophen  34.00 17 Fi [facility]   04/19/2023 04/19/2023 Fentanyl 25 Mcg/hr Patch 10.00 10 Fi [facility]   04/18/2023 03/20/2023 Zolpidem Tart Er 12.5 Mg Tab 14.00 14 Mo Dorinda   04/04/2023 03/22/2023 Oxycodone-Acetaminophen  60.00 30 Fi [facility]   04/03/2023 03/20/2023 Zolpidem Tart Er 12.5 Mg Tab 14.00 14 Mo Dorinda   03/22/2023 03/22/2023 Morphine Sulf Er 15 Mg Tablet 60.00 30 Fi [facility]   03/20/2023 03/20/2023 Zolpidem Tart Er 12.5 Mg Tab 14.00 14 Mo Dorinda        Vitals:    03/12/25 1100 03/12/25 1200 03/12/25 1300 03/12/25 1400   BP: 113/64 135/68 120/72 119/71   Pulse: 100 103 105 100   Resp: 20 19 18 19   Temp: 98.7 °F (37.1 °C)      TempSrc: Oral      SpO2: 98% 98% 97% 97%   Weight:       Height:            Physical Exam  Constitutional:       General: She is not in acute distress.     Appearance: Normal appearance.   HENT:      Head: Normocephalic and atraumatic.   Eyes:      General: No scleral icterus.     Extraocular Movements: Extraocular movements intact.      Conjunctiva/sclera: Conjunctivae normal.      Pupils: Pupils are equal, round, and reactive to light.   Pulmonary:      Effort: Pulmonary effort is normal. No respiratory distress.   Neurological:      Mental Status: She is alert and oriented to person, place, and time.   Psychiatric:         Attention and Perception: Attention normal.         Mood and Affect: Mood and affect normal.         Behavior: Behavior normal. Behavior is cooperative.         Thought Content: Thought content normal.            Assessment/Plan      Clinical Impression and Recommendations       Opioid dependency in the setting of chronic pain  Recommend starting buprenorphine.  Would start with 4 mg initial dose.  If patient tolerates this well then would move to 2 mg 4 times per day.  Can titrate to effective dose for pain relief and removal of withdrawal symptoms.  Recommend starting with a lower dose due to potential incomplete cross tolerance between opioids in buprenorphine.  Recommend titrating to affective dose as an outpatient.  Patient open to see me in the outpatient setting.  We will have staff contact patient to set up appointment after discharge.    Benzodiazepine dependency in the setting of generalized anxiety disorder  Recommend continuing current treatment at present.  We will discuss benzodiazepine tapering with the patient in the outpatient setting.    Chronic neck and back pain, nonradiating, no focal weakness  We will have patient follow up in the outpatient setting.    For now I recommend patient continue on her current psychiatric medications and follow up with her regular psychiatrist or primary care provider for the management of these medications.        I will follow the patient peripherally.    Thank you for allowing me to participate in the care of this patient.          NOTE: Portions of this documentation were dictated using voice recognition software and  may contain dictation related errors in spelling / grammar / syntax not discovered on text review.              ELINOR Chirinos DO    Board Certified, Addiction Medicine  Board Certified, Neuromusculoskeletal Medicine and [facility]  Department of Psychiatry, [facility]      Method of Encounter   IN PERSON visit in Hospital   Type of Encounter   New patient to me   E/M Code (+/- modifier)   71469: Inpatient or Observation, Initial, Level 2 (55-74 minutes)     Separate from E/M same visit   None   Total Mins  (03/12/2025) I spent a total of at least 55 minutes evaluating and managing the patient on the day of the service. This  includes time reviewing the patient's chart, performing a history and physical examination, patient education and counseling, documentation, and care coordination.

## 2025-03-12 NOTE — SUBJECTIVE & OBJECTIVE
"Past Medical History:   Diagnosis Date    Agoraphobia     Carpal tunnel syndrome     Depression     Diabetes mellitus, type 2     DVT (deep venous thrombosis)     2019 had blood clot in right arm    Fibromyalgia     Hypertension     Migraine headache     Nerve injury 08/2016    Lingual Nerve Injury    Obese     Pain management     PTSD (post-traumatic stress disorder)     Trigeminal neuralgia        Past Surgical History:   Procedure Laterality Date    CARPAL TUNNEL RELEASE Right 12/27/2018    Dr Lozada     CARPAL TUNNEL RELEASE Left 01/28/2020    Procedure: RELEASE, CARPAL TUNNEL;  Surgeon: Brendon Lozada Jr., MD;  Location: [facility];  Service: Orthopedics;  Laterality: Left;    CHOLECYSTECTOMY N/A 10/31/2022    MOUTH SURGERY      NERVE GRAFT  2016    WISDOM TOOTH EXTRACTION         Review of patient's allergies indicates:   Allergen Reactions    Benadryl [diphenhydramine hcl]      Paralysis on right side body     Codeine      Paralysis right body    Flexeril [cyclobenzaprine] Other (See Comments)     Numbness on right side of body    Nsaids (non-steroidal anti-inflammatory drug) Diarrhea, Nausea And Vomiting and Other (See Comments)     Ulcers    Penicillins Anaphylaxis and Hives    Droperidol Anxiety and Other (See Comments)    Magnesium      "made me feel horrible"     Prochlorperazine Anxiety and Palpitations       No current facility-administered medications on file prior to encounter.     Current Outpatient Medications on File Prior to Encounter   Medication Sig    acetaminophen (TYLENOL) 500 MG tablet Take 500 mg by mouth every 6 (six) hours as needed for Pain. [id number] daily    amLODIPine (NORVASC) 5 MG tablet Take 1 tablet (5 mg total) by mouth once daily.    atorvastatin (LIPITOR) 10 MG tablet Take 1 tablet (10 mg total) by mouth once daily.    citalopram (CELEXA) 20 MG tablet Take 20 mg by mouth once daily.    dicyclomine (BENTYL) 20 mg tablet Take 1 tablet (20 mg total) by mouth every 8 (eight) hours as " needed. (Patient taking differently: Take 20 mg by mouth every 8 (eight) hours as needed (abdominl pain).)    lisinopriL (PRINIVIL,ZESTRIL) 20 MG tablet Take 1 tablet (20 mg total) by mouth once daily.    LORazepam (ATIVAN) 1 MG tablet Take 1 mg by mouth 3 (three) times daily as needed for Anxiety.    metFORMIN (GLUCOPHAGE) 500 MG tablet Take 1 tablet (500 mg total) by mouth daily with breakfast.    metoprolol succinate (TOPROL-XL) 100 MG 24 hr tablet Take 1 tablet (100 mg total) by mouth 2 (two) times daily.    ondansetron (ZOFRAN) 4 MG tablet Take 1 tablet (4 mg total) by mouth every 12 (twelve) hours as needed for Nausea.    tirzepatide 10 mg/0.5 mL PnIj Inject 10 mg into the skin every 7 days.    tiZANidine (ZANAFLEX) 4 MG tablet Take 1 tablet (4 mg total) by mouth every 8 (eight) hours as needed (SPASM).    ABILIFY 20 mg Tab  (Patient not taking: Reported on 3/11/2025)    blood sugar diagnostic Strp To check BG one times daily, to use with insurance preferred meter    blood-glucose meter kit To check BG one times daily, to use with insurance preferred meter    busPIRone (BUSPAR) 30 MG Tab 30 mg 3 (three) times daily. (Patient not taking: Reported on 3/11/2025)    fluvoxaMINE (LUVOX) 100 MG tablet 200 mg nightly. (Patient not taking: Reported on 3/11/2025)    insulin glargine U-100, Lantus, 100 unit/mL (3 mL) SubQ InPn pen Inject 20 Units into the skin once daily. (Patient not taking: Reported on 2/26/2025)    lancets Misc To check BG one times daily, to use with insurance preferred meter     Family History       Problem Relation (Age of Onset)    Cancer Maternal Aunt    Depression Mother    Diabetes Mother    No Known Problems Father, Daughter, Son, Maternal Uncle, Paternal Aunt, Paternal Uncle, Maternal Grandmother, Maternal Grandfather, Paternal Grandmother, Paternal Grandfather          Tobacco Use    Smoking status: Never    Smokeless tobacco: Never   Substance and Sexual Activity    Alcohol use: Yes      Comment: occasional    Drug use: No    Sexual activity: Yes     Partners: Male     Review of Systems   Constitutional:  Negative for activity change and appetite change.   HENT:  Negative for congestion and dental problem.    Eyes:  Negative for discharge and itching.   Respiratory:  Negative for shortness of breath.    Cardiovascular:  Negative for chest pain.   Gastrointestinal:  Positive for nausea and vomiting. Negative for abdominal distention and abdominal pain.   Endocrine: Negative for cold intolerance.   Genitourinary:  Negative for difficulty urinating and dysuria.   Musculoskeletal:  Negative for arthralgias and back pain.   Skin:  Negative for color change.   Neurological:  Positive for tremors. Negative for dizziness and facial asymmetry.   Hematological:  Negative for adenopathy.   Psychiatric/Behavioral:  Negative for agitation and behavioral problems.      Objective:     Vital Signs (Most Recent):  Temp: 98.5 °F (36.9 °C) (03/11/25 1700)  Pulse: (!) 116 (03/11/25 1700)  Resp: 18 (03/11/25 1815)  BP: (!) 155/102 (03/11/25 1700)  SpO2: 97 % (03/11/25 1700) Vital Signs (24h Range):  Temp:  [98.1 °F (36.7 °C)-98.5 °F (36.9 °C)] 98.5 °F (36.9 °C)  Pulse:  [] 116  Resp:  [11-20] 18  SpO2:  [95 %-100 %] 97 %  BP: (122-198)/() 155/102     Weight: 133.4 kg (294 lb 1.5 oz)  Body mass index is 48.94 kg/m².     Physical Exam  Vitals and nursing note reviewed.   Constitutional:       General: She is not in acute distress.  HENT:      Head: Atraumatic.      Right Ear: External ear normal.      Left Ear: External ear normal.      Nose: Nose normal.      Mouth/Throat:      Mouth: Mucous membranes are moist.   Eyes:      Extraocular Movements: Extraocular movements intact.   Cardiovascular:      Rate and Rhythm: Normal rate.   Pulmonary:      Effort: Pulmonary effort is normal.   Musculoskeletal:         General: Normal range of motion.      Cervical back: Normal range of motion.   Skin:     General: Skin  is warm.   Neurological:      Mental Status: She is alert and oriented to person, place, and time.   Psychiatric:         Behavior: Behavior normal.                Significant Labs: All pertinent labs within the past 24 hours have been reviewed.  CBC:   Recent Labs   Lab 03/11/25  0952   WBC 16.54*   HGB 14.1   HCT 41.6        CMP:   Recent Labs   Lab 03/11/25  0952      K 3.7      CO2 20*   *   BUN 9   CREATININE 1.1   CALCIUM 10.1   PROT 7.9   ALBUMIN 4.7   BILITOT 0.5   ALKPHOS 63   AST 17   ALT 17   ANIONGAP 15       Significant Imaging: I have reviewed all pertinent imaging results/findings within the past 24 hours.

## 2025-03-12 NOTE — H&P
[facility] Medicine  History & Physical    Patient Name: Jasper Guerrero  MRN: [id number]  Patient Class: OP- Observation  Admission Date: 3/11/2025  Attending Physician: Chandan Balderas MD   Primary Care Provider: Enoch Gerardo NP         Patient information was obtained from patient, past medical records, and ER records.     Subjective:     Principal Problem:Hypertensive urgency    Chief Complaint:   Chief Complaint   Patient presents with    Hypertension     Pt admitted two weeks ago for htn and had all the same symptoms she is having today.  Red Face and arms SOB         HPI: 34 year old pt getting admitted with HTN urgency  Pt was admitted with same issue last month in this hospital  She was Discharged with BP medication and per pt she is compliant with medication intake  This time pt started having rash on face/tremors/Nausea vomiting episodes  Symptoms went worse, she came to ER and was found to have High BP  Last timer when she was admitted Psychiatry consult was placed but pt rejected it   Per records pt  has opioid dependence and today UDS is positive for benzos  (She takes benzos at the moment for anxiety)  She was asking for pain medications upon discharge   Also said Percocet works better than Norco    Past Medical History:   Diagnosis Date    Agoraphobia     Carpal tunnel syndrome     Depression     Diabetes mellitus, type 2     DVT (deep venous thrombosis)     2019 had blood clot in right arm    Fibromyalgia     Hypertension     Migraine headache     Nerve injury 08/2016    Lingual Nerve Injury    Obese     Pain management     PTSD (post-traumatic stress disorder)     Trigeminal neuralgia        Past Surgical History:   Procedure Laterality Date    CARPAL TUNNEL RELEASE Right 12/27/2018    Dr Lozada     CARPAL TUNNEL RELEASE Left 01/28/2020    Procedure: RELEASE, CARPAL TUNNEL;  Surgeon: Brendon Lozada Jr., MD;  Location: [facility];  Service: Orthopedics;  Laterality: Left;     "CHOLECYSTECTOMY N/A 10/31/2022    MOUTH SURGERY      NERVE GRAFT  2016    WISDOM TOOTH EXTRACTION         Review of patient's allergies indicates:   Allergen Reactions    Benadryl [diphenhydramine hcl]      Paralysis on right side body     Codeine      Paralysis right body    Flexeril [cyclobenzaprine] Other (See Comments)     Numbness on right side of body    Nsaids (non-steroidal anti-inflammatory drug) Diarrhea, Nausea And Vomiting and Other (See Comments)     Ulcers    Penicillins Anaphylaxis and Hives    Droperidol Anxiety and Other (See Comments)    Magnesium      "made me feel horrible"     Prochlorperazine Anxiety and Palpitations       No current facility-administered medications on file prior to encounter.     Current Outpatient Medications on File Prior to Encounter   Medication Sig    acetaminophen (TYLENOL) 500 MG tablet Take 500 mg by mouth every 6 (six) hours as needed for Pain. 5285-0251 daily    amLODIPine (NORVASC) 5 MG tablet Take 1 tablet (5 mg total) by mouth once daily.    atorvastatin (LIPITOR) 10 MG tablet Take 1 tablet (10 mg total) by mouth once daily.    citalopram (CELEXA) 20 MG tablet Take 20 mg by mouth once daily.    dicyclomine (BENTYL) 20 mg tablet Take 1 tablet (20 mg total) by mouth every 8 (eight) hours as needed. (Patient taking differently: Take 20 mg by mouth every 8 (eight) hours as needed (abdominl pain).)    lisinopriL (PRINIVIL,ZESTRIL) 20 MG tablet Take 1 tablet (20 mg total) by mouth once daily.    LORazepam (ATIVAN) 1 MG tablet Take 1 mg by mouth 3 (three) times daily as needed for Anxiety.    metFORMIN (GLUCOPHAGE) 500 MG tablet Take 1 tablet (500 mg total) by mouth daily with breakfast.    metoprolol succinate (TOPROL-XL) 100 MG 24 hr tablet Take 1 tablet (100 mg total) by mouth 2 (two) times daily.    ondansetron (ZOFRAN) 4 MG tablet Take 1 tablet (4 mg total) by mouth every 12 (twelve) hours as needed for Nausea.    tirzepatide 10 mg/0.5 mL PnDanielle Inject 10 mg into the " skin every 7 days.    tiZANidine (ZANAFLEX) 4 MG tablet Take 1 tablet (4 mg total) by mouth every 8 (eight) hours as needed (SPASM).    ABILIFY 20 mg Tab  (Patient not taking: Reported on 3/11/2025)    blood sugar diagnostic Strp To check BG one times daily, to use with insurance preferred meter    blood-glucose meter kit To check BG one times daily, to use with insurance preferred meter    busPIRone (BUSPAR) 30 MG Tab 30 mg 3 (three) times daily. (Patient not taking: Reported on 3/11/2025)    fluvoxaMINE (LUVOX) 100 MG tablet 200 mg nightly. (Patient not taking: Reported on 3/11/2025)    insulin glargine U-100, Lantus, 100 unit/mL (3 mL) SubQ InPn pen Inject 20 Units into the skin once daily. (Patient not taking: Reported on 2/26/2025)    lancets Misc To check BG one times daily, to use with insurance preferred meter     Family History       Problem Relation (Age of Onset)    Cancer Maternal Aunt    Depression Mother    Diabetes Mother    No Known Problems Father, Daughter, Son, Maternal Uncle, Paternal Aunt, Paternal Uncle, Maternal Grandmother, Maternal Grandfather, Paternal Grandmother, Paternal Grandfather          Tobacco Use    Smoking status: Never    Smokeless tobacco: Never   Substance and Sexual Activity    Alcohol use: Yes     Comment: occasional    Drug use: No    Sexual activity: Yes     Partners: Male     Review of Systems   Constitutional:  Negative for activity change and appetite change.   HENT:  Negative for congestion and dental problem.    Eyes:  Negative for discharge and itching.   Respiratory:  Negative for shortness of breath.    Cardiovascular:  Negative for chest pain.   Gastrointestinal:  Positive for nausea and vomiting. Negative for abdominal distention and abdominal pain.   Endocrine: Negative for cold intolerance.   Genitourinary:  Negative for difficulty urinating and dysuria.   Musculoskeletal:  Negative for arthralgias and back pain.   Skin:  Negative for color change.    Neurological:  Positive for tremors. Negative for dizziness and facial asymmetry.   Hematological:  Negative for adenopathy.   Psychiatric/Behavioral:  Negative for agitation and behavioral problems.      Objective:     Vital Signs (Most Recent):  Temp: 98.5 °F (36.9 °C) (03/11/25 1700)  Pulse: (!) 116 (03/11/25 1700)  Resp: 18 (03/11/25 1815)  BP: (!) 155/102 (03/11/25 1700)  SpO2: 97 % (03/11/25 1700) Vital Signs (24h Range):  Temp:  [98.1 °F (36.7 °C)-98.5 °F (36.9 °C)] 98.5 °F (36.9 °C)  Pulse:  [] 116  Resp:  [11-20] 18  SpO2:  [95 %-100 %] 97 %  BP: (122-198)/() 155/102     Weight: 133.4 kg (294 lb 1.5 oz)  Body mass index is 48.94 kg/m².     Physical Exam  Vitals and nursing note reviewed.   Constitutional:       General: She is not in acute distress.  HENT:      Head: Atraumatic.      Right Ear: External ear normal.      Left Ear: External ear normal.      Nose: Nose normal.      Mouth/Throat:      Mouth: Mucous membranes are moist.   Eyes:      Extraocular Movements: Extraocular movements intact.   Cardiovascular:      Rate and Rhythm: Normal rate.   Pulmonary:      Effort: Pulmonary effort is normal.   Musculoskeletal:         General: Normal range of motion.      Cervical back: Normal range of motion.   Skin:     General: Skin is warm.   Neurological:      Mental Status: She is alert and oriented to person, place, and time.   Psychiatric:         Behavior: Behavior normal.                Significant Labs: All pertinent labs within the past 24 hours have been reviewed.  CBC:   Recent Labs   Lab 03/11/25  0952   WBC 16.54*   HGB 14.1   HCT 41.6        CMP:   Recent Labs   Lab 03/11/25  0952      K 3.7      CO2 20*   *   BUN 9   CREATININE 1.1   CALCIUM 10.1   PROT 7.9   ALBUMIN 4.7   BILITOT 0.5   ALKPHOS 63   AST 17   ALT 17   ANIONGAP 15       Significant Imaging: I have reviewed all pertinent imaging results/findings within the past 24 hours.  Assessment/Plan:      * Hypertensive urgency  Patient has a current diagnosis of hypertensive urgency (without evidence of end organ damage) which is uncontrolled.  Latest blood pressure and vitals reviewed-   Temp:  [98.1 °F (36.7 °C)-98.5 °F (36.9 °C)]   Pulse:  []   Resp:  [11-20]   BP: (122-198)/()   SpO2:  [95 %-100 %] .   Patient currently on IV antihypertensives.   Home meds for hypertension were reviewed and noted below.   Hypertension Medications              amLODIPine (NORVASC) 5 MG tablet Take 1 tablet (5 mg total) by mouth once daily.    lisinopriL (PRINIVIL,ZESTRIL) 20 MG tablet Take 1 tablet (20 mg total) by mouth once daily.    metoprolol succinate (TOPROL-XL) 100 MG 24 hr tablet Take 1 tablet (100 mg total) by mouth 2 (two) times daily.                Morbid obesity  Body mass index is 48.94 kg/m². Morbid obesity complicates all aspects of disease management from diagnostic modalities to treatment.     Chronic Opiate Use  Aware  Mostly she is having withdrawal symptoms right now from opiates  Consulted Psychiatrist     Chronic pain  Ongoing issue  Mostly Going through opioid withdrawal        Anxiety  She is on Benzos PRN basis at the moment for anxiety       Type 2 diabetes mellitus  Patient's FSGs are uncontrolled due to hyperglycemia on current medication regimen.  Last A1c reviewed-   Lab Results   Component Value Date    HGBA1C 6.8 (H) 02/15/2025     Most recent fingerstick glucose reviewed-   Recent Labs   Lab 03/11/25  1658   POCTGLUCOSE 153*     Current correctional scale  High  Maintain anti-hyperglycemic dose as follows-   Antihyperglycemics (From admission, onward)      Start     Stop Route Frequency Ordered    03/11/25 1228  insulin aspart U-100 pen 0-10 Units         -- SubQ Before meals & nightly PRN 03/11/25 1129          Hold Oral hypoglycemics while patient is in the hospital.    Fibromyalgia  Per chart review         VTE Risk Mitigation (From admission, onward)           Ordered      enoxaparin injection 40 mg  Every 12 hours         03/11/25 1141     IP VTE HIGH RISK PATIENT  Once         03/11/25 1129     Place sequential compression device  Until discontinued         03/11/25 1129                       On 03/11/2025, patient should be placed in hospital observation services under my care.        Enoxaparin 40 mg every 24 hours  changed to enoxaparin 40 mg every 12 hours.  BMI = 49.76 kg / m2.    Chandan Balderas MD  Department of Hospital Medicine  [facility]

## 2025-03-12 NOTE — ASSESSMENT & PLAN NOTE
Patient's FSGs are uncontrolled due to hyperglycemia on current medication regimen.  Last A1c reviewed-   Lab Results   Component Value Date    HGBA1C 6.1 03/12/2025     Most recent fingerstick glucose reviewed-   Recent Labs   Lab 03/11/25  2142 03/12/25  0823 03/12/25  1143 03/12/25  1549   POCTGLUCOSE 167* 127* 127* 115*     Current correctional scale  High  Maintain anti-hyperglycemic dose as follows-   Antihyperglycemics (From admission, onward)      Start     Stop Route Frequency Ordered    03/11/25 1228  insulin aspart U-100 pen 0-10 Units         -- SubQ Before meals & nightly PRN 03/11/25 1129          Hold Oral hypoglycemics while patient is in the hospital.

## 2025-03-12 NOTE — ASSESSMENT & PLAN NOTE
Body mass index is 48.94 kg/m². Morbid obesity complicates all aspects of disease management from diagnostic modalities to treatment.

## 2025-03-12 NOTE — PROGRESS NOTES
[facility] Medicine  Progress Note    Patient Name: Jasper Guerrero  MRN: [id number]  Patient Class: IP- Inpatient   Admission Date: 3/11/2025  Length of Stay: 0 days  Attending Physician: Arelis Alvarez DO  Primary Care Provider: Enoch Gerardo NP        Subjective     Principal Problem:Hypertensive urgency        HPI:  34 year old pt getting admitted with HTN urgency  Pt was admitted with same issue last month in this hospital  She was Discharged with BP medication and per pt she is compliant with medication intake  This time pt started having rash on face/tremors/Nausea vomiting episodes  Symptoms went worse, she came to ER and was found to have High BP  Last timer when she was admitted Psychiatry consult was placed but pt rejected it   Per records pt  has opioid dependence and today UDS is positive for benzos  (She takes   She was asking for pain medications upon discharge   Also said Percocet works better than Pierron    Overview/Hospital Course:  34-year-old female with history of chronic pain came in with vomiting and hypertension and admitted for hypertensive urgency with concern for withdrawal. Patient reported that she stopped taking fluvoxamine, aripiprazole,buspirone and weaned herself off  these medications over a week or 2 ago and she has been on these medications for years. Patient was initially started on clonidine due to concern of withdrawal however was transitioned to buprenorphine after discussing with Dr. Chirinos addiction Psychiatry.  Patient's vitals were monitored closely and patient can be seen by Dr. Chirinos on Friday.    Restarted home blood pressure meds except for lisinopril.    Interval History:  Patient said she is feeling better    Review of Systems   All other systems reviewed and are negative.    Objective:     Vital Signs (Most Recent):  Temp: 98.1 °F (36.7 °C) (03/12/25 1545)  Pulse: 96 (03/12/25 1500)  Resp: 20 (03/12/25 1500)  BP: 118/64 (03/12/25 1500)  SpO2:  (!) 92 % (03/12/25 1500) Vital Signs (24h Range):  Temp:  [98.1 °F (36.7 °C)-99.9 °F (37.7 °C)] 98.1 °F (36.7 °C)  Pulse:  [] 96  Resp:  [17-22] 20  SpO2:  [92 %-98 %] 92 %  BP: (113-155)/() 118/64     Weight: 133.4 kg (294 lb 1.5 oz)  Body mass index is 48.94 kg/m².    Intake/Output Summary (Last 24 hours) at 3/12/2025 1605  Last data filed at 3/12/2025 1301  Gross per 24 hour   Intake 147.77 ml   Output 1 ml   Net 146.77 ml         Physical Exam  Cardiovascular:      Rate and Rhythm: Normal rate.      Pulses: Normal pulses.   Pulmonary:      Effort: Pulmonary effort is normal.   Neurological:      Mental Status: She is alert and oriented to person, place, and time.               Significant Labs: All pertinent labs within the past 24 hours have been reviewed.    Significant Imaging: I have reviewed all pertinent imaging results/findings within the past 24 hours.      Assessment & Plan  Hypertensive urgency  Patient has a current diagnosis of hypertensive urgency (without evidence of end organ damage) which is uncontrolled.  Latest blood pressure and vitals reviewed-   Temp:  [98.1 °F (36.7 °C)-99.9 °F (37.7 °C)]   Pulse:  []   Resp:  [17-22]   BP: (113-155)/()   SpO2:  [92 %-98 %] .   Patient currently on IV antihypertensives.   Home meds for hypertension were reviewed and noted below.   Hypertension Medications              amLODIPine (NORVASC) 5 MG tablet Take 1 tablet (5 mg total) by mouth once daily.    lisinopriL (PRINIVIL,ZESTRIL) 20 MG tablet Take 1 tablet (20 mg total) by mouth once daily.    metoprolol succinate (TOPROL-XL) 100 MG 24 hr tablet Take 1 tablet (100 mg total) by mouth 2 (two) times daily.              Fibromyalgia  Per chart review     Anxiety  She is on Benzos PRN basis at the moment for anxiety     Chronic Opiate Use  Aware  Mostly she is having withdrawal symptoms right now from opiates  Consulted Psychiatrist   Morbid obesity  Body mass index is 48.94 kg/m². Morbid  obesity complicates all aspects of disease management from diagnostic modalities to treatment.   Chronic pain  Ongoing issue  Mostly Going through opioid withdrawal      Type 2 diabetes mellitus  Patient's FSGs are uncontrolled due to hyperglycemia on current medication regimen.  Last A1c reviewed-   Lab Results   Component Value Date    HGBA1C 6.1 03/12/2025     Most recent fingerstick glucose reviewed-   Recent Labs   Lab 03/11/25  2142 03/12/25  0823 03/12/25  1143 03/12/25  1549   POCTGLUCOSE 167* 127* 127* 115*     Current correctional scale  High  Maintain anti-hyperglycemic dose as follows-   Antihyperglycemics (From admission, onward)      Start     Stop Route Frequency Ordered    03/11/25 1228  insulin aspart U-100 pen 0-10 Units         -- SubQ Before meals & nightly PRN 03/11/25 1129          Hold Oral hypoglycemics while patient is in the hospital.    VTE Risk Mitigation (From admission, onward)           Ordered     enoxaparin injection 40 mg  Every 12 hours         03/11/25 1141     IP VTE HIGH RISK PATIENT  Once         03/11/25 1129     Place sequential compression device  Until discontinued         03/11/25 1129                    Discharge Planning   BRITTANY: 3/13/2025     Code Status: Full Code   Medical Readiness for Discharge Date:   Discharge Plan A: Home with family                        Arelis Alvarez DO  Department of Hospital Medicine   [facility]

## 2025-03-12 NOTE — CARE UPDATE
03/12/25 0200   Patient Assessment/Suction   Level of Consciousness (AVPU) responds to voice   Respiratory Effort Normal;Unlabored   PRE-TX-O2   Device (Oxygen Therapy) room air   Pulse Oximetry Type Continuous   $ Pulse Oximetry - Multiple Charge Pulse Oximetry - Multiple   Positioning Right side   Positioning   Head of Bed (HOB) Positioning HOB at 20-30 degrees   Education   $ Education Other (see comment);15 min

## 2025-03-12 NOTE — ASSESSMENT & PLAN NOTE
Patient has a current diagnosis of hypertensive urgency (without evidence of end organ damage) which is uncontrolled.  Latest blood pressure and vitals reviewed-   Temp:  [98.1 °F (36.7 °C)-98.5 °F (36.9 °C)]   Pulse:  []   Resp:  [11-20]   BP: (122-198)/()   SpO2:  [95 %-100 %] .   Patient currently on IV antihypertensives.   Home meds for hypertension were reviewed and noted below.   Hypertension Medications              amLODIPine (NORVASC) 5 MG tablet Take 1 tablet (5 mg total) by mouth once daily.    lisinopriL (PRINIVIL,ZESTRIL) 20 MG tablet Take 1 tablet (20 mg total) by mouth once daily.    metoprolol succinate (TOPROL-XL) 100 MG 24 hr tablet Take 1 tablet (100 mg total) by mouth 2 (two) times daily.

## 2025-03-12 NOTE — ASSESSMENT & PLAN NOTE
Patient has a current diagnosis of hypertensive urgency (without evidence of end organ damage) which is uncontrolled.  Latest blood pressure and vitals reviewed-   Temp:  [98.1 °F (36.7 °C)-99.9 °F (37.7 °C)]   Pulse:  []   Resp:  [17-22]   BP: (113-155)/()   SpO2:  [92 %-98 %] .   Patient currently on IV antihypertensives.   Home meds for hypertension were reviewed and noted below.   Hypertension Medications              amLODIPine (NORVASC) 5 MG tablet Take 1 tablet (5 mg total) by mouth once daily.    lisinopriL (PRINIVIL,ZESTRIL) 20 MG tablet Take 1 tablet (20 mg total) by mouth once daily.    metoprolol succinate (TOPROL-XL) 100 MG 24 hr tablet Take 1 tablet (100 mg total) by mouth 2 (two) times daily.

## 2025-03-12 NOTE — SUBJECTIVE & OBJECTIVE
Interval History:  Patient said she is feeling better    Review of Systems   All other systems reviewed and are negative.    Objective:     Vital Signs (Most Recent):  Temp: 98.1 °F (36.7 °C) (03/12/25 1545)  Pulse: 96 (03/12/25 1500)  Resp: 20 (03/12/25 1500)  BP: 118/64 (03/12/25 1500)  SpO2: (!) 92 % (03/12/25 1500) Vital Signs (24h Range):  Temp:  [98.1 °F (36.7 °C)-99.9 °F (37.7 °C)] 98.1 °F (36.7 °C)  Pulse:  [] 96  Resp:  [17-22] 20  SpO2:  [92 %-98 %] 92 %  BP: (113-155)/() 118/64     Weight: 133.4 kg (294 lb 1.5 oz)  Body mass index is 48.94 kg/m².    Intake/Output Summary (Last 24 hours) at 3/12/2025 1605  Last data filed at 3/12/2025 1301  Gross per 24 hour   Intake 147.77 ml   Output 1 ml   Net 146.77 ml         Physical Exam  Cardiovascular:      Rate and Rhythm: Normal rate.      Pulses: Normal pulses.   Pulmonary:      Effort: Pulmonary effort is normal.   Neurological:      Mental Status: She is alert and oriented to person, place, and time.               Significant Labs: All pertinent labs within the past 24 hours have been reviewed.    Significant Imaging: I have reviewed all pertinent imaging results/findings within the past 24 hours.

## 2025-03-12 NOTE — CARE UPDATE
Started pt on Clonidine for suspected opioid withdrawal  Awaiting Psych MD evaluation   Her symptoms mostly indicates she is having opioid withdrawal

## 2025-03-12 NOTE — ASSESSMENT & PLAN NOTE
Patient's FSGs are uncontrolled due to hyperglycemia on current medication regimen.  Last A1c reviewed-   Lab Results   Component Value Date    HGBA1C 6.8 (H) 02/15/2025     Most recent fingerstick glucose reviewed-   Recent Labs   Lab 03/11/25  1658   POCTGLUCOSE 153*     Current correctional scale  High  Maintain anti-hyperglycemic dose as follows-   Antihyperglycemics (From admission, onward)      Start     Stop Route Frequency Ordered    03/11/25 1228  insulin aspart U-100 pen 0-10 Units         -- SubQ Before meals & nightly PRN 03/11/25 1129          Hold Oral hypoglycemics while patient is in the hospital.

## 2025-03-13 VITALS
TEMPERATURE: 98 F | OXYGEN SATURATION: 94 % | BODY MASS INDEX: 48.82 KG/M2 | DIASTOLIC BLOOD PRESSURE: 66 MMHG | WEIGHT: 293 LBS | SYSTOLIC BLOOD PRESSURE: 140 MMHG | RESPIRATION RATE: 16 BRPM | HEART RATE: 100 BPM | HEIGHT: 65 IN

## 2025-03-13 LAB
ALBUMIN SERPL BCP-MCNC: 4.2 G/DL (ref 3.5–5.2)
ALP SERPL-CCNC: 52 U/L (ref 55–135)
ALT SERPL W/O P-5'-P-CCNC: 13 U/L (ref 10–44)
ANION GAP SERPL CALC-SCNC: 8 MMOL/L (ref 8–16)
AST SERPL-CCNC: 13 U/L (ref 10–40)
BASOPHILS # BLD AUTO: 0.03 K/UL (ref 0–0.2)
BASOPHILS NFR BLD: 0.3 % (ref 0–1.9)
BILIRUB SERPL-MCNC: 0.5 MG/DL (ref 0.1–1)
BUN SERPL-MCNC: 7 MG/DL (ref 6–20)
CALCIUM SERPL-MCNC: 9.3 MG/DL (ref 8.7–10.5)
CHLORIDE SERPL-SCNC: 106 MMOL/L (ref 95–110)
CO2 SERPL-SCNC: 27 MMOL/L (ref 23–29)
CREAT SERPL-MCNC: 0.8 MG/DL (ref 0.5–1.4)
DIFFERENTIAL METHOD BLD: ABNORMAL
EOSINOPHIL # BLD AUTO: 0.2 K/UL (ref 0–0.5)
EOSINOPHIL NFR BLD: 1.3 % (ref 0–8)
ERYTHROCYTE [DISTWIDTH] IN BLOOD BY AUTOMATED COUNT: 13.9 % (ref 11.5–14.5)
EST. GFR  (NO RACE VARIABLE): >60 ML/MIN/1.73 M^2
GLUCOSE SERPL-MCNC: 127 MG/DL (ref 70–110)
HCT VFR BLD AUTO: 41.8 % (ref 37–48.5)
HGB BLD-MCNC: 13.1 G/DL (ref 12–16)
IMM GRANULOCYTES # BLD AUTO: 0.04 K/UL (ref 0–0.04)
IMM GRANULOCYTES NFR BLD AUTO: 0.3 % (ref 0–0.5)
LYMPHOCYTES # BLD AUTO: 3.5 K/UL (ref 1–4.8)
LYMPHOCYTES NFR BLD: 30.1 % (ref 18–48)
MAGNESIUM SERPL-MCNC: 2.1 MG/DL (ref 1.6–2.6)
MCH RBC QN AUTO: 29.1 PG (ref 27–31)
MCHC RBC AUTO-ENTMCNC: 31.3 G/DL (ref 32–36)
MCV RBC AUTO: 93 FL (ref 82–98)
MONOCYTES # BLD AUTO: 0.8 K/UL (ref 0.3–1)
MONOCYTES NFR BLD: 6.8 % (ref 4–15)
NEUTROPHILS # BLD AUTO: 7.1 K/UL (ref 1.8–7.7)
NEUTROPHILS NFR BLD: 61.2 % (ref 38–73)
NRBC BLD-RTO: 0 /100 WBC
PLATELET # BLD AUTO: 297 K/UL (ref 150–450)
PMV BLD AUTO: 9.1 FL (ref 9.2–12.9)
POCT GLUCOSE: 135 MG/DL (ref 70–110)
POTASSIUM SERPL-SCNC: 3.8 MMOL/L (ref 3.5–5.1)
PROT SERPL-MCNC: 6.9 G/DL (ref 6–8.4)
RBC # BLD AUTO: 4.5 M/UL (ref 4–5.4)
SODIUM SERPL-SCNC: 141 MMOL/L (ref 136–145)
WBC # BLD AUTO: 11.58 K/UL (ref 3.9–12.7)

## 2025-03-13 PROCEDURE — 85025 COMPLETE CBC W/AUTO DIFF WBC: CPT | Performed by: INTERNAL MEDICINE

## 2025-03-13 PROCEDURE — 83735 ASSAY OF MAGNESIUM: CPT | Performed by: INTERNAL MEDICINE

## 2025-03-13 PROCEDURE — 63600175 PHARM REV CODE 636 W HCPCS: Performed by: INTERNAL MEDICINE

## 2025-03-13 PROCEDURE — 25000003 PHARM REV CODE 250: Performed by: FAMILY MEDICINE

## 2025-03-13 PROCEDURE — 25000003 PHARM REV CODE 250: Performed by: INTERNAL MEDICINE

## 2025-03-13 PROCEDURE — 80053 COMPREHEN METABOLIC PANEL: CPT | Performed by: INTERNAL MEDICINE

## 2025-03-13 PROCEDURE — 36415 COLL VENOUS BLD VENIPUNCTURE: CPT | Performed by: INTERNAL MEDICINE

## 2025-03-13 RX ORDER — BUPRENORPHINE 2 MG/1
4 TABLET SUBLINGUAL 3 TIMES DAILY PRN
Qty: 60 TABLET | Refills: 0 | Status: SHIPPED | OUTPATIENT
Start: 2025-03-13 | End: 2025-03-14

## 2025-03-13 RX ADMIN — CITALOPRAM HYDROBROMIDE 20 MG: 20 TABLET ORAL at 09:03

## 2025-03-13 RX ADMIN — AMLODIPINE BESYLATE 5 MG: 5 TABLET ORAL at 09:03

## 2025-03-13 RX ADMIN — METOPROLOL SUCCINATE 100 MG: 50 TABLET, FILM COATED, EXTENDED RELEASE ORAL at 09:03

## 2025-03-13 RX ADMIN — FAMOTIDINE 20 MG: 20 TABLET, FILM COATED ORAL at 09:03

## 2025-03-13 RX ADMIN — ENOXAPARIN SODIUM 40 MG: 40 INJECTION SUBCUTANEOUS at 09:03

## 2025-03-13 RX ADMIN — BUPRENORPHINE 4 MG: 2 TABLET SUBLINGUAL at 09:03

## 2025-03-13 NOTE — ASSESSMENT & PLAN NOTE
Patient's FSGs are uncontrolled due to hyperglycemia on current medication regimen.  Last A1c reviewed-   Lab Results   Component Value Date    HGBA1C 6.1 03/12/2025     Most recent fingerstick glucose reviewed-   Recent Labs   Lab 03/12/25 1944 03/13/25  0852   POCTGLUCOSE 119* 135*     Current correctional scale  High  Maintain anti-hyperglycemic dose as follows-   Antihyperglycemics (From admission, onward)      None          Hold Oral hypoglycemics while patient is in the hospital.

## 2025-03-13 NOTE — PLAN OF CARE
Follow up with william scheduled and added to AVS. F/u with PCP unavailable within 2 wks of discharge. Due to this, NP at home referral was placed for short term follow up and soonest available with PCP was added to AVS.      03/13/25 7740   Post-Acute Status   Hospital Resources/Appts/Education Provided Appointments scheduled and added to AVS

## 2025-03-13 NOTE — DISCHARGE SUMMARY
[facility] Medicine  Discharge Summary      Patient Name: Jasper Guerrreo  MRN: [id number]  ELE: [id number]  Patient Class: IP- Inpatient  Admission Date: 3/11/2025  Hospital Length of Stay: 1 days  Discharge Date and Time:  03/13/2025 4:14 PM  Attending Physician: No att. providers found   Discharging Provider: Arelis Alvarez DO  Primary Care Provider: Enoch Gerardo NP    Primary Care Team: Networked reference to record PCT     HPI:   34 year old pt getting admitted with HTN urgency  Pt was admitted with same issue last month in this hospital  She was Discharged with BP medication and per pt she is compliant with medication intake  This time pt started having rash on face/tremors/Nausea vomiting episodes  Symptoms went worse, she came to ER and was found to have High BP  Last timer when she was admitted Psychiatry consult was placed but pt rejected it   Per records pt  has opioid dependence and today UDS is positive for benzos  (She takes   She was asking for pain medications upon discharge   Also said Percocet works better than Norco    * No surgery found *      Hospital Course:   34-year-old female with history of chronic pain came in with vomiting and hypertension and admitted for hypertensive urgency with concern for withdrawal. Patient reported that she stopped taking fluvoxamine, aripiprazole,buspirone and weaned herself off  these medications over a week or 2 ago and she has been on these medications for years. Patient was initially started on clonidine due to concern of withdrawal however was transitioned to buprenorphine after discussing with Dr. Chirinos addiction Psychiatry.  Patient's vitals were monitored closely and patient can be seen by Dr. Chirinos on Friday.Restarted home blood pressure meds except for lisinopril.  Patient will be following with Dr. Chirinos outpatient on 03/13     Goals of Care Treatment Preferences:  Code Status: Full Code      SDOH Screening:  The patient was  screened for utility difficulties, food insecurity, transport difficulties, housing insecurity, and interpersonal safety and there were no concerns identified this admission.     Consults:   Consults (From admission, onward)          Status Ordering Provider     Inpatient consult to Telemedicine - Psych  Once        Provider:  ELINOR Chirinos, DO    Completed AAYUSH MONTGOMERY            Assessment & Plan  Hypertensive urgency  Patient has a current diagnosis of hypertensive urgency (without evidence of end organ damage) which is uncontrolled.  Latest blood pressure and vitals reviewed-   Temp:  [98 °F (36.7 °C)-98.5 °F (36.9 °C)]   Pulse:  []   Resp:  [1-20]   BP: (122-157)/(58-86)   SpO2:  [93 %-96 %] .   Patient currently on IV antihypertensives.   Home meds for hypertension were reviewed and noted below.   Hypertension Medications              amLODIPine (NORVASC) 5 MG tablet Take 1 tablet (5 mg total) by mouth once daily.    lisinopriL (PRINIVIL,ZESTRIL) 20 MG tablet Take 1 tablet (20 mg total) by mouth once daily.    metoprolol succinate (TOPROL-XL) 100 MG 24 hr tablet Take 1 tablet (100 mg total) by mouth 2 (two) times daily.              Fibromyalgia  Per chart review     Anxiety  She is on Benzos PRN basis at the moment for anxiety     Chronic Opiate Use  Aware  Mostly she is having withdrawal symptoms right now from opiates  Consulted Psychiatrist   Morbid obesity  Body mass index is 48.94 kg/m². Morbid obesity complicates all aspects of disease management from diagnostic modalities to treatment.   Chronic pain  Ongoing issue  Mostly Going through opioid withdrawal      Type 2 diabetes mellitus  Patient's FSGs are uncontrolled due to hyperglycemia on current medication regimen.  Last A1c reviewed-   Lab Results   Component Value Date    HGBA1C 6.1 03/12/2025     Most recent fingerstick glucose reviewed-   Recent Labs   Lab 03/12/25 1944 03/13/25  0852   POCTGLUCOSE 119* 135*     Current correctional scale   High  Maintain anti-hyperglycemic dose as follows-   Antihyperglycemics (From admission, onward)      None          Hold Oral hypoglycemics while patient is in the hospital.  Final Active Diagnoses:    Diagnosis Date Noted POA    PRINCIPAL PROBLEM:  Hypertensive urgency [I16.0] 02/15/2025 Yes    Morbid obesity [E66.01] 03/11/2025 Yes    Chronic pain [G89.29] 03/11/2025 Yes    Type 2 diabetes mellitus [E11.9] 03/11/2025 Yes    Chronic Opiate Use [Z79.891] 12/12/2023 Not Applicable    Anxiety [F41.9] 04/24/2019 Yes    Fibromyalgia [M79.7] 01/16/2015 Yes      Problems Resolved During this Admission:       Discharged Condition: fair    Disposition: Home or Self Care    Follow Up:   Follow-up Information       Enoch Gerardo NP. Go on 4/8/2025.    Specialty: Family Medicine  Why: please go to hospital follow up appointment for 10:20AM  Contact information:  [address]  [address]  [place] [address]  [phone]               Chirinos, W. Dan, . Go on 3/14/2025.    Specialties: Psychiatry, Behavioral Health  Why: please go to hospital follow up appointment at 3PM  Contact information:  [address]  [address]  [place] [address]  [phone]                           Patient Instructions:      Ambulatory referral/consult to [facility]   Standing Status: Future   Referral Priority: Routine Referral Type: Consultation   Referral Reason: Specialty Services Required   Number of Visits Requested: 1       Significant Diagnostic Studies: Labs: BMP:   Recent Labs   Lab 03/12/25  0533 03/13/25  0717   * 127*    141   K 3.5 3.8    106   CO2 26 27   BUN 9 7   CREATININE 0.7 0.8   CALCIUM 9.0 9.3   MG 1.7 2.1   , CMP   Recent Labs   Lab 03/12/25  0533 03/13/25  0717    141   K 3.5 3.8    106   CO2 26 27   * 127*   BUN 9 7   CREATININE 0.7 0.8   CALCIUM 9.0 9.3   PROT 6.7 6.9   ALBUMIN 4.0 4.2   BILITOT 0.5 0.5   ALKPHOS 46* 52*   AST 10 13   ALT 12 13   ANIONGAP 8 8   , and  CBC   Recent Labs   Lab 03/12/25  0533 03/13/25  0717   WBC 12.70 11.58   HGB 11.9* 13.1   HCT 36.7* 41.8    297       Pending Diagnostic Studies:       None           Medications:  Reconciled Home Medications:      Medication List        START taking these medications      buprenorphine HCL 2 mg Subl  Commonly known as: SUBUTEX  Place 2 tablets (4 mg total) under the tongue 3 (three) times daily as needed (pain).            CONTINUE taking these medications      acetaminophen 500 MG tablet  Commonly known as: TYLENOL  Take 500 mg by mouth every 6 (six) hours as needed for Pain. [id number] daily     amLODIPine 5 MG tablet  Commonly known as: NORVASC  Take 1 tablet (5 mg total) by mouth once daily.     atorvastatin 10 MG tablet  Commonly known as: LIPITOR  Take 1 tablet (10 mg total) by mouth once daily.     blood sugar diagnostic Strp  To check BG one times daily, to use with insurance preferred meter     blood-glucose meter kit  To check BG one times daily, to use with insurance preferred meter     citalopram 20 MG tablet  Commonly known as: CeleXA  Take 20 mg by mouth once daily.     dicyclomine 20 mg tablet  Commonly known as: BENTYL  Take 1 tablet (20 mg total) by mouth every 8 (eight) hours as needed.     lancets Misc  To check BG one times daily, to use with insurance preferred meter     lisinopriL 20 MG tablet  Commonly known as: PRINIVIL,ZESTRIL  Take 1 tablet (20 mg total) by mouth once daily.     LORazepam 1 MG tablet  Commonly known as: ATIVAN  Take 1 mg by mouth 3 (three) times daily as needed for Anxiety.     metFORMIN 500 MG tablet  Commonly known as: GLUCOPHAGE  Take 1 tablet (500 mg total) by mouth daily with breakfast.     metoprolol succinate 100 MG 24 hr tablet  Commonly known as: TOPROL-XL  Take 1 tablet (100 mg total) by mouth 2 (two) times daily.     ondansetron 4 MG tablet  Commonly known as: ZOFRAN  Take 1 tablet (4 mg total) by mouth every 12 (twelve) hours as needed for Nausea.      tirzepatide 10 mg/0.5 mL Pnij  Inject 10 mg into the skin every 7 days.     tiZANidine 4 MG tablet  Commonly known as: ZANAFLEX  Take 1 tablet (4 mg total) by mouth every 8 (eight) hours as needed (SPASM).            STOP taking these medications      ABILIFY 20 mg Tab  Generic drug: ARIPiprazole     busPIRone 30 MG Tab  Commonly known as: BUSPAR     fluvoxaMINE 100 MG tablet  Commonly known as: LUVOX     insulin glargine U-100 (Lantus) 100 unit/mL (3 mL) Inpn pen              Indwelling Lines/Drains at time of discharge:   Lines/Drains/Airways       None                   Time spent on the discharge of patient: 32 minutes         Arelis Alvarez DO  Department of Hospital Medicine  [facility]

## 2025-03-13 NOTE — NURSING
MD discharge orders entered. Case management cleared patient for discharge.   Discharge instructions reviewed with patient. Verbalized understanding and intent of compliance. PIV discontinued. Tolerated well. Telemetry discontinued and box [id number]  returned to monitor room. Leads remain on wall monitor. Personal belongings packed by patient and spouse. Discharged home via wheelchair per [facility] staff accompanied by spouse to personal vehicle without incident.

## 2025-03-13 NOTE — PROGRESS NOTES
Inpatient Encounter    Addiction Medicine Consultation - Progress Note    ELINOR Chirinos DO  Board Certified - Addiction Medicine  Board Certified - Neuromusculoskeletal Medicine and Community Health    Date of Service: 03/13/2025     Assessment & Plan      ASSESSMENT / PLAN      Clinical Impression and Recommendations      Opioid dependency in the setting of chronic pain  Continue buprenorphine. I will provide RX for buprenorphine 4 mg QID prn pain.  Patient has appointment scheduled with me for tomorrow.     Benzodiazepine dependency in the setting of generalized anxiety disorder  Will further evaluate in the outpatient setting. Will discuss benzodiazepine tapering with the patient in the outpatient setting.     Chronic neck and back pain, nonradiating, no focal weakness  Will further evaluate in the outpatient setting.     I recommend patient continue on her current psychiatric medications and follow up with her regular psychiatrist or primary care provider for the management of these medications.        I will sign off. Please do not hesitate to consult again if new information and/or condition present.    Thank you for allowing me to participate in the care of this patient.       Subjective      SUBJECTIVE      Patient seen and examined. Addiction medicine was consulted for opioid dependence and chronic pain.    Review of systems performed and found unremarkable except as stated below.     03/13/2025 - Interval History   Chart reviewed. Patient seen and examined.    Patient stated the 4mg dose of buprenorphine was helpful. I saw her prior to her morning dose. Her BP and HR were elevated, though not too high. She states she was in pain. She was about to get her morning dose of buprenorphine. Overall patient seems to be doing well. She has an appointment with me tomorrow.     Initial Addiction Medicine Inpatient Consult - 03/12/2025   HPI per primary service:  34 year old pt getting admitted with HTN urgency  Pt was  admitted with same issue last month in this hospital  She was Discharged with BP medication and per pt she is compliant with medication intake  This time pt started having rash on face/tremors/Nausea vomiting episodes  Symptoms went worse, she came to ER and was found to have High BP  Last timer when she was admitted Psychiatry consult was placed but pt rejected it   Per records pt  has opioid dependence and today UDS is positive for benzos  (She takes benzos at the moment for anxiety)  She was asking for pain medications upon discharge   Also said Percocet works better than [place].     Addiction Medicine Consult (03/12/2025):  Chart reviewed. Patient seen and examined.  34-year-old female presenting to the emergency department with the vomiting, rash, and hypertension.  She also complains of chronic pain.  Her pain is in her neck and her low back.  She denies radiation of pain.  She denies any focal weakness.  Patient has been on opioids on and off for quite some time.  She states she has had back problems and neck problems since she was in high school.  She reports seeing chiropractic, PT, and Orthopedics.  She has taken muscle relaxer with some benefit.  Additionally the patient states she has anxiety and has been taking benzodiazepines for some time.  Currently she has taken Ativan.  She states she takes 1 mg daily; however, she is prescribed it 3 times daily according to the .  Patient states that Percocet has helped her the best for her pain management in the past.  She mentions Percocet multiple times to other providers.   does show multiple prescriptions for opioids over the past couple of years.  Additionally she has multiple hospital/ED encounters where she is given opioids.  Patient denies abusing opioids.  She states she takes them for her pain.  Patient does admit to occasionally taking an Ativan to help her with the anxiety that she gets from her chronic pain.     Additionally the patient states  that she was taking Abilify and fluoxetine and BuSpar.  She stopped them a couple of weeks ago and she wonders if some of her symptoms may be related to that discontinuation.     Patient also relates that she has posttraumatic stress disorder from a dental procedure where the surgeon cut her lingual nerve.  She had significant pain from that and that may be where she started getting exposed to frequent doses of opioids.     The patient denies any history of substance use disorders in her immediate family.    The patient participates well with history.  Discussed issues related to pain, addiction, and chemical dependency.  Presented the patient with multiple options.  Patient open to a trial of buprenorphine to help with her pain.  Simultaneously with the work on manage her pain in healthy ways while we taper down buprenorphine.  Discussed with the patient the benefit of decreasing her Ativan use.  Patient is open to tapering this to cessation as well.  We will see patient in the outpatient setting.  Patient appears motivated to make positive change.          Objective      OBJECTIVE     Physical Exam     Vitals:    03/13/25 0715 03/13/25 0800 03/13/25 0900 03/13/25 0901   BP:  (!) 146/86 (!) 157/80 (!) 157/80   Pulse: 104  110 106   Resp:       Temp: 98.1 °F (36.7 °C)      TempSrc: Oral      SpO2: 96%   96%   Weight:       Height:            Physical Exam  Constitutional:       General: She is not in acute distress.     Appearance: Normal appearance.   HENT:      Head: Normocephalic and atraumatic.   Eyes:      General: No scleral icterus.     Extraocular Movements: Extraocular movements intact.      Conjunctiva/sclera: Conjunctivae normal.      Pupils: Pupils are equal, round, and reactive to light.   Pulmonary:      Effort: Pulmonary effort is normal. No respiratory distress.   Skin:     Comments: Face slightly flushed. Palms moist.   Neurological:      Mental Status: She is alert and oriented to person, place,  and time.   Psychiatric:         Attention and Perception: Attention normal.         Mood and Affect: Mood and affect normal.         Behavior: Behavior normal. Behavior is cooperative.         Thought Content: Thought content normal.            Labs (auto-populated)     POC Urine Drug Screen (*)   Toxicology Results in Chart      AMP * AMP *  (*)   BAR * BAR *  (*)   BUP * BUP Negative (4/16/2024)   BZO * BZO Presumptive Positive (A) (3/11/2025)   JAH * JAH Negative (3/11/2025)   METH * METH *  (*)   MOR * MOR Negative (3/11/2025)   MTD * MTD *  (*)   OXY * OXY *  (*)   THC * THC *  (*)      Fentanyl Negative @GONZALEZ (4/16/2024)    Alcohol Level *  (*)     Recent CBC, CMP, and other routine labs     WBC 11.58 (3/13/2025)  (3/13/2025)   RBC    4.50 (3/13/2025) K 3.8 (3/13/2025)   HGB 13.1 (3/13/2025) Cl 106 (3/13/2025)   HCT 41.8 (3/13/2025) CO2 27 (3/13/2025)   MCV 93 (3/13/2025) Glu 127 (H) (3/13/2025)   MCH 29.1 (3/13/2025) BUN 7 (3/13/2025)   MCHC 31.3 (L) (3/13/2025) Cr 0.8 (3/13/2025)   RDW 13.9 (3/13/2025) Ca 9.3 (3/13/2025)    (3/13/2025)AST Alb 4.2 (3/13/2025)   MPV 9.1 (L) (3/13/2025) TBili 0.5 (3/13/2025)   ANC 7.1; 61.2;  (3/13/2025) AlkPho 52 (L) (3/13/2025)   ESR 13 (2/15/2025) AST 13 (3/13/2025)   CRP 2.70 (H) (2/15/2025) ALT 13 (3/13/2025)       (3/11/2025) GFR >60.0 (3/13/2025)   HgA1c 6.1 (3/12/2025) TSH 0.877 (2/15/2025)   Trop I 0.058 (HH) (2/14/2025) FT4 0.67 (L) (2/16/2025)   Chol 219 (H) (8/26/2024) uHCG Negative (3/11/2025)   TGs 352 (H) (8/26/2024) HCV Non-reactive (11/4/2022)   HDL 40 (8/26/2024) HIV Negative (8/31/2023)   .6 (8/26/2024) HBV sAB *  (*)   TINO *  (*) HBV sAG Negative (8/31/2023)   LIP 15 (2/15/2025) RPR nonreactive (8/31/2023)   INR 1.1 (7/17/2022) B1 *  (*)   GGT *  (*) B9 *  (*)   NH4 *  (*) B12 *  (*)   PETH *  (*) Vit D *  (*)            NOTE: Portions of this documentation were dictated using voice recognition software and  may contain dictation  related errors in spelling / grammar / syntax not discovered on text review.              ELINOR Chirinos DO    Board Certified, Addiction Medicine  Board Certified, Neuromusculoskeletal Medicine and [facility]  Department of Psychiatry, [facility]      Method of Encounter   IN PERSON visit in Hospital   Type of Encounter   Inpatient or Observation, Subsequent encounter   E/M Code (+/- modifier)   72859: Inpatient or Observation, Subsequent, Level 2, Level 4 criteria: 1 unstable chronic illness and Prescription Drug Management     Separate from E/M same visit   None   Total Mins  (03/13/2025) N/A - Not billing for time

## 2025-03-13 NOTE — PLAN OF CARE
Pt clear for discharge pending morning rounds. Follow up with PCP unavailable within 2 weeks. Soonest available appointment accepted and added to AVS. Referral for NP at home placed for pt to be seen within 2wk period. F/u with Candis scheduled and added to AVS. No further needs known. Spouse to provide transport.      03/13/25 0912   Final Note   Assessment Type Final Discharge Note   Anticipated Discharge Disposition Home   What phone number can be called within the next 1-3 days to see how you are doing after discharge? [phone]   Hospital Resources/Appts/Education Provided Provided patient/caregiver with written discharge plan information;Appointments scheduled and added to AVS   Post-Acute Status   Discharge Delays None known at this time

## 2025-03-14 ENCOUNTER — OFFICE VISIT (OUTPATIENT)
Facility: CLINIC | Age: 35
End: 2025-03-14
Payer: MEDICAID

## 2025-03-14 ENCOUNTER — TELEPHONE (OUTPATIENT)
Dept: HOME HEALTH SERVICES | Facility: CLINIC | Age: 35
End: 2025-03-14
Payer: MEDICAID

## 2025-03-14 ENCOUNTER — TELEPHONE (OUTPATIENT)
Dept: FAMILY MEDICINE | Facility: CLINIC | Age: 35
End: 2025-03-14
Payer: MEDICAID

## 2025-03-14 VITALS
HEIGHT: 65 IN | DIASTOLIC BLOOD PRESSURE: 70 MMHG | HEART RATE: 98 BPM | SYSTOLIC BLOOD PRESSURE: 110 MMHG | BODY MASS INDEX: 48.82 KG/M2 | WEIGHT: 293 LBS

## 2025-03-14 DIAGNOSIS — M62.838 MUSCLE SPASM: ICD-10-CM

## 2025-03-14 DIAGNOSIS — F11.20 OPIOID DEPENDENCE ON AGONIST THERAPY: Primary | ICD-10-CM

## 2025-03-14 DIAGNOSIS — M26.629 TMJPDS (TEMPOROMANDIBULAR JOINT PAIN DYSFUNCTION SYNDROME): ICD-10-CM

## 2025-03-14 DIAGNOSIS — G89.4 CHRONIC PAIN SYNDROME: ICD-10-CM

## 2025-03-14 PROCEDURE — 99213 OFFICE O/P EST LOW 20 MIN: CPT | Mod: PBBFAC,PN | Performed by: NEUROMUSCULOSKELETAL MEDICINE & OMM

## 2025-03-14 PROCEDURE — 99999 PR PBB SHADOW E&M-EST. PATIENT-LVL III: CPT | Mod: PBBFAC,,, | Performed by: NEUROMUSCULOSKELETAL MEDICINE & OMM

## 2025-03-14 RX ORDER — BUPRENORPHINE AND NALOXONE 8; 2 MG/1; MG/1
FILM, SOLUBLE BUCCAL; SUBLINGUAL
Qty: 20 FILM | Refills: 0 | Status: SHIPPED | OUTPATIENT
Start: 2025-03-14

## 2025-03-14 RX ORDER — TIZANIDINE 4 MG/1
4 TABLET ORAL EVERY 8 HOURS PRN
Qty: 90 TABLET | Refills: 0 | Status: SHIPPED | OUTPATIENT
Start: 2025-03-14

## 2025-03-14 NOTE — PATIENT INSTRUCTIONS
Contact the clinic with any concerns. If you feel the concerns are of a very serious (example: seizure, suicidal thoughts, hallucinations, severe illness) call 911 or go to the nearest emergency department.

## 2025-03-14 NOTE — PROGRESS NOTES
Addiction Medicine  ESTABLISHED PATIENT EVALUATION    Date of Service: 3/14/2025    Name:  Jasper Guerrero   MRN:  [id number]   :  1990   Address:   [address]  [facility] [address]   Phone:  [phone]        Subjective     2025   Jasper Guerrero presents today for evaluation and management of opioid dependence, chronic pain, anxiety, and benzodiazepine dependence.    Chart reviewed. Patient seen and examined.    Patient reports buprenorphine has been helpful. She notes that if she doesn't take her subsequent dose within 6-8 hours her pain returns.    We discussed chemical dependence to opioids and benzodiazepines today. Patient is interested in weaning off of Ativan in the future.    Patient requests refill on tizanidine. Chart review shows patient has been on tizanidine for many years. She states she has recently been out of this medication. This might explain some of her symptoms during her recent hospitalization, including her tachycardia and high blood pressure.    Patient reports continued neck and back pain. She also notes jaw/face pain on the right.  Patient denies radiating pain and denies focal weakness.     Interval History - 2025 - (HOSPITAL)   Chart reviewed. Patient seen and examined.    Patient stated the 4mg dose of buprenorphine was helpful. I saw her prior to her morning dose. Her BP and HR were elevated, though not too high. She states she was in pain. She was about to get her morning dose of buprenorphine. Overall patient seems to be doing well. She has an appointment with me tomorrow.      Initial Addiction Medicine Inpatient Consult - 2025 - (HOSPITAL)   HPI per primary service:  34 year old pt getting admitted with HTN urgency  Pt was admitted with same issue last month in this hospital  She was Discharged with BP medication and per pt she is compliant with medication intake  This time pt started having rash on face/tremors/Nausea vomiting  episodes  Symptoms went worse, she came to ER and was found to have High BP  Last timer when she was admitted Psychiatry consult was placed but pt rejected it   Per records pt  has opioid dependence and today UDS is positive for benzos  (She takes benzos at the moment for anxiety)  She was asking for pain medications upon discharge   Also said Percocet works better than Oak.     Addiction Medicine Consult (03/12/2025):  Chart reviewed. Patient seen and examined.  34-year-old female presenting to the emergency department with the vomiting, rash, and hypertension.  She also complains of chronic pain.  Her pain is in her neck and her low back.  She denies radiation of pain.  She denies any focal weakness.  Patient has been on opioids on and off for quite some time.  She states she has had back problems and neck problems since she was in high school.  She reports seeing chiropractic, PT, and Orthopedics.  She has taken muscle relaxer with some benefit.  Additionally the patient states she has anxiety and has been taking benzodiazepines for some time.  Currently she has taken Ativan.  She states she takes 1 mg daily; however, she is prescribed it 3 times daily according to the .  Patient states that Percocet has helped her the best for her pain management in the past.  She mentions Percocet multiple times to other providers.   does show multiple prescriptions for opioids over the past couple of years.  Additionally she has multiple hospital/ED encounters where she is given opioids.  Patient denies abusing opioids.  She states she takes them for her pain.  Patient does admit to occasionally taking an Ativan to help her with the anxiety that she gets from her chronic pain.     Additionally the patient states that she was taking Abilify and fluoxetine and BuSpar.  She stopped them a couple of weeks ago and she wonders if some of her symptoms may be related to that discontinuation.     Patient also relates that she  "has posttraumatic stress disorder from a dental procedure where the surgeon cut her lingual nerve.  She had significant pain from that and that may be where she started getting exposed to frequent doses of opioids.     The patient denies any history of substance use disorders in her immediate family.    The patient participates well with history.  Discussed issues related to pain, addiction, and chemical dependency.  Presented the patient with multiple options.  Patient open to a trial of buprenorphine to help with her pain.  Simultaneously with the work on manage her pain in healthy ways while we taper down buprenorphine.  Discussed with the patient the benefit of decreasing her Ativan use.  Patient is open to tapering this to cessation as well.  We will see patient in the outpatient setting.  Patient appears motivated to make positive change.        Objective     Vitals:    03/14/25 1457   BP: 110/70   Pulse: 98   Weight: 134.3 kg (296 lb 1.2 oz)   Height: 5' 5" (1.651 m)        Physical Exam  Constitutional:       General: She is not in acute distress.     Appearance: Normal appearance.   HENT:      Head: Normocephalic and atraumatic.   Eyes:      General: No scleral icterus.     Extraocular Movements: Extraocular movements intact.      Conjunctiva/sclera: Conjunctivae normal.      Pupils: Pupils are equal, round, and reactive to light.   Pulmonary:      Effort: Pulmonary effort is normal. No respiratory distress.   Musculoskeletal:      Comments: Tenderness to right TMJ.   Neurological:      Mental Status: She is alert and oriented to person, place, and time.   Psychiatric:         Attention and Perception: Attention normal.         Mood and Affect: Mood and affect normal.         Behavior: Behavior normal. Behavior is cooperative.         Thought Content: Thought content normal.          Assessment       ICD-10-CM ICD-9-CM   1. Opioid dependence on agonist therapy  F11.20 304.00   2. Chronic pain syndrome  " G89.4 338.4   3. Muscle spasm  M62.838 728.85   4. TMJPDS (temporomandibular joint pain dysfunction syndrome)  M26.629 524.69        Plan     Increase buprenorphine to 4 mg QID.  Will refill tizanidine. It is likely that patient has also has some tizanidine withdrawal. This would explain her hypertension and tachycardia during recent hospitalization.  Will evaluate pain next encounter.    Follow up in about 1 week (around 3/21/2025).    Today: Discussion, counseling, and education provided.  Provided anticipatory guidance regarding treatment.  Prescription Drug Management entails the review, recommendation, or consideration without recommendation of medications, and as such was employed during the encounter.   Recent diagnostic test results reviewed with the patient.        Orders Placed This Encounter    buprenorphine-naloxone 8-2 mg (SUBOXONE) 8-2 mg    tiZANidine (ZANAFLEX) 4 MG tablet             NOTE: Portions of this documentation were dictated using voice recognition software and may contain dictation related errors in spelling / grammar / syntax not discovered on text review.            ELINOR Chirinos DO    Board Certified, Addiction Medicine  Board Certified, Neuromusculoskeletal Medicine and [facility]  Department of Psychiatry, [facility]      Method of Encounter   IN PERSON visit at the clinic   Type of Encounter   Follow up visit with me   E/M Code   95932: Office E&M of an ESTABLISHED patient, level 5, (40 to 54 minutes)  , without modifiers -24,-25,-53: COMPLEXITY: Visit today included increased complexity associated with the care of the episodic problem(s) addressed and managing the longitudinal care of the patient due to the serious and/or complex managed problem(s). (See Assessment/Plan for problems addressed)     Separate from E/M (same visit)   None   Time   I spent a total of at least 40 minutes evaluating and managing the patient on the day of the service. This includes time reviewing the  patient's chart, performing a history and physical examination, patient education and counseling, documentation, and care coordination.

## 2025-03-14 NOTE — TELEPHONE ENCOUNTER
----- Message from Nurse Longoria sent at 3/14/2025 10:58 AM CDT -----  Call patient - needs post-hospital phone call within 2 business days and hospital follow up visit scheduled within 7-14 days.

## 2025-03-14 NOTE — TELEPHONE ENCOUNTER
Contacted pt to schedule an appointment regarding a referral placed for a tcc home visit with a nurse practitioner. Patient has been sheduled

## 2025-03-14 NOTE — TELEPHONE ENCOUNTER
Called pt, no BP med changes were made, has an appt in April, said up to [facility] if you want her in sooner for HFU

## 2025-03-17 ENCOUNTER — TELEPHONE (OUTPATIENT)
Dept: HOME HEALTH SERVICES | Facility: CLINIC | Age: 35
End: 2025-03-17
Payer: MEDICAID

## 2025-03-17 ENCOUNTER — PATIENT MESSAGE (OUTPATIENT)
Facility: CLINIC | Age: 35
End: 2025-03-17
Payer: MEDICAID

## 2025-03-17 ENCOUNTER — TELEPHONE (OUTPATIENT)
Dept: PSYCHIATRY | Facility: CLINIC | Age: 35
End: 2025-03-17
Payer: MEDICAID

## 2025-03-17 NOTE — TELEPHONE ENCOUNTER
Looks like her bp was good at her appt with Dr Chirinos and she has an appt with him this week. She can keep the appt next month

## 2025-03-18 NOTE — TELEPHONE ENCOUNTER
"patient wishes to cancel her TCC appt for 3/18/25, doesn't want to reschedule, states she has "too much going on" and has a follow up with her PCP soon.  "

## 2025-03-19 ENCOUNTER — PATIENT MESSAGE (OUTPATIENT)
Dept: FAMILY MEDICINE | Facility: CLINIC | Age: 35
End: 2025-03-19
Payer: MEDICAID

## 2025-03-20 ENCOUNTER — PATIENT MESSAGE (OUTPATIENT)
Dept: FAMILY MEDICINE | Facility: CLINIC | Age: 35
End: 2025-03-20
Payer: MEDICAID

## 2025-03-20 DIAGNOSIS — I10 PRIMARY HYPERTENSION: Primary | ICD-10-CM

## 2025-03-20 RX ORDER — LISINOPRIL 20 MG/1
20 TABLET ORAL DAILY
Qty: 30 TABLET | Refills: 0 | Status: SHIPPED | OUTPATIENT
Start: 2025-03-20 | End: 2026-03-20

## 2025-03-20 RX ORDER — METOPROLOL SUCCINATE 100 MG/1
100 TABLET, EXTENDED RELEASE ORAL 2 TIMES DAILY
Qty: 60 TABLET | Refills: 0 | Status: SHIPPED | OUTPATIENT
Start: 2025-03-20 | End: 2026-03-20

## 2025-03-21 ENCOUNTER — PATIENT MESSAGE (OUTPATIENT)
Facility: CLINIC | Age: 35
End: 2025-03-21
Payer: MEDICAID

## 2025-03-22 ENCOUNTER — PATIENT MESSAGE (OUTPATIENT)
Dept: FAMILY MEDICINE | Facility: CLINIC | Age: 35
End: 2025-03-22
Payer: MEDICAID

## 2025-03-22 DIAGNOSIS — E78.5 HYPERLIPIDEMIA, UNSPECIFIED HYPERLIPIDEMIA TYPE: ICD-10-CM

## 2025-03-22 DIAGNOSIS — I10 PRIMARY HYPERTENSION: ICD-10-CM

## 2025-03-22 DIAGNOSIS — E11.9 TYPE 2 DIABETES MELLITUS WITHOUT COMPLICATION, WITHOUT LONG-TERM CURRENT USE OF INSULIN: ICD-10-CM

## 2025-03-23 ENCOUNTER — PATIENT MESSAGE (OUTPATIENT)
Facility: CLINIC | Age: 35
End: 2025-03-23
Payer: MEDICAID

## 2025-03-24 RX ORDER — ATORVASTATIN CALCIUM 10 MG/1
10 TABLET, FILM COATED ORAL DAILY
Qty: 30 TABLET | Refills: 2 | Status: SHIPPED | OUTPATIENT
Start: 2025-03-24

## 2025-03-24 RX ORDER — AMLODIPINE BESYLATE 5 MG/1
5 TABLET ORAL DAILY
Qty: 30 TABLET | Refills: 2 | Status: SHIPPED | OUTPATIENT
Start: 2025-03-24

## 2025-03-25 ENCOUNTER — PATIENT MESSAGE (OUTPATIENT)
Facility: CLINIC | Age: 35
End: 2025-03-25
Payer: MEDICAID

## 2025-03-25 NOTE — TELEPHONE ENCOUNTER
Spoke with patient and advised that Dr. Chirinos is out of the office this week and it is advised that she go to the ER for evaluation. Patient stated that she will just resume the medication because the ER is not an option for her. I did schedule her a visit for when Dr. Chirinos returns so they can further discuss.

## 2025-03-27 ENCOUNTER — PATIENT MESSAGE (OUTPATIENT)
Dept: FAMILY MEDICINE | Facility: CLINIC | Age: 35
End: 2025-03-27
Payer: MEDICAID

## 2025-03-27 DIAGNOSIS — R11.0 NAUSEA: ICD-10-CM

## 2025-03-27 DIAGNOSIS — M62.838 MUSCLE SPASM: ICD-10-CM

## 2025-03-27 DIAGNOSIS — E11.9 TYPE 2 DIABETES MELLITUS WITHOUT COMPLICATION, WITHOUT LONG-TERM CURRENT USE OF INSULIN: ICD-10-CM

## 2025-03-27 RX ORDER — ONDANSETRON 4 MG/1
4 TABLET, FILM COATED ORAL EVERY 12 HOURS PRN
Qty: 20 TABLET | Refills: 0 | Status: SHIPPED | OUTPATIENT
Start: 2025-03-27

## 2025-03-31 ENCOUNTER — PATIENT MESSAGE (OUTPATIENT)
Facility: CLINIC | Age: 35
End: 2025-03-31
Payer: MEDICAID

## 2025-03-31 DIAGNOSIS — G89.4 CHRONIC PAIN SYNDROME: ICD-10-CM

## 2025-03-31 DIAGNOSIS — M62.838 MUSCLE SPASM: ICD-10-CM

## 2025-03-31 DIAGNOSIS — F11.20 UNCOMPLICATED OPIOID DEPENDENCE: Primary | ICD-10-CM

## 2025-03-31 RX ORDER — BUPRENORPHINE 2 MG/1
4 TABLET SUBLINGUAL 3 TIMES DAILY
Qty: 168 TABLET | Refills: 0 | Status: SHIPPED | OUTPATIENT
Start: 2025-03-31 | End: 2025-04-28

## 2025-03-31 RX ORDER — TIZANIDINE 4 MG/1
4 TABLET ORAL EVERY 8 HOURS PRN
Qty: 30 TABLET | Refills: 0 | Status: SHIPPED | OUTPATIENT
Start: 2025-03-31

## 2025-03-31 NOTE — PROGRESS NOTES
Responded to my chart message. Patient has appointment scheduled this week. Will switch back to tablets for buprenorphine and will give a short refill of tizanidine. Patient likely physiologically dependent on tizanidine. Tizanidine withdrawal likely related to her frequent tachycardia and high blood pressure hospitalizations. Patient appears to be using it more than prescribed. Will discuss taper with patient. May benefit from cross titration of tizanidine with guanfacine since guanfacine has a longer half life.

## 2025-04-02 ENCOUNTER — PATIENT MESSAGE (OUTPATIENT)
Facility: CLINIC | Age: 35
End: 2025-04-02
Payer: MEDICAID

## 2025-04-08 ENCOUNTER — PATIENT MESSAGE (OUTPATIENT)
Dept: FAMILY MEDICINE | Facility: CLINIC | Age: 35
End: 2025-04-08

## 2025-04-10 ENCOUNTER — OFFICE VISIT (OUTPATIENT)
Facility: CLINIC | Age: 35
End: 2025-04-10
Payer: MEDICAID

## 2025-04-10 VITALS
BODY MASS INDEX: 47.76 KG/M2 | DIASTOLIC BLOOD PRESSURE: 87 MMHG | WEIGHT: 286.69 LBS | HEART RATE: 96 BPM | SYSTOLIC BLOOD PRESSURE: 130 MMHG | HEIGHT: 65 IN

## 2025-04-10 DIAGNOSIS — F90.9 ATTENTION DEFICIT HYPERACTIVITY DISORDER (ADHD), UNSPECIFIED ADHD TYPE: ICD-10-CM

## 2025-04-10 DIAGNOSIS — F11.20 OPIOID DEPENDENCE ON AGONIST THERAPY: Primary | ICD-10-CM

## 2025-04-10 DIAGNOSIS — M99.01 SOMATIC DYSFUNCTION OF CERVICAL REGION: ICD-10-CM

## 2025-04-10 DIAGNOSIS — M62.838 MUSCLE SPASM: ICD-10-CM

## 2025-04-10 DIAGNOSIS — F19.939 WITHDRAWAL FROM OTHER PSYCHOACTIVE SUBSTANCE: ICD-10-CM

## 2025-04-10 DIAGNOSIS — M99.00 SOMATIC DYSFUNCTION OF HEAD REGION: ICD-10-CM

## 2025-04-10 DIAGNOSIS — M54.2 CERVICALGIA: ICD-10-CM

## 2025-04-10 DIAGNOSIS — G89.4 CHRONIC PAIN SYNDROME: ICD-10-CM

## 2025-04-10 PROCEDURE — 99213 OFFICE O/P EST LOW 20 MIN: CPT | Mod: PBBFAC,PN | Performed by: NEUROMUSCULOSKELETAL MEDICINE & OMM

## 2025-04-10 PROCEDURE — 98925 OSTEOPATH MANJ 1-2 REGIONS: CPT | Mod: PBBFAC,PN | Performed by: NEUROMUSCULOSKELETAL MEDICINE & OMM

## 2025-04-10 PROCEDURE — 99999 PR PBB SHADOW E&M-EST. PATIENT-LVL III: CPT | Mod: PBBFAC,,, | Performed by: NEUROMUSCULOSKELETAL MEDICINE & OMM

## 2025-04-10 RX ORDER — BUPRENORPHINE HYDROCHLORIDE 8 MG/1
TABLET SUBLINGUAL
Qty: 21 TABLET | Refills: 0 | Status: SHIPPED | OUTPATIENT
Start: 2025-04-10

## 2025-04-10 RX ORDER — GUANFACINE 1 MG/1
1 TABLET, EXTENDED RELEASE ORAL NIGHTLY
Qty: 30 TABLET | Refills: 0 | Status: SHIPPED | OUTPATIENT
Start: 2025-04-10

## 2025-04-10 NOTE — PROGRESS NOTES
"     Addiction Medicine  ESTABLISHED PATIENT EVALUATION    Date of Service: 4/10/2025    Name:  Jasper Guerrero   MRN:  [id number]   :  1990   [facility] [address]  [facility] [address]   Phone:  [phone]        Subjective     04/10/2025   Jasper Guerrero presents today for evaluation and management of chemical dependence and chronic pain.    Regarding buprenorphine, patient finds it helpful. She is currently taking 4mg BID.  Regarding tizanidine, the patient hasn't taken any in a week. She notes withdrawal symptoms of tachycardia, muscle spasms, elevated blood pressure, tremors, sweats and chills. Patient doesn't want to take any more tizanidine but would like help with withdrawal symptoms. Patient also notes a history of problems focusing on tasks. Her mother is present today in the encounter and states she has never had a formal diagnosis of ADHD but states she has always had problems focusing and staying on task.  Not currently employed.  No concerns regarding home-life.  Patient has some anxiety.  Difficulty managing anxiety.  Complains of neck and lower back pain.  Pain is rated as Moderate.   Pain occurs multiple times a day.  Character of the pain is described as dull, throbbing, and tight band.   Pain is manageable.  Aggravating factors include changing position, standing, moderate exertion, and stress.  Alleviating factors include rest and pain medication.  Patient denies radiating pain or focal weakness.  Discussed medications.  OMT provided today to address patient's pain.       Objective     Vitals:    04/10/25 0905   BP: 130/87   Pulse: 96   Weight: 130 kg (286 lb 11.3 oz)   Height: 5' 5" (1.651 m)        Physical Exam:  No acute distress. Normal appearance. No scleral icterus. Conjunctivae normal. Pulmonary effort is normal. Nor respiratory distress. Alert and oriented x 3. Attention and perception appear normal. Mood and affect appear normal. Speech normal. Patient is " cooperative.  Sensation intact in bilateral upper and lower extremities.  Motor strength 5/5 in bilateral upper and lower extremities.  DTRs normal in bilateral upper and lower extremities.  Somatic dysfunctions were found during today's physical exam. These dysfunctions are documented in the Osteopathic Manipulative Treatment (OMT) procedure note under the Assessment/Plan portion of today's note.       Assessment       ICD-10-CM ICD-9-CM   1. Attention deficit hyperactivity disorder (ADHD), unspecified ADHD type  F90.9 314.01   2. Withdrawal from other psychoactive substance  F19.939 292.0     305.90   3. Opioid dependence on agonist therapy  F11.20 304.00   4. Chronic pain syndrome  G89.4 338.4   5. Muscle spasm  M62.838 728.85        Plan     Neck pain addressed today. Secondary to somatic dysfunction and muscle spasm. Addressed SD with OMT today. Patient responded well. No need for imaging at this time.    Will continue buprenorphine PRN for pain. Patient doing well on 8 mg dosing. Requested tablets instead of films.    For tizanadine withdrawal and ADHD symptoms will try guanfacine 1 mg ER QHS. Will titrate dose to patient's response. Patient may need shorter acting medication during the day such as clonidine 0.1 mg but will hold off on that for now.    The patient's pain was determined to be related, in part, to somatic dysfunctions found on today's exam. The decision to perform OMT was not made prior to this encounter.  Prescription drug management entails the review, recommendation, or consideration without recommendation of medications, and as such was employed during the encounter.  Discussion, counseling, and education provided.  Provided anticipatory guidance regarding treatment.   reviewed and discussed with patient as warranted.  OMT provided (see procedure documentation)  Follow up 2 weeks.    Orders Placed This Encounter    guanFACINE 1 mg Tb24    buprenorphine HCL (SUBUTEX) 8 mg Subl         Osteopathic Manipulative Treatment - Procedure Note     Indications Use of OMT was determined to be medically necessary based on the patient's complaints and somatic dysfunctions found on today's structural examination, documented below.     Consent After discussing potential risks and benefits of OMT and offering the patient alternative options, the patient gave verbal consent for treatment. Goals of treatment include relieving pain, increasing functional movement, and removing impediments to arterial supply, venous and lymphatic return, and normal nerve transmission in the affected area.     Structural Examination Head: T.A.R.T. criteria met. Dysfunctions present: bilateral Suboccipital TTA, RIGHT rectus capitus posterior major TTA/T, and LEFT obliquus capitis superior TTA/T. Techniques used to treat this region: SCS, BLT, and MFR.  Neck: T.A.R.T. criteria met. Dysfunctions present: BILATERAL paraspinal TTA, C2 FRS RIGHT, C3 FRS RIGHT, C5 FRS LEFT, C6 FRS LEFT, RIGHT Anterior and middle scalene TTA/T, and LEFT Anterior and middle scalene TTA/T. Techniques used to treat this region: SCS, LVMA, BLT, HVLA, and MFR.     Procedure  The patient was placed initially in the supine position and the regions were palpated for tissue texture abnormalities, tenderness, motion restrictions, and asymmetries. The patient's position was changed as necessary for treatment of individual somatic dysfunctions.The selection of technique and regional applications were guided by patient feedback and palpatory changes during the procedure. The treatments provided are described in each region evaluated as listed above.     Disposition The procedure was well tolerated by the patient. The patient indicated some improvement in symptoms at the end of the encounter.      Education/  Counseling Addressed expectations.                 ELINOR Chirinos DO    Board Certified, Addiction Medicine  Board Certified, Neuromusculoskeletal Medicine and  OMM    Portions of this documentation may have been dictated using voice recognition software and may contain dictation related errors in spelling / grammar / syntax not discovered on text review.      Subsequent patient encounter.  Clinic encounter.  42952: Office E&M of an ESTABLISHED patient, level 5, (40 to 54 minutes)  84914 - OMT; 1-2 body regions involved  I spent a total of at least 40 minutes evaluating and managing the patient on the day of the service. This includes time reviewing the patient's chart, performing a history and physical examination, patient education and counseling, documentation, and care coordination. Any time spent performing same day procedures is not included in the calculation for level of care.

## 2025-04-13 ENCOUNTER — PATIENT MESSAGE (OUTPATIENT)
Dept: FAMILY MEDICINE | Facility: CLINIC | Age: 35
End: 2025-04-13
Payer: MEDICAID

## 2025-04-13 DIAGNOSIS — E11.9 TYPE 2 DIABETES MELLITUS WITHOUT COMPLICATION, WITHOUT LONG-TERM CURRENT USE OF INSULIN: ICD-10-CM

## 2025-04-14 RX ORDER — METFORMIN HYDROCHLORIDE 500 MG/1
500 TABLET ORAL
Qty: 90 TABLET | Refills: 0 | Status: SHIPPED | OUTPATIENT
Start: 2025-04-14

## 2025-04-19 ENCOUNTER — PATIENT MESSAGE (OUTPATIENT)
Dept: FAMILY MEDICINE | Facility: CLINIC | Age: 35
End: 2025-04-19
Payer: MEDICAID

## 2025-04-19 DIAGNOSIS — I10 PRIMARY HYPERTENSION: ICD-10-CM

## 2025-04-21 RX ORDER — LISINOPRIL 20 MG/1
20 TABLET ORAL DAILY
Qty: 30 TABLET | Refills: 0 | Status: SHIPPED | OUTPATIENT
Start: 2025-04-21 | End: 2026-04-21

## 2025-04-22 ENCOUNTER — PATIENT MESSAGE (OUTPATIENT)
Dept: FAMILY MEDICINE | Facility: CLINIC | Age: 35
End: 2025-04-22
Payer: MEDICAID

## 2025-04-22 DIAGNOSIS — I10 PRIMARY HYPERTENSION: ICD-10-CM

## 2025-04-23 ENCOUNTER — PATIENT MESSAGE (OUTPATIENT)
Facility: CLINIC | Age: 35
End: 2025-04-23
Payer: MEDICAID

## 2025-04-23 RX ORDER — METOPROLOL SUCCINATE 100 MG/1
100 TABLET, EXTENDED RELEASE ORAL 2 TIMES DAILY
Qty: 60 TABLET | Refills: 1 | Status: SHIPPED | OUTPATIENT
Start: 2025-04-23 | End: 2026-04-23

## 2025-04-24 ENCOUNTER — PATIENT MESSAGE (OUTPATIENT)
Facility: CLINIC | Age: 35
End: 2025-04-24
Payer: MEDICAID

## 2025-04-24 DIAGNOSIS — F11.20 OPIOID DEPENDENCE ON AGONIST THERAPY: ICD-10-CM

## 2025-04-24 RX ORDER — BUPRENORPHINE HYDROCHLORIDE 8 MG/1
TABLET SUBLINGUAL
Qty: 21 TABLET | Refills: 0 | Status: SHIPPED | OUTPATIENT
Start: 2025-04-24

## 2025-04-26 ENCOUNTER — PATIENT MESSAGE (OUTPATIENT)
Facility: CLINIC | Age: 35
End: 2025-04-26
Payer: MEDICAID

## 2025-04-28 ENCOUNTER — CLINICAL SUPPORT (OUTPATIENT)
Dept: PSYCHIATRY | Facility: CLINIC | Age: 35
End: 2025-04-28
Payer: MEDICAID

## 2025-04-28 DIAGNOSIS — F11.20 UNCOMPLICATED OPIOID DEPENDENCE: Primary | ICD-10-CM

## 2025-04-28 DIAGNOSIS — G89.4 CHRONIC PAIN SYNDROME: ICD-10-CM

## 2025-04-28 PROCEDURE — 96372 THER/PROPH/DIAG INJ SC/IM: CPT | Mod: PBBFAC,PN

## 2025-04-28 PROCEDURE — 99999PBSHW PR PBB SHADOW TECHNICAL ONLY FILED TO HB: Mod: JZ,PBBFAC,,

## 2025-04-28 RX ADMIN — BUPRENORPHINE 300 MG: 300 SOLUTION SUBCUTANEOUS at 01:04

## 2025-04-28 NOTE — PROGRESS NOTES
Administered Sublocade 300 mg SQ.   NDC- [address]-3  LOT- [id number]  Exp- 12-31-25  Patient tolerated well. No bleeding at insertion site noted. Pain scale 0/10 with injection. 2 patient identifiers used (name, ), aseptic technique maintained.

## 2025-05-02 ENCOUNTER — PATIENT MESSAGE (OUTPATIENT)
Dept: FAMILY MEDICINE | Facility: CLINIC | Age: 35
End: 2025-05-02
Payer: MEDICAID

## 2025-05-02 DIAGNOSIS — R11.0 NAUSEA: ICD-10-CM

## 2025-05-02 RX ORDER — ONDANSETRON 4 MG/1
4 TABLET, FILM COATED ORAL EVERY 12 HOURS PRN
Qty: 20 TABLET | Refills: 0 | Status: SHIPPED | OUTPATIENT
Start: 2025-05-02

## 2025-05-15 ENCOUNTER — PATIENT MESSAGE (OUTPATIENT)
Dept: FAMILY MEDICINE | Facility: CLINIC | Age: 35
End: 2025-05-15
Payer: MEDICAID

## 2025-05-15 DIAGNOSIS — E11.9 TYPE 2 DIABETES MELLITUS WITHOUT COMPLICATION, WITHOUT LONG-TERM CURRENT USE OF INSULIN: ICD-10-CM

## 2025-05-23 ENCOUNTER — PATIENT MESSAGE (OUTPATIENT)
Dept: ADMINISTRATIVE | Facility: HOSPITAL | Age: 35
End: 2025-05-23
Payer: MEDICAID

## 2025-05-23 ENCOUNTER — OFFICE VISIT (OUTPATIENT)
Dept: FAMILY MEDICINE | Facility: CLINIC | Age: 35
End: 2025-05-23
Payer: MEDICAID

## 2025-05-23 VITALS — OXYGEN SATURATION: 97 % | HEART RATE: 73 BPM | BODY MASS INDEX: 44.48 KG/M2 | WEIGHT: 267 LBS | HEIGHT: 65 IN

## 2025-05-23 DIAGNOSIS — E11.9 TYPE 2 DIABETES MELLITUS WITHOUT COMPLICATION, WITHOUT LONG-TERM CURRENT USE OF INSULIN: Primary | ICD-10-CM

## 2025-05-23 DIAGNOSIS — I10 PRIMARY HYPERTENSION: ICD-10-CM

## 2025-05-23 DIAGNOSIS — L71.0 PERIORAL DERMATITIS: ICD-10-CM

## 2025-05-23 DIAGNOSIS — K59.00 CONSTIPATION, UNSPECIFIED CONSTIPATION TYPE: ICD-10-CM

## 2025-05-23 RX ORDER — ARIPIPRAZOLE 5 MG/1
5 TABLET ORAL
COMMUNITY
Start: 2025-04-17

## 2025-05-23 RX ORDER — AZELAIC ACID 0.15 G/G
GEL TOPICAL 2 TIMES DAILY
Qty: 50 G | Refills: 0 | Status: SHIPPED | OUTPATIENT
Start: 2025-05-23

## 2025-05-23 RX ORDER — PSYLLIUM SEED
PACKET (EA) ORAL
Qty: 660 G | Refills: 0 | Status: SHIPPED | OUTPATIENT
Start: 2025-05-23

## 2025-05-23 RX ORDER — LISINOPRIL 20 MG/1
20 TABLET ORAL DAILY
Qty: 90 TABLET | Refills: 1 | Status: SHIPPED | OUTPATIENT
Start: 2025-05-23

## 2025-05-23 RX ORDER — METFORMIN HYDROCHLORIDE 500 MG/1
500 TABLET ORAL
Qty: 90 TABLET | Refills: 1 | Status: SHIPPED | OUTPATIENT
Start: 2025-05-23

## 2025-05-23 RX ORDER — AMLODIPINE BESYLATE 5 MG/1
5 TABLET ORAL DAILY
Qty: 90 TABLET | Refills: 1 | Status: SHIPPED | OUTPATIENT
Start: 2025-05-23

## 2025-05-23 NOTE — PROGRESS NOTES
SUBJECTIVE:      Patient ID: Jasper Guerrero is a 34 y.o. female.    Chief Complaint: Hypertension and Rash (face)    HPI  History of Present Illness    CHIEF COMPLAINT:  Jasper presents today for follow up on dm and htn.  She follows with Dr. Chirinos for opiate dependence, reporting it is adequately controlled.       DIABETES:  She continues Metformin and her A1C was 6.1 in March.     WEIGHT MANAGEMENT:  She has been on mounjaro 10mg for weight loss and DM since January. She has lost weight and has been eating healthier. Feeling good.    GASTROINTESTINAL:  She reports having bowel movements occurring approximately every other day.    DERMATOLOGIC:  She reports recurrent non-itchy facial rash, denying that lesions are pimples.     PSYCHIATRIC CARE:  She continues follow up with psychiatrist every 4-8 weeks       Past Surgical History:   Procedure Laterality Date    CARPAL TUNNEL RELEASE Right 12/27/2018    Dr Lozada     CARPAL TUNNEL RELEASE Left 01/28/2020    Procedure: RELEASE, CARPAL TUNNEL;  Surgeon: Brendon Lozada Jr., MD;  Location: [facility];  Service: Orthopedics;  Laterality: Left;    CHOLECYSTECTOMY N/A 10/31/2022    MOUTH SURGERY      NERVE GRAFT  2016    WISDOM TOOTH EXTRACTION       Family History   Problem Relation Name Age of Onset    Depression Mother      Diabetes Mother      No Known Problems Father      No Known Problems Daughter      No Known Problems Son      Cancer Maternal Aunt          endometrial    No Known Problems Maternal Uncle      No Known Problems Paternal Aunt      No Known Problems Paternal Uncle      No Known Problems Maternal Grandmother      No Known Problems Maternal Grandfather      No Known Problems Paternal Grandmother      No Known Problems Paternal Grandfather        Social History     Socioeconomic History    Marital status:    Tobacco Use    Smoking status: Never    Smokeless tobacco: Never   Substance and Sexual Activity    Alcohol use: Yes     Comment:  "occasional    Drug use: No    Sexual activity: Yes     Partners: Male     Social Drivers of Health     Financial Resource Strain: Low Risk  (3/12/2025)    Overall Financial Resource Strain (CARDIA)     Difficulty of Paying Living Expenses: Not hard at all   Food Insecurity: No Food Insecurity (3/12/2025)    Hunger Vital Sign     Worried About Running Out of Food in the Last Year: Never true     Ran Out of Food in the Last Year: Never true   Transportation Needs: No Transportation Needs (6/2/2024)    Received from [facility]    PRAPARE - Transportation     Lack of Transportation (Medical): No     Lack of Transportation (Non-Medical): No   Physical Activity: Inactive (6/2/2024)    Received from [facility]    Exercise Vital Sign     Days of Exercise per Week: 0 days     Minutes of Exercise per Session: 0 min   Stress: No Stress Concern Present (3/12/2025)    [employer] - Occupational Stress Questionnaire     Feeling of Stress : Not at all   Housing Stability: Low Risk  (3/12/2025)    Housing Stability Vital Sign     Unable to Pay for Housing in the Last Year: No     Homeless in the Last Year: No     Current Medications[1]  Review of patient's allergies indicates:   Allergen Reactions    Benadryl [diphenhydramine hcl]      Paralysis on right side body     Codeine      Paralysis right body    Flexeril [cyclobenzaprine] Other (See Comments)     Numbness on right side of body    Nsaids (non-steroidal anti-inflammatory drug) Diarrhea, Nausea And Vomiting and Other (See Comments)     Ulcers    Penicillins Anaphylaxis and Hives    Droperidol Anxiety and Other (See Comments)    Magnesium      "made me feel horrible"     Prochlorperazine Anxiety and Palpitations      Past Medical History:   Diagnosis Date    Agoraphobia     Carpal tunnel syndrome     Depression     Diabetes mellitus, type 2     DVT (deep venous thrombosis)     2019 had blood clot in right arm    Fibromyalgia     Hypertension     " "Migraine headache     Nerve injury 08/2016    Lingual Nerve Injury    Obese     Pain management     PTSD (post-traumatic stress disorder)     Trigeminal neuralgia      Past Surgical History:   Procedure Laterality Date    CARPAL TUNNEL RELEASE Right 12/27/2018    Dr Lozada     CARPAL TUNNEL RELEASE Left 01/28/2020    Procedure: RELEASE, CARPAL TUNNEL;  Surgeon: Brendon Lozada Jr., MD;  Location: [facility];  Service: Orthopedics;  Laterality: Left;    CHOLECYSTECTOMY N/A 10/31/2022    MOUTH SURGERY      NERVE GRAFT  2016    WISDOM TOOTH EXTRACTION         Review of Systems   Constitutional:  Negative for activity change, appetite change, chills, diaphoresis and unexpected weight change.   HENT:  Negative for ear discharge, hearing loss, rhinorrhea, trouble swallowing and voice change.    Eyes:  Negative for photophobia, pain, discharge and visual disturbance.   Respiratory:  Negative for chest tightness, wheezing and stridor.    Cardiovascular:  Negative for chest pain and palpitations.   Gastrointestinal:  Negative for abdominal pain, blood in stool, constipation, diarrhea and vomiting.   Endocrine: Negative for cold intolerance, heat intolerance, polydipsia and polyuria.   Genitourinary:  Negative for difficulty urinating, dysuria, flank pain, hematuria and menstrual problem.   Musculoskeletal:  Negative for arthralgias, joint swelling, neck pain and neck stiffness.   Skin:  Negative for pallor.   Neurological:  Negative for dizziness, speech difficulty, weakness and headaches.   Hematological:  Does not bruise/bleed easily.   Psychiatric/Behavioral:  Negative for confusion, dysphoric mood, self-injury, sleep disturbance and suicidal ideas. The patient is not nervous/anxious.       OBJECTIVE:      Vitals:    05/23/25 1019   BP: (P) 100/70   Pulse: 73   SpO2: 97%   Weight: 121.1 kg (267 lb)   Height: 5' 5" (1.651 m)     Physical Exam  Vitals and nursing note reviewed.   Constitutional:       General: She is not in " acute distress.     Appearance: She is well-developed.   HENT:      Head: Normocephalic and atraumatic.      Right Ear: Tympanic membrane normal.      Left Ear: Tympanic membrane normal.      Nose: Nose normal.      Mouth/Throat:      Pharynx: Uvula midline.   Eyes:      General: Lids are normal.      Conjunctiva/sclera: Conjunctivae normal.      Pupils: Pupils are equal, round, and reactive to light.      Right eye: Pupil is round and reactive.      Left eye: Pupil is round and reactive.   Neck:      Thyroid: No thyromegaly.      Vascular: No JVD.      Trachea: Trachea normal.   Cardiovascular:      Rate and Rhythm: Normal rate and regular rhythm.      Pulses: Normal pulses.      Heart sounds: Normal heart sounds. No murmur heard.  Pulmonary:      Effort: Pulmonary effort is normal. No tachypnea or respiratory distress.      Breath sounds: Normal breath sounds. No wheezing, rhonchi or rales.   Abdominal:      General: Bowel sounds are normal.      Palpations: Abdomen is soft.      Tenderness: There is no abdominal tenderness.   Musculoskeletal:         General: Normal range of motion.      Cervical back: Normal range of motion and neck supple.      Right lower leg: No edema.      Left lower leg: No edema.      Right foot: No deformity.      Left foot: No deformity.   Feet:      Right foot:      Protective Sensation: 10 sites tested.  10 sites sensed.      Skin integrity: No ulcer, blister or skin breakdown.      Left foot:      Protective Sensation: 10 sites tested.  10 sites sensed.      Skin integrity: No ulcer, blister or skin breakdown.   Lymphadenopathy:      Cervical: No cervical adenopathy.   Skin:     General: Skin is warm and dry.      Findings: No rash.      Comments: Perioral rash noted   Neurological:      Mental Status: She is alert and oriented to person, place, and time.   Psychiatric:         Mood and Affect: Mood normal.         Speech: Speech normal.         Behavior: Behavior normal. Behavior is  cooperative.         Thought Content: Thought content normal.         Judgment: Judgment normal.       No visits with results within 1 Month(s) from this visit.   Latest known visit with results is:   Admission on 03/11/2025, Discharged on 03/13/2025   Component Date Value Ref Range Status    Magnesium 03/11/2025 1.5 (L)  1.6 - 2.6 mg/dL Final    QRS Duration 03/11/2025 100  ms Final    [facility] QTC Calculation 03/11/2025 449  ms Final    WBC 03/11/2025 16.54 (H)  3.90 - 12.70 K/uL Final    RBC 03/11/2025 4.68  4.00 - 5.40 M/uL Final    Hemoglobin 03/11/2025 14.1  12.0 - 16.0 g/dL Final    Hematocrit 03/11/2025 41.6  37.0 - 48.5 % Final    MCV 03/11/2025 89  82 - 98 fL Final    MCH 03/11/2025 30.1  27.0 - 31.0 pg Final    MCHC 03/11/2025 33.9  32.0 - 36.0 g/dL Final    RDW 03/11/2025 13.4  11.5 - 14.5 % Final    Platelets 03/11/2025 410  150 - 450 K/uL Final    MPV 03/11/2025 9.2  9.2 - 12.9 fL Final    Immature Granulocytes 03/11/2025 0.7 (H)  0.0 - 0.5 % Final    Gran # (ANC) 03/11/2025 13.8 (H)  1.8 - 7.7 K/uL Final    Immature Grans (Abs) 03/11/2025 0.11 (H)  0.00 - 0.04 K/uL Final    Comment: Mild elevation in immature granulocytes is non specific and   can be seen in a variety of conditions including stress response,   acute inflammation, trauma and pregnancy. Correlation with other   laboratory and clinical findings is essential.      Lymph # 03/11/2025 1.9  1.0 - 4.8 K/uL Final    Mono # 03/11/2025 0.7  0.3 - 1.0 K/uL Final    Eos # 03/11/2025 0.0  0.0 - 0.5 K/uL Final    Baso # 03/11/2025 0.05  0.00 - 0.20 K/uL Final    nRBC 03/11/2025 0  0 /100 WBC Final    Gran % 03/11/2025 83.5 (H)  38.0 - 73.0 % Final    Lymph % 03/11/2025 11.4 (L)  18.0 - 48.0 % Final    Mono % 03/11/2025 4.0  4.0 - 15.0 % Final    Eosinophil % 03/11/2025 0.1  0.0 - 8.0 % Final    Basophil % 03/11/2025 0.3  0.0 - 1.9 % Final    Differential Method 03/11/2025 Automated   Final    Sodium 03/11/2025 140  136 - 145 mmol/L Final    Potassium  03/11/2025 3.7  3.5 - 5.1 mmol/L Final    Chloride 03/11/2025 105  95 - 110 mmol/L Final    CO2 03/11/2025 20 (L)  23 - 29 mmol/L Final    Glucose 03/11/2025 194 (H)  70 - 110 mg/dL Final    BUN 03/11/2025 9  6 - 20 mg/dL Final    Creatinine 03/11/2025 1.1  0.5 - 1.4 mg/dL Final    Calcium 03/11/2025 10.1  8.7 - 10.5 mg/dL Final    Total Protein 03/11/2025 7.9  6.0 - 8.4 g/dL Final    Albumin 03/11/2025 4.7  3.5 - 5.2 g/dL Final    Total Bilirubin 03/11/2025 0.5  0.1 - 1.0 mg/dL Final    Comment: For infants and newborns, interpretation of results should be based  on gestational age, weight and in agreement with clinical  observations.    Premature Infant recommended reference ranges:  Up to 24 hours.............<8.0 mg/dL  Up to 48 hours............<12.0 mg/dL  3-5 days..................<15.0 mg/dL  6-29 days.................<15.0 mg/dL      Alkaline Phosphatase 03/11/2025 63  55 - 135 U/L Final    AST 03/11/2025 17  10 - 40 U/L Final    ALT 03/11/2025 17  10 - 44 U/L Final    eGFR 03/11/2025 >60.0  >60 mL/min/1.73 m^2 Final    Anion Gap 03/11/2025 15  8 - 16 mmol/L Final    Troponin I High Sensitivity 03/11/2025 7.9  0.0 - 14.9 pg/mL Final    Comment: Troponin results differ between methods. Do not use   results between Troponin methods interchangeably.    Alkaline Phospatase levels above 400 U/L may   cause false positive results.    Access hsTnI should not be used for patients taking   Asfotase elizabeth (Strensiq).      BNP 03/11/2025 60  0 - 99 pg/mL Final    Values of less than 100 pg/ml are consistent with non-CHF populations.    POC Preg Test, Ur 03/11/2025 Negative  Negative Final     Acceptable 03/11/2025 Yes   Final    Specimen UA 03/11/2025 Urine, Clean Catch   Final    Color, UA 03/11/2025 Yellow  Yellow, Straw, Cynthia Final    Appearance, UA 03/11/2025 Clear  Clear Final    pH, UA 03/11/2025 7.0  5.0 - 8.0 Final    Specific Gravity, UA 03/11/2025 1.030  1.005 - 1.030 Final    Protein, UA  03/11/2025 1+ (A)  Negative Final    Comment: Recommend a 24 hour urine protein or a urine   protein/creatinine ratio if globulin induced proteinuria is  clinically suspected.      Glucose, UA 03/11/2025 3+ (A)  Negative Final    Ketones, UA 03/11/2025 1+ (A)  Negative Final    Bilirubin (UA) 03/11/2025 Negative  Negative Final    Occult Blood UA 03/11/2025 Negative  Negative Final    Nitrite, UA 03/11/2025 Negative  Negative Final    Urobilinogen, UA 03/11/2025 Negative  Negative EU/dL Final    Leukocytes, UA 03/11/2025 Trace (A)  Negative Final    CPK 03/11/2025 116  20 - 180 U/L Final    D-Dimer 03/11/2025 0.42  0.00 - 0.49 mg/L FEU Final    Comment: The quantitative D-dimer assay should be used as an aid in   the diagnosis of deep vein thrombosis and pulmonary embolism  in patients with the appropriate presentation and clinical  history. The upper limit of the reference interval and the clinical   cut off   point are identical. Causes of a positive (>0.50 mg/L FEU) D-Dimer   test  include, but are not limited to: DVT, PE, DIC, thrombolytic   therapy, anticoagulant therapy, recent surgery, trauma, or   pregnancy, disseminated malignancy, aortic aneurysm, cirrhosis,  and severe infection. False negative results may occur in   patients with distal DVT.      Troponin I High Sensitivity 03/11/2025 8.6  0.0 - 14.9 pg/mL Final    Comment: Troponin results differ between methods. Do not use   results between Troponin methods interchangeably.    Alkaline Phospatase levels above 400 U/L may   cause false positive results.    Access hsTnI should not be used for patients taking   Asfotase elizabeth (Strensiq).      RBC, UA 03/11/2025 1  0 - 4 /hpf Final    WBC, UA 03/11/2025 4  0 - 5 /hpf Final    Bacteria 03/11/2025 None  None-Occ /hpf Final    Yeast, UA 03/11/2025 None  None Final    Squam Epithel, UA 03/11/2025 5  /hpf Final    Hyaline Casts, UA 03/11/2025 4 (A)  0-1/lpf /lpf Final    Microscopic Comment 03/11/2025 SEE  COMMENT   Final    Comment: Other formed elements not mentioned in the report are not   present in the microscopic examination.       Benzodiazepines 03/11/2025 Presumptive Positive (A)  Negative Final    Cocaine (Metab.) 03/11/2025 Negative  Negative Final    Opiate Scrn, Ur 03/11/2025 Negative  Negative Final    Barbiturate Screen, Ur 03/11/2025 Negative  Negative Final    Amphetamine Screen, Ur 03/11/2025 Negative  Negative Final    THC 03/11/2025 Negative  Negative Final    Phencyclidine 03/11/2025 Negative  Negative Final    Creatinine, Urine 03/11/2025 261.7  15.0 - 325.0 mg/dL Final    Comment: The random urine reference ranges provided were established   for 24 hour urine collections.  No reference ranges exist for  random urine specimens.  Correlate clinically.      Toxicology Information 03/11/2025 SEE COMMENT   Final    Comment: This screen includes the following classes of drugs at the   listed cut-off:    Benzodiazepines                  200 ng/ml  Cocaine metabolite               300 ng/ml  Opiates                          300 ng/ml  Barbiturates                     200 ng/ml  Amphetamines                    1000 ng/ml  Marijuana metabs (THC)            50 ng/ml  Phencyclidine (PCP)               25 ng/ml    High concentrations of Methylenedioxymethamphetamine (MDMA aka  Ectasy) and other structurally similar compounds may cross-   react with the Amphetamine/Methamphetamine screening   immunoassay giving a false positive result.    Note: This exception list includes only more common   interferants in toxicology screen testing.  Because of many   cross-reactantspositive results on toxicology drug screens   should be confirmed whenever results do not correlate with   clinical presentation.    This report is intended for use in clinical monitoring and  management of patients. It is not intended for use in   em                           ployment related drug testing.    Because of any cross-reactants,  positive results on toxicology  drug screens should be confirmed whenever results do not  correlate with clinical presentation.    Presumptive positive results are unconfirmed and may be used   only for medical purposes.      POCT Glucose 03/11/2025 153 (H)  70 - 110 mg/dL Final    POCT Glucose 03/11/2025 167 (H)  70 - 110 mg/dL Final    Sodium 03/12/2025 139  136 - 145 mmol/L Final    Potassium 03/12/2025 3.5  3.5 - 5.1 mmol/L Final    Chloride 03/12/2025 105  95 - 110 mmol/L Final    CO2 03/12/2025 26  23 - 29 mmol/L Final    Glucose 03/12/2025 165 (H)  70 - 110 mg/dL Final    BUN 03/12/2025 9  6 - 20 mg/dL Final    Creatinine 03/12/2025 0.7  0.5 - 1.4 mg/dL Final    Calcium 03/12/2025 9.0  8.7 - 10.5 mg/dL Final    Total Protein 03/12/2025 6.7  6.0 - 8.4 g/dL Final    Albumin 03/12/2025 4.0  3.5 - 5.2 g/dL Final    Total Bilirubin 03/12/2025 0.5  0.1 - 1.0 mg/dL Final    Comment: For infants and newborns, interpretation of results should be based  on gestational age, weight and in agreement with clinical  observations.    Premature Infant recommended reference ranges:  Up to 24 hours.............<8.0 mg/dL  Up to 48 hours............<12.0 mg/dL  3-5 days..................<15.0 mg/dL  6-29 days.................<15.0 mg/dL      Alkaline Phosphatase 03/12/2025 46 (L)  55 - 135 U/L Final    AST 03/12/2025 10  10 - 40 U/L Final    ALT 03/12/2025 12  10 - 44 U/L Final    eGFR 03/12/2025 >60.0  >60 mL/min/1.73 m^2 Final    Anion Gap 03/12/2025 8  8 - 16 mmol/L Final    Magnesium 03/12/2025 1.7  1.6 - 2.6 mg/dL Final    WBC 03/12/2025 12.70  3.90 - 12.70 K/uL Final    RBC 03/12/2025 4.03  4.00 - 5.40 M/uL Final    Hemoglobin 03/12/2025 11.9 (L)  12.0 - 16.0 g/dL Final    Hematocrit 03/12/2025 36.7 (L)  37.0 - 48.5 % Final    MCV 03/12/2025 91  82 - 98 fL Final    MCH 03/12/2025 29.5  27.0 - 31.0 pg Final    MCHC 03/12/2025 32.4  32.0 - 36.0 g/dL Final    RDW 03/12/2025 13.8  11.5 - 14.5 % Final    Platelets 03/12/2025  299  150 - 450 K/uL Final    MPV 03/12/2025 9.1 (L)  9.2 - 12.9 fL Final    Immature Granulocytes 03/12/2025 0.3  0.0 - 0.5 % Final    Gran # (ANC) 03/12/2025 8.5 (H)  1.8 - 7.7 K/uL Final    Immature Grans (Abs) 03/12/2025 0.04  0.00 - 0.04 K/uL Final    Comment: Mild elevation in immature granulocytes is non specific and   can be seen in a variety of conditions including stress response,   acute inflammation, trauma and pregnancy. Correlation with other   laboratory and clinical findings is essential.      Lymph # 03/12/2025 3.0  1.0 - 4.8 K/uL Final    Mono # 03/12/2025 1.1 (H)  0.3 - 1.0 K/uL Final    Eos # 03/12/2025 0.0  0.0 - 0.5 K/uL Final    Baso # 03/12/2025 0.02  0.00 - 0.20 K/uL Final    nRBC 03/12/2025 0  0 /100 WBC Final    Gran % 03/12/2025 66.8  38.0 - 73.0 % Final    Lymph % 03/12/2025 23.6  18.0 - 48.0 % Final    Mono % 03/12/2025 8.9  4.0 - 15.0 % Final    Eosinophil % 03/12/2025 0.2  0.0 - 8.0 % Final    Basophil % 03/12/2025 0.2  0.0 - 1.9 % Final    Differential Method 03/12/2025 Automated   Final    POCT Glucose 03/12/2025 127 (H)  70 - 110 mg/dL Final    Hemoglobin A1C 03/12/2025 6.1  4.5 - 6.2 % Final    Comment: According to ADA guidelines, hemoglobin A1C <7.0% represents  optimal control in non-pregnant diabetic patients.  Different  metrics may apply to specific populations.   Standards of Medical Care in Diabetes - 2016.    For the purpose of screening for the presence of diabetes:  <5.7%     Consistent with the absence of diabetes  5.7-6.4%  Consistent with increasing risk for diabetes   (prediabetes)  >or=6.5%  Consistent with diabetes    Currently no consensus exists for use of hemoglobin A1C  for diagnosis of diabetes for children.      Estimated Avg Glucose 03/12/2025 128  68 - 131 mg/dL Final    POCT Glucose 03/12/2025 127 (H)  70 - 110 mg/dL Final    POCT Glucose 03/12/2025 115 (H)  70 - 110 mg/dL Final    POCT Glucose 03/12/2025 119 (H)  70 - 110 mg/dL Final    Sodium 03/13/2025  141  136 - 145 mmol/L Final    Potassium 03/13/2025 3.8  3.5 - 5.1 mmol/L Final    Chloride 03/13/2025 106  95 - 110 mmol/L Final    CO2 03/13/2025 27  23 - 29 mmol/L Final    Glucose 03/13/2025 127 (H)  70 - 110 mg/dL Final    BUN 03/13/2025 7  6 - 20 mg/dL Final    Creatinine 03/13/2025 0.8  0.5 - 1.4 mg/dL Final    Calcium 03/13/2025 9.3  8.7 - 10.5 mg/dL Final    Total Protein 03/13/2025 6.9  6.0 - 8.4 g/dL Final    Albumin 03/13/2025 4.2  3.5 - 5.2 g/dL Final    Total Bilirubin 03/13/2025 0.5  0.1 - 1.0 mg/dL Final    Comment: For infants and newborns, interpretation of results should be based  on gestational age, weight and in agreement with clinical  observations.    Premature Infant recommended reference ranges:  Up to 24 hours.............<8.0 mg/dL  Up to 48 hours............<12.0 mg/dL  3-5 days..................<15.0 mg/dL  6-29 days.................<15.0 mg/dL      Alkaline Phosphatase 03/13/2025 52 (L)  55 - 135 U/L Final    AST 03/13/2025 13  10 - 40 U/L Final    ALT 03/13/2025 13  10 - 44 U/L Final    eGFR 03/13/2025 >60.0  >60 mL/min/1.73 m^2 Final    Anion Gap 03/13/2025 8  8 - 16 mmol/L Final    Magnesium 03/13/2025 2.1  1.6 - 2.6 mg/dL Final    WBC 03/13/2025 11.58  3.90 - 12.70 K/uL Final    RBC 03/13/2025 4.50  4.00 - 5.40 M/uL Final    Hemoglobin 03/13/2025 13.1  12.0 - 16.0 g/dL Final    Hematocrit 03/13/2025 41.8  37.0 - 48.5 % Final    MCV 03/13/2025 93  82 - 98 fL Final    MCH 03/13/2025 29.1  27.0 - 31.0 pg Final    MCHC 03/13/2025 31.3 (L)  32.0 - 36.0 g/dL Final    RDW 03/13/2025 13.9  11.5 - 14.5 % Final    Platelets 03/13/2025 297  150 - 450 K/uL Final    MPV 03/13/2025 9.1 (L)  9.2 - 12.9 fL Final    Immature Granulocytes 03/13/2025 0.3  0.0 - 0.5 % Final    Gran # (ANC) 03/13/2025 7.1  1.8 - 7.7 K/uL Final    Immature Grans (Abs) 03/13/2025 0.04  0.00 - 0.04 K/uL Final    Comment: Mild elevation in immature granulocytes is non specific and   can be seen in a variety of conditions  including stress response,   acute inflammation, trauma and pregnancy. Correlation with other   laboratory and clinical findings is essential.      Lymph # 03/13/2025 3.5  1.0 - 4.8 K/uL Final    Mono # 03/13/2025 0.8  0.3 - 1.0 K/uL Final    Eos # 03/13/2025 0.2  0.0 - 0.5 K/uL Final    Baso # 03/13/2025 0.03  0.00 - 0.20 K/uL Final    nRBC 03/13/2025 0  0 /100 WBC Final    Gran % 03/13/2025 61.2  38.0 - 73.0 % Final    Lymph % 03/13/2025 30.1  18.0 - 48.0 % Final    Mono % 03/13/2025 6.8  4.0 - 15.0 % Final    Eosinophil % 03/13/2025 1.3  0.0 - 8.0 % Final    Basophil % 03/13/2025 0.3  0.0 - 1.9 % Final    Differential Method 03/13/2025 Automated   Final    POCT Glucose 03/13/2025 135 (H)  70 - 110 mg/dL Final      Assessment:       1. Type 2 diabetes mellitus without complication, without long-term current use of insulin    2. Perioral dermatitis    3. Constipation, unspecified constipation type    4. Primary hypertension        Plan:       Type 2 diabetes mellitus without complication, without long-term current use of insulin  -     Ambulatory referral/consult to Ophthalmology; Future; Expected date: 05/23/2025  -     Foot Exam Performed  -     metFORMIN (GLUCOPHAGE) 500 MG tablet; Take 1 tablet (500 mg total) by mouth daily with breakfast.  Dispense: 90 tablet; Refill: 1  -     tirzepatide 10 mg/0.5 mL PnIj; Inject 10 mg into the skin every 7 days.  Dispense: 12 Pen; Refill: 0    Perioral dermatitis  -     azelaic acid (AZELEX) 15 % gel; Apply topically 2 (two) times a day.  Dispense: 50 g; Refill: 0    Constipation, unspecified constipation type  -     psyllium husk (METAMUCIL) 3.4 gram/5.4 gram Powd; 1-2 scoop po daily  Dispense: 660 g; Refill: 0    Primary hypertension  -     amLODIPine (NORVASC) 5 MG tablet; Take 1 tablet (5 mg total) by mouth once daily.  Dispense: 90 tablet; Refill: 1  -     lisinopriL (PRINIVIL,ZESTRIL) 20 MG tablet; Take 1 tablet (20 mg total) by mouth once daily.  Dispense: 90 tablet;  Refill: 1      Assessment & Plan    HYPERTENSION:  - Evaluated blood pressure management, noting good control on current regimen.  - Blood pressure looks great overall.  - Continued amlodipine 5 mg, lisinopril 25 mg, and metoprolol twice daily.    TYPE 2 DIABETES:  - Assessed weight loss progress on current dose, determining no need to increase due to satisfactory weight loss of more than 1 pound per week.  -- Reviewed recent lab results, including A1C of 6.1 from March, finding them satisfactory.  - [place]asper to continue current dietary improvements for weight loss.    CONSTIPATION:  - Jasper reports bowel movements every other day   - Discussed the importance of regular bowel movements   - recommend Miralax daily for constipation or metamucil as an alternative option to help with bowel movements.    PERIORAL DERMATITIS:  - Jasper reports a recurring rash   - Diagnosed perioral dermatitis as likely cause of facial rash.  - Prescribed med to be applied topically twice daily.          Follow up in about 4 months (around 9/23/2025) for DM .  This note was generated with the assistance of ambient listening technology. Verbal consent was obtained by the patient and accompanying visitor(s) for the recording of patient appointment to facilitate this note. I attest to having reviewed and edited the generated note for accuracy, though some syntax or spelling errors may persist. Please contact the author of this note for any clarification.        5/23/2025 CHRISTOPHE Chao, NATALIAP             [1]   Current Outpatient Medications   Medication Sig Dispense Refill    acetaminophen (TYLENOL) 500 MG tablet Take 500 mg by mouth every 6 (six) hours as needed for Pain. [id number] daily      ARIPiprazole (ABILIFY) 5 MG Tab Take 5 mg by mouth.      atorvastatin (LIPITOR) 10 MG tablet Take 1 tablet (10 mg total) by mouth once daily. 30 tablet 2    citalopram (CELEXA) 20 MG tablet Take 20 mg by mouth once daily.      dicyclomine (BENTYL) 20 mg  tablet Take 1 tablet (20 mg total) by mouth every 8 (eight) hours as needed. 90 tablet 0    LORazepam (ATIVAN) 1 MG tablet Take 1 mg by mouth 3 (three) times daily as needed for Anxiety.      metoprolol succinate (TOPROL-XL) 100 MG 24 hr tablet Take 1 tablet (100 mg total) by mouth 2 (two) times daily. 60 tablet 1    ondansetron (ZOFRAN) 4 MG tablet Take 1 tablet (4 mg total) by mouth every 12 (twelve) hours as needed for Nausea. 20 tablet 0    amLODIPine (NORVASC) 5 MG tablet Take 1 tablet (5 mg total) by mouth once daily. 90 tablet 1    azelaic acid (AZELEX) 15 % gel Apply topically 2 (two) times a day. 50 g 0    blood sugar diagnostic Strp To check BG one times daily, to use with insurance preferred meter (Patient not taking: Reported on 3/14/2025) 100 each 0    blood-glucose meter kit To check BG one times daily, to use with insurance preferred meter (Patient not taking: Reported on 3/14/2025) 1 each 0    lancets Misc To check BG one times daily, to use with insurance preferred meter 100 each 0    lisinopriL (PRINIVIL,ZESTRIL) 20 MG tablet Take 1 tablet (20 mg total) by mouth once daily. 90 tablet 1    metFORMIN (GLUCOPHAGE) 500 MG tablet Take 1 tablet (500 mg total) by mouth daily with breakfast. 90 tablet 1    psyllium husk (METAMUCIL) 3.4 gram/5.4 gram Powd 1-2 scoop po daily 660 g 0    tirzepatide 10 mg/0.5 mL PnIj Inject 10 mg into the skin every 7 days. 12 Pen 0     Current Facility-Administered Medications   Medication Dose Route Frequency Provider Last Rate Last Admin    buprenorphine extended release (Sublocade) 300 mg/1.5 mL injection 300 mg  300 mg Subcutaneous Q30 Days ELINOR Chirinos,    300 mg at 04/28/25 4553

## 2025-05-28 ENCOUNTER — PATIENT MESSAGE (OUTPATIENT)
Facility: CLINIC | Age: 35
End: 2025-05-28
Payer: MEDICAID

## 2025-06-03 ENCOUNTER — OFFICE VISIT (OUTPATIENT)
Facility: CLINIC | Age: 35
End: 2025-06-03
Payer: MEDICAID

## 2025-06-03 DIAGNOSIS — G89.4 CHRONIC PAIN SYNDROME: ICD-10-CM

## 2025-06-03 DIAGNOSIS — G89.29 CHRONIC BILATERAL LOW BACK PAIN WITHOUT SCIATICA: Primary | ICD-10-CM

## 2025-06-03 DIAGNOSIS — M54.50 CHRONIC BILATERAL LOW BACK PAIN WITHOUT SCIATICA: Primary | ICD-10-CM

## 2025-06-03 PROCEDURE — 1160F RVW MEDS BY RX/DR IN RCRD: CPT | Mod: CPTII,95,, | Performed by: NEUROMUSCULOSKELETAL MEDICINE & OMM

## 2025-06-03 PROCEDURE — 1159F MED LIST DOCD IN RCRD: CPT | Mod: CPTII,95,, | Performed by: NEUROMUSCULOSKELETAL MEDICINE & OMM

## 2025-06-03 PROCEDURE — G2211 COMPLEX E/M VISIT ADD ON: HCPCS | Mod: 95,,, | Performed by: NEUROMUSCULOSKELETAL MEDICINE & OMM

## 2025-06-03 PROCEDURE — 4010F ACE/ARB THERAPY RXD/TAKEN: CPT | Mod: CPTII,95,, | Performed by: NEUROMUSCULOSKELETAL MEDICINE & OMM

## 2025-06-03 PROCEDURE — 3044F HG A1C LEVEL LT 7.0%: CPT | Mod: CPTII,95,, | Performed by: NEUROMUSCULOSKELETAL MEDICINE & OMM

## 2025-06-03 PROCEDURE — 98006 SYNCH AUDIO-VIDEO EST MOD 30: CPT | Mod: 95,,, | Performed by: NEUROMUSCULOSKELETAL MEDICINE & OMM

## 2025-06-08 ENCOUNTER — PATIENT MESSAGE (OUTPATIENT)
Dept: FAMILY MEDICINE | Facility: CLINIC | Age: 35
End: 2025-06-08
Payer: MEDICAID

## 2025-06-08 DIAGNOSIS — E11.9 TYPE 2 DIABETES MELLITUS WITHOUT COMPLICATION, WITHOUT LONG-TERM CURRENT USE OF INSULIN: ICD-10-CM

## 2025-06-09 ENCOUNTER — PATIENT MESSAGE (OUTPATIENT)
Dept: FAMILY MEDICINE | Facility: CLINIC | Age: 35
End: 2025-06-09
Payer: MEDICAID

## 2025-06-09 NOTE — TELEPHONE ENCOUNTER
At her recent visit we discussed continuing current dose as she is getting good benefit on A1c reduction and weight loss at this dose

## 2025-06-11 ENCOUNTER — PATIENT MESSAGE (OUTPATIENT)
Dept: FAMILY MEDICINE | Facility: CLINIC | Age: 35
End: 2025-06-11
Payer: MEDICAID

## 2025-06-11 DIAGNOSIS — R21 RASH: Primary | ICD-10-CM

## 2025-06-16 ENCOUNTER — PATIENT MESSAGE (OUTPATIENT)
Dept: FAMILY MEDICINE | Facility: CLINIC | Age: 35
End: 2025-06-16
Payer: MEDICAID

## 2025-06-24 DIAGNOSIS — E11.9 TYPE 2 DIABETES MELLITUS WITHOUT COMPLICATION, WITHOUT LONG-TERM CURRENT USE OF INSULIN: ICD-10-CM

## 2025-06-24 DIAGNOSIS — E78.5 HYPERLIPIDEMIA, UNSPECIFIED HYPERLIPIDEMIA TYPE: ICD-10-CM

## 2025-06-24 RX ORDER — METFORMIN HYDROCHLORIDE 500 MG/1
500 TABLET ORAL
Qty: 90 TABLET | Refills: 1 | Status: SHIPPED | OUTPATIENT
Start: 2025-06-24

## 2025-06-24 RX ORDER — ATORVASTATIN CALCIUM 10 MG/1
10 TABLET, FILM COATED ORAL DAILY
Qty: 90 TABLET | Refills: 1 | Status: SHIPPED | OUTPATIENT
Start: 2025-06-24

## 2025-06-27 ENCOUNTER — OFFICE VISIT (OUTPATIENT)
Facility: CLINIC | Age: 35
End: 2025-06-27
Payer: MEDICAID

## 2025-06-27 ENCOUNTER — PATIENT MESSAGE (OUTPATIENT)
Facility: CLINIC | Age: 35
End: 2025-06-27
Payer: MEDICAID

## 2025-06-27 VITALS
BODY MASS INDEX: 43.49 KG/M2 | HEART RATE: 100 BPM | SYSTOLIC BLOOD PRESSURE: 123 MMHG | WEIGHT: 261 LBS | HEIGHT: 65 IN | DIASTOLIC BLOOD PRESSURE: 80 MMHG

## 2025-06-27 DIAGNOSIS — G89.4 CHRONIC PAIN SYNDROME: ICD-10-CM

## 2025-06-27 DIAGNOSIS — M54.50 CHRONIC BILATERAL LOW BACK PAIN WITHOUT SCIATICA: Primary | ICD-10-CM

## 2025-06-27 DIAGNOSIS — G89.29 CHRONIC BILATERAL LOW BACK PAIN WITHOUT SCIATICA: Primary | ICD-10-CM

## 2025-06-27 DIAGNOSIS — M99.03 SOMATIC DYSFUNCTION OF LUMBAR REGION: ICD-10-CM

## 2025-06-27 DIAGNOSIS — M99.04 SOMATIC DYSFUNCTION OF SACRAL REGION: ICD-10-CM

## 2025-06-27 DIAGNOSIS — M99.05 SOMATIC DYSFUNCTION OF PELVIS REGION: ICD-10-CM

## 2025-06-27 PROBLEM — Z79.891 CHRONICALLY ON OPIATE THERAPY: Status: RESOLVED | Noted: 2018-03-21 | Resolved: 2025-06-27

## 2025-06-27 PROBLEM — Z79.891 CHRONIC PRESCRIPTION OPIATE USE: Status: RESOLVED | Noted: 2023-12-12 | Resolved: 2025-06-27

## 2025-06-27 PROCEDURE — 99214 OFFICE O/P EST MOD 30 MIN: CPT | Mod: PBBFAC,PN | Performed by: NEUROMUSCULOSKELETAL MEDICINE & OMM

## 2025-06-27 PROCEDURE — 99999 PR PBB SHADOW E&M-EST. PATIENT-LVL IV: CPT | Mod: PBBFAC,,, | Performed by: NEUROMUSCULOSKELETAL MEDICINE & OMM

## 2025-06-27 NOTE — PROGRESS NOTES
"     Addiction Medicine  ESTABLISHED PATIENT EVALUATION    Date of Service: 2025    Name:  Jasper Guerrero   MRN:  [id number]   :  1990   Address:  [address] [place] [id number]   Phone:  [phone]        Subjective     2025     History of Present Illness    Patient presents today for follow up. She reports chronic back pain rated 4-5/10. She denies current neck or TMJ problems. She reports intentional weight loss of 60 lbs with significant improvement in overall well-being. She has history of opioid dependence, previously treated with buprenorphine and Sublocade shot. She has successfully tapered off  of all opioids without ongoing issues. Though she was physiologically dependent she was not addicted. She also has history of tizanidine dependence, which was managed with evening guanfacine taper with significant symptomatic improvement. She is no longer taking metoprolol and reports resolution of previous drowsiness. Her blood pressure remains stable.             Objective     Vitals:    25 0857   BP: 123/80   Pulse: 100   Weight: 118.4 kg (261 lb 0.4 oz)   Height: 5' 5" (1.651 m)        Physical Exam:  No acute distress. Normal appearance. No scleral icterus. Conjunctivae normal. Pulmonary effort is normal. Nor respiratory distress. Alert and oriented x 3. Attention and perception appear normal. Mood and affect appear normal. Speech normal. Patient is cooperative.  Sensation intact in bilateral upper and lower extremities.  Motor strength 5/5 in bilateral upper and lower extremities.  DTRs normal in bilateral upper and lower extremities.  Somatic dysfunctions were found during today's physical exam. These dysfunctions are documented in the Osteopathic Manipulative Treatment (OMT) procedure note under the Assessment/Plan portion of today's note.       Assessment / Plan     M54.50, G89.29 Chronic bilateral low back pain without sciatica  G89.4 Chronic pain syndrome  M99.03 " Somatic dysfunction of lumbar region  M99.04 Somatic dysfunction of sacral region  M99.05 Somatic dysfunction of pelvis region    Chronic back pain, currently rated at 4-5/10. Following osteopathic manipulative treatment (OMT), pain improved to 0/10.   Pain likely related to mechanical dysfunctions found on exam.  Noted successful management of previous opioid and tizanidine dependencies.  Observed intentional 60-pound weight loss and overall health improvement.  Recognized stable BP without metoprolol.  Explained the likely mechanical nature of the back pain.  Recommend continued manual therapy, either through OMT or chiropractic care, for pain management.    PLAN:  Performed OMT on pelvic regions, lumbar spine, and sacral spine  Consider referral to local chiropractor for continued manual therapy  Follow up as needed for OMT         Osteopathic Manipulative Treatment - Procedure Note     Indications Use of OMT was determined to be medically necessary based on the patient's complaints and somatic dysfunctions found on today's structural examination, documented below.     Consent After discussing potential risks and benefits of OMT and offering the patient alternative options, the patient gave written consent for treatment. The patient was offered the option of having a chaperone present during the procedure. The patient's signed consent form is held at the clinic location where the procedure was provided. Goals of treatment include relieving pain, increasing functional movement, and removing impediments to arterial supply, venous and lymphatic return, and normal nerve transmission in the affected area.     Structural Examination Lumbar:  T.A.R.T. criteria met. Dysfunctions present: BILATERAL paraspinal TTA and LEFT quadratus lumborum TTA/T. Techniques used to treat this region: SCS, LVMA, and BLT.  Sacral:  T.A.R.T. criteria met. Dysfunctions present: RIGHT SIJ hypomobility, LEFT SIJ hypomobility, L/R Sacral  Torsion, RIGHT sacroiliac ligament TTA/T, and LEFT sacroiliac ligament TTA/T. Techniques used to treat this region: LVMA, BLT, HVLA, and ME.  Pelvic:  T.A.R.T. criteria met. Dysfunctions present: LEFT piriformis TTA/T and LEFT gluteus medius TTA/T. Techniques used to treat this region: SCS.     Procedure  The patient was placed initially in the prone position and the regions were palpated for tissue texture abnormalities, tenderness, motion restrictions, and asymmetries. The patient's position was changed as necessary for treatment of individual somatic dysfunctions.The selection of technique and regional applications were guided by patient feedback and palpatory changes during the procedure. The treatments provided are described in each region evaluated as listed above.     Disposition The procedure was well tolerated by the patient. The patient indicated marked improvement in symptoms at the end of the encounter.      Education/  Counseling Addressed expectations.               ELINOR Chirinos DO    Board Certified, Addiction Medicine  Board Certified, Neuromusculoskeletal Medicine and OMM    Portions of this documentation may have been dictated using voice recognition software and may contain dictation related errors in spelling / grammar / syntax not discovered on text review.         Subsequent patient encounter.  Clinic encounter.  21633: Office E&M of an ESTABLISHED patient, level 4, (30 to 39 minutes)  14964 - OMT; 3-4 body regions involved  I spent a total of at least 30 minutes evaluating and managing the patient on the day of the service. This includes time reviewing the patient's chart, performing a history and physical examination, patient education and counseling, documentation, and care coordination. Any time spent performing same day procedures is not included in the calculation for level of care.

## 2025-06-28 DIAGNOSIS — I10 PRIMARY HYPERTENSION: ICD-10-CM

## 2025-06-28 NOTE — PATIENT INSTRUCTIONS
WORKUP:  -- none     PREVENTION (use daily regardless of headache):  -- none at this time  -- would be OK to resume trileptal during breastfeeding     ACUTE TREATMENT:  -- reduce percocet slightly (by 2.5mg per day - very tiny change) - 5mg/325mg 4x per day as needed #120, 4/30-5/30/18 (paper)   
Not applicable

## 2025-06-30 RX ORDER — METOPROLOL SUCCINATE 100 MG/1
100 TABLET, EXTENDED RELEASE ORAL 2 TIMES DAILY
Qty: 60 TABLET | Refills: 1 | Status: SHIPPED | OUTPATIENT
Start: 2025-06-30

## 2025-07-06 DIAGNOSIS — E11.9 TYPE 2 DIABETES MELLITUS WITHOUT COMPLICATION, WITHOUT LONG-TERM CURRENT USE OF INSULIN: ICD-10-CM

## 2025-07-06 DIAGNOSIS — R11.0 NAUSEA: ICD-10-CM

## 2025-07-07 ENCOUNTER — PATIENT MESSAGE (OUTPATIENT)
Dept: FAMILY MEDICINE | Facility: CLINIC | Age: 35
End: 2025-07-07
Payer: MEDICAID

## 2025-07-09 NOTE — TELEPHONE ENCOUNTER
Looks like she is still taking zofran regularly for nausea symptoms so will not increase at this time

## 2025-07-10 ENCOUNTER — PATIENT MESSAGE (OUTPATIENT)
Dept: FAMILY MEDICINE | Facility: CLINIC | Age: 35
End: 2025-07-10
Payer: MEDICAID

## 2025-07-10 RX ORDER — ONDANSETRON 4 MG/1
4 TABLET, FILM COATED ORAL EVERY 12 HOURS PRN
Qty: 20 TABLET | Refills: 0 | OUTPATIENT
Start: 2025-07-10

## 2025-07-10 RX ORDER — METFORMIN HYDROCHLORIDE 500 MG/1
500 TABLET ORAL
Qty: 90 TABLET | Refills: 0 | Status: SHIPPED | OUTPATIENT
Start: 2025-07-10

## 2025-08-04 ENCOUNTER — PATIENT MESSAGE (OUTPATIENT)
Dept: FAMILY MEDICINE | Facility: CLINIC | Age: 35
End: 2025-08-04
Payer: MEDICAID

## 2025-08-05 ENCOUNTER — PATIENT MESSAGE (OUTPATIENT)
Dept: FAMILY MEDICINE | Facility: CLINIC | Age: 35
End: 2025-08-05
Payer: MEDICAID

## 2025-08-05 DIAGNOSIS — E11.9 TYPE 2 DIABETES MELLITUS WITHOUT COMPLICATION, WITHOUT LONG-TERM CURRENT USE OF INSULIN: ICD-10-CM

## 2025-08-06 DIAGNOSIS — R11.0 NAUSEA: ICD-10-CM

## 2025-08-06 RX ORDER — ONDANSETRON 4 MG/1
4 TABLET, FILM COATED ORAL EVERY 12 HOURS PRN
Qty: 20 TABLET | Refills: 0 | Status: SHIPPED | OUTPATIENT
Start: 2025-08-06

## 2025-08-09 ENCOUNTER — HOSPITAL ENCOUNTER (EMERGENCY)
Facility: HOSPITAL | Age: 35
Discharge: LEFT BEFORE DISCHARGE | End: 2025-08-09
Attending: EMERGENCY MEDICINE
Payer: MEDICAID

## 2025-08-09 VITALS
RESPIRATION RATE: 18 BRPM | WEIGHT: 247 LBS | HEIGHT: 65 IN | TEMPERATURE: 98 F | HEART RATE: 96 BPM | DIASTOLIC BLOOD PRESSURE: 76 MMHG | BODY MASS INDEX: 41.15 KG/M2 | SYSTOLIC BLOOD PRESSURE: 114 MMHG | OXYGEN SATURATION: 100 %

## 2025-08-09 DIAGNOSIS — M54.6 ACUTE LEFT-SIDED THORACIC BACK PAIN: Primary | ICD-10-CM

## 2025-08-09 DIAGNOSIS — S39.012A STRAIN OF LUMBAR REGION, INITIAL ENCOUNTER: ICD-10-CM

## 2025-08-09 LAB
B-HCG UR QL: NEGATIVE
CTP QC/QA: YES

## 2025-08-09 PROCEDURE — 99284 EMERGENCY DEPT VISIT MOD MDM: CPT | Mod: 25

## 2025-08-09 PROCEDURE — 81025 URINE PREGNANCY TEST: CPT | Performed by: EMERGENCY MEDICINE

## 2025-08-09 PROCEDURE — 25000003 PHARM REV CODE 250

## 2025-08-09 PROCEDURE — 63600175 PHARM REV CODE 636 W HCPCS

## 2025-08-09 RX ORDER — LIDOCAINE 50 MG/G
1 PATCH TOPICAL DAILY
Qty: 10 PATCH | Refills: 0 | Status: SHIPPED | OUTPATIENT
Start: 2025-08-09 | End: 2025-08-19

## 2025-08-09 RX ORDER — ACETAMINOPHEN 500 MG
1000 TABLET ORAL
Status: DISCONTINUED | OUTPATIENT
Start: 2025-08-09 | End: 2025-08-09

## 2025-08-09 RX ORDER — PREDNISONE 20 MG/1
20 TABLET ORAL DAILY
Qty: 3 TABLET | Refills: 0 | Status: SHIPPED | OUTPATIENT
Start: 2025-08-09 | End: 2025-08-12

## 2025-08-09 RX ORDER — PREDNISONE 20 MG/1
40 TABLET ORAL
Status: COMPLETED | OUTPATIENT
Start: 2025-08-09 | End: 2025-08-09

## 2025-08-09 RX ORDER — HYDROCODONE BITARTRATE AND ACETAMINOPHEN 5; 325 MG/1; MG/1
1 TABLET ORAL
Refills: 0 | Status: COMPLETED | OUTPATIENT
Start: 2025-08-09 | End: 2025-08-09

## 2025-08-09 RX ORDER — METHOCARBAMOL 500 MG/1
1000 TABLET, FILM COATED ORAL
Status: COMPLETED | OUTPATIENT
Start: 2025-08-09 | End: 2025-08-09

## 2025-08-09 RX ORDER — METHOCARBAMOL 500 MG/1
1000 TABLET, FILM COATED ORAL 3 TIMES DAILY
Qty: 18 TABLET | Refills: 0 | Status: SHIPPED | OUTPATIENT
Start: 2025-08-09 | End: 2025-08-12

## 2025-08-09 RX ADMIN — HYDROCODONE BITARTRATE AND ACETAMINOPHEN 1 TABLET: 5; 325 TABLET ORAL at 07:08

## 2025-08-09 RX ADMIN — METHOCARBAMOL 1000 MG: 500 TABLET ORAL at 07:08

## 2025-08-09 RX ADMIN — PREDNISONE 40 MG: 20 TABLET ORAL at 07:08

## (undated) DEVICE — SEE MEDLINE ITEM 157131

## (undated) DEVICE — SEE MEDLINE ITEM 152487

## (undated) DEVICE — BANDAGE ESMARK LATEX FREE 4INX

## (undated) DEVICE — SYR 10CC LUER LOCK

## (undated) DEVICE — DRESSING N ADH OIL EMUL 3X3

## (undated) DEVICE — CONTAINER SPECIMEN STRL 4OZ

## (undated) DEVICE — BRUSH SCRUB HIBICLENS 4%

## (undated) DEVICE — TRAY DRY SPONGE SCRUB W FOAM

## (undated) DEVICE — GLOVE SURG ULTRA TOUCH 7

## (undated) DEVICE — SPONGE SUPER KERLIX 6X6.75IN

## (undated) DEVICE — COVER SURG LIGHT HANDLE

## (undated) DEVICE — PAD CAST SPECIALIST STRL 6

## (undated) DEVICE — TOURNIQUET SB QC DP 18X4IN

## (undated) DEVICE — BLADE SURG #15 CARBON STEEL

## (undated) DEVICE — CATH RED RUBBER 8FR

## (undated) DEVICE — PADDING CAST 4IN SPECIALIST

## (undated) DEVICE — SEE MEDLINE ITEM 157171

## (undated) DEVICE — DRESSING GAUZE 6PLY 4X4

## (undated) DEVICE — SCRUB HIBICLENS 4% CHG 4OZ

## (undated) DEVICE — SOL 9P NACL IRR PIC IL

## (undated) DEVICE — SPONGE GAUZE 16PLY 4X4

## (undated) DEVICE — SEE MEDLINE ITEM 146308

## (undated) DEVICE — GOWN X-LG STERILE BACK

## (undated) DEVICE — GLOVE SURG ULTRA TOUCH 8

## (undated) DEVICE — SLING ARM STP PAD BLUE XL 9X22

## (undated) DEVICE — SEE MEDLINE ITEM 152622

## (undated) DEVICE — SUT ETHILON 3-0 PS2 18 BLK

## (undated) DEVICE — STRAP OR TABLE 5IN X 72IN

## (undated) DEVICE — NDL SAFETY 25G X 1.5 ECLIPSE

## (undated) DEVICE — SEE MEDLINE ITEM 146270

## (undated) DEVICE — ALCOHOL 70% ISOP RUBBING 4OZ

## (undated) DEVICE — ELECTRODE REM PLYHSV RETURN 9

## (undated) DEVICE — SKINMARKER W/RULER DEVON

## (undated) DEVICE — NDL BOX COUNTER

## (undated) DEVICE — PAD CAST SPECIALIST STRL 4

## (undated) DEVICE — PACK BASIC